# Patient Record
Sex: MALE | Race: WHITE | Employment: OTHER | URBAN - METROPOLITAN AREA
[De-identification: names, ages, dates, MRNs, and addresses within clinical notes are randomized per-mention and may not be internally consistent; named-entity substitution may affect disease eponyms.]

---

## 2017-06-12 ENCOUNTER — DOCUMENTATION ONLY (OUTPATIENT)
Dept: FAMILY MEDICINE CLINIC | Age: 82
End: 2017-06-12

## 2017-06-12 NOTE — PROGRESS NOTES
[address] [place] [address] Registry status was put on LPN [employer] desk to process

## 2017-08-03 RX ORDER — BISOPROLOL FUMARATE AND HYDROCHLOROTHIAZIDE 10; 6.25 MG/1; MG/1
1 TABLET ORAL DAILY
Qty: 30 TAB | Refills: 5 | Status: SHIPPED | OUTPATIENT
Start: 2017-08-03 | End: 2020-11-11

## 2017-12-20 ENCOUNTER — OFFICE VISIT (OUTPATIENT)
Dept: FAMILY MEDICINE CLINIC | Age: 82
End: 2017-12-20

## 2017-12-20 VITALS
WEIGHT: 149.2 LBS | HEART RATE: 54 BPM | OXYGEN SATURATION: 98 % | SYSTOLIC BLOOD PRESSURE: 155 MMHG | RESPIRATION RATE: 16 BRPM | BODY MASS INDEX: 29.29 KG/M2 | HEIGHT: 60 IN | TEMPERATURE: 97.7 F | DIASTOLIC BLOOD PRESSURE: 82 MMHG

## 2017-12-20 DIAGNOSIS — Z00.00 PHYSICAL EXAM: Primary | ICD-10-CM

## 2017-12-20 DIAGNOSIS — I10 ESSENTIAL HYPERTENSION: ICD-10-CM

## 2017-12-20 DIAGNOSIS — C61 PROSTATE CA (HCC): ICD-10-CM

## 2017-12-20 RX ORDER — ATENOLOL 50 MG/1
50 TABLET ORAL DAILY
Qty: 90 TAB | Refills: 3 | Status: SHIPPED | OUTPATIENT
Start: 2017-12-20 | End: 2018-12-31 | Stop reason: SDUPTHER

## 2017-12-20 RX ORDER — ALLOPURINOL 300 MG/1
300 TABLET ORAL DAILY
Qty: 90 TAB | Refills: 3 | Status: SHIPPED | OUTPATIENT
Start: 2017-12-20 | End: 2018-12-31 | Stop reason: SDUPTHER

## 2017-12-20 RX ORDER — ATENOLOL 50 MG/1
TABLET ORAL
COMMUNITY
Start: 2017-12-16 | End: 2017-12-20 | Stop reason: SDUPTHER

## 2017-12-20 NOTE — MR AVS SNAPSHOT
Visit Information Date & Time Provider Department Dept. Phone Encounter #  
 12/20/2017  8:30 AM Antoine Merchant [phone] [id number] Follow-up Instructions Return in about 1 year (around 12/20/2018), or if symptoms worsen or fail to improve. Upcoming Health Maintenance Date Due Pneumococcal 65+ High/Highest Risk (2 of 2 - PPSV23) 1/4/2012 MEDICARE YEARLY EXAM 12/20/2017 GLAUCOMA SCREENING Q2Y 12/19/2018 DTaP/Tdap/Td series (2 - Td) 12/19/2026 Allergies as of 12/20/2017  Review Complete On: 12/20/2017 By: Corina HANSON Rash, LPN Severity Noted Reaction Type Reactions Pcn [Penicillins]  01/04/2011    Swelling Current Immunizations  Reviewed on 12/7/2015 Name Date Influenza High Dose Vaccine PF 12/19/2016 Influenza Vaccine 10/30/2015 Influenza Vaccine Split 12/12/2012 ZZZ-RETIRED (DO NOT USE) Pneumococcal Vaccine (Unspecified Type) 1/4/2007 Zoster Vaccine, Live 10/30/2014 Not reviewed this visit You Were Diagnosed With   
  
 Codes Comments Physical exam    -  Primary ICD-10-CM: Z00.00 ICD-9-CM: V70.9 Prostate CA [facility])     ICD-10-CM: Q17 ICD-9-CM: 829 Vitals BP Pulse Temp Resp Height(growth percentile) Weight(growth percentile) 155/82 (BP 1 Location: Right arm, BP Patient Position: Sitting) (!) 54 97.7 °F (36.5 °C) (Oral) 16 5' (1.524 m) 149 lb 3.2 oz (67.7 kg) SpO2 BMI Smoking Status 98% 29.14 kg/m2 Never Smoker BMI and BSA Data Body Mass Index Body Surface Area  
 29.14 kg/m 2 1.69 m 2 Preferred Pharmacy Pharmacy Name Phone [phone] [place], [address] [phone] Your Updated Medication List  
  
   
This list is accurate as of: 12/20/17  8:51 AM.  Always use your most recent med list.  
  
  
  
  
 allopurinol 300 mg tablet Commonly known as:  Reyes Zhu Take 1 Tab by mouth daily. atenolol 50 mg tablet Commonly known as:  TENORMIN  
  
 bisoprolol-hydroCHLOROthiazide 10-6.25 mg per tablet Commonly known as:  [facility] Take 1 Tab by mouth daily. We Performed the Following CBC WITH AUTOMATED DIFF [12393 CPT(R)] LIPID PANEL [68302 CPT(R)] METABOLIC PANEL, COMPREHENSIVE [35048 CPT(R)] PSA, DIAGNOSTIC (PROSTATE SPECIFIC AG) [id number] CPT(R)] TSH 3RD GENERATION [25248 CPT(R)] Follow-up Instructions Return in about 1 year (around 12/20/2018), or if symptoms worsen or fail to improve. Patient Instructions A Healthy Lifestyle: Care Instructions Your Care Instructions A healthy lifestyle can help you feel good, stay at a healthy weight, and have plenty of energy for both work and play. A healthy lifestyle is something you can share with your whole family. A healthy lifestyle also can lower your risk for serious health problems, such as high blood pressure, heart disease, and diabetes. You can follow a few steps listed below to improve your health and the health of your family. Follow-up care is a key part of your treatment and safety. Be sure to make and go to all appointments, and call your doctor if you are having problems. It's also a good idea to know your test results and keep a list of the medicines you take. How can you care for yourself at home? · Do not eat too much sugar, fat, or fast foods. You can still have dessert and treats now and then. The goal is moderation. · Start small to improve your eating habits. Pay attention to portion sizes, drink less juice and soda pop, and eat more fruits and vegetables. ¨ Eat a healthy amount of food. A 3-ounce serving of meat, for example, is about the size of a deck of cards. Fill the rest of your plate with vegetables and whole grains. ¨ Limit the amount of soda and sports drinks you have every day. Drink more water when you are thirsty. ¨ Eat at least 5 servings of fruits and vegetables every day. It may seem like a lot, but it is not hard to reach this goal. A serving or helping is 1 piece of fruit, 1 cup of vegetables, or 2 cups of leafy, raw vegetables. Have an apple or some carrot sticks as an afternoon snack instead of a candy bar. Try to have fruits and/or vegetables at every meal. 
· Make exercise part of your daily routine. You may want to start with simple activities, such as walking, bicycling, or slow swimming. Try to be active 30 to 60 minutes every day. You do not need to do all 30 to 60 minutes all at once. For example, you can exercise 3 times a day for 10 or 20 minutes. Moderate exercise is safe for most people, but it is always a good idea to talk to your doctor before starting an exercise program. 
· Keep moving. [employer] the lawn, work in the garden, or [employer]. Take the stairs instead of the elevator at work. · If you smoke, quit. People who smoke have an increased risk for heart attack, stroke, cancer, and other lung illnesses. Quitting is hard, but there are ways to boost your chance of quitting tobacco for good. ¨ Use nicotine gum, patches, or lozenges. ¨ Ask your doctor about stop-smoking programs and medicines. ¨ Keep trying. In addition to reducing your risk of diseases in the future, you will notice some benefits soon after you stop using tobacco. If you have shortness of breath or asthma symptoms, they will likely get better within a few weeks after you quit. · Limit how much alcohol you drink. Moderate amounts of alcohol (up to 2 drinks a day for men, 1 drink a day for women) are okay. But drinking too much can lead to liver problems, high blood pressure, and other health problems. Family health If you have a family, there are many things you can do together to improve your health. · Eat meals together as a family as often as possible. · Eat healthy foods.  This includes fruits, vegetables, lean meats and dairy, and whole grains. · Include your family in your fitness plan. Most people think of activities such as jogging or tennis as the way to fitness, but there are many ways you and your family can be more active. Anything that makes you breathe hard and gets your heart pumping is exercise. Here are some tips: 
¨ Walk to do errands or to take your child to school or the bus. ¨ Go for a family bike ride after dinner instead of watching TV. Where can you learn more? Go to http://courtney-sera.info/. Enter G710 in the search box to learn more about \"A Healthy Lifestyle: Care Instructions. \" Current as of: May 12, 2017 Content Version: 11.4 © 0344-6466 [employer]. Care instructions adapted under license by [employer] (which disclaims liability or warranty for this information). If you have questions about a medical condition or this instruction, always ask your healthcare professional. [place] [address] any warranty or liability for your use of this information. Introducing [facility]! [facility] introduces [employer] patient portal. Now you can access parts of your medical record, email your doctor's office, and request medication refills online. 1. In your internet browser, go to [email or web address] 2. Click on the First Time User? Click Here link in the Sign In box. You will see the New Member Sign Up page. 3. Enter your [employer] Access Code exactly as it appears below. You will not need to use this code after youve completed the sign-up process. If you do not sign up before the expiration date, you must request a new code. · [employer] Access Code: [email or web address] Expires: 3/20/2018  8:37 AM 
 
4. Enter the last four digits of your Social Security Number (xxxx) and Date of Birth (mm/dd/yyyy) as indicated and click Submit. You will be taken to the next sign-up page. 5. Create a [employer] ID. This will be your [employer] login ID and cannot be changed, so think of one that is secure and easy to remember. 6. Create a [employer] password. You can change your password at any time. 7. Enter your Password Reset Question and Answer. This can be used at a later time if you forget your password. 8. Enter your e-mail address. You will receive e-mail notification when new information is available in [address] 9. Click Sign Up. You can now view and download portions of your medical record. 10. Click the Download Summary menu link to download a portable copy of your medical information. If you have questions, please visit the Frequently Asked Questions section of the [employer] website. Remember, [employer] is NOT to be used for urgent needs. For medical emergencies, dial 911. Now available from your iPhone and Android! Please provide this summary of care documentation to your next provider. Your primary care clinician is listed as TYSHAWN BRICEÑO. If you have any questions after today's visit, please call [phone].

## 2017-12-20 NOTE — PROGRESS NOTES
Sharon Alvarez is a 80 y.o. male  Chief Complaint   Patient presents with    Complete Physical     1. Have you been to an emergency room, urgent clinic, or hospitalized since your last visit? NO  If yes, where when, and reason for visit? 2. Have seen or consulted any other health care provider since your last visit? NO  Please include any pap smears or colon screening in this section  If yes, where when, and reason for visit?

## 2017-12-20 NOTE — PATIENT INSTRUCTIONS

## 2017-12-20 NOTE — PROGRESS NOTES
Wayne Tayolr is a 80 y.o. male   Chief Complaint   Patient presents with    Complete Physical    pt here for CPE and is othwerwise doing well. Chief Complaint   Patient presents with    Complete Physical     he is a 80y.o. year old male who presents for evalution. Reviewed PmHx, RxHx, FmHx, SocHx, AllgHx and updated and dated in the chart. Review of Systems - negative except as listed above in the HPI    Objective:     Vitals:    12/20/17 0829   BP: 155/82   Pulse: (!) 54   Resp: 16   Temp: 97.7 °F (36.5 °C)   TempSrc: Oral   SpO2: 98%   Weight: 149 lb 3.2 oz (67.7 kg)   Height: 5' (1.524 m)     Physical Examination: General appearance - alert, well appearing, and in no distress  Mental status - alert, oriented to person, place, and time  Eyes - pupils equal and reactive, extraocular eye movements intact  Ears - bilateral TM's and external ear canals normal  Nose - normal and patent, no erythema, discharge or polyps  Mouth - mucous membranes moist, pharynx normal without lesions  Neck - supple, no significant adenopathy  Lymphatics - no palpable lymphadenopathy, no hepatosplenomegaly  Chest - clear to auscultation, no wheezes, rales or rhonchi, symmetric air entry  Heart - normal rate, regular rhythm, normal S1, S2, no murmurs, rubs, clicks or gallops  Abdomen - soft, nontender, nondistended, no masses or organomegaly  Back exam - full range of motion, no tenderness, palpable spasm or pain on motion  Neurological - alert, oriented, normal speech, no focal findings or movement disorder noted  Musculoskeletal - no joint tenderness, deformity or swelling  Extremities - peripheral pulses normal, no pedal edema, no clubbing or cyanosis      Assessment/ Plan:   Diagnoses and all orders for this visit:    1. Physical exam  -     CBC WITH AUTOMATED DIFF  -     METABOLIC PANEL, COMPREHENSIVE  -     TSH 3RD GENERATION  -     LIPID PANEL  -     PROSTATE SPECIFIC AG    2.  Prostate CA ([facility] [address]  -     PROSTATE SPECIFIC AG     Pt wants to be mailed a copy of labs does not want to be called  -Patient is in good health  -Discussed with patient cancer risk factors and screens needed  -Patient needs a colonoscopy no  -Labs from previous visits were discussed with patient yes  -Discussed with patient diet and exercise=yes  -Discussed with patient testicular (male)/breast self exam (female)= yes  Follow-up Disposition:  Return in about 1 year (around 12/20/2018), or if symptoms worsen or fail to improve. I have discussed the diagnosis with the patient and the intended plan as seen in the above orders. The patient has received an after-visit summary and questions were answered concerning future plans. Pt conveyed understanding. Medication Side Effects and Warnings were discussed with patient: yes  Patient Labs were reviewed and or requested: yes  Patient Past Records were reviewed and or requested  yes    Patient Instructions   A Healthy Lifestyle: Care Instructions  Your Care Instructions    A healthy lifestyle can help you feel good, stay at a healthy weight, and have plenty of energy for both work and play. A healthy lifestyle is something you can share with your whole family. A healthy lifestyle also can lower your risk for serious health problems, such as high blood pressure, heart disease, and diabetes. You can follow a few steps listed below to improve your health and the health of your family. Follow-up care is a key part of your treatment and safety. Be sure to make and go to all appointments, and call your doctor if you are having problems. It's also a good idea to know your test results and keep a list of the medicines you take. How can you care for yourself at home? · Do not eat too much sugar, fat, or fast foods. You can still have dessert and treats now and then. The goal is moderation. · Start small to improve your eating habits.  Pay attention to portion sizes, drink less juice and soda pop, and eat more fruits and vegetables. ¨ Eat a healthy amount of food. A 3-ounce serving of meat, for example, is about the size of a deck of cards. Fill the rest of your plate with vegetables and whole grains. ¨ Limit the amount of soda and sports drinks you have every day. Drink more water when you are thirsty. ¨ Eat at least 5 servings of fruits and vegetables every day. It may seem like a lot, but it is not hard to reach this goal. A serving or helping is 1 piece of fruit, 1 cup of vegetables, or 2 cups of leafy, raw vegetables. Have an apple or some carrot sticks as an afternoon snack instead of a candy bar. Try to have fruits and/or vegetables at every meal.  · Make exercise part of your daily routine. You may want to start with simple activities, such as walking, bicycling, or slow swimming. Try to be active 30 to 60 minutes every day. You do not need to do all 30 to 60 minutes all at once. For example, you can exercise 3 times a day for 10 or 20 minutes. Moderate exercise is safe for most people, but it is always a good idea to talk to your doctor before starting an exercise program.  · Keep moving. [employer] the lawn, work in the garden, or [employer]. Take the stairs instead of the elevator at work. · If you smoke, quit. People who smoke have an increased risk for heart attack, stroke, cancer, and other lung illnesses. Quitting is hard, but there are ways to boost your chance of quitting tobacco for good. ¨ Use nicotine gum, patches, or lozenges. ¨ Ask your doctor about stop-smoking programs and medicines. ¨ Keep trying. In addition to reducing your risk of diseases in the future, you will notice some benefits soon after you stop using tobacco. If you have shortness of breath or asthma symptoms, they will likely get better within a few weeks after you quit. · Limit how much alcohol you drink. Moderate amounts of alcohol (up to 2 drinks a day for men, 1 drink a day for women) are okay.  But drinking too much can lead to liver problems, high blood pressure, and other health problems. Family health  If you have a family, there are many things you can do together to improve your health. · Eat meals together as a family as often as possible. · Eat healthy foods. This includes fruits, vegetables, lean meats and dairy, and whole grains. · Include your family in your fitness plan. Most people think of activities such as jogging or tennis as the way to fitness, but there are many ways you and your family can be more active. Anything that makes you breathe hard and gets your heart pumping is exercise. Here are some tips:  ¨ Walk to do errands or to take your child to school or the bus. ¨ Go for a family bike ride after dinner instead of watching TV. Where can you learn more? Go to http://courtney-sera.info/. Enter K484 in the search box to learn more about \"A Healthy Lifestyle: Care Instructions. \"  Current as of: May 12, 2017  Content Version: 11.4  © 7416-1667 Healthwise, [employer]. Care instructions adapted under license by [employer] (which disclaims liability or warranty for this information). If you have questions about a medical condition or this instruction, always ask your healthcare professional. [address] any warranty or liability for your use of this information.             Dr. Susi Espino

## 2017-12-21 LAB
ALBUMIN SERPL-MCNC: 4.4 G/DL (ref 3.5–4.7)
ALBUMIN/GLOB SERPL: 2.2 {RATIO} (ref 1.2–2.2)
ALP SERPL-CCNC: 51 IU/L (ref 39–117)
ALT SERPL-CCNC: 16 IU/L (ref 0–44)
AST SERPL-CCNC: 27 IU/L (ref 0–40)
BASOPHILS # BLD AUTO: 0 X10E3/UL (ref 0–0.2)
BASOPHILS NFR BLD AUTO: 0 %
BILIRUB SERPL-MCNC: 0.9 MG/DL (ref 0–1.2)
BUN SERPL-MCNC: 25 MG/DL (ref 8–27)
BUN/CREAT SERPL: 17 (ref 10–24)
CALCIUM SERPL-MCNC: 9.7 MG/DL (ref 8.6–10.2)
CHLORIDE SERPL-SCNC: 98 MMOL/L (ref 96–106)
CHOLEST SERPL-MCNC: 169 MG/DL (ref 100–199)
CO2 SERPL-SCNC: 24 MMOL/L (ref 18–29)
CREAT SERPL-MCNC: 1.43 MG/DL (ref 0.76–1.27)
EOSINOPHIL # BLD AUTO: 0.2 X10E3/UL (ref 0–0.4)
EOSINOPHIL NFR BLD AUTO: 4 %
ERYTHROCYTE [DISTWIDTH] IN BLOOD BY AUTOMATED COUNT: 14.5 % (ref 12.3–15.4)
GLOBULIN SER CALC-MCNC: 2 G/DL (ref 1.5–4.5)
GLUCOSE SERPL-MCNC: 85 MG/DL (ref 65–99)
HCT VFR BLD AUTO: 42.4 % (ref 37.5–51)
HDLC SERPL-MCNC: 53 MG/DL
HGB BLD-MCNC: 14.2 G/DL (ref 13–17.7)
IMM GRANULOCYTES # BLD: 0 X10E3/UL (ref 0–0.1)
IMM GRANULOCYTES NFR BLD: 0 %
INTERPRETATION, 910389: NORMAL
INTERPRETATION: NORMAL
LDLC SERPL CALC-MCNC: 105 MG/DL (ref 0–99)
LYMPHOCYTES # BLD AUTO: 1.1 X10E3/UL (ref 0.7–3.1)
LYMPHOCYTES NFR BLD AUTO: 21 %
MCH RBC QN AUTO: 32.1 PG (ref 26.6–33)
MCHC RBC AUTO-ENTMCNC: 33.5 G/DL (ref 31.5–35.7)
MCV RBC AUTO: 96 FL (ref 79–97)
MONOCYTES # BLD AUTO: 0.5 X10E3/UL (ref 0.1–0.9)
MONOCYTES NFR BLD AUTO: 10 %
NEUTROPHILS # BLD AUTO: 3.6 X10E3/UL (ref 1.4–7)
NEUTROPHILS NFR BLD AUTO: 65 %
PDF IMAGE, 910387: NORMAL
PLATELET # BLD AUTO: 200 X10E3/UL (ref 150–379)
POTASSIUM SERPL-SCNC: 4.4 MMOL/L (ref 3.5–5.2)
PROT SERPL-MCNC: 6.4 G/DL (ref 6–8.5)
PSA SERPL-MCNC: <0.1 NG/ML (ref 0–4)
RBC # BLD AUTO: 4.42 X10E6/UL (ref 4.14–5.8)
SODIUM SERPL-SCNC: 140 MMOL/L (ref 134–144)
TRIGL SERPL-MCNC: 53 MG/DL (ref 0–149)
TSH SERPL DL<=0.005 MIU/L-ACNC: 3.81 UIU/ML (ref 0.45–4.5)
VLDLC SERPL CALC-MCNC: 11 MG/DL (ref 5–40)
WBC # BLD AUTO: 5.5 X10E3/UL (ref 3.4–10.8)

## 2018-12-28 ENCOUNTER — DOCUMENTATION ONLY (OUTPATIENT)
Dept: FAMILY MEDICINE CLINIC | Age: 83
End: 2018-12-28

## 2018-12-31 ENCOUNTER — OFFICE VISIT (OUTPATIENT)
Dept: FAMILY MEDICINE CLINIC | Age: 83
End: 2018-12-31

## 2018-12-31 VITALS
HEIGHT: 60 IN | RESPIRATION RATE: 16 BRPM | SYSTOLIC BLOOD PRESSURE: 138 MMHG | WEIGHT: 153 LBS | OXYGEN SATURATION: 98 % | HEART RATE: 62 BPM | BODY MASS INDEX: 30.04 KG/M2 | DIASTOLIC BLOOD PRESSURE: 76 MMHG | TEMPERATURE: 97.7 F

## 2018-12-31 DIAGNOSIS — N18.30 CHRONIC RENAL IMPAIRMENT, STAGE 3 (MODERATE) (HCC): Chronic | ICD-10-CM

## 2018-12-31 DIAGNOSIS — C61 PROSTATE CA (HCC): ICD-10-CM

## 2018-12-31 DIAGNOSIS — Z00.00 ROUTINE GENERAL MEDICAL EXAMINATION AT A HEALTH CARE FACILITY: Primary | ICD-10-CM

## 2018-12-31 DIAGNOSIS — C85.90 LYMPHOMA, UNSPECIFIED BODY REGION, UNSPECIFIED LYMPHOMA TYPE (HCC): ICD-10-CM

## 2018-12-31 DIAGNOSIS — I10 ESSENTIAL HYPERTENSION: ICD-10-CM

## 2018-12-31 RX ORDER — ATENOLOL 50 MG/1
50 TABLET ORAL DAILY
Qty: 90 TAB | Refills: 3 | Status: SHIPPED | OUTPATIENT
Start: 2018-12-31 | End: 2020-02-27 | Stop reason: SDUPTHER

## 2018-12-31 RX ORDER — ALLOPURINOL 300 MG/1
300 TABLET ORAL DAILY
Qty: 90 TAB | Refills: 3 | Status: SHIPPED | OUTPATIENT
Start: 2018-12-31 | End: 2020-06-01

## 2018-12-31 NOTE — PROGRESS NOTES
Chief Complaint   Patient presents with    Complete Physical     [employer]    Medication Refill    Labs     fasting today     1. Have you been to the ER, urgent care clinic since your last visit? Hospitalized since your last visit? No    2. Have you seen or consulted any other health care providers outside of the [address] [place] [address] since your last visit? Include any pap smears or colon screening. Yes Where: [address]    Chief Complaint   Patient presents with    Complete Physical     [employer]    Medication Refill    Labs     fasting today     he is a 80y.o. year old male who presents for evalution. Reviewed PmHx, RxHx, FmHx, SocHx, AllgHx and updated and dated in the chart. Patient Active Problem List    Diagnosis    CRI (chronic renal insufficiency)    Lymphoma (HCC)    HTN (hypertension)    Prostate CA ([facility] [address]    Gout       Review of Systems - negative except as listed above in the HPI    Objective:     Vitals:    12/31/18 0822   BP: 138/76   Pulse: 62   Resp: 16   Temp: 97.7 °F (36.5 °C)   TempSrc: Oral   SpO2: 98%   Weight: 153 lb (69.4 kg)   Height: 5' (1.524 m)     Physical Examination: General appearance - alert, well appearing, and in no distress  Ears - bilateral TM's and external ear canals normal  Nose - normal and patent, no erythema, discharge or polyps  Mouth - mucous membranes moist, pharynx normal without lesions  Neck - supple, no significant adenopathy  Chest - clear to auscultation, no wheezes, rales or rhonchi, symmetric air entry  Heart - normal rate, regular rhythm, normal S1, S2, no murmurs, rubs, clicks or gallops  Abdomen - soft, nontender, nondistended, no masses or organomegaly  Extremities - peripheral pulses normal, no pedal edema, no clubbing or cyanosis    Assessment/ Plan:   Diagnoses and all orders for this visit:    1. Routine general medical examination at a health care facility  -     TSH 3RD GENERATION    2.  Essential hypertension  -     atenolol (TENORMIN) 50 mg tablet; Take 1 Tab by mouth daily. -     allopurinol (ZYLOPRIM) 300 mg tablet; Take 1 Tab by mouth daily.  -     LIPID PANEL  -     METABOLIC PANEL, COMPREHENSIVE    3. Chronic renal impairment, stage 3 (moderate) (HCC)  -     METABOLIC PANEL, COMPREHENSIVE    4. Lymphoma, unspecified body region, unspecified lymphoma type ([facility] [address]  -     CBC WITH AUTOMATED DIFF    5. Prostate CA (HCC)  -     PSA, DIAGNOSTIC (PROSTATE SPECIFIC AG)       -Patient is in good health  -Discussed with patient cancer risk factors and screens needed  -Colonoscopy was recommended based on current guidelines for screening.  -Labs from previous visits were discussed with patient yes  -Discussed with patient diet and exercise  -Immunizations appropriate for age were discussed with pt and updated  -Follow-up Disposition:  Return if symptoms worsen or fail to improve. I have discussed the diagnosis with the patient and the intended plan as seen in the above orders. The patient understands and agrees with the plan. The patient has received an after-visit summary and questions were answered concerning future plans. Medication Side Effects and Warnings were discussed with patient  Patient Labs were reviewed and or requested  Patient Past Records were reviewed and or requested     There are no Patient Instructions on file for this visit.         Manuel Phoenix M.D.

## 2019-01-01 LAB
ALBUMIN SERPL-MCNC: 4 G/DL (ref 3.5–4.7)
ALBUMIN/GLOB SERPL: 1.8 {RATIO} (ref 1.2–2.2)
ALP SERPL-CCNC: 64 IU/L (ref 39–117)
ALT SERPL-CCNC: 21 IU/L (ref 0–44)
AST SERPL-CCNC: 26 IU/L (ref 0–40)
BASOPHILS # BLD AUTO: 0 X10E3/UL (ref 0–0.2)
BASOPHILS NFR BLD AUTO: 0 %
BILIRUB SERPL-MCNC: 0.4 MG/DL (ref 0–1.2)
BUN SERPL-MCNC: 28 MG/DL (ref 8–27)
BUN/CREAT SERPL: 19 (ref 10–24)
CALCIUM SERPL-MCNC: 9.7 MG/DL (ref 8.6–10.2)
CHLORIDE SERPL-SCNC: 104 MMOL/L (ref 96–106)
CHOLEST SERPL-MCNC: 142 MG/DL (ref 100–199)
CO2 SERPL-SCNC: 23 MMOL/L (ref 20–29)
CREAT SERPL-MCNC: 1.44 MG/DL (ref 0.76–1.27)
EOSINOPHIL # BLD AUTO: 0.2 X10E3/UL (ref 0–0.4)
EOSINOPHIL NFR BLD AUTO: 4 %
ERYTHROCYTE [DISTWIDTH] IN BLOOD BY AUTOMATED COUNT: 14.6 % (ref 12.3–15.4)
GLOBULIN SER CALC-MCNC: 2.2 G/DL (ref 1.5–4.5)
GLUCOSE SERPL-MCNC: 101 MG/DL (ref 65–99)
HCT VFR BLD AUTO: 42 % (ref 37.5–51)
HDLC SERPL-MCNC: 46 MG/DL
HGB BLD-MCNC: 13.8 G/DL (ref 13–17.7)
IMM GRANULOCYTES # BLD: 0 X10E3/UL (ref 0–0.1)
IMM GRANULOCYTES NFR BLD: 0 %
INTERPRETATION, 910389: NORMAL
INTERPRETATION: NORMAL
LDLC SERPL CALC-MCNC: 85 MG/DL (ref 0–99)
LYMPHOCYTES # BLD AUTO: 1.3 X10E3/UL (ref 0.7–3.1)
LYMPHOCYTES NFR BLD AUTO: 23 %
MCH RBC QN AUTO: 31.9 PG (ref 26.6–33)
MCHC RBC AUTO-ENTMCNC: 32.9 G/DL (ref 31.5–35.7)
MCV RBC AUTO: 97 FL (ref 79–97)
MONOCYTES # BLD AUTO: 0.6 X10E3/UL (ref 0.1–0.9)
MONOCYTES NFR BLD AUTO: 10 %
MORPHOLOGY BLD-IMP: NORMAL
NEUTROPHILS # BLD AUTO: 3.6 X10E3/UL (ref 1.4–7)
NEUTROPHILS NFR BLD AUTO: 63 %
PDF IMAGE, 910387: NORMAL
PLATELET # BLD AUTO: 199 X10E3/UL (ref 150–379)
POTASSIUM SERPL-SCNC: 4.7 MMOL/L (ref 3.5–5.2)
PROT SERPL-MCNC: 6.2 G/DL (ref 6–8.5)
PSA SERPL-MCNC: <0.1 NG/ML (ref 0–4)
RBC # BLD AUTO: 4.33 X10E6/UL (ref 4.14–5.8)
SODIUM SERPL-SCNC: 143 MMOL/L (ref 134–144)
TRIGL SERPL-MCNC: 54 MG/DL (ref 0–149)
TSH SERPL DL<=0.005 MIU/L-ACNC: 3.53 UIU/ML (ref 0.45–4.5)
VLDLC SERPL CALC-MCNC: 11 MG/DL (ref 5–40)
WBC # BLD AUTO: 5.7 X10E3/UL (ref 3.4–10.8)

## 2019-01-01 NOTE — PROGRESS NOTES
After reviewing your labs, I believe they are within normal  limits for your age and let us stay with our current plan of action. If you have any questions, feel free to email thru [facility], or give us   a call back. Ilya Claire M.D.   Good Help to Those in Need  \"You maybe whatever you resolve to be\"

## 2019-01-02 NOTE — PROGRESS NOTES
A letter was sent to the patient at the address on file, with lab results and Dr. Hayden Quiles recommendations for the patient.

## 2019-01-28 ENCOUNTER — OFFICE VISIT (OUTPATIENT)
Dept: FAMILY MEDICINE CLINIC | Age: 84
End: 2019-01-28

## 2019-01-28 VITALS
DIASTOLIC BLOOD PRESSURE: 71 MMHG | RESPIRATION RATE: 18 BRPM | HEART RATE: 77 BPM | HEIGHT: 60 IN | OXYGEN SATURATION: 95 % | WEIGHT: 151 LBS | TEMPERATURE: 97.4 F | BODY MASS INDEX: 29.64 KG/M2 | SYSTOLIC BLOOD PRESSURE: 117 MMHG

## 2019-01-28 DIAGNOSIS — I10 ESSENTIAL HYPERTENSION: ICD-10-CM

## 2019-01-28 DIAGNOSIS — B02.9 HERPES ZOSTER WITHOUT COMPLICATION: Primary | ICD-10-CM

## 2019-01-28 DIAGNOSIS — C85.90 LYMPHOMA, UNSPECIFIED BODY REGION, UNSPECIFIED LYMPHOMA TYPE (HCC): ICD-10-CM

## 2019-02-19 ENCOUNTER — OFFICE VISIT (OUTPATIENT)
Dept: FAMILY MEDICINE CLINIC | Age: 84
End: 2019-02-19

## 2019-02-19 VITALS
SYSTOLIC BLOOD PRESSURE: 123 MMHG | WEIGHT: 149 LBS | HEART RATE: 70 BPM | DIASTOLIC BLOOD PRESSURE: 71 MMHG | HEIGHT: 60 IN | RESPIRATION RATE: 16 BRPM | BODY MASS INDEX: 29.25 KG/M2 | TEMPERATURE: 97.3 F | OXYGEN SATURATION: 99 %

## 2019-02-19 DIAGNOSIS — B02.9 HERPES ZOSTER WITHOUT COMPLICATION: Primary | ICD-10-CM

## 2019-02-19 RX ORDER — GABAPENTIN 100 MG/1
100 CAPSULE ORAL 2 TIMES DAILY
Qty: 30 CAP | Refills: 1 | Status: SHIPPED | OUTPATIENT
Start: 2019-02-19 | End: 2020-11-11

## 2019-02-19 NOTE — PROGRESS NOTES
Chief Complaint   Patient presents with    Leg Pain     Right side severe pain/numbness, since shingles outbreak    Hypertension    Toe Swelling     Right toes turning blue     1. Have you been to the ER, urgent care clinic since your last visit? Hospitalized since your last visit? No    2. Have you seen or consulted any other health care providers outside of the [address] [place] [address] since your last visit? Include any pap smears or colon screening. No      Chief Complaint   Patient presents with    Leg Pain     Right side severe pain/numbness, since shingles outbreak    Hypertension    Toe Swelling     Right toes turning blue     He is a 80 y.o. male who presents for evalution. Reviewed PmHx, RxHx, FmHx, SocHx, AllgHx and updated and dated in the chart. Patient Active Problem List    Diagnosis    CRI (chronic renal insufficiency)    Lymphoma (HCC)    HTN (hypertension)    Prostate CA ([facility] [address]    Gout       Review of Systems - negative except as listed above in the HPI    Objective:     Vitals:    02/19/19 1021   BP: 123/71   Pulse: 70   Resp: 16   Temp: 97.3 °F (36.3 °C)   TempSrc: Oral   SpO2: 99%   Weight: 149 lb (67.6 kg)   Height: 5' (1.524 m)     Physical Examination: General appearance - alert, well appearing, and in no distress  R leg with healed shingles rash    Assessment/ Plan:   Diagnoses and all orders for this visit:    1. Herpes zoster without complication  -     gabapentin (NEURONTIN) 100 mg capsule; Take 1 Cap by mouth two (2) times a day.  -add rx  -take at night first time     Follow-up Disposition:  Return if symptoms worsen or fail to improve. I have discussed the diagnosis with the patient and the intended plan as seen in the above orders. The patient understands and agrees with the plan. The patient has received an after-visit summary and questions were answered concerning future plans.      Medication Side Effects and Warnings were discussed with patient  Patient Labs were reviewed and or requested:  Patient Past Records were reviewed and or requested    Lay Mansfield M.D. There are no Patient Instructions on file for this visit.

## 2020-02-27 DIAGNOSIS — I10 ESSENTIAL HYPERTENSION: ICD-10-CM

## 2020-02-27 RX ORDER — ATENOLOL 50 MG/1
50 TABLET ORAL DAILY
Qty: 90 TAB | Refills: 3 | Status: SHIPPED | OUTPATIENT
Start: 2020-02-27 | End: 2020-08-13 | Stop reason: SDUPTHER

## 2020-02-27 NOTE — TELEPHONE ENCOUNTER
Pt has appt on 4.20.2020 with Dr. Devin Viramontes for his CPE. Can med be filled until his appt on 4.20? Pt states that he comes in once a year for cpe and med refill. He didn't want to make 2 appts.

## 2020-03-09 ENCOUNTER — DOCUMENTATION ONLY (OUTPATIENT)
Dept: FAMILY MEDICINE CLINIC | Age: 85
End: 2020-03-09

## 2020-03-09 NOTE — PROGRESS NOTES
[facility] cancer registry status request was put on [facility] desk to process

## 2020-03-10 ENCOUNTER — TELEPHONE (OUTPATIENT)
Dept: FAMILY MEDICINE CLINIC | Age: 85
End: 2020-03-10

## 2020-03-10 NOTE — TELEPHONE ENCOUNTER
[facility] cancer registry status request was signed & faxed to [phone] placed in scan folder to be scanned to chart.

## 2020-03-31 ENCOUNTER — DOCUMENTATION ONLY (OUTPATIENT)
Dept: FAMILY MEDICINE CLINIC | Age: 85
End: 2020-03-31

## 2020-04-01 ENCOUNTER — TELEPHONE (OUTPATIENT)
Dept: FAMILY MEDICINE CLINIC | Age: 85
End: 2020-04-01

## 2020-04-01 NOTE — TELEPHONE ENCOUNTER
[facility] cancer registry status request was signed & faxed to [phone] placed in scan folder to be scanned to chart.

## 2020-06-01 DIAGNOSIS — I10 ESSENTIAL HYPERTENSION: ICD-10-CM

## 2020-06-01 RX ORDER — ALLOPURINOL 300 MG/1
TABLET ORAL
Qty: 90 TAB | Refills: 1 | Status: SHIPPED | OUTPATIENT
Start: 2020-06-01 | End: 2020-09-09 | Stop reason: SDUPTHER

## 2020-08-13 ENCOUNTER — OFFICE VISIT (OUTPATIENT)
Dept: FAMILY MEDICINE CLINIC | Age: 85
End: 2020-08-13
Payer: COMMERCIAL

## 2020-08-13 ENCOUNTER — HOSPITAL ENCOUNTER (OUTPATIENT)
Dept: LAB | Age: 85
Discharge: HOME | End: 2020-08-13

## 2020-08-13 VITALS
TEMPERATURE: 97.9 F | RESPIRATION RATE: 16 BRPM | DIASTOLIC BLOOD PRESSURE: 89 MMHG | WEIGHT: 138 LBS | HEART RATE: 62 BPM | SYSTOLIC BLOOD PRESSURE: 157 MMHG | HEIGHT: 60 IN | OXYGEN SATURATION: 96 % | BODY MASS INDEX: 27.09 KG/M2

## 2020-08-13 DIAGNOSIS — I10 ESSENTIAL HYPERTENSION: ICD-10-CM

## 2020-08-13 DIAGNOSIS — C61 PROSTATE CA (HCC): ICD-10-CM

## 2020-08-13 DIAGNOSIS — Z00.00 PHYSICAL EXAM: Primary | ICD-10-CM

## 2020-08-13 DIAGNOSIS — C85.90 LYMPHOMA, UNSPECIFIED BODY REGION, UNSPECIFIED LYMPHOMA TYPE (HCC): ICD-10-CM

## 2020-08-13 DIAGNOSIS — Z00.00 PHYSICAL EXAM: ICD-10-CM

## 2020-08-13 LAB
ALBUMIN SERPL-MCNC: 3.9 G/DL (ref 3.5–5)
ALBUMIN/GLOB SERPL: 1.5 {RATIO} (ref 1.1–2.2)
ALP SERPL-CCNC: 60 U/L (ref 45–117)
ALT SERPL-CCNC: 13 U/L (ref 12–78)
ANION GAP SERPL CALC-SCNC: 8 MMOL/L (ref 5–15)
AST SERPL-CCNC: 20 U/L (ref 15–37)
BASOPHILS # BLD: 0 K/UL (ref 0–0.1)
BASOPHILS NFR BLD: 0 % (ref 0–1)
BILIRUB SERPL-MCNC: 0.9 MG/DL (ref 0.2–1)
BUN SERPL-MCNC: 27 MG/DL (ref 6–20)
BUN/CREAT SERPL: 18 (ref 12–20)
CALCIUM SERPL-MCNC: 9.4 MG/DL (ref 8.5–10.1)
CHLORIDE SERPL-SCNC: 104 MMOL/L (ref 97–108)
CHOLEST SERPL-MCNC: 146 MG/DL
CO2 SERPL-SCNC: 27 MMOL/L (ref 21–32)
CREAT SERPL-MCNC: 1.47 MG/DL (ref 0.7–1.3)
DIFFERENTIAL METHOD BLD: ABNORMAL
EOSINOPHIL # BLD: 0.2 K/UL (ref 0–0.4)
EOSINOPHIL NFR BLD: 2 % (ref 0–7)
ERYTHROCYTE [DISTWIDTH] IN BLOOD BY AUTOMATED COUNT: 14.3 % (ref 11.5–14.5)
GLOBULIN SER CALC-MCNC: 2.6 G/DL (ref 2–4)
GLUCOSE SERPL-MCNC: 81 MG/DL (ref 65–100)
HCT VFR BLD AUTO: 42.5 % (ref 36.6–50.3)
HDLC SERPL-MCNC: 45 MG/DL
HDLC SERPL: 3.2 {RATIO} (ref 0–5)
HGB BLD-MCNC: 13.2 G/DL (ref 12.1–17)
IMM GRANULOCYTES # BLD AUTO: 0 K/UL (ref 0–0.04)
IMM GRANULOCYTES NFR BLD AUTO: 0 % (ref 0–0.5)
LDLC SERPL CALC-MCNC: 88 MG/DL (ref 0–100)
LIPID PROFILE,FLP: NORMAL
LYMPHOCYTES # BLD: 1.4 K/UL (ref 0.8–3.5)
LYMPHOCYTES NFR BLD: 20 % (ref 12–49)
MCH RBC QN AUTO: 31.4 PG (ref 26–34)
MCHC RBC AUTO-ENTMCNC: 31.1 G/DL (ref 30–36.5)
MCV RBC AUTO: 101 FL (ref 80–99)
MONOCYTES # BLD: 0.6 K/UL (ref 0–1)
MONOCYTES NFR BLD: 9 % (ref 5–13)
NEUTS SEG # BLD: 4.6 K/UL (ref 1.8–8)
NEUTS SEG NFR BLD: 69 % (ref 32–75)
NRBC # BLD: 0 K/UL (ref 0–0.01)
NRBC BLD-RTO: 0 PER 100 WBC
PLATELET # BLD AUTO: 201 K/UL (ref 150–400)
PMV BLD AUTO: 11.7 FL (ref 8.9–12.9)
POTASSIUM SERPL-SCNC: 4.2 MMOL/L (ref 3.5–5.1)
PROT SERPL-MCNC: 6.5 G/DL (ref 6.4–8.2)
RBC # BLD AUTO: 4.21 M/UL (ref 4.1–5.7)
SODIUM SERPL-SCNC: 139 MMOL/L (ref 136–145)
TRIGL SERPL-MCNC: 65 MG/DL (ref ?–150)
TSH SERPL DL<=0.05 MIU/L-ACNC: 2.23 UIU/ML (ref 0.36–3.74)
VLDLC SERPL CALC-MCNC: 13 MG/DL
WBC # BLD AUTO: 6.7 K/UL (ref 4.1–11.1)

## 2020-08-13 PROCEDURE — 99397 PER PM REEVAL EST PAT 65+ YR: CPT | Performed by: FAMILY MEDICINE

## 2020-08-13 RX ORDER — ATENOLOL 50 MG/1
50 TABLET ORAL DAILY
Qty: 90 TAB | Refills: 3 | Status: SHIPPED | OUTPATIENT
Start: 2020-08-13 | End: 2020-09-09 | Stop reason: SDUPTHER

## 2020-08-13 NOTE — PROGRESS NOTES
Chief Complaint   Patient presents with    Complete Physical    Labs     he is a 80y.o. year old male who presents for evalution. Pt ehre for CPE and states he no longer wants his psa checked. Pt hx of prostate cancer. Reviewed PmHx, RxHx, FmHx, SocHx, AllgHx and updated and dated in the chart. Review of Systems - negative except as listed above in the HPI    Objective:     Vitals:    08/13/20 0852   BP: 157/89   Pulse: 62   Resp: 16   Temp: 97.9 °F (36.6 °C)   TempSrc: Oral   SpO2: 96%   Weight: 138 lb (62.6 kg)   Height: 5' (1.524 m)     Physical Examination: General appearance - alert, well appearing, and in no distress  Mental status - alert, oriented to person, place, and time  Eyes - pupils equal and reactive, extraocular eye movements intact  Ears - bilateral TM's and external ear canals normal  Mouth - mucous membranes moist, pharynx normal without lesions  Neck - supple, no significant adenopathy  Lymphatics - no palpable lymphadenopathy, no hepatosplenomegaly  Chest - clear to auscultation, no wheezes, rales or rhonchi, symmetric air entry  Heart - normal rate, regular rhythm, normal S1, S2, no murmurs, rubs, clicks or gallops  Abdomen - soft, nontender, nondistended, no masses or organomegaly  Extremities - peripheral pulses normal, no pedal edema, no clubbing or cyanosis    Assessment/ Plan:   Diagnoses and all orders for this visit:    1. Physical exam  -     LIPID PANEL; Future  -     METABOLIC PANEL, COMPREHENSIVE; Future  -     CBC WITH AUTOMATED DIFF; Future  -     TSH 3RD GENERATION; Future    2. Essential hypertension  -     atenoloL (TENORMIN) 50 mg tablet; Take 1 Tab by mouth daily. 3. Prostate CA ([address]  remission  4.  Lymphoma, unspecified body region, unspecified lymphoma type ([address]  remission     -Patient is in good health  -Discussed with patient cancer risk factors and screens needed  -Patient needs a colonoscopy no  -Labs from previous visits were discussed with patient yes  -Discussed with patient diet and exercise=yes  -Discussed with patient testicular (male)/breast self exam (female)= yes  Follow-up and Dispositions    · Return if symptoms worsen or fail to improve. I have discussed the diagnosis with the patient and the intended plan as seen in the above orders. The patient has received an after-visit summary and questions were answered concerning future plans. Pt conveyed understanding. Medication Side Effects and Warnings were discussed with patient: yes  Patient Labs were reviewed and or requested: yes  Patient Past Records were reviewed and or requested  yes    Patient Instructions        A Healthy Lifestyle: Care Instructions  Your Care Instructions     A healthy lifestyle can help you feel good, stay at a healthy weight, and have plenty of energy for both work and play. A healthy lifestyle is something you can share with your whole family. A healthy lifestyle also can lower your risk for serious health problems, such as high blood pressure, heart disease, and diabetes. You can follow a few steps listed below to improve your health and the health of your family. Follow-up care is a key part of your treatment and safety. Be sure to make and go to all appointments, and call your doctor if you are having problems. It's also a good idea to know your test results and keep a list of the medicines you take. How can you care for yourself at home? · Do not eat too much sugar, fat, or fast foods. You can still have dessert and treats now and then. The goal is moderation. · Start small to improve your eating habits. Pay attention to portion sizes, drink less juice and soda pop, and eat more fruits and vegetables. ? Eat a healthy amount of food. A 3-ounce serving of meat, for example, is about the size of a deck of cards. Fill the rest of your plate with vegetables and whole grains. ? Limit the amount of soda and sports drinks you have every day.  Drink more water when you are thirsty. ? Eat at least 5 servings of fruits and vegetables every day. It may seem like a lot, but it is not hard to reach this goal. A serving or helping is 1 piece of fruit, 1 cup of vegetables, or 2 cups of leafy, raw vegetables. Have an apple or some carrot sticks as an afternoon snack instead of a candy bar. Try to have fruits and/or vegetables at every meal.  · Make exercise part of your daily routine. You may want to start with simple activities, such as walking, bicycling, or slow swimming. Try to be active 30 to 60 minutes every day. You do not need to do all 30 to 60 minutes all at once. For example, you can exercise 3 times a day for 10 or 20 minutes. Moderate exercise is safe for most people, but it is always a good idea to talk to your doctor before starting an exercise program.  · Keep moving. Carol Chock the lawn, work in the garden, or [employer]. Take the stairs instead of the elevator at work. · If you smoke, quit. People who smoke have an increased risk for heart attack, stroke, cancer, and other lung illnesses. Quitting is hard, but there are ways to boost your chance of quitting tobacco for good. ? Use nicotine gum, patches, or lozenges. ? Ask your doctor about stop-smoking programs and medicines. ? Keep trying. In addition to reducing your risk of diseases in the future, you will notice some benefits soon after you stop using tobacco. If you have shortness of breath or asthma symptoms, they will likely get better within a few weeks after you quit. · Limit how much alcohol you drink. Moderate amounts of alcohol (up to 2 drinks a day for men, 1 drink a day for women) are okay. But drinking too much can lead to liver problems, high blood pressure, and other health problems. Family health  If you have a family, there are many things you can do together to improve your health. · Eat meals together as a family as often as possible. · Eat healthy foods.  This includes fruits, vegetables, lean meats and dairy, and whole grains. · Include your family in your fitness plan. Most people think of activities such as jogging or tennis as the way to fitness, but there are many ways you and your family can be more active. Anything that makes you breathe hard and gets your heart pumping is exercise. Here are some tips:  ? Walk to do errands or to take your child to school or the bus.  ? Go for a family bike ride after dinner instead of watching TV. Where can you learn more? Go to http://courtneyPulian Softwaresera.info/  Enter P778 in the search box to learn more about \"A Healthy Lifestyle: Care Instructions. \"  Current as of: January 31, 2020               Content Version: 12.5  © 8926-0135 Healthwise, Incorporated. Care instructions adapted under license by [employer] (which disclaims liability or warranty for this information). If you have questions about a medical condition or this instruction, always ask your healthcare professional. [place] [address] any warranty or liability for your use of this information.               Dr. Ori Machado

## 2020-08-13 NOTE — PROGRESS NOTES
Chief Complaint   Patient presents with    Complete Physical    Labs     Patient presents in office today for CPE and fasting labs. No concerns. 1. Have you been to the ER, urgent care clinic since your last visit? Hospitalized since your last visit? No    2. Have you seen or consulted any other health care providers outside of the [address] [place] [address] since your last visit? Include any pap smears or colon screening.  No    Learning Assessment 4/5/2016   PRIMARY LEARNER Patient   HIGHEST LEVEL OF EDUCATION - PRIMARY LEARNER  GRADUATED HIGH SCHOOL OR GED   BARRIERS PRIMARY LEARNER NONE   CO-LEARNER CAREGIVER No   PRIMARY LANGUAGE ENGLISH   LEARNER PREFERENCE PRIMARY DEMONSTRATION   ANSWERED BY pat   RELATIONSHIP SELF

## 2020-08-13 NOTE — PATIENT INSTRUCTIONS
A Healthy Lifestyle: Care Instructions Your Care Instructions A healthy lifestyle can help you feel good, stay at a healthy weight, and have plenty of energy for both work and play. A healthy lifestyle is something you can share with your whole family. A healthy lifestyle also can lower your risk for serious health problems, such as high blood pressure, heart disease, and diabetes. You can follow a few steps listed below to improve your health and the health of your family. Follow-up care is a key part of your treatment and safety. Be sure to make and go to all appointments, and call your doctor if you are having problems. It's also a good idea to know your test results and keep a list of the medicines you take. How can you care for yourself at home? · Do not eat too much sugar, fat, or fast foods. You can still have dessert and treats now and then. The goal is moderation. · Start small to improve your eating habits. Pay attention to portion sizes, drink less juice and soda pop, and eat more fruits and vegetables. ? Eat a healthy amount of food. A 3-ounce serving of meat, for example, is about the size of a deck of cards. Fill the rest of your plate with vegetables and whole grains. ? Limit the amount of soda and sports drinks you have every day. Drink more water when you are thirsty. ? Eat at least 5 servings of fruits and vegetables every day. It may seem like a lot, but it is not hard to reach this goal. A serving or helping is 1 piece of fruit, 1 cup of vegetables, or 2 cups of leafy, raw vegetables. Have an apple or some carrot sticks as an afternoon snack instead of a candy bar. Try to have fruits and/or vegetables at every meal. 
· Make exercise part of your daily routine. You may want to start with simple activities, such as walking, bicycling, or slow swimming. Try to be active 30 to 60 minutes every day.  You do not need to do all 30 to 60 minutes all at once. For example, you can exercise 3 times a day for 10 or 20 minutes. Moderate exercise is safe for most people, but it is always a good idea to talk to your doctor before starting an exercise program. 
· Keep moving. Garrosa Spar the lawn, work in the garden, or [employer]. Take the stairs instead of the elevator at work. · If you smoke, quit. People who smoke have an increased risk for heart attack, stroke, cancer, and other lung illnesses. Quitting is hard, but there are ways to boost your chance of quitting tobacco for good. ? Use nicotine gum, patches, or lozenges. ? Ask your doctor about stop-smoking programs and medicines. ? Keep trying. In addition to reducing your risk of diseases in the future, you will notice some benefits soon after you stop using tobacco. If you have shortness of breath or asthma symptoms, they will likely get better within a few weeks after you quit. · Limit how much alcohol you drink. Moderate amounts of alcohol (up to 2 drinks a day for men, 1 drink a day for women) are okay. But drinking too much can lead to liver problems, high blood pressure, and other health problems. Family health If you have a family, there are many things you can do together to improve your health. · Eat meals together as a family as often as possible. · Eat healthy foods. This includes fruits, vegetables, lean meats and dairy, and whole grains. · Include your family in your fitness plan. Most people think of activities such as jogging or tennis as the way to fitness, but there are many ways you and your family can be more active. Anything that makes you breathe hard and gets your heart pumping is exercise. Here are some tips: 
? Walk to do errands or to take your child to school or the bus. 
? Go for a family bike ride after dinner instead of watching TV. Where can you learn more? Go to http://courtney-sera.info/ Enter X950 in the search box to learn more about \"A Healthy Lifestyle: Care Instructions. \" Current as of: January 31, 2020               Content Version: 12.5 © 9628-1072 Healthwise, Incorporated. Care instructions adapted under license by [employer] (which disclaims liability or warranty for this information). If you have questions about a medical condition or this instruction, always ask your healthcare professional. [place] [address] any warranty or liability for your use of this information.

## 2020-09-09 DIAGNOSIS — I10 ESSENTIAL HYPERTENSION: ICD-10-CM

## 2020-09-09 RX ORDER — ATENOLOL 50 MG/1
50 TABLET ORAL DAILY
Qty: 90 TAB | Refills: 3 | Status: SHIPPED | OUTPATIENT
Start: 2020-09-09 | End: 2020-12-21

## 2020-09-09 RX ORDER — ALLOPURINOL 300 MG/1
TABLET ORAL
Qty: 90 TAB | Refills: 1 | Status: SHIPPED | OUTPATIENT
Start: 2020-09-09 | End: 2020-11-20

## 2020-09-09 NOTE — TELEPHONE ENCOUNTER
Patient states that [facility] was shut down so he needs these 2 rx sent to [facility] with refill for the rest of the year.

## 2020-11-11 ENCOUNTER — OFFICE VISIT (OUTPATIENT)
Dept: FAMILY MEDICINE CLINIC | Age: 85
End: 2020-11-11
Payer: COMMERCIAL

## 2020-11-11 VITALS
HEIGHT: 63 IN | BODY MASS INDEX: 24.1 KG/M2 | OXYGEN SATURATION: 97 % | SYSTOLIC BLOOD PRESSURE: 128 MMHG | WEIGHT: 136 LBS | HEART RATE: 72 BPM | DIASTOLIC BLOOD PRESSURE: 75 MMHG | TEMPERATURE: 97.7 F

## 2020-11-11 DIAGNOSIS — R29.6 FREQUENT FALLS: Primary | ICD-10-CM

## 2020-11-11 DIAGNOSIS — L21.9 SEBORRHEIC DERMATITIS OF SCALP: ICD-10-CM

## 2020-11-11 PROCEDURE — 99213 OFFICE O/P EST LOW 20 MIN: CPT | Performed by: STUDENT IN AN ORGANIZED HEALTH CARE EDUCATION/TRAINING PROGRAM

## 2020-11-11 RX ORDER — BISMUTH SUBSALICYLATE 262 MG
1 TABLET,CHEWABLE ORAL DAILY
COMMUNITY
End: 2020-12-21

## 2020-11-11 RX ORDER — KETOCONAZOLE 20 MG/G
CREAM TOPICAL 2 TIMES DAILY
Qty: 60 G | Refills: 1 | Status: SHIPPED | OUTPATIENT
Start: 2020-11-11 | End: 2020-11-30

## 2020-11-11 NOTE — PROGRESS NOTES
Val Berry is a 80 y.o. male , id x 2(name and ). Reviewed record, history, and  medications. Chief Complaint   Patient presents with    Hair/Scalp Problem     itchy scalp x 3 months. states that he has tried everything OTC that he can find and nothing has helped. Vitals:    20 1050   BP: 128/75   Pulse: 72   Temp: 97.7 °F (36.5 °C)   SpO2: 97%   Weight: 136 lb (61.7 kg)   Height: 5' 3\" (1.6 m)   PainSc:   0 - No pain       Coordination of Care Questionnaire:   1) Have you been to an emergency room, urgent care, or hospitalized since your last visit?   no       2. Have seen or consulted any other health care provider since your last visit? NO      3 most recent PHQ Screens 2020   Little interest or pleasure in doing things Not at all   Feeling down, depressed, irritable, or hopeless Not at all   Total Score PHQ 2 0       Patient is accompanied by self I have received verbal consent from Val Berry to discuss any/all medical information while they are present in the room.

## 2020-11-11 NOTE — PROGRESS NOTES
Progress Note    he is a 80y.o. year old male who presents for evalution. Subjective:     Having some issues with balance, especially since a fall 1.5 weeks ago. Was coming through the door holding onto a knob and fell into the hallway. Uses a cane sometimes. Took him 2 hours to get up off the floor, did not want to call for help. Daughter lives 2 miles away. No pain now, back was painful but now resolved. Has fallen into the couch and chair this week \"doing nothing\". No dizziness or pre-syncope, comes out of nowhere. H/o radiation on L side of face for non-hodgkins lymphoma and issues with loss of hearing on his left. Scalp itching off and on. Mostly itches in the posterior. No bleeding. Uses Dermarest Shampoo (3% salicylic acid). Has also tried Juan and one other thing he can't recall. Reviewed PmHx, RxHx, FmHx, SocHx, AllgHx and updated and dated in the chart. Review of Systems - negative except as listed above in the HPI     Objective:     Vitals:    11/11/20 1050   BP: 128/75   Pulse: 72   Temp: 97.7 °F (36.5 °C)   SpO2: 97%   Weight: 136 lb (61.7 kg)   Height: 5' 3\" (1.6 m)       Current Outpatient Medications   Medication Sig    multivitamin (ONE A DAY) tablet Take 1 Tab by mouth daily.  ketoconazole (NIZORAL) 2 % topical cream Apply  to affected area two (2) times a day for 28 days.  allopurinoL (ZYLOPRIM) 300 mg tablet TAKE ONE TABLET BY MOUTH EVERY DAY    atenoloL (TENORMIN) 50 mg tablet Take 1 Tab by mouth daily.  gabapentin (NEURONTIN) 100 mg capsule Take 1 Cap by mouth two (2) times a day. (Patient not taking: Reported on 11/11/2020)    bisoprolol-hydroCHLOROthiazide [facility]) 10-6.25 mg per tablet Take 1 Tab by mouth daily. (Patient not taking: Reported on 11/11/2020)     No current facility-administered medications for this visit. Physical Exam  Vitals signs and nursing note reviewed. Constitutional:       General: He is not in acute distress. Appearance: Normal appearance. He is not ill-appearing, toxic-appearing or diaphoretic. HENT:      Head: Normocephalic and atraumatic. Eyes:      General: No scleral icterus. Right eye: No discharge. Left eye: No discharge. Conjunctiva/sclera: Conjunctivae normal.   Neck:      Musculoskeletal: No neck rigidity. Cardiovascular:      Rate and Rhythm: Normal rate and regular rhythm. Pulses: Normal pulses. Heart sounds: Normal heart sounds. Pulmonary:      Effort: Pulmonary effort is normal. No respiratory distress. Breath sounds: Normal breath sounds. Musculoskeletal:      Right lower leg: No edema. Left lower leg: No edema. Skin:     General: Skin is warm and dry. Findings: Erythema (mild scattered erythema over posterior scalp with fine scaling) present. No lesion. Neurological:      General: No focal deficit present. Mental Status: He is alert and oriented to person, place, and time. Sensory: No sensory deficit. Motor: No weakness. Coordination: Coordination normal.      Gait: Gait normal.            Assessment/ Plan:   Diagnoses and all orders for this visit:    1. Frequent falls - declines to go to PT or undergo workup. [facility]  -     REFERRAL TO PHYSICAL THERAPY    2. Seborrheic dermatitis of scalp - mild, otc without full relief. Trial topical ketoconazole. -     ketoconazole (NIZORAL) 2 % topical cream; Apply  to affected area two (2) times a day for 28 days. Follow-up and Dispositions    · Return in about 3 months (around 2/11/2021). I have discussed the diagnosis with the patient and the intended plan as seen in the above orders. The patient has received an after-visit summary and questions were answered concerning future plans. Pt conveyed understanding of plan.     Medication Side Effects and Warnings were discussed with patient      Stanislaw Turciose Juan Antonio Gr MD

## 2020-11-20 ENCOUNTER — APPOINTMENT (OUTPATIENT)
Dept: MRI IMAGING | Age: 85
DRG: 682 | End: 2020-11-20
Attending: STUDENT IN AN ORGANIZED HEALTH CARE EDUCATION/TRAINING PROGRAM
Payer: MEDICARE

## 2020-11-20 ENCOUNTER — HOSPITAL ENCOUNTER (INPATIENT)
Age: 85
LOS: 7 days | Discharge: HOME HEALTH | DRG: 682 | End: 2020-11-27
Attending: EMERGENCY MEDICINE | Admitting: FAMILY MEDICINE
Payer: MEDICARE

## 2020-11-20 ENCOUNTER — APPOINTMENT (OUTPATIENT)
Dept: VASCULAR SURGERY | Age: 85
DRG: 682 | End: 2020-11-20
Attending: STUDENT IN AN ORGANIZED HEALTH CARE EDUCATION/TRAINING PROGRAM
Payer: MEDICARE

## 2020-11-20 ENCOUNTER — APPOINTMENT (OUTPATIENT)
Dept: CT IMAGING | Age: 85
DRG: 682 | End: 2020-11-20
Attending: EMERGENCY MEDICINE
Payer: MEDICARE

## 2020-11-20 DIAGNOSIS — R53.1 WEAKNESS GENERALIZED: ICD-10-CM

## 2020-11-20 DIAGNOSIS — E83.52 HYPERCALCEMIA OF MALIGNANCY: ICD-10-CM

## 2020-11-20 DIAGNOSIS — Z71.89 GOALS OF CARE, COUNSELING/DISCUSSION: ICD-10-CM

## 2020-11-20 DIAGNOSIS — R53.1 WEAKNESS: ICD-10-CM

## 2020-11-20 DIAGNOSIS — Z71.89 ADVANCED CARE PLANNING/COUNSELING DISCUSSION: ICD-10-CM

## 2020-11-20 DIAGNOSIS — R29.6 MULTIPLE FALLS: ICD-10-CM

## 2020-11-20 DIAGNOSIS — E83.52 HYPERCALCEMIA: ICD-10-CM

## 2020-11-20 DIAGNOSIS — C85.90 NON-HODGKIN'S LYMPHOMA IN RELAPSE (HCC): ICD-10-CM

## 2020-11-20 DIAGNOSIS — Z71.89 DNR (DO NOT RESUSCITATE) DISCUSSION: ICD-10-CM

## 2020-11-20 DIAGNOSIS — C85.90 LYMPHOMA, UNSPECIFIED BODY REGION, UNSPECIFIED LYMPHOMA TYPE (HCC): ICD-10-CM

## 2020-11-20 DIAGNOSIS — N17.9 ACUTE RENAL FAILURE, UNSPECIFIED ACUTE RENAL FAILURE TYPE (HCC): Primary | ICD-10-CM

## 2020-11-20 PROBLEM — M48.061 LUMBAR CANAL STENOSIS: Status: ACTIVE | Noted: 2020-11-20

## 2020-11-20 PROBLEM — M48.02 CERVICAL STENOSIS OF SPINAL CANAL: Status: ACTIVE | Noted: 2020-11-20

## 2020-11-20 LAB
25(OH)D3 SERPL-MCNC: 29 NG/ML (ref 30–100)
ACETONE,ACETX: NEGATIVE MG/L
ALBUMIN SERPL-MCNC: 2.8 G/DL (ref 3.5–5)
ALBUMIN SERPL-MCNC: 2.9 G/DL (ref 3.5–5)
ALBUMIN/GLOB SERPL: 0.9 {RATIO} (ref 1.1–2.2)
ALBUMIN/GLOB SERPL: 0.9 {RATIO} (ref 1.1–2.2)
ALP SERPL-CCNC: 59 U/L (ref 45–117)
ALP SERPL-CCNC: 62 U/L (ref 45–117)
ALT SERPL-CCNC: 13 U/L (ref 12–78)
ALT SERPL-CCNC: 13 U/L (ref 12–78)
AMMONIA PLAS-SCNC: 15 UMOL/L
AMPHET UR QL SCN: NEGATIVE
ANION GAP SERPL CALC-SCNC: 4 MMOL/L (ref 5–15)
ANION GAP SERPL CALC-SCNC: 5 MMOL/L (ref 5–15)
ANION GAP SERPL CALC-SCNC: 6 MMOL/L (ref 5–15)
APAP SERPL-MCNC: <2 UG/ML (ref 10–30)
APPEARANCE UR: CLEAR
AST SERPL-CCNC: 42 U/L (ref 15–37)
AST SERPL-CCNC: 43 U/L (ref 15–37)
BACTERIA URNS QL MICRO: NEGATIVE /HPF
BARBITURATES UR QL SCN: NEGATIVE
BASOPHILS # BLD: 0 K/UL (ref 0–0.1)
BASOPHILS NFR BLD: 0 % (ref 0–1)
BENZODIAZ UR QL: NEGATIVE
BILIRUB SERPL-MCNC: 0.7 MG/DL (ref 0.2–1)
BILIRUB SERPL-MCNC: 0.8 MG/DL (ref 0.2–1)
BILIRUB UR QL: NEGATIVE
BUN SERPL-MCNC: 52 MG/DL (ref 6–20)
BUN SERPL-MCNC: 52 MG/DL (ref 6–20)
BUN SERPL-MCNC: 56 MG/DL (ref 6–20)
BUN/CREAT SERPL: 14 (ref 12–20)
CA-I BLD-SCNC: 1.53 MMOL/L (ref 1.13–1.32)
CALCIUM SERPL-MCNC: 11.7 MG/DL (ref 8.5–10.1)
CALCIUM SERPL-MCNC: 11.8 MG/DL (ref 8.5–10.1)
CALCIUM SERPL-MCNC: 12 MG/DL (ref 8.5–10.1)
CALCIUM SERPL-MCNC: 13.6 MG/DL (ref 8.5–10.1)
CANNABINOIDS UR QL SCN: NEGATIVE
CHAIN OF CUSTODY,CHC: NO
CHLORIDE SERPL-SCNC: 102 MMOL/L (ref 97–108)
CHLORIDE SERPL-SCNC: 103 MMOL/L (ref 97–108)
CHLORIDE SERPL-SCNC: 98 MMOL/L (ref 97–108)
CO2 SERPL-SCNC: 30 MMOL/L (ref 21–32)
CO2 SERPL-SCNC: 31 MMOL/L (ref 21–32)
CO2 SERPL-SCNC: 32 MMOL/L (ref 21–32)
COCAINE UR QL SCN: NEGATIVE
COLOR UR: ABNORMAL
COMMENT, HOLDF: NORMAL
CREAT SERPL-MCNC: 3.73 MG/DL (ref 0.7–1.3)
CREAT SERPL-MCNC: 3.79 MG/DL (ref 0.7–1.3)
CREAT SERPL-MCNC: 3.89 MG/DL (ref 0.7–1.3)
CREAT UR-MCNC: 69 MG/DL
DIFFERENTIAL METHOD BLD: ABNORMAL
DRUG SCRN COMMENT,DRGCM: ABNORMAL
EOSINOPHIL # BLD: 0.2 K/UL (ref 0–0.4)
EOSINOPHIL NFR BLD: 2 % (ref 0–7)
EPITH CASTS URNS QL MICRO: ABNORMAL /LPF
ERYTHROCYTE [DISTWIDTH] IN BLOOD BY AUTOMATED COUNT: 14.8 % (ref 11.5–14.5)
ETHANOL SERPL-MCNC: <10 MG/DL
ETHANOL,ETHX: NEGATIVE MG/L
FERRITIN SERPL-MCNC: 583 NG/ML (ref 26–388)
FOLATE SERPL-MCNC: 39.6 NG/ML (ref 5–21)
GLOBULIN SER CALC-MCNC: 3 G/DL (ref 2–4)
GLOBULIN SER CALC-MCNC: 3.2 G/DL (ref 2–4)
GLUCOSE SERPL-MCNC: 114 MG/DL (ref 65–100)
GLUCOSE SERPL-MCNC: 115 MG/DL (ref 65–100)
GLUCOSE SERPL-MCNC: 140 MG/DL (ref 65–100)
GLUCOSE UR STRIP.AUTO-MCNC: NEGATIVE MG/DL
HAPTOGLOB SERPL-MCNC: 267 MG/DL (ref 30–200)
HCT VFR BLD AUTO: 36.2 % (ref 36.6–50.3)
HGB BLD-MCNC: 11.9 G/DL (ref 12.1–17)
HGB UR QL STRIP: ABNORMAL
HYALINE CASTS URNS QL MICRO: ABNORMAL /LPF (ref 0–5)
IMM GRANULOCYTES # BLD AUTO: 0 K/UL (ref 0–0.04)
IMM GRANULOCYTES NFR BLD AUTO: 0 % (ref 0–0.5)
IRON SATN MFR SERPL: 9 % (ref 20–50)
IRON SERPL-MCNC: 22 UG/DL (ref 35–150)
ISOPROPANOL,ISOPX: NEGATIVE MG/L
KETONES UR QL STRIP.AUTO: NEGATIVE MG/DL
LDH SERPL L TO P-CCNC: 359 U/L (ref 85–241)
LEUKOCYTE ESTERASE UR QL STRIP.AUTO: ABNORMAL
LYMPHOCYTES # BLD: 1.5 K/UL (ref 0.8–3.5)
LYMPHOCYTES NFR BLD: 15 % (ref 12–49)
MAGNESIUM SERPL-MCNC: 2.2 MG/DL (ref 1.6–2.4)
MCH RBC QN AUTO: 31.5 PG (ref 26–34)
MCHC RBC AUTO-ENTMCNC: 32.9 G/DL (ref 30–36.5)
MCV RBC AUTO: 95.8 FL (ref 80–99)
METHADONE UR QL: NEGATIVE
METHANOL,METHX: NEGATIVE MG/L
MONOCYTES # BLD: 1.6 K/UL (ref 0–1)
MONOCYTES NFR BLD: 16 % (ref 5–13)
NEUTS SEG # BLD: 6.5 K/UL (ref 1.8–8)
NEUTS SEG NFR BLD: 67 % (ref 32–75)
NITRITE UR QL STRIP.AUTO: NEGATIVE
NRBC # BLD: 0 K/UL (ref 0–0.01)
NRBC BLD-RTO: 0 PER 100 WBC
OPIATES UR QL: POSITIVE
OSMOLALITY SERPL: 307 MOSM/KG H2O
OSMOLALITY UR: 367 MOSM/KG H2O
PCP UR QL: NEGATIVE
PH UR STRIP: 6.5 [PH] (ref 5–8)
PHOSPHATE SERPL-MCNC: 5.6 MG/DL (ref 2.6–4.7)
PLATELET # BLD AUTO: 146 K/UL (ref 150–400)
PMV BLD AUTO: 11.5 FL (ref 8.9–12.9)
POTASSIUM SERPL-SCNC: 3.6 MMOL/L (ref 3.5–5.1)
POTASSIUM SERPL-SCNC: 4 MMOL/L (ref 3.5–5.1)
POTASSIUM SERPL-SCNC: 4.1 MMOL/L (ref 3.5–5.1)
PROT SERPL-MCNC: 5.8 G/DL (ref 6.4–8.2)
PROT SERPL-MCNC: 6.1 G/DL (ref 6.4–8.2)
PROT UR STRIP-MCNC: 30 MG/DL
PSA SERPL-MCNC: 0 NG/ML (ref 0.01–4)
PTH-INTACT SERPL-MCNC: 8.3 PG/ML (ref 18.4–88)
RBC # BLD AUTO: 3.78 M/UL (ref 4.1–5.7)
RBC #/AREA URNS HPF: ABNORMAL /HPF (ref 0–5)
REPORT STATUS,RSTSX: NORMAL
RETICS # AUTO: 0.04 M/UL (ref 0.03–0.1)
RETICS/RBC NFR AUTO: 1.1 % (ref 0.7–2.1)
SALICYLATES SERPL-MCNC: <1.7 MG/DL (ref 2.8–20)
SAMPLES BEING HELD,HOLD: NORMAL
SODIUM SERPL-SCNC: 136 MMOL/L (ref 136–145)
SODIUM SERPL-SCNC: 137 MMOL/L (ref 136–145)
SODIUM SERPL-SCNC: 138 MMOL/L (ref 136–145)
SODIUM UR-SCNC: 42 MMOL/L
SP GR UR REFRACTOMETRY: 1.01 (ref 1–1.03)
SPECIMEN SOURCE: NORMAL
TIBC SERPL-MCNC: 253 UG/DL (ref 250–450)
TROPONIN I SERPL-MCNC: <0.05 NG/ML
TSH SERPL DL<=0.05 MIU/L-ACNC: 1.79 UIU/ML (ref 0.36–3.74)
UROBILINOGEN UR QL STRIP.AUTO: 0.2 EU/DL (ref 0.2–1)
VIT B12 SERPL-MCNC: 284 PG/ML (ref 193–986)
WBC # BLD AUTO: 9.8 K/UL (ref 4.1–11.1)
WBC URNS QL MICRO: ABNORMAL /HPF (ref 0–4)

## 2020-11-20 PROCEDURE — 80053 COMPREHEN METABOLIC PANEL: CPT

## 2020-11-20 PROCEDURE — 70551 MRI BRAIN STEM W/O DYE: CPT

## 2020-11-20 PROCEDURE — 83935 ASSAY OF URINE OSMOLALITY: CPT

## 2020-11-20 PROCEDURE — 82140 ASSAY OF AMMONIA: CPT

## 2020-11-20 PROCEDURE — 83615 LACTATE (LD) (LDH) ENZYME: CPT

## 2020-11-20 PROCEDURE — 84153 ASSAY OF PSA TOTAL: CPT

## 2020-11-20 PROCEDURE — 36415 COLL VENOUS BLD VENIPUNCTURE: CPT

## 2020-11-20 PROCEDURE — 72125 CT NECK SPINE W/O DYE: CPT

## 2020-11-20 PROCEDURE — 83970 ASSAY OF PARATHORMONE: CPT

## 2020-11-20 PROCEDURE — 82570 ASSAY OF URINE CREATININE: CPT

## 2020-11-20 PROCEDURE — 72131 CT LUMBAR SPINE W/O DYE: CPT

## 2020-11-20 PROCEDURE — 72128 CT CHEST SPINE W/O DYE: CPT

## 2020-11-20 PROCEDURE — 87086 URINE CULTURE/COLONY COUNT: CPT

## 2020-11-20 PROCEDURE — 72141 MRI NECK SPINE W/O DYE: CPT

## 2020-11-20 PROCEDURE — 83010 ASSAY OF HAPTOGLOBIN QUANT: CPT

## 2020-11-20 PROCEDURE — 93880 EXTRACRANIAL BILAT STUDY: CPT

## 2020-11-20 PROCEDURE — 80320 DRUG SCREEN QUANTALCOHOLS: CPT

## 2020-11-20 PROCEDURE — 84466 ASSAY OF TRANSFERRIN: CPT

## 2020-11-20 PROCEDURE — 74011250636 HC RX REV CODE- 250/636: Performed by: EMERGENCY MEDICINE

## 2020-11-20 PROCEDURE — 82746 ASSAY OF FOLIC ACID SERUM: CPT

## 2020-11-20 PROCEDURE — 81001 URINALYSIS AUTO W/SCOPE: CPT

## 2020-11-20 PROCEDURE — 84100 ASSAY OF PHOSPHORUS: CPT

## 2020-11-20 PROCEDURE — 72148 MRI LUMBAR SPINE W/O DYE: CPT

## 2020-11-20 PROCEDURE — 82728 ASSAY OF FERRITIN: CPT

## 2020-11-20 PROCEDURE — 99284 EMERGENCY DEPT VISIT MOD MDM: CPT

## 2020-11-20 PROCEDURE — 84300 ASSAY OF URINE SODIUM: CPT

## 2020-11-20 PROCEDURE — 80307 DRUG TEST PRSMV CHEM ANLYZR: CPT

## 2020-11-20 PROCEDURE — 65660000000 HC RM CCU STEPDOWN

## 2020-11-20 PROCEDURE — 83735 ASSAY OF MAGNESIUM: CPT

## 2020-11-20 PROCEDURE — 70544 MR ANGIOGRAPHY HEAD W/O DYE: CPT

## 2020-11-20 PROCEDURE — 85025 COMPLETE CBC W/AUTO DIFF WBC: CPT

## 2020-11-20 PROCEDURE — 82330 ASSAY OF CALCIUM: CPT

## 2020-11-20 PROCEDURE — 84484 ASSAY OF TROPONIN QUANT: CPT

## 2020-11-20 PROCEDURE — 83930 ASSAY OF BLOOD OSMOLALITY: CPT

## 2020-11-20 PROCEDURE — 99222 1ST HOSP IP/OBS MODERATE 55: CPT | Performed by: FAMILY MEDICINE

## 2020-11-20 PROCEDURE — 82306 VITAMIN D 25 HYDROXY: CPT

## 2020-11-20 PROCEDURE — 82607 VITAMIN B-12: CPT

## 2020-11-20 PROCEDURE — 83540 ASSAY OF IRON: CPT

## 2020-11-20 PROCEDURE — 70450 CT HEAD/BRAIN W/O DYE: CPT

## 2020-11-20 PROCEDURE — 84443 ASSAY THYROID STIM HORMONE: CPT

## 2020-11-20 PROCEDURE — 99285 EMERGENCY DEPT VISIT HI MDM: CPT

## 2020-11-20 PROCEDURE — 85045 AUTOMATED RETICULOCYTE COUNT: CPT

## 2020-11-20 RX ORDER — ALLOPURINOL 300 MG/1
150 TABLET ORAL DAILY
COMMUNITY
End: 2020-12-06

## 2020-11-20 RX ORDER — HEPARIN SODIUM 5000 [USP'U]/ML
5000 INJECTION, SOLUTION INTRAVENOUS; SUBCUTANEOUS EVERY 8 HOURS
Status: DISCONTINUED | OUTPATIENT
Start: 2020-11-20 | End: 2020-11-26

## 2020-11-20 RX ORDER — SODIUM CHLORIDE 0.9 % (FLUSH) 0.9 %
5-40 SYRINGE (ML) INJECTION AS NEEDED
Status: DISCONTINUED | OUTPATIENT
Start: 2020-11-20 | End: 2020-11-27 | Stop reason: HOSPADM

## 2020-11-20 RX ORDER — SODIUM CHLORIDE 0.9 % (FLUSH) 0.9 %
5-40 SYRINGE (ML) INJECTION EVERY 8 HOURS
Status: DISCONTINUED | OUTPATIENT
Start: 2020-11-20 | End: 2020-11-27 | Stop reason: HOSPADM

## 2020-11-20 RX ORDER — SODIUM CHLORIDE 9 MG/ML
150 INJECTION, SOLUTION INTRAVENOUS CONTINUOUS
Status: DISCONTINUED | OUTPATIENT
Start: 2020-11-20 | End: 2020-11-24

## 2020-11-20 RX ORDER — VITAMIN E CAP 100 UNIT 100 UNIT
100 CAP ORAL DAILY
COMMUNITY
End: 2020-12-21

## 2020-11-20 RX ADMIN — Medication 10 ML: at 22:00

## 2020-11-20 RX ADMIN — SODIUM CHLORIDE 1000 ML: 900 INJECTION, SOLUTION INTRAVENOUS at 14:50

## 2020-11-20 NOTE — H&P
[address] [place] [address]   Office [phone]  Fax [phone]       Admission H&P     Name: Brittney Zhang MRN: [id number]  Sex: Male   YOB: 1932  Age: 80 y.o. PCP: Kevin Davies MD     Source of Information: patient, medical records    Chief complaint:     History of Present Illness  Brittney Zhang is a 80 y.o. male with PMHx of Hx of prostate CA, HTN, Gout, hx of lymphoma who presents to the ED complaining of a week hx of generalized weakness and fatigue. Pt and daughter refers recurrent fall episodes for the past 5 days associated with head trauma but w/o LOC. Pt has difficulty ambulating. Family notes a decline in his ADL,  from driving his own car and ambulating with her walker when to go to the barely being able to ambulate. Pertinent positives includes fatigue, polydipsia, constipation (last bowel movement 4 days ago), urinary incontinence (once) and back pain. No numbness, saddle  or tingling. No loss of bowel or bladder habit. No chest pain, shortness of breath, fevers or chills.        COVID Questions:   Experiencing any of the following symptoms: fever, chills, cough, SOB, diarrhea, URI symptoms: none  Any Sick contacts with fever, cough, diarrhea, SOB, URI symptoms. ye  Traveled out of state or out of country. no  Lives with:  alone. Has been staying at home. yes    In the ED:  Vitals: Temp: 98  /78   HR 71   RR 18   SatO2  94% on RA  Labs: Cr: 3.89 (BL: 1.4), BUN: 56, UA: + leuk esterase, Ca: 13.6 corrected 14.5, Hgb: 11.9 (14)  Imaging: Ct spine thoracic: T12 with suspected fx in the body of  T11, CT spine: severe canal stenoses at L3-4 and L4-5.    Treatment: nss 0.9% 1L    EKG: not performed in the ED    Patient Vitals for the past 12 hrs:   Temp Pulse Resp BP SpO2   11/20/20 1446    (!) 132/59 96 %   11/20/20 1305     94 %   11/20/20 1300    (!) 109/45 94 %   11/20/20 1240 98 °F (36.7 °C) 71 18 118/78 94 %       Review of Systems  Review of Systems   Constitutional: Positive for appetite change and fatigue. HENT: Negative. Eyes: Negative. Respiratory: Negative for cough, chest tightness, shortness of breath and wheezing. Cardiovascular: Negative for chest pain and leg swelling. Gastrointestinal: Positive for constipation. Negative for abdominal pain, diarrhea, nausea and vomiting. Endocrine: Negative. Genitourinary: Positive for decreased urine volume. Musculoskeletal: Positive for back pain and gait problem. Skin: Negative. Allergic/Immunologic: Negative. Neurological: Positive for dizziness, weakness and light-headedness. Negative for syncope, numbness and headaches. Hematological: Negative. Psychiatric/Behavioral: Negative. Home Medications   Prior to Admission medications    Medication Sig Start Date End Date Taking? Authorizing Provider   multivitamin (ONE A DAY) tablet Take 1 Tab by mouth daily. Provider, Historical   ketoconazole (NIZORAL) 2 % topical cream Apply  to affected area two (2) times a day for 28 days. 11/11/20 12/9/20  Jesica Alfred MD   allopurinoL (ZYLOPRIM) 300 mg tablet TAKE ONE TABLET BY MOUTH EVERY DAY 9/9/20   Aleena Waterman MD   atenoloL (TENORMIN) 50 mg tablet Take 1 Tab by mouth daily. 9/9/20   Aleena Waterman MD       Allergies  Allergies   Allergen Reactions    Pcn [Penicillins] Swelling       Past Medical History  Past Medical History:   Diagnosis Date    CRI (chronic renal insufficiency) 12/13/2012    Gout 1/4/2011    [facility]       Previous Hospitalization(s)  Past Surgical History:   Procedure Laterality Date    ENDOSCOPY, COLON, DIAGNOSTIC  2003    neg    HC BONE MARROW BIOPSY      HX PROSTATECTOMY         Family History  No family history on file. Social History  Alcohol history: Not at all  Smoking history: Non-smoker  Illicit drug history: Not at all  Living arrangement: patient lives alone.   Ambulates: needs walker around the house, but recently unable to walk even with walker around the house. Vital Signs  Visit Vitals  BP (!) 132/59   Pulse 71   Temp 98 °F (36.7 °C)   Resp 18   Ht 5' 3\" (1.6 m)   Wt 136 lb (61.7 kg)   SpO2 96%   BMI 24.09 kg/m²       Physical Exam  General: In no acute distress. Alert. Cooperative. Head: Normocephalic. Atraumatic. Eyes:              Pin point pupil. Sclera white. PERRL. EOMI. Ears:              Marked hearing loss. Respiratory: CTAB. No w/r/r/c.   Cardiovascular: RRR. GI: Hyperactive bowel sounds. Nontender. No rebound tenderness or guarding. Nondistended. Extremities: No LE edema. Distal pulses intact. Musculoskeletal: Full ROM in all extremities. Skin: Warm, dry. No rashes. Neuro: CN II-XII grossly intact. Strength 5/5 in all extremities. Present cremasteric reflex and anal wink present. Laboratory Data  Recent Results (from the past 8 hour(s))   CBC WITH AUTOMATED DIFF    Collection Time: 11/20/20  1:31 PM   Result Value Ref Range    WBC 9.8 4.1 - 11.1 K/uL    RBC 3.78 (L) 4.10 - 5.70 M/uL    HGB 11.9 (L) 12.1 - 17.0 g/dL    HCT 36.2 (L) 36.6 - 50.3 %    MCV 95.8 80.0 - 99.0 FL    MCH 31.5 26.0 - 34.0 PG    MCHC 32.9 30.0 - 36.5 g/dL    RDW 14.8 (H) 11.5 - 14.5 %    PLATELET 008 (L) 089 - 400 K/uL    MPV 11.5 8.9 - 12.9 FL    NRBC 0.0 0  WBC    ABSOLUTE NRBC 0.00 0.00 - 0.01 K/uL    NEUTROPHILS 67 32 - 75 %    LYMPHOCYTES 15 12 - 49 %    MONOCYTES 16 (H) 5 - 13 %    EOSINOPHILS 2 0 - 7 %    BASOPHILS 0 0 - 1 %    IMMATURE GRANULOCYTES 0 0.0 - 0.5 %    ABS. NEUTROPHILS 6.5 1.8 - 8.0 K/UL    ABS. LYMPHOCYTES 1.5 0.8 - 3.5 K/UL    ABS. MONOCYTES 1.6 (H) 0.0 - 1.0 K/UL    ABS. EOSINOPHILS 0.2 0.0 - 0.4 K/UL    ABS. BASOPHILS 0.0 0.0 - 0.1 K/UL    ABS. IMM.  GRANS. 0.0 0.00 - 0.04 K/UL    DF AUTOMATED     METABOLIC PANEL, BASIC    Collection Time: 11/20/20  1:31 PM   Result Value Ref Range    Sodium 136 136 - 145 mmol/L    Potassium 4.0 3.5 - 5.1 mmol/L Chloride 98 97 - 108 mmol/L    CO2 32 21 - 32 mmol/L    Anion gap 6 5 - 15 mmol/L    Glucose 114 (H) 65 - 100 mg/dL    BUN 56 (H) 6 - 20 MG/DL    Creatinine 3.89 (H) 0.70 - 1.30 MG/DL    BUN/Creatinine ratio 14 12 - 20      GFR est AA 18 (L) >60 ml/min/1.73m2    GFR est non-AA 15 (L) >60 ml/min/1.73m2    Calcium 13.6 (HH) 8.5 - 10.1 MG/DL   TROPONIN I    Collection Time: 11/20/20  1:31 PM   Result Value Ref Range    Troponin-I, Qt. <0.05 <0.05 ng/mL   SAMPLES BEING HELD    Collection Time: 11/20/20  1:31 PM   Result Value Ref Range    SAMPLES BEING HELD 1RED,1BLUE     COMMENT        Add-on orders for these samples will be processed based on acceptable specimen integrity and analyte stability, which may vary by analyte. URINALYSIS W/ RFLX MICROSCOPIC    Collection Time: 11/20/20  2:50 PM   Result Value Ref Range    Color YELLOW/STRAW      Appearance CLEAR CLEAR      Specific gravity 1.012 1.003 - 1.030      pH (UA) 6.5 5.0 - 8.0      Protein 30 (A) NEG mg/dL    Glucose Negative NEG mg/dL    Ketone Negative NEG mg/dL    Bilirubin Negative NEG      Blood MODERATE (A) NEG      Urobilinogen 0.2 0.2 - 1.0 EU/dL    Nitrites Negative NEG      Leukocyte Esterase TRACE (A) NEG      WBC 5-10 0 - 4 /hpf    RBC 20-50 0 - 5 /hpf    Epithelial cells FEW FEW /lpf    Bacteria Negative NEG /hpf    Hyaline cast 0-2 0 - 5 /lpf       Imaging  CXR Results  (Last 48 hours)    None        CT Results  (Last 48 hours)               11/20/20 1414  CT HEAD WO CONT Final result    Impression:  IMPRESSION:    No acute intracranial process. Imaging findings consistent with mild chronic microvascular ischemic change. There is a mild to moderate degree of cerebral atrophy. Narrative:  CLINICAL HISTORY: fall   INDICATION: fall   COMPARISON: None. CT dose reduction was achieved through use of a standardized protocol tailored   for this examination and automatic exposure control for dose modulation.     TECHNIQUE: Serial axial images with a collimation of 5 mm were obtained from the   skull base through the vertex     FINDINGS:    There is sulcal and ventricular prominence. Confluent periventricular and   scattered foci of hypodensity in the cerebral white matter. There is no evidence   of an acute infarction, hemorrhage, or mass-effect. There is no evidence of   midline shift or hydrocephalus. Posterior fossa structures are unremarkable. No   extra-axial collections are seen. Mastoid air cells are well pneumatized and clear. Vertebral and carotid vascular calcifications are noted. 11/20/20 1414  CT SPINE CERV WO CONT Final result    Impression:  IMPRESSION:   There is no acute fracture or dislocation identified. Narrative:  EXAM: CT SPINE CERV WO CONT   CLINICAL HISTORY: fall   INDICATION: fall   COMPARISON:  None   TECHNIQUE:  Axial neck CT was performed. Noncontrast imaging obtained. Coronal   and sagittal reconstructions were performed. CT dose reduction was achieved through use of a standardized protocol tailored   for this examination and automatic exposure control for dose modulation. Osseous/bone algorithm was utilized. FINDINGS:   Carotid atherosclerotic change. No pneumothorax. No large pulmonary mass or   nodule. .  There is no evidence of fracture or subluxation. The prevertebral soft   tissues are grossly within normal limits. The atlantodental interval is within   normal limits. The craniocervical junction is intact. Multilevel loss of disc height. Multilevel severe canal and foraminal stenoses. Minimal anterolisthesis of C7 on T1.       11/20/20 1414  CT SPINE THORAC WO CONT Final result    Impression:  IMPRESSION:    1. Cortical disruption in the body of T12 with suspected fracture in the body of   T11. Narrative:  EXAM:  CT SPINE THORAC WO CONT    INDICATION: Fall, back pain. COMPARISON: None. TECHNIQUE:    Multislice helical CT of the thoracic spine was performed. Sagittal and coronal   reformations were generated. CT dose reduction was achieved through use of a   standardized protocol tailored for this examination and automatic exposure   control for dose modulation. FINDINGS:   There are osteophytes at multiple levels. There is cortical disruption in the anterior, superior endplate of A34 which   extends through both the right lateral and left lateral osteophyte. Query   cortical disruption in the right, lateral aspect of the T11 vertebral body. There is no spinal canal stenosis. Coronary artery disease and atherosclerosis. Prominent lymph nodes in the   posterior mediastinum Likely cystic lesions in both kidneys which are   incompletely characterized             11/20/20 1414  CT SPINE LUMB WO CONT Final result    Impression:  IMPRESSION:    Extensive multilevel degenerative change with multilevel spondylolisthesis as   well. Severe canal stenoses at L3-4 and L4-5. Additional, less severe degenerative findings are as described in detail above. Narrative:  EXAM: CT SPINE LUMB WO CONT   History: Status post fall/back pain   INDICATION: fall, back pain       COMPARISON: None. TECHNIQUE:   Unenhanced multislice helical CT of the lumbar spine was performed   in the axial plane. Coronal and sagittal reconstructions were obtained. CT   dose reduction was achieved through use of a standardized protocol tailored for   this examination and automatic exposure control for dose modulation. FINDINGS:       There is retrolisthesis of L2 on L3 and L3 on L4 each measuring 4 mm. Anterolisthesis of L4 on L5 measuring or millimeters. Abdominal aorta   demonstrates atherosclerotic change. There are renal calculi. There is no   hydronephrosis. Left renal hypodensities most likely a cyst. Mild levorotatory   scoliotic change. T12-L1: The spinal canal and neural foramina are widely patent.        L1-L2: The spinal canal and neural foramina are widely patent. L2-L3: Moderate facet arthropathy. Ligamentum flavum hypertrophy. Minimal   retrolisthesis. Disc bulge/osteophyte. Moderate canal and right foraminal   stenosis. L3-L4: Facet arthropathy and hypertrophy. Ligament flavum hypertrophy. Minimal   retrolisthesis. Severe canal stenosis. Mild bilateral foraminal stenoses. L4-L5: Facet arthropathy and hypertrophy. Disc bulge/osteophyte. Severe canal   stenosis. Severe left foraminal stenosis. L5-S1: Mild facet arthropathy. Canal is patent. Foramina are patent. Assessment and Plan     Arlene Mccartney is a 80 y.o. male with a PMHx of prostate CA, HTN, Gout, hx of lymphoma who presents to the ED with a weakness, acute renal failure and hypercalcemia. Weakness w/ Recurrent falls: Pt w/ hx 3 falls in the past 7 days associated with chronic dizziness. CT spine severe canal stenosis at L3-4 and L4-5. CT spine thoracic: cortical disruption in the body of T12 with suspected fracture in the body of T11.  -Admit to remote telemetry  -Vital signs per unit protoccol  -Brain, thoracic, lumbar, cervical spine MRI  -carotid DOPPLER  -echo  -ekg  -orthostatic bp's  -ammonia, acetaminophen levels, volatiles, salicylates, etoh levels  -fall precautions  -CBC and CMP daily  -Mag, phos, Tsh  -consider consult neuro    Spinal stenosis: recurrent history of weakness, dizziness and falls. -ortho consult  -Lumbar mri    T12 questionable fx: Anthony Velasquez CT spine thoracic: cortical disruption in the body of T12 with suspected fracture in the body of T11.  -consult ortho  -lumbar mri    Hypercalcemia: POA: 13.6 (BL9) ---> corrected Ca:14.5. could be 2/2  Hypovolemia vs  malignancy given hx of lymphoma and prostate CA  vs hyperparathyroidim.  Improving with IVF hydration down to 12.7, will hold off on calcitonin given improvement w/ fluid  -PTH levels  -ionized calcium  -vit d level  -Conitnue IVF aggressive hydration  -Consult nephrology  -repeat ionized ca+ at 10:00 pm     Opioid use: No opioids prescribed per . UDS + for opiates. Hypertension- On home atenolol 50 mg tablet everyday. - Continuing home medications   - Will continue to monitor at this time and readjust as BP's trend.}    Acute renal Failure-  Hx of Estimated Creatinine Clearance: 10.6 mL/min (A) (based on SCr of 3.89 mg/dL (H)). Baseline 1.45. Etiology may be secondary to hypercalcemia   -Urine electrolytes  -IVF: NS @ 150mL/hr  -Strict I&O  -Nephrology consult  -CMP  -Urine lytes  -serum osmolarity  -Ammonia levels  -ucx: Anemia: poa: 11.9 (bl: 13) likely secondary to chronic renal failur. hx of active bleeding.  -peripheral smear  -CBC daily  -LD, Iron studies  -haptoglobin    Gout: stable  -Hold home allopurinol    Lymphoma: stable; remission normal PET scan in 2011. Prostate Cancer: stable; s/p prostatectomy       FEN/GI - renal diet. NS at 100 mL/hr. Activity - Out of bed w/ assistance. DVT prophylaxis - Sub Q Heparin  GI prophylaxis - Not indicated at this time  Fall prophylaxis - Fall precautions ordered. Disposition - Admit to Remote Telemetry. Plan to d/c to Home. Consulting PT, OT and CM  Code Status - Full. Discussed with patient / caregivers. Next of Kin Name and [address], Bhavna,  Daughter - [phone]    Patient Fabiana Paul will be discussed with Dr. Piper De Leon.     3:20 PM, 11/20/20  Edwar Coronado MD  Family Medicine Resident       For Billing    Chief Complaint   Patient presents with   [facility] [place] Problems  Date Reviewed: 8/13/2020    None

## 2020-11-20 NOTE — CONSULTS
ORTHOPAEDIC CONSULT NOTE    Subjective:     Date of Consultation:  November 20, 2020      Noe Welsh is a 80 y.o. male who is being seen for T11/T12 fracture, frequent falls. Pt/daughter falls x 3 weeks. Onset of Mid/Low back pain x one week. Ambulates at baseline with walker. Denies radicular symptoms, Denies new numbness or paraesthesia of the LE, Denies bowel or bladder changes      POC-no pain lying supine. Pain with change of position, standing/truck motion. Lives at home alone, last week pt was able to drive himself to appt, care for himself/manage his house/yard----- last 2 days family has been providing his care as his functional status has declined significantly !!    Daughter states pt is stubborn and prideful doesn't share what is really going on, difficulties. Patient Active Problem List    Diagnosis Date Noted    Hypercalcemia 11/20/2020    Cervical stenosis of spinal canal 11/20/2020    Acute renal failure (ARF) ([address] 11/20/2020    Lumbar canal stenosis 11/20/2020    Frequent falls 11/20/2020    Multiple falls 11/20/2020    CRI (chronic renal insufficiency) 12/13/2012    Lymphoma ([address] 01/04/2011    HTN (hypertension) 01/04/2011    Prostate CA ([address] 01/04/2011    Gout 01/04/2011     No family history on file. Social History     Tobacco Use    Smoking status: Never Smoker    Smokeless tobacco: Never Used   Substance Use Topics    Alcohol use: No     Past Medical History:   Diagnosis Date    CRI (chronic renal insufficiency) 12/13/2012    Gout 1/4/2011    Prostate CA ([facility] [address] 1/4/2011      Past Surgical History:   Procedure Laterality Date    ENDOSCOPY, COLON, DIAGNOSTIC  2003    neg    HC BONE MARROW BIOPSY      HX PROSTATECTOMY        Prior to Admission medications    Medication Sig Start Date End Date Taking? Authorizing Provider   multivitamin (ONE A DAY) tablet Take 1 Tab by mouth daily.     Provider, Historical   ketoconazole (NIZORAL) 2 % topical cream Apply  to affected area two (2) times a day for 28 days. 20  Kelvin Hampton MD   allopurinoL (ZYLOPRIM) 300 mg tablet TAKE ONE TABLET BY MOUTH EVERY DAY 20   Shaun Diaz MD   atenoloL (TENORMIN) 50 mg tablet Take 1 Tab by mouth daily. 20   Shaun Diaz MD     Current Facility-Administered Medications   Medication Dose Route Frequency    sodium chloride (NS) flush 5-40 mL  5-40 mL IntraVENous Q8H    sodium chloride (NS) flush 5-40 mL  5-40 mL IntraVENous PRN    heparin (porcine) injection 5,000 Units  5,000 Units SubCUTAneous Q8H    0.9% sodium chloride infusion  100 mL/hr IntraVENous CONTINUOUS     Current Outpatient Medications   Medication Sig    multivitamin (ONE A DAY) tablet Take 1 Tab by mouth daily.  ketoconazole (NIZORAL) 2 % topical cream Apply  to affected area two (2) times a day for 28 days.  allopurinoL (ZYLOPRIM) 300 mg tablet TAKE ONE TABLET BY MOUTH EVERY DAY    atenoloL (TENORMIN) 50 mg tablet Take 1 Tab by mouth daily. Allergies   Allergen Reactions    Pcn [Penicillins] Swelling        Review of Systems:  A comprehensive review of systems was negative except for that written in the HPI.     Mental Status: no dementia    Objective:     Patient Vitals for the past 8 hrs:   BP Temp Pulse Resp SpO2 Height Weight   20 1616 112/63 98 °F (36.7 °C) 86 14 96 %     20 1500 (!) 111/54    96 %     20 1446 (!) 132/59    96 %     20 1305     94 %     20 1300 (!) 109/45    94 %     20 1240 118/78 98 °F (36.7 °C) 71 18 94 % 5' 3\" (1.6 m) 61.7 kg (136 lb)     Temp (24hrs), Av °F (36.7 °C), Min:98 °F (36.7 °C), Max:98 °F (36.7 °C)      Gen: Well-developed,  in no acute distress   HEENT: Pink conjunctivae, hard of hearing, moist mucous membranes   Neck: Supple  Resp: No respiratory distress   Card: RRR, palpable distal pulse-equal bilaterally, birsk cap refill all distal digits   Abd:  non-distended  Musc: Bilat LE supple A/P ROM, hip int/ext rotation- pain free. 5/5 MMT LE equal bilat. Pain with truck motion/shifting in bed   No knee effusion, No sign of LE VTE. Skin: No skin breakdown noted. Skin warm, pink, dry  Neuro: Cranial nerves are grossly intact, no focal motor weakness, follows commands appropriately   Psych: Good insight, oriented to person, place and time, alert    Imaging Review:   EXAM:  CT SPINE [facility] WO CONT   INDICATION: Fall, back pain. COMPARISON: None. Multislice helical CT of the thoracic spine was performed. Sagittal and coronal  reformations were generated. CT dose reduction was achieved through use of a  standardized protocol tailored for this examination and automatic exposure  control for dose modulation.  FINDINGS:  There are osteophytes at multiple levels. There is cortical disruption in the anterior, superior endplate of A64 which  extends through both the right lateral and left lateral osteophyte. Query  cortical disruption in the right, lateral aspect of the T11 vertebral body. There is no spinal canal stenosis. Coronary artery disease and atherosclerosis. Prominent lymph nodes in the  posterior mediastinum Likely cystic lesions in both kidneys which are  incompletely characterized    IMPRESSION:  1. Cortical disruption in the body of T12 with suspected fracture in the body of T11. EXAM: CT SPINE CERV WO CONT  INDICATION: fall  COMPARISON:  None  TECHNIQUE:  Axial neck CT was performed. Noncontrast imaging obtained. Coronal  and sagittal reconstructions were performed. CT dose reduction was achieved through use of a standardized protocol tailored  for this examination and automatic exposure control for dose modulation. Osseous/bone algorithm was utilized. FINDINGS:  Carotid atherosclerotic change. No pneumothorax. No large pulmonary mass or  nodule. .  There is no evidence of fracture or subluxation. The prevertebral soft  tissues are grossly within normal limits.  The atlantodental interval is within  normal limits. The craniocervical junction is intact. Multilevel loss of disc height. Multilevel severe canal and foraminal stenoses. Minimal anterolisthesis of C7 on T1. IMPRESSION:There is no acute fracture or dislocation identified. EXAM: CT SPINE LUMB WO CONT  History: Status post fall/back pain  INDICATION: fall, back pain   COMPARISON: None.   TECHNIQUE:   Unenhanced multislice helical CT of the lumbar spine was performed  in the axial plane. Coronal and sagittal reconstructions were obtained. CT  dose reduction was achieved through use of a standardized protocol tailored for  this examination and automatic exposure control for dose modulation.  FINDINGS:  Litzy Muller is retrolisthesis of L2 on L3 and L3 on L4 each measuring 4 mm. Anterolisthesis of L4 on L5 measuring or millimeters. Abdominal aorta  demonstrates atherosclerotic change. There are renal calculi. There is no  hydronephrosis. Left renal hypodensities most likely a cyst. Mild levorotatory scoliotic change. T12-L1: The spinal canal and neural foramina are widely patent. L1-L2: The spinal canal and neural foramina are widely patent. L2-L3: Moderate facet arthropathy. Ligamentum flavum hypertrophy. Minimal  retrolisthesis. Disc bulge/osteophyte. Moderate canal and right foraminal stenosis. L3-L4: Facet arthropathy and hypertrophy. Ligament flavum hypertrophy. Minimal  retrolisthesis. Severe canal stenosis. Mild bilateral foraminal stenoses. L4-L5: Facet arthropathy and hypertrophy. Disc bulge/osteophyte. Severe canal  stenosis. Severe left foraminal stenosis. L5-S1: Mild facet arthropathy. Canal is patent. Foramina are patent. IMPRESSION:   Extensive multilevel degenerative change with multilevel spondylolisthesis as well.   Severe canal stenoses at L3-4 and L4-5.   Additional, less severe degenerative findings are as described in detail above.       Labs:   Recent Results (from the past 24 hour(s))   CBC WITH AUTOMATED DIFF    Collection Time: 11/20/20  1:31 PM   Result Value Ref Range    WBC 9.8 4.1 - 11.1 K/uL    RBC 3.78 (L) 4.10 - 5.70 M/uL    HGB 11.9 (L) 12.1 - 17.0 g/dL    HCT 36.2 (L) 36.6 - 50.3 %    MCV 95.8 80.0 - 99.0 FL    MCH 31.5 26.0 - 34.0 PG    MCHC 32.9 30.0 - 36.5 g/dL    RDW 14.8 (H) 11.5 - 14.5 %    PLATELET 060 (L) 940 - 400 K/uL    MPV 11.5 8.9 - 12.9 FL    NRBC 0.0 0  WBC    ABSOLUTE NRBC 0.00 0.00 - 0.01 K/uL    NEUTROPHILS 67 32 - 75 %    LYMPHOCYTES 15 12 - 49 %    MONOCYTES 16 (H) 5 - 13 %    EOSINOPHILS 2 0 - 7 %    BASOPHILS 0 0 - 1 %    IMMATURE GRANULOCYTES 0 0.0 - 0.5 %    ABS. NEUTROPHILS 6.5 1.8 - 8.0 K/UL    ABS. LYMPHOCYTES 1.5 0.8 - 3.5 K/UL    ABS. MONOCYTES 1.6 (H) 0.0 - 1.0 K/UL    ABS. EOSINOPHILS 0.2 0.0 - 0.4 K/UL    ABS. BASOPHILS 0.0 0.0 - 0.1 K/UL    ABS. IMM. GRANS. 0.0 0.00 - 0.04 K/UL    DF AUTOMATED     METABOLIC PANEL, BASIC    Collection Time: 11/20/20  1:31 PM   Result Value Ref Range    Sodium 136 136 - 145 mmol/L    Potassium 4.0 3.5 - 5.1 mmol/L    Chloride 98 97 - 108 mmol/L    CO2 32 21 - 32 mmol/L    Anion gap 6 5 - 15 mmol/L    Glucose 114 (H) 65 - 100 mg/dL    BUN 56 (H) 6 - 20 MG/DL    Creatinine 3.89 (H) 0.70 - 1.30 MG/DL    BUN/Creatinine ratio 14 12 - 20      GFR est AA 18 (L) >60 ml/min/1.73m2    GFR est non-AA 15 (L) >60 ml/min/1.73m2    Calcium 13.6 (HH) 8.5 - 10.1 MG/DL   TROPONIN I    Collection Time: 11/20/20  1:31 PM   Result Value Ref Range    Troponin-I, Qt. <0.05 <0.05 ng/mL   SAMPLES BEING HELD    Collection Time: 11/20/20  1:31 PM   Result Value Ref Range    SAMPLES BEING HELD 1RED,1BLUE     COMMENT        Add-on orders for these samples will be processed based on acceptable specimen integrity and analyte stability, which may vary by analyte.    TSH 3RD GENERATION    Collection Time: 11/20/20  1:31 PM   Result Value Ref Range    TSH 1.79 0.36 - 3.74 uIU/mL   URINALYSIS W/ RFLX MICROSCOPIC    Collection Time: 11/20/20  2:50 PM Result Value Ref Range    Color YELLOW/STRAW      Appearance CLEAR CLEAR      Specific gravity 1.012 1.003 - 1.030      pH (UA) 6.5 5.0 - 8.0      Protein 30 (A) NEG mg/dL    Glucose Negative NEG mg/dL    Ketone Negative NEG mg/dL    Bilirubin Negative NEG      Blood MODERATE (A) NEG      Urobilinogen 0.2 0.2 - 1.0 EU/dL    Nitrites Negative NEG      Leukocyte Esterase TRACE (A) NEG      WBC 5-10 0 - 4 /hpf    RBC 20-50 0 - 5 /hpf    Epithelial cells FEW FEW /lpf    Bacteria Negative NEG /hpf    Hyaline cast 0-2 0 - 5 /lpf         Impression:     Patient Active Problem List    Diagnosis Date Noted    Hypercalcemia 11/20/2020    Cervical stenosis of spinal canal 11/20/2020    Acute renal failure (ARF) ([address] 11/20/2020    Lumbar canal stenosis 11/20/2020    Frequent falls 11/20/2020    Multiple falls 11/20/2020    CRI (chronic renal insufficiency) 12/13/2012    Lymphoma ([address] 01/04/2011    HTN (hypertension) 01/04/2011    Prostate CA ([facility] 01/04/2011    Gout 01/04/2011     Active Problems:    Hypercalcemia (11/20/2020)      Cervical stenosis of spinal canal (11/20/2020)      Acute renal failure (ARF) ([address] (11/20/2020)      Lumbar canal stenosis (11/20/2020)      Frequent falls (11/20/2020)      Multiple falls (11/20/2020)        Plan:     -Acute T11/T12 fracture  -Multilevel lumbar DD/DJD with canal stenosis    Recommend TLSO  WBAT with walker  Pain mgt per medical service  PT/OT/Rehab  MRI spine pending-  Will update recommendations as needed  No urgent surgical intervention at this time    Out-pt FU with Dr Cathy Brady, Dr Alcon Barr or Dr Adeline Santillan aware and agrees with plan as above. Rk Cheng PA-C  [address]      I agree with the above note/plan.  In addition - the patient reports a history of R great toe \"stiffness\" and some intermittent paresthesias to b/l toes after chemo - on exam he does have minimal R EHL however he notes this to be baseline, otherwise his motor/sensation is intact to all exremities. He does have some acute lower back pain for the last week that he attributes to several GLF recently. His CT scan shows a T12 nondisplaced body fx that extends through osteophyte as well as possible fracture line through body T11 - he has extensive lumbar degenerative changes as well as spondylolisthesis most notable at L2-3 level. He had an MRI of the C/T/L spine ordered in the ED and will need to f/u final reads. Recommend a TLSO and WBAT. PT/OT, pain control. Can f/u with spine as noted above after discharge.

## 2020-11-20 NOTE — PROGRESS NOTES
11/20/2020  5:10 PM  Case management note    Reason for Admission:   Acute renal failure  Patient lives alone, had been driving until recently. He has started using a walker. Daughter at bedside Jacqueline Whitman [phone]  [facility] [address]  Patient has been falling recently. RUR Score:      10%               Plan for utilizing home health:      PT/OT [facility]    PCP: First and Last name:  Nathalie mcgill   Name of Practice:    Are you a current patient: Yes/No: yes   Approximate date of last visit: 1 week   Can you participate in a virtual visit with your PCP: unknown                    Current Advanced Directive/Advance Care Plan: no AD, patient unable to do today                         Transition of Care Plan:                1. Home with family assistance  2. PCP follow up  3. AD planning  4.  CM to follow for discharge needs          Care Management Interventions  PCP Verified by CM: Yes(Dr. Buck Mccullough)  Mode of Transport at Discharge: Self  Current Support Network: Lives Alone  Confirm Follow Up Transport: Family  The Plan for Transition of Care is Related to the Following Treatment Goals : Acute Renal failure  Discharge Location  Discharge Placement: Home with family assistance  Lois Whitehead

## 2020-11-20 NOTE — PROGRESS NOTES
[facility]: MED RECONCILIATION    Comments/Recommendations:   Medication information was provided by the patient's son-in law via telephone (read from pictures in his phone). Patient's daughter states that he had all of his medications today (pills taken from weekly pill container). Patient's daughter stated that he takes half of a blood pressure medication, but the son  in-law who had a picture of the medications read \"half of the Allopurinol\". Medication(s) ADDED to PTA list:  Vit E daily    Medication(s) REMOVED from PTA list:  None    Medication(s) ADJUSTED on PTA list:  Allopurinol dose was changed to \"1/2 tab= 150 mg daily\" (per patient's son in-law)    Allergies: Pcn [penicillins]    Prior to Admission Medications:     Prior to Admission Medications   Prescriptions Last Dose Informant Patient Reported? Taking?   allopurinoL (ZYLOPRIM) 300 mg tablet 11/20/2020 at Unknown time  Yes Yes   Sig: Take 150 mg by mouth daily. atenoloL (TENORMIN) 50 mg tablet 11/20/2020 at Unknown time  No Yes   Sig: Take 1 Tab by mouth daily. ketoconazole (NIZORAL) 2 % topical cream 11/20/2020 at Unknown time  No Yes   Sig: Apply  to affected area two (2) times a day for 28 days. multivitamin (ONE A DAY) tablet 11/20/2020 at Unknown time  Yes Yes   Sig: Take 1 Tab by mouth daily. vitamin e (E GEMS) 100 unit capsule 11/20/2020 at Unknown time  Yes Yes   Sig: Take 100 Units by mouth daily.       Facility-Administered Medications: None            Andreas Decker, YANGD

## 2020-11-20 NOTE — ED PROVIDER NOTES
Date of Service:  11/20/2020    Patient:  Arlene Mccartney    Chief Complaint:  Fall and Fatigue       HPI:  Arlene Mccartney is a 80 y.o.  male who presents for evaluation of falls and fatigue. Patient has had 3+ falls in the last week. He states that 2 of them were last Saturday and has had at least one since then. He believes he has had and has had every time. There is been no loss of consciousness. He remembers falling he states that sometimes he feels dizzy before he falls but other times just feels fatigued and weak. Family notes a decline from driving his own car and ambulating with her walker when to go to the barely being able to ambulate today. He has had decreased appetite in the last week. He comes in complaining of these falls as well as some back pain. No numbness or tingling. No loss of bowel or bladder habit. No chest pain shortness of breath fevers or chills. The dizziness is a long time standing issue that the patient had but does not really feel as though his falls are attributed to him being dizzy, Pain minimal,  3/10           Past Medical History:   Diagnosis Date    CRI (chronic renal insufficiency) 12/13/2012    Gout 1/4/2011    Prostate CA ([facility] [address] 1/4/2011       Past Surgical History:   Procedure Laterality Date    ENDOSCOPY, COLON, DIAGNOSTIC  2003    neg    HC BONE MARROW BIOPSY      HX PROSTATECTOMY           No family history on file.     Social History     Socioeconomic History    Marital status: SINGLE     Spouse name: Not on file    Number of children: Not on file    Years of education: Not on file    Highest education level: Not on file   Occupational History    Not on file   Social Needs    Financial resource strain: Not on file    Food insecurity     Worry: Not on file     Inability: Not on file    Transportation needs     Medical: Not on file     Non-medical: Not on file   Tobacco Use    Smoking status: Never Smoker    Smokeless tobacco: Never Used   Substance and Sexual Activity    Alcohol use: No    Drug use: Not on file    Sexual activity: Not on file   Lifestyle    Physical activity     Days per week: Not on file     Minutes per session: Not on file    Stress: Not on file   Relationships    Social connections     Talks on phone: Not on file     Gets together: Not on file     Attends Mu-ism service: Not on file     Active member of club or organization: Not on file     Attends meetings of clubs or organizations: Not on file     Relationship status: Not on file    Intimate partner violence     Fear of current or ex partner: Not on file     Emotionally abused: Not on file     Physically abused: Not on file     Forced sexual activity: Not on file   Other Topics Concern    Not on file   Social History Narrative    Not on file         ALLERGIES: Pcn [penicillins]    Review of Systems   Constitutional: Positive for fatigue. Negative for fever. HENT: Negative for hearing loss. Eyes: Negative for visual disturbance. Respiratory: Negative for shortness of breath. Cardiovascular: Negative for chest pain. Gastrointestinal: Negative for abdominal pain. Genitourinary: Negative for dysuria and flank pain. Musculoskeletal: Positive for back pain. Negative for neck pain and neck stiffness. Skin: Negative for rash. Neurological: Positive for dizziness. Negative for light-headedness. Psychiatric/Behavioral: Negative for confusion. Vitals:    11/20/20 1240 11/20/20 1305   BP: 118/78    Pulse: 71    Resp: 18    Temp: 98 °F (36.7 °C)    SpO2: 94% 94%   Weight: 61.7 kg (136 lb)    Height: 5' 3\" (1.6 m)             Physical Exam  Vitals signs and nursing note reviewed. Constitutional:       General: He is not in acute distress. Appearance: Normal appearance. He is well-developed. He is not ill-appearing, toxic-appearing or diaphoretic. HENT:      Head: Normocephalic and atraumatic. Eyes:      General: No scleral icterus. Conjunctiva/sclera: Conjunctivae normal.   Neck:      Musculoskeletal: Neck supple. No muscular tenderness. Vascular: No JVD. Trachea: No tracheal deviation. Cardiovascular:      Rate and Rhythm: Normal rate and regular rhythm. Heart sounds: No murmur. No friction rub. Pulmonary:      Effort: Pulmonary effort is normal. No respiratory distress. Breath sounds: Normal breath sounds. Abdominal:      General: Abdomen is flat. Palpations: Abdomen is soft. Tenderness: There is no abdominal tenderness. Musculoskeletal: Normal range of motion. General: No tenderness or deformity. Right lower leg: No edema. Left lower leg: No edema. Comments: No midline c/t/l spine tenderness  Moves all extremities without difficulty   Skin:     General: Skin is warm and dry. Capillary Refill: Capillary refill takes less than 2 seconds. Findings: No rash. Neurological:      General: No focal deficit present. Mental Status: He is alert and oriented to person, place, and time. Motor: No weakness. Psychiatric:         Mood and Affect: Mood normal.         Behavior: Behavior normal.          MDM  Number of Diagnoses or Management Options  Acute renal failure, unspecified acute renal failure type [facility]):    Hypercalcemia:   Multiple falls:      Amount and/or Complexity of Data Reviewed  Decide to obtain previous medical records or to obtain history from someone other than the patient: yes          VITAL SIGNS:  Patient Vitals for the past 4 hrs:   Temp Pulse Resp BP SpO2   11/20/20 1305     94 %   11/20/20 1300    (!) 109/45 94 %   11/20/20 1240 98 °F (36.7 °C) 71 18 118/78 94 %         LABS:  Recent Results (from the past 6 hour(s))   CBC WITH AUTOMATED DIFF    Collection Time: 11/20/20  1:31 PM   Result Value Ref Range    WBC 9.8 4.1 - 11.1 K/uL    RBC 3.78 (L) 4.10 - 5.70 M/uL    HGB 11.9 (L) 12.1 - 17.0 g/dL    HCT 36.2 (L) 36.6 - 50.3 %    MCV 95.8 80.0 - 99.0 FL    MCH 31.5 26.0 - 34.0 PG    MCHC 32.9 30.0 - 36.5 g/dL    RDW 14.8 (H) 11.5 - 14.5 %    PLATELET 305 (L) 741 - 400 K/uL    MPV 11.5 8.9 - 12.9 FL    NRBC 0.0 0  WBC    ABSOLUTE NRBC 0.00 0.00 - 0.01 K/uL    NEUTROPHILS 67 32 - 75 %    LYMPHOCYTES 15 12 - 49 %    MONOCYTES 16 (H) 5 - 13 %    EOSINOPHILS 2 0 - 7 %    BASOPHILS 0 0 - 1 %    IMMATURE GRANULOCYTES 0 0.0 - 0.5 %    ABS. NEUTROPHILS 6.5 1.8 - 8.0 K/UL    ABS. LYMPHOCYTES 1.5 0.8 - 3.5 K/UL    ABS. MONOCYTES 1.6 (H) 0.0 - 1.0 K/UL    ABS. EOSINOPHILS 0.2 0.0 - 0.4 K/UL    ABS. BASOPHILS 0.0 0.0 - 0.1 K/UL    ABS. IMM. GRANS. 0.0 0.00 - 0.04 K/UL    DF AUTOMATED     METABOLIC PANEL, BASIC    Collection Time: 11/20/20  1:31 PM   Result Value Ref Range    Sodium 136 136 - 145 mmol/L    Potassium 4.0 3.5 - 5.1 mmol/L    Chloride 98 97 - 108 mmol/L    CO2 32 21 - 32 mmol/L    Anion gap 6 5 - 15 mmol/L    Glucose 114 (H) 65 - 100 mg/dL    BUN 56 (H) 6 - 20 MG/DL    Creatinine 3.89 (H) 0.70 - 1.30 MG/DL    BUN/Creatinine ratio 14 12 - 20      GFR est AA 18 (L) >60 ml/min/1.73m2    GFR est non-AA 15 (L) >60 ml/min/1.73m2    Calcium 13.6 (HH) 8.5 - 10.1 MG/DL   TROPONIN I    Collection Time: 11/20/20  1:31 PM   Result Value Ref Range    Troponin-I, Qt. <0.05 <0.05 ng/mL   SAMPLES BEING HELD    Collection Time: 11/20/20  1:31 PM   Result Value Ref Range    SAMPLES BEING HELD 1RED,1BLUE     COMMENT        Add-on orders for these samples will be processed based on acceptable specimen integrity and analyte stability, which may vary by analyte. IMAGING:  CT HEAD WO CONT   Final Result   IMPRESSION:    No acute intracranial process. Imaging findings consistent with mild chronic microvascular ischemic change. There is a mild to moderate degree of cerebral atrophy. CT SPINE CERV WO CONT   Final Result   IMPRESSION:   There is no acute fracture or dislocation identified.           CT SPINE LUMB WO CONT   Final Result   IMPRESSION: Extensive multilevel degenerative change with multilevel spondylolisthesis as   well. Severe canal stenoses at L3-4 and L4-5. Additional, less severe degenerative findings are as described in detail above. CT SPINE [facility] WO CONT    (Results Pending)         Medications During Visit:  Medications   sodium chloride 0.9 % bolus infusion 1,000 mL (has no administration in time range)         DECISION MAKING:  Robin Slater is a 80 y.o. male who comes in as above. Here, imaging and labs are as above. CT thoracic spine pending at the time of disposition. Patient the patient's renal failure with multiple falls and his hypercalcemia, occupation in the hospital.  This likely dehydration which caused secondary other issues. Later she revealed that the patient had a T12 compression fracture, assistant with where he was describing some back pain. At this time patient is admitted to the  [facility] service. Patient stable disposition      IMPRESSION:  1. Acute renal failure, unspecified acute renal failure type ([address]    2. Multiple falls    3. Hypercalcemia        DISPOSITION:  Admitted        Procedures        Perfect Serve Consult for Admission  2:45 PM    ED Room Number: ER08/08  Patient Name and age:  Robin Slater 80 y.o.  male  Working Diagnosis:   1. Acute renal failure, unspecified acute renal failure type ([address]    2. Multiple falls    3. Hypercalcemia        COVID-19 Suspicion:  no  Sepsis present:  no  Reassessment needed: no  Code Status:  Full Code  Readmission: no  Isolation Requirements:  no  Recommended Level of Care:  med/surg./remote  Department:[facility] ED - [phone]  Other:  Several falls from feeling week/dizzy. Not drinking a lot. Acute renal fail with hyper calcemia. Well appearing.

## 2020-11-20 NOTE — ED TRIAGE NOTES
Per wife and son patient has had weakness and falls x 1 week. Per son this has been a progressive decline. states 1 week ago patient was able to drive himself to a dr. [place]. Patient was at appointment for balance issues but told everything was ok.

## 2020-11-20 NOTE — CONSULTS
Consult dict    BENJI likely IVVD  Hypercalcemia likely due to volume depletion, rapidly improving with fluids. Cannot rule out malignancy but seems less likely.     Rec:  Volume expansion  Serial labs  No need for RRT  More detailed workup of hypercalcemia if it does not improve with fluid alone

## 2020-11-20 NOTE — ED NOTES
TRANSFER - OUT REPORT:    Verbal report given to Megan GERMAN(name) on Princess Solis  being transferred to 522(unit) for routine progression of care       Report consisted of patients Situation, Background, Assessment and   Recommendations(SBAR). Information from the following report(s) SBAR, Kardex, ED Summary, MAR, Recent Results and Med Rec Status was reviewed with the receiving nurse. Lines:   Peripheral IV 11/20/20 Left Antecubital (Active)   Site Assessment Clean, dry, & intact 11/20/20 1339   Phlebitis Assessment 0 11/20/20 1339   Infiltration Assessment 0 11/20/20 1339   Dressing Status Clean, dry, & intact 11/20/20 1339   Dressing Type Tape;Transparent 11/20/20 1339   Hub Color/Line Status Pink 11/20/20 1339        Opportunity for questions and clarification was provided.       Patient transported with:   Monitor  Tech

## 2020-11-21 ENCOUNTER — APPOINTMENT (OUTPATIENT)
Dept: MRI IMAGING | Age: 85
DRG: 682 | End: 2020-11-21
Attending: STUDENT IN AN ORGANIZED HEALTH CARE EDUCATION/TRAINING PROGRAM
Payer: MEDICARE

## 2020-11-21 ENCOUNTER — APPOINTMENT (OUTPATIENT)
Dept: NON INVASIVE DIAGNOSTICS | Age: 85
DRG: 682 | End: 2020-11-21
Attending: STUDENT IN AN ORGANIZED HEALTH CARE EDUCATION/TRAINING PROGRAM
Payer: MEDICARE

## 2020-11-21 PROBLEM — R53.1 WEAKNESS: Status: ACTIVE | Noted: 2020-11-21

## 2020-11-21 PROBLEM — D64.9 ANEMIA: Status: ACTIVE | Noted: 2020-11-21

## 2020-11-21 PROBLEM — F11.90 OPIOID USE: Status: ACTIVE | Noted: 2020-11-21

## 2020-11-21 LAB
ALBUMIN SERPL-MCNC: 2.9 G/DL (ref 3.5–5)
ALBUMIN/GLOB SERPL: 0.9 {RATIO} (ref 1.1–2.2)
ALP SERPL-CCNC: 62 U/L (ref 45–117)
ALT SERPL-CCNC: 12 U/L (ref 12–78)
ANION GAP SERPL CALC-SCNC: 5 MMOL/L (ref 5–15)
AST SERPL-CCNC: 48 U/L (ref 15–37)
BACTERIA SPEC CULT: NORMAL
BILIRUB SERPL-MCNC: 0.8 MG/DL (ref 0.2–1)
BUN SERPL-MCNC: 52 MG/DL (ref 6–20)
BUN/CREAT SERPL: 14 (ref 12–20)
CA-I BLD-SCNC: 1.57 MMOL/L (ref 1.13–1.32)
CA-I BLD-SCNC: 1.58 MMOL/L (ref 1.13–1.32)
CALCIUM SERPL-MCNC: 11.8 MG/DL (ref 8.5–10.1)
CHLORIDE SERPL-SCNC: 103 MMOL/L (ref 97–108)
CO2 SERPL-SCNC: 30 MMOL/L (ref 21–32)
CREAT SERPL-MCNC: 3.7 MG/DL (ref 0.7–1.3)
ERYTHROCYTE [DISTWIDTH] IN BLOOD BY AUTOMATED COUNT: 14.9 % (ref 11.5–14.5)
GLOBULIN SER CALC-MCNC: 3.1 G/DL (ref 2–4)
GLUCOSE SERPL-MCNC: 117 MG/DL (ref 65–100)
HCT VFR BLD AUTO: 34.9 % (ref 36.6–50.3)
HGB BLD-MCNC: 11.3 G/DL (ref 12.1–17)
LEFT CCA DIST DIAS: 17 CM/S
LEFT CCA DIST SYS: 72.5 CM/S
LEFT CCA PROX DIAS: 5.6 CM/S
LEFT CCA PROX SYS: 66.8 CM/S
LEFT ECA DIAS: 0 CM/S
LEFT ECA SYS: 50.4 CM/S
LEFT ICA DIST DIAS: 16.2 CM/S
LEFT ICA DIST SYS: 80 CM/S
LEFT ICA MID DIAS: 11.5 CM/S
LEFT ICA MID SYS: 58 CM/S
LEFT ICA PROX DIAS: 12.1 CM/S
LEFT ICA PROX SYS: 64.2 CM/S
LEFT ICA/CCA SYS: 1.1
LEFT SUBCLAVIAN DIAS: 0 CM/S
LEFT SUBCLAVIAN SYS: 50.6 CM/S
LEFT VERTEBRAL DIAS: 10.22 CM/S
LEFT VERTEBRAL SYS: 47.8 CM/S
MAGNESIUM SERPL-MCNC: 2.1 MG/DL (ref 1.6–2.4)
MCH RBC QN AUTO: 31.3 PG (ref 26–34)
MCHC RBC AUTO-ENTMCNC: 32.4 G/DL (ref 30–36.5)
MCV RBC AUTO: 96.7 FL (ref 80–99)
NRBC # BLD: 0 K/UL (ref 0–0.01)
NRBC BLD-RTO: 0 PER 100 WBC
PHOSPHATE SERPL-MCNC: 5.1 MG/DL (ref 2.6–4.7)
PLATELET # BLD AUTO: 148 K/UL (ref 150–400)
PMV BLD AUTO: 11.4 FL (ref 8.9–12.9)
POTASSIUM SERPL-SCNC: 3.8 MMOL/L (ref 3.5–5.1)
PROT SERPL-MCNC: 6 G/DL (ref 6.4–8.2)
RBC # BLD AUTO: 3.61 M/UL (ref 4.1–5.7)
RIGHT CCA DIST DIAS: 4.3 CM/S
RIGHT CCA DIST SYS: 53.2 CM/S
RIGHT CCA PROX DIAS: 9.8 CM/S
RIGHT CCA PROX SYS: 83.7 CM/S
RIGHT ECA DIAS: 0 CM/S
RIGHT ECA SYS: 101.8 CM/S
RIGHT ICA DIST DIAS: 13.1 CM/S
RIGHT ICA DIST SYS: 57.6 CM/S
RIGHT ICA MID DIAS: 14 CM/S
RIGHT ICA MID SYS: 56.3 CM/S
RIGHT ICA PROX DIAS: 0 CM/S
RIGHT ICA PROX SYS: 37 CM/S
RIGHT ICA/CCA SYS: 1.1
RIGHT SUBCLAVIAN DIAS: 0 CM/S
RIGHT SUBCLAVIAN SYS: 81.1 CM/S
RIGHT VERTEBRAL DIAS: 3.75 CM/S
RIGHT VERTEBRAL SYS: 36.1 CM/S
SERVICE CMNT-IMP: NORMAL
SODIUM SERPL-SCNC: 138 MMOL/L (ref 136–145)
TRANSFERRIN SERPL-MCNC: 198 MG/DL (ref 149–313)
WBC # BLD AUTO: 8.9 K/UL (ref 4.1–11.1)

## 2020-11-21 PROCEDURE — 93306 TTE W/DOPPLER COMPLETE: CPT | Performed by: STUDENT IN AN ORGANIZED HEALTH CARE EDUCATION/TRAINING PROGRAM

## 2020-11-21 PROCEDURE — 82330 ASSAY OF CALCIUM: CPT

## 2020-11-21 PROCEDURE — 97530 THERAPEUTIC ACTIVITIES: CPT

## 2020-11-21 PROCEDURE — 74011250636 HC RX REV CODE- 250/636: Performed by: INTERNAL MEDICINE

## 2020-11-21 PROCEDURE — 74011250637 HC RX REV CODE- 250/637: Performed by: STUDENT IN AN ORGANIZED HEALTH CARE EDUCATION/TRAINING PROGRAM

## 2020-11-21 PROCEDURE — 36415 COLL VENOUS BLD VENIPUNCTURE: CPT

## 2020-11-21 PROCEDURE — 97116 GAIT TRAINING THERAPY: CPT

## 2020-11-21 PROCEDURE — 97161 PT EVAL LOW COMPLEX 20 MIN: CPT

## 2020-11-21 PROCEDURE — 83735 ASSAY OF MAGNESIUM: CPT

## 2020-11-21 PROCEDURE — 82397 CHEMILUMINESCENT ASSAY: CPT

## 2020-11-21 PROCEDURE — 80053 COMPREHEN METABOLIC PANEL: CPT

## 2020-11-21 PROCEDURE — 2709999900 HC NON-CHARGEABLE SUPPLY

## 2020-11-21 PROCEDURE — 74011250636 HC RX REV CODE- 250/636: Performed by: STUDENT IN AN ORGANIZED HEALTH CARE EDUCATION/TRAINING PROGRAM

## 2020-11-21 PROCEDURE — L0627 LO SAG RI AN/POS PNL PRE CST: HCPCS

## 2020-11-21 PROCEDURE — 84100 ASSAY OF PHOSPHORUS: CPT

## 2020-11-21 PROCEDURE — 85027 COMPLETE CBC AUTOMATED: CPT

## 2020-11-21 PROCEDURE — 99221 1ST HOSP IP/OBS SF/LOW 40: CPT | Performed by: INTERNAL MEDICINE

## 2020-11-21 PROCEDURE — 65660000000 HC RM CCU STEPDOWN

## 2020-11-21 PROCEDURE — 99232 SBSQ HOSP IP/OBS MODERATE 35: CPT | Performed by: FAMILY MEDICINE

## 2020-11-21 PROCEDURE — 72146 MRI CHEST SPINE W/O DYE: CPT

## 2020-11-21 PROCEDURE — 93306 TTE W/DOPPLER COMPLETE: CPT

## 2020-11-21 RX ORDER — ACETAMINOPHEN 325 MG/1
650 TABLET ORAL
Status: DISCONTINUED | OUTPATIENT
Start: 2020-11-21 | End: 2020-11-27 | Stop reason: HOSPADM

## 2020-11-21 RX ORDER — CALCITONIN SALMON 200 [USP'U]/ML
4 INJECTION, SOLUTION INTRAMUSCULAR; SUBCUTANEOUS EVERY 12 HOURS
Status: COMPLETED | OUTPATIENT
Start: 2020-11-21 | End: 2020-11-22

## 2020-11-21 RX ORDER — DOCUSATE SODIUM 100 MG/1
100 CAPSULE, LIQUID FILLED ORAL 2 TIMES DAILY
Status: DISCONTINUED | OUTPATIENT
Start: 2020-11-21 | End: 2020-11-27 | Stop reason: HOSPADM

## 2020-11-21 RX ADMIN — SODIUM CHLORIDE 150 ML/HR: 900 INJECTION, SOLUTION INTRAVENOUS at 02:38

## 2020-11-21 RX ADMIN — HEPARIN SODIUM 5000 UNITS: 5000 INJECTION INTRAVENOUS; SUBCUTANEOUS at 00:33

## 2020-11-21 RX ADMIN — SODIUM CHLORIDE 150 ML/HR: 900 INJECTION, SOLUTION INTRAVENOUS at 14:00

## 2020-11-21 RX ADMIN — HEPARIN SODIUM 5000 UNITS: 5000 INJECTION INTRAVENOUS; SUBCUTANEOUS at 08:08

## 2020-11-21 RX ADMIN — DOCUSATE SODIUM 100 MG: 100 CAPSULE, LIQUID FILLED ORAL at 14:07

## 2020-11-21 RX ADMIN — ACETAMINOPHEN 650 MG: 325 TABLET ORAL at 14:07

## 2020-11-21 RX ADMIN — CALCITONIN SALMON 246 INT'L UNITS: 200 INJECTION, SOLUTION INTRAMUSCULAR; SUBCUTANEOUS at 21:56

## 2020-11-21 RX ADMIN — Medication 10 ML: at 00:34

## 2020-11-21 RX ADMIN — DOCUSATE SODIUM 100 MG: 100 CAPSULE, LIQUID FILLED ORAL at 17:24

## 2020-11-21 RX ADMIN — ACETAMINOPHEN 650 MG: 325 TABLET ORAL at 22:06

## 2020-11-21 RX ADMIN — Medication 10 ML: at 21:57

## 2020-11-21 RX ADMIN — HEPARIN SODIUM 5000 UNITS: 5000 INJECTION INTRAVENOUS; SUBCUTANEOUS at 17:24

## 2020-11-21 NOTE — PROGRESS NOTES
Orthopaedic Progress Note  Post Op day: * No surgery found *    November 21, 2020 2:37 PM     Patient: Janes Shah MRN: [id number]  SSN: [id number]    YOB: 1932  Age: 80 y.o. Sex: male      Admit date:  11/20/2020  Date of Surgery:  * No surgery found *   Procedures:    Admitting Physician:  Binu Brooks MD   Surgeon:  * Surgery not found *    Consulting Physician(s): Treatment Team: Attending Provider: Danielle Mcdaniel MD; Consulting Provider: Queen Quinten; Consulting Provider: Jennifer Mondragon MD; Care Manager: Malathi Guzmán; Consulting Provider: Giselle Dow MD; Consulting Provider: Adelina Vizcarra MD; Utilization Review: Jose G Vanessa; Primary Nurse: Azael Ross; Consulting Provider: Earnest Pool MD    SUBJECTIVE:     Janes Shah is a 80 y.o. male with T11-12 vertebral body fracture. MRI pending of thoracic spine. Pt up to chair. Complains of moderate pain in mid back when OOB. OBJECTIVE:       Physical Exam:  General: Alert, cooperative, no distress. Respiratory: Respirations unlabored  Neurological:  Neurovascular exam within normal limits. Motor: + DF/PF. Bilateral upper and lower extremities with 5/5 strength throughout. Musculoskeletal: Calves soft, supple, non-tender upon palpation. Dressing/Wound:  Clean, dry and intact. No significant erythema or swelling.       Vital Signs:        Patient Vitals for the past 8 hrs:   BP Temp Pulse Resp SpO2 Height Weight   11/21/20 1235 115/63         11/21/20 1225 111/61         11/21/20 1219 (!) 108/57         11/21/20 1217 (!) 148/67  83       11/21/20 1215 117/63  79  94 %     11/21/20 1119 (!) 109/53     5' 3\" (1.6 m) 61.7 kg (136 lb 0.4 oz)   11/21/20 1107 (!) 109/53 97.8 °F (36.6 °C) 61 17 91 %     11/21/20 0715 112/62 98 °F (36.7 °C) 73 17 93 %     11/21/20 0703   79                                              Temp (24hrs), Av.8 °F (36.6 °C), Min:97.4 °F (36.3 °C), Max:98 °F (36.7 °C)      Labs:        Recent Labs     20  0220   HCT 34.9*   HGB 11.3*     Lab Results   Component Value Date/Time    Sodium 138 2020 02:20 AM    Potassium 3.8 2020 02:20 AM    Chloride 103 2020 02:20 AM    CO2 30 2020 02:20 AM    Glucose 117 (H) 2020 02:20 AM    BUN 52 (H) 2020 02:20 AM    Creatinine 3.70 (H) 2020 02:20 AM    Calcium 11.8 (H) 2020 02:20 AM       PT/OT:        Gait  Gait Abnormalities: Decreased step clearance  Ambulation - Level of Assistance: Assist x2, Minimal assistance  Distance (ft): 5 Feet (ft)  Assistive Device: Gait belt, Brace/Splint, Walker, rolling       Patient mobility  Bed Mobility Training  Rolling: Additional time, Minimum assistance  Supine to Sit: Minimum assistance, Additional time  Scooting: Stand-by assistance  Transfer Training  Sit to Stand: Assist x2, Minimum assistance  Stand to Sit: Assist x2, Contact guard assistance  Bed to Chair: Minimum assistance, Assist x2      Gait Training  Assistive Device: Gait belt, Brace/Splint, Walker, rolling  Ambulation - Level of Assistance: Assist x2, Minimal assistance  Distance (ft): 5 Feet (ft)            ASSESSMENT / PLAN:   Principal Problem:    Acute renal failure (ARF) ([facility] [address] (2020)    Active Problems:    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 Pt is NVI. Moderate pain. LSO in room, needs TLSO. Will await results from MRI thoracic spine. Likely conservative management with bracing, pain management and possible rehab.               Pain Control: Adequate with oral agents and PRN IV narcotics   DVT Prophylaxis: Lovenox daily, SCDs    Therapy/ Weight bearing: WBAT   Wound/ incisional issues: C, D & I   Medical concerns:    Disposition: TBD     Signed By:  Gilles Zavala NP    Orthopedic Surgery   [address] [place] [address]

## 2020-11-21 NOTE — PROGRESS NOTES
Problem: Mobility Impaired (Adult and Pediatric)  Goal: *Acute Goals and Plan of Care (Insert Text)  Description: FUNCTIONAL STATUS PRIOR TO ADMISSION: Patient was modified independent using a rolling walker out in community and single point cane around the house for functional mobility. HOME SUPPORT PRIOR TO ADMISSION: The patient lived alone;  daughter and son nearby to provide assistance. Physical Therapy Goals  Initiated 11/21/2020    1. Patient will move from supine to sit and sit to supine  in bed with modified independence within 4 days. 2. Patient will perform sit to stand with modified independence within 4 days. 3. Patient will ambulate with modified independence for 100 feet with the least restrictive device within 4 days. 4. Patient will ascend/descend 4 stairs with 1 handrail(s) with minimal assistance/contact guard assist within 4 days. 5. Patient will verbalize and demonstrate understanding of spinal precautions (No bending, lifting greater than 5 lbs, or twisting; log-roll technique; frequent repositioning as instructed) within 4 days. Outcome: Progressing Towards Goal   PHYSICAL THERAPY EVALUATION  Patient: Lion Justice (48 y.o. male)  Date: 11/21/2020  Primary Diagnosis: Acute renal failure (ARF) (HCC) [N17.9]  Frequent falls [R29.6]  Hypercalcemia [E83.52]  Lumbar canal stenosis [M48.061]  Cervical stenosis of spinal canal [M48.02]  Acute renal failure (ARF) (HCC) [N17.9]  Frequent falls [R29.6]  Hypercalcemia [E83.52]  Lumbar canal stenosis [M48.061]  Cervical stenosis of spinal canal [M48.02]        Precautions:   Fall, Back(no BLT's, brace on when OOB; log roll technique)    ASSESSMENT  Based on the objective data described below, the patient presents with generalized weakness, decreased bed mobility, transfers and functional ambulation, decreased safety awareness and at risk for falls following admission for acute renal failure and frequent falls.   CT of head negative for acute process; CT of spine showed multilevel stenosis and T11-T12 fracture. MRI pending. Patient has good family support and though he lives alone, son plans to take him to his home to recover. Recommend home with 24/7 supervision, use of rolling walker and brace when up, and HHPT. Daughter present and expressed understanding. Patient has needed DME. Current Level of Function Impacting Discharge (mobility/balance): Patient received in bed alert and oriented but a bit [place]. Patient admits to frequent falls lately. PT checked BP with changes of position and drops slightly with standing but not far from baseline (see doc flow sheets). Educated patient regarding back precautions and use of brace when OOB. PT fitted him with LSO upon sitting. He stood and ambulated 5 feet with rolling walker +2 min assist.  Needs cues for safe practices. Left up in chair with alarm in place and daughter in room. Functional Outcome Measure: The patient scored 50/100 on the Barthel outcome measure. Other factors to consider for discharge: lives alone but has good family support     Patient will benefit from skilled therapy intervention to address the above noted impairments. PLAN :  Recommendations and Planned Interventions: bed mobility training, transfer training, gait training, and therapeutic exercises      Frequency/Duration: Patient will be followed by physical therapy:  daily to address goals. Recommendation for discharge: (in order for the patient to meet his/her long term goals)  Physical therapy at least 2 days/week in the home AND ensure assist and/or supervision for safety with gait    This discharge recommendation:  Has not yet been discussed the attending provider and/or case management    IF patient discharges home will need the following DME: patient owns DME required for discharge         SUBJECTIVE:   Patient stated I have been falling a lot lately and I don't know why.     OBJECTIVE DATA SUMMARY:   HISTORY:    Past Medical History:   Diagnosis Date    CRI (chronic renal insufficiency) 12/13/2012    Gout 1/4/2011    Prostate CA ([address] 1/4/2011     Past Surgical History:   Procedure Laterality Date    ENDOSCOPY, COLON, DIAGNOSTIC  2003    neg    HC BONE MARROW BIOPSY      HX PROSTATECTOMY         Personal factors and/or comorbidities impacting plan of care: frequent falls, decreased safety awareness. T11-T12 compression fracture    Home Situation  Home Environment: Private residence  # Steps to Enter: 2  Rails to Enter: Yes  One/Two Story Residence: One story  Living Alone: Yes  Support Systems: Family member(s)  Patient Expects to be Discharged to[de-identified] Private residence  Current DME Used/Available at Home: Jim Durant, susana, Walker, rolling    EXAMINATION/PRESENTATION/DECISION MAKING:   Critical Behavior:  Neurologic State: Alert  Orientation Level: Oriented X4  Cognition: Follows commands  Safety/Judgement: Decreased awareness of need for safety  Hearing: Auditory  Auditory Impairment: Hard of hearing, bilateral  Skin:  not fully observed    Range Of Motion:  AROM: Within functional limits(BLE)                       Strength:    Strength: Generally decreased, functional(BLE)                    Tone & Sensation:   Tone: Normal              Sensation: Impaired(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)                    Functional Mobility:  Bed Mobility:  Rolling: Additional time;Minimum assistance  Supine to Sit: Minimum assistance; Additional time     Scooting: Stand-by assistance  Transfers:  Sit to Stand: Assist x2;Minimum assistance  Stand to Sit: Assist x2;Contact guard assistance        Bed to Chair: Minimum assistance;Assist x2              Balance:   Sitting: Intact  Standing: Intact; With support  Ambulation/Gait Training:  Distance (ft): 5 Feet (ft)  Assistive Device: Gait belt;Brace/Splint; Walker, rolling  Ambulation - Level of Assistance: Assist x2;Minimal assistance        Gait Abnormalities: Decreased step clearance                                                  Therapeutic Exercises: Ankle pumps    Functional Measure:  Barthel Index:    Bathin  Bladder: 10  Bowels: 10  Groomin  Dressin  Feeding: 10  Mobility: 0  Stairs: 0  Toilet Use: 5  Transfer (Bed to Chair and Back): 5  Total: 50/100       The Barthel ADL Index: Guidelines  1. The index should be used as a record of what a patient does, not as a record of what a patient could do. 2. The main aim is to establish degree of independence from any help, physical or verbal, however minor and for whatever reason. 3. The need for supervision renders the patient not independent. 4. A patient's performance should be established using the best available evidence. Asking the patient, friends/relatives and nurses are the usual sources, but direct observation and common sense are also important. However direct testing is not needed. 5. Usually the patient's performance over the preceding 24-48 hours is important, but occasionally longer periods will be relevant. 6. Middle categories imply that the patient supplies over 50 per cent of the effort. 7. Use of aids to be independent is allowed. Nika Armendariz., Barthel, D.W. ([id number]). Functional evaluation: the Barthel Index. [facility] (14)2. Lorre Gums benja [place], J.J.M.F, Sana Sport., Larry Mosley., [place], [address] [place] [address] (). Measuring the change indisability after inpatient rehabilitation; comparison of the responsiveness of the Barthel Index and Functional [place] Measure. Journal of Neurology, Neurosurgery, and Psychiatry, 66(4), 975-322. Geovani Payne, N.J.A, ALONDRA Copeland, & Gadiel Simmons MALEXA. (2004.) Assessment of post-stroke quality of life in cost-effectiveness studies: The usefulness of the Barthel Index and the EuroQoL-5D.  Quality of Life Research, 15, 398-38           Physical Therapy Evaluation Charge Determination   History Examination Presentation Decision-Making   MEDIUM  Complexity : 1-2 comorbidities / personal factors will impact the outcome/ POC  LOW Complexity : 1-2 Standardized tests and measures addressing body structure, function, activity limitation and / or participation in recreation  MEDIUM Complexity : Evolving with changing characteristics  Other outcome measures Barthel  LOW       Based on the above components, the patient evaluation is determined to be of the following complexity level: LOW     Pain Rating:  Warren Ripa if I move a lot\"    Activity Tolerance:   Good    After treatment patient left in no apparent distress:   Sitting in chair, Call bell within reach, Bed / chair alarm activated, and Caregiver / family present    COMMUNICATION/EDUCATION:   The patients plan of care was discussed with: Registered nurse. Fall prevention education was provided and the patient/caregiver indicated understanding. and Patient/family agree to work toward stated goals and plan of care.     Thank you for this referral.  Russell Raymundo, PT   Time Calculation: 40 mins

## 2020-11-21 NOTE — PROGRESS NOTES
Awaiting for lab collection to monitor hypercalcemia and ARF. Discussed with night nurse, she will collect labs shortly. Consider bladder scan, if urine output remains low after even after IVF infusion.   
 
Grayson Nunez MD

## 2020-11-21 NOTE — PROGRESS NOTES
[facility] RESIDENCY PROGRAM  PROGRESS NOTE     11/20/2020  PCP: Samira Arzate MD     Assessment/Plan:     Jonny Caal is a 80 y.o. male with a PMHx of prostate CA, HTN, Gout, hx of lymphoma who presents to the ED with a weakness, acute renal failure and hypercalcemia. 24-hour events: Unable to obtain MRI thoracic spine as pt intolerant due to pain.     Weakness w/ Recurrent falls: Pt w/ hx 3 falls in the past 7 days associated with chronic dizziness. CT spine severe canal stenosis at L3-4 and L4-5. CT spine thoracic: cortical disruption in the body of T12 with suspected fracture in the body of T11. NH3, acetaminophen, volatiles, salicylates, etoh neg. -MRI/MRA brain: no acute intracranial abnormalities  -MRI cervical spine: disk herniation C5/C6 which contacts anterior cervical cord  -MRI lumbar spine: neuroforaminal stenosis, most severe on the L side at L3/L4 and L4/L5  -MRI T-spine pending  -echo and ekg pending  -orthostatic bp's pending  -CBC and CMP daily  -Ortho following     Spinal stenosis: recurrent history of weakness, dizziness and falls.   -Lumbar mri as above  -Ortho following, recs appreciated     T12 questionable fx: Fely French CT spine thoracic: cortical disruption in the body of T12 with suspected fracture in the body of T11. MRI lumbar spine as above, final read pending.  -Per ortho WBAT, work w/ PT/OT, thoracic-lumbar-sacral orthosis - will update recs post-MRI     Hypercalcemia: POA: 13.6 (BL9) ---> corrected Ca:14.5. could be 2/2  Hypovolemia vs  malignancy given hx of lymphoma and prostate CA  vs hyperparathyroidim. Improving with IVF hydration. Will hold off on calcitonin given improvement w/ fluid. PTH low. Vit D wnl. Ionized calcium 1.53 > 1.58  -Continue aggressive hydration  -Per nephro no extensive workup required at this time, continue to monitor  -Will get a PTHrP      Opioid use: No opioids prescribed per .  UDS + for opiates.     Hypertension- On home atenolol 50 mg tablet everyday. - Continuing home medications   - Will continue to monitor at this time and readjust as BP's trend.     Acute renal Failure-  Hx of Estimated Creatinine Clearance: 10.6 mL/min (A) (based on SCr of 3.89 mg/dL (H)). Baseline 1.45. Etiology may be secondary to hypercalcemia. FeNa 1.6% suggests intrinsic cause.  -Strict I&O  -Per nephro continue IV hydration, monitor CMP  -UCx pending     Anemia: poa: 11.9 (bl: 13) likely secondary to chronic renal failur. hx of active bleeding. Iron panel suggests anemia of chronic disease  -peripheral smear pending  -CBC daily     Gout: stable  -Hold home allopurinol     Lymphoma: stable; remission normal PET scan in .     Prostate Cancer: stable; s/p prostatectomy         FEN/GI - renal diet. NS at 150 mL/hr. Activity - Out of bed w/ assistance. DVT prophylaxis - Sub Q Heparin  GI prophylaxis - Not indicated at this time  Fall prophylaxis - Fall precautions ordered. Code Status - Full. Discussed with patient / caregivers. Next of Kin Name and Contact - Elias Jordan,  Daughter - [phone]      Alfonso Almodovar discussed with Dr. Karla Gauthier. Subjective:   Pt was seen and examined at bedside. Afebrile and hemodynamically stable. Pt doing ok this morning, having trouble finding a comfortable position to sleep due to back pain. Has not been out of bed to move around much yet. Voiding appropriately. Denies chest pain, SOB, nausea, vomiting, abdominal pain, dizziness.      Objective:   Physical examination  Patient Vitals for the past 24 hrs:   Temp Pulse Resp BP SpO2   20 1952 97.5 °F (36.4 °C) 70 18 110/61 96 %   20 1854  84      20 1616 98 °F (36.7 °C) 86 14 112/63 96 %   20 1500    (!) 111/54 96 %   20 1446    (!) 132/59 96 %   20 1305     94 %   20 1300    (!) 109/45 94 %   20 1240 98 °F (36.7 °C) 71 18 118/78 94 %      Temp (24hrs), Av.8 °F (36.6 °C), Min:97.5 °F (36.4 °C), Max:98 °F (36.7 °C)         O2 Device: Room air      Physical Exam  General: In no acute distress. Alert. Cooperative. Head: Normocephalic. Atraumatic. Eyes:              Sclera white. PERRL. EOMI. Ears:              Hard of hearing. Respiratory: CTAB. No w/r/r/c.   Cardiovascular: RRR. GI: Normal bowel sounds. Nontender. No rebound tenderness or guarding. Nondistended. Extremities: No LE edema. Distal pulses intact. Musculoskeletal: Full ROM in all extremities. Skin: Warm, dry. No rashes. Neuro: CN II-XII grossly intact. Strength 5/5 in all extremities. Data Review:     CBC:  Recent Labs     11/20/20  1331   WBC 9.8   HGB 11.9*   HCT 36.2*   *     Metabolic Panel:  Recent Labs     11/20/20  1645 11/20/20  1331    136   K 3.6 4.0    98   CO2 30 32   BUN 52* 56*   CREA 3.79* 3.89*   * 114*   CA 12.0*  11.8* 13.6*   MG 2.2  --    PHOS 5.6*  --    ALB 2.9*  --    TBILI 0.8  --    ALT 13  --      Micro:  No results found for: CULT  Imaging:  Mra Brain Wo Cont    Result Date: 11/20/2020  *PRELIMINARY REPORT* No emergent process. Preliminary report was provided by Dr. Melissa Sadler Final report to follow. *END PRELIMINARY REPORT*      Mri Brain Wo Cont    Result Date: 11/20/2020  *PRELIMINARY REPORT* No emergent process. Preliminary report was provided by Dr. Melissa Sadler Final report to follow. *END PRELIMINARY REPORT*      Mri Cerv Spine Wo Cont    Result Date: 11/20/2020  *PRELIMINARY REPORT* There is disc herniation at C5/C6 which contacts the anterior margin of the cervical cord. No emergent findings Preliminary report was provided by Dr. Melissa Sadler Final report to follow. *END PRELIMINARY REPORT*      Mri Lumb Spine Wo Cont    Result Date: 11/20/2020  *PRELIMINARY REPORT* Neuroforaminal stenosis, most severe on the left at L3/L4 and L4/L5 Preliminary report was provided by Dr. Melissa Sadler Final report to follow.  *END PRELIMINARY REPORT*      Ct Head Wo Cont    Result Date: 11/20/2020  CLINICAL HISTORY: fall INDICATION: fall COMPARISON: None. CT dose reduction was achieved through use of a standardized protocol tailored for this examination and automatic exposure control for dose modulation. TECHNIQUE: Serial axial images with a collimation of 5 mm were obtained from the skull base through the vertex  FINDINGS: There is sulcal and ventricular prominence. Confluent periventricular and scattered foci of hypodensity in the cerebral white matter. There is no evidence of an acute infarction, hemorrhage, or mass-effect. There is no evidence of midline shift or hydrocephalus. Posterior fossa structures are unremarkable. No extra-axial collections are seen. Mastoid air cells are well pneumatized and clear. Vertebral and carotid vascular calcifications are noted. IMPRESSION: No acute intracranial process. Imaging findings consistent with mild chronic microvascular ischemic change. There is a mild to moderate degree of cerebral atrophy. Ct Spine Cerv Wo Cont    Result Date: 11/20/2020  EXAM: CT SPINE CERV WO CONT CLINICAL HISTORY: fall INDICATION: fall COMPARISON:  None TECHNIQUE:  Axial neck CT was performed. Noncontrast imaging obtained. Coronal and sagittal reconstructions were performed. CT dose reduction was achieved through use of a standardized protocol tailored for this examination and automatic exposure control for dose modulation. Osseous/bone algorithm was utilized. FINDINGS: Carotid atherosclerotic change. No pneumothorax. No large pulmonary mass or nodule. .  There is no evidence of fracture or subluxation. The prevertebral soft tissues are grossly within normal limits. The atlantodental interval is within normal limits. The craniocervical junction is intact. Multilevel loss of disc height. Multilevel severe canal and foraminal stenoses. Minimal anterolisthesis of C7 on T1. IMPRESSION: There is no acute fracture or dislocation identified.       Ct Spine Thorac Wo Cont    Result Date: 11/20/2020  EXAM:  CT SPINE [facility] WO CONT INDICATION: Fall, back pain. COMPARISON: None. TECHNIQUE: Multislice helical CT of the thoracic spine was performed. Sagittal and coronal reformations were generated. CT dose reduction was achieved through use of a standardized protocol tailored for this examination and automatic exposure control for dose modulation. FINDINGS: There are osteophytes at multiple levels. There is cortical disruption in the anterior, superior endplate of F13 which extends through both the right lateral and left lateral osteophyte. Query cortical disruption in the right, lateral aspect of the T11 vertebral body. There is no spinal canal stenosis. Coronary artery disease and atherosclerosis. Prominent lymph nodes in the posterior mediastinum Likely cystic lesions in both kidneys which are incompletely characterized      IMPRESSION: 1. Cortical disruption in the body of T12 with suspected fracture in the body of T11. Ct Spine Lumb Wo Cont    Result Date: 11/20/2020  EXAM: CT SPINE LUMB WO CONT History: Status post fall/back pain INDICATION: fall, back pain COMPARISON: None. TECHNIQUE:   Unenhanced multislice helical CT of the lumbar spine was performed in the axial plane. Coronal and sagittal reconstructions were obtained. CT dose reduction was achieved through use of a standardized protocol tailored for this examination and automatic exposure control for dose modulation. FINDINGS: There is retrolisthesis of L2 on L3 and L3 on L4 each measuring 4 mm. Anterolisthesis of L4 on L5 measuring or millimeters. Abdominal aorta demonstrates atherosclerotic change. There are renal calculi. There is no hydronephrosis. Left renal hypodensities most likely a cyst. Mild levorotatory scoliotic change. T12-L1: The spinal canal and neural foramina are widely patent. L1-L2: The spinal canal and neural foramina are widely patent. L2-L3: Moderate facet arthropathy.  Ligamentum flavum hypertrophy. Minimal retrolisthesis. Disc bulge/osteophyte. Moderate canal and right foraminal stenosis. L3-L4: Facet arthropathy and hypertrophy. Ligament flavum hypertrophy. Minimal retrolisthesis. Severe canal stenosis. Mild bilateral foraminal stenoses. L4-L5: Facet arthropathy and hypertrophy. Disc bulge/osteophyte. Severe canal stenosis. Severe left foraminal stenosis. L5-S1: Mild facet arthropathy. Canal is patent. Foramina are patent. IMPRESSION: Extensive multilevel degenerative change with multilevel spondylolisthesis as well. Severe canal stenoses at L3-4 and L4-5. Additional, less severe degenerative findings are as described in detail above. Medications reviewed  Current Facility-Administered Medications   Medication Dose Route Frequency    sodium chloride (NS) flush 5-40 mL  5-40 mL IntraVENous Q8H    sodium chloride (NS) flush 5-40 mL  5-40 mL IntraVENous PRN    heparin (porcine) injection 5,000 Units  5,000 Units SubCUTAneous Q8H    0.9% sodium chloride infusion  150 mL/hr IntraVENous CONTINUOUS         Signed:   Twyla Gilbert MD   Resident, Family Medicine      Attending note: Attending note to follow. ..

## 2020-11-21 NOTE — CONSULTS
[address]     Name:  Jose Dumont  MR#:  [id number]  :  1932  ACCOUNT #:  [de-identified]  DATE OF SERVICE:  2020      NEPHROLOGY CONSULTATION    REQUESTING PHYSICIAN:  Dr. Dedra Hinojosa. CHIEF COMPLAINT:  Elevated creatinine. HISTORY OF PRESENT ILLNESS:  The patient is an 59-year-old white male who has a history of prostate cancer and lives alone. He had a baseline creatinine of 1.5 back in August.  It is not clear if he has previously seen a nephrologist.  He is not followed by our group. He was brought into the hospital with recurrent falls and weakness. His creatinine was 3.9 and calcium was 13.6. He received saline in the emergency room and repeat labs showed a creatinine of 3.79 and calcium down to 11.8. The patient's history is vague. He seems to note that he has been eating less. He is clearly confused and it is tough to get details. Review of his medical record shows no renin-angiotensin system inhibitors or diuretics or NSAIDs among his listed home medications. Blood pressure was stable on admission. REVIEW OF SYSTEMS:  CONSTITUTIONAL:  Positive for weakness. No fever. GI:  Positive for constipation. Denies vomiting or diarrhea. :  He mentions decreased urine output. No dysuria or hematuria. NEUROLOGICAL:  Positive for dizziness. No seizures. Positive for back pain. All other systems negative, except as per history of present illness. PAST MEDICAL HISTORY:  Prostate cancer, details unknown. He is on no current therapy it appears. Gout, chronic kidney disease stage III, possible history of lymphoma. FAMILY HISTORY:  No documented family history of renal disease. SOCIAL HISTORY:  He does not smoke. He lives alone. His daughter checks in on him. PHYSICAL EXAMINATION:  VITAL SIGNS:  Temperature 98.0, pulse 86, blood pressure 112/63. Pulse ox 96% on room air. GENERAL:  Thin, elderly white male. EYES:  Anicteric. Extraocular movements intact. ENMT:  Mucous membranes are dry. There is no epistaxis. NECK:  Nontender. There is no JVD. HEART:  Regular. There is no lower extremity edema. RESPIRATORY:  Lungs are clear with good effort. GI:  Abdomen is soft, nontender. Bowel sounds are active. There is no guarding or rebound. SKIN:  Warm with normal turgor. There is no rash. MUSCULOSKELETAL:  There is no cyanosis or clubbing. There is no synovitis of fingers or wrists bilaterally. LABORATORY DATA:  Sodium 137, potassium 3.6, chloride 102, bicarb 30, BUN 52, creatinine 3.79, calcium 11.8, albumin 2.9. ASSESSMENT:  Acute kidney injury due to intravascular volume depletion, most likely. He has associated hypercalcemia which is also likely due to volume depletion, although the differential would include malignancy and other causes. PLAN:  1. Volume expansion with isotonic saline. 2.  Check urine electrolytes. 3.  Monitor serial labs. 4.  If renal function is not improving with volume expansion, consider additional workup including imaging and possibly serologies. 5.  Calcium appears to be improving rapidly with fluids. We would not do an extensive workup at this time, unless the calcium fails to normalize.       Rk Pablo MD      [id number]  D:  11/20/2020 19:04  T:  11/20/2020 23:19  JOB #:  [id number]

## 2020-11-21 NOTE — PROGRESS NOTES
11/21/2020  2:30 PM  Consult for [facility] services, PT solomon completed recommending HH w/ 24/7 supervision, pt's son plan to stay w/ him 24/7 at [place]. Met w/ pt and Dtr Braden Grover, provided list of [facility] agencies, she will review w/ her brother for choice.  Pt has [facility] for outpatient    Will follow and assist.  KILEY Kaur

## 2020-11-21 NOTE — PROGRESS NOTES
Bedside, Verbal and Written shift change report given to Maura GERMAN (oncoming nurse) by Anais Sneed (offgoing nurse). Report included the following information SBAR, Kardex, ED Summary, Procedure Summary, Intake/Output, MAR, Recent Results, Med Rec Status and Cardiac Rhythm ns/afib.

## 2020-11-21 NOTE — CONSULTS
[address] [facility]  [address] [facility], [address] [place] [address] Melchor Whitlock, [address]  [phone]    Hematology Oncology Consultation Note         Patient: Erin Pagan MRN: [id number]  SSN: [id number]    YOB: 1932  Age: 80 y.o. Sex: male        Reason for Consultation:      1. Hypercalcemia  2. H/O Diffuse large B-cell lymphoma    Subjective:      Erin Pagan is a 80 y.o. male who I am seeing in consultation for hypercalcemia and history of diffuse large B-cell lymphoma. He is admitted with weakness, fall and back injury. He has some back pain. CT and MRI shows degenerative changes. Labs on admission revealed BENJI and hypercalcemia. With hydration, Creat and Ca level are coming down. He was diagnosed with DLBCL in 2009 and received R-CHOP under the care of Dr. Jose Shi and and has been cured of it. Review of Systems:    Constitutional: negative  Eyes: negative  Ears, Nose, Mouth, Throat, and Face: negative  Respiratory: negative  Cardiovascular: negative  Gastrointestinal: negative  Genitourinary:negative  Integument/Breast: negative  Hematologic/Lymphatic: negative  Musculoskeletal:negative  Neurological: negative      Past Medical History:   Diagnosis Date    CRI (chronic renal insufficiency) 12/13/2012    Gout 1/4/2011    Prostate CA ([facility] [address] 1/4/2011     Past Surgical History:   Procedure Laterality Date    ENDOSCOPY, COLON, DIAGNOSTIC  2003    neg    HC BONE MARROW BIOPSY      HX PROSTATECTOMY        No family history on file. Social History     Tobacco Use    Smoking status: Never Smoker    Smokeless tobacco: Never Used   Substance Use Topics    Alcohol use: No      Prior to Admission medications    Medication Sig Start Date End Date Taking? Authorizing Provider   allopurinoL (ZYLOPRIM) 300 mg tablet Take 150 mg by mouth daily.    Yes Provider, Historical   vitamin e (E GEMS) 100 unit capsule Take 100 Units by mouth daily. Yes Provider, Historical   multivitamin (ONE A DAY) tablet Take 1 Tab by mouth daily. Yes Provider, Historical   ketoconazole (NIZORAL) 2 % topical cream Apply  to affected area two (2) times a day for 28 days. 11/11/20 12/9/20 Yes Ihsan Hicks MD   atenoloL (TENORMIN) 50 mg tablet Take 1 Tab by mouth daily. 9/9/20  Yes Kevin Davies MD              Allergies   Allergen Reactions    Pcn [Penicillins] Swelling           Objective:     Visit Vitals  /63 (BP Patient Position: Sitting)   Pulse 83   Temp 97.8 °F (36.6 °C)   Resp 17   Ht 5' 3\" (1.6 m)   Wt 136 lb 0.4 oz (61.7 kg)   SpO2 94%   BMI 24.10 kg/m²           Physical Exam:    GENERAL: alert, cooperative, no distress, appears stated age  EYE: conjunctivae/corneas clear. PERRL, EOM's intact  LYMPHATIC: Cervical, supraclavicular, and axillary nodes normal.   THROAT & NECK: normal and no erythema or exudates noted. LUNG: clear to auscultation bilaterally  HEART: regular rate and rhythm, S1, S2 normal, no murmur, click, rub or gallop  ABDOMEN: soft, non-tender. Bowel sounds normal. No masses,  no organomegaly  EXTREMITIES:  extremities normal, atraumatic, no cyanosis or edema  SKIN: Normal.  NEUROLOGIC: AOx3. Gait normal. Reflexes and motor strength normal and symmetric. Cranial nerves 2-12 and sensation grossly intact.       Lab Results   Component Value Date/Time    WBC 8.9 11/21/2020 02:20 AM    HGB 11.3 (L) 11/21/2020 02:20 AM    HCT 34.9 (L) 11/21/2020 02:20 AM    PLATELET 615 (L) 94/86/6807 02:20 AM    MCV 96.7 11/21/2020 02:20 AM       Lab Results   Component Value Date/Time    Sodium 138 11/21/2020 02:20 AM    Potassium 3.8 11/21/2020 02:20 AM    Chloride 103 11/21/2020 02:20 AM    CO2 30 11/21/2020 02:20 AM    Anion gap 5 11/21/2020 02:20 AM    Glucose 117 (H) 11/21/2020 02:20 AM    BUN 52 (H) 11/21/2020 02:20 AM    Creatinine 3.70 (H) 11/21/2020 02:20 AM    BUN/Creatinine ratio 14 11/21/2020 02:20 AM    GFR est AA 19 (L) 11/21/2020 02:20 AM    GFR est non-AA 16 (L) 11/21/2020 02:20 AM    Calcium 11.8 (H) 11/21/2020 02:20 AM    Bilirubin, total 0.8 11/21/2020 02:20 AM    Alk. phosphatase 62 11/21/2020 02:20 AM    Protein, total 6.0 (L) 11/21/2020 02:20 AM    Albumin 2.9 (L) 11/21/2020 02:20 AM    Globulin 3.1 11/21/2020 02:20 AM    A-G Ratio 0.9 (L) 11/21/2020 02:20 AM    ALT (SGPT) 12 11/21/2020 02:20 AM    AST (SGOT) 48 (H) 11/21/2020 02:20 AM                Assessment:     1. Hypercalcemia    Agree with Nephrology impression  Possible from dehydration and abnormal kidneys  It is improving  No obvious signs of malignancy at this time. Will obtain SPEP/Immunofix      2. Anemia     Mild, normocytic - chronic disease related  Observation      3. H/O DLBCL    Treated with R-CHOP in 2009  Is cured     Plan:       1. When serum creatinine is close to normal, consider CT of the chest/abd/pelvis  2. Will follow along  3. He will return to Dr. Noemy Price after discharge for follow up of h/o of lymphoma  4.  SPEP/Immunofix      Signed by: Tianna Baird MD                     November 21, 2020

## 2020-11-21 NOTE — PROGRESS NOTES
Patient was unable to finish all of his MRIs tonight due to his severe pain and couldn't hold still. 4 out of 5 exams were done.  MRI of T-spine will be done tomorrow (11/21/2020)

## 2020-11-21 NOTE — PROGRESS NOTES
Problem: Falls - Risk of  Goal: *Absence of Falls  Description: Document Beverly Jones Fall Risk and appropriate interventions in the flowsheet. Outcome: Progressing Towards Goal  Note: Fall Risk Interventions:  Mobility Interventions: PT Consult for mobility concerns    Mentation Interventions: Bed/chair exit alarm              History of Falls Interventions: Bed/chair exit alarm         Problem: Patient Education: Go to Patient Education Activity  Goal: Patient/Family Education  Outcome: Progressing Towards Goal     Problem: Pressure Injury - Risk of  Goal: *Prevention of pressure injury  Description: Document Daniel Scale and appropriate interventions in the flowsheet.   Outcome: Progressing Towards Goal  Note: Pressure Injury Interventions:  Sensory Interventions: Assess need for specialty bed    Moisture Interventions: Absorbent underpads    Activity Interventions: PT/OT evaluation    Mobility Interventions: HOB 30 degrees or less    Nutrition Interventions: Document food/fluid/supplement intake

## 2020-11-22 PROBLEM — Z85.72 HISTORY OF B-CELL LYMPHOMA: Status: ACTIVE | Noted: 2020-11-22

## 2020-11-22 PROBLEM — C85.90 NON-HODGKIN'S LYMPHOMA IN RELAPSE (HCC): Status: ACTIVE | Noted: 2020-11-22

## 2020-11-22 LAB
ALBUMIN SERPL-MCNC: 2.8 G/DL (ref 3.5–5)
ALBUMIN/GLOB SERPL: 0.9 {RATIO} (ref 1.1–2.2)
ALP SERPL-CCNC: 62 U/L (ref 45–117)
ALT SERPL-CCNC: 14 U/L (ref 12–78)
ANION GAP SERPL CALC-SCNC: 6 MMOL/L (ref 5–15)
ANION GAP SERPL CALC-SCNC: 7 MMOL/L (ref 5–15)
AST SERPL-CCNC: 40 U/L (ref 15–37)
BILIRUB SERPL-MCNC: 0.7 MG/DL (ref 0.2–1)
BUN SERPL-MCNC: 39 MG/DL (ref 6–20)
BUN SERPL-MCNC: 41 MG/DL (ref 6–20)
BUN/CREAT SERPL: 14 (ref 12–20)
BUN/CREAT SERPL: 14 (ref 12–20)
CA-I BLD-SCNC: 1.51 MMOL/L (ref 1.13–1.32)
CALCIUM SERPL-MCNC: 10.2 MG/DL (ref 8.5–10.1)
CALCIUM SERPL-MCNC: 11.1 MG/DL (ref 8.5–10.1)
CHLORIDE SERPL-SCNC: 107 MMOL/L (ref 97–108)
CHLORIDE SERPL-SCNC: 110 MMOL/L (ref 97–108)
CO2 SERPL-SCNC: 24 MMOL/L (ref 21–32)
CO2 SERPL-SCNC: 25 MMOL/L (ref 21–32)
CREAT SERPL-MCNC: 2.72 MG/DL (ref 0.7–1.3)
CREAT SERPL-MCNC: 3.02 MG/DL (ref 0.7–1.3)
ERYTHROCYTE [DISTWIDTH] IN BLOOD BY AUTOMATED COUNT: 14.9 % (ref 11.5–14.5)
GLOBULIN SER CALC-MCNC: 3.2 G/DL (ref 2–4)
GLUCOSE SERPL-MCNC: 105 MG/DL (ref 65–100)
GLUCOSE SERPL-MCNC: 114 MG/DL (ref 65–100)
HCT VFR BLD AUTO: 33.9 % (ref 36.6–50.3)
HGB BLD-MCNC: 11.2 G/DL (ref 12.1–17)
MAGNESIUM SERPL-MCNC: 1.7 MG/DL (ref 1.6–2.4)
MCH RBC QN AUTO: 31.4 PG (ref 26–34)
MCHC RBC AUTO-ENTMCNC: 33 G/DL (ref 30–36.5)
MCV RBC AUTO: 95 FL (ref 80–99)
NRBC # BLD: 0 K/UL (ref 0–0.01)
NRBC BLD-RTO: 0 PER 100 WBC
PHOSPHATE SERPL-MCNC: 4.1 MG/DL (ref 2.6–4.7)
PLATELET # BLD AUTO: 144 K/UL (ref 150–400)
PMV BLD AUTO: 11.5 FL (ref 8.9–12.9)
POTASSIUM SERPL-SCNC: 3.5 MMOL/L (ref 3.5–5.1)
POTASSIUM SERPL-SCNC: 3.7 MMOL/L (ref 3.5–5.1)
PROT SERPL-MCNC: 6 G/DL (ref 6.4–8.2)
RBC # BLD AUTO: 3.57 M/UL (ref 4.1–5.7)
SODIUM SERPL-SCNC: 139 MMOL/L (ref 136–145)
SODIUM SERPL-SCNC: 140 MMOL/L (ref 136–145)
WBC # BLD AUTO: 9.9 K/UL (ref 4.1–11.1)

## 2020-11-22 PROCEDURE — 65660000000 HC RM CCU STEPDOWN

## 2020-11-22 PROCEDURE — 80053 COMPREHEN METABOLIC PANEL: CPT

## 2020-11-22 PROCEDURE — 94760 N-INVAS EAR/PLS OXIMETRY 1: CPT

## 2020-11-22 PROCEDURE — 85027 COMPLETE CBC AUTOMATED: CPT

## 2020-11-22 PROCEDURE — 74011000250 HC RX REV CODE- 250: Performed by: STUDENT IN AN ORGANIZED HEALTH CARE EDUCATION/TRAINING PROGRAM

## 2020-11-22 PROCEDURE — 99232 SBSQ HOSP IP/OBS MODERATE 35: CPT | Performed by: FAMILY MEDICINE

## 2020-11-22 PROCEDURE — 36415 COLL VENOUS BLD VENIPUNCTURE: CPT

## 2020-11-22 PROCEDURE — 84165 PROTEIN E-PHORESIS SERUM: CPT

## 2020-11-22 PROCEDURE — 84100 ASSAY OF PHOSPHORUS: CPT

## 2020-11-22 PROCEDURE — 74011250636 HC RX REV CODE- 250/636: Performed by: STUDENT IN AN ORGANIZED HEALTH CARE EDUCATION/TRAINING PROGRAM

## 2020-11-22 PROCEDURE — 99233 SBSQ HOSP IP/OBS HIGH 50: CPT | Performed by: INTERNAL MEDICINE

## 2020-11-22 PROCEDURE — 82784 ASSAY IGA/IGD/IGG/IGM EACH: CPT

## 2020-11-22 PROCEDURE — 82330 ASSAY OF CALCIUM: CPT

## 2020-11-22 PROCEDURE — 74011250636 HC RX REV CODE- 250/636: Performed by: INTERNAL MEDICINE

## 2020-11-22 PROCEDURE — 83735 ASSAY OF MAGNESIUM: CPT

## 2020-11-22 PROCEDURE — 74011250637 HC RX REV CODE- 250/637: Performed by: STUDENT IN AN ORGANIZED HEALTH CARE EDUCATION/TRAINING PROGRAM

## 2020-11-22 RX ORDER — POLYETHYLENE GLYCOL 3350 17 G/17G
17 POWDER, FOR SOLUTION ORAL DAILY PRN
Status: DISCONTINUED | OUTPATIENT
Start: 2020-11-22 | End: 2020-11-27 | Stop reason: HOSPADM

## 2020-11-22 RX ORDER — LIDOCAINE 4 G/100G
1 PATCH TOPICAL EVERY 24 HOURS
Status: DISCONTINUED | OUTPATIENT
Start: 2020-11-22 | End: 2020-11-27 | Stop reason: HOSPADM

## 2020-11-22 RX ADMIN — DOCUSATE SODIUM 100 MG: 100 CAPSULE, LIQUID FILLED ORAL at 08:01

## 2020-11-22 RX ADMIN — SODIUM CHLORIDE 150 ML/HR: 900 INJECTION, SOLUTION INTRAVENOUS at 19:38

## 2020-11-22 RX ADMIN — CALCITONIN SALMON 246 INT'L UNITS: 200 INJECTION, SOLUTION INTRAMUSCULAR; SUBCUTANEOUS at 09:49

## 2020-11-22 RX ADMIN — Medication 10 ML: at 21:23

## 2020-11-22 RX ADMIN — SODIUM CHLORIDE 150 ML/HR: 900 INJECTION, SOLUTION INTRAVENOUS at 03:47

## 2020-11-22 RX ADMIN — HEPARIN SODIUM 5000 UNITS: 5000 INJECTION INTRAVENOUS; SUBCUTANEOUS at 00:25

## 2020-11-22 RX ADMIN — HEPARIN SODIUM 5000 UNITS: 5000 INJECTION INTRAVENOUS; SUBCUTANEOUS at 17:25

## 2020-11-22 RX ADMIN — Medication 10 ML: at 00:29

## 2020-11-22 RX ADMIN — HEPARIN SODIUM 5000 UNITS: 5000 INJECTION INTRAVENOUS; SUBCUTANEOUS at 08:01

## 2020-11-22 RX ADMIN — SODIUM CHLORIDE 150 ML/HR: 900 INJECTION, SOLUTION INTRAVENOUS at 11:54

## 2020-11-22 RX ADMIN — POLYETHYLENE GLYCOL 3350 17 G: 17 POWDER, FOR SOLUTION ORAL at 09:50

## 2020-11-22 RX ADMIN — DOCUSATE SODIUM 100 MG: 100 CAPSULE, LIQUID FILLED ORAL at 17:25

## 2020-11-22 RX ADMIN — SODIUM CHLORIDE 150 ML/HR: 900 INJECTION, SOLUTION INTRAVENOUS at 21:22

## 2020-11-22 RX ADMIN — ACETAMINOPHEN 650 MG: 325 TABLET ORAL at 13:08

## 2020-11-22 NOTE — PROGRESS NOTES
Nephrology Progress Note  [facility]  [facility], [address]  [place], [address]  Phone - [phone]               Fax - [phone]  Patient: Saint Freed    YOB: 1932     Admit Date: 11/20/2020   Date- 11/21/2020     CC: Follow up for hypercalcemia, BENJI        IMPRESSION:    BENJI on CKD  -baseline:1.5; Cr 3.7 on admission  · Hypercalcemia(13.6 on admission)  H/O Diffuse large B-cell lymphoma( possile recurrence as etiology)       PLAN:    Continue IVF: improving calcium but stil >11   Will start Calcitonin  4UI/kg q12, may be increased to 8UI q12h if needed    Monitor serum \calcium   pending SPEP w/ IF   Monitor UOP   Avoid diuretics       Subjective:  Improving calcium    Objective:   Vitals:    11/21/20 1235 11/21/20 1443 11/21/20 1609 11/21/20 1915   BP: 115/63  117/64 112/69   Pulse:  80 68 72   Resp:   17 17   Temp:   97.7 °F (36.5 °C) 97.6 °F (36.4 °C)   SpO2:   94% 94%   Weight:       Height:          Physical exam:   GEN: NAD  NECK- Supple  RESP: no distress  CVS  RRR  NEURO:  Non focal  EXT: No Edema           Chart reviewed. Pertinent Notes reviewed.      Data Review :  Recent Labs     11/21/20 0220 11/20/20  2245 11/20/20  1645    138 137   K 3.8 4.1 3.6    103 102   CO2 30 31 30   BUN 52* 52* 52*   CREA 3.70* 3.73* 3.79*   * 140* 115*   CA 11.8* 11.7* 12.0*  11.8*   MG 2.1  --  2.2   PHOS 5.1*  --  5.6*     Recent Labs     11/21/20  0220 11/20/20  1331   WBC 8.9 9.8   HGB 11.3* 11.9*   HCT 34.9* 36.2*   * 146*     Recent Labs     11/20/20  1645   TIBC 253   PSAT 9*   FERR 583*      Medication list  reviewed  Current Facility-Administered Medications   Medication Dose Route Frequency    docusate sodium (COLACE) capsule 100 mg  100 mg Oral BID    acetaminophen (TYLENOL) tablet 650 mg  650 mg Oral Q6H PRN    calciTONIN (MIACALCIN) injection 246 Int'l Units  4 Int'l Units/kg SubCUTAneous Q12H    sodium chloride (NS) flush 5-40 mL  5-40 mL IntraVENous Q8H    sodium chloride (NS) flush 5-40 mL  5-40 mL IntraVENous PRN    heparin (porcine) injection 5,000 Units  5,000 Units SubCUTAneous Q8H    0.9% sodium chloride infusion  150 mL/hr IntraVENous CONTINUOUS                          Maritza Quiles MD                   [facility]                   [email or web address]

## 2020-11-22 NOTE — PROGRESS NOTES
Physical Therapy    New orders received regarding use of TLSO instead of LSO. Subsequently, PA saw patient this day and advised in his note that LSO should be sufficient in patient's case. Patient is very short in stature with short waist, which would likely make standard TLSO fit poorly. Attempted to see patient this afternoon for routine therapy session. RN notes patient requesting to sleep but noted he had not been OOB all day. Daughter present, woke patient upon this PT's arrival.  Patient adamantly declining to participate in therapy at this time. Suggested short walk then back to bed to sleep but declined and also declined supine bed therex. Patient reporting needing to \"get stronger\" before working with therapy. Patient educated regarding goals of therapy and expectations following this type of injury regarding mobility. Patient continues to decline. Advised would discuss with RN so they could assist patient up to chair later this afternoon, evening, for dinner. Patient with weak agerement to this plan. Will f/u tomorrow earlier in day for therapy session.     Nuha Allred, MS, PT

## 2020-11-22 NOTE — PROGRESS NOTES
[address] [facility] RESIDENCY PROGRAM  PROGRESS NOTE     11/23/2020  PCP: Juan Francisco Thibodeaux MD     Assessment/Plan:     Salvador Nur is a 80 y.o. male with a PMHx of prostate CA, HTN, Gout, hx of lymphoma who presents to the ED with a weakness, acute renal failure and hypercalcemia. Overnight events: none    Hypercalcemia: mohan. Ca: 20.6   -Dc'dIVF hydration 150 ml/hr  -Denosumab 120 mg SQ once. -CMP daily  - f/u w/ SPEP/Immunofix to r/o MM  -F/U on PTHr peptide  -f/u vitamin D levels  -heme following, aprec recs  -nephro following, aprec recs    Acute renal Failure-  Improving, today: 2.36 (BL: 1.45)  -Strict I&O  -Continue PO intake    Weakness w/ Recurrent falls: could be 2/2 to anemia. MRI and CT spine w/ multilevel degenerative changes and stenosis. Carotid dupplex wnl. ECHO LVEF 60-65%, G1LVDD. -f/u on echo   -continue working w/ PT & OT  -CBC and CMP daily  -Ortho following     T12 fx: MRI T spine: Broken dish T12 fx.  -No plans for orthosis   -Tylenol 650 mg q 6 hrs for pain  -Lidocaine patch daily  -PT / OT    Hx of diffuse large B-cell Lymphoma: possible recurrence. MRI T: Splenomegaly and bulky splenic masses. Thoracic, mesenteric, and retroperitoneal LAD. -Ct abdomen pelvic w/o contrast  -CT guided bx  -Chest ct today.  -heme onco following--aprec recs    Spinal stenosis: recurrent history of weakness, dizziness and falls. MRI lumbar: Severe spinal canal stenosis at L4-5 relating   -Ortho following, recs appreciated    Hypertension- On home atenolol 50 mg tablet everyday. - Holding  home medications   . Anemia of chronic disease: Today 9.6 down from 11.2,  likely 2/2 to malignancy. -CBC daily  -Continue to monitor  -heme following, aprec recs     Gout: stable  -Hold home allopurinol 300 mg daily. Opioid use: No opioids prescribed per . UDS + for opiates. Prostate Cancer: stable; s/p prostatectomy. PSA wnl.        FEN/GI - renal diet.    Activity - Out of bed w/ assistance. DVT prophylaxis - Sub Q Heparin  GI prophylaxis - Not indicated at this time  Fall prophylaxis - Fall precautions ordered. Dispo: Home with HH. Code Status - Full. Discussed with patient / caregivers. Next of Kin Name and Contact - Constantin Ley,  Daughter - [phone]      Lissy Ear discussed with Dr. Kavon Ruiz. Subjective:   Pt was seen and examined at bedside. Afebrile and hemodynamically stable. Pt refers improvement of back pain. Denies chest pain, SOB, nausea, vomiting, abdominal pain, dizziness, diarrhea and constipation. Objective:   Physical examination  Patient Vitals for the past 24 hrs:   Temp Pulse Resp BP SpO2   20 0757 98.1 °F (36.7 °C) 84 17 (!) 149/80 93 %   20 0700  90      20 0424 98.5 °F (36.9 °C) 89 18 (!) 156/80 94 %   20 0000 98 °F (36.7 °C) 87 17 127/70 98 %   20 2256  76      20 2000 98.4 °F (36.9 °C) 84 16 112/62 97 %   20 1510 97.6 °F (36.4 °C) 86 17 135/65 96 %   20 1406  83      20 1206 97.5 °F (36.4 °C) 94 17 136/71 94 %      Temp (24hrs), Av °F (36.7 °C), Min:97.5 °F (36.4 °C), Max:98.5 °F (36.9 °C)         O2 Device: Room air    Date 20 - 20 0620 - 20 0659   Shift 6528-5444 4466-5059 24 Hour Total 6195-1441 2663-5459 24 Hour Total   INTAKE   P.O. 560  560        P. O. 560  560      Shift Total(mL/kg) 560(8.5)  560(8.5)      OUTPUT   Urine(mL/kg/hr) 745(0.9) 1150(1.5) 1895(1.2) 325  325     Urine Voided 745 1150 1895 325  325     Urine Occurrence(s) 3 x  3 x      Shift Total(mL/kg) 745(11.3) 1150(17.5) 1895(28.8) 325(4.9)  325(4.9)   NET -185 -1150 -1335 -325  -325   Weight (kg) 65.8 65.8 65.8 65.8 65.8 65.8     Physical Exam  General: In no acute distress. Alert. Cooperative. Head: Normocephalic. Atraumatic. Eyes:              Sclera white. PERRL. EOMI. Ears:              Hard of hearing. Respiratory: CTAB. No w/r/r/c. Cardiovascular: RRR. GI: Normal bowel sounds. Nontender. No rebound tenderness or guarding. Nondistended. Extremities: No LE edema. Distal pulses intact. Musculoskeletal: Full ROM in all extremities. Skin: Warm, dry. No rashes. Neuro: CN II-XII grossly intact. Strength 5/5 in all extremities. Data Review:     CBC:  Recent Labs     11/23/20  0539 11/22/20 0055 11/21/20  0220   WBC 7.7 9.9 8.9   HGB 9.6* 11.2* 11.3*   HCT 28.8* 33.9* 34.9*   * 144* 208*     Metabolic Panel:  Recent Labs     11/23/20  0539 11/22/20  1310 11/22/20 0055 11/21/20 0220    140 139 138   K 3.3* 3.7 3.5 3.8   * 110* 107 103   CO2 22 24 25 30   BUN 32* 39* 41* 52*   CREA 2.36* 2.72* 3.02* 3.70*   GLU 94 114* 105* 117*   CA 9.8 10.2* 11.1* 11.8*   MG 1.7  --  1.7 2.1   PHOS 3.2  --  4.1 5.1*   ALB 2.5*  --  2.8* 2.9*   TBILI 0.7  --  0.7 0.8   ALT 13  --  14 12     Micro:  Lab Results   Component Value Date/Time    Culture result: No growth (<1,000 CFU/ML) 11/20/2020 04:45 PM     Imaging:  Mra Brain Wo Cont    Result Date: 11/20/2020  *PRELIMINARY REPORT* No emergent process. Preliminary report was provided by Dr. Mauricio Waters Final report to follow. *END PRELIMINARY REPORT*      Mri Brain Wo Cont    Result Date: 11/20/2020  *PRELIMINARY REPORT* No emergent process. Preliminary report was provided by Dr. Mauricio Waters Final report to follow. *END PRELIMINARY REPORT*      Mri Cerv Spine Wo Cont    Result Date: 11/20/2020  *PRELIMINARY REPORT* There is disc herniation at C5/C6 which contacts the anterior margin of the cervical cord. No emergent findings Preliminary report was provided by Dr. Mauricio Waters Final report to follow. *END PRELIMINARY REPORT*      Mri Lumb Spine Wo Cont    Result Date: 11/20/2020  *PRELIMINARY REPORT* Neuroforaminal stenosis, most severe on the left at L3/L4 and L4/L5 Preliminary report was provided by Dr. Mauricio Waters Final report to follow.  *END PRELIMINARY REPORT*      Ct Head Wo Cont    Result Date: 11/20/2020  CLINICAL HISTORY: fall INDICATION: fall COMPARISON: None. CT dose reduction was achieved through use of a standardized protocol tailored for this examination and automatic exposure control for dose modulation. TECHNIQUE: Serial axial images with a collimation of 5 mm were obtained from the skull base through the vertex  FINDINGS: There is sulcal and ventricular prominence. Confluent periventricular and scattered foci of hypodensity in the cerebral white matter. There is no evidence of an acute infarction, hemorrhage, or mass-effect. There is no evidence of midline shift or hydrocephalus. Posterior fossa structures are unremarkable. No extra-axial collections are seen. Mastoid air cells are well pneumatized and clear. Vertebral and carotid vascular calcifications are noted. IMPRESSION: No acute intracranial process. Imaging findings consistent with mild chronic microvascular ischemic change. There is a mild to moderate degree of cerebral atrophy. Ct Spine Cerv Wo Cont    Result Date: 11/20/2020  EXAM: CT SPINE CERV WO CONT CLINICAL HISTORY: fall INDICATION: fall COMPARISON:  None TECHNIQUE:  Axial neck CT was performed. Noncontrast imaging obtained. Coronal and sagittal reconstructions were performed. CT dose reduction was achieved through use of a standardized protocol tailored for this examination and automatic exposure control for dose modulation. Osseous/bone algorithm was utilized. FINDINGS: Carotid atherosclerotic change. No pneumothorax. No large pulmonary mass or nodule. .  There is no evidence of fracture or subluxation. The prevertebral soft tissues are grossly within normal limits. The atlantodental interval is within normal limits. The craniocervical junction is intact. Multilevel loss of disc height. Multilevel severe canal and foraminal stenoses. Minimal anterolisthesis of C7 on T1. IMPRESSION: There is no acute fracture or dislocation identified.       Ct Spine Thorac Wo Cont    Result Date: 11/20/2020  EXAM:  CT SPINE [facility] WO CONT INDICATION: Fall, back pain. COMPARISON: None. TECHNIQUE: Multislice helical CT of the thoracic spine was performed. Sagittal and coronal reformations were generated. CT dose reduction was achieved through use of a standardized protocol tailored for this examination and automatic exposure control for dose modulation. FINDINGS: There are osteophytes at multiple levels. There is cortical disruption in the anterior, superior endplate of C48 which extends through both the right lateral and left lateral osteophyte. Query cortical disruption in the right, lateral aspect of the T11 vertebral body. There is no spinal canal stenosis. Coronary artery disease and atherosclerosis. Prominent lymph nodes in the posterior mediastinum Likely cystic lesions in both kidneys which are incompletely characterized      IMPRESSION: 1. Cortical disruption in the body of T12 with suspected fracture in the body of T11. Ct Spine Lumb Wo Cont    Result Date: 11/20/2020  EXAM: CT SPINE LUMB WO CONT History: Status post fall/back pain INDICATION: fall, back pain COMPARISON: None. TECHNIQUE:   Unenhanced multislice helical CT of the lumbar spine was performed in the axial plane. Coronal and sagittal reconstructions were obtained. CT dose reduction was achieved through use of a standardized protocol tailored for this examination and automatic exposure control for dose modulation. FINDINGS: There is retrolisthesis of L2 on L3 and L3 on L4 each measuring 4 mm. Anterolisthesis of L4 on L5 measuring or millimeters. Abdominal aorta demonstrates atherosclerotic change. There are renal calculi. There is no hydronephrosis. Left renal hypodensities most likely a cyst. Mild levorotatory scoliotic change. T12-L1: The spinal canal and neural foramina are widely patent. L1-L2: The spinal canal and neural foramina are widely patent. L2-L3: Moderate facet arthropathy.  Ligamentum flavum hypertrophy. Minimal retrolisthesis. Disc bulge/osteophyte. Moderate canal and right foraminal stenosis. L3-L4: Facet arthropathy and hypertrophy. Ligament flavum hypertrophy. Minimal retrolisthesis. Severe canal stenosis. Mild bilateral foraminal stenoses. L4-L5: Facet arthropathy and hypertrophy. Disc bulge/osteophyte. Severe canal stenosis. Severe left foraminal stenosis. L5-S1: Mild facet arthropathy. Canal is patent. Foramina are patent. IMPRESSION: Extensive multilevel degenerative change with multilevel spondylolisthesis as well. Severe canal stenoses at L3-4 and L4-5. Additional, less severe degenerative findings are as described in detail above. Medications reviewed  Current Facility-Administered Medications   Medication Dose Route Frequency    potassium chloride 10 mEq in 100 ml IVPB  10 mEq IntraVENous Q1H    lidocaine 4 % patch 1 Patch  1 Patch TransDERmal Q24H    polyethylene glycol (MIRALAX) packet 17 g  17 g Oral DAILY PRN    docusate sodium (COLACE) capsule 100 mg  100 mg Oral BID    acetaminophen (TYLENOL) tablet 650 mg  650 mg Oral Q6H PRN    sodium chloride (NS) flush 5-40 mL  5-40 mL IntraVENous Q8H    sodium chloride (NS) flush 5-40 mL  5-40 mL IntraVENous PRN    heparin (porcine) injection 5,000 Units  5,000 Units SubCUTAneous Q8H    0.9% sodium chloride infusion  150 mL/hr IntraVENous CONTINUOUS         Signed:   Kayla Ortiz MD   Resident, Family Medicine      Attending note: Attending note to follow. ..

## 2020-11-22 NOTE — ROUTINE PROCESS
Bedside shift change report given to Lavon Wilson RN (oncoming nurse) by Mills Halsted, RN (offgoing nurse). Report included the following information SBAR, Kardex, Intake/Output, MAR and Accordion.

## 2020-11-22 NOTE — PROGRESS NOTES
[facility]  [address], [address] [facility] [address] [place], [address] [facility]  [phone]    Hematology Oncology Follow up Note         Patient: Janes Shah MRN: [id number]  SSN: [id number]    YOB: 1932  Age: 80 y.o. Sex: male        Reason for Consultation:      1. Hypercalcemia  2. Recurrent lymphoma ? Subjective:      Janes Shah is a 80 y.o. male who I am seeing in follow up for hypercalcemia and history of diffuse large B-cell lymphoma. He is admitted with weakness, fall and back injury. He has some back pain. CT and MRI shows degenerative changes. Labs on admission revealed BENJI and hypercalcemia. With hydration, Creat and Ca level are coming down. MRI shows lots of adenopathy both above and below the diaphragm and splenomegaly. He was diagnosed with DLBCL in 2009 and received R-CHOP under the care of Dr. Jeferson Ward and and has been cured of it. Review of Systems:    Constitutional: negative  Eyes: negative  Ears, Nose, Mouth, Throat, and Face: negative  Respiratory: negative  Cardiovascular: negative  Gastrointestinal: negative  Genitourinary:negative  Integument/Breast: negative  Hematologic/Lymphatic: negative  Musculoskeletal:negative  Neurological: negative      Past Medical History:   Diagnosis Date    CRI (chronic renal insufficiency) 12/13/2012    Gout 1/4/2011    Prostate CA ([facility] [address] 1/4/2011     Past Surgical History:   Procedure Laterality Date    ENDOSCOPY, COLON, DIAGNOSTIC  2003    neg    HC BONE MARROW BIOPSY      HX PROSTATECTOMY        No family history on file. Social History     Tobacco Use    Smoking status: Never Smoker    Smokeless tobacco: Never Used   Substance Use Topics    Alcohol use: No      Prior to Admission medications    Medication Sig Start Date End Date Taking? Authorizing Provider   allopurinoL (ZYLOPRIM) 300 mg tablet Take 150 mg by mouth daily.    Yes Provider, Historical   vitamin e (E GEMS) 100 unit capsule Take 100 Units by mouth daily. Yes Provider, Historical   multivitamin (ONE A DAY) tablet Take 1 Tab by mouth daily. Yes Provider, Historical   ketoconazole (NIZORAL) 2 % topical cream Apply  to affected area two (2) times a day for 28 days. 11/11/20 12/9/20 Yes Gely Burns MD   atenoloL (TENORMIN) 50 mg tablet Take 1 Tab by mouth daily. 9/9/20  Yes Emerald Tavera MD              Allergies   Allergen Reactions    Pcn [Penicillins] Swelling           Objective:     Visit Vitals  /71 (BP 1 Location: Right arm, BP Patient Position: At rest)   Pulse 83   Temp 97.5 °F (36.4 °C)   Resp 17   Ht 5' 3\" (1.6 m)   Wt 145 lb 1 oz (65.8 kg)   SpO2 94%   BMI 25.70 kg/m²           Physical Exam:    GENERAL: alert, cooperative, no distress, appears stated age  EYE: conjunctivae/corneas clear. PERRL, EOM's intact  LYMPHATIC: Cervical, supraclavicular, and axillary nodes normal.   THROAT & NECK: normal and no erythema or exudates noted. LUNG: clear to auscultation bilaterally  HEART: regular rate and rhythm, S1, S2 normal, no murmur, click, rub or gallop  ABDOMEN: soft, non-tender. Bowel sounds normal. No masses,  no organomegaly  EXTREMITIES:  extremities normal, atraumatic, no cyanosis or edema  SKIN: Normal.  NEUROLOGIC: AOx3. Gait normal. Reflexes and motor strength normal and symmetric. Cranial nerves 2-12 and sensation grossly intact.       Lab Results   Component Value Date/Time    WBC 9.9 11/22/2020 12:55 AM    HGB 11.2 (L) 11/22/2020 12:55 AM    HCT 33.9 (L) 11/22/2020 12:55 AM    PLATELET 874 (L) 29/72/0133 12:55 AM    MCV 95.0 11/22/2020 12:55 AM       Lab Results   Component Value Date/Time    Sodium 140 11/22/2020 01:10 PM    Potassium 3.7 11/22/2020 01:10 PM    Chloride 110 (H) 11/22/2020 01:10 PM    CO2 24 11/22/2020 01:10 PM    Anion gap 6 11/22/2020 01:10 PM    Glucose 114 (H) 11/22/2020 01:10 PM    BUN 39 (H) 11/22/2020 01:10 PM    Creatinine 2.72 (H) 11/22/2020 01:10 PM    BUN/Creatinine ratio 14 11/22/2020 01:10 PM    GFR est AA 27 (L) 11/22/2020 01:10 PM    GFR est non-AA 22 (L) 11/22/2020 01:10 PM    Calcium 10.2 (H) 11/22/2020 01:10 PM    Bilirubin, total 0.7 11/22/2020 12:55 AM    Alk. phosphatase 62 11/22/2020 12:55 AM    Protein, total 6.0 (L) 11/22/2020 12:55 AM    Albumin 2.8 (L) 11/22/2020 12:55 AM    Globulin 3.2 11/22/2020 12:55 AM    A-G Ratio 0.9 (L) 11/22/2020 12:55 AM    ALT (SGPT) 14 11/22/2020 12:55 AM    AST (SGOT) 40 (H) 11/22/2020 12:55 AM          MRI Results (most recent):  Results from [place] encounter on 11/20/20    [facility]    Narrative MRI OF THE THORACIC SPINE WITHOUT CONTRAST: 11/21/2020 1:51 PM    INDICATION: T12 fracture suspected on CT thoracic spine. TECHNIQUE: Multiplanar and multisequence MRI was obtained of the thoracic spine  without contrast.    COMPARISON: CT thoracic 11/20/2020. FINDINGS:   \"Broken DISH\" fracture: A fracture of the right lateral aspect of T12 (6-6) is  better seen on prior CT. On prior CT, diffuse idiopathic skeletal hyperostosis  (DISH) is also better appreciated. The flowing syndesmophytes of DISH in this  patient are predominantly right lateral. The fracture extends through the right  lateral cortex of T12 and slightly into the vertebral body, running obliquely  toward the superior endplate. A minimal, anterior, left, superior endplate  cortical step-off in T12 is better seen on prior CT (839-85). The fracture line  continues through the left lateral osteophyte between T11 and T12 (601-43 on  prior CT). Most of these findings are not as clearly visible on MRI. No definite  fracture of T11. No height loss in T11 or T12. The fracture is a \"broken DISH\"  fracture rather than a typical compression fracture. Partially imaged edema and L2 is better seen on same-day lumbar MRI to be  degenerative. Intervertebral disc height and signal intensity are well  preserved.  Thoracic kyphosis is exaggerated. The thoracic cord is normal in  caliber and signal intensity. Lymphoma: In the partially imaged upper abdomen, there are multiple enlarged  retroperitoneal lymph nodes, with more bulky enlargement of multiple mesenteric  lymph nodes. The largest retroperitoneal lymph node measures 14 mm in short axis  on image 5-5. The largest, conglomerate, mesenteric lymph node measures 22 mm on  image 5-4. The spleen is enlarged. Multiple splenic masses are nearly T2  isointense. Several of the larger are centrally T2 hyperintense, however. The  largest splenic mass measures approximately 40 x 49 mm (8-25). An enlarged right  hilar lymph node measures 12 mm on image 8-14. The paraspinal lymph node  posterior to the esophagus and medial to the aorta, at the T5-T6 level, measures  14 mm in short axis (5-8, 8-12). Findings are concerning for lymphoma. Other thoracoabdominal findings: Right shoulder joint effusion. Trace bilateral  pleural effusions and mild passive atelectasis. The common bile duct is mildly  dilated (7 mm). Distended stomach. Bilateral renal cysts. A 9 x 14 mm, oval,  circumscribed, homogenously T2 hyperintense, homogenously T1 hypointense nodule  posteromedial to the segment 6 hepatic capsule (8-24) is indeterminate, but of  doubtful significance. C7-T1: Facet osteoarthritis causes mild right neural foraminal stenosis (5-10). T1-T12: No herniation or stenosis. Impression IMPRESSION:   1. \"Broken DISH\" fracture of T12.  2. Lymphoma: Splenomegaly and bulky splenic masses. Thoracic, mesenteric, and  retroperitoneal lymphadenopathy. 3. CT chest abdomen pelvis with IV contrast is recommended for staging. The findings were called to Dr. Austin Todd on 11/21/2020 at 1347 hours and  1614 hours by Dr. Jhon Coffman. [phone]        I personally reviewed the images. Extensive adenopathy and splenomegaly        Assessment:     1.  Hypercalcemia    From malignancy Denosumab      2. Anemia     Mild, normocytic - chronic disease related  Observation      3. Malignant disease    Lymphoma ? CT with or without contrast  Biopsy afterwards      Plan:       1. CT with or without contrast  2. Biopsy  3.  Denosumab (spoke to pharmacy)      Signed by: Lola Bryson MD                     November 22, 2020

## 2020-11-22 NOTE — PROGRESS NOTES
Bedside, Verbal and Written shift change report given to Maura GERMAN (oncoming nurse) by Cristy Sanchez (offgoing nurse). Report included the following information SBAR, Kardex, ED Summary, Procedure Summary, Intake/Output, MAR, Accordion, Recent Results and Med Rec Status.

## 2020-11-22 NOTE — PROGRESS NOTES
[address] [facility] RESIDENCY PROGRAM  PROGRESS NOTE     11/22/2020  PCP: Juan Francisco Thibodeaux MD     Assessment/Plan:     Salvador Nur is a 80 y.o. male with a PMHx of prostate CA, HTN, Gout, hx of lymphoma who presents to the ED with a weakness, acute renal failure and hypercalcemia. Overnight events: none    Hypercalcemia: POA: mohan. Ca:14.5. could be 2/2  hypovolemia vs MM. Improving with IVF hydration. S/p calcitonin 4u/kg. Ionized Ca+ improving  from 1.57-->1.51 Low PTH  -Continue IVF hydration 150 ml/hr  -Repeat BMP at 2:00 pm  -SPEP/Immunofix to r/o MM  -F/U on PTHr peptide  -heme following, aprec recs  -nephro following, aprec recs    Acute renal Failure-  Improving, today: 3.02 (BL: 1.45)  -Strict I&O  -Per nephro continue IV hydration  -repeat BMP at 2:00 pm  - F/U on final UCx    Weakness w/ Recurrent falls: could be 2/2 to anemia vs malignancy. CT spine and MRI C-spine and L-spine with multilevel degenerative changes and stenosis. -f/u on echo   -continue working w/ PT & OT  -CBC and CMP daily  -Ortho following     T12 questionable fx: MRI T spine: Broken dish  T12 fx.  -No plans for orthosis   -Tylenol 650 mg q 6 hrs for pain  -Lidocaine patch daily  -PT / OT    Hx of diffuse large B-cell Lymphoma: possible recurrence. MRI T: Splenomegaly and bulky splenic masses. Thoracic, mesenteric, and retroperitoneal LAD. -Ct abdomen w/ IV contrast needed for staging. (monitor Cr)  -heme onco following--aprec recs    Spinal stenosis: recurrent history of weakness, dizziness and falls. MRI lumbar: Severe spinal canal stenosis at L4-5 relating   -Ortho following, recs appreciated    Opioid use: No opioids prescribed per . UDS + for opiates. Hypertension- On home atenolol 50 mg tablet everyday. - Holding  home medications   . Anemia: poa: 11.9 (bl: 13) likely secondary to chronic renal failur. hx of active bleeding.  Iron panel suggests anemia of chronic disease.   -f/u on PBS  -CBC daily  -heme following, aprec recs     Gout: stable  -Hold home allopurinol 300 mg daily. Prostate Cancer: stable; s/p prostatectomy         FEN/GI - renal diet. NS at 150 mL/hr. Activity - Out of bed w/ assistance. DVT prophylaxis - Sub Q Heparin  GI prophylaxis - Not indicated at this time  Fall prophylaxis - Fall precautions ordered. Code Status - Full. Discussed with patient / caregivers. Next of Kin Name and Contact - Carlos Enrique Colon,  Daughter - [phone]      Andrewabbe Vang discussed with Dr. New Darden. Subjective:   Pt was seen and examined at bedside. Afebrile and hemodynamically stable. Pt doing ok this morning but continues to refer back pain, says it improves with Tylenol. Denies chest pain, SOB, nausea, vomiting, abdominal pain, dizziness. Objective:   Physical examination  Patient Vitals for the past 24 hrs:   Temp Pulse Resp BP SpO2   20 0722 97.2 °F (36.2 °C) 91 17 (!) 149/71 93 %   20 0714  88      20 0342 98.2 °F (36.8 °C) 84 16 136/76 92 %   20 2333 98.3 °F (36.8 °C) 89 18 109/60 92 %   20 2112  (!) 59      20 1915 97.6 °F (36.4 °C) 72 17 112/69 94 %   20 1609 97.7 °F (36.5 °C) 68 17 117/64 94 %   20 1443  80      20 1235    115/63    20 1225    111/61    20 1219    (!) 108/57    20 1217  83  (!) 148/67    20 1215  79  117/63 94 %      Temp (24hrs), Av.8 °F (36.6 °C), Min:97.2 °F (36.2 °C), Max:98.3 °F (36.8 °C)         O2 Device: Room air    Date 20 0700 - 20 0659 20 07 - 20 0659   Shift 8656-3789 1731-0706 24 Hour Total 5435-4193 5567-4410 24 Hour Total   INTAKE   P.O. 597  597 240  240     P. O. 597  597 240  240   I. V.(mL/kg/hr) 850(1.1)  850(0.6)        Volume (0.9% sodium chloride infusion) 850  850      Shift Total(mL/kg) 1447(23.5)  1447(23.5) 240(3.9)  240(3.9)   OUTPUT   Urine(mL/kg/hr) 850(1.1)  850(0.6) 225  225     Urine Voided 850 850 225  225     Urine Occurrence(s) 3 x  3 x 1 x  1 x   Other  800 800        Other Output  800 800      Shift Total(mL/kg) 850(13.8) 800(13) 1650(26.7) 225(3.6)  225(3.6)    800 -203 15  15   Weight (kg) 61.7 61.7 61.7 61.7 61.7 61.7     Physical Exam  General: In no acute distress. Alert. Cooperative. Head: Normocephalic. Atraumatic. Eyes:              Sclera white. PERRL. EOMI. Ears:              Hard of hearing. Respiratory: CTAB. No w/r/r/c.   Cardiovascular: RRR. GI: Normal bowel sounds. Nontender. No rebound tenderness or guarding. Nondistended. Extremities: No LE edema. Distal pulses intact. Musculoskeletal: Full ROM in all extremities. Skin: Warm, dry. No rashes. Neuro: CN II-XII grossly intact. Strength 5/5 in all extremities. Data Review:     CBC:  Recent Labs     11/22/20 0055 11/21/20 0220 11/20/20  1331   WBC 9.9 8.9 9.8   HGB 11.2* 11.3* 11.9*   HCT 33.9* 34.9* 36.2*   * 148* 577*     Metabolic Panel:  Recent Labs     11/22/20 0055 11/21/20 0220 11/20/20  2245 11/20/20  1645    138 138 137   K 3.5 3.8 4.1 3.6    103 103 102   CO2 25 30 31 30   BUN 41* 52* 52* 52*   CREA 3.02* 3.70* 3.73* 3.79*   * 117* 140* 115*   CA 11.1* 11.8* 11.7* 12.0*  11.8*   MG 1.7 2.1  --  2.2   PHOS 4.1 5.1*  --  5.6*   ALB 2.8* 2.9* 2.8* 2.9*   TBILI 0.7 0.8 0.7 0.8   ALT 14 12 13 13     Micro:  Lab Results   Component Value Date/Time    Culture result: No growth (<1,000 CFU/ML) 11/20/2020 04:45 PM     Imaging:  Mra Brain Wo Cont    Result Date: 11/20/2020  *PRELIMINARY REPORT* No emergent process. Preliminary report was provided by Dr. Consuelo Lobo Final report to follow. *END PRELIMINARY REPORT*      Mri Brain Wo Cont    Result Date: 11/20/2020  *PRELIMINARY REPORT* No emergent process. Preliminary report was provided by Dr. Consuelo Lobo Final report to follow.  *END PRELIMINARY REPORT*      Mri Cerv Spine Wo Cont    Result Date: 11/20/2020  *PRELIMINARY REPORT* There is disc herniation at C5/C6 which contacts the anterior margin of the cervical cord. No emergent findings Preliminary report was provided by Dr. Rody Faust Final report to follow. *END PRELIMINARY REPORT*      Mri Lumb Spine Wo Cont    Result Date: 11/20/2020  *PRELIMINARY REPORT* Neuroforaminal stenosis, most severe on the left at L3/L4 and L4/L5 Preliminary report was provided by Dr. Rody Faust Final report to follow. *END PRELIMINARY REPORT*      Ct Head Wo Cont    Result Date: 11/20/2020  CLINICAL HISTORY: fall INDICATION: fall COMPARISON: None. CT dose reduction was achieved through use of a standardized protocol tailored for this examination and automatic exposure control for dose modulation. TECHNIQUE: Serial axial images with a collimation of 5 mm were obtained from the skull base through the vertex  FINDINGS: There is sulcal and ventricular prominence. Confluent periventricular and scattered foci of hypodensity in the cerebral white matter. There is no evidence of an acute infarction, hemorrhage, or mass-effect. There is no evidence of midline shift or hydrocephalus. Posterior fossa structures are unremarkable. No extra-axial collections are seen. Mastoid air cells are well pneumatized and clear. Vertebral and carotid vascular calcifications are noted. IMPRESSION: No acute intracranial process. Imaging findings consistent with mild chronic microvascular ischemic change. There is a mild to moderate degree of cerebral atrophy. Ct Spine Cerv Wo Cont    Result Date: 11/20/2020  EXAM: CT SPINE CERV WO CONT CLINICAL HISTORY: fall INDICATION: fall COMPARISON:  None TECHNIQUE:  Axial neck CT was performed. Noncontrast imaging obtained. Coronal and sagittal reconstructions were performed. CT dose reduction was achieved through use of a standardized protocol tailored for this examination and automatic exposure control for dose modulation. Osseous/bone algorithm was utilized.  FINDINGS: Carotid atherosclerotic change. No pneumothorax. No large pulmonary mass or nodule. .  There is no evidence of fracture or subluxation. The prevertebral soft tissues are grossly within normal limits. The atlantodental interval is within normal limits. The craniocervical junction is intact. Multilevel loss of disc height. Multilevel severe canal and foraminal stenoses. Minimal anterolisthesis of C7 on T1. IMPRESSION: There is no acute fracture or dislocation identified. Ct Spine Thorac Wo Cont    Result Date: 11/20/2020  EXAM:  CT SPINE [facility] WO CONT INDICATION: Fall, back pain. COMPARISON: None. TECHNIQUE: Multislice helical CT of the thoracic spine was performed. Sagittal and coronal reformations were generated. CT dose reduction was achieved through use of a standardized protocol tailored for this examination and automatic exposure control for dose modulation. FINDINGS: There are osteophytes at multiple levels. There is cortical disruption in the anterior, superior endplate of M46 which extends through both the right lateral and left lateral osteophyte. Query cortical disruption in the right, lateral aspect of the T11 vertebral body. There is no spinal canal stenosis. Coronary artery disease and atherosclerosis. Prominent lymph nodes in the posterior mediastinum Likely cystic lesions in both kidneys which are incompletely characterized      IMPRESSION: 1. Cortical disruption in the body of T12 with suspected fracture in the body of T11. Ct Spine Lumb Wo Cont    Result Date: 11/20/2020  EXAM: CT SPINE LUMB WO CONT History: Status post fall/back pain INDICATION: fall, back pain COMPARISON: None. TECHNIQUE:   Unenhanced multislice helical CT of the lumbar spine was performed in the axial plane. Coronal and sagittal reconstructions were obtained. CT dose reduction was achieved through use of a standardized protocol tailored for this examination and automatic exposure control for dose modulation.  FINDINGS: There is retrolisthesis of L2 on L3 and L3 on L4 each measuring 4 mm. Anterolisthesis of L4 on L5 measuring or millimeters. Abdominal aorta demonstrates atherosclerotic change. There are renal calculi. There is no hydronephrosis. Left renal hypodensities most likely a cyst. Mild levorotatory scoliotic change. T12-L1: The spinal canal and neural foramina are widely patent. L1-L2: The spinal canal and neural foramina are widely patent. L2-L3: Moderate facet arthropathy. Ligamentum flavum hypertrophy. Minimal retrolisthesis. Disc bulge/osteophyte. Moderate canal and right foraminal stenosis. L3-L4: Facet arthropathy and hypertrophy. Ligament flavum hypertrophy. Minimal retrolisthesis. Severe canal stenosis. Mild bilateral foraminal stenoses. L4-L5: Facet arthropathy and hypertrophy. Disc bulge/osteophyte. Severe canal stenosis. Severe left foraminal stenosis. L5-S1: Mild facet arthropathy. Canal is patent. Foramina are patent. IMPRESSION: Extensive multilevel degenerative change with multilevel spondylolisthesis as well. Severe canal stenoses at L3-4 and L4-5. Additional, less severe degenerative findings are as described in detail above. Medications reviewed  Current Facility-Administered Medications   Medication Dose Route Frequency    lidocaine 4 % patch 1 Patch  1 Patch TransDERmal Q24H    polyethylene glycol (MIRALAX) packet 17 g  17 g Oral DAILY PRN    docusate sodium (COLACE) capsule 100 mg  100 mg Oral BID    acetaminophen (TYLENOL) tablet 650 mg  650 mg Oral Q6H PRN    sodium chloride (NS) flush 5-40 mL  5-40 mL IntraVENous Q8H    sodium chloride (NS) flush 5-40 mL  5-40 mL IntraVENous PRN    heparin (porcine) injection 5,000 Units  5,000 Units SubCUTAneous Q8H    0.9% sodium chloride infusion  150 mL/hr IntraVENous CONTINUOUS         Signed:   Do Briscoe MD   Resident, Family Medicine      Attending note: Attending note to follow. ..

## 2020-11-22 NOTE — PROGRESS NOTES
11/22/2020  2:24 PM  CM met w/ pt and Dtr Harjit Al, agreeable to [facility] services at [place], choice offered, prefers [facility], referral faxed [phone], placed TC to on call, await response.   Care Management Interventions  PCP Verified by CM: Yes(Dr. Tyrell Whittington)  Mode of Transport at Discharge: Self  Transition of Care Consult (CM Consult): [address]: No  Reason Outside [place]: (pt preference)  Physical Therapy Consult: Yes  Occupational Therapy Consult: No  Speech Therapy Consult: No  Current Support Network: Lives Alone  Confirm Follow Up Transport: Family  The Plan for Transition of Care is Related to the Following Treatment Goals : Home Health SN and PT  The Patient and/or Patient Representative was Provided with a Choice of Provider and Agrees with the Discharge Plan?: Yes  Name of the Patient Representative Who was Provided with a Choice of Provider and Agrees with the Discharge Plan: pt Lion Justice  [place] of Choice List was Provided with Basic Dialogue that Supports the Patient's Individualized Plan of Care/Goals, Treatment Preferences and Shares the Quality Data Associated with the Providers?: (S) Yes  Discharge Location  Discharge Placement: Home with home health    KILEY Gilliland

## 2020-11-22 NOTE — PROGRESS NOTES
Problem: Falls - Risk of  Goal: *Absence of Falls  Description: Document Jackie Ko Fall Risk and appropriate interventions in the flowsheet. Outcome: Progressing Towards Goal  Note: Fall Risk Interventions:  Mobility Interventions: Bed/chair exit alarm    Mentation Interventions: Adequate sleep, hydration, pain control              History of Falls Interventions: Bed/chair exit alarm         Problem: Patient Education: Go to Patient Education Activity  Goal: Patient/Family Education  Outcome: Progressing Towards Goal     Problem: Pressure Injury - Risk of  Goal: *Prevention of pressure injury  Description: Document Daniel Scale and appropriate interventions in the flowsheet.   Outcome: Progressing Towards Goal  Note: Pressure Injury Interventions:  Sensory Interventions: Assess changes in LOC    Moisture Interventions: Absorbent underpads    Activity Interventions: PT/OT evaluation    Mobility Interventions: HOB 30 degrees or less    Nutrition Interventions: Document food/fluid/supplement intake    Friction and Shear Interventions: HOB 30 degrees or less

## 2020-11-22 NOTE — PROGRESS NOTES
Ortho: Today-- still doing well, resting comfortably in bed, discomfort with changed of position. Managing with APAP and lidocaine patch      Excellent strength with MMT of the LE bilat, intact SLR  No sign of LE VTE.  - R great toe \"stiffness\", intermittent paresthesias bilat toes s/p chemo, Limited R EHL (baseline) . Review of MRI T/L/C-Spine---  -  extensive cervical and lumbar degenerative changes   - spondylolisthesis most notable at L2-3 level   - Most significant at C5-6, where large central disc osteophyte complex effaces the ventral thecal       sac, without significant cord compression, and bilateral uncovertebral osteophytes        cause moderate to severe neuroforaminal narrowing  - \"Broken DISH\" fracture of T12.  -  Lymphoma: Splenomegaly and bulky splenic masses. Thoracic, mesenteric, and      retroperitoneal lymphadenopathy.     No orthopedic surgical intervention  LSO Brace should be appropriate for support of \"T12 fracture\", PT/OT Rehab  Pain mgt per primary team  NO surgical intervention at this time    Out-pt FU with Spine specialist Dr Gemini Mendoza, Dr Bhavana Roth or Dr Zoe Grajeda per Dr Vera Díaz PA-C

## 2020-11-23 ENCOUNTER — APPOINTMENT (OUTPATIENT)
Dept: CT IMAGING | Age: 85
DRG: 682 | End: 2020-11-23
Attending: NURSE PRACTITIONER
Payer: MEDICARE

## 2020-11-23 ENCOUNTER — APPOINTMENT (OUTPATIENT)
Dept: CT IMAGING | Age: 85
DRG: 682 | End: 2020-11-23
Attending: INTERNAL MEDICINE
Payer: MEDICARE

## 2020-11-23 ENCOUNTER — TELEPHONE (OUTPATIENT)
Dept: ONCOLOGY | Age: 85
End: 2020-11-23

## 2020-11-23 PROBLEM — E83.52 HYPERCALCEMIA: Status: ACTIVE | Noted: 2020-11-23

## 2020-11-23 LAB
ALBUMIN SERPL ELPH-MCNC: 2.7 G/DL (ref 2.9–4.4)
ALBUMIN SERPL-MCNC: 2.5 G/DL (ref 3.5–5)
ALBUMIN/GLOB SERPL: 0.9 {RATIO} (ref 1.1–2.2)
ALBUMIN/GLOB SERPL: 1.1 {RATIO} (ref 0.7–1.7)
ALP SERPL-CCNC: 49 U/L (ref 45–117)
ALPHA1 GLOB SERPL ELPH-MCNC: 0.4 G/DL (ref 0–0.4)
ALPHA2 GLOB SERPL ELPH-MCNC: 0.7 G/DL (ref 0.4–1)
ALT SERPL-CCNC: 13 U/L (ref 12–78)
ANION GAP SERPL CALC-SCNC: 7 MMOL/L (ref 5–15)
AST SERPL-CCNC: 35 U/L (ref 15–37)
B-GLOBULIN SERPL ELPH-MCNC: 0.8 G/DL (ref 0.7–1.3)
BILIRUB SERPL-MCNC: 0.7 MG/DL (ref 0.2–1)
BUN SERPL-MCNC: 32 MG/DL (ref 6–20)
BUN/CREAT SERPL: 14 (ref 12–20)
CALCIUM SERPL-MCNC: 9.8 MG/DL (ref 8.5–10.1)
CHLORIDE SERPL-SCNC: 112 MMOL/L (ref 97–108)
CO2 SERPL-SCNC: 22 MMOL/L (ref 21–32)
COMMENT, HOLDF: NORMAL
CREAT SERPL-MCNC: 2.36 MG/DL (ref 0.7–1.3)
ECHO AO ASC DIAM: 3.54 CM
ECHO AO ROOT DIAM: 3.74 CM
ECHO AR MAX VEL PISA: 307.54 CENTIMETER/SECOND
ECHO AV AREA PEAK VELOCITY: 2.8 CM2
ECHO AV AREA/BSA PEAK VELOCITY: 1.7 CM2/M2
ECHO AV PEAK GRADIENT: 6.84 MMHG
ECHO AV PEAK VELOCITY: 130.79 CM/S
ECHO AV REGURGITANT PHT: 1051.03 MS
ECHO LA AREA 4C: 18.16 CM2
ECHO LA MAJOR AXIS: 3.52 CM
ECHO LA MINOR AXIS: 2.14 CM
ECHO LA VOL 2C: 51.86 ML (ref 18–58)
ECHO LA VOL 4C: 50.31 ML (ref 18–58)
ECHO LA VOL BP: 54 ML (ref 18–58)
ECHO LA VOL/BSA BIPLANE: 32.9 ML/M2 (ref 16–28)
ECHO LA VOLUME INDEX A2C: 31.59 ML/M2 (ref 16–28)
ECHO LA VOLUME INDEX A4C: 30.65 ML/M2 (ref 16–28)
ECHO LV E' LATERAL VELOCITY: 8.67 CENTIMETER/SECOND
ECHO LV E' SEPTAL VELOCITY: 7.52 CENTIMETER/SECOND
ECHO LV EDV A2C: 51.15 ML
ECHO LV EDV A4C: 52.67 ML
ECHO LV EDV BP: 51.43 ML (ref 67–155)
ECHO LV EDV INDEX A4C: 32.1 ML/M2
ECHO LV EDV INDEX BP: 31.3 ML/M2
ECHO LV EDV NDEX A2C: 31.2 ML/M2
ECHO LV EJECTION FRACTION A2C: 69 PERCENT
ECHO LV EJECTION FRACTION A4C: 57 PERCENT
ECHO LV EJECTION FRACTION BIPLANE: 63.3 PERCENT (ref 55–100)
ECHO LV ESV A2C: 15.64 ML
ECHO LV ESV A4C: 22.49 ML
ECHO LV ESV BP: 18.88 ML (ref 22–58)
ECHO LV ESV INDEX A2C: 9.5 ML/M2
ECHO LV ESV INDEX A4C: 13.7 ML/M2
ECHO LV ESV INDEX BP: 11.5 ML/M2
ECHO LV INTERNAL DIMENSION DIASTOLIC: 4.62 CM (ref 4.2–5.9)
ECHO LV INTERNAL DIMENSION SYSTOLIC: 3.23 CM
ECHO LV IVSD: 0.6 CM (ref 0.6–1)
ECHO LV MASS 2D: 86.8 G (ref 88–224)
ECHO LV MASS INDEX 2D: 52.9 G/M2 (ref 49–115)
ECHO LV POSTERIOR WALL DIASTOLIC: 0.65 CM (ref 0.6–1)
ECHO LVOT DIAM: 2.15 CM
ECHO LVOT PEAK GRADIENT: 4.1 MMHG
ECHO LVOT PEAK VELOCITY: 101.27 CM/S
ECHO LVOT SV: 88.7 ML
ECHO LVOT VTI: 24.53 CM
ECHO MV A VELOCITY: 106.79 CENTIMETER/SECOND
ECHO MV AREA PHT: 2.94 CM2
ECHO MV E DECELERATION TIME (DT): 257.97 MS
ECHO MV E VELOCITY: 68.84 CENTIMETER/SECOND
ECHO MV PRESSURE HALF TIME (PHT): 74.81 MS
ECHO PV MAX VELOCITY: 96.02 CM/S
ECHO PV PEAK INSTANTANEOUS GRADIENT SYSTOLIC: 3.69 MMHG
ECHO RV INTERNAL DIMENSION: 3.62 CM
ECHO RV TAPSE: 1.64 CM (ref 1.5–2)
ECHO TV REGURGITANT MAX VELOCITY: 290.12 CM/S
ECHO TV REGURGITANT PEAK GRADIENT: 33.67 MMHG
ERYTHROCYTE [DISTWIDTH] IN BLOOD BY AUTOMATED COUNT: 15.1 % (ref 11.5–14.5)
GAMMA GLOB SERPL ELPH-MCNC: 0.5 G/DL (ref 0.4–1.8)
GLOBULIN SER CALC-MCNC: 2.4 G/DL (ref 2.2–3.9)
GLOBULIN SER CALC-MCNC: 2.7 G/DL (ref 2–4)
GLUCOSE SERPL-MCNC: 94 MG/DL (ref 65–100)
HCT VFR BLD AUTO: 28.8 % (ref 36.6–50.3)
HGB BLD-MCNC: 9.6 G/DL (ref 12.1–17)
LA VOL DISK BP: 50.29 ML (ref 18–58)
LVOT MG: 1.77 MMHG
M PROTEIN SERPL ELPH-MCNC: ABNORMAL G/DL
MAGNESIUM SERPL-MCNC: 1.7 MG/DL (ref 1.6–2.4)
MCH RBC QN AUTO: 31.6 PG (ref 26–34)
MCHC RBC AUTO-ENTMCNC: 33.3 G/DL (ref 30–36.5)
MCV RBC AUTO: 94.7 FL (ref 80–99)
NRBC # BLD: 0 K/UL (ref 0–0.01)
NRBC BLD-RTO: 0 PER 100 WBC
PERIPHERAL SMEAR,PSM: NORMAL
PHOSPHATE SERPL-MCNC: 3.2 MG/DL (ref 2.6–4.7)
PLATELET # BLD AUTO: 128 K/UL (ref 150–400)
PMV BLD AUTO: 11.6 FL (ref 8.9–12.9)
POTASSIUM SERPL-SCNC: 3.3 MMOL/L (ref 3.5–5.1)
PROT SERPL-MCNC: 5.1 G/DL (ref 6–8.5)
PROT SERPL-MCNC: 5.2 G/DL (ref 6.4–8.2)
RBC # BLD AUTO: 3.04 M/UL (ref 4.1–5.7)
SAMPLES BEING HELD,HOLD: NORMAL
SODIUM SERPL-SCNC: 141 MMOL/L (ref 136–145)
WBC # BLD AUTO: 7.7 K/UL (ref 4.1–11.1)

## 2020-11-23 PROCEDURE — 99232 SBSQ HOSP IP/OBS MODERATE 35: CPT | Performed by: FAMILY MEDICINE

## 2020-11-23 PROCEDURE — 99232 SBSQ HOSP IP/OBS MODERATE 35: CPT | Performed by: INTERNAL MEDICINE

## 2020-11-23 PROCEDURE — 83735 ASSAY OF MAGNESIUM: CPT

## 2020-11-23 PROCEDURE — 36415 COLL VENOUS BLD VENIPUNCTURE: CPT

## 2020-11-23 PROCEDURE — 74011250637 HC RX REV CODE- 250/637: Performed by: STUDENT IN AN ORGANIZED HEALTH CARE EDUCATION/TRAINING PROGRAM

## 2020-11-23 PROCEDURE — 84100 ASSAY OF PHOSPHORUS: CPT

## 2020-11-23 PROCEDURE — 85027 COMPLETE CBC AUTOMATED: CPT

## 2020-11-23 PROCEDURE — 74011250636 HC RX REV CODE- 250/636: Performed by: STUDENT IN AN ORGANIZED HEALTH CARE EDUCATION/TRAINING PROGRAM

## 2020-11-23 PROCEDURE — 74176 CT ABD & PELVIS W/O CONTRAST: CPT

## 2020-11-23 PROCEDURE — 94760 N-INVAS EAR/PLS OXIMETRY 1: CPT

## 2020-11-23 PROCEDURE — 80053 COMPREHEN METABOLIC PANEL: CPT

## 2020-11-23 PROCEDURE — 74011000250 HC RX REV CODE- 250: Performed by: STUDENT IN AN ORGANIZED HEALTH CARE EDUCATION/TRAINING PROGRAM

## 2020-11-23 PROCEDURE — 71250 CT THORAX DX C-: CPT

## 2020-11-23 PROCEDURE — 82652 VIT D 1 25-DIHYDROXY: CPT

## 2020-11-23 PROCEDURE — 65660000000 HC RM CCU STEPDOWN

## 2020-11-23 RX ORDER — MAGNESIUM SULFATE HEPTAHYDRATE 40 MG/ML
2 INJECTION, SOLUTION INTRAVENOUS ONCE
Status: COMPLETED | OUTPATIENT
Start: 2020-11-23 | End: 2020-11-23

## 2020-11-23 RX ORDER — POTASSIUM CHLORIDE 7.45 MG/ML
10 INJECTION INTRAVENOUS
Status: COMPLETED | OUTPATIENT
Start: 2020-11-23 | End: 2020-11-23

## 2020-11-23 RX ADMIN — DOCUSATE SODIUM 100 MG: 100 CAPSULE, LIQUID FILLED ORAL at 08:21

## 2020-11-23 RX ADMIN — MAGNESIUM SULFATE HEPTAHYDRATE 2 G: 40 INJECTION, SOLUTION INTRAVENOUS at 08:22

## 2020-11-23 RX ADMIN — HEPARIN SODIUM 5000 UNITS: 5000 INJECTION INTRAVENOUS; SUBCUTANEOUS at 08:22

## 2020-11-23 RX ADMIN — POLYETHYLENE GLYCOL 3350 17 G: 17 POWDER, FOR SOLUTION ORAL at 18:14

## 2020-11-23 RX ADMIN — POTASSIUM CHLORIDE 10 MEQ: 10 INJECTION, SOLUTION INTRAVENOUS at 11:00

## 2020-11-23 RX ADMIN — ACETAMINOPHEN 650 MG: 325 TABLET ORAL at 18:14

## 2020-11-23 RX ADMIN — Medication 10 ML: at 00:59

## 2020-11-23 RX ADMIN — DOCUSATE SODIUM 100 MG: 100 CAPSULE, LIQUID FILLED ORAL at 18:14

## 2020-11-23 RX ADMIN — HEPARIN SODIUM 5000 UNITS: 5000 INJECTION INTRAVENOUS; SUBCUTANEOUS at 00:58

## 2020-11-23 RX ADMIN — POTASSIUM CHLORIDE 10 MEQ: 10 INJECTION, SOLUTION INTRAVENOUS at 10:08

## 2020-11-23 RX ADMIN — HEPARIN SODIUM 5000 UNITS: 5000 INJECTION INTRAVENOUS; SUBCUTANEOUS at 18:13

## 2020-11-23 RX ADMIN — POTASSIUM CHLORIDE 10 MEQ: 10 INJECTION, SOLUTION INTRAVENOUS at 10:00

## 2020-11-23 RX ADMIN — POTASSIUM CHLORIDE 10 MEQ: 10 INJECTION, SOLUTION INTRAVENOUS at 08:23

## 2020-11-23 RX ADMIN — Medication 10 ML: at 23:05

## 2020-11-23 NOTE — PROGRESS NOTES
Andry Diaz  YOB: 1932          Assessment & Plan: BENJI due to IVVD, improving with IVF  Hypercalcemia, a bit better. S/p calcitonin. Concern for malignancy  Hypokalemia  H/o lymphoma and prostate cancer    Rec:  Continue IVF  F/u SPEP, PTHrP  Check 1,25 D  Replete K       Subjective:   CC: f/u BENJI  HPI: Renal function improving. Got calcitonin and Xgeva. ROS: no n/v/sob +constipation  Current Facility-Administered Medications   Medication Dose Route Frequency    potassium chloride 10 mEq in 100 ml IVPB  10 mEq IntraVENous Q1H    lidocaine 4 % patch 1 Patch  1 Patch TransDERmal Q24H    polyethylene glycol (MIRALAX) packet 17 g  17 g Oral DAILY PRN    denosumab (XGEVA) injection 120 mg  120 mg SubCUTAneous ONCE    docusate sodium (COLACE) capsule 100 mg  100 mg Oral BID    acetaminophen (TYLENOL) tablet 650 mg  650 mg Oral Q6H PRN    sodium chloride (NS) flush 5-40 mL  5-40 mL IntraVENous Q8H    sodium chloride (NS) flush 5-40 mL  5-40 mL IntraVENous PRN    heparin (porcine) injection 5,000 Units  5,000 Units SubCUTAneous Q8H    0.9% sodium chloride infusion  150 mL/hr IntraVENous CONTINUOUS          Objective:     Vitals:  Blood pressure (!) 149/80, pulse 84, temperature 98.1 °F (36.7 °C), resp. rate 17, height 5' 3\" (1.6 m), weight 65.8 kg (145 lb 1 oz), SpO2 93 %. Temp (24hrs), Av °F (36.7 °C), Min:97.5 °F (36.4 °C), Max:98.5 °F (36.9 °C)      Intake and Output:   07 - 1900  In: -   Out: 325 [Urine:325]  1901 -  0700  In: 560 [P.O.:560]  Out: 2695 [Urine:1895]    Physical Exam:               GENERAL ASSESSMENT: Elderly, thin WM NAD  CHEST: CTA  HEART: S1S2  ABDOMEN: Soft,NT  EXTREMITY: no EDEMA        ECG/rhythm:    Data Review      No results for input(s): TNIPOC in the last 72 hours.     No lab exists for component: ITNL   Recent Labs     20  1331   TROIQ <0.05     Recent Labs 11/23/20  0539 11/22/20  1310 11/22/20  0055 11/21/20  0220    140 139 138   K 3.3* 3.7 3.5 3.8   * 110* 107 103   CO2 22 24 25 30   BUN 32* 39* 41* 52*   CREA 2.36* 2.72* 3.02* 3.70*   GLU 94 114* 105* 117*   PHOS 3.2  --  4.1 5.1*   MG 1.7  --  1.7 2.1   CA 9.8 10.2* 11.1* 11.8*   ALB 2.5*  --  2.8* 2.9*   WBC 7.7  --  9.9 8.9   HGB 9.6*  --  11.2* 11.3*   HCT 28.8*  --  33.9* 34.9*   *  --  144* 148*      No results for input(s): INR, PTP, APTT, INREXT in the last 72 hours. Needs: urine analysis, urine sodium, protein and creatinine  Lab Results   Component Value Date/Time    Sodium,urine random 42 11/20/2020 04:45 PM    Creatinine, urine 69.00 11/20/2020 04:45 PM           : Domenica Jiménez MD  11/23/2020        [facility]:  [email or web address]  Ita Yepez office:  Karina [address] [place] [address] [address]  [address]  Phone: [phone]  Fax :     [phone]    [place] office:  [address] [place] [address]  [place]  Phone - [phone]  Fax - [phone]

## 2020-11-23 NOTE — TELEPHONE ENCOUNTER
Nurse calling for patient. In room 522 at [facility]    Dr Erick Hutson put in an ord for a biopsy but patient cannot get one in this location. Wanting to know if it is ok to do a CT of Chest, Abd, Pelvis.     Please call ASAP

## 2020-11-23 NOTE — PROGRESS NOTES
Bedside, Verbal and Written shift change report given to Dustin Guo (oncoming nurse) by May Murillo (offgoing nurse). Report included the following information SBAR, Kardex, ED Summary, Procedure Summary, Intake/Output, MAR, Accordion, Recent Results and Med Rec Status.

## 2020-11-23 NOTE — ROUTINE PROCESS
Bedside shift change report given to Cb Ramirez RN (oncoming nurse) by Mehran Rojas RN (offgoing nurse). Report included the following information SBAR, Kardex, Intake/Output, MAR and Accordion.

## 2020-11-23 NOTE — PROGRESS NOTES
11/23/2020  CARE MANAGEMENT NOTE:  CM reviewed EMR and handoff received from weekend  Miranda Hensleyion). Pt was admitted with ARF, BENJI; also with T12 fracture (no surgery recommended). Reportedly, pt resides alone in [place]. Pt's dtr, Kevin Martinez [phone]) is the primary family contact. .      RUR 24%    Transition Plan of Care:  1. Plan is for pt to return home with assistance from his son, Goyo Salinas (cell [phone]). [facility] (skilled nursing, PT) referral was sent over the weekend and they accepted however start of care will be Monday, 11/30. CM met with pt and his dtr and bedside and they do not want referrals sent to other agencies. 3.  Outpt f/u  4. Pt will arrange his own transportation home. CM will continue to follow pt until discharged.   Carlos

## 2020-11-23 NOTE — PROGRESS NOTES
Per Dr. Tari Trinh, pt may go to his room and may eat. He states will discuss plan for biopsy with Yessenia Romero. Spoke to [place] via telephone to relay this information.

## 2020-11-23 NOTE — PROGRESS NOTES
[address] [facility] ([place], [place]) Oncology at [facility]  [phone]    Hematology / Oncology Follow-up        Patient: Alfonso Almodovar MRN: [id number]  SSN: [id number]    YOB: 1932  Age: 80 y.o. Sex: male        Reason for Consultation:      1. Hypercalcemia  2. Recurrent lymphoma ? Subjective:      Alfonso Almodovar is a 80 y.o. male who I am seeing in follow up for hypercalcemia and history of diffuse large B-cell lymphoma. He is admitted with weakness, fall and back injury. He has some back pain. CT and MRI shows degenerative changes. Labs on admission revealed BENJI and hypercalcemia. With hydration, Creat and Ca level are coming down. MRI shows lots of adenopathy both above and below the diaphragm and splenomegaly. He was diagnosed with DLBCL in 2009 and received R-CHOP under the care of Dr. Kingsley Montes and and has been cured of it. Interval History:     Assisted onto stretcher; denies SOB or pain at present. Daughter ,Bhavna at bedside         Past Medical History:   Diagnosis Date    CRI (chronic renal insufficiency) 12/13/2012    Gout 1/4/2011    Prostate CA ([facility] [address] 1/4/2011     Past Surgical History:   Procedure Laterality Date    ENDOSCOPY, COLON, DIAGNOSTIC  2003    neg    HC BONE MARROW BIOPSY      HX PROSTATECTOMY        No family history on file. Social History     Tobacco Use    Smoking status: Never Smoker    Smokeless tobacco: Never Used   Substance Use Topics    Alcohol use: No      Prior to Admission medications    Medication Sig Start Date End Date Taking? Authorizing Provider   allopurinoL (ZYLOPRIM) 300 mg tablet Take 150 mg by mouth daily. Yes Provider, Historical   vitamin e (E GEMS) 100 unit capsule Take 100 Units by mouth daily. Yes Provider, Historical   multivitamin (ONE A DAY) tablet Take 1 Tab by mouth daily. Yes Provider, Historical   ketoconazole (NIZORAL) 2 % topical cream Apply  to affected area two (2) times a day for 28 days.  11/11/20 12/9/20 Yes Melinda Campbell MD   atenoloL (TENORMIN) 50 mg tablet Take 1 Tab by mouth daily. 9/9/20  Yes Sherif Gomez MD          Allergies   Allergen Reactions    Pcn [Penicillins] Swelling           Objective:     Visit Vitals  BP (!) 149/80 (BP 1 Location: Left arm, BP Patient Position: At rest;Sitting)   Pulse 84   Temp 98.1 °F (36.7 °C)   Resp 17   Ht 5' 3\" (1.6 m)   Wt 65.8 kg (145 lb 1 oz)   SpO2 93%   BMI 25.70 kg/m²     Physical Exam:    General: No distress  Eyes: Anicteric sclerae  HENT: Atraumatic  Neck: Supple  Respiratory: Normal respiratory effort  CV: No peripheral edema  GI: Soft, nontender, nondistended, no masses, no hepatomegaly, no splenomegaly  Skin: No rashes, ecchymoses, or petechiae  Psych: Alert, oriented, appropriate affect, normal judgment/insight      Lab Results   Component Value Date/Time    WBC 7.7 11/23/2020 05:39 AM    HGB 9.6 (L) 11/23/2020 05:39 AM    HCT 28.8 (L) 11/23/2020 05:39 AM    PLATELET 230 (L) 40/28/3364 05:39 AM    MCV 94.7 11/23/2020 05:39 AM       Lab Results   Component Value Date/Time    Sodium 141 11/23/2020 05:39 AM    Potassium 3.3 (L) 11/23/2020 05:39 AM    Chloride 112 (H) 11/23/2020 05:39 AM    CO2 22 11/23/2020 05:39 AM    Anion gap 7 11/23/2020 05:39 AM    Glucose 94 11/23/2020 05:39 AM    BUN 32 (H) 11/23/2020 05:39 AM    Creatinine 2.36 (H) 11/23/2020 05:39 AM    BUN/Creatinine ratio 14 11/23/2020 05:39 AM    GFR est AA 32 (L) 11/23/2020 05:39 AM    GFR est non-AA 26 (L) 11/23/2020 05:39 AM    Calcium 9.8 11/23/2020 05:39 AM    Bilirubin, total 0.7 11/23/2020 05:39 AM    Alk.  phosphatase 49 11/23/2020 05:39 AM    Protein, total 5.2 (L) 11/23/2020 05:39 AM    Albumin 2.5 (L) 11/23/2020 05:39 AM    Globulin 2.7 11/23/2020 05:39 AM    A-G Ratio 0.9 (L) 11/23/2020 05:39 AM    ALT (SGPT) 13 11/23/2020 05:39 AM    AST (SGOT) 35 11/23/2020 05:39 AM          MRI Results (most recent):  Results from [facility] on 11/20/20    [facility] CONT    Narrative MRI OF THE THORACIC SPINE WITHOUT CONTRAST: 11/21/2020 1:51 PM    INDICATION: T12 fracture suspected on CT thoracic spine. TECHNIQUE: Multiplanar and multisequence MRI was obtained of the thoracic spine  without contrast.    COMPARISON: CT thoracic 11/20/2020. FINDINGS:   \"Broken DISH\" fracture: A fracture of the right lateral aspect of T12 (6-6) is  better seen on prior CT. On prior CT, diffuse idiopathic skeletal hyperostosis  (DISH) is also better appreciated. The flowing syndesmophytes of DISH in this  patient are predominantly right lateral. The fracture extends through the right  lateral cortex of T12 and slightly into the vertebral body, running obliquely  toward the superior endplate. A minimal, anterior, left, superior endplate  cortical step-off in T12 is better seen on prior CT (528-74). The fracture line  continues through the left lateral osteophyte between T11 and T12 (601-43 on  prior CT). Most of these findings are not as clearly visible on MRI. No definite  fracture of T11. No height loss in T11 or T12. The fracture is a \"broken DISH\"  fracture rather than a typical compression fracture. Partially imaged edema and L2 is better seen on same-day lumbar MRI to be  degenerative. Intervertebral disc height and signal intensity are well  preserved. Thoracic kyphosis is exaggerated. The thoracic cord is normal in  caliber and signal intensity. Lymphoma: In the partially imaged upper abdomen, there are multiple enlarged  retroperitoneal lymph nodes, with more bulky enlargement of multiple mesenteric  lymph nodes. The largest retroperitoneal lymph node measures 14 mm in short axis  on image 5-5. The largest, conglomerate, mesenteric lymph node measures 22 mm on  image 5-4. The spleen is enlarged. Multiple splenic masses are nearly T2  isointense. Several of the larger are centrally T2 hyperintense, however. The  largest splenic mass measures approximately 40 x 49 mm (8-25).  An enlarged right  hilar lymph node measures 12 mm on image 8-14. The paraspinal lymph node  posterior to the esophagus and medial to the aorta, at the T5-T6 level, measures  14 mm in short axis (5-8, 8-12). Findings are concerning for lymphoma. Other thoracoabdominal findings: Right shoulder joint effusion. Trace bilateral  pleural effusions and mild passive atelectasis. The common bile duct is mildly  dilated (7 mm). Distended stomach. Bilateral renal cysts. A 9 x 14 mm, oval,  circumscribed, homogenously T2 hyperintense, homogenously T1 hypointense nodule  posteromedial to the segment 6 hepatic capsule (8-24) is indeterminate, but of  doubtful significance. C7-T1: Facet osteoarthritis causes mild right neural foraminal stenosis (5-10). T1-T12: No herniation or stenosis. Impression IMPRESSION:   1. \"Broken DISH\" fracture of T12.  2. Lymphoma: Splenomegaly and bulky splenic masses. Thoracic, mesenteric, and  retroperitoneal lymphadenopathy. 3. CT chest abdomen pelvis with IV contrast is recommended for staging. The findings were called to Dr. Christiana Cabot on 11/21/2020 at 1347 hours and  1614 hours by Dr. Skinny Escoto. [phone]        11/23/2020 CT C/A/P wo contrast  Pending         Assessment/Plan     1. Hypercalcemia  Likely from malignancy   S/p calcitonin x 2 doses 11/21 and 11/22   Calcium 9.8 this am: corrects to 11  Continue with IVFs  Will continue to monitor; no treatment today. 2. Anemia   Mild, normocytic - chronic disease related  Acute drop this am; ? Dilutional; continue to monitor   Transfuse for Hgb < 7       3. Malignant disease  Lymphoma ? CT without contrast placed per radiology request  11/23 Biopsy per radiology     4. BENJI  Nephrology following  Improving this am      Plan reviewed with Dr Sherry Walker by:  Lien Strong, TEO                     November 23, 2020

## 2020-11-23 NOTE — TELEPHONE ENCOUNTER
Spoke with . Will forward to inpatient NP at [address] [place] [address] to monitor kidney function prior to ordering scans.

## 2020-11-24 ENCOUNTER — APPOINTMENT (OUTPATIENT)
Dept: GENERAL RADIOLOGY | Age: 85
DRG: 682 | End: 2020-11-24
Attending: STUDENT IN AN ORGANIZED HEALTH CARE EDUCATION/TRAINING PROGRAM
Payer: MEDICARE

## 2020-11-24 ENCOUNTER — APPOINTMENT (OUTPATIENT)
Dept: CT IMAGING | Age: 85
DRG: 682 | End: 2020-11-24
Attending: INTERNAL MEDICINE
Payer: MEDICARE

## 2020-11-24 LAB
ALBUMIN SERPL-MCNC: 2.6 G/DL (ref 3.5–5)
ALBUMIN/GLOB SERPL: 1 {RATIO} (ref 1.1–2.2)
ALP SERPL-CCNC: 56 U/L (ref 45–117)
ALT SERPL-CCNC: 14 U/L (ref 12–78)
ANION GAP SERPL CALC-SCNC: 6 MMOL/L (ref 5–15)
APPEARANCE UR: CLEAR
AST SERPL-CCNC: 40 U/L (ref 15–37)
BACTERIA URNS QL MICRO: NEGATIVE /HPF
BASOPHILS # BLD: 0 K/UL (ref 0–0.1)
BASOPHILS NFR BLD: 0 % (ref 0–1)
BILIRUB SERPL-MCNC: 0.7 MG/DL (ref 0.2–1)
BILIRUB UR QL: NEGATIVE
BUN SERPL-MCNC: 29 MG/DL (ref 6–20)
BUN/CREAT SERPL: 13 (ref 12–20)
CA-I BLD-SCNC: 1.38 MMOL/L (ref 1.13–1.32)
CALCIUM SERPL-MCNC: 10.1 MG/DL (ref 8.5–10.1)
CHLORIDE SERPL-SCNC: 113 MMOL/L (ref 97–108)
CO2 SERPL-SCNC: 21 MMOL/L (ref 21–32)
COLOR UR: ABNORMAL
CREAT SERPL-MCNC: 2.17 MG/DL (ref 0.7–1.3)
D DIMER PPP FEU-MCNC: 2.95 MG/L FEU (ref 0–0.65)
DIFFERENTIAL METHOD BLD: ABNORMAL
EOSINOPHIL # BLD: 0 K/UL (ref 0–0.4)
EOSINOPHIL NFR BLD: 0 % (ref 0–7)
EPITH CASTS URNS QL MICRO: ABNORMAL /LPF
ERYTHROCYTE [DISTWIDTH] IN BLOOD BY AUTOMATED COUNT: 15.5 % (ref 11.5–14.5)
ERYTHROCYTE [DISTWIDTH] IN BLOOD BY AUTOMATED COUNT: 15.8 % (ref 11.5–14.5)
GLOBULIN SER CALC-MCNC: 2.7 G/DL (ref 2–4)
GLUCOSE SERPL-MCNC: 90 MG/DL (ref 65–100)
GLUCOSE UR STRIP.AUTO-MCNC: 100 MG/DL
HCT VFR BLD AUTO: 29.1 % (ref 36.6–50.3)
HCT VFR BLD AUTO: 31.5 % (ref 36.6–50.3)
HGB BLD-MCNC: 10.6 G/DL (ref 12.1–17)
HGB BLD-MCNC: 9.7 G/DL (ref 12.1–17)
HGB UR QL STRIP: ABNORMAL
HYALINE CASTS URNS QL MICRO: ABNORMAL /LPF (ref 0–5)
IGA SERPL-MCNC: 182 MG/DL (ref 61–437)
IGG SERPL-MCNC: 612 MG/DL (ref 603–1613)
IGM SERPL-MCNC: 54 MG/DL (ref 15–143)
IMM GRANULOCYTES # BLD AUTO: 0.1 K/UL (ref 0–0.04)
IMM GRANULOCYTES NFR BLD AUTO: 1 % (ref 0–0.5)
KETONES UR QL STRIP.AUTO: 15 MG/DL
LEUKOCYTE ESTERASE UR QL STRIP.AUTO: NEGATIVE
LYMPHOCYTES # BLD: 0.8 K/UL (ref 0.8–3.5)
LYMPHOCYTES NFR BLD: 7 % (ref 12–49)
MAGNESIUM SERPL-MCNC: 2.1 MG/DL (ref 1.6–2.4)
MCH RBC QN AUTO: 31.5 PG (ref 26–34)
MCH RBC QN AUTO: 31.8 PG (ref 26–34)
MCHC RBC AUTO-ENTMCNC: 33.3 G/DL (ref 30–36.5)
MCHC RBC AUTO-ENTMCNC: 33.7 G/DL (ref 30–36.5)
MCV RBC AUTO: 94.5 FL (ref 80–99)
MCV RBC AUTO: 94.6 FL (ref 80–99)
MONOCYTES # BLD: 1.5 K/UL (ref 0–1)
MONOCYTES NFR BLD: 12 % (ref 5–13)
NEUTS SEG # BLD: 10.1 K/UL (ref 1.8–8)
NEUTS SEG NFR BLD: 80 % (ref 32–75)
NITRITE UR QL STRIP.AUTO: NEGATIVE
NRBC # BLD: 0 K/UL (ref 0–0.01)
NRBC # BLD: 0 K/UL (ref 0–0.01)
NRBC BLD-RTO: 0 PER 100 WBC
NRBC BLD-RTO: 0 PER 100 WBC
PH UR STRIP: 5.5 [PH] (ref 5–8)
PHOSPHATE SERPL-MCNC: 3.1 MG/DL (ref 2.6–4.7)
PLATELET # BLD AUTO: 136 K/UL (ref 150–400)
PLATELET # BLD AUTO: 141 K/UL (ref 150–400)
PMV BLD AUTO: 11 FL (ref 8.9–12.9)
PMV BLD AUTO: 11.1 FL (ref 8.9–12.9)
POTASSIUM SERPL-SCNC: 3.6 MMOL/L (ref 3.5–5.1)
PROT PATTERN SERPL IFE-IMP: NORMAL
PROT SERPL-MCNC: 5.3 G/DL (ref 6.4–8.2)
PROT UR STRIP-MCNC: ABNORMAL MG/DL
RBC # BLD AUTO: 3.08 M/UL (ref 4.1–5.7)
RBC # BLD AUTO: 3.33 M/UL (ref 4.1–5.7)
RBC #/AREA URNS HPF: ABNORMAL /HPF (ref 0–5)
SODIUM SERPL-SCNC: 140 MMOL/L (ref 136–145)
SP GR UR REFRACTOMETRY: 1.01 (ref 1–1.03)
TROPONIN I SERPL-MCNC: 0.17 NG/ML
UA: UC IF INDICATED,UAUC: ABNORMAL
UROBILINOGEN UR QL STRIP.AUTO: 1 EU/DL (ref 0.2–1)
WBC # BLD AUTO: 12.6 K/UL (ref 4.1–11.1)
WBC # BLD AUTO: 7.8 K/UL (ref 4.1–11.1)
WBC URNS QL MICRO: ABNORMAL /HPF (ref 0–4)

## 2020-11-24 PROCEDURE — 87040 BLOOD CULTURE FOR BACTERIA: CPT

## 2020-11-24 PROCEDURE — 84100 ASSAY OF PHOSPHORUS: CPT

## 2020-11-24 PROCEDURE — 81001 URINALYSIS AUTO W/SCOPE: CPT

## 2020-11-24 PROCEDURE — 74011000250 HC RX REV CODE- 250: Performed by: STUDENT IN AN ORGANIZED HEALTH CARE EDUCATION/TRAINING PROGRAM

## 2020-11-24 PROCEDURE — 85027 COMPLETE CBC AUTOMATED: CPT

## 2020-11-24 PROCEDURE — 36415 COLL VENOUS BLD VENIPUNCTURE: CPT

## 2020-11-24 PROCEDURE — 94760 N-INVAS EAR/PLS OXIMETRY 1: CPT

## 2020-11-24 PROCEDURE — 65660000000 HC RM CCU STEPDOWN

## 2020-11-24 PROCEDURE — 97116 GAIT TRAINING THERAPY: CPT

## 2020-11-24 PROCEDURE — 93005 ELECTROCARDIOGRAM TRACING: CPT

## 2020-11-24 PROCEDURE — 74011250637 HC RX REV CODE- 250/637: Performed by: STUDENT IN AN ORGANIZED HEALTH CARE EDUCATION/TRAINING PROGRAM

## 2020-11-24 PROCEDURE — 74011250636 HC RX REV CODE- 250/636: Performed by: STUDENT IN AN ORGANIZED HEALTH CARE EDUCATION/TRAINING PROGRAM

## 2020-11-24 PROCEDURE — 99232 SBSQ HOSP IP/OBS MODERATE 35: CPT | Performed by: FAMILY MEDICINE

## 2020-11-24 PROCEDURE — 85379 FIBRIN DEGRADATION QUANT: CPT

## 2020-11-24 PROCEDURE — 77030027138 HC INCENT SPIROMETER -A

## 2020-11-24 PROCEDURE — 80053 COMPREHEN METABOLIC PANEL: CPT

## 2020-11-24 PROCEDURE — 83735 ASSAY OF MAGNESIUM: CPT

## 2020-11-24 PROCEDURE — 84484 ASSAY OF TROPONIN QUANT: CPT

## 2020-11-24 PROCEDURE — 99232 SBSQ HOSP IP/OBS MODERATE 35: CPT | Performed by: INTERNAL MEDICINE

## 2020-11-24 PROCEDURE — 74011250636 HC RX REV CODE- 250/636: Performed by: INTERNAL MEDICINE

## 2020-11-24 PROCEDURE — 97530 THERAPEUTIC ACTIVITIES: CPT

## 2020-11-24 PROCEDURE — 85025 COMPLETE CBC W/AUTO DIFF WBC: CPT

## 2020-11-24 PROCEDURE — 71046 X-RAY EXAM CHEST 2 VIEWS: CPT

## 2020-11-24 PROCEDURE — 82330 ASSAY OF CALCIUM: CPT

## 2020-11-24 RX ORDER — METRONIDAZOLE 500 MG/100ML
500 INJECTION, SOLUTION INTRAVENOUS EVERY 8 HOURS
Status: DISCONTINUED | OUTPATIENT
Start: 2020-11-25 | End: 2020-11-24

## 2020-11-24 RX ORDER — LEVOFLOXACIN 5 MG/ML
500 INJECTION, SOLUTION INTRAVENOUS
Status: DISCONTINUED | OUTPATIENT
Start: 2020-11-26 | End: 2020-11-27 | Stop reason: HOSPADM

## 2020-11-24 RX ORDER — GUAIFENESIN 100 MG/5ML
100 SOLUTION ORAL
Status: DISCONTINUED | OUTPATIENT
Start: 2020-11-24 | End: 2020-11-27 | Stop reason: HOSPADM

## 2020-11-24 RX ORDER — LEVOFLOXACIN 5 MG/ML
750 INJECTION, SOLUTION INTRAVENOUS ONCE
Status: COMPLETED | OUTPATIENT
Start: 2020-11-25 | End: 2020-11-25

## 2020-11-24 RX ORDER — POTASSIUM CHLORIDE 7.45 MG/ML
10 INJECTION INTRAVENOUS
Status: DISCONTINUED | OUTPATIENT
Start: 2020-11-24 | End: 2020-11-24

## 2020-11-24 RX ORDER — MAG HYDROX/ALUMINUM HYD/SIMETH 200-200-20
30 SUSPENSION, ORAL (FINAL DOSE FORM) ORAL ONCE
Status: COMPLETED | OUTPATIENT
Start: 2020-11-24 | End: 2020-11-24

## 2020-11-24 RX ORDER — METRONIDAZOLE 500 MG/100ML
500 INJECTION, SOLUTION INTRAVENOUS EVERY 12 HOURS
Status: DISCONTINUED | OUTPATIENT
Start: 2020-11-25 | End: 2020-11-27 | Stop reason: HOSPADM

## 2020-11-24 RX ORDER — POTASSIUM CHLORIDE 750 MG/1
20 TABLET, FILM COATED, EXTENDED RELEASE ORAL
Status: DISPENSED | OUTPATIENT
Start: 2020-11-24 | End: 2020-11-24

## 2020-11-24 RX ADMIN — DOCUSATE SODIUM 100 MG: 100 CAPSULE, LIQUID FILLED ORAL at 09:07

## 2020-11-24 RX ADMIN — HEPARIN SODIUM 5000 UNITS: 5000 INJECTION INTRAVENOUS; SUBCUTANEOUS at 17:49

## 2020-11-24 RX ADMIN — Medication 10 ML: at 06:59

## 2020-11-24 RX ADMIN — POTASSIUM CHLORIDE 20 MEQ: 750 TABLET, FILM COATED, EXTENDED RELEASE ORAL at 11:59

## 2020-11-24 RX ADMIN — SODIUM CHLORIDE 150 ML/HR: 900 INJECTION, SOLUTION INTRAVENOUS at 09:11

## 2020-11-24 RX ADMIN — METRONIDAZOLE 500 MG: 500 INJECTION, SOLUTION INTRAVENOUS at 23:39

## 2020-11-24 RX ADMIN — HEPARIN SODIUM 5000 UNITS: 5000 INJECTION INTRAVENOUS; SUBCUTANEOUS at 09:00

## 2020-11-24 RX ADMIN — SODIUM CHLORIDE 150 ML/HR: 900 INJECTION, SOLUTION INTRAVENOUS at 02:35

## 2020-11-24 RX ADMIN — ALUMINUM HYDROXIDE, MAGNESIUM HYDROXIDE, AND SIMETHICONE 30 ML: 200; 200; 20 SUSPENSION ORAL at 18:56

## 2020-11-24 RX ADMIN — Medication 10 ML: at 22:57

## 2020-11-24 RX ADMIN — POTASSIUM CHLORIDE 20 MEQ: 750 TABLET, FILM COATED, EXTENDED RELEASE ORAL at 09:07

## 2020-11-24 RX ADMIN — PAMIDRONATE DISODIUM 30 MG: 3 INJECTION INTRAVENOUS at 11:57

## 2020-11-24 RX ADMIN — HEPARIN SODIUM 5000 UNITS: 5000 INJECTION INTRAVENOUS; SUBCUTANEOUS at 01:08

## 2020-11-24 NOTE — PROGRESS NOTES
Andry Diaz  YOB: 1932          Assessment & Plan: BENJI due to IVVD, improving with IVF  Hypercalcemia, a bit better. S/p calcitonin. Concern for malignancy  Hypokalemia  H/o lymphoma and prostate cancer    Rec:  Reduce IVF  SPEP neg  F/u PTHrP  F/u 1,25 D  BMBX planned       Subjective:   CC: f/u BENJI  HPI: Renal function improving. Calcium better. ROS: no n/v/sob +constipation  Current Facility-Administered Medications   Medication Dose Route Frequency    potassium chloride SR (KLOR-CON 10) tablet 20 mEq  20 mEq Oral Q2H    pamidronate (AREDIA) 30 mg in 0.9% sodium chloride 250 mL infusion  30 mg IntraVENous ONCE    lidocaine 4 % patch 1 Patch  1 Patch TransDERmal Q24H    polyethylene glycol (MIRALAX) packet 17 g  17 g Oral DAILY PRN    docusate sodium (COLACE) capsule 100 mg  100 mg Oral BID    acetaminophen (TYLENOL) tablet 650 mg  650 mg Oral Q6H PRN    sodium chloride (NS) flush 5-40 mL  5-40 mL IntraVENous Q8H    sodium chloride (NS) flush 5-40 mL  5-40 mL IntraVENous PRN    heparin (porcine) injection 5,000 Units  5,000 Units SubCUTAneous Q8H    0.9% sodium chloride infusion  150 mL/hr IntraVENous CONTINUOUS          Objective:     Vitals:  Blood pressure 135/71, pulse 70, temperature 98.1 °F (36.7 °C), resp. rate 18, height 5' 3\" (1.6 m), weight 67 kg (147 lb 9.6 oz), SpO2 91 %. Temp (24hrs), Av.1 °F (36.7 °C), Min:97.8 °F (36.6 °C), Max:98.4 °F (36.9 °C)      Intake and Output:  701 - 1900  In: -   Out: 400 [Urine:400]  1901 - 700  In: 1938 [I.V.:1755]  Out: 5915 [Urine:3005]    Physical Exam:               GENERAL ASSESSMENT: Elderly, thin WM NAD  CHEST: CTA  HEART: S1S2  ABDOMEN: Soft,NT  EXTREMITY: no EDEMA        ECG/rhythm:    Data Review      No results for input(s): TNIPOC in the last 72 hours.     No lab exists for component: ITNL   No results for input(s): CPK, CKMB, TROIQ in the last 72 hours. Recent Labs     11/24/20  0135 11/23/20  0539 11/22/20  1310 11/22/20  0055    141 140 139   K 3.6 3.3* 3.7 3.5   * 112* 110* 107   CO2 21 22 24 25   BUN 29* 32* 39* 41*   CREA 2.17* 2.36* 2.72* 3.02*   GLU 90 94 114* 105*   PHOS 3.1 3.2  --  4.1   MG 2.1 1.7  --  1.7   CA 10.1 9.8 10.2* 11.1*   ALB 2.6* 2.5*  --  2.8*   WBC 7.8 7.7  --  9.9   HGB 9.7* 9.6*  --  11.2*   HCT 29.1* 28.8*  --  33.9*   * 128*  --  144*      No results for input(s): INR, PTP, APTT, INREXT, INREXT in the last 72 hours. Needs: urine analysis, urine sodium, protein and creatinine  Lab Results   Component Value Date/Time    Sodium,urine random 42 11/20/2020 04:45 PM    Creatinine, urine 69.00 11/20/2020 04:45 PM           : Natasha Stokes MD  11/24/2020        [facility]:  [email or web address]  Adali Barron office:  [address] [place] [address], [address] [place] [address] [address]  [facility], [address] [place] [address]  Phone: [phone]  Fax :     [phone]    [address] [place] [address] office:  [facility]  [facility], [address]  Phone - [phone]  Fax - [phone]

## 2020-11-24 NOTE — PROGRESS NOTES
11/24/2020  CARE MANAGEMENT NOTE:  CM reviewed EMR and handoff received from weekend  Nino Teri). Pt was admitted with ARF, BENJI; also with T12 fracture (no surgery recommended). Reportedly, pt resides alone in [facility]. Pt's dtr, Carroll Cage ([phone]) is the primary family contact. .       RUR 23%     Transition Plan of Care:  1. Plan is for pt to return home with assistance from his son, Michael Connell (cell [phone]). [facility] (skilled nursing, PT) referral was sent over the weekend and they accepted however start of care will be Monday, 11/30. CM met with pt and his dtr at bedside and they do not want referrals sent to other agencies. 3.  Outpt f/u  4. Pt will arrange his own transportation home.     CM will continue to follow pt until discharged.   Carlos

## 2020-11-24 NOTE — PROGRESS NOTES
Bedside and Verbal shift change report given to Johnna Burch (oncoming nurse) by Sosa Stone (offgoing nurse). Report included the following information SBAR, Kardex and MAR.

## 2020-11-24 NOTE — PROGRESS NOTES
Bedside and Verbal shift change report given to Sangeetha [facility] (oncoming nurse) by Kingsley Holder RN (offgoing nurse). Report included the following information SBAR, Kardex, Intake/Output, MAR, Accordion and Recent Results.

## 2020-11-24 NOTE — ROUTINE PROCESS
Concerning CT guided biopsy, Dr. Sharmila Melendez, as well as Dr. Cory Mendiola, says that position of Retroperitoneal adenopathy is too difficult to attempt. Biopsy will be cancelled. Any questions please call Sharmila Beaulieu.

## 2020-11-24 NOTE — PROGRESS NOTES
[id number] [facility] RESIDENCY PROGRAM  PROGRESS NOTE     11/24/2020  PCP: Gina Cerrato MD     Assessment/Plan:     Noe Welsh is a 80 y.o. male with a PMHx of prostate CA, HTN, Gout, hx of lymphoma who presents to the ED with a weakness, acute renal failure and hypercalcemia. Overnight events: none    Hypercalcemia: possibly 2/2 to malignancy. Calcium 10.1  this am: corrects to 11.2  -CMP daily  - f/u w/ SPEP/Immunofix  -F/U on PTHr peptide  -f/u vitamin D levels  -heme following, aprec recs  -nephro following, aprec recs    Acute renal Failure-  Improving.  -Strict I&O  -continue MVIF  -Continue PO intake    Weakness w/ Recurrent falls: could be 2/2 to anemia. MRI and CT spine w/ multilevel degenerative changes and stenosis. Carotid dupplex wnl. ECHO LVEF 60-65%, G1LVDD. -f/u on echo   -continue working w/ PT & OT  -CBC and CMP daily  -Ortho following     T12 fx: MRI T spine: Broken dish T12 fx.  -No plans for orthosis   -Tylenol 650 mg q 6 hrs for pain  -Lidocaine patch daily  -PT / OT    Hx of diffuse large B-cell Lymphoma: possible recurrence. Ct abdomen pelvic w/o contrast remarkable for retroperitoneal adenopathy; difficult percutaneous sample. Will attempt BM tomorrow. -F/u outpatient w/ Dr Fam Bee.  -heme onco following--aprec recs    Spinal stenosis: recurrent history of weakness, dizziness and falls. MRI lumbar: Severe spinal canal stenosis at L4-5 relating   -Ortho following, recs appreciated    Hypertension- On home atenolol 50 mg tablet everyday. - Holding  home medications   . Anemia of chronic disease: Today 9.6 down from 11.2,  likely 2/2 to malignancy. -CBC daily  -Continue to monitor  -heme following, aprec recs     Gout: stable  -Hold home allopurinol 300 mg daily. Opioid use: No opioids prescribed per . UDS + for opiates. Prostate Cancer: stable; s/p prostatectomy. PSA wnl.        FEN/GI - renal diet. Activity - Out of bed w/ assistance.   DVT prophylaxis - Sub Q Heparin  GI prophylaxis - Not indicated at this time  Fall prophylaxis - Fall precautions ordered. Dispo: Home with HH. Code Status - Full. Discussed with patient / caregivers. Next of Kin Name and Contact - Binta Ann,  Daughter - [phone]      [facility] discussed with Dr. Jose Mcneil. Subjective:   Pt was seen and examined at bedside. Afebrile and hemodynamically stable. Pt refers improvement of back pain. Denies chest pain, SOB, nausea, vomiting, abdominal pain, dizziness, diarrhea and constipation.       Objective:   Physical examination  Patient Vitals for the past 24 hrs:   Temp Pulse Resp BP SpO2   20 1116 98.1 °F (36.7 °C) 70 18 135/71 91 %   20 0838 98.1 °F (36.7 °C) 84 18 (!) 157/83 96 %   20 0700  100      20 0437 98.1 °F (36.7 °C) 78 16 132/69 94 %   20 2330 98.1 °F (36.7 °C) 87 16 131/78 92 %   20 2303  68      20 2100 98 °F (36.7 °C) 87 15 124/76 95 %   20 1533 97.8 °F (36.6 °C) 87 16 135/77 94 %   20 1458  79      20 1300 98.4 °F (36.9 °C) 94 17 (!) 149/87 93 %      Temp (24hrs), Av.1 °F (36.7 °C), Min:97.8 °F (36.6 °C), Max:98.4 °F (36.9 °C)         O2 Device: Room air    Date 20 07 - 20 0659 20 07 - 20 0659   Shift 1825-5261 3008-0975 24 Hour Total 7764-7604 8310-9939 24 Hour Total   INTAKE   I.V.(mL/kg/hr) 0(0) 1755(2.2) 1755(1.1)        Volume (0.9% sodium chloride infusion) 0 1755 1755      Shift Total(mL/kg) 0(0) 1755(26.2) 1755(26.2)      OUTPUT   Urine(mL/kg/hr) 1150(1.5) 705(0.9) 1855(1.2) 400  400     Urine Voided 2178 401 0644 400  400     Urine Occurrence(s)  3 x 3 x      Emesis/NG output 0  0 0  0     Emesis 0  0 0  0   Stool 0  0        Stool Occurrence(s)  1 x 1 x        Stool 0  0      Shift Total(mL/kg) 1150(17.5) 705(10.5) 1855(27.7) 400(6)  400(6)   NET -1150 1050 -100 -400  -400   Weight (kg) 65.8 67 67 67 67 67     Physical Exam  General: In no acute distress. Alert. Cooperative. Head: Normocephalic. Atraumatic. Eyes:              Sclera white. PERRL. EOMI. Ears:              Hard of hearing. Respiratory: CTAB. No w/r/r/c.   Cardiovascular: RRR. GI: Normal bowel sounds. Nontender. No rebound tenderness or guarding. Nondistended. Extremities: No LE edema. Distal pulses intact. Musculoskeletal: Full ROM in all extremities. Skin: Warm, dry. No rashes. Neuro: CN II-XII grossly intact. Strength 5/5 in all extremities. Data Review:     CBC:  Recent Labs     11/24/20 0135 11/23/20  0539 11/22/20  0055   WBC 7.8 7.7 9.9   HGB 9.7* 9.6* 11.2*   HCT 29.1* 28.8* 33.9*   * 128* 661*     Metabolic Panel:  Recent Labs     11/24/20 0135 11/23/20  0539 11/22/20  1310 11/22/20  0055    141 140 139   K 3.6 3.3* 3.7 3.5   * 112* 110* 107   CO2 21 22 24 25   BUN 29* 32* 39* 41*   CREA 2.17* 2.36* 2.72* 3.02*   GLU 90 94 114* 105*   CA 10.1 9.8 10.2* 11.1*   MG 2.1 1.7  --  1.7   PHOS 3.1 3.2  --  4.1   ALB 2.6* 2.5*  --  2.8*   TBILI 0.7 0.7  --  0.7   ALT 14 13  --  14     Micro:  Lab Results   Component Value Date/Time    Culture result: No growth (<1,000 CFU/ML) 11/20/2020 04:45 PM     Imaging:  Mra Brain Wo Cont    Result Date: 11/20/2020  *PRELIMINARY REPORT* No emergent process. Preliminary report was provided by Dr. Keaton Pederson Final report to follow. *END PRELIMINARY REPORT*      Mri Brain Wo Cont    Result Date: 11/20/2020  *PRELIMINARY REPORT* No emergent process. Preliminary report was provided by Dr. Keaton Pederson Final report to follow. *END PRELIMINARY REPORT*      Mri Cerv Spine Wo Cont    Result Date: 11/20/2020  *PRELIMINARY REPORT* There is disc herniation at C5/C6 which contacts the anterior margin of the cervical cord. No emergent findings Preliminary report was provided by Dr. Keaton Pederson Final report to follow.  *END PRELIMINARY REPORT*      Mri Lumb Spine Wo Cont    Result Date: 11/20/2020  *PRELIMINARY REPORT* Neuroforaminal stenosis, most severe on the left at L3/L4 and L4/L5 Preliminary report was provided by Dr. Ganesh Cook Final report to follow. *END PRELIMINARY REPORT*      Ct Head Wo Cont    Result Date: 11/20/2020  CLINICAL HISTORY: fall INDICATION: fall COMPARISON: None. CT dose reduction was achieved through use of a standardized protocol tailored for this examination and automatic exposure control for dose modulation. TECHNIQUE: Serial axial images with a collimation of 5 mm were obtained from the skull base through the vertex  FINDINGS: There is sulcal and ventricular prominence. Confluent periventricular and scattered foci of hypodensity in the cerebral white matter. There is no evidence of an acute infarction, hemorrhage, or mass-effect. There is no evidence of midline shift or hydrocephalus. Posterior fossa structures are unremarkable. No extra-axial collections are seen. Mastoid air cells are well pneumatized and clear. Vertebral and carotid vascular calcifications are noted. IMPRESSION: No acute intracranial process. Imaging findings consistent with mild chronic microvascular ischemic change. There is a mild to moderate degree of cerebral atrophy. Ct Spine Cerv Wo Cont    Result Date: 11/20/2020  EXAM: CT SPINE CERV WO CONT CLINICAL HISTORY: fall INDICATION: fall COMPARISON:  None TECHNIQUE:  Axial neck CT was performed. Noncontrast imaging obtained. Coronal and sagittal reconstructions were performed. CT dose reduction was achieved through use of a standardized protocol tailored for this examination and automatic exposure control for dose modulation. Osseous/bone algorithm was utilized. FINDINGS: Carotid atherosclerotic change. No pneumothorax. No large pulmonary mass or nodule. .  There is no evidence of fracture or subluxation. The prevertebral soft tissues are grossly within normal limits. The atlantodental interval is within normal limits. The craniocervical junction is intact.  Multilevel loss of disc height. Multilevel severe canal and foraminal stenoses. Minimal anterolisthesis of C7 on T1. IMPRESSION: There is no acute fracture or dislocation identified. Ct Spine Thorac Wo Cont    Result Date: 11/20/2020  EXAM:  CT SPINE [facility] WO CONT INDICATION: Fall, back pain. COMPARISON: None. TECHNIQUE: Multislice helical CT of the thoracic spine was performed. Sagittal and coronal reformations were generated. CT dose reduction was achieved through use of a standardized protocol tailored for this examination and automatic exposure control for dose modulation. FINDINGS: There are osteophytes at multiple levels. There is cortical disruption in the anterior, superior endplate of Z80 which extends through both the right lateral and left lateral osteophyte. Query cortical disruption in the right, lateral aspect of the T11 vertebral body. There is no spinal canal stenosis. Coronary artery disease and atherosclerosis. Prominent lymph nodes in the posterior mediastinum Likely cystic lesions in both kidneys which are incompletely characterized      IMPRESSION: 1. Cortical disruption in the body of T12 with suspected fracture in the body of T11. Ct Spine Lumb Wo Cont    Result Date: 11/20/2020  EXAM: CT SPINE LUMB WO CONT History: Status post fall/back pain INDICATION: fall, back pain COMPARISON: None. TECHNIQUE:   Unenhanced multislice helical CT of the lumbar spine was performed in the axial plane. Coronal and sagittal reconstructions were obtained. CT dose reduction was achieved through use of a standardized protocol tailored for this examination and automatic exposure control for dose modulation. FINDINGS: There is retrolisthesis of L2 on L3 and L3 on L4 each measuring 4 mm. Anterolisthesis of L4 on L5 measuring or millimeters. Abdominal aorta demonstrates atherosclerotic change. There are renal calculi. There is no hydronephrosis.  Left renal hypodensities most likely a cyst. Mild levorotatory scoliotic change. T12-L1: The spinal canal and neural foramina are widely patent. L1-L2: The spinal canal and neural foramina are widely patent. L2-L3: Moderate facet arthropathy. Ligamentum flavum hypertrophy. Minimal retrolisthesis. Disc bulge/osteophyte. Moderate canal and right foraminal stenosis. L3-L4: Facet arthropathy and hypertrophy. Ligament flavum hypertrophy. Minimal retrolisthesis. Severe canal stenosis. Mild bilateral foraminal stenoses. L4-L5: Facet arthropathy and hypertrophy. Disc bulge/osteophyte. Severe canal stenosis. Severe left foraminal stenosis. L5-S1: Mild facet arthropathy. Canal is patent. Foramina are patent. IMPRESSION: Extensive multilevel degenerative change with multilevel spondylolisthesis as well. Severe canal stenoses at L3-4 and L4-5. Additional, less severe degenerative findings are as described in detail above. Medications reviewed  Current Facility-Administered Medications   Medication Dose Route Frequency    potassium chloride SR (KLOR-CON 10) tablet 20 mEq  20 mEq Oral Q2H    pamidronate (AREDIA) 30 mg in 0.9% sodium chloride 250 mL infusion  30 mg IntraVENous ONCE    lidocaine 4 % patch 1 Patch  1 Patch TransDERmal Q24H    polyethylene glycol (MIRALAX) packet 17 g  17 g Oral DAILY PRN    docusate sodium (COLACE) capsule 100 mg  100 mg Oral BID    acetaminophen (TYLENOL) tablet 650 mg  650 mg Oral Q6H PRN    sodium chloride (NS) flush 5-40 mL  5-40 mL IntraVENous Q8H    sodium chloride (NS) flush 5-40 mL  5-40 mL IntraVENous PRN    heparin (porcine) injection 5,000 Units  5,000 Units SubCUTAneous Q8H    0.9% sodium chloride infusion  150 mL/hr IntraVENous CONTINUOUS         Signed:   Derek Haynes MD   Resident, Family Medicine      Attending note: Attending note to follow. ..

## 2020-11-24 NOTE — PROGRESS NOTES
Comprehensive Nutrition Assessment    Type and Reason for Visit: Initial, RD nutrition re-screen/LOS    Nutrition Recommendations/Plan:   1. Continue renal diet. 2. Continue Nepro TID to promote adequate intake. 3. Provided renal diet education per family request.    Nutrition Assessment:      11/24: 79 y/o male admitted with acute renal failure. PMH includes HTN, gout, prostate CA, hx of lymphoma. Possible BM bx planned per MD note. Pt reports fair appetite. Says he was eating well PTA, 2.5 meals/day + snacks. Drinks Ensure at home. Recorded intakes since admission 25-75% meals. Daughter ordering meals for pt. Says he was having constipation which decreased his appetite, but says he has since had 3 large BMs. Pt was having hypercalcemia and hypokalemia, both now resolved and WNL. Pt's daughter requesting renal diet education- provided written/verbal education and RD contact info for any further questions. Pt tried Nepro for the first time during visit and says he likes it. Will continue. Labs- Cr 2.17, folate 39.6. Meds- KCl, colace. Intakes:  Patient Vitals for the past 168 hrs:   % Diet Eaten   11/22/20 1705 45 %   11/22/20 1207 75 %   11/22/20 0940 25 %   11/21/20 1303 75 %   11/21/20 0849 45 %     Weight hx: Wt Readings from Last 10 Encounters:   11/23/20 67 kg (147 lb 9.6 oz)   11/11/20 61.7 kg (136 lb)   08/13/20 62.6 kg (138 lb)   02/19/19 67.6 kg (149 lb)   01/28/19 68.5 kg (151 lb)   12/31/18 69.4 kg (153 lb)   12/20/17 67.7 kg (149 lb 3.2 oz)   12/19/16 68.2 kg (150 lb 5 oz)   04/05/16 70.3 kg (155 lb)   12/07/15 70.3 kg (155 lb)     Malnutrition Assessment:  Malnutrition Status: none    Estimated Daily Nutrient Needs:  Energy (kcal): 5048(9780 x 1.3 AF); Weight Used for Energy Requirements: Current  Protein (g): 67(0.8-1.2 g/kg);  Weight Used for Protein Requirements: Current  Fluid (ml/day): 9687; Method Used for Fluid Requirements: 1 ml/kcal      Nutrition Related Findings:  LBM 11/23 Wounds:    None       Current Nutrition Therapies:  DIET RENAL Regular  DIET NPO  DIET NPO  DIET NUTRITIONAL SUPPLEMENTS All Meals; Nepro    Anthropometric Measures:  · Height:  5' 3\" (160 cm)  · Current Body Wt:  67 kg (147 lb 11.3 oz)   · Admission Body Wt:       · Usual Body Wt:        · Ideal Body Wt:  124 lbs:  119.1 %   · Adjusted Body Weight:   ; Weight Adjustment for: No adjustment   · Adjusted BMI:       · BMI Category: Overweight (BMI 25.0-29. 9)       Nutrition Diagnosis:   · Food & nutrition-related knowledge deficit related to food and nutrition related knowledge deficit as evidenced by (need for renal diet education)      Nutrition Interventions:   Food and/or Nutrient Delivery: Continue current diet, Continue oral nutrition supplement  Nutrition Education and Counseling: Education completed  Coordination of Nutrition Care: Continue to monitor while inpatient    Goals:  PO intake >75% meals + ONS with adherance to recommended diet next 4-6 days       Nutrition Monitoring and Evaluation:   Behavioral-Environmental Outcomes: None identified  Food/Nutrient Intake Outcomes: Food and nutrient intake, Supplement intake  Physical Signs/Symptoms Outcomes: Weight, Biochemical data, Nutrition focused physical findings, Constipation    Discharge Planning:    Continue current diet, Continue oral nutrition supplement     Electronically signed by Katharina Pittman RDN on 11/24/2020 at 2:05 PM    Contact: [phone]

## 2020-11-24 NOTE — PROGRESS NOTES
[id number] [facility] [place]  [phone]    Hematology / Oncology Follow-up        Patient: Erin Pagan MRN: [id number]  SSN: [id number]    YOB: 1932  Age: 80 y.o. Sex: male        Reason for Consultation:      1. Hypercalcemia  2. Recurrent lymphoma ? Subjective:      Erin Pagan is a 80 y.o. male who I am seeing in follow up for hypercalcemia and history of diffuse large B-cell lymphoma. He is admitted with weakness, fall and back injury. He has some back pain. CT and MRI shows degenerative changes. Labs on admission revealed BENJI and hypercalcemia. With hydration, Creat and Ca level are coming down. MRI shows lots of adenopathy both above and below the diaphragm and splenomegaly. He was diagnosed with DLBCL in 2009 and received R-CHOP under the care of Dr. Jose Shi and and has been cured of it. Interval History:     Resting comfortably; states when up walking had back brace in place with PT and that helped. No further complaints at present. No family at bedside         Past Medical History:   Diagnosis Date    CRI (chronic renal insufficiency) 12/13/2012    Gout 1/4/2011    Prostate CA ([facility] [address] 1/4/2011     Past Surgical History:   Procedure Laterality Date    ENDOSCOPY, COLON, DIAGNOSTIC  2003    neg    HC BONE MARROW BIOPSY      HX PROSTATECTOMY        No family history on file. Social History     Tobacco Use    Smoking status: Never Smoker    Smokeless tobacco: Never Used   Substance Use Topics    Alcohol use: No      Prior to Admission medications    Medication Sig Start Date End Date Taking? Authorizing Provider   allopurinoL (ZYLOPRIM) 300 mg tablet Take 150 mg by mouth daily. Yes Provider, Historical   vitamin e (E GEMS) 100 unit capsule Take 100 Units by mouth daily. Yes Provider, Historical   multivitamin (ONE A DAY) tablet Take 1 Tab by mouth daily.    Yes Provider, Historical   ketoconazole (NIZORAL) 2 % topical cream Apply  to affected area two (2) times a day for 28 days. 11/11/20 12/9/20 Yes Maddie Todd MD   atenoloL (TENORMIN) 50 mg tablet Take 1 Tab by mouth daily. 9/9/20  Yes Parvin Muir MD          Allergies   Allergen Reactions    Pcn [Penicillins] Swelling           Objective:     Visit Vitals  BP (!) 157/83 (BP 1 Location: Left arm, BP Patient Position: At rest)   Pulse 84   Temp 98.1 °F (36.7 °C)   Resp 18   Ht 5' 3\" (1.6 m)   Wt 67 kg (147 lb 9.6 oz)   SpO2 96%   BMI 26.15 kg/m²     Physical Exam:    General: No distress  Eyes: Anicteric sclerae  HENT: Atraumatic  Neck: Supple  Respiratory: Normal respiratory effort  CV: No peripheral edema  GI: Soft, nontender, nondistended, no masses, no hepatomegaly, no splenomegaly  Skin: No rashes, ecchymoses, or petechiae  Psych: Alert, oriented, appropriate affect, normal judgment/insight      Lab Results   Component Value Date/Time    WBC 7.8 11/24/2020 01:35 AM    HGB 9.7 (L) 11/24/2020 01:35 AM    HCT 29.1 (L) 11/24/2020 01:35 AM    PLATELET 037 (L) 66/03/9054 01:35 AM    MCV 94.5 11/24/2020 01:35 AM       Lab Results   Component Value Date/Time    Sodium 140 11/24/2020 01:35 AM    Potassium 3.6 11/24/2020 01:35 AM    Chloride 113 (H) 11/24/2020 01:35 AM    CO2 21 11/24/2020 01:35 AM    Anion gap 6 11/24/2020 01:35 AM    Glucose 90 11/24/2020 01:35 AM    BUN 29 (H) 11/24/2020 01:35 AM    Creatinine 2.17 (H) 11/24/2020 01:35 AM    BUN/Creatinine ratio 13 11/24/2020 01:35 AM    GFR est AA 35 (L) 11/24/2020 01:35 AM    GFR est non-AA 29 (L) 11/24/2020 01:35 AM    Calcium 10.1 11/24/2020 01:35 AM    Bilirubin, total 0.7 11/24/2020 01:35 AM    Alk.  phosphatase 56 11/24/2020 01:35 AM    Protein, total 5.3 (L) 11/24/2020 01:35 AM    Albumin 2.6 (L) 11/24/2020 01:35 AM    Globulin 2.7 11/24/2020 01:35 AM    A-G Ratio 1.0 (L) 11/24/2020 01:35 AM    ALT (SGPT) 14 11/24/2020 01:35 AM    AST (SGOT) 40 (H) 11/24/2020 01:35 AM          MRI Results (most recent):  Results from Hospital Encounter encounter on 11/20/20    [facility] CONT    Narrative MRI OF THE THORACIC SPINE WITHOUT CONTRAST: 11/21/2020 1:51 PM    INDICATION: T12 fracture suspected on CT thoracic spine. TECHNIQUE: Multiplanar and multisequence MRI was obtained of the thoracic spine  without contrast.    COMPARISON: CT thoracic 11/20/2020. FINDINGS:   \"Broken DISH\" fracture: A fracture of the right lateral aspect of T12 (6-6) is  better seen on prior CT. On prior CT, diffuse idiopathic skeletal hyperostosis  (DISH) is also better appreciated. The flowing syndesmophytes of DISH in this  patient are predominantly right lateral. The fracture extends through the right  lateral cortex of T12 and slightly into the vertebral body, running obliquely  toward the superior endplate. A minimal, anterior, left, superior endplate  cortical step-off in T12 is better seen on prior CT (054-29). The fracture line  continues through the left lateral osteophyte between T11 and T12 (601-43 on  prior CT). Most of these findings are not as clearly visible on MRI. No definite  fracture of T11. No height loss in T11 or T12. The fracture is a \"broken DISH\"  fracture rather than a typical compression fracture. Partially imaged edema and L2 is better seen on same-day lumbar MRI to be  degenerative. Intervertebral disc height and signal intensity are well  preserved. Thoracic kyphosis is exaggerated. The thoracic cord is normal in  caliber and signal intensity. Lymphoma: In the partially imaged upper abdomen, there are multiple enlarged  retroperitoneal lymph nodes, with more bulky enlargement of multiple mesenteric  lymph nodes. The largest retroperitoneal lymph node measures 14 mm in short axis  on image 5-5. The largest, conglomerate, mesenteric lymph node measures 22 mm on  image 5-4. The spleen is enlarged. Multiple splenic masses are nearly T2  isointense. Several of the larger are centrally T2 hyperintense, however. The  largest splenic mass measures approximately 40 x 49 mm (8-25). An enlarged right  hilar lymph node measures 12 mm on image 8-14. The paraspinal lymph node  posterior to the esophagus and medial to the aorta, at the T5-T6 level, measures  14 mm in short axis (5-8, 8-12). Findings are concerning for lymphoma. Other thoracoabdominal findings: Right shoulder joint effusion. Trace bilateral  pleural effusions and mild passive atelectasis. The common bile duct is mildly  dilated (7 mm). Distended stomach. Bilateral renal cysts. A 9 x 14 mm, oval,  circumscribed, homogenously T2 hyperintense, homogenously T1 hypointense nodule  posteromedial to the segment 6 hepatic capsule (8-24) is indeterminate, but of  doubtful significance. C7-T1: Facet osteoarthritis causes mild right neural foraminal stenosis (5-10). T1-T12: No herniation or stenosis. Impression IMPRESSION:   1. \"Broken DISH\" fracture of T12.  2. Lymphoma: Splenomegaly and bulky splenic masses. Thoracic, mesenteric, and  retroperitoneal lymphadenopathy. 3. CT chest abdomen pelvis with IV contrast is recommended for staging. The findings were called to Dr. Mari Davidson on 11/21/2020 at 1347 hours and  1614 hours by Dr. Hunter Kayser. [phone]        11/23/2020 CT C/A/P wo contrast  IMPRESSION:  Retroperitoneal adenopathy is aorta caval in location measuring 35 x 17 mm in  size. Splenomegaly with scattered splenic hypodensities. The patient was not  administered IV contrast.   Adenopathy is in a location that would be difficult to percutaneously sample. Consider clinical necessity of percutaneous sampling.   Small right and minimal left-sided pleural effusion with bibasilar atelectasis.   Right and left renal calculi and cysts. No hydronephrosis or ureterolithiasis.           Assessment/Plan     1.  Hypercalcemia  Likely from malignancy   S/p calcitonin x 2 doses 11/21 and 11/22   Calcium 10.1  this am: corrects to 11.2  Continue with IVFs; will give Pamidronate today 11/24  Will continue to monitor        2. Anemia   Mild, normocytic - chronic disease related  No fe def; B12 and Folate normal  stable;  continue to monitor   Transfuse for Hgb < 7       3. Malignant disease  Lymphoma ? Scans as above   11/24 Plan for Biopsy; per radiology unable to perform bx due to difficulty of location  Will attempt BM Bx; possibly  tomorrow: waiting to hear from radiology; if unable to perform; OK for discharge    - will need close follow up with Dr Jazmine Krishnan after discharge; reviewed with [facility]. 4. BENJI  Nephrology following  Improving this am    5. Dispo  -needs Calcium continuing to trend down  -possible BM bx; if unable to perform tomorrow; no need to delay discharge  -needs close follow up with Dr Jazmine Krishnan  -plan reviewed with [facility] reviewed with Dr Deb Brady by:  Rivka Gonsalez, NP                     November 24, 2020

## 2020-11-25 ENCOUNTER — APPOINTMENT (OUTPATIENT)
Dept: CT IMAGING | Age: 85
DRG: 682 | End: 2020-11-25
Attending: STUDENT IN AN ORGANIZED HEALTH CARE EDUCATION/TRAINING PROGRAM
Payer: MEDICARE

## 2020-11-25 ENCOUNTER — APPOINTMENT (OUTPATIENT)
Dept: CT IMAGING | Age: 85
DRG: 682 | End: 2020-11-25
Attending: NURSE PRACTITIONER
Payer: MEDICARE

## 2020-11-25 LAB
ALBUMIN SERPL-MCNC: 2.6 G/DL (ref 3.5–5)
ALBUMIN/GLOB SERPL: 0.8 {RATIO} (ref 1.1–2.2)
ALP SERPL-CCNC: 62 U/L (ref 45–117)
ALT SERPL-CCNC: 17 U/L (ref 12–78)
ANION GAP SERPL CALC-SCNC: 8 MMOL/L (ref 5–15)
ARTERIAL PATENCY WRIST A: ABNORMAL
AST SERPL-CCNC: 48 U/L (ref 15–37)
ATRIAL RATE: 103 BPM
ATRIAL RATE: 111 BPM
BASE DEFICIT BLDA-SCNC: 3.8 MMOL/L
BASOPHILS # BLD: 0 K/UL (ref 0–0.1)
BASOPHILS NFR BLD: 0 % (ref 0–1)
BDY SITE: ABNORMAL
BILIRUB SERPL-MCNC: 1 MG/DL (ref 0.2–1)
BUN SERPL-MCNC: 30 MG/DL (ref 6–20)
BUN/CREAT SERPL: 15 (ref 12–20)
CALCIUM SERPL-MCNC: 11.5 MG/DL (ref 8.5–10.1)
CALCULATED P AXIS, ECG09: 47 DEGREES
CALCULATED R AXIS, ECG10: -153 DEGREES
CALCULATED R AXIS, ECG10: -22 DEGREES
CALCULATED T AXIS, ECG11: -179 DEGREES
CALCULATED T AXIS, ECG11: 12 DEGREES
CHLORIDE SERPL-SCNC: 110 MMOL/L (ref 97–108)
CO2 SERPL-SCNC: 23 MMOL/L (ref 21–32)
COMMENT, HOLDF: NORMAL
CREAT SERPL-MCNC: 2.05 MG/DL (ref 0.7–1.3)
DIAGNOSIS, 93000: NORMAL
DIAGNOSIS, 93000: NORMAL
DIFFERENTIAL METHOD BLD: ABNORMAL
EOSINOPHIL # BLD: 0 K/UL (ref 0–0.4)
EOSINOPHIL NFR BLD: 0 % (ref 0–7)
ERYTHROCYTE [DISTWIDTH] IN BLOOD BY AUTOMATED COUNT: 15.7 % (ref 11.5–14.5)
GAS FLOW.O2 O2 DELIVERY SYS: 3 L/MIN
GLOBULIN SER CALC-MCNC: 3.1 G/DL (ref 2–4)
GLUCOSE SERPL-MCNC: 132 MG/DL (ref 65–100)
HCO3 BLDA-SCNC: 20 MMOL/L (ref 22–26)
HCT VFR BLD AUTO: 30 % (ref 36.6–50.3)
HGB BLD-MCNC: 10.1 G/DL (ref 12.1–17)
IMM GRANULOCYTES # BLD AUTO: 0.1 K/UL (ref 0–0.04)
IMM GRANULOCYTES NFR BLD AUTO: 1 % (ref 0–0.5)
LYMPHOCYTES # BLD: 0.9 K/UL (ref 0.8–3.5)
LYMPHOCYTES NFR BLD: 7 % (ref 12–49)
MAGNESIUM SERPL-MCNC: 1.8 MG/DL (ref 1.6–2.4)
MCH RBC QN AUTO: 31.7 PG (ref 26–34)
MCHC RBC AUTO-ENTMCNC: 33.7 G/DL (ref 30–36.5)
MCV RBC AUTO: 94 FL (ref 80–99)
MONOCYTES # BLD: 1.4 K/UL (ref 0–1)
MONOCYTES NFR BLD: 12 % (ref 5–13)
NEUTS SEG # BLD: 9.8 K/UL (ref 1.8–8)
NEUTS SEG NFR BLD: 80 % (ref 32–75)
NRBC # BLD: 0 K/UL (ref 0–0.01)
NRBC BLD-RTO: 0 PER 100 WBC
P-R INTERVAL, ECG05: 150 MS
P-R INTERVAL, ECG05: 158 MS
PCO2 BLDA: 34 MMHG (ref 35–45)
PH BLDA: 7.39 [PH] (ref 7.35–7.45)
PHOSPHATE SERPL-MCNC: 2.7 MG/DL (ref 2.6–4.7)
PLATELET # BLD AUTO: 140 K/UL (ref 150–400)
PMV BLD AUTO: 11.7 FL (ref 8.9–12.9)
PO2 BLDA: 67 MMHG (ref 80–100)
POTASSIUM SERPL-SCNC: 3.6 MMOL/L (ref 3.5–5.1)
PROCALCITONIN SERPL-MCNC: 0.67 NG/ML
PROT SERPL-MCNC: 5.7 G/DL (ref 6.4–8.2)
Q-T INTERVAL, ECG07: 310 MS
Q-T INTERVAL, ECG07: 338 MS
QRS DURATION, ECG06: 88 MS
QRS DURATION, ECG06: 88 MS
QTC CALCULATION (BEZET), ECG08: 406 MS
QTC CALCULATION (BEZET), ECG08: 459 MS
RBC # BLD AUTO: 3.19 M/UL (ref 4.1–5.7)
SAMPLES BEING HELD,HOLD: NORMAL
SAO2 % BLD: 93 % (ref 92–97)
SAO2% DEVICE SAO2% SENSOR NAME: ABNORMAL
SODIUM SERPL-SCNC: 141 MMOL/L (ref 136–145)
SPECIMEN SITE: ABNORMAL
TROPONIN I SERPL-MCNC: 0.2 NG/ML
TROPONIN I SERPL-MCNC: 0.21 NG/ML
TROPONIN I SERPL-MCNC: 0.23 NG/ML
VENTRICULAR RATE, ECG03: 103 BPM
VENTRICULAR RATE, ECG03: 111 BPM
WBC # BLD AUTO: 12.3 K/UL (ref 4.1–11.1)

## 2020-11-25 PROCEDURE — 93005 ELECTROCARDIOGRAM TRACING: CPT

## 2020-11-25 PROCEDURE — 74011250636 HC RX REV CODE- 250/636: Performed by: STUDENT IN AN ORGANIZED HEALTH CARE EDUCATION/TRAINING PROGRAM

## 2020-11-25 PROCEDURE — 99232 SBSQ HOSP IP/OBS MODERATE 35: CPT | Performed by: FAMILY MEDICINE

## 2020-11-25 PROCEDURE — 84484 ASSAY OF TROPONIN QUANT: CPT

## 2020-11-25 PROCEDURE — 94660 CPAP INITIATION&MGMT: CPT

## 2020-11-25 PROCEDURE — 74011250637 HC RX REV CODE- 250/637: Performed by: STUDENT IN AN ORGANIZED HEALTH CARE EDUCATION/TRAINING PROGRAM

## 2020-11-25 PROCEDURE — 80053 COMPREHEN METABOLIC PANEL: CPT

## 2020-11-25 PROCEDURE — 74011000250 HC RX REV CODE- 250: Performed by: INTERNAL MEDICINE

## 2020-11-25 PROCEDURE — 2709999900 HC NON-CHARGEABLE SUPPLY

## 2020-11-25 PROCEDURE — 84145 PROCALCITONIN (PCT): CPT

## 2020-11-25 PROCEDURE — 94760 N-INVAS EAR/PLS OXIMETRY 1: CPT

## 2020-11-25 PROCEDURE — 82803 BLOOD GASES ANY COMBINATION: CPT

## 2020-11-25 PROCEDURE — 77030029684 HC NEB SM VOL KT MONA -A

## 2020-11-25 PROCEDURE — 71250 CT THORAX DX C-: CPT

## 2020-11-25 PROCEDURE — 99232 SBSQ HOSP IP/OBS MODERATE 35: CPT | Performed by: INTERNAL MEDICINE

## 2020-11-25 PROCEDURE — 74011000250 HC RX REV CODE- 250: Performed by: STUDENT IN AN ORGANIZED HEALTH CARE EDUCATION/TRAINING PROGRAM

## 2020-11-25 PROCEDURE — 74011000636 HC RX REV CODE- 636: Performed by: RADIOLOGY

## 2020-11-25 PROCEDURE — 5A09357 ASSISTANCE WITH RESPIRATORY VENTILATION, LESS THAN 24 CONSECUTIVE HOURS, CONTINUOUS POSITIVE AIRWAY PRESSURE: ICD-10-PCS | Performed by: FAMILY MEDICINE

## 2020-11-25 PROCEDURE — 36600 WITHDRAWAL OF ARTERIAL BLOOD: CPT

## 2020-11-25 PROCEDURE — 85025 COMPLETE CBC W/AUTO DIFF WBC: CPT

## 2020-11-25 PROCEDURE — 83735 ASSAY OF MAGNESIUM: CPT

## 2020-11-25 PROCEDURE — 92610 EVALUATE SWALLOWING FUNCTION: CPT

## 2020-11-25 PROCEDURE — 71275 CT ANGIOGRAPHY CHEST: CPT

## 2020-11-25 PROCEDURE — 87635 SARS-COV-2 COVID-19 AMP PRB: CPT

## 2020-11-25 PROCEDURE — 36415 COLL VENOUS BLD VENIPUNCTURE: CPT

## 2020-11-25 PROCEDURE — 94664 DEMO&/EVAL PT USE INHALER: CPT

## 2020-11-25 PROCEDURE — 84100 ASSAY OF PHOSPHORUS: CPT

## 2020-11-25 PROCEDURE — 65660000000 HC RM CCU STEPDOWN

## 2020-11-25 PROCEDURE — 94640 AIRWAY INHALATION TREATMENT: CPT

## 2020-11-25 RX ORDER — FUROSEMIDE 10 MG/ML
20 INJECTION INTRAMUSCULAR; INTRAVENOUS ONCE
Status: COMPLETED | OUTPATIENT
Start: 2020-11-25 | End: 2020-11-25

## 2020-11-25 RX ORDER — MORPHINE SULFATE 2 MG/ML
1 INJECTION, SOLUTION INTRAMUSCULAR; INTRAVENOUS
Status: DISCONTINUED | OUTPATIENT
Start: 2020-11-25 | End: 2020-11-27 | Stop reason: HOSPADM

## 2020-11-25 RX ORDER — IPRATROPIUM BROMIDE AND ALBUTEROL SULFATE 2.5; .5 MG/3ML; MG/3ML
3 SOLUTION RESPIRATORY (INHALATION)
Status: DISCONTINUED | OUTPATIENT
Start: 2020-11-25 | End: 2020-11-27 | Stop reason: HOSPADM

## 2020-11-25 RX ORDER — BUDESONIDE 0.5 MG/2ML
500 INHALANT ORAL
Status: DISCONTINUED | OUTPATIENT
Start: 2020-11-25 | End: 2020-11-27 | Stop reason: HOSPADM

## 2020-11-25 RX ORDER — HYDROCODONE BITARTRATE AND ACETAMINOPHEN 7.5; 325 MG/1; MG/1
1 TABLET ORAL
Status: DISCONTINUED | OUTPATIENT
Start: 2020-11-25 | End: 2020-11-27 | Stop reason: HOSPADM

## 2020-11-25 RX ADMIN — HEPARIN SODIUM 5000 UNITS: 5000 INJECTION INTRAVENOUS; SUBCUTANEOUS at 08:04

## 2020-11-25 RX ADMIN — FUROSEMIDE 20 MG: 10 INJECTION, SOLUTION INTRAMUSCULAR; INTRAVENOUS at 06:52

## 2020-11-25 RX ADMIN — IPRATROPIUM BROMIDE AND ALBUTEROL SULFATE 3 ML: .5; 3 SOLUTION RESPIRATORY (INHALATION) at 02:57

## 2020-11-25 RX ADMIN — IPRATROPIUM BROMIDE AND ALBUTEROL SULFATE 3 ML: .5; 3 SOLUTION RESPIRATORY (INHALATION) at 20:25

## 2020-11-25 RX ADMIN — HEPARIN SODIUM 5000 UNITS: 5000 INJECTION INTRAVENOUS; SUBCUTANEOUS at 16:54

## 2020-11-25 RX ADMIN — IPRATROPIUM BROMIDE AND ALBUTEROL SULFATE 3 ML: .5; 3 SOLUTION RESPIRATORY (INHALATION) at 07:30

## 2020-11-25 RX ADMIN — IOPAMIDOL 100 ML: 755 INJECTION, SOLUTION INTRAVENOUS at 12:02

## 2020-11-25 RX ADMIN — DOCUSATE SODIUM 100 MG: 100 CAPSULE, LIQUID FILLED ORAL at 17:52

## 2020-11-25 RX ADMIN — Medication 10 ML: at 08:04

## 2020-11-25 RX ADMIN — FUROSEMIDE 20 MG: 10 INJECTION, SOLUTION INTRAMUSCULAR; INTRAVENOUS at 02:58

## 2020-11-25 RX ADMIN — BUDESONIDE 500 MCG: 0.5 INHALANT RESPIRATORY (INHALATION) at 20:25

## 2020-11-25 RX ADMIN — Medication 10 ML: at 14:00

## 2020-11-25 RX ADMIN — HYDROCODONE BITARTRATE AND ACETAMINOPHEN 1 TABLET: 7.5; 325 TABLET ORAL at 11:16

## 2020-11-25 RX ADMIN — BUDESONIDE 500 MCG: 0.5 INHALANT RESPIRATORY (INHALATION) at 13:25

## 2020-11-25 RX ADMIN — METRONIDAZOLE 500 MG: 500 INJECTION, SOLUTION INTRAVENOUS at 08:04

## 2020-11-25 RX ADMIN — LEVOFLOXACIN 750 MG: 5 INJECTION, SOLUTION INTRAVENOUS at 01:12

## 2020-11-25 RX ADMIN — IPRATROPIUM BROMIDE AND ALBUTEROL SULFATE 3 ML: .5; 3 SOLUTION RESPIRATORY (INHALATION) at 13:25

## 2020-11-25 RX ADMIN — Medication 10 ML: at 21:02

## 2020-11-25 RX ADMIN — METRONIDAZOLE 500 MG: 500 INJECTION, SOLUTION INTRAVENOUS at 20:24

## 2020-11-25 RX ADMIN — HYDROCODONE BITARTRATE AND ACETAMINOPHEN 1 TABLET: 7.5; 325 TABLET ORAL at 15:51

## 2020-11-25 RX ADMIN — VANCOMYCIN HYDROCHLORIDE 1250 MG: 1.25 INJECTION, POWDER, LYOPHILIZED, FOR SOLUTION INTRAVENOUS at 08:04

## 2020-11-25 NOTE — PROGRESS NOTES
[address] [address] Pharmacy Dosing Services: Antimicrobial Stewardship Daily Doc 11/25/2020    Consult for antibiotic dosing of Vancomycin by Dr. Cordon  Indication: HAP   Day of Therapy 1  - Hx lymphoma, prostate CA not on chemotherapy    Ht Readings from Last 1 Encounters:   11/24/20 160 cm (63\")        Wt Readings from Last 1 Encounters:   11/23/20 67 kg (147 lb 9.6 oz)      Vancomycin therapy:  Current maintenance dose: Initial dosing  Dose calculated to approximate a therapeutic trough of 15-20 mcg/mL. Last trough level: Initial dosing  Plan for level / Adjustment in Therapy: BENJI on apparent CKD (based on creatinine history baseline creat ~1.45 mg/dL), Scr improving this AM after IVF. Will dose conservatively with 1250 mg x 1 (~20 mg/kg rounded down) and plan for a random level tomorrow AM to evaluate clearance. IVF have been dced d/t PL effusions, will monitor creatinine closely. Dose administration notes:   Doses given appropriately as scheduled    Date Dose & Interval Measured (mcg/mL) Extrapolated (mcg/mL)   ? ? ? ?   ? ? ? ?   ? ? ? ? Non-Kinetic Antimicrobial Dosing Regimen:   Current Regimen:  Levofloxacin 750 x 1 then 500 mg Q48 hours  Recommendation: Dose appropriate for CrCl <20 ml/min. Patient is borderline for 750 mg Q48 hours dose. Will keep @ 500 mg for today and evaluate in AM if dose needs increased. Dose administration notes:   Doses given appropriately as scheduled    Other Antimicrobial   (not dosed by pharmacist) Metronidazole 500 mg Q12 hours (per protocol)   Cultures 11/24 Blood: NGTD, Prelim  11/20 Urine: UA neg bacteria   Serum Creatinine Lab Results   Component Value Date/Time    Creatinine 2.05 (H) 11/25/2020 04:39 AM    Creatinine (POC) 1.7 (H) 06/10/2009 08:44 AM         Creatinine Clearance Estimated Creatinine Clearance: 20 mL/min (A) (based on SCr of 2.05 mg/dL (H)).      Temp Temp: (!) 100.5 °F (38.1 °C)       WBC Lab Results   Component Value Date/Time    WBC 12.3 (H) 11/25/2020 04:39 AM        H/H Lab Results   Component Value Date/Time    HGB 10.1 (L) 11/25/2020 04:39 AM        Platelets    Lab Results   Component Value Date/Time    PLATELET 021 (L) 67/35/7786 04:39 AM          Thanks,  Pharmacist Kevan iNcole, PHARMD Contact information: [phone]

## 2020-11-25 NOTE — PROGRESS NOTES
11/25/2020  10:44 AM    EMR Review, rapid response called on patient this morning. Patient with increased tachypnea, tachycardia and hypoxia into the mid 80s requiring 7L O2    CM consult noted, CM discussed with MD and pt's RN, pt will need to be re-evaluated by PT/OT for disposition. Consult noted for RW, CM discussed with pt and pt stated he has a walker at home, but that he doesn't walk around as much. Pt declined needing walker at this time. CM following for needs. Transition Plan of Care:  1. PT/OT, pending re-evaluation for disposition needs. 2.  Pt's plan is to return home with assistance from his son, Goyo Salinas (cell [phone]). 3. [facility]  (skilled nursing, PT) referral was sent over the weekend and they accepted however start of care will be Monday, 11/30.  CM met with pt and his dtr at bedside and they do not want referrals sent to other agencies.   4.CM following for needs    Oddis Carrel

## 2020-11-25 NOTE — CONSULTS
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 Mild dementia  · cory _ ? Volume depletion - improved with hydration   · Recent frequent falls   ·  decreased po intake per pt   · Lll asdz _ may have component of pna - ? Aspiration   · Dysphagia - per family member has had some trouble swallowing for years       RECOMMENDATIONS:   · o2 and wean   ·  start rt   ·  watch for any swallowing difficulties   ·  ab   ·  for cta today - ok by radiologist and renal for the contrast   · dvt prophylaxis  ·   For bone marrow bx  When stable   ·   dvt prophylaxis  · Continue rt/ nebs and add budesonide   · Agree with dvt prophylaxis  ·  I would like speech  To see. · dtr says there was a concern about an aspirated pill yesterday   · Volume mgt per renal   · Hypercalcemia - as per heme / onc     Subjective: This patient has been seen and evaluated at the request of Dr. Oksana Arredondo  for resp failure . Patient is a 80 y.o. male  Who was admitted with increased weakness and falls . He was noted on admit to have cory thought to be related ot volume depletion. He was hydrated and overnight went into resp distress prompting bipap support and diuretic. He is better today  And is on 2 liters nc. He denies hx of tobacco use or of pulm dz. Currently no cp , pleurisy . Admitted to dizziness and weakness. Discussed with rn, dtr, and admitting team and speech . Past Medical History:   Diagnosis Date    CRI (chronic renal insufficiency) 12/13/2012    Gout 1/4/2011    Prostate CA ([facility] [address] 1/4/2011    ux of b  Cell lymphoma - s/p tx   Past Surgical History:   Procedure Laterality Date    ENDOSCOPY, COLON, DIAGNOSTIC  2003    neg    HC BONE MARROW BIOPSY      HX PROSTATECTOMY        Prior to Admission medications    Medication Sig Start Date End Date Taking? Authorizing Provider   allopurinoL (ZYLOPRIM) 300 mg tablet Take 150 mg by mouth daily. Yes Provider, Historical   vitamin e (E GEMS) 100 unit capsule Take 100 Units by mouth daily. Yes Provider, Historical   multivitamin (ONE A DAY) tablet Take 1 Tab by mouth daily. Yes Provider, Historical   ketoconazole (NIZORAL) 2 % topical cream Apply  to affected area two (2) times a day for 28 days. 11/11/20 12/9/20 Yes Jared Darnell MD   atenoloL (TENORMIN) 50 mg tablet Take 1 Tab by mouth daily. 9/9/20  Yes David Malik MD     Allergies   Allergen Reactions    Pcn [Penicillins] Swelling      Social History     Tobacco Use    Smoking status: Never Smoker    Smokeless tobacco: Never Used   Substance Use Topics    Alcohol use: No      No family history on file.    - reportedly  Was living alone     Current Facility-Administered Medications   Medication Dose Route Frequency    albuterol-ipratropium (DUO-NEB) 2.5 MG-0.5 MG/3 ML  3 mL Nebulization Q6H RT    [START ON 11/26/2020] Vancomycin level 11/26 @ 0400   Other ONCE    Vancomycin- Pharmacy dosing by levels   Other Rx Dosing/Monitoring    metroNIDAZOLE (FLAGYL) IVPB premix 500 mg  500 mg IntraVENous Q12H    [START ON 11/26/2020] levoFLOXacin (LEVAQUIN) 500 mg in D5W IVPB  500 mg IntraVENous Q48H    lidocaine 4 % patch 1 Patch  1 Patch TransDERmal Q24H    docusate sodium (COLACE) capsule 100 mg  100 mg Oral BID    sodium chloride (NS) flush 5-40 mL  5-40 mL IntraVENous Q8H    heparin (porcine) injection 5,000 Units  5,000 Units SubCUTAneous Q8H Review of Systems:  A comprehensive review of systems was negative except for that written in the HPI. He admits to trouble breathing last pm. - ok now    he reports trouble  Swallowing large pills --  Vit c .    Objective:   Vital Signs:    Visit Vitals  /76   Pulse 92   Temp 99.7 °F (37.6 °C)   Resp 21   Ht 5' 3\" (1.6 m)   Wt 67 kg (147 lb 9.6 oz)   SpO2 96%   BMI 26.15 kg/m²       O2 Device: Nasal cannula   O2 Flow Rate (L/min): 2 l/min   Temp (24hrs), Av.5 °F (36.9 °C), Min:97.8 °F (36.6 °C), Max:100.5 °F (38.1 °C)       Intake/Output:   Last shift:       0701 - 1900  In: 350 [I.V.:350]  Out: 1100 [Urine:1100]  Last 3 shifts: 1901 -  0700  In: 2005 [I.V.:2005]  Out: 9949 [Urine:2715]    Intake/Output Summary (Last 24 hours) at 2020 1132  Last data filed at 2020 1118  Gross per 24 hour   Intake 600 ml   Output 2710 ml   Net -2110 ml      Physical Exam:   General:  Alert, cooperative, no distress, appears stated age. Head:  Normocephalic, without obvious abnormality, atraumatic. Eyes:  Conjunctivae/corneas clear. PERRL, EOMs intact. Nose: Nares normal. Septum midline. Mucosa normal. No drainage or sinus tenderness. Throat: Lips, mucosa, and tongue normal. Dry mouth and poor dentition    Neck: Supple, symmetrical, trachea midline, no adenopathy, thyroid: no enlargment/tenderness/nodules, no carotid bruit and no JVD. Back:   Symmetric, no curvature. ROM normal.   Lungs:   Rhonchi - left greater than rt with course wheeze and some rales in bases    Chest wall:  No tenderness or deformity. Heart:  Regular rate and rhythm, S1, S2 normal, no murmur, click, rub or gallop. Abdomen:   Soft, non-tender. Bowel sounds normal. No masses,  No organomegaly. Extremities: Extremities normal, atraumatic, no cyanosis or edema. Pulses: 2+ and symmetric all extremities.    Skin: Skin color, texture, turgor normal. No rashes or lesions   Lymph nodes: Cervical, supraclavicular, nodes normal.   Neurologic: Grossly nonfocal- alert and talkative -      Data review:     Recent Results (from the past 24 hour(s))   CBC WITH AUTOMATED DIFF    Collection Time: 11/24/20  9:20 PM   Result Value Ref Range    WBC 12.6 (H) 4.1 - 11.1 K/uL    RBC 3.33 (L) 4.10 - 5.70 M/uL    HGB 10.6 (L) 12.1 - 17.0 g/dL    HCT 31.5 (L) 36.6 - 50.3 %    MCV 94.6 80.0 - 99.0 FL    MCH 31.8 26.0 - 34.0 PG    MCHC 33.7 30.0 - 36.5 g/dL    RDW 15.8 (H) 11.5 - 14.5 %    PLATELET 659 (L) 669 - 400 K/uL    MPV 11.0 8.9 - 12.9 FL    NRBC 0.0 0  WBC    ABSOLUTE NRBC 0.00 0.00 - 0.01 K/uL    NEUTROPHILS 80 (H) 32 - 75 %    LYMPHOCYTES 7 (L) 12 - 49 %    MONOCYTES 12 5 - 13 %    EOSINOPHILS 0 0 - 7 %    BASOPHILS 0 0 - 1 %    IMMATURE GRANULOCYTES 1 (H) 0.0 - 0.5 %    ABS. NEUTROPHILS 10.1 (H) 1.8 - 8.0 K/UL    ABS. LYMPHOCYTES 0.8 0.8 - 3.5 K/UL    ABS. MONOCYTES 1.5 (H) 0.0 - 1.0 K/UL    ABS. EOSINOPHILS 0.0 0.0 - 0.4 K/UL    ABS. BASOPHILS 0.0 0.0 - 0.1 K/UL    ABS. IMM.  GRANS. 0.1 (H) 0.00 - 0.04 K/UL    DF AUTOMATED     TROPONIN I    Collection Time: 11/24/20  9:20 PM   Result Value Ref Range    Troponin-I, Qt. 0.17 (H) <0.05 ng/mL   CULTURE, BLOOD    Collection Time: 11/24/20 11:07 PM    Specimen: Blood   Result Value Ref Range    Special Requests: NO SPECIAL REQUESTS      Culture result: NO GROWTH AFTER 7 HOURS     D DIMER    Collection Time: 11/24/20 11:07 PM   Result Value Ref Range    D-dimer 2.95 (H) 0.00 - 0.65 mg/L FEU   URINALYSIS W/ REFLEX CULTURE    Collection Time: 11/24/20 11:28 PM    Specimen: Urine   Result Value Ref Range    Color YELLOW/STRAW      Appearance CLEAR CLEAR      Specific gravity 1.009 1.003 - 1.030      pH (UA) 5.5 5.0 - 8.0      Protein TRACE (A) NEG mg/dL    Glucose 100 (A) NEG mg/dL    Ketone 15 (A) NEG mg/dL    Bilirubin Negative NEG      Blood MODERATE (A) NEG      Urobilinogen 1.0 0.2 - 1.0 EU/dL    Nitrites Negative NEG      Leukocyte Esterase Negative NEG      WBC 0-4 0 - 4 /hpf    RBC 10-20 0 - 5 /hpf    Epithelial cells FEW FEW /lpf    Bacteria Negative NEG /hpf    UA:UC IF INDICATED CULTURE NOT INDICATED BY UA RESULT CNI      Hyaline cast 0-2 0 - 5 /lpf   BLOOD GAS, ARTERIAL    Collection Time: 11/25/20  2:49 AM   Result Value Ref Range    pH 7.39 7.35 - 7.45      PCO2 34 (L) 35.0 - 45.0 mmHg    PO2 67 (L) 80 - 100 mmHg    O2 SAT 93 92 - 97 %    BICARBONATE 20 (L) 22 - 26 mmol/L    BASE DEFICIT 3.8 mmol/L    O2 METHOD NASAL O2      O2 FLOW RATE 3 L/min    Sample source ARTERIAL      SITE RR      NEW'S TEST N/A     METABOLIC PANEL, COMPREHENSIVE    Collection Time: 11/25/20  4:39 AM   Result Value Ref Range    Sodium 141 136 - 145 mmol/L    Potassium 3.6 3.5 - 5.1 mmol/L    Chloride 110 (H) 97 - 108 mmol/L    CO2 23 21 - 32 mmol/L    Anion gap 8 5 - 15 mmol/L    Glucose 132 (H) 65 - 100 mg/dL    BUN 30 (H) 6 - 20 MG/DL    Creatinine 2.05 (H) 0.70 - 1.30 MG/DL    BUN/Creatinine ratio 15 12 - 20      GFR est AA 37 (L) >60 ml/min/1.73m2    GFR est non-AA 31 (L) >60 ml/min/1.73m2    Calcium 11.5 (H) 8.5 - 10.1 MG/DL    Bilirubin, total 1.0 0.2 - 1.0 MG/DL    ALT (SGPT) 17 12 - 78 U/L    AST (SGOT) 48 (H) 15 - 37 U/L    Alk.  phosphatase 62 45 - 117 U/L    Protein, total 5.7 (L) 6.4 - 8.2 g/dL    Albumin 2.6 (L) 3.5 - 5.0 g/dL    Globulin 3.1 2.0 - 4.0 g/dL    A-G Ratio 0.8 (L) 1.1 - 2.2     PHOSPHORUS    Collection Time: 11/25/20  4:39 AM   Result Value Ref Range    Phosphorus 2.7 2.6 - 4.7 MG/DL   CBC WITH AUTOMATED DIFF    Collection Time: 11/25/20  4:39 AM   Result Value Ref Range    WBC 12.3 (H) 4.1 - 11.1 K/uL    RBC 3.19 (L) 4.10 - 5.70 M/uL    HGB 10.1 (L) 12.1 - 17.0 g/dL    HCT 30.0 (L) 36.6 - 50.3 %    MCV 94.0 80.0 - 99.0 FL    MCH 31.7 26.0 - 34.0 PG    MCHC 33.7 30.0 - 36.5 g/dL    RDW 15.7 (H) 11.5 - 14.5 %    PLATELET 185 (L) 659 - 400 K/uL    MPV 11.7 8.9 - 12.9 FL    NRBC 0.0 0  WBC    ABSOLUTE NRBC 0.00 0.00 - 0.01 K/uL    NEUTROPHILS 80 (H) 32 - 75 % LYMPHOCYTES 7 (L) 12 - 49 %    MONOCYTES 12 5 - 13 %    EOSINOPHILS 0 0 - 7 %    BASOPHILS 0 0 - 1 %    IMMATURE GRANULOCYTES 1 (H) 0.0 - 0.5 %    ABS. NEUTROPHILS 9.8 (H) 1.8 - 8.0 K/UL    ABS. LYMPHOCYTES 0.9 0.8 - 3.5 K/UL    ABS. MONOCYTES 1.4 (H) 0.0 - 1.0 K/UL    ABS. EOSINOPHILS 0.0 0.0 - 0.4 K/UL    ABS. BASOPHILS 0.0 0.0 - 0.1 K/UL    ABS. IMM. GRANS. 0.1 (H) 0.00 - 0.04 K/UL    DF AUTOMATED     TROPONIN I    Collection Time: 11/25/20  4:39 AM   Result Value Ref Range    Troponin-I, Qt. 0.21 (H) <0.05 ng/mL   PROCALCITONIN    Collection Time: 11/25/20  4:39 AM   Result Value Ref Range    Procalcitonin 0.67 ng/mL   MAGNESIUM    Collection Time: 11/25/20  4:39 AM   Result Value Ref Range    Magnesium 1.8 1.6 - 2.4 mg/dL   SAMPLES BEING HELD    Collection Time: 11/25/20  4:39 AM   Result Value Ref Range    SAMPLES BEING HELD 1LAV     COMMENT        Add-on orders for these samples will be processed based on acceptable specimen integrity and analyte stability, which may vary by analyte.    EKG, 12 LEAD, INITIAL    Collection Time: 11/25/20  6:55 AM   Result Value Ref Range    Ventricular Rate 100 BPM    Atrial Rate 357 BPM    QRS Duration 80 ms    Q-T Interval 346 ms    QTC Calculation (Bezet) 446 ms    Calculated R Axis -25 degrees    Calculated T Axis -3 degrees    Diagnosis       ** Poor data quality, interpretation may be adversely affected  Atrial fibrillation  Low voltage QRS  Nonspecific ST and T wave abnormality  Abnormal ECG  When compared with ECG of 24-NOV-2020 20:42,  MANUAL COMPARISON REQUIRED, DATA IS UNCONFIRMED         Imaging:  I have personally reviewed the patients radiographs and have reviewed the reports:   cxr and ct seen         Bret Wooten MD

## 2020-11-25 NOTE — PROGRESS NOTES
TRANSFER - IN REPORT:    Verbal report received from Lester Rollins (name) on Jonny Caal  being received from 5th floor Med Surg(unit) for change in patient condition(respiratory distress)      Report consisted of patients Situation, Background, Assessment and   Recommendations(SBAR). Information from the following report(s) SBAR, ED Summary, Intake/Output, MAR, Recent Results, Cardiac Rhythm ST, Alarm Parameters  and Dual Neuro Assessment was reviewed with the receiving nurse. Opportunity for questions and clarification was provided. Assessment completed upon patients arrival to unit and care assumed. Primary Nurse Snow Espinoza RN and MARTA RN performed a dual skin assessment on this patient Impairment noted- see wound doc flow sheet  Daniel score is SEE FLOWSHEET. A&Ox4, JENKINS, follows commands. Lung sounds wet, crackles. Was on 15L NC during transfer in- placed on bipap with respiratory at bedside during transfer. NPO, last BM on 24th. BS hypoactive. #20G L AC- flushed and capped. Sinus tach on monitor - S1S2 present, pulses palpable. Condom cath placed on patient, lasix 20mg administered per family practice orders who are at bedside to assess pt. Skin tear noted to posterior right upper thigh, with dressing dated 11/21/2020. Mepilex dressing removed, new mepilex applied. Charge nurse at bedside. 0700 - Bedside shift change report given to Shruthi Pelayo (oncoming nurse) by Gilbert Salcedo (offgoing nurse). Report included the following information SBAR, ED Summary, Procedure Summary, Intake/Output, MAR, Med Rec Status, Cardiac Rhythm NSR, Alarm Parameters  and Dual Neuro Assessment.

## 2020-11-25 NOTE — PROGRESS NOTES
Notified family practice pt's respirations were 32, heart rate 112, crackles bilateral lungs. Of note, was told in report that pt had difficulty swallowing a potassium pill during day shift, experienced some reflux, and was treated with Maalox. [address] and Reyes Sickles came to the bedside to evaluate the pt by 2010. New orders placed, including CXR, CT, and labs.

## 2020-11-25 NOTE — PROGRESS NOTES
[address] [place] [address] with [address] [place] [address]     Resident Progress Note in Brief    S: Nurse reported patient's O2Sat went down to low 90s%, put on 2L Oxygen and went up to 95%. Patient seen and examined at bedside. Patient is awake and alert, he seems mildly tachypneic and little bit uncomfortable. Per patient he reports he had trouble swallowing some big pills today (likely potassium tablets). Denies cp/sob. O:  Visit Vitals  BP (!) 156/80 (BP 1 Location: Left arm, BP Patient Position: At rest)   Pulse (!) 112   Temp 98.5 °F (36.9 °C)   Resp (!) 32   Ht 5' 3\" (1.6 m)   Wt 147 lb 9.6 oz (67 kg)   SpO2 97%   BMI 26.15 kg/m²       Physical Examination  General appearance - alert, seems a little bit uncomfortable. Chest - No increase in work of breathing. Mild decrease in air movement in LLL, rales throughout the lung fields. No wheezes or rhonchi. Heart - tachycardic, regular rhythm, normal S1, S2, no murmurs, rubs, clicks or gallops,   Abdomen - soft, nontender, nondistended, no masses or organomegaly  Neurological - alert, oriented, normal speech, no focal findings  Extremities - peripheral pulses normal, no pedal edema, no clubbing or cyanosis    A/P:   Melven Magda a 80 y. o. male with a PMHx of prostate CA, HTN, Gout, hx of lymphoma who presents to the ED with a weakness, acute renal failure and hypercalcemia. Concern for Aspiration pneumonia: Pt with a brief episode of hypoxia, improved after 2L O2 supplementation. Also tachycardic and tachypneic. Afebrile. Rales throughout on PE.   - CXR PA and Lateral  - EKG  - Continue monitoring closely  - Supplemental O2 as needed  - CBC  - Troponin    Addendum   9:40 PM  CBC came back, WBC:12.6 (Increased from AM lab WBC: 7.8). Troponin: 0.17. CXR: Left lower lobe collapse or volume loss with left pleural effusion.  We talked to Radiologist and he reports concern that this lung collapse may be due to a mucus plug, PE is very unlikely, and he recommended doing a CT chest for a better picture and also for comparison to the previous one. Pt reports that he experienced face swelling with the use of Penicillin. We spoke to Pharmacist regarding initiation of antibiotics based on Penicillin allergy and pt's renal function, they recommended to consider Ceftriaxone (even in the setting of this side effect) or Levaquin/Flagyl usage. SIRS 3/4 criteria (HR:107 RR:24 WBC:12.6). Possibly due to an infectious process (PNA vs UTI vs unclear source). There is a concern for a PE given his increase HR/RR transient decreased in O2Sat, (pt on prophylactic anticoagulation, though. Unable to get CTA d/t kidneys' function). It can be due to his L pleural effusion as well. Trop: 0.17 EKG: STachy, PVCs, No ST o T changes. QTc:439  - Trend Trops q6h. Next 0330  - Blood Culture  - D-dimer.  - UA/UCx   - Procal   - Start Levaquin and Flagyl, dosing per pharmacy  - Mucinex 100 mg q4h PRN  - Continue monitoring closely. - Supplemental O2 as needed. - CT chest w/o contrast.      11:40 PM  Pt re-evaluated at bedside. He is not in acute distress. He seems more comfortable. He refers having some fhlegm coming up. Visit Vitals  BP (!) 148/77 (BP 1 Location: Right arm, BP Patient Position: At rest)   Pulse (!) 104   Temp 98.1 °F (36.7 °C)   Resp 24   Ht 5' 3\" (1.6 m)   Wt 147 lb 9.6 oz (67 kg)   SpO2 95%   BMI 26.15 kg/m²     D- dimer level: 0.29 mg/L. (Age corrected d-dimer: 0.88 mg/L). Wells score for PE: 3 (Mod risk). - Continue monitoring  - Awaiting for CT chest.       3:01 AM  Rapid response was called due to patient being tachypneic (RR:40) and feeling SOB. Pt seen and evaluated at bedside with Dr. Tara Marshall. Pt reports feeling SOB, denies CP/n/v.   Pt supplemental O2 increased to 4L NC, O2Sat 95%    Physical Exam:  General: In mild acute distress. Alert. Respiratory: Tachypneic. Mild increase in work breathing.  Decrease air movement in LLL, rales throughout the lung fields. No wheezes or rhonchi. Cardiovascular: Tachycardic. Normal S1,S2. No m/r/g. Visit Vitals  BP (!) 174/87 (BP 1 Location: Left arm, BP Patient Position: At rest)   Pulse (!) 117   Temp 97.9 °F (36.6 °C)   Resp (!) 40   Ht 5' 3\" (1.6 m)   Wt 147 lb 9.6 oz (67 kg)   SpO2 91%   BMI 26.15 kg/m²     CT scan Chest: Bilateral pleural effusions and bilateral lower lobe volume loss left greater than right. No evidence of bronchial obstruction. Plan:  - Lasix 20 mg iv x1  - Breathing Treatment x1   - ABG stat (Respiratory Alkalosis)  - Continue monitoring. 3:39 AM  Pt seen and evaluated with Dr. Jihan Marino. Pt reports feeling somehow better, he states that his breathing is better after the respiratory treatment. He denies CP. Pt is less tachypneic and seems a little bit more comfortable. Pt's O2Sat 94% on 2LNC. - Will continue following closely  - Duo-nebs scheduled    5:31 AM  Pt seen and evaluated at bedside with Dr. Jihan Marino. Pt laying down comfortably, in no acute distress. He reports feeling ok, denies trouble with breathing, no CP, chest pressure. Visit Vitals  BP (!) 157/72 (BP 1 Location: Left arm, BP Patient Position: At rest)   Pulse 89   Temp 97.8 °F (36.6 °C)   Resp 30   Ht 5' 3\" (1.6 m)   Wt 147 lb 9.6 oz (67 kg)   SpO2 95%   BMI 26.15 kg/m²     Physical Exam:  General: In no acute distress. Chest: Mild tachypneic. Rales throughout R>L lung. No wheezing/ronchi/crackles. Plan:  - Continue monitoring  - Trop/Procal levels results pending.   - Continue IV Levo/Flagyl  - Cont breathing treatment  - Supplemental O2 as needed. Please see daily progress note for detailed assessment and plan.      Rudine Ormond, MD  Family Medicine Resident

## 2020-11-25 NOTE — PROGRESS NOTES
[facility] RESIDENCY PROGRAM  PROGRESS NOTE     11/25/2020  PCP: Phillip Talamantes MD     Assessment/Plan:     Robin Slater is a 80 y.o. male with a PMHx of prostate CA, HTN, Gout, hx of lymphoma who presents to the ED with a weakness, acute renal failure and hypercalcemia. Overnight events: rapid response at 3:00 AM.  Pt presented w/ increased tachypnea, tachycardia and hypoxia into the mid 80s requiring 7L O2 to maintain O2 saturations >90%. Also having noisy breathing. Patient denied chest pain. Pt placed on BIPAP, ABGs remarkable for respiratory alkalosis. Pt transferred to telemetry. AHRF: likely 2/2 to PE vs less likely aspiration events. D-dimer: 2.95, Chest CT: b/l pleural effusion, vlm loss left>right. BCx neg for 7 hours. -ABG q 4 hrs  -chest CTA stat  -Levaquin and flagyl started. Elevated Troponins: 0.17-->0.21 likely 2/2 to PE vs demand ischemia. EKG w/o acute ST changes compare to previous. -f/u on trops    Hypercalcemia: possibly 2/2 to malignancy. Continues worsening; corrected 12.6  -CMP daily  - f/u w/ SPEP/Immunofix  -F/U on PTHr peptide  -f/u vitamin D levels  -heme following, aprec recs  -nephro following, aprec recs    Acute renal Failure-  Improving.  -Strict I&O  -continue MVIF  -Continue PO intake    Hx of diffuse large B-cell Lymphoma: possible recurrence. Ct abdomen pelvic w/o contrast remarkable for retroperitoneal adenopathy; difficult percutaneous sample. BM bx cancelled . -F/u outpatient w/ Dr Fracisco Kwan.  -heme onco following--aprec recs    Weakness w/ Recurrent falls: could be 2/2 to anemia. MRI and CT spine w/ multilevel degenerative changes and stenosis. Carotid dupplex wnl. ECHO LVEF 60-65%, G1LVDD.    -f/u on echo   -continue working w/ PT & OT  -CBC and CMP daily  -Ortho following     T12 fx: MRI T spine: Broken dish T12 fx.  -No plans for orthosis   -Tylenol 650 mg q 6 hrs for pain  -Lidocaine patch daily  -PT / OT    Spinal stenosis: recurrent history of weakness, dizziness and falls. MRI lumbar: Severe spinal canal stenosis at L4-5 relating   -Ortho following, recs appreciated    Hypertension- On home atenolol 50 mg tablet everyday. - Holding  home medications   . Anemia of chronic disease: Today 9.6 down from 11.2,  likely 2/2 to malignancy. -CBC daily  -Continue to monitor  -heme following, aprec recs     Gout: stable  -Hold home allopurinol 300 mg daily. Opioid use: No opioids prescribed per . UDS + for opiates. Prostate Cancer: stable; s/p prostatectomy. PSA wnl.        FEN/GI - renal diet. Activity - Out of bed w/ assistance. DVT prophylaxis - Sub Q Heparin  GI prophylaxis - Not indicated at this time  Fall prophylaxis - Fall precautions ordered. Dispo: Home with HH. Code Status - Full. Discussed with patient / caregivers. Next of Kin Name and Contact - Luwana Castleman,  Daughter - [phone]      L[place] Justice discussed with Dr. Joseph Romero. Subjective:   Pt was seen and examined at bedside. Afebrile and hemodynamically stable. Pt refers improvement of back pain. Denies chest pain, SOB, nausea, vomiting, abdominal pain, dizziness, diarrhea and constipation.       Objective:   Physical examination  Patient Vitals for the past 24 hrs:   Temp Pulse Resp BP SpO2   11/25/20 1115 99.7 °F (37.6 °C) 92 21 136/76 96 %   11/25/20 1100  (!) 108 24 125/68 100 %   11/25/20 1045  97 15 127/72 100 %   11/25/20 1030  94 27 113/72 100 %   11/25/20 1015  (!) 104 23 133/73 100 %   11/25/20 1000  98 26 130/65 100 %   11/25/20 0945  99 23 128/73 100 %   11/25/20 0930  99 26 133/71 100 %   11/25/20 0915  100 27 (!) 140/79 92 %   11/25/20 0900  (!) 106 22 137/76 100 %   11/25/20 0845  (!) 108 24 (!) 141/87 100 %   11/25/20 0830  (!) 113 27 (!) 151/81 100 %   11/25/20 0827     100 %   11/25/20 0815  (!) 116 22 (!) 148/90 100 %   11/25/20 0800   20 (!) 156/93 100 %   11/25/20 0745 98.3 °F (36.8 °C) 96 21 (!) 146/84 98 % 20 0731     96 %   20 0730  90 30 (!) 152/96 100 %   20 0715  90 21 (!) 134/116 99 %   20 0713  95      20 0700  100 25 (!) 148/97 97 %   20 0649  (!) 107 30 (!) 152/86 99 %   20 0645  (!) 113 (!) 38 (!) 162/97 96 %   20 0632     96 %   20 0630 (!) 100.5 °F (38.1 °C) (!) 117 (!) 32 (!) 157/83 95 %   20 0611 97.8 °F (36.6 °C) (!) 122 (!) 40 (!) 171/94 (!) 85 %   20 0339 97.8 °F (36.6 °C) 89 30 (!) 157/72 95 %   20 0236 97.9 °F (36.6 °C) (!) 117 (!) 40 (!) 174/87 91 %   20 2333  (!) 104      20 2233 98.1 °F (36.7 °C) (!) 107 24 (!) 148/77 95 %   20 98.5 °F (36.9 °C) (!) 112 (!) 32 (!) 156/80 97 %   20 1545 98 °F (36.7 °C) 95 18 (!) 145/80 90 %   20 1418  85         Temp (24hrs), Av.5 °F (36.9 °C), Min:97.8 °F (36.6 °C), Max:100.5 °F (38.1 °C)     O2 Flow Rate (L/min): 2 l/min   O2 Device: Nasal cannula    Date 20 - 20 - 20 0659   Shift 1617-9849 2701-8607 24 Hour Total [id number] 24 Hour Total   INTAKE   I.V.(mL/kg/hr)  250(0.3) 250(0.2) 350  350     Volume (levoFLOXacin (LEVAQUIN) 750 mg in D5W IVPB)  150 150        Volume (metroNIDAZOLE (FLAGYL) IVPB premix 500 mg)  100 100 100  100     Volume (vancomycin (VANCOCIN) 1,250 mg in 0.9% sodium chloride 250 mL (VIAL-MATE))    250  250   Shift Total(mL/kg)  250(3.7) 250(3.7) 350(5.2)  350(5.2)   OUTPUT   Urine(mL/kg/hr) 700(0.9) 1310(1.6) 2010(1.3) 1100  1100     Urine Voided 700 1310 2010 1100  1100     Urine Occurrence(s) 1 x 9 x 10 x      Emesis/NG output 0  0        Emesis 0  0      Stool           Stool Occurrence(s) 1 x  1 x      Shift Total(mL/kg) 700(10.5) 1310(19.6) (30) 1100(16.4)  1100(16.4)   NET -700 -1060 -1760 -750  -750   Weight (kg) 67 67 67 67 67 67     Physical Exam  General: In no acute distress. Alert. Cooperative. Head: Normocephalic. Atraumatic. Eyes:              Sclera white. PERRL. EOMI. Ears:              Hard of hearing. Respiratory: Increased b/l rhoncus. Cardiovascular: RRR. GI: Normal bowel sounds. Nontender. No rebound tenderness or guarding. Nondistended. Extremities: No LE edema. Distal pulses intact. Musculoskeletal: Full ROM in all extremities. Skin: Warm, dry. No rashes. Neuro: CN II-XII grossly intact. Strength 5/5 in all extremities. Data Review:     CBC:  Recent Labs     11/25/20 0439 11/24/20 2120 11/24/20 0135   WBC 12.3* 12.6* 7.8   HGB 10.1* 10.6* 9.7*   HCT 30.0* 31.5* 29.1*   * 141* 534*     Metabolic Panel:  Recent Labs     11/25/20 0439 11/24/20 0135 11/23/20  0539    140 141   K 3.6 3.6 3.3*   * 113* 112*   CO2 23 21 22   BUN 30* 29* 32*   CREA 2.05* 2.17* 2.36*   * 90 94   CA 11.5* 10.1 9.8   MG 1.8 2.1 1.7   PHOS 2.7 3.1 3.2   ALB 2.6* 2.6* 2.5*   TBILI 1.0 0.7 0.7   ALT 17 14 13     Micro:  Lab Results   Component Value Date/Time    Culture result: NO GROWTH AFTER 7 HOURS 11/24/2020 11:07 PM    Culture result: No growth (<1,000 CFU/ML) 11/20/2020 04:45 PM     Imaging:  Mra Brain Wo Cont    Result Date: 11/20/2020  *PRELIMINARY REPORT* No emergent process. Preliminary report was provided by Dr. SNOW [facility] Final report to follow. *END PRELIMINARY REPORT*      Mri Brain Wo Cont    Result Date: 11/20/2020  *PRELIMINARY REPORT* No emergent process. Preliminary report was provided by Dr. SNOW [facility] Final report to follow. *END PRELIMINARY REPORT*      Mri Cerv Spine Wo Cont    Result Date: 11/20/2020  *PRELIMINARY REPORT* There is disc herniation at C5/C6 which contacts the anterior margin of the cervical cord. No emergent findings Preliminary report was provided by Dr. SNOW [facility] Final report to follow.  *END PRELIMINARY REPORT*      Mri Lumb Spine Wo Cont    Result Date: 11/20/2020  *PRELIMINARY REPORT* Neuroforaminal stenosis, most severe on the left at L3/L4 and L4/L5 Preliminary report was provided by Dr. Zhang Cluster Final report to follow. *END PRELIMINARY REPORT*      Ct Head Wo Cont    Result Date: 11/20/2020  CLINICAL HISTORY: fall INDICATION: fall COMPARISON: None. CT dose reduction was achieved through use of a standardized protocol tailored for this examination and automatic exposure control for dose modulation. TECHNIQUE: Serial axial images with a collimation of 5 mm were obtained from the skull base through the vertex  FINDINGS: There is sulcal and ventricular prominence. Confluent periventricular and scattered foci of hypodensity in the cerebral white matter. There is no evidence of an acute infarction, hemorrhage, or mass-effect. There is no evidence of midline shift or hydrocephalus. Posterior fossa structures are unremarkable. No extra-axial collections are seen. Mastoid air cells are well pneumatized and clear. Vertebral and carotid vascular calcifications are noted. IMPRESSION: No acute intracranial process. Imaging findings consistent with mild chronic microvascular ischemic change. There is a mild to moderate degree of cerebral atrophy. Ct Spine Cerv Wo Cont    Result Date: 11/20/2020  EXAM: CT SPINE CERV WO CONT CLINICAL HISTORY: fall INDICATION: fall COMPARISON:  None TECHNIQUE:  Axial neck CT was performed. Noncontrast imaging obtained. Coronal and sagittal reconstructions were performed. CT dose reduction was achieved through use of a standardized protocol tailored for this examination and automatic exposure control for dose modulation. Osseous/bone algorithm was utilized. FINDINGS: Carotid atherosclerotic change. No pneumothorax. No large pulmonary mass or nodule. .  There is no evidence of fracture or subluxation. The prevertebral soft tissues are grossly within normal limits. The atlantodental interval is within normal limits. The craniocervical junction is intact. Multilevel loss of disc height. Multilevel severe canal and foraminal stenoses.  Minimal anterolisthesis of C7 on T1. IMPRESSION: There is no acute fracture or dislocation identified. Ct Spine Thorac Wo Cont    Result Date: 11/20/2020  EXAM:  CT SPINE [facility] WO CONT INDICATION: Fall, back pain. COMPARISON: None. TECHNIQUE: Multislice helical CT of the thoracic spine was performed. Sagittal and coronal reformations were generated. CT dose reduction was achieved through use of a standardized protocol tailored for this examination and automatic exposure control for dose modulation. FINDINGS: There are osteophytes at multiple levels. There is cortical disruption in the anterior, superior endplate of S55 which extends through both the right lateral and left lateral osteophyte. Query cortical disruption in the right, lateral aspect of the T11 vertebral body. There is no spinal canal stenosis. Coronary artery disease and atherosclerosis. Prominent lymph nodes in the posterior mediastinum Likely cystic lesions in both kidneys which are incompletely characterized      IMPRESSION: 1. Cortical disruption in the body of T12 with suspected fracture in the body of T11. Ct Spine Lumb Wo Cont    Result Date: 11/20/2020  EXAM: CT SPINE LUMB WO CONT History: Status post fall/back pain INDICATION: fall, back pain COMPARISON: None. TECHNIQUE:   Unenhanced multislice helical CT of the lumbar spine was performed in the axial plane. Coronal and sagittal reconstructions were obtained. CT dose reduction was achieved through use of a standardized protocol tailored for this examination and automatic exposure control for dose modulation. FINDINGS: There is retrolisthesis of L2 on L3 and L3 on L4 each measuring 4 mm. Anterolisthesis of L4 on L5 measuring or millimeters. Abdominal aorta demonstrates atherosclerotic change. There are renal calculi. There is no hydronephrosis. Left renal hypodensities most likely a cyst. Mild levorotatory scoliotic change. T12-L1: The spinal canal and neural foramina are widely patent. L1-L2: The spinal canal and neural foramina are widely patent. L2-L3: Moderate facet arthropathy. Ligamentum flavum hypertrophy. Minimal retrolisthesis. Disc bulge/osteophyte. Moderate canal and right foraminal stenosis. L3-L4: Facet arthropathy and hypertrophy. Ligament flavum hypertrophy. Minimal retrolisthesis. Severe canal stenosis. Mild bilateral foraminal stenoses. L4-L5: Facet arthropathy and hypertrophy. Disc bulge/osteophyte. Severe canal stenosis. Severe left foraminal stenosis. L5-S1: Mild facet arthropathy. Canal is patent. Foramina are patent. IMPRESSION: Extensive multilevel degenerative change with multilevel spondylolisthesis as well. Severe canal stenoses at L3-4 and L4-5. Additional, less severe degenerative findings are as described in detail above.     Medications reviewed  Current Facility-Administered Medications   Medication Dose Route Frequency    albuterol-ipratropium (DUO-NEB) 2.5 MG-0.5 MG/3 ML  3 mL Nebulization Q6H RT    [START ON 11/26/2020] Vancomycin level 11/26 @ 0400   Other ONCE    Vancomycin- Pharmacy dosing by levels   Other Rx Dosing/Monitoring    morphine injection 1 mg  1 mg IntraVENous Q4H PRN    HYDROcodone-acetaminophen (NORCO) 7.5-325 mg per tablet 1 Tab  1 Tab Oral Q4H PRN    metroNIDAZOLE (FLAGYL) IVPB premix 500 mg  500 mg IntraVENous Q12H    [START ON 11/26/2020] levoFLOXacin (LEVAQUIN) 500 mg in D5W IVPB  500 mg IntraVENous Q48H    guaiFENesin (ROBITUSSIN) 100 mg/5 mL oral liquid 100 mg  100 mg Oral Q4H PRN    lidocaine 4 % patch 1 Patch  1 Patch TransDERmal Q24H    polyethylene glycol (MIRALAX) packet 17 g  17 g Oral DAILY PRN    docusate sodium (COLACE) capsule 100 mg  100 mg Oral BID    acetaminophen (TYLENOL) tablet 650 mg  650 mg Oral Q6H PRN    sodium chloride (NS) flush 5-40 mL  5-40 mL IntraVENous Q8H    sodium chloride (NS) flush 5-40 mL  5-40 mL IntraVENous PRN    heparin (porcine) injection 5,000 Units  5,000 Units SubCUTAneous Q8H Signed:   Marcella Lundberg MD   Resident, Family Medicine      Attending note: Attending note to follow. ..

## 2020-11-25 NOTE — PROGRESS NOTES
Problem: Dysphagia (Adult)  Goal: *Acute Goals and Plan of Care (Insert Text)  Description: Swallowing goals initiated 12-2-20:   1) tolerate regular diet, thin liquids without s/s aspiration  by 12-9-20  2) crush pills as able for large pills and tolerate without s/s aspiration or reflux by 12-9-20  Outcome: Progressing Towards Goal   SPEECH [address] EVALUATION  Patient: Lashay Cage [de-identified]80 y.o. male)  Date: 11/25/2020  Primary Diagnosis: Acute renal failure (ARF) (HCC) [N17.9]  Frequent falls [R29.6]  Hypercalcemia [E83.52]  Lumbar canal stenosis [M48.061]  Cervical stenosis of spinal canal [M48.02]  Acute renal failure (ARF) (HCC) [N17.9]  Frequent falls [R29.6]  Hypercalcemia [E83.52]  Lumbar canal stenosis [M48.061]  Cervical stenosis of spinal canal [M48.02]        Precautions: aspiration  Fall, Back(no BLT's, brace on when OOB; log roll technique)    ASSESSMENT :  Based on the objective data described below, the patient presents with functional swallow. He has premorbid mild dysphagia, most likely related to radiation of neck lymph node in 2009. Daughter reports h/o mild dysphagia. Patient reports large pill dysphagia. Admitted 11-20-20 with weakness, 3 falls in a week. CT spine: severe stenosis of L3-4 and 4-5. Also T12 fx. With ?return of lymphoma, retroperitonieal adenopathy. Daughter noted decreased PO lately. 11-24: patient had trouble with potassium pills and c/o reflux. Then 11-25 he had RR with congestions and lung sounds. Chest CT:  (B) pleural effusions, (B) LL volume loss L >R. He is getting diuresed. PMH; prostate CA, HTN, gout,  lymphoma 2009, cervical stenosis, lumbar stenosis. Used to walk with walker and drive self. Patient will benefit from skilled intervention to address the above impairments. Patients rehabilitation potential is considered to be Good     PLAN :  Recommendations and Planned Interventions:  Return to renal diet.    Crush large pills or convert to liquid. Frequency/Duration: Patient will be followed by speech-language pathology 1 time a week to address goals. Discharge Recommendations: To Be Determined     SUBJECTIVE:   Patient stated Melisa Heredia had a bunch of (ice) chips, but they took them away. OBJECTIVE:     Past Medical History:   Diagnosis Date    CRI (chronic renal insufficiency) 12/13/2012    Gout 1/4/2011    Prostate CA ([address] 1/4/2011     Past Surgical History:   Procedure Laterality Date    ENDOSCOPY, COLON, DIAGNOSTIC  2003    neg    HC BONE MARROW BIOPSY      HX PROSTATECTOMY       Prior Level of Function/Home Situation:   Home Situation  Home Environment: Private residence  # Steps to Enter: 2  Rails to Enter: Yes  One/Two Story Residence: One story  Living Alone: Yes  Support Systems: Family member(s)  Patient Expects to be Discharged to[de-identified] Private residence  Current DME Used/Available at Home: Abimbola Freeman, susana, Walker, rolling  Diet prior to admission: regular, thins  Current Diet:  NPO b/c of congestion s/p pill dysphagia. Cognitive and Communication Status:  Neurologic State: Alert, Eyes open spontaneously  Orientation Level: Oriented to place, Oriented to person, Disoriented to situation, Disoriented to time  Cognition: Decreased command following, Impaired decision making, Impulsive, Memory loss  Perception: Appears intact     Safety/Judgement: Decreased insight into deficits  Oral Assessment:  Oral Assessment  Labial: No impairment  Dentition: Limited;Natural(bridge, caps)  Oral Hygiene: WFL  Lingual: No impairment  Velum: No impairment  Mandible: No impairment  P.O. Trials:  Patient Position: upright in bed  Vocal quality prior to P.O.: No impairment  Consistency Presented: Thin liquid; Solid;Puree  How Presented: Self-fed/presented;Spoon;Straw;Successive swallows   ORAL PHASE:   Bolus Acceptance: No impairment  Bolus Formation/Control: No impairment     Propulsion: No impairment  Oral Residue: None  PHARYNGEAL PHASE:   Initiation of Swallow: No impairment  Laryngeal Elevation: Functional     Pharyngeal Phase Characteristics: (occasional coughing at meals)         Patient reports large pill dysphagia \"I have to eat something behind it to get it down\" He doesn't take vitamins any more b/c of that. He did not recall yesterday's instance of pill dysphagia. Daughter reports that he doesn't eat much. Sometimes his voice sounds wet when he is eating and talking to her on the phone. NOMS:   The NOMS functional outcome measure was used to quantify this patient's level of swallowing impairment. Based on the NOMS, the patient was determined to be at level 5 for swallow function       NOMS Swallowing Levels:  Level 1 (CN): NPO  Level 2 (CM): NPO but takes consistency in therapy  Level 3 (CL): Takes less than 50% of nutrition p.o. and continues with nonoral feedings; and/or safe with mod cues; and/or max diet restriction  Level 4 (CK): Safe swallow but needs mod cues; and/or mod diet restriction; and/or still requires some nonoral feeding/supplements  Level 5 (CJ): Safe swallow with min diet restriction; and/or needs min cues  Level 6 (CI): Independent with p.o.; rare cues; usually self cues; may need to avoid some foods or needs extra time  Level 7 ([address] [place] [address]): Independent for all p.o.  BELGICA. (2003). National Outcomes Measurement System (NOMS): Adult Speech-Language Pathology User's Guide. Pain:  Pain Scale 1: Numeric (0 - 10)  Pain Intensity 1: 6  Pain Location 1: Back    After treatment:   Patient left in no apparent distress in bed and Nursing notified    COMMUNICATION/EDUCATION:   Patient was educated regarding his deficit(s) of pill dysphagia  as this relates to his diagnosis of weakness. He demonstrated Fair understanding as evidenced by confusion . Daughter understood.  .    The patient's plan of care including recommendations, planned interventions, and recommended diet changes were discussed with: Registered nurse. Patient/family have participated as able in goal setting and plan of care. Patient/family agree to work toward stated goals and plan of care.     Thank you for this referral.  Allen Kinney SLP  Time Calculation: 20 mins

## 2020-11-25 NOTE — PROGRESS NOTES
Palliative Medicine      Code Status: Full Code    Advance Care Planning:  Advance Care Planning 2020   Confirm Advance Directive None       Patient / Family Encounter Documentation    Participants (names):  Pt, Palliative Medicine (NP Hoxiebernabe Knight). Regrouped with kavitha Huggins shortly after. Narrative:  Pt was awake in bed, no family present. Pt was alert and able to engage in conversation though was unclear in the details both in terms of personal history (particularly time) and current medical condition. Pt served 4 years in the [employer] during the [address] [place] [address] Street, spoke of numerous family members who also served during wartime. Pt shared that he has 4 children but stated one child \"walked out the door\" with pt's then wife 27 yrs ago and stated they haven't spoken since, though story changed a bit over course of conversation. Only two children's names are on file:  pt's kavitha Huggins lives nearby, pt's son Paula Cortez reportedly plans to stay with pt following hospitalization. Pt's sister  in 2017; pt was not accurate as to timeframe of sister's death but did state he is ready to be her and his brother-in-law (though later referred to [facility] as though he were still living). Pt initially stated sister was his only sibling, later clarified that he also has a brother Jocelyne Neri). Pt shared that he was followed by Dr. Anayeli Ryan for 10 yrs after treatment for his lymphoma, stated he had not planned to see Dr. Anayeli Ryan again after 2019 visit but stated he is now being told to follow up, though did not appear to have an understanding as to why. Pt does not currently have AMD on file. Did not attempt goals of care conversation without family present. While pt was able to speak to some degree about his medical condition, he did not demonstrate clear understanding, became fixated on desire to drink water but is currently NPO. Palliative team returned to room shortly after upon learning that kavitha Huggins was present. Pt was off floor for testing. Dtr confirmed that pt has 1 dtr and 3 sons, is completely estranged from son in [place], does not have much contact with other son, Marian Simmons. Dtr Ed Edwar and son Goyo Salinas are involved. Dtr reports pt is hard of hearing but will not use assistive devices, indicated that what appears to be confusion is often really attributable to pt's hearing loss, though stated pt is also experiencing some confusion due to stress of hospitalization. Dtr shared that pt tends to think the worst when he receives information. Dtr stated she is pt's POA, though was uncertain whether paperwork authorizes medical decision making in addition to managing finances. Dtr stated she and pt have discussed code status during this hospitalization, stated pt wishes to remain full code at this time. Psychosocial Issues Identified/ Resilience Factors:  Pt indicated some degree of spiritual distress, stated he has always lived a \"clean life,\" never smoked, attends [employer] every Sunday, is questioning why God would allow him to become sick.  had been in to visit just prior to Palliative team; pt had that time had indicated that his donaldo was intact. Pt is estranged from several family members, has good support from dtr and a son, expressed pride that his children are all well educated. Goals of Care / Plan: Biopsy had been planned for today but is on hold due to RRT; plan is reportedly for biopsy tomorrow. SLP to evaluate swallowing; pt is eager to eat/drink. Palliative team will follow for support and ongoing goals of care conversations. Thank you for including Palliative Medicine in the care of Mr. Mg Razo.     [employer] KARLENE Levy, [facility]-  [phone])

## 2020-11-25 NOTE — PROGRESS NOTES
[id number] [facility]  [phone]    Hematology / Oncology Follow-up        Patient: Val Berry MRN: [id number]  SSN: [id number]    YOB: 1932  Age: 80 y.o. Sex: male        Reason for Consultation:      1. Hypercalcemia  2. Recurrent lymphoma ? Subjective:      Val Berry is a 80 y.o. male who I am seeing in follow up for hypercalcemia and history of diffuse large B-cell lymphoma. He is admitted with weakness, fall and back injury. He has some back pain. CT and MRI shows degenerative changes. Labs on admission revealed BENJI and hypercalcemia. With hydration, Creat and Ca level are coming down. MRI shows lots of adenopathy both above and below the diaphragm and splenomegaly. He was diagnosed with DLBCL in 2009 and received R-CHOP under the care of Dr. Queta South    Interval History:     Rapid response called this am with pt having increased tachypnea, tachycardia and hypoxia in to the mid 80s on 7L NC O2 > 90%. Lasix given. Transferred to ICU, on bipap. Pt feeling better. Past Medical History:   Diagnosis Date    CRI (chronic renal insufficiency) 12/13/2012    Gout 1/4/2011    Prostate CA ([facility] [address] 1/4/2011     Past Surgical History:   Procedure Laterality Date    ENDOSCOPY, COLON, DIAGNOSTIC  2003    neg    HC BONE MARROW BIOPSY      HX PROSTATECTOMY        No family history on file. Social History     Tobacco Use    Smoking status: Never Smoker    Smokeless tobacco: Never Used   Substance Use Topics    Alcohol use: No      Prior to Admission medications    Medication Sig Start Date End Date Taking? Authorizing Provider   allopurinoL (ZYLOPRIM) 300 mg tablet Take 150 mg by mouth daily. Yes Provider, Historical   vitamin e (E GEMS) 100 unit capsule Take 100 Units by mouth daily. Yes Provider, Historical   multivitamin (ONE A DAY) tablet Take 1 Tab by mouth daily.    Yes Provider, Historical   ketoconazole (NIZORAL) 2 % topical cream Apply  to affected area two (2) times a day for 28 days. 11/11/20 12/9/20 Yes Zayda Brown MD   atenoloL (TENORMIN) 50 mg tablet Take 1 Tab by mouth daily. 9/9/20  Yes Tera Frazier MD          Allergies   Allergen Reactions    Pcn [Penicillins] Swelling           Objective:     Visit Vitals  BP (!) 146/84   Pulse 96   Temp (!) 100.5 °F (38.1 °C)   Resp 21   Ht 5' 3\" (1.6 m)   Wt 147 lb 9.6 oz (67 kg)   SpO2 98%   BMI 26.15 kg/m²     Physical Exam:    General: No distress  Eyes: Anicteric sclerae  HENT: Atraumatic  Neck: Supple  Respiratory: on bipap  CV: No peripheral edema  GI: nondistended  Skin: No rashes, ecchymoses, or petechiae  Psych: Alert, oriented, appropriate affect, normal judgment/insight      Lab Results   Component Value Date/Time    WBC 12.3 (H) 11/25/2020 04:39 AM    HGB 10.1 (L) 11/25/2020 04:39 AM    HCT 30.0 (L) 11/25/2020 04:39 AM    PLATELET 077 (L) 67/72/0812 04:39 AM    MCV 94.0 11/25/2020 04:39 AM       Lab Results   Component Value Date/Time    Sodium 141 11/25/2020 04:39 AM    Potassium 3.6 11/25/2020 04:39 AM    Chloride 110 (H) 11/25/2020 04:39 AM    CO2 23 11/25/2020 04:39 AM    Anion gap 8 11/25/2020 04:39 AM    Glucose 132 (H) 11/25/2020 04:39 AM    BUN 30 (H) 11/25/2020 04:39 AM    Creatinine 2.05 (H) 11/25/2020 04:39 AM    BUN/Creatinine ratio 15 11/25/2020 04:39 AM    GFR est AA 37 (L) 11/25/2020 04:39 AM    GFR est non-AA 31 (L) 11/25/2020 04:39 AM    Calcium 11.5 (H) 11/25/2020 04:39 AM    Bilirubin, total 1.0 11/25/2020 04:39 AM    Alk.  phosphatase 62 11/25/2020 04:39 AM    Protein, total 5.7 (L) 11/25/2020 04:39 AM    Albumin 2.6 (L) 11/25/2020 04:39 AM    Globulin 3.1 11/25/2020 04:39 AM    A-G Ratio 0.8 (L) 11/25/2020 04:39 AM    ALT (SGPT) 17 11/25/2020 04:39 AM    AST (SGOT) 48 (H) 11/25/2020 04:39 AM          MRI Results (most recent):  Results from [place] encounter on 11/20/20   MRI [facility] SPINE WO CONT    Narrative MRI OF THE THORACIC SPINE WITHOUT CONTRAST: 11/21/2020 1:51 PM    INDICATION: T12 fracture suspected on CT thoracic spine. TECHNIQUE: Multiplanar and multisequence MRI was obtained of the thoracic spine  without contrast.    COMPARISON: CT thoracic 11/20/2020. FINDINGS:   \"Broken DISH\" fracture: A fracture of the right lateral aspect of T12 (6-6) is  better seen on prior CT. On prior CT, diffuse idiopathic skeletal hyperostosis  (DISH) is also better appreciated. The flowing syndesmophytes of DISH in this  patient are predominantly right lateral. The fracture extends through the right  lateral cortex of T12 and slightly into the vertebral body, running obliquely  toward the superior endplate. A minimal, anterior, left, superior endplate  cortical step-off in T12 is better seen on prior CT (138-16). The fracture line  continues through the left lateral osteophyte between T11 and T12 (601-43 on  prior CT). Most of these findings are not as clearly visible on MRI. No definite  fracture of T11. No height loss in T11 or T12. The fracture is a \"broken DISH\"  fracture rather than a typical compression fracture. Partially imaged edema and L2 is better seen on same-day lumbar MRI to be  degenerative. Intervertebral disc height and signal intensity are well  preserved. Thoracic kyphosis is exaggerated. The thoracic cord is normal in  caliber and signal intensity. Lymphoma: In the partially imaged upper abdomen, there are multiple enlarged  retroperitoneal lymph nodes, with more bulky enlargement of multiple mesenteric  lymph nodes. The largest retroperitoneal lymph node measures 14 mm in short axis  on image 5-5. The largest, conglomerate, mesenteric lymph node measures 22 mm on  image 5-4. The spleen is enlarged. Multiple splenic masses are nearly T2  isointense. Several of the larger are centrally T2 hyperintense, however. The  largest splenic mass measures approximately 40 x 49 mm (8-25).  An enlarged right  hilar lymph node measures 12 mm on image 8-14. The paraspinal lymph node  posterior to the esophagus and medial to the aorta, at the T5-T6 level, measures  14 mm in short axis (5-8, 8-12). Findings are concerning for lymphoma. Other thoracoabdominal findings: Right shoulder joint effusion. Trace bilateral  pleural effusions and mild passive atelectasis. The common bile duct is mildly  dilated (7 mm). Distended stomach. Bilateral renal cysts. A 9 x 14 mm, oval,  circumscribed, homogenously T2 hyperintense, homogenously T1 hypointense nodule  posteromedial to the segment 6 hepatic capsule (8-24) is indeterminate, but of  doubtful significance. C7-T1: Facet osteoarthritis causes mild right neural foraminal stenosis (5-10). T1-T12: No herniation or stenosis. Impression IMPRESSION:   1. \"Broken DISH\" fracture of T12.  2. Lymphoma: Splenomegaly and bulky splenic masses. Thoracic, mesenteric, and  retroperitoneal lymphadenopathy. 3. CT chest abdomen pelvis with IV contrast is recommended for staging. The findings were called to Dr. Alan Holder on 11/21/2020 at 1347 hours and  1614 hours by Dr. Bari Nguyen. [phone]        11/23/2020 CT C/A/P wo contrast  IMPRESSION:  Retroperitoneal adenopathy is aorta caval in location measuring 35 x 17 mm in  size. Splenomegaly with scattered splenic hypodensities. The patient was not  administered IV contrast.   Adenopathy is in a location that would be difficult to percutaneously sample. Consider clinical necessity of percutaneous sampling.   Small right and minimal left-sided pleural effusion with bibasilar atelectasis.   Right and left renal calculi and cysts. No hydronephrosis or ureterolithiasis.           Assessment/Plan     1. Hypercalcemia: Likely from malignancy, S/p calcitonin x 2 doses 11/21 and 11/22   Calcium 11.5 this am: corrects to 13.4  Continue with IVFs and lasix as needed; s/p Pamidronate 11/24, verified with nursing. Given at 30 mg over 4 hours      2.  Anemia   Mild, normocytic - chronic disease related  No fe def; B12 and Folate normal  stable;  continue to monitor   Transfuse for Hgb < 7       3. Malignant disease  Lymphoma ? Scans as above   11/24 Plan for Biopsy; per radiology unable to perform bx due to difficulty of location  Will cancel bone marrow biopsy due to rapid response, discussed with patient and nursing    - will need close follow up with  [facility] after discharge; reviewed with [facility]. Will consult palliative care for goals of care discussion    4. BENJI  Nephrology following, SPEP negative  Improving this am    5. Dispo  -needs Calcium continuing to trend down  -needs close follow up with Dr Phoebe Pastrana by:  Sharad Knapp MD                     November 25, 2020

## 2020-11-25 NOTE — PROGRESS NOTES
[address] RESIDENCY PROGRAM  PROGRESS NOTE     11/26/2020  PCP: Sherif Gomez MD     Assessment/Plan:     Rach Grullon is a 80 y.o. male with a PMHx of prostate CA, HTN, Gout, hx of lymphoma who presents to the ED with a weakness, acute renal failure and hypercalcemia. Overnight events: no calls. AHRF: likely 2/2 to volume overload. Symptomatic improvement with Lasix. Procal: 0.67 Chest xray: improved aeration b/l. Probable L. Basilar vlm loss.  -Levaquin, vancomycin and flagyl started on 11/25.  -continue weaning O2 as tolerated. -continue on schedule duonebs q 6hrs  -continue pulmicort BID  -conitnue robitussin 100 mg q 4 hrs prn  -f/u on blood cx, NGTD  -cbc daily    Elevated Troponins: 0.17-->0.21-->0.23  likely 2/2 to demand ischemia. EKG w/o acute ST changes compare to previous. No PE on CTA. Hypercalcemia: possibly 2/2 to malignancy and recurrence of lymphoma. S/p calcitonin, denosumab, pramidonate x 4 doses. Corrected today: 11.8  -CMP daily  -ionized Ca daily  - f/u w/ SPEP/Immunofix  -F/U on PTHr peptide  -f/u vitamin D levels  -heme following, aprec recs  -nephro following, aprec recs    Acute renal Failure-  Improving.  -Strict I&O  -Continue PO intake  -Nephrology following. Hx of diffuse large B-cell Lymphoma: possible recurrence. Ct abdomen pelvic w/o contrast remarkable for retroperitoneal adenopathy; difficult percutaneous sample. -F/u outpatient w/ Dr Margareth Richey for possible BM bx.  -heme onco following--aprec recs    Weakness w/ Recurrent falls: could be 2/2 to anemia and malignance. MRI and CT spine w/ multilevel degenerative changes and stenosis. Carotid dupplex wnl. ECHO LVEF 60-65%, G1LVDD. -continue working w/ PT & OT  -CBC and CMP daily  -Ortho consulted. COVID PUI: now negative, given new onset opacities on chest cta, cough and new O2 requirement.     T12 fx: MRI T spine: Broken dish T12 fx.  -Norco 7.5-325 mg q 4hrs prn for pain.   -Morphine 1 mg IV q 4hrs prn for pain. -Tylenol 650 mg q 6 hrs for pain  -Lidocaine patch daily  -PT / OT    Spinal stenosis: evident on lumbar MRI: Severe spinal canal stenosis at L4-5 relating   -Ortho consulted. No orthopedic surgical intervention  -LSO Brace recommended per ortho, but will no pursuit given no proven evidence. -PT/OT Rehab  -Out-pt FU with Spine specialist Dr Angelo Chacon, Dr Sofía Jarvis or Dr Ana Wright    Hypertension- On home atenolol 50 mg tablet everyday. - Holding  home medications due to hypotension. .  Anemia of chronic disease: Stable,  likely 2/2 to malignancy. Normal Folate and B 12 levels. -CBC daily  -Continue to monitor  -Transfuse for Hgb < 7   -heme following, aprec recs     Gout: stable  -Hold home allopurinol 300 mg daily. Opioid use: Trace Pelaez UDS + for opiates. Prostate Cancer: stable; s/p prostatectomy. PSA wnl.        FEN/GI - renal diet. Activity - Out of bed w/ assistance. DVT prophylaxis - Sub Q Heparin  GI prophylaxis - Not indicated at this time  Fall prophylaxis - Fall precautions ordered. Dispo: Home with . [facility] (skilled nursing, PT)  accepted hstart of care will be Monday, 11/30. Code Status - Full. Discussed with patient / caregivers. Next of Kin Name and Contact - Thania Atkins,  Daughter - [phone]      Erin Pagan discussed with Dr. Maria Luisa Gandara. Subjective:   Pt was seen and examined at bedside. Afebrile and hemodynamically stable. Pt refers improvement of back pain. Denies chest pain, SOB, nausea, vomiting, abdominal pain, dizziness, diarrhea and constipation.       Objective:   Physical examination  Patient Vitals for the past 24 hrs:   Temp Pulse Resp BP SpO2   11/26/20 0400 98.3 °F (36.8 °C) 92 24 99/68 98 %   11/26/20 0200  93 26 (!) 114/56 97 %   11/26/20 0016     98 %   11/26/20 0000 98 °F (36.7 °C) 80 30 (!) 99/50 98 %   11/25/20 2319  95      11/25/20 2300  98 (!) 34 108/65 95 %   11/25/20 2200    115/65  20 2100  100 21 120/70 93 %   20 2026     95 %   20 2000 98.2 °F (36.8 °C) 91 30 109/68 95 %   20 1900  97 30 109/85 91 %   20 1800  (!) 105 27 120/77 92 %   20 1700  96 27 116/62 99 %   20 1600 97.3 °F (36.3 °C) 96 25 (!) 111/54 97 %   20 1500  (!) 118 26 (!) 127/99    20 1451  (!) 108      20 1400  98 28 118/65 97 %   20 1325     97 %   20 1300  89 28 123/70 98 %   20 1145 99.7 °F (37.6 °C) 94 29 107/64 97 %   20 1130  (!) 109 22 129/76 100 %   20 1115 99.7 °F (37.6 °C) 92 21 136/76 96 %   20 1100  (!) 108 24 125/68 100 %   20 1045  97 15 127/72 100 %   20 1030  94 27 113/72 100 %   20 1015  (!) 104 23 133/73 100 %   20 1000  98 26 130/65 100 %   20 0945  99 23 128/73 100 %   20 0930  99 26 133/71 100 %   20 0915  100 27 (!) 140/79 92 %   20 0900  (!) 106 22 137/76 100 %   20 0845  (!) 108 24 (!) 141/87 100 %   20 0830  (!) 113 27 (!) 151/81 100 %   20 0827     100 %   20 0815  (!) 116 22 (!) 148/90 100 %   20 0800   20 (!) 156/93 100 %   20 0745 98.3 °F (36.8 °C) 96 21 (!) 146/84 98 %   20 0731     96 %   20 0730  90 30 (!) 152/96 100 %   20 0715  90 21 (!) 134/116 99 %   20 0713  95      20 0700  100 25 (!) 148/97 97 %   20 0649  (!) 107 30 (!) 152/86 99 %   20 0645  (!) 113 (!) 38 (!) 162/97 96 %      Temp (24hrs), Av.5 °F (36.9 °C), Min:97.3 °F (36.3 °C), Max:99.7 °F (37.6 °C)     O2 Flow Rate (L/min): 3 l/min   O2 Device: Nasal cannula    Date 20 - 20 0659 20 - 20 0659   Shift 3725-9921 9635-4627 24 Hour Total 4020-8847 6907-7610 24 Hour Total   INTAKE   P.O. 480  480        P. O. 480  480      I. V.(mL/kg/hr) 350(0.4)  350        Volume (metroNIDAZOLE (FLAGYL) IVPB premix 500 mg) 100  100        Volume (vancomycin (VANCOCIN) 1,250 mg in 0.9% sodium chloride 250 mL (VIAL-MATE)) 250  250      Shift Total(mL/kg) 830(12.4)  830(12.4)      OUTPUT   Urine(mL/kg/hr) 3407(5.4) 125 2200        Urine Voided 2075 2075        Urine Occurrence(s) 1 x 1 x 2 x        Urine Output (mL) (Condom Catheter 11/25/20)  125 125      Shift Total(mL/kg) 3300(51) 125(1.9) 3414(22.0)      NET -9783 -125 -1375      Weight (kg) 67 67 67 67 67 67     Physical Exam  General: In no acute distress. Alert. Cooperative. Head: Normocephalic. Atraumatic. Eyes:              Sclera white. PERRL. EOMI. Ears:              Hard of hearing. Respiratory:  b/l rhoncus. Cardiovascular: RRR. GI: Normal bowel sounds. Nontender. No rebound tenderness or guarding. Nondistended. Extremities: No LE edema. Distal pulses intact. Musculoskeletal: Full ROM in all extremities. Skin: Warm, dry. No rashes. Neuro: CN II-XII grossly intact. Strength 5/5 in all extremities. Data Review:     CBC:  Recent Labs     11/26/20 0341 11/25/20 0439 11/24/20  2120   WBC 10.8 12.3* 12.6*   HGB 10.1* 10.1* 10.6*   HCT 29.6* 30.0* 31.5*   * 140* 831*     Metabolic Panel:  Recent Labs     11/26/20 0341 11/25/20  0439 11/24/20  0135    141 140   K 2.6* 3.6 3.6    110* 113*   CO2 28 23 21   BUN 30* 30* 29*   CREA 2.13* 2.05* 2.17*   * 132* 90   CA 10.6* 11.5* 10.1   MG 1.8 1.8 2.1   PHOS 3.0 2.7 3.1   ALB 2.5* 2.6* 2.6*   TBILI 0.8 1.0 0.7   ALT 15 17 14     Micro:  Lab Results   Component Value Date/Time    Culture result: NO GROWTH AFTER 7 HOURS 11/24/2020 11:07 PM    Culture result: No growth (<1,000 CFU/ML) 11/20/2020 04:45 PM     Imaging:  Mra Brain Wo Cont    Result Date: 11/20/2020  *PRELIMINARY REPORT* No emergent process. Preliminary report was provided by Dr. Peterson Bro Final report to follow. *END PRELIMINARY REPORT*      Mri Brain Wo Cont    Result Date: 11/20/2020  *PRELIMINARY REPORT* No emergent process.  Preliminary report was provided by Dr. Maggie Garcia Final report to follow. *END PRELIMINARY REPORT*      Mri Cerv Spine Wo Cont    Result Date: 11/20/2020  *PRELIMINARY REPORT* There is disc herniation at C5/C6 which contacts the anterior margin of the cervical cord. No emergent findings Preliminary report was provided by Dr. Maggie Garcia Final report to follow. *END PRELIMINARY REPORT*      Mri Lumb Spine Wo Cont    Result Date: 11/20/2020  *PRELIMINARY REPORT* Neuroforaminal stenosis, most severe on the left at L3/L4 and L4/L5 Preliminary report was provided by Dr. Maggie Garcia Final report to follow. *END PRELIMINARY REPORT*      Ct Head Wo Cont    Result Date: 11/20/2020  CLINICAL HISTORY: fall INDICATION: fall COMPARISON: None. CT dose reduction was achieved through use of a standardized protocol tailored for this examination and automatic exposure control for dose modulation. TECHNIQUE: Serial axial images with a collimation of 5 mm were obtained from the skull base through the vertex  FINDINGS: There is sulcal and ventricular prominence. Confluent periventricular and scattered foci of hypodensity in the cerebral white matter. There is no evidence of an acute infarction, hemorrhage, or mass-effect. There is no evidence of midline shift or hydrocephalus. Posterior fossa structures are unremarkable. No extra-axial collections are seen. Mastoid air cells are well pneumatized and clear. Vertebral and carotid vascular calcifications are noted. IMPRESSION: No acute intracranial process. Imaging findings consistent with mild chronic microvascular ischemic change. There is a mild to moderate degree of cerebral atrophy. Ct Spine Cerv Wo Cont    Result Date: 11/20/2020  EXAM: CT SPINE CERV WO CONT CLINICAL HISTORY: fall INDICATION: fall COMPARISON:  None TECHNIQUE:  Axial neck CT was performed. Noncontrast imaging obtained. Coronal and sagittal reconstructions were performed.  CT dose reduction was achieved through use of a standardized protocol tailored for this examination and automatic exposure control for dose modulation. Osseous/bone algorithm was utilized. FINDINGS: Carotid atherosclerotic change. No pneumothorax. No large pulmonary mass or nodule. .  There is no evidence of fracture or subluxation. The prevertebral soft tissues are grossly within normal limits. The atlantodental interval is within normal limits. The craniocervical junction is intact. Multilevel loss of disc height. Multilevel severe canal and foraminal stenoses. Minimal anterolisthesis of C7 on T1. IMPRESSION: There is no acute fracture or dislocation identified. Ct Spine Thorac Wo Cont    Result Date: 11/20/2020  EXAM:  CT SPINE [facility] WO CONT INDICATION: Fall, back pain. COMPARISON: None. TECHNIQUE: Multislice helical CT of the thoracic spine was performed. Sagittal and coronal reformations were generated. CT dose reduction was achieved through use of a standardized protocol tailored for this examination and automatic exposure control for dose modulation. FINDINGS: There are osteophytes at multiple levels. There is cortical disruption in the anterior, superior endplate of J40 which extends through both the right lateral and left lateral osteophyte. Query cortical disruption in the right, lateral aspect of the T11 vertebral body. There is no spinal canal stenosis. Coronary artery disease and atherosclerosis. Prominent lymph nodes in the posterior mediastinum Likely cystic lesions in both kidneys which are incompletely characterized      IMPRESSION: 1. Cortical disruption in the body of T12 with suspected fracture in the body of T11. Ct Spine Lumb Wo Cont    Result Date: 11/20/2020  EXAM: CT SPINE LUMB WO CONT History: Status post fall/back pain INDICATION: fall, back pain COMPARISON: None. TECHNIQUE:   Unenhanced multislice helical CT of the lumbar spine was performed in the axial plane. Coronal and sagittal reconstructions were obtained.   CT dose reduction was achieved through use of a standardized protocol tailored for this examination and automatic exposure control for dose modulation. FINDINGS: There is retrolisthesis of L2 on L3 and L3 on L4 each measuring 4 mm. Anterolisthesis of L4 on L5 measuring or millimeters. Abdominal aorta demonstrates atherosclerotic change. There are renal calculi. There is no hydronephrosis. Left renal hypodensities most likely a cyst. Mild levorotatory scoliotic change. T12-L1: The spinal canal and neural foramina are widely patent. L1-L2: The spinal canal and neural foramina are widely patent. L2-L3: Moderate facet arthropathy. Ligamentum flavum hypertrophy. Minimal retrolisthesis. Disc bulge/osteophyte. Moderate canal and right foraminal stenosis. L3-L4: Facet arthropathy and hypertrophy. Ligament flavum hypertrophy. Minimal retrolisthesis. Severe canal stenosis. Mild bilateral foraminal stenoses. L4-L5: Facet arthropathy and hypertrophy. Disc bulge/osteophyte. Severe canal stenosis. Severe left foraminal stenosis. L5-S1: Mild facet arthropathy. Canal is patent. Foramina are patent. IMPRESSION: Extensive multilevel degenerative change with multilevel spondylolisthesis as well. Severe canal stenoses at L3-4 and L4-5. Additional, less severe degenerative findings are as described in detail above.     Medications reviewed  Current Facility-Administered Medications   Medication Dose Route Frequency    vancomycin (VANCOCIN) 1,000 mg in 0.9% sodium chloride 250 mL (VIAL-MATE)  1,000 mg IntraVENous Q24H    potassium chloride 10 mEq in 100 ml IVPB  10 mEq IntraVENous Q1H    albuterol-ipratropium (DUO-NEB) 2.5 MG-0.5 MG/3 ML  3 mL Nebulization Q6H RT    morphine injection 1 mg  1 mg IntraVENous Q4H PRN    HYDROcodone-acetaminophen (NORCO) 7.5-325 mg per tablet 1 Tab  1 Tab Oral Q4H PRN    budesonide (PULMICORT) 500 mcg/2 ml nebulizer suspension  500 mcg Nebulization BID RT    metroNIDAZOLE (FLAGYL) IVPB premix 500 mg  500 mg IntraVENous Q12H    levoFLOXacin (LEVAQUIN) 500 mg in D5W IVPB  500 mg IntraVENous Q48H    guaiFENesin (ROBITUSSIN) 100 mg/5 mL oral liquid 100 mg  100 mg Oral Q4H PRN    lidocaine 4 % patch 1 Patch  1 Patch TransDERmal Q24H    polyethylene glycol (MIRALAX) packet 17 g  17 g Oral DAILY PRN    docusate sodium (COLACE) capsule 100 mg  100 mg Oral BID    acetaminophen (TYLENOL) tablet 650 mg  650 mg Oral Q6H PRN    sodium chloride (NS) flush 5-40 mL  5-40 mL IntraVENous Q8H    sodium chloride (NS) flush 5-40 mL  5-40 mL IntraVENous PRN    heparin (porcine) injection 5,000 Units  5,000 Units SubCUTAneous Q8H         Signed:   Braydon Galloway MD   Resident, Family Medicine      Attending note: Attending note to follow. ..

## 2020-11-25 NOTE — PROGRESS NOTES
Spiritual Care Assessment/Progress Note  [facility] [place] [place]      NAME: Rach Grullon      MRN: [id number]  AGE: 80 y.o.  SEX: male  Jain Affiliation: Non Scientologist   Language: English     11/25/2020     Total Time (in minutes): 15     Spiritual Assessment begun in [facility] INTENSIVE CARE through conversation with:         [x]Patient        [] Family    [] Friend(s)        Reason for Consult: Palliative Care, Initial/Spiritual Assessment     Spiritual beliefs: (Please include comment if needed)     [x] Identifies with a donaldo tradition:  Methodist       [] Supported by a donaldo community:            [] Claims no spiritual orientation:           [] Seeking spiritual identity:                [] Adheres to an individual form of spirituality:           [] Not able to assess:                           Identified resources for coping:      [x] Prayer                               [] Music                  [] Guided Imagery     [x] Family/friends                 [] Pet visits     [] Devotional reading                         [] Unknown     [] Other:                                            Interventions offered during this visit: (See comments for more details)   Patient Interventions: End of life issues discussed, Initial/Spiritual assessment, Critical care, Normalization of emotional/spiritual concerns, Prayer (actual), Jain beliefs/image of God discussed, Prayer (assurance of), Affirmation of emotions/emotional suffering, Affirmation of donaldo, Integration of medical assessment with existing values and beliefs, Anointing of the sick [employer]), Coping skills reviewed/reinforced, Catharsis/review of pertinent events in supportive environment           Plan of Care:     [] Support spiritual and/or cultural needs    [] Support AMD and/or advance care planning process      [] Support grieving process   [] Coordinate Rites and/or Rituals    [] Coordination with community clergy   [] No spiritual needs identified at this time   [] Detailed Plan of Care below (See Comments)  [] Make referral to Music Therapy  [] Make referral to Pet Therapy     [] Make referral to Addiction services  [] Make referral to [facility]  [] Make referral to Spiritual Care Partner  [] No future visits requested        [x] Follow up visits as needed     Comments:   visited pt, Mr Noe Welsh, goes by Yoon Mendez, on the ICU. He was in bed, alert and oriented  and appeared comfortable. He engaged  in conversation, sharing his hospitalization, donaldo and family stories. He discussed at length his medical issues and stated that he was feeling much better today. He was hoping to get the needed treatment to enable him return home soon. Pt shared that donaldo is very important. He stated that he became a Methodist at an early age and has served faithfully ever since. He was very involved in his Episcopalian, which he stopped attend anymore due to several issues in the Episcopalian. His donaldo is however intact and he supports other ministries financially. He stated with a big smile that he was eager to see his late brother and sister in law in [facility], but not yet. He requested for prayer for healing from his current medical issues.  provided supportive presence, active listening and affirmation. Requested prayer was offered at the end of the visit. Pt thanked  tearfully, for the visit and prayer. Spiritual care is still available as able or as needed. Visited by: Enma James.   To polly queen: [phone])

## 2020-11-25 NOTE — PROGRESS NOTES
[address] Medicine Residency  Resident Note in Brief  ----------------------------------------    S:  Called to patient's beside for rapid response. Patient with increased tachypnea, tachycardia and hypoxia into the mid 80s requiring 7L O2 to maintain O2 saturations >90%. Also having noisy breathing. Patient denies chest pain but is feeling like its hard to breathe. O:  Visit Vitals  BP (!) 171/94 (BP 1 Location: Left arm, BP Patient Position: At rest)   Pulse (!) 122   Temp 97.8 °F (36.6 °C)   Resp (!) 40   Ht 5' 3\" (1.6 m)   Wt 147 lb 9.6 oz (67 kg)   SpO2 (!) 85%   BMI 26.15 kg/m²     Gen: patient in mild distress, responsive to questions; oriented to self, place  Pulm: increased work of breathing with use of respiratory muscles, rales throughout anterior lung fields and crackles in the right lower lung bases with decreased air movement into the LLL. Audible gurgling sounds which are not transmitted on exam  CV: HR regular, ~ 120bpm; no murmur appreciated  Ext: no warmth, swelling, tenderness in the bilateral LE      Intake/Output Summary (Last 24 hours) at 11/25/2020 8624  Last data filed at 11/25/2020 8140  Gross per 24 hour   Intake 560 ml   Output 2010 ml   Net -1450 ml         A/P  81yo M with hx of lymphoma and prostate cancer, HTN admitted for HyperCa s/p fluid rescusitation now with increased WOB and recurrent tachycardia and tachypnea with elevated WBC in the setting of likely recurrent malignancy. CT chest earlier in the morning showed bilateral pleural effusions and patient responded well to 1X dose of lasix and duoneb. Now with noisy breathing as well as rales and crackles on exam concerning for volume overload/worsening pleural effusions.  Given his history of malignancy, elevated d dimer and recurrent respiratory distress will obtain VQ scan to evaluate for PE.  - Transfer to telemetry  - Initiate BiPAP & obtain ABG  - VQ Scan   - IV lasix 20mg   - continue duonebs prn  - discussed with oncoming day team who will reach out to family to update on plan of care      Please see full daily progress note for complete plan.   Ainsley Meza MD  6:20 AM

## 2020-11-25 NOTE — PROGRESS NOTES
Rapid response called for pt's -130s, SpO2 85% on 4L NC, RR 40, /94; pt c/o difficulty breathing and appeared to be in distress with increased effort to breathe. Family practice, ICU RN, respiratory, nursing supervisor responded. Pt put on 15L non-rebreather. Dr. Pola Sevilla ordered higher level of care. Pt transferred to ICU bed 7. Bedside report given to [address] [place] [address] w/ opportunity for questions. Pt transported on 15L non-rebreather and telemetry; personal belongings transported w/ pt.

## 2020-11-25 NOTE — WOUND CARE
New Consult for upper posterior thigh skin tear ASSESSMENT: 
 appropriately conversational, Communicative. assists in repositioning. Bed-AGA mattress All skin folds and bony prominences assessed Bilateral heel intact and blanchable buttocks and sacral skin intact and without erythema. Heels offloaded with pillows. partial thickness skin tear with skin flap, no drainage. Foam dressing in place. Recommendations: 
Cleanse with NSS and cover with foam dressing Turn reposition approximately every 2 hours and offload heels with pillows at all times in bed. Z-guard cream to buttocks and sacrum daily and as needed with incontinence care. Minimize layers of linen/-pads under patient to optimize support surface. Discussed with Staff RN Transition of Care: Plan to follow weekly and as needed while admitted to hospital.

## 2020-11-25 NOTE — PROGRESS NOTES
Bedside and Verbal shift change report given to [address] [place] [address] [place] (oncoming nurse) by Remedios Chilel RN (offgoing nurse). Report included the following information SBAR, Kardex, Intake/Output, MAR, Accordion and Recent Results.

## 2020-11-25 NOTE — PROGRESS NOTES
Chart reviewed for Bone Marrow Biopsy    Areas reviewed include, but are not limited to, patient's current and past H&P, Lab and Procedure Results, all notes, media records and medications. Patient was a rapid response this morning requiring bi pap and is currently unstable per primary RN.   Bone Marrow Biopsy should be held until patient is stable and can tolerate being sedated and placed in prone position on CT table per Radiology MD.      Please call radiology with questions at

## 2020-11-25 NOTE — PROGRESS NOTES
Physical Therapy Note:    PT treatment deferred. Pt with acute change in medical status, presenting with tachypnea, tachycardia and hypoxia earlier today. He has been transferred for increased level of care/monitoring and now pending CTA to rule out PE. Plan to hold PT interventions until results available and pt medically appropriate for PT. Will follow.     Jesse Manriquez, PT, DPT, [facility]

## 2020-11-25 NOTE — PROGRESS NOTES
Andry Diaz  YOB: 1932          Assessment & Plan: BENJI due to IVVD, better with IVF  Volume overload  Hypercalcemia, a bit better. S/p calcitonin. S/p pamidronate. SPEP neg  Hypokalemia  H/o lymphoma and prostate cancer    Rec:  Diuresis as needed. Resp status improved  Stay off IVF  SPEP neg  F/u PTHrP  F/u 1,25 D  BMBX on hold       Subjective:   CC: f/u BENJI  HPI: Rapid response for hypoxia. Better with diuresis. Off IVF. Renal function stable. Calcium higher and got pamidronate. ROS: Less sob, no n/v  Current Facility-Administered Medications   Medication Dose Route Frequency    albuterol-ipratropium (DUO-NEB) 2.5 MG-0.5 MG/3 ML  3 mL Nebulization Q6H RT    [START ON 2020] Vancomycin level  @ 0400   Other ONCE    Vancomycin- Pharmacy dosing by levels   Other Rx Dosing/Monitoring    morphine injection 1 mg  1 mg IntraVENous Q4H PRN    metroNIDAZOLE (FLAGYL) IVPB premix 500 mg  500 mg IntraVENous Q12H    [START ON 2020] levoFLOXacin (LEVAQUIN) 500 mg in D5W IVPB  500 mg IntraVENous Q48H    guaiFENesin (ROBITUSSIN) 100 mg/5 mL oral liquid 100 mg  100 mg Oral Q4H PRN    lidocaine 4 % patch 1 Patch  1 Patch TransDERmal Q24H    polyethylene glycol (MIRALAX) packet 17 g  17 g Oral DAILY PRN    docusate sodium (COLACE) capsule 100 mg  100 mg Oral BID    acetaminophen (TYLENOL) tablet 650 mg  650 mg Oral Q6H PRN    sodium chloride (NS) flush 5-40 mL  5-40 mL IntraVENous Q8H    sodium chloride (NS) flush 5-40 mL  5-40 mL IntraVENous PRN    heparin (porcine) injection 5,000 Units  5,000 Units SubCUTAneous Q8H          Objective:     Vitals:  Blood pressure 133/71, pulse 99, temperature 98.3 °F (36.8 °C), resp. rate 26, height 5' 3\" (1.6 m), weight 67 kg (147 lb 9.6 oz), SpO2 100 %.   Temp (24hrs), Av.3 °F (36.8 °C), Min:97.8 °F (36.6 °C), Max:100.5 °F (38.1 °C)      Intake and Output:  701 - 11/25 1900  In: 350 [I.V.:350]  Out: 400 [Urine:400]  11/23 1901 - 11/25 0700  In: 2005 [I.V.:2005]  Out: 2715 [Urine:2715]    Physical Exam:               GENERAL ASSESSMENT: Elderly, thin WM NAD  CHEST: CTA  HEART: S1S2  ABDOMEN: Soft,NT  EXTREMITY: no EDEMA        ECG/rhythm:    Data Review      No results for input(s): TNIPOC in the last 72 hours. No lab exists for component: ITNL   Recent Labs     11/25/20 0439 11/24/20 2120   TROIQ 0.21* 0.17*     Recent Labs     11/25/20 0439 11/24/20 2120 11/24/20  0135 11/23/20  0539     --  140 141   K 3.6  --  3.6 3.3*   *  --  113* 112*   CO2 23  --  21 22   BUN 30*  --  29* 32*   CREA 2.05*  --  2.17* 2.36*   *  --  90 94   PHOS 2.7  --  3.1 3.2   MG 1.8  --  2.1 1.7   CA 11.5*  --  10.1 9.8   ALB 2.6*  --  2.6* 2.5*   WBC 12.3* 12.6* 7.8 7.7   HGB 10.1* 10.6* 9.7* 9.6*   HCT 30.0* 31.5* 29.1* 28.8*   * 141* 136* 128*      No results for input(s): INR, PTP, APTT, INREXT, INREXT in the last 72 hours. Needs: urine analysis, urine sodium, protein and creatinine  Lab Results   Component Value Date/Time    Sodium,urine random 42 11/20/2020 04:45 PM    Creatinine, urine 69.00 11/20/2020 04:45 PM           : Amandeep Olsen MD  11/25/2020        [facility]:  [email or web address]  [place] office:  [address] [place] [address],[address] [place] [address]  Phone: [phone]  Fax :     [phone]    Pablo Tse office:  [address] [place] [address]  [facility]  Phone - [phone]  Fax - [phone]

## 2020-11-25 NOTE — PROGRESS NOTES
Pharmacy changed Levaquin to 750 mg IV now, then 500 mg IV q48H per renal protocol, for CrCl of 19 ml/min. Metronidazole changed to 500 mg IV q12H, per dosing protocol. Treating aspiration PNA. Pharmacy will follow daily.

## 2020-11-25 NOTE — PROGRESS NOTES
Physical Therapy  11/25/2020    Therapy attempted. Pt in ICU received supine in bed. Reports \"I just can't do it today they were too rough on me and I told them I had a bad back. \" States increased pain following bed transfers for imaging today. Will continue to follow.     Thank Gilbert Barbour, PT, DPT

## 2020-11-25 NOTE — PROGRESS NOTES
Rapid response called re: pt reporting shortness of breath w/ difficulty breathing, RR 40,  w/ telemetry reporting HR reaching 130-140s, Sp02 91% on RA; increased to 95% on 2L NC. Family practice, ICU RN, respiratory, nursing supervisor, and security responded. Shy Madden and Nataliia 1spire assessed pt. New orders placed, including ABG and nebulizer treatment. Pt appeared and reported to be more comfortable after nebulizer treatment.

## 2020-11-25 NOTE — CONSULTS
Palliative Medicine Consult  Pedro: [phone] ([phone])    Patient Name: Princess Solis  YOB: 1932    Date of Initial Consult: 11/25/2020  Reason for Consult: [address] Discussion  Requesting Provider: Dr. Valery García  Primary Care Physician: Emerald Tavera MD     SUMMARY:   Princess Solis is a 80 y.o. 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 Current medical issues leading to Palliative Medicine involvement include: Goals of care discussion with 80year old gentleman, currently hospitalized with acute T12 fx sesecondary to multiple recent GLFs and likely  recurrent lymphoma, in setting of no AMD and Full Code status. Patient presented to ER w/ cc of multiple recent falls, lower back pain and fatigue. Family also reported patient with decreased appetite and intake x 1 week. In ER patient found to be oriented. Vitals WNL, Labs revealed Cr 3.89 and Calcium 13.6. CT of thoracic spine +T12 fracture. Patient admitted. Course of hospitalization: Nephrology following, Creatinine decreasing, down to 2.05 since admission. Hematology/oncology following, Calcium down s/p fluids and calcitonin x2, however remains elevated. 11/21 MRI thoracic spine also partially captured upper abdomen, found multiple enlarged retroperitoneal lymph nodes with increased bulky enlargement of multiple mesenteric lymph nodes. Spleen enlarged, with multiple splenic masses. Abdominal adenopathy in a location that would make biopsy difficult to obtain. Primary medical team and oncology considering bone marrow biopsy. In the evening of 11/25, a rapid response called, concerned patient may have aspirated.   Later on second rapid response called, patient dyspneic, tachycardic and hypoxic, with low-grade temp 100.5. Patient requiring supplemental O2 and transferred to the ICU. CTA negative acute pulmonary embolus, unchanged small bilateral pleural effusions with bibasilar atelectasis and new nonspecific groundglass opacity in left lung apex, infectious versus inflammatory etiology. Speech pathologist consult pending        Baseline cognitive and functional status (per family as well as PCP visit early November 2020): A&O x 4, independent with all ADLs, used a cane, still driving, no supplemental O2 needs. Patient had multiple falls in November, one in which it took him approx 2 hours to get up off the floor. Psychosocial Hx: Born and raised in [place]. He was in the [place] x4 years, is a  [place] . He has 4 children, 1 daughter and 3 sons. He is estranged from one of his sons  that lives in [place]. Patient lives by himself, though daughter and son Reuben Guadarrama live near by and are able to help, though patient and family report he is very stubborn and does not reach out for help easily. PALLIATIVE DIAGNOSES:   1. Advanced care planning discussion  2. DNR Discussion  3. Goals of care Discussion  4. Weakness, generalized  5. Debility       PLAN:   1. Prior to visit completed extensive chart review, including review of current hospitalization documentation, vitals, mars and results of labs and other diagnostics. I also reviewed recent out patient documentation from Family Medicine appointment 11/11/2020 and 8/13/2020.   2. Patient seen x2 today  3. Palliative clinical  [address] and I met with patient, initially no family at bedside. Introduced ourselves and role of palliative medicine. Attempted to assess patient's understanding of hospitalization. Patient initially seemed to be alert and oriented, however there seemed to be some confusion versus patient being [place]. 4. Patient did not indicate he was aware of new findings of adenopathy, concerning for recurrent lymphoma.     5. We decided to wait till patient's daughter at bedside to discuss further. I called the patient's daughter, spoke with her  who stated that she was already at [address] and I went back down to the unit, initially patient off unit for imaging, however he did return while we were speaking with his daughter. 6. Advanced care planning discussion-no AMD in the EMR. Patient is single, he has 4 children 1 daughter and 3 sons. Patient stated that he is estranged from son who lives in [place]. Daughter stated that she has POA. I asked her if that also included medical decision making, she was uncertain. 7. DNR discussion-patient continues to have full CODE STATUS. 8. Goals of care discussion-primary medical team has proposed further testing, possible bone biopsy tomorrow 11/26. Palliative team will follow up on Friday 11/27 to revisit goals of care. 9. Weakness, generalized-patient has had multiple falls, 1 of which she was took him approximately 2 hours to get up on his own. Currently patient hospitalized and undergoing significant deconditioning. Poor appetite/intake and concerns for reoccurring lymphoma. PT/OT consulted  10. Initial consult note routed to primary continuity provider and/or primary health care team members  6.  Communicated plan of care with: Palliative IDT, [address] Team, Family medicine team, Speech Pathologist Amanda. [address] / TREATMENT PREFERENCES:     GOALS OF CARE:  Patient/Health Care Proxy Stated Goals: Rehabilitation    TREATMENT PREFERENCES:   Code Status: Full Code    Advance Care Planning:  [x] The [facility] Interdisciplinary Team has updated the ACP Navigator with Health Care Decision Maker and Patient Capacity      Primary Decision Maker: Mike Suresh - Daughter - [phone]    Primary Decision Maker: Courtney Doshi - [phone]  Advance Care Planning 11/25/2020   Patient's Healthcare Decision Maker is: Verbal statement (Legal Next of Kin remains as decision maker)   Confirm Advance Directive Yes, not on file       Medical Interventions: Full interventions     Other Instructions: Other:    As far as possible, the palliative care team has discussed with patient / health care proxy about goals of care / treatment preferences for patient. HISTORY:     History obtained from: medical records/patient and family    CHIEF COMPLAINT: Im thirsty    HPI/SUBJECTIVE:    The patient is:   [x] Verbal and participatory  [] Non-participatory due to:   Patient repeatedly telling us he is thirsty, mouth very dry, that  he had been NPO for a procedure, but it was cancelled, now is concerned they might keep him NPO until tomorrows procedure. Patient without pain, but acknowledged he does get  pain in lower back and sacral area s/p fall. Patient endorsed fatigue. Clinical Pain Assessment (nonverbal scale for severity on nonverbal patients):   Clinical Pain Assessment  Severity: 0          Duration: for how long has pt been experiencing pain (e.g., 2 days, 1 month, years)  Frequency: how often pain is an issue (e.g., several times per day, once every few days, constant)     FUNCTIONAL ASSESSMENT:     Palliative Performance Scale (PPS):  PPS: 40       PSYCHOSOCIAL/SPIRITUAL SCREENING:     Palliative IDT has assessed this patient for cultural preferences / practices and a referral made as appropriate to needs (Cultural Services, Patient Advocacy, Ethics, etc.)    Any spiritual / Mu-ism concerns:  [] Yes /  [x] No    Caregiver Burnout:  [] Yes /  [x] No /  [] No Caregiver Present      Anticipatory grief assessment:   [x] Normal  / [] Maladaptive       ESAS Anxiety: Anxiety: 0    ESAS Depression: Depression: 0        REVIEW OF SYSTEMS:     Positive and pertinent negative findings in ROS are noted above in HPI. The following systems were [x] reviewed / [] unable to be reviewed as noted in HPI  Other findings are noted below.   Systems: constitutional, ears/nose/mouth/throat, respiratory, gastrointestinal, genitourinary, musculoskeletal, integumentary, neurologic, psychiatric, endocrine. Positive findings noted below. Modified ESAS Completed by: provider   Fatigue: 6 Drowsiness: 0   Depression: 0 Pain: 0   Anxiety: 0 Nausea: 0   Anorexia: 4 Dyspnea: 0     Constipation: Yes     Stool Occurrence(s): 1        PHYSICAL EXAM:     From RN flowsheet:  Wt Readings from Last 3 Encounters:   11/23/20 147 lb 9.6 oz (67 kg)   11/11/20 136 lb (61.7 kg)   08/13/20 138 lb (62.6 kg)     Blood pressure 136/76, pulse 92, temperature 99.7 °F (37.6 °C), resp. rate 21, height 5' 3\" (1.6 m), weight 147 lb 9.6 oz (67 kg), SpO2 97 %.     Pain Scale 1: Numeric (0 - 10)  Pain Intensity 1: 6  Pain Onset 1: acute  Pain Location 1: Back  Pain Orientation 1: Lower  Pain Description 1: Aching  Pain Intervention(s) 1: MD notified (comment)  Last bowel movement, if known:     Constitutional:appears stated age, frail, conversant, possibly Koi  Eyes: pupils equal, anicteric  ENMT: no nasal discharge, dry oral mucous membranes  Cardiovascular: regular rhythm, distal pulses intact  Respiratory: breathing not labored, symmetric  Gastrointestinal: soft non-tender, +bowel sounds  Musculoskeletal: no deformity, no tenderness to palpation  Skin: warm, dry  Neurologic: Oriented x3, possible mild confusion vs Koi, following commands, moving all extremities  Psychiatric: appropriate affect, no hallucinations  Other:       HISTORY:     Principal Problem:    Acute renal failure (ARF) ([address] (11/20/2020)    Active Problems:    Lymphoma ([address] (1/4/2011)      HTN (hypertension) (1/4/2011)      Prostate CA ([address] (1/4/2011)      Gout (1/4/2011)      Hypercalcemia of malignancy (11/20/2020)      Cervical stenosis of spinal canal (11/20/2020)      Lumbar canal stenosis (11/20/2020)      Frequent falls (11/20/2020)      Multiple falls (11/20/2020)      Weakness (11/21/2020)      Anemia (11/21/2020)      Opioid use (11/21/2020)      History of B-cell lymphoma (11/22/2020)      Non-Hodgkin's lymphoma in relapse ([facility] (11/22/2020)      Hypercalcemia (11/23/2020)      Past Medical History:   Diagnosis Date    CRI (chronic renal insufficiency) 12/13/2012    Gout 1/4/2011    Prostate CA ([facility] 1/4/2011      Past Surgical History:   Procedure Laterality Date    ENDOSCOPY, COLON, DIAGNOSTIC  2003    neg    HC BONE MARROW BIOPSY      HX PROSTATECTOMY        No family history on file. History reviewed, no pertinent family history.   Social History     Tobacco Use    Smoking status: Never Smoker    Smokeless tobacco: Never Used   Substance Use Topics    Alcohol use: No     Allergies   Allergen Reactions    Pcn [Penicillins] Swelling      Current Facility-Administered Medications   Medication Dose Route Frequency    albuterol-ipratropium (DUO-NEB) 2.5 MG-0.5 MG/3 ML  3 mL Nebulization Q6H RT    [START ON 11/26/2020] Vancomycin level 11/26 @ 0400   Other ONCE    Vancomycin- Pharmacy dosing by levels   Other Rx Dosing/Monitoring    morphine injection 1 mg  1 mg IntraVENous Q4H PRN    HYDROcodone-acetaminophen (NORCO) 7.5-325 mg per tablet 1 Tab  1 Tab Oral Q4H PRN    budesonide (PULMICORT) 500 mcg/2 ml nebulizer suspension  500 mcg Nebulization BID RT    metroNIDAZOLE (FLAGYL) IVPB premix 500 mg  500 mg IntraVENous Q12H    [START ON 11/26/2020] levoFLOXacin (LEVAQUIN) 500 mg in D5W IVPB  500 mg IntraVENous Q48H    guaiFENesin (ROBITUSSIN) 100 mg/5 mL oral liquid 100 mg  100 mg Oral Q4H PRN    lidocaine 4 % patch 1 Patch  1 Patch TransDERmal Q24H    polyethylene glycol (MIRALAX) packet 17 g  17 g Oral DAILY PRN    docusate sodium (COLACE) capsule 100 mg  100 mg Oral BID    acetaminophen (TYLENOL) tablet 650 mg  650 mg Oral Q6H PRN    sodium chloride (NS) flush 5-40 mL  5-40 mL IntraVENous Q8H    sodium chloride (NS) flush 5-40 mL  5-40 mL IntraVENous PRN    heparin (porcine) injection 5,000 Units  5,000 Units SubCUTAneous Q8H          LAB AND IMAGING FINDINGS:     Lab Results   Component Value Date/Time    WBC 12.3 (H) 11/25/2020 04:39 AM    HGB 10.1 (L) 11/25/2020 04:39 AM    PLATELET 369 (L) 77/38/9603 04:39 AM     Lab Results   Component Value Date/Time    Sodium 141 11/25/2020 04:39 AM    Potassium 3.6 11/25/2020 04:39 AM    Chloride 110 (H) 11/25/2020 04:39 AM    CO2 23 11/25/2020 04:39 AM    BUN 30 (H) 11/25/2020 04:39 AM    Creatinine 2.05 (H) 11/25/2020 04:39 AM    Calcium 11.5 (H) 11/25/2020 04:39 AM    Magnesium 1.8 11/25/2020 04:39 AM    Phosphorus 2.7 11/25/2020 04:39 AM      Lab Results   Component Value Date/Time    Alk. phosphatase 62 11/25/2020 04:39 AM    Protein, total 5.7 (L) 11/25/2020 04:39 AM    Albumin 2.6 (L) 11/25/2020 04:39 AM    Globulin 3.1 11/25/2020 04:39 AM     No results found for: INR, PTMR, PTP, PT1, PT2, APTT, INREXT, INREXT   Lab Results   Component Value Date/Time    Iron 22 (L) 11/20/2020 04:45 PM    TIBC 253 11/20/2020 04:45 PM    Iron % saturation 9 (L) 11/20/2020 04:45 PM    Ferritin 583 (H) 11/20/2020 04:45 PM      Lab Results   Component Value Date/Time    pH 7.39 11/25/2020 02:49 AM    PCO2 34 (L) 11/25/2020 02:49 AM    PO2 67 (L) 11/25/2020 02:49 AM     No components found for: GLPOC   No results found for: CPK, CKMB             Total time: 70 min  Counseling / coordination time, spent as noted above: 55 min  > 50% counseling / coordination?: yes    Prolonged service was provided for  []30 min   []75 min in face to face time in the presence of the patient, spent as noted above. Time Start:   Time End:   Note: this can only be billed with 36209 (initial) or 07578 (follow up). If multiple start / stop times, list each separately.

## 2020-11-25 NOTE — PROGRESS NOTES
0700: Bedside shift change report given to Roberto Monique RN (oncoming nurse) by Malia Rivas RN (offgoing nurse). Report included the following information SBAR, Intake/Output, MAR, Accordion, Recent Results, Med Rec Status and Cardiac Rhythm ST w. PVCs. Primary Nurse Scott Gosselin and Norma Marquis RN performed a dual skin assessment on this patient No impairment noted. Right butt cheek skin tear covered with mepilex foam, clean dry intact, wound care following. 0745: Assessment complete see flowsheet. Patient in bed, awake, alert, in no apparent distress. On BIPAP 50%, Dr. Danuta Merrill at bedside, BIPAP removed and patient placed on nasal cannula 2L Sp02 %. RR 16-18. Breathing does not appears labored, patient not tachypneic at this time. Lungs coarse/rhales/rhonchi. Patient with congested moist non-productive cough. Encouraged IS. Quillian Sails at bedside. Patient with BUE tremors, patient states this is not new and he is currently cold. Blankets applied, room temperature increased. Speech consult ordered, concern for possible aspiration, patient with coughing after eating ice chips. VSS. ST with PVC's on monitor. [address] [facility] [address]: Spoke with CT who spoke with radiologist as well as family practice team. Family practice team wants patient to have CTA chest, GFR non AA 31, GFR AA 37, BUN 30, Cr 2.05. Per CT tech, okay per radiologist and family practice team for patient to have CTA chest.  6445: Speech at bedside, renal diet ordered. Instructed to crush large pills and give with applesauce. 1145: Re assessment complete see flowsheet. No change from previous assessment, remains on 2L Sp02 %, patient remains in no apparent distress. VSS. Call bell in reach. 1545: Re assessment complete see flowsheet  1900: Bedside shift change report given to Bertha Connell RN (oncoming nurse) by Roberto Monique RN (offgoing nurse).  Report included the following information SBAR, Intake/Output, MAR, Accordion, Recent Results, Med Rec Status and Cardiac Rhythm NSR/.

## 2020-11-26 ENCOUNTER — APPOINTMENT (OUTPATIENT)
Dept: GENERAL RADIOLOGY | Age: 85
DRG: 682 | End: 2020-11-26
Attending: INTERNAL MEDICINE
Payer: MEDICARE

## 2020-11-26 LAB
1,25(OH)2D3 SERPL-MCNC: 210.3 PG/ML (ref 19.9–79.3)
ALBUMIN SERPL-MCNC: 2.4 G/DL (ref 3.5–5)
ALBUMIN SERPL-MCNC: 2.5 G/DL (ref 3.5–5)
ALBUMIN/GLOB SERPL: 0.8 {RATIO} (ref 1.1–2.2)
ALBUMIN/GLOB SERPL: 1.1 {RATIO} (ref 1.1–2.2)
ALP SERPL-CCNC: 56 U/L (ref 45–117)
ALP SERPL-CCNC: 58 U/L (ref 45–117)
ALT SERPL-CCNC: 14 U/L (ref 12–78)
ALT SERPL-CCNC: 15 U/L (ref 12–78)
ANION GAP SERPL CALC-SCNC: 5 MMOL/L (ref 5–15)
ANION GAP SERPL CALC-SCNC: 7 MMOL/L (ref 5–15)
AST SERPL-CCNC: 33 U/L (ref 15–37)
AST SERPL-CCNC: 36 U/L (ref 15–37)
BILIRUB SERPL-MCNC: 0.8 MG/DL (ref 0.2–1)
BILIRUB SERPL-MCNC: 1 MG/DL (ref 0.2–1)
BUN SERPL-MCNC: 30 MG/DL (ref 6–20)
BUN SERPL-MCNC: 31 MG/DL (ref 6–20)
BUN/CREAT SERPL: 14 (ref 12–20)
BUN/CREAT SERPL: 16 (ref 12–20)
CA-I BLD-SCNC: 1.33 MMOL/L (ref 1.13–1.32)
CALCIUM SERPL-MCNC: 10.1 MG/DL (ref 8.5–10.1)
CALCIUM SERPL-MCNC: 10.6 MG/DL (ref 8.5–10.1)
CHLORIDE SERPL-SCNC: 105 MMOL/L (ref 97–108)
CHLORIDE SERPL-SCNC: 106 MMOL/L (ref 97–108)
CO2 SERPL-SCNC: 28 MMOL/L (ref 21–32)
CO2 SERPL-SCNC: 28 MMOL/L (ref 21–32)
CREAT SERPL-MCNC: 1.95 MG/DL (ref 0.7–1.3)
CREAT SERPL-MCNC: 2.13 MG/DL (ref 0.7–1.3)
ERYTHROCYTE [DISTWIDTH] IN BLOOD BY AUTOMATED COUNT: 15.6 % (ref 11.5–14.5)
GLOBULIN SER CALC-MCNC: 2.2 G/DL (ref 2–4)
GLOBULIN SER CALC-MCNC: 3 G/DL (ref 2–4)
GLUCOSE SERPL-MCNC: 112 MG/DL (ref 65–100)
GLUCOSE SERPL-MCNC: 121 MG/DL (ref 65–100)
HCT VFR BLD AUTO: 29.6 % (ref 36.6–50.3)
HEALTH STATUS, XMCV2T: NORMAL
HGB BLD-MCNC: 10.1 G/DL (ref 12.1–17)
MAGNESIUM SERPL-MCNC: 1.8 MG/DL (ref 1.6–2.4)
MCH RBC QN AUTO: 31.9 PG (ref 26–34)
MCHC RBC AUTO-ENTMCNC: 34.1 G/DL (ref 30–36.5)
MCV RBC AUTO: 93.4 FL (ref 80–99)
NRBC # BLD: 0 K/UL (ref 0–0.01)
NRBC BLD-RTO: 0 PER 100 WBC
PHOSPHATE SERPL-MCNC: 3 MG/DL (ref 2.6–4.7)
PLATELET # BLD AUTO: 140 K/UL (ref 150–400)
PMV BLD AUTO: 11.4 FL (ref 8.9–12.9)
POTASSIUM SERPL-SCNC: 2.6 MMOL/L (ref 3.5–5.1)
POTASSIUM SERPL-SCNC: 3 MMOL/L (ref 3.5–5.1)
PROT SERPL-MCNC: 4.6 G/DL (ref 6.4–8.2)
PROT SERPL-MCNC: 5.5 G/DL (ref 6.4–8.2)
PTH RELATED PROT SERPL-SCNC: <2 PMOL/L
RBC # BLD AUTO: 3.17 M/UL (ref 4.1–5.7)
SARS-COV-2, COV2: NOT DETECTED
SODIUM SERPL-SCNC: 139 MMOL/L (ref 136–145)
SODIUM SERPL-SCNC: 140 MMOL/L (ref 136–145)
SOURCE, COVRS: NORMAL
SPECIMEN SOURCE, FCOV2M: NORMAL
SPECIMEN TYPE, XMCV1T: NORMAL
VANCOMYCIN SERPL-MCNC: 12.7 UG/ML
WBC # BLD AUTO: 10.8 K/UL (ref 4.1–11.1)

## 2020-11-26 PROCEDURE — 80053 COMPREHEN METABOLIC PANEL: CPT

## 2020-11-26 PROCEDURE — 71045 X-RAY EXAM CHEST 1 VIEW: CPT

## 2020-11-26 PROCEDURE — 74011250636 HC RX REV CODE- 250/636: Performed by: STUDENT IN AN ORGANIZED HEALTH CARE EDUCATION/TRAINING PROGRAM

## 2020-11-26 PROCEDURE — 94640 AIRWAY INHALATION TREATMENT: CPT

## 2020-11-26 PROCEDURE — 77010033678 HC OXYGEN DAILY

## 2020-11-26 PROCEDURE — 85027 COMPLETE CBC AUTOMATED: CPT

## 2020-11-26 PROCEDURE — 65660000000 HC RM CCU STEPDOWN

## 2020-11-26 PROCEDURE — 99223 1ST HOSP IP/OBS HIGH 75: CPT | Performed by: NURSE PRACTITIONER

## 2020-11-26 PROCEDURE — 84100 ASSAY OF PHOSPHORUS: CPT

## 2020-11-26 PROCEDURE — 83735 ASSAY OF MAGNESIUM: CPT

## 2020-11-26 PROCEDURE — 74011250637 HC RX REV CODE- 250/637: Performed by: STUDENT IN AN ORGANIZED HEALTH CARE EDUCATION/TRAINING PROGRAM

## 2020-11-26 PROCEDURE — 36415 COLL VENOUS BLD VENIPUNCTURE: CPT

## 2020-11-26 PROCEDURE — 74011000250 HC RX REV CODE- 250: Performed by: INTERNAL MEDICINE

## 2020-11-26 PROCEDURE — 82330 ASSAY OF CALCIUM: CPT

## 2020-11-26 PROCEDURE — 80202 ASSAY OF VANCOMYCIN: CPT

## 2020-11-26 PROCEDURE — 74011000250 HC RX REV CODE- 250: Performed by: STUDENT IN AN ORGANIZED HEALTH CARE EDUCATION/TRAINING PROGRAM

## 2020-11-26 PROCEDURE — 99232 SBSQ HOSP IP/OBS MODERATE 35: CPT | Performed by: FAMILY MEDICINE

## 2020-11-26 PROCEDURE — 99232 SBSQ HOSP IP/OBS MODERATE 35: CPT | Performed by: INTERNAL MEDICINE

## 2020-11-26 RX ORDER — HEPARIN SODIUM 5000 [USP'U]/ML
5000 INJECTION, SOLUTION INTRAVENOUS; SUBCUTANEOUS EVERY 8 HOURS
Status: COMPLETED | OUTPATIENT
Start: 2020-11-26 | End: 2020-11-26

## 2020-11-26 RX ORDER — POTASSIUM CHLORIDE 7.45 MG/ML
10 INJECTION INTRAVENOUS
Status: DISPENSED | OUTPATIENT
Start: 2020-11-26 | End: 2020-11-26

## 2020-11-26 RX ORDER — MAGNESIUM SULFATE HEPTAHYDRATE 40 MG/ML
2 INJECTION, SOLUTION INTRAVENOUS ONCE
Status: COMPLETED | OUTPATIENT
Start: 2020-11-26 | End: 2020-11-26

## 2020-11-26 RX ADMIN — IPRATROPIUM BROMIDE AND ALBUTEROL SULFATE 3 ML: .5; 3 SOLUTION RESPIRATORY (INHALATION) at 19:30

## 2020-11-26 RX ADMIN — POTASSIUM BICARBONATE 20 MEQ: 782 TABLET, EFFERVESCENT ORAL at 11:29

## 2020-11-26 RX ADMIN — IPRATROPIUM BROMIDE AND ALBUTEROL SULFATE 3 ML: .5; 3 SOLUTION RESPIRATORY (INHALATION) at 00:16

## 2020-11-26 RX ADMIN — MAGNESIUM SULFATE HEPTAHYDRATE 2 G: 40 INJECTION, SOLUTION INTRAVENOUS at 07:46

## 2020-11-26 RX ADMIN — BUDESONIDE 500 MCG: 0.5 INHALANT RESPIRATORY (INHALATION) at 19:31

## 2020-11-26 RX ADMIN — Medication 10 ML: at 21:11

## 2020-11-26 RX ADMIN — LEVOFLOXACIN 500 MG: 5 INJECTION, SOLUTION INTRAVENOUS at 21:09

## 2020-11-26 RX ADMIN — VANCOMYCIN HYDROCHLORIDE 1000 MG: 1 INJECTION, POWDER, LYOPHILIZED, FOR SOLUTION INTRAVENOUS at 05:28

## 2020-11-26 RX ADMIN — IPRATROPIUM BROMIDE AND ALBUTEROL SULFATE 3 ML: .5; 3 SOLUTION RESPIRATORY (INHALATION) at 15:01

## 2020-11-26 RX ADMIN — METRONIDAZOLE 500 MG: 500 INJECTION, SOLUTION INTRAVENOUS at 21:09

## 2020-11-26 RX ADMIN — HEPARIN SODIUM 5000 UNITS: 5000 INJECTION INTRAVENOUS; SUBCUTANEOUS at 09:24

## 2020-11-26 RX ADMIN — HEPARIN SODIUM 5000 UNITS: 5000 INJECTION INTRAVENOUS; SUBCUTANEOUS at 00:54

## 2020-11-26 RX ADMIN — METRONIDAZOLE 500 MG: 500 INJECTION, SOLUTION INTRAVENOUS at 11:12

## 2020-11-26 RX ADMIN — Medication 10 ML: at 05:31

## 2020-11-26 RX ADMIN — POTASSIUM BICARBONATE 40 MEQ: 782 TABLET, EFFERVESCENT ORAL at 17:15

## 2020-11-26 RX ADMIN — HEPARIN SODIUM 5000 UNITS: 5000 INJECTION INTRAVENOUS; SUBCUTANEOUS at 17:15

## 2020-11-26 RX ADMIN — HYDROCODONE BITARTRATE AND ACETAMINOPHEN 1 TABLET: 7.5; 325 TABLET ORAL at 23:02

## 2020-11-26 RX ADMIN — DOCUSATE SODIUM 100 MG: 100 CAPSULE, LIQUID FILLED ORAL at 09:24

## 2020-11-26 RX ADMIN — Medication 10 ML: at 14:00

## 2020-11-26 RX ADMIN — POTASSIUM BICARBONATE 40 MEQ: 782 TABLET, EFFERVESCENT ORAL at 17:16

## 2020-11-26 RX ADMIN — DOCUSATE SODIUM 100 MG: 100 CAPSULE, LIQUID FILLED ORAL at 17:22

## 2020-11-26 RX ADMIN — POTASSIUM CHLORIDE 10 MEQ: 10 INJECTION, SOLUTION INTRAVENOUS at 09:33

## 2020-11-26 RX ADMIN — POTASSIUM BICARBONATE 20 MEQ: 782 TABLET, EFFERVESCENT ORAL at 11:12

## 2020-11-26 NOTE — PROGRESS NOTES
Bedside and Verbal shift change report given to Josefa Wright RN (oncoming nurse) by Saida Tyson RN (offgoing nurse). Report included the following information SBAR, Kardex and ED Summary. 2000 - Full assessment complete. No complaints at this time. Currently on 3LNC. Condom cath draining clear yellow urine. 0000 - Reassessment complete. No changes noted. 0400 - Reassessment complete. No changed noted. No complaints at this time. 0309 - Notified MD of K 2.6     Bedside and Verbal shift change report given to Giselle Walker (oncoming nurse) by Josefa Wright RN (offgoing nurse). Report included the following information SBAR, Kardex and ED Summary.

## 2020-11-26 NOTE — PROGRESS NOTES
[id number] [facility] Oncology at [address] [place] [address]  [phone]    Hematology / Oncology Follow-up        Patient: Saint Freed MRN: [id number]  SSN: [id number]    YOB: 1932  Age: 80 y.o. Sex: male        Reason for Consultation:      1. Hypercalcemia  2. Recurrent lymphoma ? Subjective:      Saint Freed is a 80 y.o. male who I am seeing in follow up for hypercalcemia and history of diffuse large B-cell lymphoma. He is admitted with weakness, fall and back injury. He has some back pain. CT and MRI shows degenerative changes. Labs on admission revealed BENJI and hypercalcemia. With hydration, Creat and Ca level are coming down. MRI shows lots of adenopathy both above and below the diaphragm and splenomegaly. He was diagnosed with DLBCL in 2009 and received R-CHOP under the care of Dr. Juan David Pike    Interval History:     Pt feeling better. Plan to leave ICU today      Past Medical History:   Diagnosis Date    CRI (chronic renal insufficiency) 12/13/2012    Gout 1/4/2011    Prostate CA ([facility] [address] 1/4/2011     Past Surgical History:   Procedure Laterality Date    ENDOSCOPY, COLON, DIAGNOSTIC  2003    neg    HC BONE MARROW BIOPSY      HX PROSTATECTOMY        No family history on file. Social History     Tobacco Use    Smoking status: Never Smoker    Smokeless tobacco: Never Used   Substance Use Topics    Alcohol use: No      Prior to Admission medications    Medication Sig Start Date End Date Taking? Authorizing Provider   allopurinoL (ZYLOPRIM) 300 mg tablet Take 150 mg by mouth daily. Yes Provider, Historical   vitamin e (E GEMS) 100 unit capsule Take 100 Units by mouth daily. Yes Provider, Historical   multivitamin (ONE A DAY) tablet Take 1 Tab by mouth daily. Yes Provider, Historical   ketoconazole (NIZORAL) 2 % topical cream Apply  to affected area two (2) times a day for 28 days.  11/11/20 12/9/20 Yes Larry Wright MD   atenoloL (TENORMIN) 50 mg tablet Take 1 Tab by mouth daily. 9/9/20  Yes Shaun Diaz MD          Allergies   Allergen Reactions    Pcn [Penicillins] Swelling           Objective:     Visit Vitals  /69   Pulse 65   Temp 98.2 °F (36.8 °C)   Resp 17   Ht 5' 3\" (1.6 m)   Wt 147 lb 9.6 oz (67 kg)   SpO2 97%   BMI 26.15 kg/m²     Physical Exam:    General: No distress  Eyes: Anicteric sclerae  HENT: Atraumatic  Neck: Supple  Respiratory: on bipap  CV: No peripheral edema  GI: nondistended  Skin: No rashes, ecchymoses, or petechiae  Psych: Alert, oriented, appropriate affect, normal judgment/insight      Lab Results   Component Value Date/Time    WBC 10.8 11/26/2020 03:41 AM    HGB 10.1 (L) 11/26/2020 03:41 AM    HCT 29.6 (L) 11/26/2020 03:41 AM    PLATELET 792 (L) 53/61/6428 03:41 AM    MCV 93.4 11/26/2020 03:41 AM       Lab Results   Component Value Date/Time    Sodium 139 11/26/2020 03:41 AM    Potassium 2.6 (LL) 11/26/2020 03:41 AM    Chloride 106 11/26/2020 03:41 AM    CO2 28 11/26/2020 03:41 AM    Anion gap 5 11/26/2020 03:41 AM    Glucose 112 (H) 11/26/2020 03:41 AM    BUN 30 (H) 11/26/2020 03:41 AM    Creatinine 2.13 (H) 11/26/2020 03:41 AM    BUN/Creatinine ratio 14 11/26/2020 03:41 AM    GFR est AA 36 (L) 11/26/2020 03:41 AM    GFR est non-AA 29 (L) 11/26/2020 03:41 AM    Calcium 10.6 (H) 11/26/2020 03:41 AM    Bilirubin, total 0.8 11/26/2020 03:41 AM    Alk. phosphatase 58 11/26/2020 03:41 AM    Protein, total 5.5 (L) 11/26/2020 03:41 AM    Albumin 2.5 (L) 11/26/2020 03:41 AM    Globulin 3.0 11/26/2020 03:41 AM    A-G Ratio 0.8 (L) 11/26/2020 03:41 AM    ALT (SGPT) 15 11/26/2020 03:41 AM    AST (SGOT) 36 11/26/2020 03:41 AM          MRI Results (most recent):  Results from [place] encounter on 11/20/20    [address] [facility]    Narrative MRI OF THE THORACIC SPINE WITHOUT CONTRAST: 11/21/2020 1:51 PM    INDICATION: T12 fracture suspected on CT thoracic spine.     TECHNIQUE: Multiplanar and multisequence MRI was obtained of the thoracic spine  without contrast.    COMPARISON: CT thoracic 11/20/2020. FINDINGS:   \"Broken DISH\" fracture: A fracture of the right lateral aspect of T12 (6-6) is  better seen on prior CT. On prior CT, diffuse idiopathic skeletal hyperostosis  (DISH) is also better appreciated. The flowing syndesmophytes of DISH in this  patient are predominantly right lateral. The fracture extends through the right  lateral cortex of T12 and slightly into the vertebral body, running obliquely  toward the superior endplate. A minimal, anterior, left, superior endplate  cortical step-off in T12 is better seen on prior CT (395-97). The fracture line  continues through the left lateral osteophyte between T11 and T12 (601-43 on  prior CT). Most of these findings are not as clearly visible on MRI. No definite  fracture of T11. No height loss in T11 or T12. The fracture is a \"broken DISH\"  fracture rather than a typical compression fracture. Partially imaged edema and L2 is better seen on same-day lumbar MRI to be  degenerative. Intervertebral disc height and signal intensity are well  preserved. Thoracic kyphosis is exaggerated. The thoracic cord is normal in  caliber and signal intensity. Lymphoma: In the partially imaged upper abdomen, there are multiple enlarged  retroperitoneal lymph nodes, with more bulky enlargement of multiple mesenteric  lymph nodes. The largest retroperitoneal lymph node measures 14 mm in short axis  on image 5-5. The largest, conglomerate, mesenteric lymph node measures 22 mm on  image 5-4. The spleen is enlarged. Multiple splenic masses are nearly T2  isointense. Several of the larger are centrally T2 hyperintense, however. The  largest splenic mass measures approximately 40 x 49 mm (8-25). An enlarged right  hilar lymph node measures 12 mm on image 8-14. The paraspinal lymph node  posterior to the esophagus and medial to the aorta, at the T5-T6 level, measures  14 mm in short axis (5-8, 8-12).  Findings are concerning for lymphoma. Other thoracoabdominal findings: Right shoulder joint effusion. Trace bilateral  pleural effusions and mild passive atelectasis. The common bile duct is mildly  dilated (7 mm). Distended stomach. Bilateral renal cysts. A 9 x 14 mm, oval,  circumscribed, homogenously T2 hyperintense, homogenously T1 hypointense nodule  posteromedial to the segment 6 hepatic capsule (8-24) is indeterminate, but of  doubtful significance. C7-T1: Facet osteoarthritis causes mild right neural foraminal stenosis (5-10). T1-T12: No herniation or stenosis. Impression IMPRESSION:   1. \"Broken DISH\" fracture of T12.  2. Lymphoma: Splenomegaly and bulky splenic masses. Thoracic, mesenteric, and  retroperitoneal lymphadenopathy. 3. CT chest abdomen pelvis with IV contrast is recommended for staging. The findings were called to Dr. Chris Grimes on 11/21/2020 at 1347 hours and  1614 hours by Dr. Zina Amaya. [phone]        11/23/2020 CT C/A/P wo contrast  IMPRESSION:  Retroperitoneal adenopathy is aorta caval in location measuring 35 x 17 mm in  size. Splenomegaly with scattered splenic hypodensities. The patient was not  administered IV contrast.   Adenopathy is in a location that would be difficult to percutaneously sample. Consider clinical necessity of percutaneous sampling.   Small right and minimal left-sided pleural effusion with bibasilar atelectasis.   Right and left renal calculi and cysts. No hydronephrosis or ureterolithiasis.           Assessment/Plan     1. Hypercalcemia: Likely from malignancy, S/p calcitonin x 2 doses 11/21 and 11/22   Calcium 10.6 this am: corrects to 11.8, improved  Continue with IVFs and lasix as needed; s/p Pamidronate 11/24, verified with nursing. Given at 30 mg over 4 hours      2. Anemia   Mild, normocytic - chronic disease related  No fe def; B12 and Folate normal  stable;  continue to monitor   Transfuse for Hgb < 7       3.  Malignant disease  Lymphoma ?  Scans as above   11/24 Plan for Biopsy; per radiology unable to perform bx due to difficulty of location      Will order bone marrow bx for tomorrow since pt is improved    - will need close follow up with Dr Eboni Donnelly after discharge; reviewed with [facility]. consulted palliative care for goals of care discussion, this is important as if pt needs an open surgical bx for diagnosis, would want to avoid that if he would not be interested in further chemotherapy    4. BENJI  Nephrology following, SPEP negative  Improving this am    5. Dispo  -needs Calcium continuing to trend down  -needs close follow up with Dr Eboni Donnelly    6. Acute hypoxic resp failure:  Improved, going for CTA today            Signed by:  Kajal Shah MD                     November 26, 2020

## 2020-11-26 NOTE — PROGRESS NOTES
[facility] Dosing Services: Antimicrobial Stewardship Daily Doc    Consult for antibiotic dosing of vancomycin by Dr. Qiana Wilson  Indication: HAP  Day of Therapy: 2    Ht Readings from Last 1 Encounters:   11/24/20 160 cm (63\")        Wt Readings from Last 1 Encounters:   11/23/20 67 kg (147 lb 9.6 oz)     Vancomycin therapy:  Current maintenance dose: Pharmacy to Dose by Levels  Dose calculated to approximate a therapeutic trough of 15-20 mcg/mL. Assessment/Plan:  SCr slightly up this morning, leukocytosis resolved, procalcitonin on 11/25 = 0.67, fever spike yesterday, culture results finalizing, urine output appears adequate  Vancomycin random level this morning is sufficient for re-dosing. Will start scheduled regimen of 1000 mg IV every 24 hours with plan to obtain trough level prior to 3rd dose (not ordered yet)  CMP ordered daily by MD  Dose administration notes:   Doses given appropriately as scheduled    Date Dose & Interval Measured (mcg/mL) Extrapolated (mcg/mL)   ? 11/26 at 0341 ?~20 hours after 1250 mg IV x 1 dose ? 12.7 ? N/A   ? ? ? ?   ? ? ? ? Other Antimicrobial   (not dosed by pharmacist) Levofloxacin 500 mg IV every 48 hours  Metronidazole 500 mg IV every 12 hours   Cultures 11/20 - Urine - No growth - FINAL  11/24 - Blood - NGTD x 7 hours - Prelim   Serum Creatinine Lab Results   Component Value Date/Time    Creatinine 2.05 (H) 11/25/2020 04:39 AM    Creatinine (POC) 1.7 (H) 06/10/2009 08:44 AM         Creatinine Clearance Estimated Creatinine Clearance: 20 mL/min (A) (based on SCr of 2.05 mg/dL (H)).      Temp Temp: 98.3 °F (36.8 °C)       WBC Lab Results   Component Value Date/Time    WBC 10.8 11/26/2020 03:41 AM        H/H Lab Results   Component Value Date/Time    HGB 10.1 (L) 11/26/2020 03:41 AM        Platelets    Lab Results   Component Value Date/Time    PLATELET 782 (L) 01/83/6686 03:41 AM          For Antifungals, Metronidazole, and Nafcillin:  ALT: 15   AST: 36 Alk Phos: 58  T Bili: 0.8    Pharmacist CHANDRIKA Harrison Contact information:

## 2020-11-27 ENCOUNTER — APPOINTMENT (OUTPATIENT)
Dept: CT IMAGING | Age: 85
DRG: 682 | End: 2020-11-27
Attending: INTERNAL MEDICINE
Payer: MEDICARE

## 2020-11-27 VITALS
OXYGEN SATURATION: 92 % | BODY MASS INDEX: 25.34 KG/M2 | DIASTOLIC BLOOD PRESSURE: 84 MMHG | RESPIRATION RATE: 20 BRPM | HEART RATE: 89 BPM | SYSTOLIC BLOOD PRESSURE: 142 MMHG | HEIGHT: 63 IN | TEMPERATURE: 98.7 F | WEIGHT: 143 LBS

## 2020-11-27 LAB
ALBUMIN SERPL-MCNC: 2.4 G/DL (ref 3.5–5)
ALBUMIN/GLOB SERPL: 0.8 {RATIO} (ref 1.1–2.2)
ALP SERPL-CCNC: 64 U/L (ref 45–117)
ALT SERPL-CCNC: 15 U/L (ref 12–78)
ANION GAP SERPL CALC-SCNC: 7 MMOL/L (ref 5–15)
AST SERPL-CCNC: 36 U/L (ref 15–37)
BASOPHILS # BLD: 0 K/UL (ref 0–0.1)
BASOPHILS NFR BLD: 0 % (ref 0–1)
BILIRUB SERPL-MCNC: 0.8 MG/DL (ref 0.2–1)
BUN SERPL-MCNC: 32 MG/DL (ref 6–20)
BUN/CREAT SERPL: 16 (ref 12–20)
CALCIUM SERPL-MCNC: 10.1 MG/DL (ref 8.5–10.1)
CHLORIDE SERPL-SCNC: 103 MMOL/L (ref 97–108)
CO2 SERPL-SCNC: 28 MMOL/L (ref 21–32)
CREAT SERPL-MCNC: 1.97 MG/DL (ref 0.7–1.3)
DIFFERENTIAL METHOD BLD: ABNORMAL
EOSINOPHIL # BLD: 0.2 K/UL (ref 0–0.4)
EOSINOPHIL NFR BLD: 2 % (ref 0–7)
ERYTHROCYTE [DISTWIDTH] IN BLOOD BY AUTOMATED COUNT: 15.5 % (ref 11.5–14.5)
ERYTHROCYTE [DISTWIDTH] IN BLOOD BY AUTOMATED COUNT: 15.7 % (ref 11.5–14.5)
GLOBULIN SER CALC-MCNC: 2.9 G/DL (ref 2–4)
GLUCOSE SERPL-MCNC: 133 MG/DL (ref 65–100)
HCT VFR BLD AUTO: 29.1 % (ref 36.6–50.3)
HCT VFR BLD AUTO: 29.2 % (ref 36.6–50.3)
HGB BLD-MCNC: 9.9 G/DL (ref 12.1–17)
HGB BLD-MCNC: 9.9 G/DL (ref 12.1–17)
IMM GRANULOCYTES # BLD AUTO: 0.1 K/UL (ref 0–0.04)
IMM GRANULOCYTES NFR BLD AUTO: 1 % (ref 0–0.5)
LYMPHOCYTES # BLD: 0.7 K/UL (ref 0.8–3.5)
LYMPHOCYTES NFR BLD: 7 % (ref 12–49)
MAGNESIUM SERPL-MCNC: 2.2 MG/DL (ref 1.6–2.4)
MCH RBC QN AUTO: 31.1 PG (ref 26–34)
MCH RBC QN AUTO: 31.2 PG (ref 26–34)
MCHC RBC AUTO-ENTMCNC: 33.9 G/DL (ref 30–36.5)
MCHC RBC AUTO-ENTMCNC: 34 G/DL (ref 30–36.5)
MCV RBC AUTO: 91.5 FL (ref 80–99)
MCV RBC AUTO: 92.1 FL (ref 80–99)
MONOCYTES # BLD: 1.4 K/UL (ref 0–1)
MONOCYTES NFR BLD: 13 % (ref 5–13)
NEUTS SEG # BLD: 8.1 K/UL (ref 1.8–8)
NEUTS SEG NFR BLD: 77 % (ref 32–75)
NRBC # BLD: 0 K/UL (ref 0–0.01)
NRBC # BLD: 0 K/UL (ref 0–0.01)
NRBC BLD-RTO: 0 PER 100 WBC
NRBC BLD-RTO: 0 PER 100 WBC
PHOSPHATE SERPL-MCNC: 1.4 MG/DL (ref 2.6–4.7)
PLATELET # BLD AUTO: 147 K/UL (ref 150–400)
PLATELET # BLD AUTO: 149 K/UL (ref 150–400)
PMV BLD AUTO: 11.7 FL (ref 8.9–12.9)
PMV BLD AUTO: 12.3 FL (ref 8.9–12.9)
POTASSIUM SERPL-SCNC: 3.4 MMOL/L (ref 3.5–5.1)
PROT SERPL-MCNC: 5.3 G/DL (ref 6.4–8.2)
RBC # BLD AUTO: 3.17 M/UL (ref 4.1–5.7)
RBC # BLD AUTO: 3.18 M/UL (ref 4.1–5.7)
RBC MORPH BLD: ABNORMAL
SODIUM SERPL-SCNC: 138 MMOL/L (ref 136–145)
VANCOMYCIN SERPL-MCNC: 27.1 UG/ML
WBC # BLD AUTO: 10.5 K/UL (ref 4.1–11.1)
WBC # BLD AUTO: 10.5 K/UL (ref 4.1–11.1)

## 2020-11-27 PROCEDURE — 94640 AIRWAY INHALATION TREATMENT: CPT

## 2020-11-27 PROCEDURE — 83735 ASSAY OF MAGNESIUM: CPT

## 2020-11-27 PROCEDURE — 74011000250 HC RX REV CODE- 250: Performed by: INTERNAL MEDICINE

## 2020-11-27 PROCEDURE — 77010033678 HC OXYGEN DAILY

## 2020-11-27 PROCEDURE — 74011250636 HC RX REV CODE- 250/636: Performed by: STUDENT IN AN ORGANIZED HEALTH CARE EDUCATION/TRAINING PROGRAM

## 2020-11-27 PROCEDURE — 77030014115

## 2020-11-27 PROCEDURE — 74011250637 HC RX REV CODE- 250/637: Performed by: STUDENT IN AN ORGANIZED HEALTH CARE EDUCATION/TRAINING PROGRAM

## 2020-11-27 PROCEDURE — 99153 MOD SED SAME PHYS/QHP EA: CPT

## 2020-11-27 PROCEDURE — 36415 COLL VENOUS BLD VENIPUNCTURE: CPT

## 2020-11-27 PROCEDURE — 99152 MOD SED SAME PHYS/QHP 5/>YRS: CPT

## 2020-11-27 PROCEDURE — 85027 COMPLETE CBC AUTOMATED: CPT

## 2020-11-27 PROCEDURE — 80053 COMPREHEN METABOLIC PANEL: CPT

## 2020-11-27 PROCEDURE — 74011000250 HC RX REV CODE- 250: Performed by: STUDENT IN AN ORGANIZED HEALTH CARE EDUCATION/TRAINING PROGRAM

## 2020-11-27 PROCEDURE — 88313 SPECIAL STAINS GROUP 2: CPT

## 2020-11-27 PROCEDURE — 88341 IMHCHEM/IMCYTCHM EA ADD ANTB: CPT

## 2020-11-27 PROCEDURE — 94760 N-INVAS EAR/PLS OXIMETRY 1: CPT

## 2020-11-27 PROCEDURE — 07DR3ZX EXTRACTION OF ILIAC BONE MARROW, PERCUTANEOUS APPROACH, DIAGNOSTIC: ICD-10-PCS | Performed by: RADIOLOGY

## 2020-11-27 PROCEDURE — 38222 DX BONE MARROW BX & ASPIR: CPT

## 2020-11-27 PROCEDURE — 85025 COMPLETE CBC W/AUTO DIFF WBC: CPT

## 2020-11-27 PROCEDURE — 94664 DEMO&/EVAL PT USE INHALER: CPT

## 2020-11-27 PROCEDURE — 88305 TISSUE EXAM BY PATHOLOGIST: CPT

## 2020-11-27 PROCEDURE — 97530 THERAPEUTIC ACTIVITIES: CPT

## 2020-11-27 PROCEDURE — 88184 FLOWCYTOMETRY/ TC 1 MARKER: CPT

## 2020-11-27 PROCEDURE — 88342 IMHCHEM/IMCYTCHM 1ST ANTB: CPT

## 2020-11-27 PROCEDURE — 2709999900 HC NON-CHARGEABLE SUPPLY

## 2020-11-27 PROCEDURE — 92526 ORAL FUNCTION THERAPY: CPT

## 2020-11-27 PROCEDURE — 74011250636 HC RX REV CODE- 250/636: Performed by: RADIOLOGY

## 2020-11-27 PROCEDURE — 84100 ASSAY OF PHOSPHORUS: CPT

## 2020-11-27 PROCEDURE — 88311 DECALCIFY TISSUE: CPT

## 2020-11-27 PROCEDURE — 80202 ASSAY OF VANCOMYCIN: CPT

## 2020-11-27 PROCEDURE — 99238 HOSP IP/OBS DSCHRG MGMT 30/<: CPT | Performed by: FAMILY MEDICINE

## 2020-11-27 PROCEDURE — 99232 SBSQ HOSP IP/OBS MODERATE 35: CPT | Performed by: INTERNAL MEDICINE

## 2020-11-27 PROCEDURE — 77030028872 HC BN BIOP NDL ON CNTRL TY TELE -C

## 2020-11-27 PROCEDURE — 97116 GAIT TRAINING THERAPY: CPT

## 2020-11-27 PROCEDURE — 88185 FLOWCYTOMETRY/TC ADD-ON: CPT

## 2020-11-27 RX ORDER — METRONIDAZOLE 500 MG/1
500 TABLET ORAL 2 TIMES DAILY
Qty: 8 TAB | Refills: 0 | Status: SHIPPED | OUTPATIENT
Start: 2020-11-27 | End: 2020-12-06

## 2020-11-27 RX ORDER — FENTANYL CITRATE 50 UG/ML
100 INJECTION, SOLUTION INTRAMUSCULAR; INTRAVENOUS
Status: DISCONTINUED | OUTPATIENT
Start: 2020-11-27 | End: 2020-11-27 | Stop reason: ALTCHOICE

## 2020-11-27 RX ORDER — HEPARIN SODIUM 5000 [USP'U]/ML
5000 INJECTION, SOLUTION INTRAVENOUS; SUBCUTANEOUS EVERY 8 HOURS
Status: DISCONTINUED | OUTPATIENT
Start: 2020-11-27 | End: 2020-11-27 | Stop reason: HOSPADM

## 2020-11-27 RX ORDER — ATENOLOL 50 MG/1
50 TABLET ORAL DAILY
Status: DISCONTINUED | OUTPATIENT
Start: 2020-11-27 | End: 2020-11-27 | Stop reason: HOSPADM

## 2020-11-27 RX ORDER — MIDAZOLAM HYDROCHLORIDE 1 MG/ML
5 INJECTION, SOLUTION INTRAMUSCULAR; INTRAVENOUS
Status: DISCONTINUED | OUTPATIENT
Start: 2020-11-27 | End: 2020-11-27 | Stop reason: ALTCHOICE

## 2020-11-27 RX ORDER — ALLOPURINOL 300 MG/1
150 TABLET ORAL DAILY
Status: DISCONTINUED | OUTPATIENT
Start: 2020-11-27 | End: 2020-11-27 | Stop reason: HOSPADM

## 2020-11-27 RX ORDER — ATENOLOL 50 MG/1
50 TABLET ORAL DAILY
Status: DISCONTINUED | OUTPATIENT
Start: 2020-11-28 | End: 2020-11-27

## 2020-11-27 RX ORDER — LEVOFLOXACIN 500 MG/1
TABLET, FILM COATED ORAL
Qty: 2 TAB | Refills: 0 | Status: SHIPPED | OUTPATIENT
Start: 2020-11-28 | End: 2020-12-06

## 2020-11-27 RX ADMIN — FENTANYL CITRATE 25 MCG: 50 INJECTION, SOLUTION INTRAMUSCULAR; INTRAVENOUS at 10:55

## 2020-11-27 RX ADMIN — MIDAZOLAM HYDROCHLORIDE 1 MG: 1 INJECTION, SOLUTION INTRAMUSCULAR; INTRAVENOUS at 11:06

## 2020-11-27 RX ADMIN — ALLOPURINOL 150 MG: 300 TABLET ORAL at 13:34

## 2020-11-27 RX ADMIN — POTASSIUM BICARBONATE 20 MEQ: 782 TABLET, EFFERVESCENT ORAL at 13:35

## 2020-11-27 RX ADMIN — FENTANYL CITRATE 25 MCG: 50 INJECTION, SOLUTION INTRAMUSCULAR; INTRAVENOUS at 10:50

## 2020-11-27 RX ADMIN — IPRATROPIUM BROMIDE AND ALBUTEROL SULFATE 3 ML: .5; 3 SOLUTION RESPIRATORY (INHALATION) at 02:32

## 2020-11-27 RX ADMIN — FENTANYL CITRATE 25 MCG: 50 INJECTION, SOLUTION INTRAMUSCULAR; INTRAVENOUS at 11:06

## 2020-11-27 RX ADMIN — HEPARIN SODIUM 5000 UNITS: 5000 INJECTION INTRAVENOUS; SUBCUTANEOUS at 13:34

## 2020-11-27 RX ADMIN — MIDAZOLAM HYDROCHLORIDE 1 MG: 1 INJECTION, SOLUTION INTRAMUSCULAR; INTRAVENOUS at 10:55

## 2020-11-27 RX ADMIN — IPRATROPIUM BROMIDE AND ALBUTEROL SULFATE 3 ML: .5; 3 SOLUTION RESPIRATORY (INHALATION) at 13:56

## 2020-11-27 RX ADMIN — BUDESONIDE 500 MCG: 0.5 INHALANT RESPIRATORY (INHALATION) at 07:36

## 2020-11-27 RX ADMIN — Medication 10 ML: at 13:36

## 2020-11-27 RX ADMIN — MIDAZOLAM HYDROCHLORIDE 1 MG: 1 INJECTION, SOLUTION INTRAMUSCULAR; INTRAVENOUS at 10:50

## 2020-11-27 RX ADMIN — METRONIDAZOLE 500 MG: 500 INJECTION, SOLUTION INTRAVENOUS at 08:54

## 2020-11-27 RX ADMIN — ATENOLOL 50 MG: 50 TABLET ORAL at 13:34

## 2020-11-27 RX ADMIN — IPRATROPIUM BROMIDE AND ALBUTEROL SULFATE 3 ML: .5; 3 SOLUTION RESPIRATORY (INHALATION) at 07:30

## 2020-11-27 NOTE — PROGRESS NOTES
Problem: Dysphagia (Adult)  Goal: *Acute Goals and Plan of Care (Insert Text)  Description: Swallowing goals initiated 12-2-20:   1) tolerate regular diet, thin liquids without s/s aspiration  by 12-9-20  2) crush pills as able for large pills and tolerate without s/s aspiration or reflux by 12-9-20  Outcome: Progressing Towards Goal   SPEECH LANGUAGE PATHOLOGY DYSPHAGIA TREATMENT  Patient: Rj Martin (85 y.o. male)  Date: 11/27/2020  Diagnosis: Acute renal failure (ARF) (HCC) [N17.9]  Frequent falls [R29.6]  Hypercalcemia [E83.52]  Lumbar canal stenosis [M48.061]  Cervical stenosis of spinal canal [M48.02]  Acute renal failure (ARF) (HCC) [N17.9]  Frequent falls [R29.6]  Hypercalcemia [E83.52]  Lumbar canal stenosis [M48.061]  Cervical stenosis of spinal canal [M48.02]   Acute renal failure (ARF) (HCC)       Precautions: aspiration Fall, Back(no BLT's, brace on when OOB; log roll technique)    ASSESSMENT:  Patient taking small amounts of PO with intermittent delayed coughing. He certainly has risks for aspiration due to h/o radiation to neck mass. HOwever, Chest x-ray showing improvement. PLAN:  Recommendations and Planned Interventions:  Ok to continue diet as tolerated. Patient continues to benefit from skilled intervention to address the above impairments. Continue treatment per established plan of care. Discharge Recommendations: To Be Determined     SUBJECTIVE:   Patient stated I'm thirsty. Where's my water? . OBJECTIVE:   Cognitive and Communication Status:  Neurologic State: Other (Comment)(periodic confusion)  Orientation Level: Oriented X4  Cognition: Follows commands  Perception: Appears intact     Safety/Judgement: Decreased insight into deficits  Dysphagia Treatment:  Oral Assessment:     P.O. Trials:  Patient Position: upright in bed  Vocal quality prior to P.O.: No impairment  Consistency Presented: Solid;Mixed consistency; Thin liquid  How Presented: Self-fed/presented;Spoon;Straw;Successive swallows   ORAL PHASE:   HE is sitting up much better unsupported and feeding himself today. Bolus Acceptance: No impairment  Bolus Formation/Control: Impaired     Propulsion: No impairment  Oral Residue: None  PHARYNGEAL PHASE:   Initiation of Swallow: No impairment  Laryngeal Elevation: Functional     Pharyngeal Phase Characteristics: (he has intermittent delayed coughing after PO at meals. he suppresses cough. Certainly he has aspiration risks due to h/o radiation to neck for mass.)         CXR 11-27-20 actually improved. Exercises:  Laryngeal Exercises:                                                                                 Discussed aspiration risks with RN. Pain:  Pain Scale 1: Numeric (0 - 10)  Pain Intensity 1: 0       After treatment:   Patient left in no apparent distress in bed and Nursing notified    COMMUNICATION/EDUCATION:   Patient was educated regarding his deficit(s) of dysphagia  as this relates to his diagnosis of ARF. He demonstrated Fair understanding as evidenced by lack of insight into deficits. .    The patient's plan of care including recommendations, planned interventions, and recommended diet changes were discussed with: Registered nurse.      Himanshu Rae SLP  Time Calculation: 15 mins

## 2020-11-27 NOTE — PROGRESS NOTES
11/27/2020  CARE MANAGEMENT NOTE:  Pt transferred back to the 5th floor from ICU. CM reviewed EMR and handoff was received from previous .  Pt was admitted with ARF, BENJI; also with T12 fracture (no surgery recommended).    Reportedly, pt resides [place]. Pt's dtr, Precious Cardenas ([phone]; [phone]) is the primary family contact. .       RUR 24%; LOS 6 days     Transition Plan of Care:  Darius Gong is for pt to return home with assistance from his son, Radha Martínez (cell [phone]). 2.  [facility] (skilled nursing, PT) with ST added on. AVS and revised order was faxed. .  3.  Pt declined a rolling walker for home use (Per ICU  note). 4..  Outpt f/u  5.  Pt will arrange his own transportation home.     CM will continue to follow pt until discharged.   Carlos

## 2020-11-27 NOTE — PROGRESS NOTES
responded to Spiritual Care Consults/Orders request, indicating pt wants to charge code status to DNR. Consulted pt's  nurse and learned pt was out for testing at the time of visit. Devorah Gore will continue to follow up as able or upon staff referrals. Visited by: Mihai Ward.   To polly : [phone])

## 2020-11-27 NOTE — H&P
Interventional Radiology History and Physical (Inpatient)    Patient: Princess Solis 80 y.o. male     Referring Physician:  No ref. provider found    Chief Complaint:presents for CT guided bone marrow biopsy    History of Present Illness: lymphadenopathy    History:  Past Medical History:   Diagnosis Date    CRI (chronic renal insufficiency) 12/13/2012    Gout 1/4/2011    Prostate CA ([facility] [address] 1/4/2011     No family history on file. Social History     Socioeconomic History    Marital status: SINGLE     Spouse name: Not on file    Number of children: Not on file    Years of education: Not on file    Highest education level: Not on file   Occupational History    Not on file   Social Needs    Financial resource strain: Not on file    Food insecurity     Worry: Not on file     Inability: Not on file    Transportation needs     Medical: Not on file     Non-medical: Not on file   Tobacco Use    Smoking status: Never Smoker    Smokeless tobacco: Never Used   Substance and Sexual Activity    Alcohol use: No    Drug use: Not on file    Sexual activity: Not on file   Lifestyle    Physical activity     Days per week: Not on file     Minutes per session: Not on file    Stress: Not on file   Relationships    Social connections     Talks on phone: Not on file     Gets together: Not on file     Attends [employer] service: Not on file     Active member of club or organization: Not on file     Attends meetings of clubs or organizations: Not on file     Relationship status: Not on file    Intimate partner violence     Fear of current or ex partner: Not on file     Emotionally abused: Not on file     Physically abused: Not on file     Forced sexual activity: Not on file   Other Topics Concern    Not on file   Social History Narrative    Not on file       Allergies:    Allergies   Allergen Reactions    Pcn [Penicillins] Swelling       Current Medications:  Current Facility-Administered Medications   Medication Dose Route Frequency    potassium bicarb-citric acid (EFFER-K) tablet 20 mEq  20 mEq Oral ONCE    midazolam (PF) (VERSED) injection 5 mg  5 mg IntraVENous RAD PRN    fentaNYL citrate (PF) injection 100 mcg  100 mcg IntraVENous RAD PRN    allopurinoL (ZYLOPRIM) tablet 150 mg  150 mg Oral DAILY    albuterol-ipratropium (DUO-NEB) 2.5 MG-0.5 MG/3 ML  3 mL Nebulization Q6H RT    morphine injection 1 mg  1 mg IntraVENous Q4H PRN    HYDROcodone-acetaminophen (NORCO) 7.5-325 mg per tablet 1 Tab  1 Tab Oral Q4H PRN    budesonide (PULMICORT) 500 mcg/2 ml nebulizer suspension  500 mcg Nebulization BID RT    metroNIDAZOLE (FLAGYL) IVPB premix 500 mg  500 mg IntraVENous Q12H    levoFLOXacin (LEVAQUIN) 500 mg in D5W IVPB  500 mg IntraVENous Q48H    guaiFENesin (ROBITUSSIN) 100 mg/5 mL oral liquid 100 mg  100 mg Oral Q4H PRN    lidocaine 4 % patch 1 Patch  1 Patch TransDERmal Q24H    polyethylene glycol (MIRALAX) packet 17 g  17 g Oral DAILY PRN    docusate sodium (COLACE) capsule 100 mg  100 mg Oral BID    acetaminophen (TYLENOL) tablet 650 mg  650 mg Oral Q6H PRN    sodium chloride (NS) flush 5-40 mL  5-40 mL IntraVENous Q8H    sodium chloride (NS) flush 5-40 mL  5-40 mL IntraVENous PRN        Physical Exam:  Blood pressure (!) 158/73, pulse (!) 106, temperature 97.9 °F (36.6 °C), resp. rate 19, height 5' 3\" (1.6 m), weight 64.9 kg (143 lb), SpO2 94 %.   GENERAL: alert, cooperative, no distress, appears stated age, LUNG: clear to auscultation bilaterally, HEART: tachycardia; regular rhythm    Findings/Diagnosis: lymphadenopathy    Alerts:    Hospital Problems  Date Reviewed: 8/13/2020          Codes Class Noted POA    Hypercalcemia ICD-10-CM: E83.52  ICD-9-CM: 275.42  11/23/2020 Unknown        History of B-cell lymphoma ICD-10-CM: Z85.72  ICD-9-CM: V10.79  11/22/2020 Unknown        Non-Hodgkin's lymphoma in relapse [facility]) ICD-10-CM: C85.90  ICD-9-CM: 202.80  11/22/2020 Unknown        Weakness ICD-10-CM: R53.1  ICD-9-CM: 780.79  11/21/2020 Unknown        Anemia ICD-10-CM: D64.9  ICD-9-CM: 285.9  11/21/2020 Unknown        Opioid use ICD-10-CM: F11.90  ICD-9-CM: 305.50  11/21/2020 Unknown        Hypercalcemia of malignancy ICD-10-CM: E83.52  ICD-9-CM: 275.42  11/20/2020         Cervical stenosis of spinal canal ICD-10-CM: M48.02  ICD-9-CM: 723.0  11/20/2020 Unknown        * (Principal) Acute renal failure (ARF) (HCC) ICD-10-CM: N17.9  ICD-9-CM: 584.9  11/20/2020 Unknown        Lumbar canal stenosis ICD-10-CM: M48.061  ICD-9-CM: 724.02  11/20/2020 Unknown        Frequent falls ICD-10-CM: R29.6  ICD-9-CM: V15.88  11/20/2020 Unknown        Multiple falls ICD-10-CM: R29.6  ICD-9-CM: V15.88  11/20/2020 Unknown        Lymphoma ([facility] [place] [address] ICD-10-CM: C85.90  ICD-9-CM: 202.80  1/4/2011 Yes        HTN (hypertension) ICD-10-CM: I10  ICD-9-CM: 401.9  1/4/2011 Yes        Prostate CA ([facility] [place] [address] ICD-10-CM: C61  ICD-9-CM: 185  1/4/2011 Yes        Gout ICD-10-CM: M10.9  ICD-9-CM: 274.9  1/4/2011 Yes              Laboratory:      Recent Labs     11/27/20  0406   HGB 9.9*  9.9*   HCT 29.2*  29.1*   WBC 10.5  10.5   *  147*   BUN 32*   CREA 1.97*   K 3.4*         Plan of Care/Planned Procedure:  Risks, benefits, and alternatives reviewed with patient and he agrees to proceed with the procedure. Full dictated report to follow.

## 2020-11-27 NOTE — PROGRESS NOTES
Verbal report given to primary RN to include vitals, medications given and plan of care to follow for CT bone marrow biopsy. Patient tolerated well, ok to transfer back to IP bed.

## 2020-11-27 NOTE — PROGRESS NOTES
[id number] [facility]  [phone]    Hematology / Oncology Follow-up        Patient: Saint Freed MRN: [id number]  SSN: [id number]    YOB: 1932  Age: 80 y.o. Sex: male        Reason for Consultation:      1. Hypercalcemia  2. Recurrent lymphoma ? Subjective:      Saint Freed is a 80 y.o. male who I am seeing in follow up for hypercalcemia and history of diffuse large B-cell lymphoma. He is admitted with weakness, fall and back injury. He has some back pain. CT and MRI shows degenerative changes. Labs on admission revealed BENJI and hypercalcemia. With hydration, Creat and Ca level are coming down. MRI shows lots of adenopathy both above and below the diaphragm and splenomegaly. He was diagnosed with DLBCL in 2009 and received R-CHOP under the care of Dr. Juan David Pike    Interval History:     Pt feeling better. Plan for DC today      Past Medical History:   Diagnosis Date    CRI (chronic renal insufficiency) 12/13/2012    Gout 1/4/2011    Prostate CA ([facility] [address] 1/4/2011     Past Surgical History:   Procedure Laterality Date    ENDOSCOPY, COLON, DIAGNOSTIC  2003    neg    HC BONE MARROW BIOPSY      HX PROSTATECTOMY        No family history on file. Social History     Tobacco Use    Smoking status: Never Smoker    Smokeless tobacco: Never Used   Substance Use Topics    Alcohol use: No      Prior to Admission medications    Medication Sig Start Date End Date Taking? Authorizing Provider   allopurinoL (ZYLOPRIM) 300 mg tablet Take 150 mg by mouth daily. Yes Provider, Historical   vitamin e (E GEMS) 100 unit capsule Take 100 Units by mouth daily. Yes Provider, Historical   multivitamin (ONE A DAY) tablet Take 1 Tab by mouth daily. Yes Provider, Historical   ketoconazole (NIZORAL) 2 % topical cream Apply  to affected area two (2) times a day for 28 days.  11/11/20 12/9/20 Yes Larry Wright MD   atenoloL (TENORMIN) 50 mg tablet Take 1 Tab by mouth daily. 9/9/20  Yes Zaria Zepeda MD          Allergies   Allergen Reactions    Pcn [Penicillins] Swelling           Objective:     Visit Vitals  BP (!) 153/71   Pulse 95   Temp 98 °F (36.7 °C)   Resp 22   Ht 5' 3\" (1.6 m)   Wt 143 lb (64.9 kg)   SpO2 95%   BMI 25.33 kg/m²     Physical Exam:    General: No distress  Eyes: Anicteric sclerae  HENT: Atraumatic  Neck: Supple  Respiratory: normal effort  CV: No peripheral edema  GI: nondistended  Skin: No rashes, ecchymoses, or petechiae  Psych: Alert, oriented, appropriate affect, normal judgment/insight      Lab Results   Component Value Date/Time    WBC 10.5 11/27/2020 04:06 AM    WBC 10.5 11/27/2020 04:06 AM    HGB 9.9 (L) 11/27/2020 04:06 AM    HGB 9.9 (L) 11/27/2020 04:06 AM    HCT 29.1 (L) 11/27/2020 04:06 AM    HCT 29.2 (L) 11/27/2020 04:06 AM    PLATELET 373 (L) 52/31/5500 04:06 AM    PLATELET 211 (L) 88/01/0262 04:06 AM    MCV 91.5 11/27/2020 04:06 AM    MCV 92.1 11/27/2020 04:06 AM       Lab Results   Component Value Date/Time    Sodium 138 11/27/2020 04:06 AM    Potassium 3.4 (L) 11/27/2020 04:06 AM    Chloride 103 11/27/2020 04:06 AM    CO2 28 11/27/2020 04:06 AM    Anion gap 7 11/27/2020 04:06 AM    Glucose 133 (H) 11/27/2020 04:06 AM    BUN 32 (H) 11/27/2020 04:06 AM    Creatinine 1.97 (H) 11/27/2020 04:06 AM    BUN/Creatinine ratio 16 11/27/2020 04:06 AM    GFR est AA 39 (L) 11/27/2020 04:06 AM    GFR est non-AA 32 (L) 11/27/2020 04:06 AM    Calcium 10.1 11/27/2020 04:06 AM    Bilirubin, total 0.8 11/27/2020 04:06 AM    Alk.  phosphatase 64 11/27/2020 04:06 AM    Protein, total 5.3 (L) 11/27/2020 04:06 AM    Albumin 2.4 (L) 11/27/2020 04:06 AM    Globulin 2.9 11/27/2020 04:06 AM    A-G Ratio 0.8 (L) 11/27/2020 04:06 AM    ALT (SGPT) 15 11/27/2020 04:06 AM    AST (SGOT) 36 11/27/2020 04:06 AM          MRI Results (most recent):  Results from [place] encounter on 11/20/20   MRI [facility] SPINE WO CONT    Narrative MRI OF THE THORACIC SPINE WITHOUT CONTRAST: 11/21/2020 1:51 PM    INDICATION: T12 fracture suspected on CT thoracic spine. TECHNIQUE: Multiplanar and multisequence MRI was obtained of the thoracic spine  without contrast.    COMPARISON: CT thoracic 11/20/2020. FINDINGS:   \"Broken DISH\" fracture: A fracture of the right lateral aspect of T12 (6-6) is  better seen on prior CT. On prior CT, diffuse idiopathic skeletal hyperostosis  (DISH) is also better appreciated. The flowing syndesmophytes of DISH in this  patient are predominantly right lateral. The fracture extends through the right  lateral cortex of T12 and slightly into the vertebral body, running obliquely  toward the superior endplate. A minimal, anterior, left, superior endplate  cortical step-off in T12 is better seen on prior CT (949-47). The fracture line  continues through the left lateral osteophyte between T11 and T12 (601-43 on  prior CT). Most of these findings are not as clearly visible on MRI. No definite  fracture of T11. No height loss in T11 or T12. The fracture is a \"broken DISH\"  fracture rather than a typical compression fracture. Partially imaged edema and L2 is better seen on same-day lumbar MRI to be  degenerative. Intervertebral disc height and signal intensity are well  preserved. Thoracic kyphosis is exaggerated. The thoracic cord is normal in  caliber and signal intensity. Lymphoma: In the partially imaged upper abdomen, there are multiple enlarged  retroperitoneal lymph nodes, with more bulky enlargement of multiple mesenteric  lymph nodes. The largest retroperitoneal lymph node measures 14 mm in short axis  on image 5-5. The largest, conglomerate, mesenteric lymph node measures 22 mm on  image 5-4. The spleen is enlarged. Multiple splenic masses are nearly T2  isointense. Several of the larger are centrally T2 hyperintense, however. The  largest splenic mass measures approximately 40 x 49 mm (8-25).  An enlarged right  hilar lymph node measures 12 mm on image 8-14. The paraspinal lymph node  posterior to the esophagus and medial to the aorta, at the T5-T6 level, measures  14 mm in short axis (5-8, 8-12). Findings are concerning for lymphoma. Other thoracoabdominal findings: Right shoulder joint effusion. Trace bilateral  pleural effusions and mild passive atelectasis. The common bile duct is mildly  dilated (7 mm). Distended stomach. Bilateral renal cysts. A 9 x 14 mm, oval,  circumscribed, homogenously T2 hyperintense, homogenously T1 hypointense nodule  posteromedial to the segment 6 hepatic capsule (8-24) is indeterminate, but of  doubtful significance. C7-T1: Facet osteoarthritis causes mild right neural foraminal stenosis (5-10). T1-T12: No herniation or stenosis. Impression IMPRESSION:   1. \"Broken DISH\" fracture of T12.  2. Lymphoma: Splenomegaly and bulky splenic masses. Thoracic, mesenteric, and  retroperitoneal lymphadenopathy. 3. CT chest abdomen pelvis with IV contrast is recommended for staging. The findings were called to Dr. Lakesha Howard on 11/21/2020 at 1347 hours and  1614 hours by Dr. Rommel Last. [phone]        11/23/2020 CT C/A/P wo contrast  IMPRESSION:  Retroperitoneal adenopathy is aorta caval in location measuring 35 x 17 mm in  size. Splenomegaly with scattered splenic hypodensities. The patient was not  administered IV contrast.   Adenopathy is in a location that would be difficult to percutaneously sample. Consider clinical necessity of percutaneous sampling.   Small right and minimal left-sided pleural effusion with bibasilar atelectasis.   Right and left renal calculi and cysts. No hydronephrosis or ureterolithiasis.     11/25/20 CTA chest    IMPRESSION:  No evidence of acute pulmonary embolus. Unchanged small bilateral pleural  effusions with bibasilar atelectasis. New nonspecific groundglass opacity in the  left lung apex, possibly infectious or inflammatory in etiology. Assessment/Plan     1.  Hypercalcemia: Likely from malignancy, S/p calcitonin x 2 doses 11/21 and 11/22   Calcium 10.1 this am: corrects to 11.38, improved  Continue with IVFs and lasix as needed; s/p Pamidronate 11/24, verified with nursing. Given at 30 mg over 4 hours      2. Anemia   Mild, normocytic - chronic disease related  No fe def; B12 and Folate normal  stable;  continue to monitor   Transfuse for Hgb < 7       3. Malignant disease  Lymphoma ? Scans as above   11/24 Plan for Biopsy; per radiology unable to perform bx due to difficulty of location      Bone marrow bx today, uneventful, will get results to Dr. Amira Bourne    - will need close follow up with Dr Amira Bourne after discharge; reviewed with [facility]. consulted palliative care for goals of care discussion, this is important as if pt needs an open surgical bx for diagnosis, would want to avoid that if he would not be interested in further chemotherapy    4. BENJI  Nephrology following, SPEP negative  Improving this am    5. Dispo    -needs close follow up with Dr Adeline Mendieta by:  Facundo Fay MD                     November 27, 2020

## 2020-11-27 NOTE — PROGRESS NOTES
Bedside and Verbal shift change report given to Robyn Baker (oncoming nurse) by Demetrice Wade RN (offgoing nurse). Report included the following information SBAR, Kardex, Intake/Output, MAR, Accordion, Recent Results, Med Rec Status and Cardiac Rhythm sinus rhythm with frequent PVCs. 1900 - Patient repositioned in bed, vital signs stable, no complaints of pain. Patient states he would like to have someone give him a shave. 11:03 PM  Vital signs stable, pain medication given for abdominal pain. TRANSFER - OUT REPORT:    Verbal report given to Dar Otero (name) on [facility]  being transferred to 5th Floor(unit) for routine progression of care       Report consisted of patients Situation, Background, Assessment and   Recommendations(SBAR). Information from the following report(s) SBAR, Kardex, Intake/Output, MAR, Accordion, Recent Results, Med Rec Status and Cardiac Rhythm sinus rhythm with PVCs was reviewed with the receiving nurse. Lines:   Peripheral IV 11/24/20 Left Antecubital (Active)   Site Assessment Clean, dry, & intact 11/26/20 2000   Phlebitis Assessment 0 11/26/20 2000   Infiltration Assessment 0 11/26/20 2000   Dressing Status Clean, dry, & intact 11/26/20 2000   Dressing Type Tape;Transparent 11/26/20 2000   Hub Color/Line Status Pink; Infusing;Flushed 11/26/20 2000   Action Taken Open ports on tubing capped 11/26/20 2000   Alcohol Cap Used Yes 11/26/20 2000       Peripheral IV 11/26/20 Anterior;Right; Outer Forearm (Active)   Site Assessment Clean, dry, & intact 11/26/20 2000   Phlebitis Assessment 0 11/26/20 2000   Infiltration Assessment 0 11/26/20 2000   Dressing Status Clean, dry, & intact 11/26/20 2000   Dressing Type Tape;Transparent 11/26/20 2000   Hub Color/Line Status Pink;Capped 11/26/20 2000   Action Taken Open ports on tubing capped 11/26/20 2000   Alcohol Cap Used Yes 11/26/20 2000        Opportunity for questions and clarification was provided.       Patient transported with: Monitor  O2 @ 2 liters

## 2020-11-27 NOTE — DISCHARGE INSTRUCTIONS
HOME DISCHARGE INSTRUCTIONS    Susi Cunningham / [id number] : 1932    Admission date: 2020 Discharge date: 2020     Please bring this form with you to show your care provider at your follow-up appointment. Primary care provider:  Jayden Gerardo MD    Discharging provider:  Molina Harrington MD  - Family Medicine Resident  Julia Ramirez MD - Family Medicine Attending      You have been admitted to the hospital with the following diagnoses:    ACUTE DIAGNOSES:  Acute renal failure (ARF) ([facility] [address] [N17.9]  Frequent falls [R29.6]  Hypercalcemia [E83.52]  Lumbar canal stenosis [M48.061]  Cervical stenosis of spinal canal [M48.02]  Acute renal failure (ARF) ([address] [N17.9]  Frequent falls [R29.6]  Hypercalcemia [E83.52]  Lumbar canal stenosis [M48.061]  Cervical stenosis of spinal canal [M48.02]    . . . . . . . . . . . . . . . . . . . . . . . . . . . . . . . . . . . . . . . . . . . . . . . . . . . . . . . . . . . . . . . . . . . . . . . . MEDICATION CHANGES    1. Levaquin 500 mg daily every 2 days. Start first dose in the morning  and take the second and last dose on . (No dose on the )    2. Flagyl 500 mg twice a day for 4 days. Start first dose in the evening (9:00PM) today and complete last dose on the morning of . Pick prescription from the pharmacy today. No changes were made to your medications, please take all your other home medicines as previously prescribed. . . . . . . . . . . . . . . . . . . . . . . . . . . . . . . . . . . . . . . . . . . . . . . . . . . . . . . . . . . . . . . . . . . . . . . . Shea Morocho      FOLLOW-UP CARE RECOMMENDATIONS:    Appointments  Follow-up Information     Follow up With Specialties Details Why Contact Info    Jayden Gerardo MD Family Medicine Call on 2020 Follow up after hospialization for a VIRTUAL appointment on 11:00  [facility]  [phone]      Charly Gray MD Hematology and Oncology, Hematology, Oncology Schedule an appointment as soon as possible for a visit To follow up for history of lymphoma. [address]  [phone]      Mallory Valencia MD Orthopedic Surgery Schedule an appointment as soon as possible for a visit Follow up after inpatient hospitalization for spinal fracture. [address]   [address] [facility]  [phone]             Follow-up tests needed:     -You will need to follow up with hematologist outpatient to continue work up for [address] will need to repeat CMP (complete metabolic panel) outpatient to monitor your Ca levels and renal function.    -You will need a repeat CBC (complete cell count) to follow up on anemia. Pending test results: At the time of your discharge the following test results are still pending:     - Final blood cultures sent on 11/24.   - Bone Marrow Biopsy    Please make sure you review these results with your outpatient follow-up provider(s). DIET/what to eat:  Renal Diet    ACTIVITY:  Activity as tolerated    Specific symptoms to watch for: chest pain, shortness of breath, fever, chills, nausea, vomiting, diarrhea, change in mentation, falling, weakness, bleeding. What to do if new or unexpected symptoms occur? If you experience any of the above symptoms (or should other concerns or questions arise after discharge) please call your primary care physician. Return to the emergency room if you cannot get hold of your doctor. · It is very important that you keep your follow-up appointment(s). · Please bring discharge papers, medication list (and/or medication bottles) to your follow-up appointments for review by your outpatient provider(s). · Please check the list of medications and be sure it includes every medication (even non-prescription medications) that your provider wants you to take.     · It is important that you take the medication exactly as they are prescribed. · Keep your medication in the bottles provided by the pharmacist and keep a list of the medication names, dosages, and times to be taken in your wallet. · Do not take other medications without consulting your doctor. · If you have any questions about your medications or other instructions, please talk to your nurse or care provider before you leave the hospital.     Information obtained by:     I understand that if any problems occur once I am at home I am to contact my physician. These instructions were explained to me and I had the opportunity to ask questions. I understand and acknowledge receipt of the instructions indicated above.                                                                                                                                                Physician's or R.N.'s Signature                                                                  Date/Time                                                                                                                                              Patient or Representative Signature                                                          Date/Time

## 2020-11-27 NOTE — PROGRESS NOTES
Bedside and Verbal shift change report given to Ozzy Cline (oncoming nurse) by Sosa Stone (offgoing nurse). Report included the following information SBAR, Kardex and MAR.

## 2020-11-27 NOTE — ACP (ADVANCE CARE PLANNING)
responded to Spiritual Care Consults/Orders request, indicating pt wants to charge code status to DNR. Consulted pt's nurse and learned pt was out for testing at the time of visit. Guerita Hill will continue to follow up as able or upon staff referrals. Visited by: Kirstin Burns   To polly : [phone])

## 2020-11-27 NOTE — PROGRESS NOTES
CBC Diff added on to CBC drawn this AM, PSBM added on for likely bone marrow biopsy this AM.  Please call [phone] for questions or concerns.       1180 Put in transport for Bone marrow biopsy

## 2020-11-27 NOTE — PROGRESS NOTES
Problem: Mobility Impaired (Adult and Pediatric)  Goal: *Acute Goals and Plan of Care (Insert Text)  Description: FUNCTIONAL STATUS PRIOR TO ADMISSION: Patient was modified independent using a rolling walker out in community and single point cane around the house for functional mobility. HOME SUPPORT PRIOR TO ADMISSION: The patient lived alone;  daughter and son nearby to provide assistance. Physical Therapy Goals  Initiated 11/21/2020; continue goals 11/27/20    1. Patient will move from supine to sit and sit to supine  in bed with modified independence within 4 days. 2. Patient will perform sit to stand with modified independence within 4 days. 3. Patient will ambulate with modified independence for 100 feet with the least restrictive device within 4 days. 4. Patient will ascend/descend 4 stairs with 1 handrail(s) with minimal assistance/contact guard assist within 4 days. 5. Patient will verbalize and demonstrate understanding of spinal precautions (No bending, lifting greater than 5 lbs, or twisting; log-roll technique; frequent repositioning as instructed) within 4 days. Outcome: Not Met   PHYSICAL THERAPY TREATMENT: WEEKLY REASSESSMENT  Patient: Jaime Mann [de-identified]80 y.o. male)  Date: 11/27/2020  Primary Diagnosis: Acute renal failure (ARF) (HCC) [N17.9]  Frequent falls [R29.6]  Hypercalcemia [E83.52]  Lumbar canal stenosis [M48.061]  Cervical stenosis of spinal canal [M48.02]  Acute renal failure (ARF) (HCC) [N17.9]  Frequent falls [R29.6]  Hypercalcemia [E83.52]  Lumbar canal stenosis [M48.061]  Cervical stenosis of spinal canal [M48.02]        Precautions: Skin;  Fall, Back(no BLT's, brace on when OOB; log roll technique)      ASSESSMENT  Patient continues with skilled PT services and is progressing towards goals slowly. Since initial evaluation 6 days ago pt initially demonstrated increased activity tolerance and required CGA for mobility.  He experienced decline in respiratory status and tachycardia, resulting in transfer for higher level of care. PE ruled out and COVID-19 negative. He presents today with decreased strength, AROM, and endurance limiting functional mobility. Pt back on medsurg unit using 1LNCO2 and reports not having exited bed in 2 days. He offers good participation in bed mobility with cues for techniques, transfers with mod to min assist, and brief ambulation to chair. After brief rest transport staff arrives for patient who is to receive bone marrow biopsy. Pt performs subsequent transfer, gait, and bed mobility to stretcher. O2 weaned to room air with SpO2 91% with activity and 94% at rest. RN aware and reports pt should transport without supplemental O2. Current Level of Function Impacting Discharge (mobility/balance): mod assist some transfers, cues for back precautions and safe mobility    Functional Outcome Measure: The patient scored 35 on the Barthel outcome measure which is indicative of 65% functional impairment. Other factors to consider for discharge: states son will be living with him after discharge; 2 stairs to enter home +rails. PLAN :  Goals have been updated based on progression since last assessment. Patient continues to benefit from skilled intervention to address the above impairments. Recommendations and Planned Interventions: bed mobility training, transfer training, gait training, therapeutic exercises, neuromuscular re-education, modalities, patient and family training/education, and therapeutic activities      Frequency/Duration: Patient will be followed by physical therapy:  5 times a week to address goals.     Recommendation for discharge: (in order for the patient to meet his/her long term goals)  Physical therapy at least 2 days/week in the home AND ensure assist and/or supervision for safety with all functional mobility    This discharge recommendation:  Has not yet been discussed the attending provider and/or case management    IF patient discharges home will need the following DME: none         SUBJECTIVE:   Patient stated That's about all I can do for today, after ambulating to chair. Pt declining further ambulation due to fatigue. Pt received supine, agreeable to PT and cleared by RN. OBJECTIVE DATA SUMMARY:   HISTORY:    Past Medical History:   Diagnosis Date    CRI (chronic renal insufficiency) 12/13/2012    Gout 1/4/2011    Prostate CA ([address] 1/4/2011     Past Surgical History:   Procedure Laterality Date    ENDOSCOPY, COLON, DIAGNOSTIC  2003    neg    HC BONE MARROW BIOPSY      HX PROSTATECTOMY         Personal factors and/or comorbidities impacting plan of care: as above    Home Situation  Home Environment: Private residence  # Steps to Enter: 2  Rails to Enter: Yes  One/Two Story Residence: One story  Living Alone: Yes  Support Systems: Family member(s)  Patient Expects to be Discharged to[de-identified] Private residence  Current DME Used/Available at Home: Ana Balint, straight, Walker, rolling    EXAMINATION/PRESENTATION/DECISION MAKING:   Critical Behavior:  Neurologic State: Other (Comment)(periodic confusion)  Orientation Level: Oriented X4  Cognition: Follows commands  Safety/Judgement: Decreased insight into deficits  Hearing: Auditory  Auditory Impairment: Hard of hearing, bilateral  Skin:  LE exposed skin intact  Edema: none noted LEs. Range Of Motion:      AROM generally decreased BLEs. Strength:        Generally decreased throughout                 Tone & Sensation:       Normal LE tone                           Coordination:   WFL       Functional Mobility:  Bed Mobility:  Rolling: Additional time; Adaptive equipment; Moderate assistance(cues and education for log roll)  Supine to Sit: Additional time; Adaptive equipment;Minimum assistance  Sit to Supine: Additional time; Moderate assistance  Scooting:  Additional time;Contact guard assistance  Transfers:  Sit to Stand: (mod assist from bed; min assist from chair)  Stand to Sit: Minimum assistance; Additional time(cues for safety)        Bed to Chair: Minimum assistance; Moderate assistance        Sliding Board : Additional time     Balance:   Sitting: Intact  Standing: With support  Standing - Static: Fair  Standing - Dynamic : Fair  Ambulation/Gait Training:  Distance (ft): (5' x 2 with seated rest.)  Assistive Device: Walker, rolling;Gait belt;Brace/Splint  Ambulation - Level of Assistance: Contact guard assistance        Gait Abnormalities: Decreased step clearance              Speed/Kika: Pace decreased (<100 feet/min)                     Flexed trunk    Brace donned with total assist and pt educated regarding LSO use when out of bed. He requires cues and re-education for back precautions including log roll and avoiding bending, lifting, twisting, and sitting >30 mins. Functional Measure:  Barthel Index:    Bathin  Bladder: 0  Bowels: 10  Groomin  Dressin  Feeding: 10  Mobility: 0  Stairs: 0  Toilet Use: 5  Transfer (Bed to Chair and Back): 5  Total: 35/100       The Barthel ADL Index: Guidelines  1. The index should be used as a record of what a patient does, not as a record of what a patient could do. 2. The main aim is to establish degree of independence from any help, physical or verbal, however minor and for whatever reason. 3. The need for supervision renders the patient not independent. 4. A patient's performance should be established using the best available evidence. Asking the patient, friends/relatives and nurses are the usual sources, but direct observation and common sense are also important. However direct testing is not needed. 5. Usually the patient's performance over the preceding 24-48 hours is important, but occasionally longer periods will be relevant. 6. Middle categories imply that the patient supplies over 50 per cent of the effort. 7. Use of aids to be independent is allowed.     Todd Stock., Barthel, DKeylaW. (1965). Functional evaluation: the Barthel Index. [address] [place] [employer] (14)2. JAMIN Sutton, Jane Pablo.Wilfredo Killen, [address] (). Measuring the change indisability after inpatient rehabilitation; comparison of the responsiveness of the Barthel Index and Functional [place] Measure. Journal of Neurology, Neurosurgery, and Psychiatry, 66(4), 862-964. CORIN Brandt, ALONDRA Copeland, & Deo Arshad M.A. (2004.) Assessment of post-stroke quality of life in cost-effectiveness studies: The usefulness of the Barthel Index and the EuroQoL-5D. Quality of Life Research, 13, 427-43           Pain Ratin/10 low back    Activity Tolerance:   requires rest breaks    After treatment patient left in no apparent distress:   Supine in bed and transport staff present, stretcher rails up, RN aware of pt transporting. COMMUNICATION/EDUCATION:   The patients plan of care was discussed with: Registered nurse. Fall prevention education was provided and the patient/caregiver indicated understanding., Patient/family have participated as able in goal setting and plan of care. , and Patient/family agree to work toward stated goals and plan of care.     Thank you for this referral.  Leobardo Le, PT, DPT   Time Calculation: 31 mins

## 2020-11-27 NOTE — PROGRESS NOTES
Pharmacy Dosing Note    Vancomycin random level drawn this morning (27.1 mcg/mL) is too high for re-dosing now. Will order random level in 12 more hours to evaluate clearance after 36 hours.     Thank you,  Joey Begum, PHARMD

## 2020-11-27 NOTE — PROGRESS NOTES
Bedside and Verbal shift change report given to Georgiana Akins RN (oncoming nurse) by Shelbi Alonso RN (offgoing nurse). Report included the following information SBAR, Kardex, MAR and Cardiac Rhythm NSR with PVCs .     0700: Patient without complaints. A&Ox4. Encouraged patient to call for assistance before getting out of bed. Call light in reach. Bedside and Verbal shift change report given to Bruce Miguel RN (oncoming nurse) by Georgiana Akins RN (offgoing nurse). Report included the following information SBAR, Kardex, MAR and Cardiac Rhythm NSR w/PVCs.

## 2020-11-27 NOTE — PROGRESS NOTES
I spoke to Mr. Andrea Rodriguez since the patient referred wanting to have a discussion regarding his code status. We discussed risks versus benefits of CPR. The patient is AOX4 and fully aware to make informed decisions about  life-sustaining measures and he verbally authorized DNR/DNI status. Dr. Dutch Saleh was present during conversation. Attending physician Dr. Genie Turcios will be notified.     Adrian Duran MD

## 2020-11-27 NOTE — PALLIATIVE CARE DISCHARGE
The Palliative Medicine team was consulted as part of your / your loved one's care in the hospital. Our team is a supportive service; we strive to relieve suffering and improve quality of life. You identified the following goal(s) as your main focus for healthcare: Patient/Health Care Proxy Stated Goals: Rehabilitation We reviewed advance care planning information, which includes the following: 
Advance Care Planning 11/25/2020 Patient's Healthcare Decision Maker is: Verbal statement (Legal Next of Kin remains as decision maker) Confirm Advance Directive Yes, not on file We reviewed / discussed your code status as: DNR 
   Full Code means perform CPR in the event of cardiac arrest 
   [facility] means do NOT perform CPR in the event of cardiac arrest 
   Partial Code means you have specific preferences, please discuss with your health care team 
   No Order means this issue was not addressed / resolved during your stay Because of the importance of this information, we are providing you with a printed copy to share with other healthcare providers after this hospitalization is complete. If any of the above information is incomplete or incorrect, please contact the Palliative Medicine team at [phone].

## 2020-11-27 NOTE — PROGRESS NOTES
Patient received from 5th floor for CT bone marrow biopsy    MD to bedside at 1040, Consent obtained  Patient to CT at  1045, placed in prone position on table, Monitor x3 applied, o2 at 2lpm via nasal cannula.  scan obtained. First dose at 1050  Time out at 1106  End of procedure 1114    Dry sterile dressing applied to procedure site, noted as CDI. Patient tolerated well. Total sedation time: 24 minutes  Total meds given 3mg Versed and 75mcg Fentanyl    Total meds wasted 2mg Versed and 25mcg Fentanyl    1130 patient returned to [facility] for recovery per protocol.

## 2020-11-28 NOTE — DISCHARGE SUMMARY
[address] [facility] [address] [place] [address]   Office [phone]  Fax [phone]       Discharge / Transfer / Off-Service Note     Name: Ankur Guillory MRN: [id number]  Sex: Male   YOB: 1932  Age: 80 y.o. PCP: Nadege Canales MD     Date of admission: 11/20/2020  Date of discharge/transfer: 11/27/2020    Attending physician at admission: Ford Tirado. Attending physician at discharge/transfer: Dr. Josie Fuentes  Resident physician at discharge/transfer: Analy Medellin MD     Consultants during hospitalization  IP CONSULT TO [address]  IP CONSULT TO NEPHROLOGY  IP CONSULT TO HEMATOLOGY  IP CONSULT TO PALLIATIVE CARE - PROVIDER  IP CONSULT TO PULMONOLOGY     Admission diagnoses   Acute renal failure (ARF) ([address] [N17.9]  Frequent falls [R29.6]  Hypercalcemia [E83.52]  Lumbar canal stenosis [M48.061]  Cervical stenosis of spinal canal [M48.02]  Acute renal failure (ARF) (HCC) [N17.9]  Frequent falls [R29.6]  Hypercalcemia [E83.52]  Lumbar canal stenosis [M48.061]  Cervical stenosis of spinal canal [M48.02]    Recommended follow-up after discharge    1. PCP-Erickson Sullivan MD  2. Hematology-Dr. Matt Kirkland (BM bx; possible recurrece of lymphoma)  3. Othropedic surgery-Dr. Cristian Monge (T12 fx)     Things to follow up on with PCP:   -Repeat CBC for anemia  -Repeat cmp for hypercalcemia and renal function  -final bone marrow bx results  -final blood cx taken on 11/2  -abx completion for HAP and improvement of cough. -outpatient speech follow up for dysphagia. -bowel regimen outpatient (miralax and colace)  -POA w/ uds positive for opiates w/o PMD.  -f/u peripheral blood smear      Medication Changes:    1.  Levaquin 500 mg daily q 48 hrs; on 11/28 and on 11/30.     2. Flagyl 500 mg bid  for 4 days    History of Present Illness    Ankur Guillory is a 80 y.o. male with PMHx of Hx of prostate CA, HTN, Gout, hx of lymphoma who presents to the ED complaining of a week hx of generalized weakness and fatigue. Pt and daughter refers recurrent fall episodes for the past 5 days associated with head trauma but w/o LOC. Pt has difficulty ambulating.     Family notes a decline in his ADL,  from driving his own car and ambulating with her walker when to go to the barely being able to ambulate. Pertinent positives includes fatigue, polydipsia, constipation (last bowel movement 4 days ago), urinary incontinence (once) and back pain. No numbness, saddle  or tingling.  No loss of bowel or bladder habit.  No chest pain, shortness of breath, fevers or chills.         COVID Questions:   Experiencing any of the following symptoms: fever, chills, cough, SOB, diarrhea, URI symptoms: none  Any Sick contacts with fever, cough, diarrhea, SOB, URI symptoms. ye  Traveled out of state or out of country. no  Lives with:  alone. Has been staying at home. yes     In the ED:  Vitals: Temp: 98  /78   HR 71   RR 18   SatO2  94% on RA  Labs: Cr: 3.89 (BL: 1.4), BUN: 56, UA: + leuk esterase, Ca: 13.6 corrected 14.5, Hgb: 11.9 (14)  Imaging: Ct spine thoracic: T12 with suspected fx in the body of  T11, CT spine: severe canal stenoses at L3-4 and L4-5. Treatment: nss 0.9% 1L    Hospital course    AHRF: initially thought to be 2/2 to aspiration event vs to volume overload. Pt improved w/ lasix. Chest CT: b/l pleural effusion, vlm loss left>right. CTA neg for PE. On 11/26 Chest xray: improved aeration b/l. Probable L. Basilar vlm loss. Procal: 0.67 BCcx: neg 3 days. S/p levaquin, vanco and flagyl. Pt required O2, but was weaned sucessfully and w/o O2 home requirements upon discharge.  -continue flagyl and levaquin as described above. 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 Ct abdomen pelvic w/o contrast remarkable for retroperitoneal adenopathy; difficult percutaneous sample. s/p BM bx on 11/27. Pt is DNR.  -F/u outpatient w/ Dr Khang Sierra   -F/U on final BMbx results. Hypercalcemia: POA: Ca: 13.6 corrected 14.5, 2/2 to malignancy and possible recurrence of lymphoma. S/p calcitonin, denosumab, pramidonate x 4 doses. Vitamin D levels >210.3, Immunoelectrophoresis: wnl. Decrease total protein and albumin on SPEP. PTHr peptide negative: 0.2  -CMP outpatient  -f/u w/ heme     Acute renal Failure-  POA: 3.86 (BL:1.4)  improved. Nephro was consulted. Improved initially w/ IVF. -repeat cmp     Elevated Troponins: Resolved. 0.17-->0.21-->0.23-->0.21  likely 2/2 to demand ischemia. EKG w/o acute ST changes compare to previous. No PE on CTA.      Weakness w/ Recurrent falls: could be 2/2 to anemia and malignance. MRI and CT spine w/ multilevel degenerative changes and stenosis. Carotid dupplex wnl. ECHO LVEF 60-65%, G1LVDD. -continue working w/ pt and      COVID PUI:  Negative results. Pt tested given new onset opacities on chest cta, cough and new O2 requirement.     T12 fx: MRI T spine: Broken dish T12 fx. tx with Norco 7.5-325 mg q 4hrs prn, Morphine 1 mg IV q 4hrs,  Tylenol 650 mg q 6 hr, Lidocaine patch daily.  -Follow w/ ortho outpatient. -PT / OT continue working with .     Spinal stenosis: evident on lumbar MRI: Severe spinal canal stenosis at L4-5 relating. Ortho consulted, no orthopedic surgical intervention. LSO Brace recommended per ortho, but will no pursuit given no proven evidence.  -Out-pt FU with Spine specialist Dr Navarro, Dr Mik Newell or Dr Sunnie Skiff     Hypertension-   -Continue home meds  . Anemia of chronic disease: Stable. POA: 11.9, (BL: 13)  likely 2/2 to malignancy. Normal Folate and B 12 levels. >Ferritin, <fe sat and <fe levels. Pt did not require BT.  -follow w/ heme outpatient    Constipation:   -Miralax prn  -Colace 100 mg bid     Gout: stable  -allopurinol 150 mg daily.      Opioid use: Fely French UDS + for opiates.     Prostate Cancer: stable; s/p prostatectomy. PSA wnl. Physical exam at discharge:    Vitals Reviewed.    Patient Vitals for the past 12 hrs:   Temp Pulse Resp BP SpO2   11/27/20 1645 98.7 °F (37.1 °C) 89 20 (!) 142/84 92 %   11/27/20 1431  95      11/27/20 1356     95 %   11/27/20 1214 98 °F (36.7 °C) 86 22 (!) 153/71 92 %   11/27/20 1140  94 23 134/80 95 %   11/27/20 1135  94 22 129/80 94 %   11/27/20 1130  96 23 131/83 96 %   11/27/20 1125  98 24 (!) 141/81 96 %   11/27/20 1123  99 24 (!) 141/84 95 %   11/27/20 1110  100 24 139/77 100 %   11/27/20 1105  99 23 127/76 100 %   11/27/20 1100  (!) 101 26 (!) 141/86 100 %   11/27/20 1057  (!) 102 11 131/81 100 %   11/27/20 1051  (!) 103 20 (!) 136/96 100 %   11/27/20 1026  (!) 101 26 (!) 142/81 95 %   11/27/20 1007  (!) 106 24 (!) 158/73 94 %      General Oriented to person, place, and time and well-developed. Appears well-nourished, no distress. Not diaphoretic. HENT Head Normocephalic and atraumatic. Eyes Conjunctivae are normal, no discharge. No scleral icterus. Nose Nose normal, clear turbinates. Oral Oropharynx is clear and moist. No oropharyngeal exudate. Neck No thyromegaly present. No cervical adenopathy. Cardio Normal rate, regular rhythm. Exam reveals no gallop and no friction rub. No murmur heard. No chest wall tenderness. Pulmonary Improved rhonchus. Abdominal Soft. Bowel sounds normal. No distension. No tenderness.  Deferred. Extremities No edema of lower extremities. No tenderness. Distal pulses intact. Neurological Alert and oriented to person, place, and time. Dermatology Skin is warm and dry. No rash noted. No erythema or pallor. Psychiatric Affect and judgment normal.        Condition at discharge: Stable.     Labs  Recent Labs     11/27/20  0406 11/26/20  0341 11/25/20  0439   WBC 10.5  10.5 10.8 12.3*   HGB 9.9*  9.9* 10.1* 10.1*   HCT 29.2*  29.1* 29.6* 30.0*   *  147* 140* 140*     Recent Labs     11/27/20  0406 11/26/20  1500 11/26/20  0341 11/25/20  0439    140 139 141   K 3.4* 3.0* 2.6* 3.6    105 106 110* CO2 28 28 28 23   BUN 32* 31* 30* 30*   CREA 1.97* 1.95* 2.13* 2.05*   * 121* 112* 132*   CA 10.1 10.1 10.6* 11.5*   MG 2.2  --  1.8 1.8   PHOS 1.4*  --  3.0 2.7     Recent Labs     11/27/20  0406 11/26/20  1500 11/26/20  0341   ALT 15 14 15   AP 64 56 58   TBILI 0.8 1.0 0.8   TP 5.3* 4.6* 5.5*   ALB 2.4* 2.4* 2.5*   GLOB 2.9 2.2 3.0     Recent Labs     11/25/20 2033 11/25/20  1350 11/25/20  0439 11/25/20  0249   PH  --   --   --  7.39   PCO2  --   --   --  34*   PO2  --   --   --  67*   TROIQ 0.20* 0.23* 0.21*  --        Micro:  Lab Results   Component Value Date/Time    Culture result: NO GROWTH 3 DAYS 11/24/2020 11:07 PM    Culture result: No growth (<1,000 CFU/ML) 11/20/2020 04:45 PM       Imaging:  Xr Chest Pa Lat    Result Date: 11/24/2020  EXAM: XR CHEST PA LAT INDICATION: concern for aspiration COMPARISON: 727 and 9. FINDINGS: PA and lateral radiographs of the chest demonstrate diminished lung volumes compared to prior study. Left lower lobe collapse. Left pleural effusion. . The cardiac and mediastinal contours and pulmonary vascularity are normal. The bones and soft tissues are within normal limits. IMPRESSION: Left lower lobe collapse or volume loss with left pleural effusion. Mra Brain Wo Cont    Result Date: 11/21/2020   No emergent process. Preliminary report was provided by Dr. Fer Whaley Final report to follow. EXAM: MRI BRAIN WO CONT, MRA BRAIN WO CONT INDICATION: Weakness/falls, CVA work up COMPARISON: None. CONTRAST: None. TECHNIQUE:  Multiplanar multisequence acquisition without contrast of the brain. 3-D time-of-flight MRA of the brain was performed. Multiplanar reconstructions were obtained. FINDINGS: MRI BRAIN: The ventricles are normal in size and position. There is no acute infarct, hemorrhage, extra-axial fluid collection, or mass effect. There is no cerebellar tonsillar herniation. Expected arterial flow-voids are present.  Periventricular and subcortical T2/flair white matter hyperintensity compatible with chronic small vessel ischemic disease. The paranasal sinuses, mastoid air cells, and middle ears are clear. The orbital contents are within normal limits. No significant osseous or scalp lesions are identified. MRA BRAIN: Dominant left vertebral artery, the right V4 segment is hypoplastic. The basilar artery and its branches are normal. The internal carotid, anterior cerebral, and middle cerebral arteries are patent. There is no flow-limiting intracranial stenosis. There is no aneurysm. There are no sizable posterior communicating arteries. IMPRESSION: 1. No acute intracranial abnormality. No acute vascular abnormality. 2. Sequela of chronic small vessel ischemic disease. Mri Brain Wo Cont    Result Date: 11/21/2020  *PRELIMINARY REPORT* No emergent process. Preliminary report was provided by Dr. Marlys Hodges Final report to follow. *END PRELIMINARY REPORT* EXAM: MRI BRAIN WO CONT, MRA BRAIN WO CONT INDICATION: Weakness/falls, CVA work up COMPARISON: None. CONTRAST: None. TECHNIQUE:  Multiplanar multisequence acquisition without contrast of the brain. 3-D time-of-flight MRA of the brain was performed. Multiplanar reconstructions were obtained. FINDINGS: MRI BRAIN: The ventricles are normal in size and position. There is no acute infarct, hemorrhage, extra-axial fluid collection, or mass effect. There is no cerebellar tonsillar herniation. Expected arterial flow-voids are present. Periventricular and subcortical T2/flair white matter hyperintensity compatible with chronic small vessel ischemic disease. The paranasal sinuses, mastoid air cells, and middle ears are clear. The orbital contents are within normal limits. No significant osseous or scalp lesions are identified. MRA BRAIN: Dominant left vertebral artery, the right V4 segment is hypoplastic. The basilar artery and its branches are normal. The internal carotid, anterior cerebral, and middle cerebral arteries are patent.  There is no flow-limiting intracranial stenosis. There is no aneurysm. There are no sizable posterior communicating arteries. IMPRESSION: 1. No acute intracranial abnormality. No acute vascular abnormality. 2. Sequela of chronic small vessel ischemic disease. Mri Cerv Spine Wo Cont    Result Date: 11/21/2020  *PRELIMINARY REPORT* There is disc herniation at C5/C6 which contacts the anterior margin of the cervical cord. No emergent findings Preliminary report was provided by Dr. Veronica Brunner Final report to follow. *END PRELIMINARY REPORT* EXAM: MRI CERV SPINE WO CONT INDICATION: severe canal stenosis. COMPARISON: None TECHNIQUE: MR imaging of the cervical spine was performed using the following sequences: sagittal T1, T2, STIR;  axial T2, T1. CONTRAST:  None. FINDINGS: Grade 1 anterolisthesis of C7 on T1, without significant uncovering of the disc. Vertebral body heights are maintained. Marrow signal is normal. The craniocervical junction is intact. The course, caliber, and signal intensity of the spinal cord are normal.  The paraspinal soft tissues are within normal limits. C2-C3: Small central disc osteophyte complex which does not significantly narrow the spinal canal. C3-C4: Small central disc osteophyte complex which causes minimal spinal canal narrowing. Mild bilateral uncovertebral osteophytes cause mild bilateral neural foraminal narrowing. C4-C5: Small left paracentral disc osteophyte complex causes minimal spinal canal narrowing. C5-C6: Large central disc osteophyte complex causes moderate spinal canal narrowing, with complete effacement of the ventral thecal sac although there is no compression of the spinal cord. Bilateral uncovertebral osteophytes cause moderate to severe neuroforaminal narrowing. C6-C7: Central/left paracentral disc osteophyte complex causes mild spinal canal narrowing. C7-T1: Grade 1 anterolisthesis of C7 on T1, without significant canal narrowing.      IMPRESSION: Multilevel degenerative changes as detailed above. These are most severe at C5-6, where large central disc osteophyte complex effaces the ventral thecal sac, without significant cord compression, and bilateral uncovertebral osteophytes cause moderate to severe neuroforaminal narrowing. Mri Thorac Spine Wo Cont    Result Date: 11/21/2020  MRI OF THE THORACIC SPINE WITHOUT CONTRAST: 11/21/2020 1:51 PM INDICATION: T12 fracture suspected on CT thoracic spine. TECHNIQUE: Multiplanar and multisequence MRI was obtained of the thoracic spine without contrast. COMPARISON: CT thoracic 11/20/2020. FINDINGS: \"Broken DISH\" fracture: A fracture of the right lateral aspect of T12 (6-6) is better seen on prior CT. On prior CT, diffuse idiopathic skeletal hyperostosis (DISH) is also better appreciated. The flowing syndesmophytes of DISH in this patient are predominantly right lateral. The fracture extends through the right lateral cortex of T12 and slightly into the vertebral body, running obliquely toward the superior endplate. A minimal, anterior, left, superior endplate cortical step-off in T12 is better seen on prior CT (884-98). The fracture line continues through the left lateral osteophyte between T11 and T12 (601-43 on prior CT). Most of these findings are not as clearly visible on MRI. No definite fracture of T11. No height loss in T11 or T12. The fracture is a \"broken DISH\" fracture rather than a typical compression fracture. Partially imaged edema and L2 is better seen on same-day lumbar MRI to be degenerative. Intervertebral disc height and signal intensity are well preserved. Thoracic kyphosis is exaggerated. The thoracic cord is normal in caliber and signal intensity. Lymphoma: In the partially imaged upper abdomen, there are multiple enlarged retroperitoneal lymph nodes, with more bulky enlargement of multiple mesenteric lymph nodes. The largest retroperitoneal lymph node measures 14 mm in short axis on image 5-5.  The largest, conglomerate, mesenteric lymph node measures 22 mm on image 5-4. The spleen is enlarged. Multiple splenic masses are nearly T2 isointense. Several of the larger are centrally T2 hyperintense, however. The largest splenic mass measures approximately 40 x 49 mm (8-25). An enlarged right hilar lymph node measures 12 mm on image 8-14. The paraspinal lymph node posterior to the esophagus and medial to the aorta, at the T5-T6 level, measures 14 mm in short axis (5-8, 8-12). Findings are concerning for lymphoma. Other thoracoabdominal findings: Right shoulder joint effusion. Trace bilateral pleural effusions and mild passive atelectasis. The common bile duct is mildly dilated (7 mm). Distended stomach. Bilateral renal cysts. A 9 x 14 mm, oval, circumscribed, homogenously T2 hyperintense, homogenously T1 hypointense nodule posteromedial to the segment 6 hepatic capsule (8-24) is indeterminate, but of doubtful significance. C7-T1: Facet osteoarthritis causes mild right neural foraminal stenosis (5-10). T1-T12: No herniation or stenosis. IMPRESSION: 1. \"Broken DISH\" fracture of T12. 2. Lymphoma: Splenomegaly and bulky splenic masses. Thoracic, mesenteric, and retroperitoneal lymphadenopathy. 3. CT chest abdomen pelvis with IV contrast is recommended for staging. The findings were called to Dr. Federico Camacho on 11/21/2020 at 1347 hours and 1614 hours by Dr. Monica Lopez. [facility]    Result Date: 11/21/2020  *PRELIMINARY REPORT* Neuroforaminal stenosis, most severe on the left at L3/L4 and L4/L5 Preliminary report was provided by Dr. Zulma Mattson Final report to follow. *END PRELIMINARY REPORT* EXAM: MRI LUMB SPINE WO CONT INDICATION: Severe Canal Stenosis. COMPARISON: CT of the lumbar spine dated November 20, 2020 TECHNIQUE: MR imaging of the lumbar spine was performed using the following sequences: sagittal T1, T2, STIR;  axial T1, T2. CONTRAST:  None.  FINDINGS: There is significant sigmoidal scoliosis, with apex left curvature centered about L2, and apex right curvature centered about L4. Grade 1 retrolisthesis of L2 on L3, L3 on L4. Grade 1 anterolisthesis of L4 on L5. Vertebral body heights are maintained. Mixed Modic type II and type III discogenic changes around the L2-3 disc space. The conus medullaris terminates at L1. Signal and caliber of the distal spinal cord are within normal limits. Large renal cysts are noted bilaterally. Lower thoracic spine: No herniation or stenosis. L1-L2: No herniation or stenosis. L2-L3: Grade 1 retrolisthesis of L2 on L3. Due to significant curvature, the spinal nerves at this level are displaced to the right, towards the right lateral recess. There is a moderate central disc bulge. With moderate mild bilateral facet arthropathy, there is narrowing of the right lateral recess, with mild impingement upon the traversing nerve roots. This also causes severe right neural foraminal narrowing. L3-L4: Grade 1 retrolisthesis of L3 on L4. Mild central disc bulge with a superimposed central extrusion, with a small fragment migrated inferiorly along the posterior cortex of L4 although there is no significant spinal canal narrowing. With mild bilateral facet arthropathy, the bulging disc causes mild right and moderate to severe left neural foraminal narrowing. L4-L5: Grade 1 anterolisthesis of L4 on L5 causes uncovering of the disc. In combination with hypertrophy of the ligamentum flavum at this level, there is severe spinal canal stenosis, with near complete effacement of the ventral and dorsal thecal sac. With severe bilateral facet arthropathy, there is also mild right and severe left neuroforaminal narrowing. L5-S1: No herniation or stenosis. IMPRESSION: 1. Multilevel degenerative change in association with sigmoidal scoliosis as detailed above, causing multiple areas of moderate to severe neuroforaminal narrowing.  2. Severe spinal canal stenosis at L4-5 relating to anterolisthesis of L4 on L5, uncovering of the L4-5 disc, and hypertrophy of the ligamentum flavum. 3. Central disc extrusion at L3-4, with inferior migration of an extruded fragment. There is no significant spinal canal narrowing at this level. Ct Head Wo Cont    Result Date: 11/20/2020  CLINICAL HISTORY: fall INDICATION: fall COMPARISON: None. CT dose reduction was achieved through use of a standardized protocol tailored for this examination and automatic exposure control for dose modulation. TECHNIQUE: Serial axial images with a collimation of 5 mm were obtained from the skull base through the vertex  FINDINGS: There is sulcal and ventricular prominence. Confluent periventricular and scattered foci of hypodensity in the cerebral white matter. There is no evidence of an acute infarction, hemorrhage, or mass-effect. There is no evidence of midline shift or hydrocephalus. Posterior fossa structures are unremarkable. No extra-axial collections are seen. Mastoid air cells are well pneumatized and clear. Vertebral and carotid vascular calcifications are noted. IMPRESSION: No acute intracranial process. Imaging findings consistent with mild chronic microvascular ischemic change. There is a mild to moderate degree of cerebral atrophy. Ct Chest Wo Cont    Result Date: 11/25/2020  INDICATION: Left lower lobe volume loss COMPARISON: Chest radiograph from the same day CONTRAST: None. TECHNIQUE:  5 mm axial images were obtained through the chest. Coronal and sagittal reformats were generated. CT dose reduction was achieved through use of a standardized protocol tailored for this examination and automatic exposure control for dose modulation. The absence of intravenous contrast reduces the sensitivity for evaluation of the mediastinum, martell, vasculature, and upper abdominal organs. FINDINGS: CHEST WALL: No mass or axillary lymphadenopathy. THYROID: No nodule. MEDIASTINUM: No mass or lymphadenopathy.  MARTELL: No mass or lymphadenopathy. THORACIC AORTA: No aneurysm. MAIN PULMONARY ARTERY: Normal in caliber. TRACHEA/BRONCHI: Patent. ESOPHAGUS: No wall thickening or dilatation. HEART: Coronary atherosclerotic disease PLEURA: Small bilateral pleural effusions. LUNGS: Bilateral lower lobe volume loss left greater than right. No evidence of mucus plugging or obstruction. INCIDENTALLY IMAGED UPPER ABDOMEN: Unusual lobulated contour of the spleen. BONES: No destructive bone lesion. IMPRESSION: Bilateral pleural effusions and bilateral lower lobe volume loss left greater than right. No evidence of bronchial obstruction. Ct Chest Wo Cont    Result Date: 11/23/2020  Clinical history: Evaluate for possible biopsy INDICATION:   Evaluate for possible biopsy COMPARISON:  MR lumbar spine 11/21/2020, MR thoracic spine 11/21/2020 TECHNIQUE: Thin axial images were obtained through the chest, abdomen and pelvis. Coronal and sagittal reconstructions were generated. The patient  was not administered oral contrast material as well. CT dose reduction was achieved through use of a standardized protocol tailored for this examination and automatic exposure control for dose modulation. Adaptive statistical iterative reconstruction (ASIR) was utilized. The absence of IV contrast significantly reduces sensitivity for presence of mass lesions. FINDINGS: SUPRACLAVICULAR REGION: Major cervical vasculature within normal limits. No supraclavicular adenopathy. VASCULATURE: No aortic aneurysm . MEDIASTINUM: Coronary artery disease. Trace pericardial effusion. Anemia. No hilar or mediastinal lymphadenopathy. No esophageal mass. No endotracheal or endobronchial mass. PLEURA/LUNGS[de-identified] Small right and trace left effusion. Minimal bibasilar atelectasis. No mass lesion identified No nodule, mass, or airspace disease. SOFT TISSUE/ AXILLA:  No mass or lymphadenopathy. LIVER: There is no intrahepatic duct dilatation. There is no hepatic parenchymal mass.  GALLBLADDER: No dilatation or wall thickening. SPLEEN/PANCREAS: There is splenomegaly. Scattered hypodensities in the splenic parenchyma with irregular contour of the spleen. There is no pancreatic duct dilatation. There is no evidence of splenomegaly. ADRENALS/KIDNEYS: Left and right renal hypodensities are consistent with simple cyst. Right and left renal calculi. No hydronephrosis or obstruction. There is no hydronephrosis. There is no renal mass. There is no perinephric mass. STOMACH: No dilatation or wall thickening. COLON AND SMALL BOWEL: Fecal stasis. There is no free intraperitoneal air. There is no evidence of incarceration or obstruction. No mesenteric adenopathy. PERITONEUM: Unremarkable. APPENDIX: Unremarkable. BLADDER/REPRODUCTIVE ORGANS: Patient is post prostatectomy. RETROPERITONEUM: There is retroperitoneal lymphadenopathy demonstrated. Conglomerate of lymph nodes at 2-63 measures 35 mm AP by 17 mm in transverse dimension. Aortocaval in location. Immediately associated with the renal vein and renal artery as well. There is no extraluminal iliac or inguinal adenopathy. The abdominal aorta is normal in caliber. No aneurysm. No retroperitoneal adenopathy. OSSEOUS STRUCTURES: Retrolisthesis of L2 on L3 and L3 on L4. Canal and foraminal stenosis of the lumbar spine. There is mild levo rotatory scoliosis. IMPRESSION: Retroperitoneal adenopathy is aorta caval in location measuring 35 x 17 mm in size. Splenomegaly with scattered splenic hypodensities. The patient was not administered IV contrast. Adenopathy is in a location that would be difficult to percutaneously sample. Consider clinical necessity of percutaneous sampling. Small right and minimal left-sided pleural effusion with bibasilar atelectasis. Right and left renal calculi and cysts. No hydronephrosis or ureterolithiasis. Additional incidental findings are as described in detail above.       Cta Chest W Or W Wo Cont    Result Date: 11/25/2020  INDICATION: Tachycardia. Tachypnea. New oxygen requirement. COMPARISON:November 25, 2020 at 1:34 AM TECHNIQUE:  Routine noncontrast imaging the chest was performed for localization purposes. Then, following the uneventful intravenous administration of 100 cc [id number], thin helical axial images were obtained through the chest. 3D image postprocessing was performed. CT dose reduction was achieved through use of a standardized protocol tailored for this examination and automatic exposure control for dose modulation. FINDINGS: CHEST WALL: No chest wall mass or axillary lymphadenopathy. THYROID: No nodule. MEDIASTINUM: No mass or lymphadenopathy. MARTELL: No mass or lymphadenopathy. THORACIC AORTA: No dissection or aneurysm. PULMONARY ARTERIES: Main pulmonary artery is normal in caliber. No evidence of acute pulmonary emboli. TRACHEA/BRONCHI: Patent. ESOPHAGUS: No wall thickening or dilatation. HEART: Normal in size. Moderate coronary artery calcifications. Trace pericardial effusion, unchanged. PLEURA: Unchanged small bilateral pleural effusions. LUNGS: Bibasilar atelectasis. New nonspecific groundglass opacity in the left lung apex. INCIDENTALLY IMAGED UPPER ABDOMEN: Unchanged splenic lobulation. BONES: No destructive bone lesion. ADDITIONAL COMMENTS: N/A     IMPRESSION: No evidence of acute pulmonary embolus. Unchanged small bilateral pleural effusions with bibasilar atelectasis. New nonspecific groundglass opacity in the left lung apex, possibly infectious or inflammatory in etiology. Ct Spine Cerv Wo Cont    Result Date: 11/20/2020  EXAM: CT SPINE CERV WO CONT CLINICAL HISTORY: fall INDICATION: fall COMPARISON:  None TECHNIQUE:  Axial neck CT was performed. Noncontrast imaging obtained. Coronal and sagittal reconstructions were performed. CT dose reduction was achieved through use of a standardized protocol tailored for this examination and automatic exposure control for dose modulation. Osseous/bone algorithm was utilized. FINDINGS: Carotid atherosclerotic change. No pneumothorax. No large pulmonary mass or nodule. .  There is no evidence of fracture or subluxation. The prevertebral soft tissues are grossly within normal limits. The atlantodental interval is within normal limits. The craniocervical junction is intact. Multilevel loss of disc height. Multilevel severe canal and foraminal stenoses. Minimal anterolisthesis of C7 on T1. IMPRESSION: There is no acute fracture or dislocation identified. Ct Spine Thorac Wo Cont    Result Date: 11/20/2020  EXAM:  CT SPINE [facility] WO CONT INDICATION: Fall, back pain. COMPARISON: None. TECHNIQUE: Multislice helical CT of the thoracic spine was performed. Sagittal and coronal reformations were generated. CT dose reduction was achieved through use of a standardized protocol tailored for this examination and automatic exposure control for dose modulation. FINDINGS: There are osteophytes at multiple levels. There is cortical disruption in the anterior, superior endplate of E49 which extends through both the right lateral and left lateral osteophyte. Query cortical disruption in the right, lateral aspect of the T11 vertebral body. There is no spinal canal stenosis. Coronary artery disease and atherosclerosis. Prominent lymph nodes in the posterior mediastinum Likely cystic lesions in both kidneys which are incompletely characterized      IMPRESSION: 1. Cortical disruption in the body of T12 with suspected fracture in the body of T11. Ct Spine Lumb Wo Cont    Result Date: 11/20/2020  EXAM: CT SPINE LUMB WO CONT History: Status post fall/back pain INDICATION: fall, back pain COMPARISON: None. TECHNIQUE:   Unenhanced multislice helical CT of the lumbar spine was performed in the axial plane. Coronal and sagittal reconstructions were obtained.   CT dose reduction was achieved through use of a standardized protocol tailored for this examination and automatic exposure control for dose modulation. FINDINGS: There is retrolisthesis of L2 on L3 and L3 on L4 each measuring 4 mm. Anterolisthesis of L4 on L5 measuring or millimeters. Abdominal aorta demonstrates atherosclerotic change. There are renal calculi. There is no hydronephrosis. Left renal hypodensities most likely a cyst. Mild levorotatory scoliotic change. T12-L1: The spinal canal and neural foramina are widely patent. L1-L2: The spinal canal and neural foramina are widely patent. L2-L3: Moderate facet arthropathy. Ligamentum flavum hypertrophy. Minimal retrolisthesis. Disc bulge/osteophyte. Moderate canal and right foraminal stenosis. L3-L4: Facet arthropathy and hypertrophy. Ligament flavum hypertrophy. Minimal retrolisthesis. Severe canal stenosis. Mild bilateral foraminal stenoses. L4-L5: Facet arthropathy and hypertrophy. Disc bulge/osteophyte. Severe canal stenosis. Severe left foraminal stenosis. L5-S1: Mild facet arthropathy. Canal is patent. Foramina are patent. IMPRESSION: Extensive multilevel degenerative change with multilevel spondylolisthesis as well. Severe canal stenoses at L3-4 and L4-5. Additional, less severe degenerative findings are as described in detail above. Ct Abd Pelv Wo Cont    Result Date: 11/23/2020  Clinical history: Evaluate for possible biopsy INDICATION:   Evaluate for possible biopsy COMPARISON:  MR lumbar spine 11/21/2020, MR thoracic spine 11/21/2020 TECHNIQUE: Thin axial images were obtained through the chest, abdomen and pelvis. Coronal and sagittal reconstructions were generated. The patient  was not administered oral contrast material as well. CT dose reduction was achieved through use of a standardized protocol tailored for this examination and automatic exposure control for dose modulation. Adaptive statistical iterative reconstruction (ASIR) was utilized. The absence of IV contrast significantly reduces sensitivity for presence of mass lesions.  FINDINGS: SUPRACLAVICULAR REGION: Major cervical vasculature within normal limits. No supraclavicular adenopathy. VASCULATURE: No aortic aneurysm . MEDIASTINUM: Coronary artery disease. Trace pericardial effusion. Anemia. No hilar or mediastinal lymphadenopathy. No esophageal mass. No endotracheal or endobronchial mass. PLEURA/LUNGS[de-identified] Small right and trace left effusion. Minimal bibasilar atelectasis. No mass lesion identified No nodule, mass, or airspace disease. SOFT TISSUE/ AXILLA:  No mass or lymphadenopathy. LIVER: There is no intrahepatic duct dilatation. There is no hepatic parenchymal mass. GALLBLADDER:  No dilatation or wall thickening. SPLEEN/PANCREAS: There is splenomegaly. Scattered hypodensities in the splenic parenchyma with irregular contour of the spleen. There is no pancreatic duct dilatation. There is no evidence of splenomegaly. ADRENALS/KIDNEYS: Left and right renal hypodensities are consistent with simple cyst. Right and left renal calculi. No hydronephrosis or obstruction. There is no hydronephrosis. There is no renal mass. There is no perinephric mass. STOMACH: No dilatation or wall thickening. COLON AND SMALL BOWEL: Fecal stasis. There is no free intraperitoneal air. There is no evidence of incarceration or obstruction. No mesenteric adenopathy. PERITONEUM: Unremarkable. APPENDIX: Unremarkable. BLADDER/REPRODUCTIVE ORGANS: Patient is post prostatectomy. RETROPERITONEUM: There is retroperitoneal lymphadenopathy demonstrated. Conglomerate of lymph nodes at 2-63 measures 35 mm AP by 17 mm in transverse dimension. Aortocaval in location. Immediately associated with the renal vein and renal artery as well. There is no extraluminal iliac or inguinal adenopathy. The abdominal aorta is normal in caliber. No aneurysm. No retroperitoneal adenopathy. OSSEOUS STRUCTURES: Retrolisthesis of L2 on L3 and L3 on L4. Canal and foraminal stenosis of the lumbar spine. There is mild levo rotatory scoliosis.      IMPRESSION: Retroperitoneal adenopathy is aorta caval in location measuring 35 x 17 mm in size. Splenomegaly with scattered splenic hypodensities. The patient was not administered IV contrast. Adenopathy is in a location that would be difficult to percutaneously sample. Consider clinical necessity of percutaneous sampling. Small right and minimal left-sided pleural effusion with bibasilar atelectasis. Right and left renal calculi and cysts. No hydronephrosis or ureterolithiasis. Additional incidental findings are as described in detail above. Xr Chest Port    Result Date: 11/26/2020  EXAM: XR CHEST PORT INDICATION: dypnea COMPARISON: 11/24/2020 FINDINGS: A portable AP radiograph of the chest was obtained at 316 hours. The patient is on a cardiac monitor. Lungs demonstrate improved aeration. Probable left basilar volume loss is again noted. . The cardiac and mediastinal contours and pulmonary vascularity are normal.  The bones and soft tissues are grossly within normal limits. IMPRESSION: Improved aeration bilaterally. Probable left basilar volume loss. Ct Bx Bone Marrow And Aspiration    Result Date: 11/27/2020  INDICATION: Retroperitoneal lymphadenopathy. Evaluate for lymphoma. PROCEDURE: Bone marrow biopsy under CT guidance. CT dose reduction was achieved through use of a standardized protocol tailored for this examination and automatic exposure control for dose modulation. ESTIMATED BLOOD LOSS: Less than 5 mL OPERATING PHYSICIAN: William Flores M.D. POSTOPERATIVE DIAGNOSIS: Retroperitoneal lymphadenopathy. SPECIMENS REMOVED: Bone marrow aspirate and core SEDATION: Moderate intravenous conscious sedation was supervised by Dr. Cornel Wilkinson. The patient was independently monitored by a registered nurse assigned to the Department of Radiology using automated blood pressure, EKG, and pulse oximetry. The detailed conscious sedation record is stored in the hospital information system.  MEDICATION: Versed: 3 mg Fentanyl: 75 mcg Intraprocedure time: 24 minutes EXAM: FINDINGS: Written and verbal consent was obtained from the patient after the risks, benefits and alternatives of the procedure were explained. The patient was placed prone on the CT gantry. Initial  images were obtained. Site on the lower back was chosen. The skin was prepped and draped in the normal sterile fashion. Skin and subcutaneous tissues were anesthetized with lidocaine. Under CT guidance the [employer] power bone biopsy needle was advanced to the margin of the posterior iliac bone. A 2 cm core biopsy was obtained and approximately 5 mL marrow aspirate were handed to the cytology technologist in the CT suite. The needle was removed after the sample was considered adequate. No immediate complications. Patient was recovered in the recovery room without incident. IMPRESSION: 1. Successful conscious sedation 2.  Successful CT guided power bone marrow biopsy      Procedures / Diagnostic Studies  · Imaging work up described above  · BM bx  · BCcx  · Peripheral blood smear    Chronic diagnoses   Problem List as of 11/27/2020 Date Reviewed: 8/13/2020          Codes Class Noted - Resolved    Hypercalcemia ICD-10-CM: E83.52  ICD-9-CM: 275.42  11/23/2020 - Present        History of B-cell lymphoma ICD-10-CM: Z85.72  ICD-9-CM: V10.79  11/22/2020 - Present        Non-Hodgkin's lymphoma in relapse [facility]) ICD-10-CM: C85.90  ICD-9-CM: 202.80  11/22/2020 - Present        Weakness ICD-10-CM: R53.1  ICD-9-CM: 780.79  11/21/2020 - Present        Anemia ICD-10-CM: D64.9  ICD-9-CM: 285.9  11/21/2020 - Present        Opioid use ICD-10-CM: F11.90  ICD-9-CM: 305.50  11/21/2020 - Present        Hypercalcemia of malignancy ICD-10-CM: E83.52  ICD-9-CM: 275.42  11/20/2020 - Present        Cervical stenosis of spinal canal ICD-10-CM: M48.02  ICD-9-CM: 723.0  11/20/2020 - Present        * (Principal) Acute renal failure (ARF) ([facility] [address] ICD-10-CM: N17.9  ICD-9-CM: 584.9  11/20/2020 - Present Lumbar canal stenosis ICD-10-CM: M48.061  ICD-9-CM: 724.02  11/20/2020 - Present        Frequent falls ICD-10-CM: R29.6  ICD-9-CM: V15.88  11/20/2020 - Present        Multiple falls ICD-10-CM: R29.6  ICD-9-CM: V15.88  11/20/2020 - Present        CRI (chronic renal insufficiency) (Chronic) ICD-10-CM: N18.9  ICD-9-CM: 585.9  12/13/2012 - Present        Lymphoma ([facility] ICD-10-CM: C85.90  ICD-9-CM: 202.80  1/4/2011 - Present        HTN (hypertension) ICD-10-CM: I10  ICD-9-CM: 401.9  1/4/2011 - Present        Prostate CA ([facility] ICD-10-CM: Geri Sanchez  ICD-9-CM: 185  1/4/2011 - Present        Gout ICD-10-CM: M10.9  ICD-9-CM: 274.9  1/4/2011 - Present              Discharge Medication List as of 11/27/2020  5:56 PM      START taking these medications    Details   metroNIDAZOLE (FLAGYL) 500 mg tablet Take 1 Tab by mouth two (2) times a day for 8 doses. , Normal, Disp-8 Tab,R-0      levoFLOXacin (LEVAQUIN) 500 mg tablet Take one tablet every 48 hours. , Normal, Disp-2 Tab,R-0         CONTINUE these medications which have NOT CHANGED    Details   allopurinoL (ZYLOPRIM) 300 mg tablet Take 150 mg by mouth daily. , Historical Med      vitamin e (E GEMS) 100 unit capsule Take 100 Units by mouth daily. , Historical Med      multivitamin (ONE A DAY) tablet Take 1 Tab by mouth daily. , Historical Med      ketoconazole (NIZORAL) 2 % topical cream Apply  to affected area two (2) times a day for 28 days. , Normal, Disp-60 g,R-1      atenoloL (TENORMIN) 50 mg tablet Take 1 Tab by mouth daily. , Normal, Disp-90 Tab,R-3              Diet:  Renal diet .     Activity:  As tolerated     Disposition: Home or Self Care    Discharge instructions to patient/family  Please seek medical attention for any new or worsening symptoms particularly fever, chest pain, shortness of breath, abdominal pain, nausea, vomiting    Follow up plans/appointments  Follow-up Information     Follow up With Specialties Details Why Contact Info    Gina Cerrato MD Family Medicine Call on 12/2/2020 Follow up after hospialization for a VIRTUAL appointment on 11:00  [facility]  [phone]      Susi Juarez MD Hematology and Oncology, Hematology, Oncology Schedule an appointment as soon as possible for a visit To follow up for history of lymphoma. [address] [facility]  [address] [facility]  [phone]      Char Duran MD Orthopedic Surgery Schedule an appointment as soon as possible for a visit Follow up after inpatient hospitalization for spinal fracture. [address] [facility]   [address] [facility] ([phone]). Please call agency directly with any questions.             Belen De Luna MD  Family Medicine Resident       For Billing    Chief Complaint   Patient presents with   [facility] Problems  Date Reviewed: 8/13/2020          Codes Class Noted POA    Hypercalcemia ICD-10-CM: V39.02  ICD-9-CM: 275.42  11/23/2020 Unknown        History of B-cell lymphoma ICD-10-CM: Z85.72  ICD-9-CM: V10.79  11/22/2020 Unknown        Non-Hodgkin's lymphoma in relapse [facility]) ICD-10-CM: C85.90  ICD-9-CM: 202.80  11/22/2020 Unknown        Weakness ICD-10-CM: R53.1  ICD-9-CM: 780.79  11/21/2020 Unknown        Anemia ICD-10-CM: D64.9  ICD-9-CM: 285.9  11/21/2020 Unknown        Opioid use ICD-10-CM: F11.90  ICD-9-CM: 305.50  11/21/2020 Unknown        Hypercalcemia of malignancy ICD-10-CM: E83.52  ICD-9-CM: 275.42  11/20/2020         Cervical stenosis of spinal canal ICD-10-CM: M48.02  ICD-9-CM: 723.0  11/20/2020 Unknown        * (Principal) Acute renal failure (ARF) ([facility] [address] ICD-10-CM: N17.9  ICD-9-CM: 584.9  11/20/2020 Unknown        Lumbar canal stenosis ICD-10-CM: M48.061  ICD-9-CM: 724.02  11/20/2020 Unknown        Frequent falls ICD-10-CM: R29.6  ICD-9-CM: V15.88  11/20/2020 Unknown        Multiple falls ICD-10-CM: R29.6  ICD-9-CM: V15.88  11/20/2020 Unknown        Lymphoma ([facility] ICD-10-CM: C85.90  ICD-9-CM: 202.80  1/4/2011 Yes        HTN (hypertension) ICD-10-CM: I10  ICD-9-CM: 401.9  1/4/2011 Yes        Prostate CA ([facility] ICD-10-CM: C61  ICD-9-CM: 413  1/4/2011 Yes        Gout ICD-10-CM: M10.9  ICD-9-CM: 274.9  1/4/2011 Yes

## 2020-11-30 ENCOUNTER — TELEPHONE (OUTPATIENT)
Dept: FAMILY MEDICINE CLINIC | Age: 85
End: 2020-11-30

## 2020-11-30 ENCOUNTER — VIRTUAL VISIT (OUTPATIENT)
Dept: FAMILY MEDICINE CLINIC | Age: 85
DRG: 871 | End: 2020-11-30
Payer: MEDICARE

## 2020-11-30 ENCOUNTER — PATIENT OUTREACH (OUTPATIENT)
Dept: CASE MANAGEMENT | Age: 85
End: 2020-11-30

## 2020-11-30 ENCOUNTER — APPOINTMENT (OUTPATIENT)
Dept: GENERAL RADIOLOGY | Age: 85
DRG: 871 | End: 2020-11-30
Attending: EMERGENCY MEDICINE
Payer: MEDICARE

## 2020-11-30 ENCOUNTER — HOSPITAL ENCOUNTER (INPATIENT)
Age: 85
LOS: 6 days | Discharge: NURSING, REHAB OR LONG-TERM CARE | DRG: 871 | End: 2020-12-06
Attending: EMERGENCY MEDICINE | Admitting: FAMILY MEDICINE
Payer: MEDICARE
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
## 2020-11-30 DIAGNOSIS — I10 ESSENTIAL HYPERTENSION: ICD-10-CM

## 2020-11-30 DIAGNOSIS — R65.20 SEVERE SEPSIS (HCC): ICD-10-CM

## 2020-11-30 DIAGNOSIS — A41.9 SEVERE SEPSIS (HCC): ICD-10-CM

## 2020-11-30 DIAGNOSIS — J18.9 HCAP (HEALTHCARE-ASSOCIATED PNEUMONIA): Primary | ICD-10-CM

## 2020-11-30 DIAGNOSIS — J18.9 HAP (HOSPITAL-ACQUIRED PNEUMONIA): ICD-10-CM

## 2020-11-30 DIAGNOSIS — N30.01 ACUTE CYSTITIS WITH HEMATURIA: ICD-10-CM

## 2020-11-30 DIAGNOSIS — J96.01 ACUTE HYPOXEMIC RESPIRATORY FAILURE (HCC): ICD-10-CM

## 2020-11-30 DIAGNOSIS — R53.1 WEAKNESS: ICD-10-CM

## 2020-11-30 DIAGNOSIS — R09.02 HYPOXIA: ICD-10-CM

## 2020-11-30 DIAGNOSIS — N17.9 AKI (ACUTE KIDNEY INJURY) (HCC): ICD-10-CM

## 2020-11-30 DIAGNOSIS — I50.9 ACUTE CONGESTIVE HEART FAILURE, UNSPECIFIED HEART FAILURE TYPE (HCC): ICD-10-CM

## 2020-11-30 DIAGNOSIS — M1A.9XX0 CHRONIC GOUT WITHOUT TOPHUS, UNSPECIFIED CAUSE, UNSPECIFIED SITE: ICD-10-CM

## 2020-11-30 DIAGNOSIS — N17.9 ACUTE RENAL FAILURE, UNSPECIFIED ACUTE RENAL FAILURE TYPE (HCC): ICD-10-CM

## 2020-11-30 DIAGNOSIS — J96.00 ACUTE RESPIRATORY FAILURE, UNSPECIFIED WHETHER WITH HYPOXIA OR HYPERCAPNIA (HCC): Primary | ICD-10-CM

## 2020-11-30 DIAGNOSIS — R31.9 HEMATURIA, UNSPECIFIED TYPE: ICD-10-CM

## 2020-11-30 DIAGNOSIS — Z85.72 HISTORY OF B-CELL LYMPHOMA: ICD-10-CM

## 2020-11-30 DIAGNOSIS — R06.02 SOB (SHORTNESS OF BREATH): ICD-10-CM

## 2020-11-30 DIAGNOSIS — D64.9 ANEMIA, UNSPECIFIED TYPE: ICD-10-CM

## 2020-11-30 PROBLEM — R65.10 SIRS (SYSTEMIC INFLAMMATORY RESPONSE SYNDROME) (HCC): Status: ACTIVE | Noted: 2020-11-30

## 2020-11-30 LAB
ALBUMIN SERPL-MCNC: 2.5 G/DL (ref 3.5–5)
ALBUMIN/GLOB SERPL: 0.9 {RATIO} (ref 1.1–2.2)
ALP SERPL-CCNC: 98 U/L (ref 45–117)
ALT SERPL-CCNC: 17 U/L (ref 12–78)
ANION GAP SERPL CALC-SCNC: 6 MMOL/L (ref 5–15)
APPEARANCE UR: ABNORMAL
ARTERIAL PATENCY WRIST A: YES
AST SERPL-CCNC: 41 U/L (ref 15–37)
BACTERIA SPEC CULT: NORMAL
BACTERIA URNS QL MICRO: NEGATIVE /HPF
BASE EXCESS BLDA CALC-SCNC: 1.7 MMOL/L
BASOPHILS # BLD: 0 K/UL (ref 0–0.1)
BASOPHILS NFR BLD: 0 % (ref 0–1)
BDY SITE: ABNORMAL
BILIRUB SERPL-MCNC: 1.2 MG/DL (ref 0.2–1)
BILIRUB UR QL: NEGATIVE
BNP SERPL-MCNC: ABNORMAL PG/ML
BUN SERPL-MCNC: 48 MG/DL (ref 6–20)
BUN/CREAT SERPL: 21 (ref 12–20)
CALCIUM SERPL-MCNC: 9.2 MG/DL (ref 8.5–10.1)
CHLORIDE SERPL-SCNC: 101 MMOL/L (ref 97–108)
CO2 SERPL-SCNC: 31 MMOL/L (ref 21–32)
COLOR UR: ABNORMAL
CREAT SERPL-MCNC: 2.24 MG/DL (ref 0.7–1.3)
CRP SERPL-MCNC: 10.7 MG/DL (ref 0–0.6)
D DIMER PPP FEU-MCNC: 8.2 MG/L FEU (ref 0–0.65)
DIFFERENTIAL METHOD BLD: ABNORMAL
EOSINOPHIL # BLD: 0 K/UL (ref 0–0.4)
EOSINOPHIL NFR BLD: 0 % (ref 0–7)
EPITH CASTS URNS QL MICRO: ABNORMAL /LPF
ERYTHROCYTE [DISTWIDTH] IN BLOOD BY AUTOMATED COUNT: 16 % (ref 11.5–14.5)
FERRITIN SERPL-MCNC: 1620 NG/ML (ref 26–388)
FIBRINOGEN PPP-MCNC: 661 MG/DL (ref 200–475)
GAS FLOW.O2 O2 DELIVERY SYS: 4 L/MIN
GLOBULIN SER CALC-MCNC: 2.8 G/DL (ref 2–4)
GLUCOSE SERPL-MCNC: 127 MG/DL (ref 65–100)
GLUCOSE UR STRIP.AUTO-MCNC: NEGATIVE MG/DL
HCO3 BLDA-SCNC: 24 MMOL/L (ref 22–26)
HCT VFR BLD AUTO: 34.6 % (ref 36.6–50.3)
HGB BLD-MCNC: 11.8 G/DL (ref 12.1–17)
HGB UR QL STRIP: ABNORMAL
HYALINE CASTS URNS QL MICRO: ABNORMAL /LPF (ref 0–5)
IMM GRANULOCYTES # BLD AUTO: 0.1 K/UL (ref 0–0.04)
IMM GRANULOCYTES NFR BLD AUTO: 1 % (ref 0–0.5)
KETONES UR QL STRIP.AUTO: NEGATIVE MG/DL
LACTATE SERPL-SCNC: 1.9 MMOL/L (ref 0.4–2)
LDH SERPL L TO P-CCNC: 450 U/L (ref 85–241)
LEUKOCYTE ESTERASE UR QL STRIP.AUTO: ABNORMAL
LIPASE SERPL-CCNC: 69 U/L (ref 73–393)
LYMPHOCYTES # BLD: 0.9 K/UL (ref 0.8–3.5)
LYMPHOCYTES NFR BLD: 5 % (ref 12–49)
MAGNESIUM SERPL-MCNC: 1.8 MG/DL (ref 1.6–2.4)
MCH RBC QN AUTO: 31.3 PG (ref 26–34)
MCHC RBC AUTO-ENTMCNC: 34.1 G/DL (ref 30–36.5)
MCV RBC AUTO: 91.8 FL (ref 80–99)
MONOCYTES # BLD: 1.5 K/UL (ref 0–1)
MONOCYTES NFR BLD: 9 % (ref 5–13)
NEUTS SEG # BLD: 15 K/UL (ref 1.8–8)
NEUTS SEG NFR BLD: 85 % (ref 32–75)
NITRITE UR QL STRIP.AUTO: NEGATIVE
NRBC # BLD: 0 K/UL (ref 0–0.01)
NRBC BLD-RTO: 0 PER 100 WBC
PCO2 BLDA: 32 MMHG (ref 35–45)
PH BLDA: 7.49 [PH] (ref 7.35–7.45)
PH UR STRIP: 5.5 [PH] (ref 5–8)
PLATELET # BLD AUTO: 209 K/UL (ref 150–400)
PMV BLD AUTO: 11.6 FL (ref 8.9–12.9)
PO2 BLDA: 67 MMHG (ref 80–100)
POTASSIUM SERPL-SCNC: 3.5 MMOL/L (ref 3.5–5.1)
PROCALCITONIN SERPL-MCNC: 1.26 NG/ML
PROT SERPL-MCNC: 5.3 G/DL (ref 6.4–8.2)
PROT UR STRIP-MCNC: 30 MG/DL
RBC # BLD AUTO: 3.77 M/UL (ref 4.1–5.7)
RBC #/AREA URNS HPF: ABNORMAL /HPF (ref 0–5)
SAO2 % BLD: 95 % (ref 92–97)
SAO2% DEVICE SAO2% SENSOR NAME: ABNORMAL
SERVICE CMNT-IMP: NORMAL
SODIUM SERPL-SCNC: 138 MMOL/L (ref 136–145)
SP GR UR REFRACTOMETRY: 1.02 (ref 1–1.03)
SPECIMEN SITE: ABNORMAL
TROPONIN I SERPL-MCNC: <0.05 NG/ML
UA: UC IF INDICATED,UAUC: ABNORMAL
UROBILINOGEN UR QL STRIP.AUTO: 0.2 EU/DL (ref 0.2–1)
WBC # BLD AUTO: 17.5 K/UL (ref 4.1–11.1)
WBC URNS QL MICRO: ABNORMAL /HPF (ref 0–4)

## 2020-11-30 PROCEDURE — 87899 AGENT NOS ASSAY W/OPTIC: CPT

## 2020-11-30 PROCEDURE — 83880 ASSAY OF NATRIURETIC PEPTIDE: CPT

## 2020-11-30 PROCEDURE — 36415 COLL VENOUS BLD VENIPUNCTURE: CPT

## 2020-11-30 PROCEDURE — 0100U RESPIRATORY PANEL,PCR,NASOPHARYNGEAL: CPT

## 2020-11-30 PROCEDURE — 87635 SARS-COV-2 COVID-19 AMP PRB: CPT

## 2020-11-30 PROCEDURE — G0463 HOSPITAL OUTPT CLINIC VISIT: HCPCS | Performed by: NURSE PRACTITIONER

## 2020-11-30 PROCEDURE — 84484 ASSAY OF TROPONIN QUANT: CPT

## 2020-11-30 PROCEDURE — 83605 ASSAY OF LACTIC ACID: CPT

## 2020-11-30 PROCEDURE — 93005 ELECTROCARDIOGRAM TRACING: CPT

## 2020-11-30 PROCEDURE — 84145 PROCALCITONIN (PCT): CPT

## 2020-11-30 PROCEDURE — 81001 URINALYSIS AUTO W/SCOPE: CPT

## 2020-11-30 PROCEDURE — 87449 NOS EACH ORGANISM AG IA: CPT

## 2020-11-30 PROCEDURE — 82803 BLOOD GASES ANY COMBINATION: CPT

## 2020-11-30 PROCEDURE — 96375 TX/PRO/DX INJ NEW DRUG ADDON: CPT

## 2020-11-30 PROCEDURE — 83735 ASSAY OF MAGNESIUM: CPT

## 2020-11-30 PROCEDURE — 96365 THER/PROPH/DIAG IV INF INIT: CPT

## 2020-11-30 PROCEDURE — 87040 BLOOD CULTURE FOR BACTERIA: CPT

## 2020-11-30 PROCEDURE — 83690 ASSAY OF LIPASE: CPT

## 2020-11-30 PROCEDURE — 85384 FIBRINOGEN ACTIVITY: CPT

## 2020-11-30 PROCEDURE — 71045 X-RAY EXAM CHEST 1 VIEW: CPT

## 2020-11-30 PROCEDURE — 74011250637 HC RX REV CODE- 250/637: Performed by: STUDENT IN AN ORGANIZED HEALTH CARE EDUCATION/TRAINING PROGRAM

## 2020-11-30 PROCEDURE — 80053 COMPREHEN METABOLIC PANEL: CPT

## 2020-11-30 PROCEDURE — 74011250636 HC RX REV CODE- 250/636: Performed by: STUDENT IN AN ORGANIZED HEALTH CARE EDUCATION/TRAINING PROGRAM

## 2020-11-30 PROCEDURE — 85379 FIBRIN DEGRADATION QUANT: CPT

## 2020-11-30 PROCEDURE — 82728 ASSAY OF FERRITIN: CPT

## 2020-11-30 PROCEDURE — 99213 OFFICE O/P EST LOW 20 MIN: CPT | Performed by: NURSE PRACTITIONER

## 2020-11-30 PROCEDURE — 74011250636 HC RX REV CODE- 250/636: Performed by: EMERGENCY MEDICINE

## 2020-11-30 PROCEDURE — 83615 LACTATE (LD) (LDH) ENZYME: CPT

## 2020-11-30 PROCEDURE — 65660000000 HC RM CCU STEPDOWN

## 2020-11-30 PROCEDURE — 86140 C-REACTIVE PROTEIN: CPT

## 2020-11-30 PROCEDURE — 85025 COMPLETE CBC W/AUTO DIFF WBC: CPT

## 2020-11-30 PROCEDURE — 87086 URINE CULTURE/COLONY COUNT: CPT

## 2020-11-30 PROCEDURE — 74011000250 HC RX REV CODE- 250: Performed by: EMERGENCY MEDICINE

## 2020-11-30 PROCEDURE — 99285 EMERGENCY DEPT VISIT HI MDM: CPT

## 2020-11-30 RX ORDER — SODIUM CHLORIDE 9 MG/ML
100 INJECTION, SOLUTION INTRAVENOUS CONTINUOUS
Status: DISCONTINUED | OUTPATIENT
Start: 2020-11-30 | End: 2020-12-01

## 2020-11-30 RX ORDER — ONDANSETRON 2 MG/ML
4 INJECTION INTRAMUSCULAR; INTRAVENOUS
Status: DISCONTINUED | OUTPATIENT
Start: 2020-11-30 | End: 2020-12-06 | Stop reason: HOSPADM

## 2020-11-30 RX ORDER — ACETAMINOPHEN 650 MG/1
650 SUPPOSITORY RECTAL
Status: DISCONTINUED | OUTPATIENT
Start: 2020-11-30 | End: 2020-12-06 | Stop reason: HOSPADM

## 2020-11-30 RX ORDER — HEPARIN SODIUM 5000 [USP'U]/ML
5000 INJECTION, SOLUTION INTRAVENOUS; SUBCUTANEOUS EVERY 12 HOURS
Status: DISCONTINUED | OUTPATIENT
Start: 2020-11-30 | End: 2020-11-30

## 2020-11-30 RX ORDER — ASCORBIC ACID 500 MG
500 TABLET ORAL 2 TIMES DAILY
Status: DISCONTINUED | OUTPATIENT
Start: 2020-11-30 | End: 2020-12-01

## 2020-11-30 RX ORDER — VANCOMYCIN/0.9 % SOD CHLORIDE 1.5G/250ML
1500 PLASTIC BAG, INJECTION (ML) INTRAVENOUS ONCE
Status: COMPLETED | OUTPATIENT
Start: 2020-11-30 | End: 2020-12-01

## 2020-11-30 RX ORDER — HEPARIN SODIUM 5000 [USP'U]/ML
5000 INJECTION, SOLUTION INTRAVENOUS; SUBCUTANEOUS EVERY 8 HOURS
Status: DISCONTINUED | OUTPATIENT
Start: 2020-11-30 | End: 2020-12-06 | Stop reason: HOSPADM

## 2020-11-30 RX ORDER — ALLOPURINOL 100 MG/1
50 TABLET ORAL EVERY OTHER DAY
Status: DISCONTINUED | OUTPATIENT
Start: 2020-12-02 | End: 2020-12-06 | Stop reason: HOSPADM

## 2020-11-30 RX ORDER — SODIUM CHLORIDE 0.9 % (FLUSH) 0.9 %
5-40 SYRINGE (ML) INJECTION AS NEEDED
Status: DISCONTINUED | OUTPATIENT
Start: 2020-11-30 | End: 2020-12-06 | Stop reason: HOSPADM

## 2020-11-30 RX ORDER — ATENOLOL 50 MG/1
50 TABLET ORAL DAILY
Status: DISCONTINUED | OUTPATIENT
Start: 2020-12-01 | End: 2020-12-06 | Stop reason: HOSPADM

## 2020-11-30 RX ORDER — ATORVASTATIN CALCIUM 20 MG/1
20 TABLET, FILM COATED ORAL DAILY
Status: DISCONTINUED | OUTPATIENT
Start: 2020-12-01 | End: 2020-12-01

## 2020-11-30 RX ORDER — SODIUM CHLORIDE 9 MG/ML
100 INJECTION, SOLUTION INTRAVENOUS CONTINUOUS
Status: DISCONTINUED | OUTPATIENT
Start: 2020-11-30 | End: 2020-11-30

## 2020-11-30 RX ORDER — ACETAMINOPHEN 325 MG/1
650 TABLET ORAL
Status: DISCONTINUED | OUTPATIENT
Start: 2020-11-30 | End: 2020-12-06 | Stop reason: HOSPADM

## 2020-11-30 RX ORDER — PROMETHAZINE HYDROCHLORIDE 25 MG/1
12.5 TABLET ORAL
Status: DISCONTINUED | OUTPATIENT
Start: 2020-11-30 | End: 2020-12-06 | Stop reason: HOSPADM

## 2020-11-30 RX ORDER — ZINC SULFATE 50(220)MG
1 CAPSULE ORAL DAILY
Status: DISCONTINUED | OUTPATIENT
Start: 2020-12-01 | End: 2020-12-01

## 2020-11-30 RX ORDER — SODIUM CHLORIDE 0.9 % (FLUSH) 0.9 %
5-40 SYRINGE (ML) INJECTION EVERY 8 HOURS
Status: DISCONTINUED | OUTPATIENT
Start: 2020-11-30 | End: 2020-12-06 | Stop reason: HOSPADM

## 2020-11-30 RX ORDER — DEXAMETHASONE SODIUM PHOSPHATE 10 MG/ML
6 INJECTION INTRAMUSCULAR; INTRAVENOUS EVERY 24 HOURS
Status: DISCONTINUED | OUTPATIENT
Start: 2020-11-30 | End: 2020-12-01

## 2020-11-30 RX ORDER — METRONIDAZOLE 500 MG/100ML
500 INJECTION, SOLUTION INTRAVENOUS EVERY 12 HOURS
Status: DISCONTINUED | OUTPATIENT
Start: 2020-11-30 | End: 2020-12-02

## 2020-11-30 RX ADMIN — CEFEPIME 2 G: 2 INJECTION, POWDER, FOR SOLUTION INTRAVENOUS at 18:04

## 2020-11-30 RX ADMIN — METRONIDAZOLE 500 MG: 500 INJECTION, SOLUTION INTRAVENOUS at 21:18

## 2020-11-30 RX ADMIN — HEPARIN SODIUM 5000 UNITS: 5000 INJECTION INTRAVENOUS; SUBCUTANEOUS at 21:23

## 2020-11-30 RX ADMIN — DEXAMETHASONE SODIUM PHOSPHATE 6 MG: 10 INJECTION, SOLUTION INTRAMUSCULAR; INTRAVENOUS at 21:22

## 2020-11-30 RX ADMIN — SODIUM CHLORIDE 100 ML/HR: 900 INJECTION, SOLUTION INTRAVENOUS at 21:18

## 2020-11-30 RX ADMIN — VANCOMYCIN HYDROCHLORIDE 1500 MG: 10 INJECTION, POWDER, LYOPHILIZED, FOR SOLUTION INTRAVENOUS at 18:23

## 2020-11-30 RX ADMIN — OXYCODONE HYDROCHLORIDE AND ACETAMINOPHEN 500 MG: 500 TABLET ORAL at 21:26

## 2020-11-30 NOTE — TELEPHONE ENCOUNTER
LVM for pt to [facility] to discuss upcoming VV with Roxy Portillo. Need to discuss the need for O2, and making an in office appt to document all of the information necessary for insurance coverage.

## 2020-11-30 NOTE — PROGRESS NOTES
Chapis Love is a 80 y.o. male , id x 2(name and ). Reviewed questionnaires, and  medications. Chief Complaint   Patient presents with   [facility] Follow Up    Shortness of Breath     woke up several time during the night with SOB.  Did aspirate a pill in the hospital and was placed on abx.        3 most recent PHQ Screens 2020   Little interest or pleasure in doing things Not at all   Feeling down, depressed, irritable, or hopeless Not at all   Total Score PHQ 2 0

## 2020-11-30 NOTE — ED TRIAGE NOTES
Patient reports recent admission for lung infection, dc Friday. Told to come to ER by PCP.  Taking 2 abx

## 2020-11-30 NOTE — ED PROVIDER NOTES
80-year-old male with a history of recent admission on 11/20 with reported suspected aspiration event during that admission precipitating pneumonia and shortness of breath reportedly discharged on Friday presents to the emergency department stating that last night he developed significant shortness of breath again as well as intermittent occasionally productive cough. He states that the symptoms are identical to the symptoms that he had while in the hospital.  He states that it seemed like he was getting a little bit better but then got worse again. He has been taking his Rx'd medication as prescribed, including Flagyl and Levaquin. He reportedly had a virtual visit with his primary care provider earlier today and checked his home pulse oximetry and was found to have O2 sats in the low 80s. No history of asthma, COPD, or baseline oxygen requirement at home, non-smoker. He denies any chest pain, abdominal pain, fever, nausea, vomiting, diarrhea, or any other choking or aspiration type events. He was advised to present to the emergency department for further evaluation. He reportedly was recently tested for COVID-19 on the 25th and was negative at that time. No other known sick contacts. Shortness of Breath   Associated symptoms include cough. Pertinent negatives include no fever, no headaches, no rhinorrhea, no sore throat, no neck pain, no chest pain, no vomiting, no abdominal pain, no rash and no leg swelling. Past Medical History:   Diagnosis Date    CRI (chronic renal insufficiency) 12/13/2012    Gout 1/4/2011    Prostate CA ([facility] [address] 1/4/2011       Past Surgical History:   Procedure Laterality Date    ENDOSCOPY, COLON, DIAGNOSTIC  2003    neg    HC BONE MARROW BIOPSY      HX PROSTATECTOMY           No family history on file.     Social History     Socioeconomic History    Marital status: SINGLE     Spouse name: Not on file    Number of children: Not on file    Years of education: Not on file    Highest education level: Not on file   Occupational History    Not on file   Social Needs    Financial resource strain: Not on file    Food insecurity     Worry: Not on file     Inability: Not on file    Transportation needs     Medical: Not on file     Non-medical: Not on file   Tobacco Use    Smoking status: Never Smoker    Smokeless tobacco: Never Used   Substance and Sexual Activity    Alcohol use: No    Drug use: Not on file    Sexual activity: Not on file   Lifestyle    Physical activity     Days per week: Not on file     Minutes per session: Not on file    Stress: Not on file   Relationships    Social connections     Talks on phone: Not on file     Gets together: Not on file     Attends [employer] service: Not on file     Active member of club or organization: Not on file     Attends meetings of clubs or organizations: Not on file     Relationship status: Not on file    Intimate partner violence     Fear of current or ex partner: Not on file     Emotionally abused: Not on file     Physically abused: Not on file     Forced sexual activity: Not on file   Other Topics Concern    Not on file   Social History Narrative    Not on file         ALLERGIES: Pcn [penicillins]    Review of Systems   Constitutional: Positive for activity change and fatigue. Negative for appetite change, chills and fever. HENT: Negative for congestion, rhinorrhea, sinus pain, sneezing and sore throat. Eyes: Negative for photophobia and visual disturbance. Respiratory: Positive for cough and shortness of breath. Cardiovascular: Negative for chest pain and leg swelling. Gastrointestinal: Negative for abdominal pain, blood in stool, constipation, diarrhea, nausea and vomiting. Genitourinary: Negative for difficulty urinating, dysuria, flank pain, hematuria, penile pain and testicular pain. Musculoskeletal: Negative for arthralgias, back pain, myalgias and neck pain. Skin: Negative for rash and wound. Neurological: Negative for syncope, weakness, light-headedness, numbness and headaches. Psychiatric/Behavioral: Negative for self-injury and suicidal ideas. All other systems reviewed and are negative. Vitals:    11/30/20 1617 11/30/20 1631 11/30/20 1645 11/30/20 1649   BP: 126/78  119/69    Pulse: 76      Resp: 24      Temp: 98.1 °F (36.7 °C)      SpO2: (!) 87% 91% 95% 94%   Weight: 64.9 kg (143 lb)      Height: 5' 3\" (1.6 m)               Physical Exam  Vitals signs and nursing note reviewed. Constitutional:       General: He is not in acute distress. Appearance: Normal appearance. He is well-developed. He is not diaphoretic. Comments: Pleasant, no acute distress, satting in the low 80s on room air. HENT:      Head: Normocephalic and atraumatic. Nose: Nose normal.   Eyes:      Extraocular Movements: Extraocular movements intact. Conjunctiva/sclera: Conjunctivae normal.      Pupils: Pupils are equal, round, and reactive to light. Neck:      Musculoskeletal: Neck supple. Cardiovascular:      Rate and Rhythm: Normal rate and regular rhythm. Heart sounds: Normal heart sounds. Pulmonary:      Effort: Pulmonary effort is normal. Tachypnea (Mild, but speaking in full sentences.) present. No respiratory distress. Breath sounds: Examination of the right-lower field reveals rhonchi. Rhonchi present. Abdominal:      General: There is no distension. Palpations: Abdomen is soft. Tenderness: There is no abdominal tenderness. Musculoskeletal:         General: No tenderness. Skin:     General: Skin is warm and dry. Neurological:      General: No focal deficit present. Mental Status: He is alert and oriented to person, place, and time. Cranial Nerves: No cranial nerve deficit. Sensory: No sensory deficit. Motor: No weakness.       Coordination: Coordination normal.          MDM   77-year-old male with recent admission for renal failure and suspected aspiration pneumonia. He returns to the ED with worsening shortness of breath despite p.o. antibiotics. He was placed on nasal cannula and improved. Patient is afebrile with otherwise vital signs stable. Labs returned showing normal lactic acid at 1.9, but leukocytosis of 17.5, Hg 11.8, pH 7.49, PCO2 32, O2 67, normal bicarb, creatinine elevated at 2.24, BUN 48, BNP elevated [place] [address], negative troponin, elevated CRP and D-dimer and inflammatory markers. Chest x-ray reviewed by myself and read by radiology showing right lower lobe pneumonia, consistent with exam.  Is given IV antibiotics, vanc/Zosyn as he has failed outpatient treatment with p.o. Levaquin and Flagyl. Will recheck COVID-19 and admit to the [facility] service for further care and assessment. Total critical care time spent exclusive of procedures:  45 minutes    Procedures  1654 EKG shows normal sinus rhythm with occasional PVCs and PACs, left axis deviation, rate of 75 bpm, with nonspecific T wave flattening inferolaterally but no ST elevation or depression. Perfect Serve Consult for Admission  6:01 PM    ED Room Number: ER14/14  Patient Name and age:  Tricia Perrin 80 y.o.  male  Working Diagnosis:   1. HCAP (healthcare-associated pneumonia)    2. Hypoxia    3. BENJI (acute kidney injury) ([address]    4. SOB (shortness of breath)        COVID-19 Suspicion:  yes, ordered testing  Sepsis present:  no  Reassessment needed: no  Code Status:  Do Not Resuscitate  Readmission: yes  Isolation Requirements:  yes  Recommended Level of Care:  telemetry  Department:Merged with [facility] ED - [phone]  Other: Recent admission for BENJI and PNA, had prior Covid negative on the 25th, symptoms unalleviated by p.o. Levaquin and Flagyl at home clinical worsening last night. Hypoxic on room air, RLL pneumonia, IV ABX, stable on NC O2. Hemodynamically stable, afebrile.

## 2020-11-30 NOTE — PROGRESS NOTES
[facility] Dosing Services: Antimicrobial Stewardship Progress Note    Cefepime: dose adjustment per P&T approved protocol. Pharmacist reviewed antibiotic appropriateness for 80year old , male  for indication of bacteremia. Day of Therapy: 1    Non-Kinetic Antimicrobial Dosing:   Assessment/Plan: CrCl 18 mL/min. Begin Cefepime 2 grams IV every 24 hours per P&T approved protocol for patient with CrCl 10-29 mL/min. Serum Creatinine     Lab Results   Component Value Date/Time    Creatinine 2.24 (H) 11/30/2020 05:06 PM    Creatinine (POC) 1.7 (H) 06/10/2009 08:44 AM       Creatinine Clearance Estimated Creatinine Clearance: 18.3 mL/min (A) (based on SCr of 2.24 mg/dL (H)).      Procalcitonin    Lab Results   Component Value Date/Time    Procalcitonin 0.67 11/25/2020 04:39 AM      Temp   98.1 °F (36.7 °C)    WBC   Lab Results   Component Value Date/Time    WBC 17.5 (H) 11/30/2020 05:06 PM       H/H   Lab Results   Component Value Date/Time    HGB 11.8 (L) 11/30/2020 05:06 PM      Platelets Lab Results   Component Value Date/Time    PLATELET 802 31/46/0387 05:06 PM      Pharmacist: Christin Heath

## 2020-11-30 NOTE — PROGRESS NOTES
20 Care transitions    Es Gary RN, [facility], [facility]  Care transitions nurse [phone]  [facility] Coordination Team    Mr. Mg Razo was seen at [address] [place] [address]  -for weakness, debility - dyspnea -   Pt with history of lymphoma - had retesting - awaiting results of lymphoma biopsy -   Pt with severe spinal stenosis - contuls from ortho, nephrology, pulmonary, and palliative. Pt's daughter, Omar Vann, and his son, are his caretakers -  She states he is c/o dyspnea - and gets anxious - she said it is thought that he aspirated a pill in the hospital and hence is on aspiration precautions and is on antibiotics. They have a virtual visit today - with Dr. Renuka Machado -  - 3 pm -  Prasanth Rodriguez was discussed as an adjunct evaluation - in person - and his daughter wanted that -   CTN placed referral to [facility] and they can see today - CTN requested call from provider, as possible. They have access to hospital records -    Complex pt with debility, frailty - falls prior to admission -   Palliative care for symptom management might be consideration - with long term plan. Patient contacted regarding recent discharge and COVID-19 risk. Discussed COVID-19 related testing which was available at this time. Test results were negative. Patient informed of results, if available? yes    2020  9:55 AM - Johnny, [facility] [employer]     Component  Value  Flag  Ref Range  Units  Status    Specimen source  Nasopharyngeal       Final    SARS-CoV-2  Not detected   NOTD     Final    Comment:         The specimen is NEGATIVE for SARS-CoV2, the novel coronavirus associated with COVID-19. Care Transition Nurse/ Ambulatory Care Manager/ LPN Care Coordinator contacted the family by telephone to perform post discharge assessment. Verified name and  with family as identifiers.      Patient has following risk factors of: acute respiratory failure and immunocompromised; history lymphoma - testing for recurrence; cervical spinal stenosis - falls. CTN/ACM/LPN reviewed discharge instructions, medical action plan and red flags related to discharge diagnosis. Reviewed and educated them on any new and changed medications related to discharge diagnosis. Advised obtaining a 90-day supply of all daily and as-needed medications. Advance Care Planning:   Does patient have an Advance Directive: currently not on file; education provided     Carlos Enrique Colon Daughter Primary Decision Maker  [phone] [facility] [address] [id number]    Attach Document       Kaylah Murphy Son Primary Decision Maker  [phone] [facility]  [phone]      Education provided regarding infection prevention, and signs and symptoms of COVID-19 and when to seek medical attention with family who verbalized understanding. Discussed exposure protocols and quarantine from [address] you at higher risk for severe illness 2019 and given an opportunity for questions and concerns. The family agrees to contact the COVID-19 hotline [phone] or PCP office for questions related to their healthcare. CTN/ACM/LPN provided contact information for future reference. From CDC: Are you at higher risk for severe illness?  Wash your hands often.  Avoid close contact (6 feet, which is about two arm lengths) with people who are sick.  Put distance between yourself and other people if COVID-19 is spreading in your community.  Clean and disinfect frequently touched surfaces.  Avoid all cruise travel and non-essential air travel.  Call your healthcare professional if you have concerns about COVID-19 and your underlying condition or if you are sick.     For more information on steps you can take to protect yourself, see CDC's How to Protect Yourself      Patient/family/caregiver given information for [facility] and agrees to enroll no  Patient's preferred e-mail:  none  Patient's preferred phone number: [phone]  Based on [facility] alert triggers, patient will be contacted by nurse care manager for worsening symptoms. Plan for follow-up call in 3-5 days based on severity of symptoms and risk factors.     Davdia Carrasco

## 2020-11-30 NOTE — PROGRESS NOTES
Consent: Janes Shah, who was seen by synchronous (real-time) audio-video technology, and/or his healthcare decision maker, is aware that this patient-initiated, Telehealth encounter on 11/30/2020 is a billable service, with coverage as determined by his insurance carrier. He is aware that he may receive a bill and has provided verbal consent to proceed: Yes. 712      Subjective:   Janes Shah is a 80 y.o. male who was seen for Hospital Follow Up and Shortness of Breath (woke up several time during the night with SOB. Did aspirate a pill in the hospital and was placed on abx. )    Pt w/ hx of prostate CA, HTN, gout, spinal stenosis, and diffuse large B cell lymphoma. Being seen for hospital f/u and continued SOB. Recently admitted to [facility] from 11/20-11/27 for generalized weakness and fatigue. Found to have ARF, anemia and elevated troponin's (thought to be due to demand ischemia) - no ST changes on EKG and CT negative for PE. He was started on IVF d/t ARF and later developed acute hypoxemic respiratory failure requiring bipap, oxygen, and diuretic, but was successfully weaned off O2 prior to discharge. Per notes respiratory failure thought to be d/t fluid overload vs aspiration event. His chest CT showed bilateral pleural effusions and bilateral lower lobe volume loss left greater than right, however CXR prior to discharged showed improvement. He was discharged on: Levaquin 500 mg daily q 48 hrs & Flagyl 500 mg bid  for 4 days (has not completed)  Consults were placed to ortho, nephrology, pulm, heme/onc, and palliative during admission. Had bone marrow biopsy on 11/27, awaiting results. Pt's is cared for by daughter and son who are present for virtual visit.   Pt c/o ongoing SOB and feeling of \"congestion in my chest\" since discharge from hospital, reportedly woke several times during the night w/ dyspnea  Pt denies feeling any improvement on antibiotics and c/o dyspnea  Son reports that he just bought pulse oximeter today but has not placed on patient  Placed on pt during visit and HR 73 and SpO2 consistently visualized to be 82-83%, tried on alternate fingers and SpO2 remained in mid 80's   Daughter reports Prasanth Rodriguez was planned to visit pt today and will have [facility] as well. Prior to Admission medications    Medication Sig Start Date End Date Taking? Authorizing Provider   metroNIDAZOLE (FLAGYL) 500 mg tablet Take 1 Tab by mouth two (2) times a day for 8 doses. 11/27/20 12/1/20 Yes Ingrid Hidalgo MD   allopurinoL (ZYLOPRIM) 300 mg tablet Take 150 mg by mouth daily. Yes Provider, Historical   atenoloL (TENORMIN) 50 mg tablet Take 1 Tab by mouth daily. 9/9/20  Yes Kevin Davies MD   levoFLOXacin (LEVAQUIN) 500 mg tablet Take one tablet every 48 hours. Patient not taking: Reported on 11/30/2020 11/28/20 11/30/20  Ingrid Hidalgo MD   vitamin e (E GEMS) 100 unit capsule Take 100 Units by mouth daily. Provider, Historical   multivitamin (ONE A DAY) tablet Take 1 Tab by mouth daily. Provider, Historical   ketoconazole (NIZORAL) 2 % topical cream Apply  to affected area two (2) times a day for 28 days.  11/11/20 12/9/20  Ihsan Hicks MD     Allergies   Allergen Reactions    Pcn [Penicillins] Swelling       Patient Active Problem List    Diagnosis Date Noted    Hypercalcemia 11/23/2020    History of B-cell lymphoma 11/22/2020    Non-Hodgkin's lymphoma in relapse ([address] 11/22/2020    Weakness 11/21/2020    Anemia 11/21/2020    Opioid use 11/21/2020    Hypercalcemia of malignancy 11/20/2020    Cervical stenosis of spinal canal 11/20/2020    Acute renal failure (ARF) ([address] 11/20/2020    Lumbar canal stenosis 11/20/2020    Frequent falls 11/20/2020    Multiple falls 11/20/2020    CRI (chronic renal insufficiency) 12/13/2012    Lymphoma ([address] 01/04/2011    HTN (hypertension) 01/04/2011    Prostate CA ([address] 01/04/2011    Gout 01/04/2011       ROS - negative except as listed above in the HPI      Objective:   Vital Signs: (As obtained by patient/caregiver at home)  There were no vitals taken for this visit. Physical Exam:   General: alert, cooperative, no distress   Mental  status: normal mood, behavior, speech, dress, motor activity, and thought processes, able to follow commands   HEENT: normocephalic, atraumatic    Eyes:  extraocular movements intact, sclera normal, no visible discharge   Ears:  external ears normal   Mouth/Throat:  mucous memrates appear moist    Neck: no visualized mass   Resp: respiratory effort is normal, breathing appears non-labored, audible rales/rattling heard through virtual platform on inspiration and expiration    Neuro: no gross deficits   Skin: no discoloration or lesions of concern on visible areas   Psychiatric: normal affect, consistent with stated mood, no evidence of hallucinations      Additional exam findings:      Assessment & Plan:   Diagnoses and all orders for this visit:    1. Acute respiratory failure, unspecified whether with hypoxia or hypercapnia ([address]  - Discussed with patient and son/daughter that initially plan was to recheck labs w/ [place] and continue to monitor outpatient, however given SpO2 82-83% during visit, audible adventitious breath sounds, and pt c/o ongoing dyspnea, would be best for him to go back to hospital asap for supportive oxygen. Was not discharged on oxygen and due to new O2 requirement, needs urgent imaging and labs to further assess.  - Daughter and son in agreement w/ plan and will bring pt back to [facility] to eval.             Follow-up and Dispositions    · Return if symptoms worsen or fail to improve. I spent at least 15 minutes with this established patient, and >50% of the time was spent counseling and/or coordinating care regarding hospital follow up, hypoxemia       We discussed the expected course, resolution and complications of the diagnosis(es) in detail.   Medication risks, benefits, costs, interactions, and alternatives were discussed as indicated. I advised him to contact the office if his condition worsens, changes or fails to improve as anticipated. He expressed understanding with the diagnosis(es) and plan. Chapis Love is a 80 y.o. male being evaluated by a video visit encounter for concerns as above. A caregiver was present when appropriate. Due to this being a TeleHealth encounter (During [place] [address] public health emergency), evaluation of the following organ systems was limited: Vitals/Constitutional/EENT/Resp/CV/GI//MS/Neuro/Skin/Heme-Lymph-Imm. Pursuant to the emergency declaration under the [facility], 1135 waiver authority and the [employer] Act, this Virtual  Visit was conducted, with patient's (and/or legal guardian's) consent, to reduce the patient's risk of exposure to COVID-19 and provide necessary medical care. Services were provided through a video synchronous discussion virtually to substitute for in-person clinic visit. Patient and provider were located at their individual homes.         Leonora Ruvalcaba NP

## 2020-11-30 NOTE — PROGRESS NOTES
[facility]: MED RECONCILIATION    Information obtained from: Patient's son in law over the phone     Allergies: Pcn [penicillins]    Comment:  Just discharged on 11/27/20  Removed Ketoconazole cream from his PTA med list as son in law states patient has not been using it as it is not working per patient. Prior to Admission Medications:     Medication Documentation Review Audit       Reviewed by Shira Alexander PHARMD (Pharmacist) on 11/30/20 at [address] Provider Last Dose Status Taking?   allopurinoL (ZYLOPRIM) 300 mg tablet Take 150 mg by mouth daily. Provider, Historical 11/30/2020 Active Yes   atenoloL (TENORMIN) 50 mg tablet Take 1 Tab by mouth daily. Enrike Olson MD 11/30/2020 Active Yes   levoFLOXacin (LEVAQUIN) 500 mg tablet Take one tablet every 48 hours. Mindi Ag MD 11/30/2020 Active Yes   metroNIDAZOLE (FLAGYL) 500 mg tablet Take 1 Tab by mouth two (2) times a day for 8 doses. Mindi Ag MD 11/30/2020 Active Yes   multivitamin (ONE A DAY) tablet Take 1 Tab by mouth daily. Provider, Historical Not today Active Yes   vitamin e (E GEMS) 100 unit capsule Take 100 Units by mouth daily.  Provider, Historical Not today Active Yes                  [address] [facility]   Contact: 2154

## 2020-12-01 ENCOUNTER — APPOINTMENT (OUTPATIENT)
Dept: NON INVASIVE DIAGNOSTICS | Age: 85
DRG: 871 | End: 2020-12-01
Attending: NURSE PRACTITIONER
Payer: MEDICARE

## 2020-12-01 ENCOUNTER — APPOINTMENT (OUTPATIENT)
Dept: CT IMAGING | Age: 85
DRG: 871 | End: 2020-12-01
Attending: STUDENT IN AN ORGANIZED HEALTH CARE EDUCATION/TRAINING PROGRAM
Payer: MEDICARE

## 2020-12-01 PROBLEM — I50.9 CHF (CONGESTIVE HEART FAILURE) (HCC): Status: ACTIVE | Noted: 2020-12-01

## 2020-12-01 PROBLEM — R65.20 SEVERE SEPSIS (HCC): Status: ACTIVE | Noted: 2020-12-01

## 2020-12-01 PROBLEM — A41.9 SEVERE SEPSIS (HCC): Status: ACTIVE | Noted: 2020-12-01

## 2020-12-01 PROBLEM — Z20.822 PERSON UNDER INVESTIGATION FOR COVID-19: Status: ACTIVE | Noted: 2020-12-01

## 2020-12-01 PROBLEM — J96.01 ACUTE HYPOXEMIC RESPIRATORY FAILURE (HCC): Status: ACTIVE | Noted: 2020-12-01

## 2020-12-01 LAB
ALBUMIN SERPL-MCNC: 2.4 G/DL (ref 3.5–5)
ALBUMIN/GLOB SERPL: 1 {RATIO} (ref 1.1–2.2)
ALP SERPL-CCNC: 84 U/L (ref 45–117)
ALT SERPL-CCNC: 16 U/L (ref 12–78)
ANION GAP SERPL CALC-SCNC: 7 MMOL/L (ref 5–15)
AST SERPL-CCNC: 36 U/L (ref 15–37)
ATRIAL RATE: 75 BPM
B PERT DNA SPEC QL NAA+PROBE: NOT DETECTED
BASOPHILS # BLD: 0 K/UL (ref 0–0.1)
BASOPHILS NFR BLD: 0 % (ref 0–1)
BILIRUB SERPL-MCNC: 1 MG/DL (ref 0.2–1)
BORDETELLA PARAPERTUSSIS PCR, BORPAR: NOT DETECTED
BUN SERPL-MCNC: 46 MG/DL (ref 6–20)
BUN/CREAT SERPL: 23 (ref 12–20)
C PNEUM DNA SPEC QL NAA+PROBE: NOT DETECTED
CALCIUM SERPL-MCNC: 8.4 MG/DL (ref 8.5–10.1)
CALCULATED P AXIS, ECG09: 25 DEGREES
CALCULATED R AXIS, ECG10: -31 DEGREES
CALCULATED T AXIS, ECG11: -26 DEGREES
CHLORIDE SERPL-SCNC: 104 MMOL/L (ref 97–108)
CO2 SERPL-SCNC: 28 MMOL/L (ref 21–32)
COVID-19 RAPID TEST, COVR: NOT DETECTED
CREAT SERPL-MCNC: 1.97 MG/DL (ref 0.7–1.3)
CRP SERPL-MCNC: 8.84 MG/DL (ref 0–0.6)
D DIMER PPP FEU-MCNC: 4.1 MG/L FEU (ref 0–0.65)
DIAGNOSIS, 93000: NORMAL
DIFFERENTIAL METHOD BLD: ABNORMAL
EOSINOPHIL # BLD: 0 K/UL (ref 0–0.4)
EOSINOPHIL NFR BLD: 0 % (ref 0–7)
ERYTHROCYTE [DISTWIDTH] IN BLOOD BY AUTOMATED COUNT: 16 % (ref 11.5–14.5)
FERRITIN SERPL-MCNC: 1491 NG/ML (ref 26–388)
FLUAV H1 2009 PAND RNA SPEC QL NAA+PROBE: NOT DETECTED
FLUAV H1 RNA SPEC QL NAA+PROBE: NOT DETECTED
FLUAV H3 RNA SPEC QL NAA+PROBE: NOT DETECTED
FLUAV SUBTYP SPEC NAA+PROBE: NOT DETECTED
FLUBV RNA SPEC QL NAA+PROBE: NOT DETECTED
GLOBULIN SER CALC-MCNC: 2.4 G/DL (ref 2–4)
GLUCOSE SERPL-MCNC: 125 MG/DL (ref 65–100)
HADV DNA SPEC QL NAA+PROBE: NOT DETECTED
HCOV 229E RNA SPEC QL NAA+PROBE: NOT DETECTED
HCOV HKU1 RNA SPEC QL NAA+PROBE: NOT DETECTED
HCOV NL63 RNA SPEC QL NAA+PROBE: NOT DETECTED
HCOV OC43 RNA SPEC QL NAA+PROBE: NOT DETECTED
HCT VFR BLD AUTO: 33.1 % (ref 36.6–50.3)
HEALTH STATUS, XMCV2T: NORMAL
HEALTH STATUS, XMCV2T: NORMAL
HGB BLD-MCNC: 11.2 G/DL (ref 12.1–17)
HMPV RNA SPEC QL NAA+PROBE: NOT DETECTED
HPIV1 RNA SPEC QL NAA+PROBE: NOT DETECTED
HPIV2 RNA SPEC QL NAA+PROBE: NOT DETECTED
HPIV3 RNA SPEC QL NAA+PROBE: NOT DETECTED
HPIV4 RNA SPEC QL NAA+PROBE: NOT DETECTED
IMM GRANULOCYTES # BLD AUTO: 0.1 K/UL (ref 0–0.04)
IMM GRANULOCYTES NFR BLD AUTO: 1 % (ref 0–0.5)
LDH SERPL L TO P-CCNC: 379 U/L (ref 85–241)
LYMPHOCYTES # BLD: 1 K/UL (ref 0.8–3.5)
LYMPHOCYTES NFR BLD: 9 % (ref 12–49)
M PNEUMO DNA SPEC QL NAA+PROBE: NOT DETECTED
MCH RBC QN AUTO: 31.4 PG (ref 26–34)
MCHC RBC AUTO-ENTMCNC: 33.8 G/DL (ref 30–36.5)
MCV RBC AUTO: 92.7 FL (ref 80–99)
MONOCYTES # BLD: 0.6 K/UL (ref 0–1)
MONOCYTES NFR BLD: 5 % (ref 5–13)
NEUTS SEG # BLD: 10.4 K/UL (ref 1.8–8)
NEUTS SEG NFR BLD: 85 % (ref 32–75)
NRBC # BLD: 0 K/UL (ref 0–0.01)
NRBC BLD-RTO: 0 PER 100 WBC
P-R INTERVAL, ECG05: 170 MS
PLATELET # BLD AUTO: 183 K/UL (ref 150–400)
PMV BLD AUTO: 11.7 FL (ref 8.9–12.9)
POTASSIUM SERPL-SCNC: 3.5 MMOL/L (ref 3.5–5.1)
PROT SERPL-MCNC: 4.8 G/DL (ref 6.4–8.2)
Q-T INTERVAL, ECG07: 386 MS
QRS DURATION, ECG06: 82 MS
QTC CALCULATION (BEZET), ECG08: 431 MS
RBC # BLD AUTO: 3.57 M/UL (ref 4.1–5.7)
RSV RNA SPEC QL NAA+PROBE: NOT DETECTED
RV+EV RNA SPEC QL NAA+PROBE: NOT DETECTED
SARS-COV-2, COV2: NOT DETECTED
SODIUM SERPL-SCNC: 139 MMOL/L (ref 136–145)
SOURCE, COVRS: NORMAL
SOURCE, COVRS: NORMAL
SPECIMEN SOURCE, FCOV2M: NORMAL
SPECIMEN SOURCE, FCOV2M: NORMAL
SPECIMEN TYPE, XMCV1T: NORMAL
SPECIMEN TYPE, XMCV1T: NORMAL
TROPONIN I SERPL-MCNC: <0.05 NG/ML
VENTRICULAR RATE, ECG03: 75 BPM
WBC # BLD AUTO: 12.2 K/UL (ref 4.1–11.1)

## 2020-12-01 PROCEDURE — 65660000000 HC RM CCU STEPDOWN

## 2020-12-01 PROCEDURE — 99223 1ST HOSP IP/OBS HIGH 75: CPT | Performed by: FAMILY MEDICINE

## 2020-12-01 PROCEDURE — 84484 ASSAY OF TROPONIN QUANT: CPT

## 2020-12-01 PROCEDURE — 65270000029 HC RM PRIVATE

## 2020-12-01 PROCEDURE — 74011250636 HC RX REV CODE- 250/636: Performed by: STUDENT IN AN ORGANIZED HEALTH CARE EDUCATION/TRAINING PROGRAM

## 2020-12-01 PROCEDURE — 93308 TTE F-UP OR LMTD: CPT

## 2020-12-01 PROCEDURE — 85379 FIBRIN DEGRADATION QUANT: CPT

## 2020-12-01 PROCEDURE — 80053 COMPREHEN METABOLIC PANEL: CPT

## 2020-12-01 PROCEDURE — 74011000250 HC RX REV CODE- 250: Performed by: EMERGENCY MEDICINE

## 2020-12-01 PROCEDURE — 93308 TTE F-UP OR LMTD: CPT | Performed by: INTERNAL MEDICINE

## 2020-12-01 PROCEDURE — 99254 IP/OBS CNSLTJ NEW/EST MOD 60: CPT | Performed by: INTERNAL MEDICINE

## 2020-12-01 PROCEDURE — 36415 COLL VENOUS BLD VENIPUNCTURE: CPT

## 2020-12-01 PROCEDURE — 83615 LACTATE (LD) (LDH) ENZYME: CPT

## 2020-12-01 PROCEDURE — 86140 C-REACTIVE PROTEIN: CPT

## 2020-12-01 PROCEDURE — 74011250637 HC RX REV CODE- 250/637: Performed by: STUDENT IN AN ORGANIZED HEALTH CARE EDUCATION/TRAINING PROGRAM

## 2020-12-01 PROCEDURE — 71275 CT ANGIOGRAPHY CHEST: CPT

## 2020-12-01 PROCEDURE — 2709999900 HC NON-CHARGEABLE SUPPLY

## 2020-12-01 PROCEDURE — 77030040831 HC BAG URINE DRNG MDII -A

## 2020-12-01 PROCEDURE — 82728 ASSAY OF FERRITIN: CPT

## 2020-12-01 PROCEDURE — 85025 COMPLETE CBC W/AUTO DIFF WBC: CPT

## 2020-12-01 PROCEDURE — 74011000636 HC RX REV CODE- 636: Performed by: RADIOLOGY

## 2020-12-01 PROCEDURE — 74011000250 HC RX REV CODE- 250: Performed by: NURSE PRACTITIONER

## 2020-12-01 PROCEDURE — 74011250636 HC RX REV CODE- 250/636: Performed by: EMERGENCY MEDICINE

## 2020-12-01 RX ORDER — LEVOFLOXACIN 5 MG/ML
750 INJECTION, SOLUTION INTRAVENOUS
Status: DISCONTINUED | OUTPATIENT
Start: 2020-12-01 | End: 2020-12-06 | Stop reason: HOSPADM

## 2020-12-01 RX ORDER — ALBUTEROL SULFATE 1.25 MG/3ML
1.25 SOLUTION RESPIRATORY (INHALATION)
Status: DISCONTINUED | OUTPATIENT
Start: 2020-12-01 | End: 2020-12-06 | Stop reason: HOSPADM

## 2020-12-01 RX ORDER — BUMETANIDE 0.25 MG/ML
0.5 INJECTION INTRAMUSCULAR; INTRAVENOUS ONCE
Status: COMPLETED | OUTPATIENT
Start: 2020-12-01 | End: 2020-12-01

## 2020-12-01 RX ADMIN — Medication 10 ML: at 21:19

## 2020-12-01 RX ADMIN — BUMETANIDE 0.5 MG: 0.25 INJECTION, SOLUTION INTRAMUSCULAR; INTRAVENOUS at 13:43

## 2020-12-01 RX ADMIN — Medication 10 ML: at 13:42

## 2020-12-01 RX ADMIN — METRONIDAZOLE 500 MG: 500 INJECTION, SOLUTION INTRAVENOUS at 21:19

## 2020-12-01 RX ADMIN — METRONIDAZOLE 500 MG: 500 INJECTION, SOLUTION INTRAVENOUS at 09:46

## 2020-12-01 RX ADMIN — Medication 10 ML: at 06:16

## 2020-12-01 RX ADMIN — IOPAMIDOL 85 ML: 755 INJECTION, SOLUTION INTRAVENOUS at 07:47

## 2020-12-01 RX ADMIN — CEFEPIME 2 G: 2 INJECTION, POWDER, FOR SOLUTION INTRAVENOUS at 16:57

## 2020-12-01 RX ADMIN — Medication 10 ML: at 01:02

## 2020-12-01 RX ADMIN — HEPARIN SODIUM 5000 UNITS: 5000 INJECTION INTRAVENOUS; SUBCUTANEOUS at 12:26

## 2020-12-01 RX ADMIN — ATORVASTATIN CALCIUM 20 MG: 20 TABLET, FILM COATED ORAL at 09:45

## 2020-12-01 RX ADMIN — HEPARIN SODIUM 5000 UNITS: 5000 INJECTION INTRAVENOUS; SUBCUTANEOUS at 21:18

## 2020-12-01 RX ADMIN — OXYCODONE HYDROCHLORIDE AND ACETAMINOPHEN 500 MG: 500 TABLET ORAL at 09:45

## 2020-12-01 RX ADMIN — ATENOLOL 50 MG: 50 TABLET ORAL at 09:45

## 2020-12-01 RX ADMIN — HEPARIN SODIUM 5000 UNITS: 5000 INJECTION INTRAVENOUS; SUBCUTANEOUS at 06:36

## 2020-12-01 RX ADMIN — LEVOFLOXACIN 750 MG: 5 INJECTION, SOLUTION INTRAVENOUS at 07:05

## 2020-12-01 NOTE — H&P
[id number]   Admission H&P    Date of admission: 2020    Patient name: Susi Cunningham  MRN: [id number]  YOB: 1932  Age: 80 y.o. Primary care provider:  Jayden Gerardo MD     Source of Information: patient, medical records    Chief complaint:  SOB    History of Present Illness  Susi Cunningham is a 80 y.o. male with A PMHx of prostate CA, HTN, Gout, hx of lymphoma  who presents to the ED complaining of SOB. Pt was recently admitted on  -  fro AHR 2/2 Aspiration Pneumonia. He was treated with broad spectrum antibiotics and d/c home with Flagyl and Levaquin PO. He states that he has bee feeling SOB since his discharge and has had sputum production, small amount, green in color. Pt had a virtual visit with his where it was found to have low oxygen saturation in the low 80s and was advise to come to the ED promptly. Patient denies fever, chills, n/v/d, palpitations, CP, HA. Of note, he was found to have retroperitoneal adenopathy on CT abd during his admission and had BM biopsy done showing hypercellular marrow and  left shifted myeloid maturation without B cell lymphoma. COVID Questions:   Experiencing any of the following symptoms: fever, chills, cough, SOB, diarrhea, URI symptoms. Yes, SOB  Any Sick contacts fever, cough, diarrhea, SOB, URI symptoms. No  Traveled out of state or out of country. No    In the ED:   Vitals: T:98.1 BP: 126/78 HR:76 RR:24 O2Sat: 87 on RA  Labs: WBC:17.5 H.8 Cr: 2.24 (BL: 2.0). Rapid covid test negative, LA:1.9 Pro- BNP:14.829 Procal:1.26 LD:450 Trop:0.05 Fribinogen:661 D-dimer: 8.20  Imaging: XCR: Right lower lobe pneumonia  Treatment: Cefepime/Vancomycin started. Review of Systems  Review of Systems   Constitutional: Positive for malaise/fatigue. Negative for chills. HENT: Negative for congestion and ear discharge. Eyes: Negative for double vision and photophobia.    Respiratory: Positive for sputum production and shortness of breath. Negative for cough. Cardiovascular: Negative for chest pain, palpitations and leg swelling. Gastrointestinal: Negative for abdominal pain, blood in stool, diarrhea, nausea and vomiting. Genitourinary: Negative for dysuria, frequency and urgency. Incontinence    Musculoskeletal: Negative for myalgias. Neurological: Negative for dizziness, speech change, focal weakness and headaches. Endo/Heme/Allergies: Does not bruise/bleed easily. Psychiatric/Behavioral: Negative for depression. Home Medications   Prior to Admission medications    Medication Sig Start Date End Date Taking? Authorizing Provider   metroNIDAZOLE (FLAGYL) 500 mg tablet Take 1 Tab by mouth two (2) times a day for 8 doses. 11/27/20 12/1/20 Yes Johana Galeano MD   levoFLOXacin (LEVAQUIN) 500 mg tablet Take one tablet every 48 hours. 11/28/20 11/30/20 Yes Johana Galeano MD   allopurinoL (ZYLOPRIM) 300 mg tablet Take 150 mg by mouth daily. Yes Provider, Historical   vitamin e (E GEMS) 100 unit capsule Take 100 Units by mouth daily. Yes Provider, Historical   multivitamin (ONE A DAY) tablet Take 1 Tab by mouth daily. Yes Provider, Historical   atenoloL (TENORMIN) 50 mg tablet Take 1 Tab by mouth daily. 9/9/20  Yes Parvin Muir MD   ketoconazole (NIZORAL) 2 % topical cream Apply  to affected area two (2) times a day for 28 days. 11/11/20 11/30/20  Maddie Todd MD       Allergies   Allergies   Allergen Reactions    Pcn [Penicillins] Swelling       Past Medical History:   Diagnosis Date    CRI (chronic renal insufficiency) 12/13/2012    Gout 1/4/2011    Prostate CA ([facility] [address] 1/4/2011       Past Surgical History:   Procedure Laterality Date    ENDOSCOPY, COLON, DIAGNOSTIC  2003    neg    HC BONE MARROW BIOPSY      HX PROSTATECTOMY         No family history on file.     Social History   Patient resides  x  Independently, Son close to him, check on him very frequently. With family care      Assisted living      SNF      Ambulates  x  Independently      With cane       Assisted walker      Alcohol history   x  None     Social     Chronic     Smoking history  x  None     Former smoker     Current smoker     Social History     Tobacco Use   Smoking Status Never Smoker   Smokeless Tobacco Never Used       Drug history  x  None     Former drug user     Current drug user     Code status  x  Full code     DNR/DNI     Partial    Code status discussed with the patient/caregivers. Physical Exam  Visit Vitals  /70   Pulse 76   Temp 98.1 °F (36.7 °C)   Resp 24   Ht 5' 3\" (1.6 m)   Wt 143 lb (64.9 kg)   SpO2 96%   BMI 25.33 kg/m²        Physical Exam  Constitutional:       Appearance: Normal appearance. HENT:      Head: Normocephalic and atraumatic. Nose: Nose normal.      Mouth/Throat:      Mouth: Mucous membranes are moist.      Pharynx: Oropharynx is clear. Eyes:      Pupils: Pupils are equal, round, and reactive to light. Neck:      Musculoskeletal: Normal range of motion and neck supple. Cardiovascular:      Rate and Rhythm: Normal rate and regular rhythm. Pulses: Normal pulses. Heart sounds: Normal heart sounds. No murmur. No friction rub. No gallop. Pulmonary:      Effort: Pulmonary effort is normal.      Breath sounds: Normal breath sounds. No wheezing or rhonchi. Comments: Crackles in R middle and lower lobes  Abdominal:      General: Abdomen is flat. Bowel sounds are normal.      Palpations: Abdomen is soft. Musculoskeletal: Normal range of motion. Skin:     General: Skin is warm and dry. Capillary Refill: Capillary refill takes less than 2 seconds. Neurological:      General: No focal deficit present. Mental Status: He is alert and oriented to person, place, and time.    Psychiatric:         Mood and Affect: Mood normal.          Laboratory Data  Recent Results (from the past 24 hour(s))   EKG, 12 LEAD, INITIAL Collection Time: 11/30/20  4:54 PM   Result Value Ref Range    Ventricular Rate 75 BPM    Atrial Rate 75 BPM    P-R Interval 170 ms    QRS Duration 82 ms    Q-T Interval 386 ms    QTC Calculation (Bezet) 431 ms    Calculated P Axis 25 degrees    Calculated R Axis -31 degrees    Calculated T Axis -26 degrees    Diagnosis       Sinus rhythm with occasional premature ventricular complexes and premature   atrial complexes  Left axis deviation  Nonspecific ST and T wave abnormality  Abnormal ECG  When compared with ECG of 25-NOV-2020 06:57,  premature ventricular complexes are now present  premature atrial complexes are now present  ST now depressed in Anterior leads     CBC WITH AUTOMATED DIFF    Collection Time: 11/30/20  5:06 PM   Result Value Ref Range    WBC 17.5 (H) 4.1 - 11.1 K/uL    RBC 3.77 (L) 4.10 - 5.70 M/uL    HGB 11.8 (L) 12.1 - 17.0 g/dL    HCT 34.6 (L) 36.6 - 50.3 %    MCV 91.8 80.0 - 99.0 FL    MCH 31.3 26.0 - 34.0 PG    MCHC 34.1 30.0 - 36.5 g/dL    RDW 16.0 (H) 11.5 - 14.5 %    PLATELET 188 247 - 451 K/uL    MPV 11.6 8.9 - 12.9 FL    NRBC 0.0 0  WBC    ABSOLUTE NRBC 0.00 0.00 - 0.01 K/uL    NEUTROPHILS 85 (H) 32 - 75 %    LYMPHOCYTES 5 (L) 12 - 49 %    MONOCYTES 9 5 - 13 %    EOSINOPHILS 0 0 - 7 %    BASOPHILS 0 0 - 1 %    IMMATURE GRANULOCYTES 1 (H) 0.0 - 0.5 %    ABS. NEUTROPHILS 15.0 (H) 1.8 - 8.0 K/UL    ABS. LYMPHOCYTES 0.9 0.8 - 3.5 K/UL    ABS. MONOCYTES 1.5 (H) 0.0 - 1.0 K/UL    ABS. EOSINOPHILS 0.0 0.0 - 0.4 K/UL    ABS. BASOPHILS 0.0 0.0 - 0.1 K/UL    ABS. IMM.  GRANS. 0.1 (H) 0.00 - 0.04 K/UL    DF AUTOMATED     METABOLIC PANEL, COMPREHENSIVE    Collection Time: 11/30/20  5:06 PM   Result Value Ref Range    Sodium 138 136 - 145 mmol/L    Potassium 3.5 3.5 - 5.1 mmol/L    Chloride 101 97 - 108 mmol/L    CO2 31 21 - 32 mmol/L    Anion gap 6 5 - 15 mmol/L    Glucose 127 (H) 65 - 100 mg/dL    BUN 48 (H) 6 - 20 MG/DL    Creatinine 2.24 (H) 0.70 - 1.30 MG/DL    BUN/Creatinine ratio 21 (H) 12 - 20      GFR est AA 34 (L) >60 ml/min/1.73m2    GFR est non-AA 28 (L) >60 ml/min/1.73m2    Calcium 9.2 8.5 - 10.1 MG/DL    Bilirubin, total 1.2 (H) 0.2 - 1.0 MG/DL    ALT (SGPT) 17 12 - 78 U/L    AST (SGOT) 41 (H) 15 - 37 U/L    Alk.  phosphatase 98 45 - 117 U/L    Protein, total 5.3 (L) 6.4 - 8.2 g/dL    Albumin 2.5 (L) 3.5 - 5.0 g/dL    Globulin 2.8 2.0 - 4.0 g/dL    A-G Ratio 0.9 (L) 1.1 - 2.2     NT-PRO BNP    Collection Time: 11/30/20  5:06 PM   Result Value Ref Range    NT pro-BNP 14,829 (H) <450 PG/ML   TROPONIN I    Collection Time: 11/30/20  5:06 PM   Result Value Ref Range    Troponin-I, Qt. <0.05 <0.05 ng/mL   LIPASE    Collection Time: 11/30/20  5:06 PM   Result Value Ref Range    Lipase 69 (L) 73 - 393 U/L   MAGNESIUM    Collection Time: 11/30/20  5:06 PM   Result Value Ref Range    Magnesium 1.8 1.6 - 2.4 mg/dL   PROCALCITONIN    Collection Time: 11/30/20  5:06 PM   Result Value Ref Range    Procalcitonin 1.26 ng/mL   C REACTIVE PROTEIN, QT    Collection Time: 11/30/20  5:06 PM   Result Value Ref Range    C-Reactive protein 10.70 (H) 0.00 - 0.60 mg/dL   D DIMER    Collection Time: 11/30/20  5:06 PM   Result Value Ref Range    D-dimer 8.20 (H) 0.00 - 0.65 mg/L FEU   FIBRINOGEN    Collection Time: 11/30/20  5:06 PM   Result Value Ref Range    Fibrinogen 661 (H) 200 - 475 mg/dL   LD    Collection Time: 11/30/20  5:06 PM   Result Value Ref Range     (H) 85 - 241 U/L   BLOOD GAS, ARTERIAL    Collection Time: 11/30/20  5:16 PM   Result Value Ref Range    pH 7.49 (H) 7.35 - 7.45      PCO2 32 (L) 35.0 - 45.0 mmHg    PO2 67 (L) 80 - 100 mmHg    O2 SAT 95 92 - 97 %    BICARBONATE 24 22 - 26 mmol/L    BASE EXCESS 1.7 mmol/L    O2 METHOD NASAL O2      O2 FLOW RATE 4 L/min    Sample source LEFT RADIAL      SITE ARTERIAL      NEW'S TEST YES     SARS-COV-2    Collection Time: 11/30/20  5:26 PM   Result Value Ref Range    Specimen source Nasopharyngeal      Specimen source Nasopharyngeal      COVID-19 rapid test Not detected NOTD      Specimen type NP Swab      Health status Symptomatic Testing      COVID-19 PENDING    LACTIC ACID    Collection Time: 11/30/20  5:27 PM   Result Value Ref Range    Lactic acid 1.9 0.4 - 2.0 MMOL/L       Imaging  Xr Chest Port    Result Date: 11/30/2020  IMPRESSION: Right lower lobe pneumonia       EKG:  SR w/ occasional premature ventricular complexes and premature atrial complexes. VR:75. Assessment and Plan     Alfonso Almodovar is a 80 y.o. male with a PMHx of prostate CA, HTN, Gout, hx of lymphoma who presents to the ED with SOB and dyspnea.     SIRS 2/4: (WBC:17.5 RR:24): Likely 2/2 to HAP vs Covid Infection. Pt with a recent 7 days hospitalization. CXR: Right lower lobe pneumonia Procal:1.26. Rapid covid test neg. Pro-BNP 14,829. D-dimer:8.20.   - Admit to Telemetry  - Vital signs per unit protoccol  - Renal diet   - Vancomycin/Cefepime/Flagyl/Levaquin  - CBC and CMP daily  - RVP  - Sputum culture  - Legionella Ag, S. Pneumo, Mycoplasma  - Blood Culture.      AHRF: likely 2/2 to HAP vs Covid Infection. Complaint of SOB which has been present since prior to last admission. Pt recently admitted d/t Aspiration PNA. CXR: Right lower lobe pneumonia Procal:1.26. Rapid covid test neg. Pro-BNP 14,829. ECHO on 11/21/20 EF: 60-65%  - Supplemental O2 as needed  - Covid test PCR    Covid PIU: Pt with SOB and Sputum production.   - Covid Test PCR  - Covid labs daily  - Vit C 500 mg po bid daily  - Zinc 220 mg po daily   - Droplets precautions  - Decadron 6 mg daily    - Atorvastatin 20 mg po daily. At risk of BENJI in the setting of CKD III: POA CR: 2.24 (BL:2.0). Likely 2/2 dehydration.   - CMP daily   - Avoid nephrotoxic agents    Elevated D-dimer: possible causes may include HAP vs COVID infection. Low suspicious for DVT or PE, pt had a CTA done last admission which was negative for PE. D-dimer:8.20 Wells' score for DVT: 1 point (Mod risk).  Well's score for PE:1.5 Low risk.   - Repeat D- dimer in AM.     Spinal stenosis: recurrent history of weakness, dizziness and falls. - Follow as OP     Hypertension On home atenolol 50 mg tablet everyday. - Continuing home Atenolol 50 mg daily   - Will continue to monitor at this time and readjust as BP's trend.     Anemia: POA 11.8 (BL: 13) likely 2/2 CKD. No hx of active bleeding.  - CBC daily    Gout: stable. Home Allopurinol 150 mg daily   -  Start Allopurinol 50 mg every other day. Dose changed based on patient renal function.      H/o B Cell Lymphoma: Stable; remission normal PET scan in 2011. Bone Marrow Bx on 11/27/20: Hypercellular marrow with maturing trilineage hematopoiesis   No morphologic or immunophenotypic evidence of B cell non-Hodgkin's lymphoma   Flow cytometry shows left shifted myeloid maturation with less than 5% blasts   - Follow as OP. Prostate Cancer: stable; s/p prostatectomy         FEN/GI - Renal diet. NS at 100 mL/hr. Activity - Out of bed w/ assistance. DVT prophylaxis - Heparin  GI prophylaxis - Not indicated at this time  Fall prophylaxis - Fall precautions ordered. Disposition - Admit to Telemetry. Plan to d/c to Home. Consulting PT, OT and CM  Code Status - Full. Discussed with patient / caregivers.   Next of Kin Name and [address], Bhavna,  Daughter - [phone]     Patient discussed with Dr. Iveth Katz (Family Medicine Attending)       Mery Mooney, [address] Problems  Date Reviewed: 8/13/2020    None

## 2020-12-01 NOTE — PROGRESS NOTES
Verbal shift change report given to Dioni (oncoming nurse) by Tello Gonzalez (offgoing nurse). Report included the following information SBAR.

## 2020-12-01 NOTE — CONSULTS
Lee Parikh MD [facility]  Office [phone]      Date of  Admission: 11/30/2020  4:18 PM       Assessment/Plan:   · Dyspnea: multifactorial:   · Acute resp failure: COVID ruled out. Being treated for PNA: Add nebs. · Acute diastolic CHF: low dose IV bumex now. Repeat limited ECHO to reassess EF Ck troponin. · Hx aspiration:   · BENJI on CKD  · HTN: cont atenolol. · Prostate CA:   · Gout:  · Hx lymphoma  · Deconditioned: PT/OT. Will need home oxygen assessment prior to discharge  · Readmission: will d/w . Pt personally seen and examined. Chart reviewed. Agree with advanced NP's history, exam and  A/P with changes/additons. CVS-S1-S2 present,   2/6 systolic murmur present  RS- Bilateral rhonchi present  Abdomen-  Obese/soft/NT    IV diuretics/Monitor Cr    11/30/20   ECHO ADULT FOLLOW-UP OR LIMITED 12/01/2020 12/1/2020    Narrative · LV: Estimated LVEF is 55 - 60%. Normal cavity size, wall thickness and   systolic function (ejection fraction normal). · MV: Moderate mitral annular calcification. Signed by: Radu Summers MD         Discussed with patient/nursing        Thank you for allowing us to participate in Hitesh Ocampo care. We will be happy to follow along. Please call for any questions. Richard Eduardo MD, [facility]          Consult requested by Marii Sethi MD for *HAP (hospital-acquired pneumonia) [J18.9, Y95]  SIRS (systemic inflammatory response syndrome) ([facility] [R65.10]    Subjective:  Hitesh Ocampo is a 80 y.o.   male  PMH of prostate CA, HTN, Gout, hx of lymphoma who presents to the ED complaining of SOB. Pt was recently admitted on 11/20 - 11/27 for AHRF 2/2 Aspiration Pneumonia. He was treated with broad spectrum antibiotics and d/c home with Flagyl and Levaquin PO.    He states that he has been feeling SOB since his discharge and has had sputum production, small amount, green in color. Pt had a virtual visit with his where it was found to have low oxygen saturation in the low 80s and was advise to come to the ED. Per pt had a \"test on his heart\" , not a stress test but can't recall it. (20 yrs ago)       Cardiology asked to see for new HF. [place] [address] with chest CT showing: bilateral pleural effusions. Requires oxygen 2 liters for hypoxia. The patient denies chest pain, dependent edema, diaphoresis,  orthopnea, palpitations, PND,  claudication or syncope. No bleeding. Cardiac risk factors    HTN   Dyslipidemia   Male gender       LABS:   Troponin:   Pending     ProBNP: 35055         Results from Hospital Encounter encounter on 11/30/20   CTA CHEST W OR W WO CONT    Narrative EXAM:  CTA CHEST W OR W WO CONT    INDICATION: Elevated d-dimer    COMPARISON: None. TECHNIQUE: Helical thin section chest CT following uneventful intravenous  administration of nonionic contrast 85 mL of isovue 370 according to  departmental PE protocol. Coronal and sagittal reformats were performed. 3D post  processing was performed. CT dose reduction was achieved through the use of a  standardized protocol tailored for this examination and automatic exposure  control for dose modulation. FINDINGS: This is a limited quality study for the evaluation of pulmonary  embolism to the proximal segmental arterial level. There is no pulmonary  embolism to this level. THYROID: No nodule. MEDIASTINUM: No mass or lymphadenopathy. MARTELL: No mass or lymphadenopathy. THORACIC AORTA: No aneurysm. HEART: Coronary atherosclerotic disease  ESOPHAGUS: No wall thickening or dilatation. TRACHEA/BRONCHI: Patent. PLEURA: Bilateral pleural effusions  LUNGS: Bilateral lower lobe volume loss. Mild right middle lobe volume loss. UPPER ABDOMEN: Distended gallbladder. Lobulated appearance of the spleen. Possible retroperitoneal adenopathy is again noted.   BONES: No aggressive bone lesion or fracture. Impression IMPRESSION:     1.  [Limited study. No evidence of pulmonary embolus. 2.  Bilateral pleural effusions and bilateral lower lobe volume loss. Mild right  middle lobe volume loss. 3.  Distended gallbladder. This may be related to fasting state. Correlate  clinically. Lobulated spleen and possible retroperitoneal adenopathy is again  noted         Cardiographics:   Telemetry:  SR PVC's     EKG Results     Procedure 720 Value Units Date/Time    EKG 12 LEAD INITIAL [[id number]] Collected:  11/30/20 1654    Order Status:  Completed Updated:  12/01/20 1143     Ventricular Rate 75 BPM      Atrial Rate 75 BPM      P-R Interval 170 ms      QRS Duration 82 ms      Q-T Interval 386 ms      QTC Calculation (Bezet) 431 ms      Calculated P Axis 25 degrees      Calculated R Axis -31 degrees      Calculated T Axis -26 degrees      Diagnosis --     Sinus rhythm with occasional premature ventricular complexes and premature   atrial complexes  Left axis deviation  Nonspecific ST and T wave abnormality  Abnormal ECG  When compared with ECG of 25-NOV-2020 06:57,  premature ventricular complexes are now present  premature atrial complexes are now present  ST now depressed in Anterior leads  Confirmed by eKlin Quiñones M.D., [address] [place] [address] ([facility]) on 12/1/2020 11:43:02 AM              Cardiac Testing/ Procedures: A. Cardiac Cath/PCI:   B.ECHO/MISTY:   C.StressNuclear/Stress ECHO/Stress test:   D.Vascular:   E. EP:   F. Miscellaneous         Patient Active Problem List    Diagnosis Date Noted    CHF (congestive heart failure) ([facility] 12/01/2020    Acute hypoxemic respiratory failure ([facility] 12/01/2020    Severe sepsis ([address] 12/01/2020    Person under investigation for COVID-19 12/01/2020    HAP (hospital-acquired pneumonia) 11/30/2020    SIRS (systemic inflammatory response syndrome) ([address] 11/30/2020    Hypercalcemia 11/23/2020    History of B-cell lymphoma 11/22/2020    Non-Hodgkin's lymphoma in relapse ([facility] 11/22/2020    Weakness 11/21/2020    Anemia 11/21/2020    Opioid use 11/21/2020    Hypercalcemia of malignancy 11/20/2020    Cervical stenosis of spinal canal 11/20/2020    Acute renal failure (ARF) ([facility] 11/20/2020    Lumbar canal stenosis 11/20/2020    Frequent falls 11/20/2020    Multiple falls 11/20/2020    CRI (chronic renal insufficiency) 12/13/2012    Lymphoma ([facility] 01/04/2011    HTN (hypertension) 01/04/2011    Prostate CA ([facility] 01/04/2011    Gout 01/04/2011      Past Medical History:   Diagnosis Date    CRI (chronic renal insufficiency) 12/13/2012    Gout 1/4/2011    Prostate CA ([facility] 1/4/2011      Past Surgical History:   Procedure Laterality Date    ENDOSCOPY, COLON, DIAGNOSTIC  2003    neg    HC BONE MARROW BIOPSY      HX PROSTATECTOMY       Allergies   Allergen Reactions    Pcn [Penicillins] Swelling      Current Facility-Administered Medications   Medication Dose Route Frequency    levoFLOXacin (LEVAQUIN) 750 mg in D5W IVPB  750 mg IntraVENous Q48H    [START ON 12/2/2020] Vancomycin Level 12/2 with am labs   Other ONCE    cefepime (MAXIPIME) 2 g in sterile water (preservative free) 10 mL IV syringe  2 g IntraVENous Q24H    sodium chloride (NS) flush 5-40 mL  5-40 mL IntraVENous Q8H    sodium chloride (NS) flush 5-40 mL  5-40 mL IntraVENous PRN    acetaminophen (TYLENOL) tablet 650 mg  650 mg Oral Q6H PRN    Or    acetaminophen (TYLENOL) suppository 650 mg  650 mg Rectal Q6H PRN    promethazine (PHENERGAN) tablet 12.5 mg  12.5 mg Oral Q6H PRN    Or    ondansetron (ZOFRAN) injection 4 mg  4 mg IntraVENous Q6H PRN    ascorbic acid (vitamin C) (VITAMIN C) tablet 500 mg  500 mg Oral BID    zinc sulfate (ZINCATE) 220 (50) mg capsule 1 Cap  1 Cap Oral DAILY    metroNIDAZOLE (FLAGYL) IVPB premix 500 mg  500 mg IntraVENous Q12H    dexamethasone (PF) (DECADRON) 10 mg/mL injection 6 mg  6 mg IntraVENous Q24H    heparin (porcine) injection 5,000 Units  5,000 Units SubCUTAneous Q8H    atenoloL (TENORMIN) tablet 50 mg  50 mg Oral DAILY    [START ON 12/2/2020] allopurinoL (ZYLOPRIM) tablet 50 mg  50 mg Oral EVERY OTHER DAY    Vancomycin - Pharmacist to dose    Other Rx Dosing/Monitoring    atorvastatin (LIPITOR) tablet 20 mg  20 mg Oral DAILY     Current Outpatient Medications   Medication Sig    metroNIDAZOLE (FLAGYL) 500 mg tablet Take 1 Tab by mouth two (2) times a day for 8 doses.  allopurinoL (ZYLOPRIM) 300 mg tablet Take 150 mg by mouth daily.  vitamin e (E GEMS) 100 unit capsule Take 100 Units by mouth daily.  multivitamin (ONE A DAY) tablet Take 1 Tab by mouth daily.  atenoloL (TENORMIN) 50 mg tablet Take 1 Tab by mouth daily. Review of Symptoms:  Constitutional: negative for fatigue  ENT: [place]   Respiratory: positive for cough or dyspnea on exertion  Gastrointestinal: negative for nausea and vomiting  Genitourinary: + frrequency   Musculoskeletal:positive for stiff joints  Neurological: negative for memory problems  Other systems reviewed and negative except as above. Social History     Socioeconomic History    Marital status: SINGLE     Spouse name: Not on file    Number of children: Not on file    Years of education: Not on file    Highest education level: Not on file   Tobacco Use    Smoking status: Never Smoker    Smokeless tobacco: Never Used   Substance and Sexual Activity    Alcohol use: No     No family history on file. Physical Exam    Visit Vitals  BP (!) 116/56   Pulse 71   Temp 97.5 °F (36.4 °C)   Resp 20   Ht 5' 3\" (1.6 m)   Wt 64.9 kg (143 lb)   SpO2 98%   BMI 25.33 kg/m²     General: awake, alert, and oriented. MAEx4. No acute distress. Thin, frial.   HEENT Exam:     Normocephalic, atraumatic. Sclera anicteric . Neck: supple,   Lung Exam:     Respirations unlabored. O2 @98 % 2 liters   Sat:  . Lungs: scattered rhonchi. Heart Exam:       PMI nondisplaced,  Rate: Rhythm: regular, no murmur.      No JVD, Abdomen Exam:     soft, nontender. + Bowel sounds. No palpable masses; organomegaly. : voiding     Extremities Exam:      Atraumatic. Trace edema RLE    Vascular Exam:      radial, brachial, dorsalis pedis, posterior tibial, are equal and strong bilaterally     Neuro exam:  No focal deficits   Psy Exam: Normal affect, does not appear anxious or agitated        Recent Results (from the past 12 hour(s))   METABOLIC PANEL, COMPREHENSIVE    Collection Time: 12/01/20  6:14 AM   Result Value Ref Range    Sodium 139 136 - 145 mmol/L    Potassium 3.5 3.5 - 5.1 mmol/L    Chloride 104 97 - 108 mmol/L    CO2 28 21 - 32 mmol/L    Anion gap 7 5 - 15 mmol/L    Glucose 125 (H) 65 - 100 mg/dL    BUN 46 (H) 6 - 20 MG/DL    Creatinine 1.97 (H) 0.70 - 1.30 MG/DL    BUN/Creatinine ratio 23 (H) 12 - 20      GFR est AA 39 (L) >60 ml/min/1.73m2    GFR est non-AA 32 (L) >60 ml/min/1.73m2    Calcium 8.4 (L) 8.5 - 10.1 MG/DL    Bilirubin, total 1.0 0.2 - 1.0 MG/DL    ALT (SGPT) 16 12 - 78 U/L    AST (SGOT) 36 15 - 37 U/L    Alk. phosphatase 84 45 - 117 U/L    Protein, total 4.8 (L) 6.4 - 8.2 g/dL    Albumin 2.4 (L) 3.5 - 5.0 g/dL    Globulin 2.4 2.0 - 4.0 g/dL    A-G Ratio 1.0 (L) 1.1 - 2.2     CBC WITH AUTOMATED DIFF    Collection Time: 12/01/20  6:14 AM   Result Value Ref Range    WBC 12.2 (H) 4.1 - 11.1 K/uL    RBC 3.57 (L) 4.10 - 5.70 M/uL    HGB 11.2 (L) 12.1 - 17.0 g/dL    HCT 33.1 (L) 36.6 - 50.3 %    MCV 92.7 80.0 - 99.0 FL    MCH 31.4 26.0 - 34.0 PG    MCHC 33.8 30.0 - 36.5 g/dL    RDW 16.0 (H) 11.5 - 14.5 %    PLATELET 210 676 - 401 K/uL    MPV 11.7 8.9 - 12.9 FL    NRBC 0.0 0  WBC    ABSOLUTE NRBC 0.00 0.00 - 0.01 K/uL    NEUTROPHILS 85 (H) 32 - 75 %    LYMPHOCYTES 9 (L) 12 - 49 %    MONOCYTES 5 5 - 13 %    EOSINOPHILS 0 0 - 7 %    BASOPHILS 0 0 - 1 %    IMMATURE GRANULOCYTES 1 (H) 0.0 - 0.5 %    ABS. NEUTROPHILS 10.4 (H) 1.8 - 8.0 K/UL    ABS. LYMPHOCYTES 1.0 0.8 - 3.5 K/UL    ABS.  MONOCYTES 0.6 0.0 - 1.0 K/UL ABS. EOSINOPHILS 0.0 0.0 - 0.4 K/UL    ABS. BASOPHILS 0.0 0.0 - 0.1 K/UL    ABS. IMM.  GRANS. 0.1 (H) 0.00 - 0.04 K/UL    DF AUTOMATED     D DIMER    Collection Time: 12/01/20  6:14 AM   Result Value Ref Range    D-dimer 4.10 (H) 0.00 - 0.65 mg/L FEU   LD    Collection Time: 12/01/20  6:14 AM   Result Value Ref Range     (H) 85 - 241 U/L   FERRITIN    Collection Time: 12/01/20  6:14 AM   Result Value Ref Range    Ferritin 1,491 (H) 26 - 388 NG/ML   C REACTIVE PROTEIN, QT    Collection Time: 12/01/20  6:14 AM   Result Value Ref Range    C-Reactive protein 8.84 (H) 0.00 - 0.60 mg/dL       Wilda Rubi MS [facility]

## 2020-12-01 NOTE — PROGRESS NOTES
[facility] Dosing Services:     Pharmacist Renal Dosing Progress Note for Levaquin   Physician: Tim Salcedo    The following medication: Levaquin, was automatically dose-adjusted per [facility] P&T Committee Protocol, with respect to renal function. Consult provided for this   80 y.o. , male , for the indication of HCAP. Pt Weight:   Wt Readings from Last 1 Encounters:   11/30/20 64.9 kg (143 lb)         Previous Regimen 750 mg IV daily   Serum Creatinine Lab Results   Component Value Date/Time    Creatinine 2.24 (H) 11/30/2020 05:06 PM    Creatinine (POC) 1.7 (H) 06/10/2009 08:44 AM       Creatinine Clearance Estimated Creatinine Clearance: 18.3 mL/min (A) (based on SCr of 2.24 mg/dL (H)). BUN Lab Results   Component Value Date/Time    BUN 48 (H) 11/30/2020 05:06 PM         Dosage changed to: For CrCl ~20, change to 750 mg IV every 48 hours per [facility] Renal dosing protocol    Pharmacy to continue to monitor patient daily. Will make dosage adjustments based upon changing renal function. Signed Zoraida Medina.  Contact information:  [phone]

## 2020-12-01 NOTE — PROGRESS NOTES
[address] [facility] [address] [place] [address]   Office [phone]  Fax [phone]          Assessment and Plan   Belkys Lincoln a 80 y. o. male with a PMHx of prostate CA, HTN, Gout, hx of lymphoma who presents to the ED with SOB and dyspnea.     Severe sepsis w/ SIRS 3/4 (WBC:17.5 RR:24 HR:98) w/ AHRF: LA 1.9. Pt with a recent hx of 7 days hospitalization d/t aspiration pneumonia. CXR: Right lower lobe pneumonia. Chest CT: Bilateral pleural effusion Procal:1.26. COVID rapid and PCR neg. Pro-BNP 14,829. D-dimer:8.20 UA: Negative RVP: Negative. Was not given fluid bolus as he has possible HF and his pro-BNP is elevated. - Continue Vancomycin dosing per pharmacy  - Cont Cefepime 2 g iv daily  - Continue Flagyl 500 mg iv q12 daily for 1 more day  - Continue Levaquin 750 mg IV Q48h  - CBC and CMP daily  - Sputum culture pending   - Legionella Ag, S. Pneumo, Mycoplasma, results pending   - Blood Culture prelim NG 10h  - MRSA culture pending     AHRF 2/2 HAP vs HF exacerbation: Complaint of SOB which has been present since prior to last admission. Pt recently admitted d/t Aspiration PNA. CXR: Right lower lobe pneumonia Procal:1.26. Rapid covid test neg. Pro-BNP 14,829. ECHO on 11/21/20 EF: 60-65%  - Cards c/s appreciate recs  - Supplemental O2 as needed     UTI: Patient presented with urinary incontinence. UA showed trace LE and moderate blood  - UCx sent  - Covered with broad spec as above     Covid neg: Pt with SOB and Sputum production.  - Discontinue COVID labs and meds     At risk of BENJI in the setting of CKD III: Improved POA CR: 2.24 (BL:2.0). Likely 2/2 dehydration.   - CMP daily   - Avoid nephrotoxic agents     Elevated D-dimer - no PE: Likely increased as acute phase reactant 2/2 HAP vs UTI. D-dimer:8.20  Low suspicious for DVT or PE, pt had a CTA done last admission which was negative for PE. Wells' score for DVT: 1 point (Mod risk). Well's score for PE:1.5 Low risk.  Repeat CTA neg for PE     Spinal stenosis: recurrent history of weakness, dizziness and falls.   - Follow out OP     Hypertension On home atenolol 50 mg tablet everyday. - Continuing home Atenolol 50 mg daily   - Will continue to monitor at this time and readjust as BP's trend.     Anemia: POA 11.8 (BL: 13) likely 2/2 CKD. No hx of active bleeding.  - CBC daily     Gout: stable. Home Allopurinol 150 mg daily   - Continue Allopurinol 50 mg every other day. Dose changed based on patient renal function.      H/o B Cell Lymphoma: Stable; remission normal PET scan in . Bone Marrow Bx on 20: Hypercellular marrow with maturing trilineage hematopoiesis   No morphologic or immunophenotypic evidence of B cell non-Hodgkin's lymphoma   Flow cytometry shows left shifted myeloid maturation with less than 5% blasts   - Follow as OP.      Prostate Cancer: stable; s/p prostatectomy       FEN/GI - Renal diet. NS at 100 mL/hr. Activity - Out of bed w/ assistance. DVT prophylaxis - Heparin  GI prophylaxis - Not indicated at this time  Fall prophylaxis - Fall precautions ordered. Disposition -. Plan to d/c to Home. Consulting PT, OT and CM  Code Status - Full. Discussed with patient / caregivers. Next of Rafi 69 Name and Antonio Blanton MD  Family Medicine Resident         Subjective / Objective     Subjective Pt reports he is feeling ok, he has been able to sleep throughout the night. He denies any other complaints at this moment. Temp (24hrs), Av.7 °F (36.5 °C), Min:97.5 °F (36.4 °C), Max:98.1 °F (36.7 °C)     Objective  Respiratory: O2 Flow Rate (L/min): 2 l/min O2 Device: Nasal cannula   Visit Vitals  BP (!) 116/56   Pulse 71   Temp 97.5 °F (36.4 °C)   Resp 20   Ht 5' 3\" (1.6 m)   Wt 143 lb (64.9 kg)   SpO2 98%   BMI 25.33 kg/m²     Physical Exam  Constitutional:       Appearance: Normal appearance. HENT:      Head: Normocephalic and atraumatic.       Nose: Nose normal. Mouth/Throat:      Mouth: Mucous membranes are moist.      Pharynx: Oropharynx is clear. Eyes:      Pupils: Pupils are equal, round, and reactive to light. Neck:      Musculoskeletal: Normal range of motion and neck supple. Cardiovascular:      Rate and Rhythm: Normal rate and regular rhythm. Pulses: Normal pulses. Heart sounds: Normal heart sounds. No murmur. No friction rub. No gallop. Pulmonary:      Effort: Pulmonary effort is normal.      Breath sounds: Normal breath sounds. No wheezing or rhonchi. Comments: Crackles in R middle and lower lobes  Abdominal:      General: Abdomen is flat. Bowel sounds are normal.      Palpations: Abdomen is soft. Musculoskeletal: Normal range of motion. Skin:     General: Skin is warm and dry. Capillary Refill: Capillary refill takes less than 2 seconds. Neurological:      General: No focal deficit present. Mental Status: He is alert and oriented to person, place, and time.    Psychiatric:         Mood and Affect: Mood normal.     I/O:  Date 11/30/20 0700 - 12/01/20 0659 12/01/20 0700 - 12/02/20 0659   Shift 7279-8779 3104-9930 24 Hour Total 4116-2879 4217-5109 24 Hour Total   INTAKE   I.V.(mL/kg/hr)  600(0.8) 600(0.4) 1320  1320     Volume (0.9% sodium chloride infusion)    1070  1070     Volume (vancomycin (VANCOCIN) 1500 mg in  ml infusion)  500 500        Volume (metroNIDAZOLE (FLAGYL) IVPB premix 500 mg)  100 100 100  100     Volume (levoFLOXacin (LEVAQUIN) 750 mg in D5W IVPB)    150  150   Shift Total(mL/kg)  600(9.3) 600(9.3) 1320(20.4)  1320(20.4)   OUTPUT   Urine(mL/kg/hr)    200  200     Urine Voided    200  200     Urine Occurrence(s)  150 x 150 x      Shift Total(mL/kg)    200(3.1)  200(3.1)   NET    1120   Weight (kg) 64.9 64.9 64.9 64.9 64.9 64.9       CBC:  Recent Labs     12/01/20  0614 11/30/20  1706   WBC 12.2* 17.5*   HGB 11.2* 11.8*   HCT 33.1* 34.6*    185       Metabolic Panel:  Recent Labs 12/01/20  0614 11/30/20  1706    138   K 3.5 3.5    101   CO2 28 31   BUN 46* 48*   CREA 1.97* 2.24*   * 127*   CA 8.4* 9.2   MG  --  1.8   ALB 2.4* 2.5*   ALT 16 17          For Billing    Chief Complaint   Patient presents with    Shortness of Breath       Hospital Problems  Date Reviewed: 12/1/2020          Codes Class Noted POA    CHF (congestive heart failure) (HCC) ICD-10-CM: I50.9  ICD-9-CM: 428.0  12/1/2020 Unknown        * (Principal) Acute hypoxemic respiratory failure (HCC) ICD-10-CM: J96.01  ICD-9-CM: 518.81  12/1/2020 Unknown        Severe sepsis (HCC) ICD-10-CM: A41.9, R65.20  ICD-9-CM: 038.9, 995.92  12/1/2020 Unknown        Person under investigation for COVID-19 ICD-10-CM: Z20.828  ICD-9-CM: V01.79  12/1/2020 Unknown        HAP (hospital-acquired pneumonia) ICD-10-CM: J18.9, Y95  ICD-9-CM: 652  11/30/2020 Unknown        SIRS (systemic inflammatory response syndrome) (HCC) ICD-10-CM: R65.10  ICD-9-CM: 995.90  11/30/2020 Unknown        History of B-cell lymphoma ICD-10-CM: Z85.72  ICD-9-CM: V10.79  11/22/2020 Yes        Anemia ICD-10-CM: D64.9  ICD-9-CM: 285.9  11/21/2020 Yes        CRI (chronic renal insufficiency) (Chronic) ICD-10-CM: N18.9  ICD-9-CM: 585.9  12/13/2012 Yes        Lymphoma ([address] ICD-10-CM: C85.90  ICD-9-CM: 202.80  1/4/2011 Yes        HTN (hypertension) ICD-10-CM: I10  ICD-9-CM: 401.9  1/4/2011 Yes        Gout ICD-10-CM: M10.9  ICD-9-CM: 274.9  1/4/2011 Yes

## 2020-12-01 NOTE — PROGRESS NOTES
1100: Patient's daughter left her number for this nurse to call back for an update. Called the number multiple times but the line is busy and not going through. Call the house phone and asked the daughter's  if the number is right, he said the number that she left is right. Will try again later. 1103: Patient's COVID swab came back negative. Notified Family Practice, received order to discontinue the isolation precautions.

## 2020-12-01 NOTE — PROGRESS NOTES
[facility]   Senior Resident Admission Note    CC: SOB    HPI:  Janes Shah is a 80 y.o. male with a history of gout, prostate CA, CKD III, HTN, and large B cell lymphoma who presents to the ER complaining of SOB. Patient was admitted 11/20-11/27 for AHR 2/2 aspiration PNA. He was treated with IV Vanc/Levaquin/Flagyl and discharged home with Flagyl and Levaquin. He states that prior to discharge, he experienced increased SOB and developed cough productive of green sputum. He states his difficulty breathing worsening last night. He had a virtual visit with his PCP today; he noted O2 sat in low 80s and was advised to go to the ED. States he had a few episodes of urinary incontinence since last admission. He denies fevers, chills, diarrhea, dysuria, other URI symptoms, sick contacts, travel, or known COVID exposure. He lives at home alone typically, but his son moved in after his last admission. Of note, he was found to have retroperitoneal adenopathy on CT abd during his admission and had BM biopsy done showing hypercellular marrow and  left shifted myeloid maturation without B cell lymphoma. Chart reviewed. Patient seen, examined, and discussed with Dr. Brock Ross (PGY-1). See her note for more details. A/P: 79yo male with a history of gout, prostate CA, CKD III, HTN, and large B cell lymphoma who is admitted for AHRF, sepsis 2/2 HAP, and COVID r/o.     1. AHRF 2/2 HAP vs possible COVID: Recent d/c from [facility] for aspiration PNA. CXR: RLL PNA. ABG: pH 7.49/pCO2 32/HCO3 24. Now requires 4L NC. BNP 14,829, although clinically euvolemic. Echo 11/21/20 w/ EF 60-65% w/ G1DD.  -Vanc, Cefepime, Flagyl, Levaquin  -blood cx  -wean O2 as tolerated    2. Sepsis 2/2 HAP: 2/4 SIRS (RR, WBC). CXR w/ RLL PNA in setting of recent hospital admission. LA nml. Pt w/ urinary incontinence; will r/o UTI as well.   -Vanc, Cefepime, Flagyl  -blood cx  -UA  -MIVF    3. COVID r/o: Previously tested neg 11/25.  -Lipitor 20mg daily  -Vit C BID, Zinc daily  -Decadron daily given O2 requirement    4. At risk BENJI in setting of CKD III: Cr 2.24 (BL 1.5). Likely due to acute illness. -MIVF  -Daily CMP    5. Elevated D-dimer: D-dimer 8.20; low suspicion for DVT/PE (no LE edema or tachycardia). Had neg CTA chest on 11/25. Likely due to at risk BENJI and sepsis. -D-dimer daily; consider CTA chest if becomes tachycardic or respiratory status worsens    6. Elevated BNP: BNP 14,829, although clinically euvolemic. Echo 11/21/20 w/ EF 60-65% w/ GIDD. Likely due to AHRF and at risk BENJI. 7.  B cell lymphoma: CT abd with retroperitoneal adenopathy with bone marrow showing hypercellular marrow and left shift myeloid maturation w/o B cell lymphoma. Followed by Dr. Julissa Rangel. -F/u Onc OP    8. Gout: Stable. -Decrease Allopurinol 150mg daily to 50mg every other day (renal dosing)    I agree with remaining assessment and plan as documented in Dr. oDmenica Hadley note.       To be discussed with Dr. Sweta Ernandez (on-call attending physician)    Kyung Clarke MD  Family Medicine Resident

## 2020-12-01 NOTE — PROGRESS NOTES
12/1/2020  12:10 PM    Case management note    Reason for Readmission:       EVAL done with son in law via phone    11/20/2020 -11/27/2020 ARF  Patient came back to hospital for weakness and SOB. Patient lives alone, son has been staying with him since discharge. Patient stated he choked on pill while hospitalized, monitoring for aspiration. Patient has history of prostate CA, awaiting results from biopsy for lymphoma   Patient lives alone and has a walker  [employer] @ [address] from [facility] was supposed to come yesterday and he came to hospital.           RUR Score/Risk Level:     31%    PCP: First and Last name:  Dr. Demetrice Tucker   Name of Practice:    Are you a current patient: Yes/No: yes   Approximate date of last visit:    Can you participate in a virtual visit with your PCP: no    Is a Care Conference indicated: yes, for long term goals of  care      Did you attend your follow up appointment (s): If not, why not:no, patient left on 27th, office closed for thanksgiving         Resources/supports as identified by patient/family:   family       Top Challenges facing patient (as identified by patient/family and CM): Finances/Medication cost?     Medicare, [employer]      family  Support system or lack thereof?     family  Living arrangements? Son is currently staying with him and daughter and son in law go to his home frequently  Self-care/ADLs/Cognition? Unable to do self care, poor health cognition         Current Advanced Directive/Advance Care Plan:             Plan for utilizing home health:   [address] was to start today             Transition of Care Plan:    Based on readmission, the patient's previous Plan of Care   has been evaluated and/or modified. The current Transition of Care Plan is:      1. Home to be determined  2. PCP follow up  3. PT/OT eval  4. AD planning  5.  CM to follow for discharge needs         Care Management Interventions  PCP Verified by CM: Yes(Dr. Sarah Alexander)  Mode of Transport at Discharge: Self  Current Support Network: Lives Alone(son has been staying with him)  The Plan for Transition of Care is Related to the Following Treatment Goals : HAP  Discharge Location  Discharge Placement: Home with family assistance    Readmission Assessment  Number of days since last admission?: 1-7 days  Previous disposition: Home with Home Health  Who is being interviewed?: Caregiver  What was the patient's/caregiver's perception as to why they think they needed to return back to the hospital?: Did not realize care needs would be so extensive, Not enough help at home, Other (Comment)(patient was too weak to stay at home)  Did you visit your Primary Care Physician after you left the hospital, before you returned this time?: No(they were closed over thanksgiving)  Did you see a specialist, such as Cardiac, Pulmonary, Orthopedic Physician, etc. after you left the hospital?: No  Who advised the patient to return to the hospital?: Caregiver, Self-referral  Does the patient report anything that got in the way of taking their medications?: No  In our efforts to provide the best possible care to you and others like you, can you think of anything that we could have done to help you after you left the hospital the first time, so that you might not have needed to return so soon?: Identify patient's health literacy needs, Arrange for more help when leaving the hospital, Teaching during hospitalization regarding your illness, Additional Community resources available for illness support    Eve CAORT

## 2020-12-01 NOTE — ED NOTES
Bedside and Verbal shift change report given to Jeffery Lantigua RN (oncoming nurse) by Soledad Ellsworth RN (offgoing nurse). Report included the following information SBAR, ED Summary, Intake/Output, MAR, Recent Results and Cardiac Rhythm NSR.

## 2020-12-01 NOTE — PROGRESS NOTES
12/1/20 - addendum     Mr. Kirt Armstrong had a virtual visit with pcp office on 11/30 -   His family had gotten a pulse ox and his sats were in the 80 range - and he continued with uncomfortable dyspnea - he was advised to return to [facility] ED - and was admitted on 2l/oxygen -  Consultation with cardiology -     CTN note - requested , noted palliative referral last admission, and requested oxygen testing. CTN episode closed - pt readmitted.     Virgilio Moreno

## 2020-12-01 NOTE — ED NOTES
Bedside and Verbal shift change report given to Bharath Montano RN (oncoming nurse) by Nuno Timmons RN (offgoing nurse). Report included the following information SBAR, Kardex, ED Summary, MAR, Accordion and Recent Results.

## 2020-12-01 NOTE — ED NOTES
TRANSFER - OUT REPORT:    Verbal report given to Skyler (name) on Aleah Monique  being transferred to 5th floor med/surg (unit) for routine progression of care       Report consisted of patients Situation, Background, Assessment and   Recommendations(SBAR). Information from the following report(s) SBAR, ED Summary, [facility] and Recent Results was reviewed with the receiving nurse. Lines:   Peripheral IV 11/30/20 Left Antecubital (Active)   Site Assessment Clean, dry, & intact 12/01/20 1800   Phlebitis Assessment 0 12/01/20 1800   Infiltration Assessment 0 12/01/20 1800   Dressing Status Clean, dry, & intact 12/01/20 1800   Dressing Type Transparent 12/01/20 1800   Hub Color/Line Status Pink 12/01/20 1800   Action Taken Blood drawn 11/30/20 1704   Alcohol Cap Used Yes 12/01/20 0700       Peripheral IV 12/01/20 Right Antecubital (Active)   Site Assessment Clean, dry, & intact 12/01/20 1800   Phlebitis Assessment 0 12/01/20 1800   Infiltration Assessment 0 12/01/20 1800   Dressing Status Clean, dry, & intact 12/01/20 1800   Dressing Type Transparent 12/01/20 1800   Hub Color/Line Status Pink 12/01/20 1800   Alcohol Cap Used Yes 12/01/20 0700        Opportunity for questions and clarification was provided.       Patient transported with:   Monitor  Registered Nurse

## 2020-12-01 NOTE — PROGRESS NOTES
[facility] Dosing Services: Antimicrobial Stewardship Progress Note    Consult for antibiotic dosing of Vancomycin by Dr. Long Gunn   Pharmacist reviewed antibiotic appropriateness for 80year old , male  for indication of - Bacteremia   Day of Therapy - 1    Plan:  Vancomycin :  Dose based on level  CrCl 18 ml/min    1500 mg dose given in ER at 1823, 11/30/20    Other Antimicrobial  (not dosed by pharmacist)   Cefepime 2 gm IV Q24h  Metronidazole 500 mg IV Q12h   Cultures        Serum Creatinine     Lab Results   Component Value Date/Time    Creatinine 2.24 (H) 11/30/2020 05:06 PM    Creatinine (POC) 1.7 (H) 06/10/2009 08:44 AM       Creatinine Clearance Estimated Creatinine Clearance: 18.3 mL/min (A) (based on SCr of 2.24 mg/dL (H)).      Procalcitonin    Lab Results   Component Value Date/Time    Procalcitonin 1.26 11/30/2020 05:06 PM        Temp   98.1 °F (36.7 °C)    WBC   Lab Results   Component Value Date/Time    WBC 17.5 (H) 11/30/2020 05:06 PM       H/H   Lab Results   Component Value Date/Time    HGB 11.8 (L) 11/30/2020 05:06 PM      Platelets Lab Results   Component Value Date/Time    PLATELET 201 72/57/1775 05:06 PM            Pharmacist: [address] [place] [address] Rd, [address]

## 2020-12-02 ENCOUNTER — APPOINTMENT (OUTPATIENT)
Dept: GENERAL RADIOLOGY | Age: 85
DRG: 871 | End: 2020-12-02
Attending: STUDENT IN AN ORGANIZED HEALTH CARE EDUCATION/TRAINING PROGRAM
Payer: MEDICARE

## 2020-12-02 LAB
ALBUMIN SERPL-MCNC: 2.2 G/DL (ref 3.5–5)
ALBUMIN/GLOB SERPL: 0.8 {RATIO} (ref 1.1–2.2)
ALP SERPL-CCNC: 72 U/L (ref 45–117)
ALT SERPL-CCNC: 13 U/L (ref 12–78)
ANION GAP SERPL CALC-SCNC: 7 MMOL/L (ref 5–15)
ANION GAP SERPL CALC-SCNC: 9 MMOL/L (ref 5–15)
ANION GAP SERPL CALC-SCNC: 9 MMOL/L (ref 5–15)
AST SERPL-CCNC: 34 U/L (ref 15–37)
BACTERIA SPEC CULT: NORMAL
BASOPHILS # BLD: 0 K/UL (ref 0–0.1)
BASOPHILS # BLD: 0 K/UL (ref 0–0.1)
BASOPHILS NFR BLD: 0 % (ref 0–1)
BASOPHILS NFR BLD: 0 % (ref 0–1)
BILIRUB SERPL-MCNC: 0.9 MG/DL (ref 0.2–1)
BUN SERPL-MCNC: 40 MG/DL (ref 6–20)
BUN SERPL-MCNC: 42 MG/DL (ref 6–20)
BUN SERPL-MCNC: 46 MG/DL (ref 6–20)
BUN/CREAT SERPL: 21 (ref 12–20)
BUN/CREAT SERPL: 23 (ref 12–20)
BUN/CREAT SERPL: 24 (ref 12–20)
CALCIUM SERPL-MCNC: 8.3 MG/DL (ref 8.5–10.1)
CALCIUM SERPL-MCNC: 8.5 MG/DL (ref 8.5–10.1)
CALCIUM SERPL-MCNC: 8.5 MG/DL (ref 8.5–10.1)
CHLORIDE SERPL-SCNC: 105 MMOL/L (ref 97–108)
CHLORIDE SERPL-SCNC: 105 MMOL/L (ref 97–108)
CHLORIDE SERPL-SCNC: 106 MMOL/L (ref 97–108)
CO2 SERPL-SCNC: 23 MMOL/L (ref 21–32)
CO2 SERPL-SCNC: 24 MMOL/L (ref 21–32)
CO2 SERPL-SCNC: 26 MMOL/L (ref 21–32)
CREAT SERPL-MCNC: 1.8 MG/DL (ref 0.7–1.3)
CREAT SERPL-MCNC: 1.89 MG/DL (ref 0.7–1.3)
CREAT SERPL-MCNC: 1.91 MG/DL (ref 0.7–1.3)
DIFFERENTIAL METHOD BLD: ABNORMAL
DIFFERENTIAL METHOD BLD: ABNORMAL
EOSINOPHIL # BLD: 0 K/UL (ref 0–0.4)
EOSINOPHIL # BLD: 0 K/UL (ref 0–0.4)
EOSINOPHIL NFR BLD: 0 % (ref 0–7)
EOSINOPHIL NFR BLD: 0 % (ref 0–7)
ERYTHROCYTE [DISTWIDTH] IN BLOOD BY AUTOMATED COUNT: 15.9 % (ref 11.5–14.5)
ERYTHROCYTE [DISTWIDTH] IN BLOOD BY AUTOMATED COUNT: 15.9 % (ref 11.5–14.5)
FLUID CULTURE, SPNG2: NORMAL
GLOBULIN SER CALC-MCNC: 2.8 G/DL (ref 2–4)
GLUCOSE SERPL-MCNC: 103 MG/DL (ref 65–100)
GLUCOSE SERPL-MCNC: 125 MG/DL (ref 65–100)
GLUCOSE SERPL-MCNC: 91 MG/DL (ref 65–100)
HCT VFR BLD AUTO: 29.4 % (ref 36.6–50.3)
HCT VFR BLD AUTO: 30.3 % (ref 36.6–50.3)
HGB BLD-MCNC: 10.2 G/DL (ref 12.1–17)
HGB BLD-MCNC: 10.3 G/DL (ref 12.1–17)
IMM GRANULOCYTES # BLD AUTO: 0.1 K/UL (ref 0–0.04)
IMM GRANULOCYTES # BLD AUTO: 0.2 K/UL (ref 0–0.04)
IMM GRANULOCYTES NFR BLD AUTO: 1 % (ref 0–0.5)
IMM GRANULOCYTES NFR BLD AUTO: 1 % (ref 0–0.5)
L PNEUMO1 AG UR QL IA: NEGATIVE
LYMPHOCYTES # BLD: 0.8 K/UL (ref 0.8–3.5)
LYMPHOCYTES # BLD: 1.1 K/UL (ref 0.8–3.5)
LYMPHOCYTES NFR BLD: 6 % (ref 12–49)
LYMPHOCYTES NFR BLD: 8 % (ref 12–49)
MAGNESIUM SERPL-MCNC: 1.7 MG/DL (ref 1.6–2.4)
MCH RBC QN AUTO: 30.7 PG (ref 26–34)
MCH RBC QN AUTO: 31.3 PG (ref 26–34)
MCHC RBC AUTO-ENTMCNC: 34 G/DL (ref 30–36.5)
MCHC RBC AUTO-ENTMCNC: 34.7 G/DL (ref 30–36.5)
MCV RBC AUTO: 90.2 FL (ref 80–99)
MCV RBC AUTO: 90.4 FL (ref 80–99)
MONOCYTES # BLD: 0.8 K/UL (ref 0–1)
MONOCYTES # BLD: 1.4 K/UL (ref 0–1)
MONOCYTES NFR BLD: 11 % (ref 5–13)
MONOCYTES NFR BLD: 6 % (ref 5–13)
NEUTS SEG # BLD: 10.2 K/UL (ref 1.8–8)
NEUTS SEG # BLD: 10.7 K/UL (ref 1.8–8)
NEUTS SEG NFR BLD: 81 % (ref 32–75)
NEUTS SEG NFR BLD: 85 % (ref 32–75)
NRBC # BLD: 0 K/UL (ref 0–0.01)
NRBC # BLD: 0 K/UL (ref 0–0.01)
NRBC BLD-RTO: 0 PER 100 WBC
NRBC BLD-RTO: 0 PER 100 WBC
ORGANISM ID, SPNG3: NORMAL
PHOSPHATE SERPL-MCNC: 2.3 MG/DL (ref 2.6–4.7)
PLATELET # BLD AUTO: 183 K/UL (ref 150–400)
PLATELET # BLD AUTO: 212 K/UL (ref 150–400)
PLEASE NOTE, SPNG4: NORMAL
PMV BLD AUTO: 11.4 FL (ref 8.9–12.9)
PMV BLD AUTO: 11.7 FL (ref 8.9–12.9)
POTASSIUM SERPL-SCNC: 2.8 MMOL/L (ref 3.5–5.1)
POTASSIUM SERPL-SCNC: 3.1 MMOL/L (ref 3.5–5.1)
POTASSIUM SERPL-SCNC: 3.2 MMOL/L (ref 3.5–5.1)
PROT SERPL-MCNC: 5 G/DL (ref 6.4–8.2)
RBC # BLD AUTO: 3.26 M/UL (ref 4.1–5.7)
RBC # BLD AUTO: 3.35 M/UL (ref 4.1–5.7)
S PNEUM AG SPEC QL LA: NEGATIVE
SERVICE CMNT-IMP: NORMAL
SERVICE CMNT-IMP: NORMAL
SODIUM SERPL-SCNC: 137 MMOL/L (ref 136–145)
SODIUM SERPL-SCNC: 138 MMOL/L (ref 136–145)
SODIUM SERPL-SCNC: 139 MMOL/L (ref 136–145)
SPECIMEN SOURCE: NORMAL
SPECIMEN SOURCE: NORMAL
SPECIMEN, SPNG1: NORMAL
TROPONIN I SERPL-MCNC: <0.05 NG/ML
VANCOMYCIN SERPL-MCNC: 17.3 UG/ML
WBC # BLD AUTO: 12.6 K/UL (ref 4.1–11.1)
WBC # BLD AUTO: 12.7 K/UL (ref 4.1–11.1)

## 2020-12-02 PROCEDURE — 80048 BASIC METABOLIC PNL TOTAL CA: CPT

## 2020-12-02 PROCEDURE — 77030027138 HC INCENT SPIROMETER -A

## 2020-12-02 PROCEDURE — 74011250637 HC RX REV CODE- 250/637: Performed by: FAMILY MEDICINE

## 2020-12-02 PROCEDURE — 74011250636 HC RX REV CODE- 250/636: Performed by: INTERNAL MEDICINE

## 2020-12-02 PROCEDURE — 71045 X-RAY EXAM CHEST 1 VIEW: CPT

## 2020-12-02 PROCEDURE — 80053 COMPREHEN METABOLIC PANEL: CPT

## 2020-12-02 PROCEDURE — 36415 COLL VENOUS BLD VENIPUNCTURE: CPT

## 2020-12-02 PROCEDURE — 74011250637 HC RX REV CODE- 250/637: Performed by: STUDENT IN AN ORGANIZED HEALTH CARE EDUCATION/TRAINING PROGRAM

## 2020-12-02 PROCEDURE — 74011000250 HC RX REV CODE- 250: Performed by: STUDENT IN AN ORGANIZED HEALTH CARE EDUCATION/TRAINING PROGRAM

## 2020-12-02 PROCEDURE — 2709999900 HC NON-CHARGEABLE SUPPLY

## 2020-12-02 PROCEDURE — 77030029684 HC NEB SM VOL KT MONA -A

## 2020-12-02 PROCEDURE — 94640 AIRWAY INHALATION TREATMENT: CPT

## 2020-12-02 PROCEDURE — 93005 ELECTROCARDIOGRAM TRACING: CPT

## 2020-12-02 PROCEDURE — 74011250636 HC RX REV CODE- 250/636: Performed by: STUDENT IN AN ORGANIZED HEALTH CARE EDUCATION/TRAINING PROGRAM

## 2020-12-02 PROCEDURE — 84100 ASSAY OF PHOSPHORUS: CPT

## 2020-12-02 PROCEDURE — 74011000250 HC RX REV CODE- 250: Performed by: INTERNAL MEDICINE

## 2020-12-02 PROCEDURE — 94760 N-INVAS EAR/PLS OXIMETRY 1: CPT

## 2020-12-02 PROCEDURE — 80202 ASSAY OF VANCOMYCIN: CPT

## 2020-12-02 PROCEDURE — 51798 US URINE CAPACITY MEASURE: CPT

## 2020-12-02 PROCEDURE — 87070 CULTURE OTHR SPECIMN AEROBIC: CPT

## 2020-12-02 PROCEDURE — 94664 DEMO&/EVAL PT USE INHALER: CPT

## 2020-12-02 PROCEDURE — 92610 EVALUATE SWALLOWING FUNCTION: CPT

## 2020-12-02 PROCEDURE — 85025 COMPLETE CBC W/AUTO DIFF WBC: CPT

## 2020-12-02 PROCEDURE — 77010033678 HC OXYGEN DAILY

## 2020-12-02 PROCEDURE — 97530 THERAPEUTIC ACTIVITIES: CPT

## 2020-12-02 PROCEDURE — 74011250636 HC RX REV CODE- 250/636: Performed by: EMERGENCY MEDICINE

## 2020-12-02 PROCEDURE — 99232 SBSQ HOSP IP/OBS MODERATE 35: CPT | Performed by: INTERNAL MEDICINE

## 2020-12-02 PROCEDURE — 84484 ASSAY OF TROPONIN QUANT: CPT

## 2020-12-02 PROCEDURE — 99233 SBSQ HOSP IP/OBS HIGH 50: CPT | Performed by: FAMILY MEDICINE

## 2020-12-02 PROCEDURE — 83735 ASSAY OF MAGNESIUM: CPT

## 2020-12-02 PROCEDURE — 97161 PT EVAL LOW COMPLEX 20 MIN: CPT

## 2020-12-02 PROCEDURE — 65660000000 HC RM CCU STEPDOWN

## 2020-12-02 PROCEDURE — 74011000250 HC RX REV CODE- 250: Performed by: EMERGENCY MEDICINE

## 2020-12-02 RX ORDER — SODIUM,POTASSIUM PHOSPHATES 280-250MG
1 POWDER IN PACKET (EA) ORAL 4 TIMES DAILY
Status: COMPLETED | OUTPATIENT
Start: 2020-12-02 | End: 2020-12-02

## 2020-12-02 RX ORDER — IPRATROPIUM BROMIDE AND ALBUTEROL SULFATE 2.5; .5 MG/3ML; MG/3ML
3 SOLUTION RESPIRATORY (INHALATION)
Status: DISCONTINUED | OUTPATIENT
Start: 2020-12-02 | End: 2020-12-02

## 2020-12-02 RX ORDER — BUMETANIDE 0.25 MG/ML
1 INJECTION INTRAMUSCULAR; INTRAVENOUS ONCE
Status: COMPLETED | OUTPATIENT
Start: 2020-12-02 | End: 2020-12-02

## 2020-12-02 RX ORDER — POTASSIUM CHLORIDE 7.45 MG/ML
10 INJECTION INTRAVENOUS
Status: DISPENSED | OUTPATIENT
Start: 2020-12-02 | End: 2020-12-02

## 2020-12-02 RX ORDER — POTASSIUM CHLORIDE 750 MG/1
40 TABLET, FILM COATED, EXTENDED RELEASE ORAL 3 TIMES DAILY
Status: DISCONTINUED | OUTPATIENT
Start: 2020-12-02 | End: 2020-12-02

## 2020-12-02 RX ORDER — IPRATROPIUM BROMIDE AND ALBUTEROL SULFATE 2.5; .5 MG/3ML; MG/3ML
3 SOLUTION RESPIRATORY (INHALATION)
Status: DISCONTINUED | OUTPATIENT
Start: 2020-12-02 | End: 2020-12-05

## 2020-12-02 RX ADMIN — METHYLPREDNISOLONE SODIUM SUCCINATE 40 MG: 40 INJECTION, POWDER, FOR SOLUTION INTRAMUSCULAR; INTRAVENOUS at 16:41

## 2020-12-02 RX ADMIN — Medication 10 ML: at 07:16

## 2020-12-02 RX ADMIN — POTASSIUM & SODIUM PHOSPHATES POWDER PACK 280-160-250 MG 1 PACKET: 280-160-250 PACK at 21:50

## 2020-12-02 RX ADMIN — IPRATROPIUM BROMIDE AND ALBUTEROL SULFATE 3 ML: .5; 3 SOLUTION RESPIRATORY (INHALATION) at 19:52

## 2020-12-02 RX ADMIN — POTASSIUM & SODIUM PHOSPHATES POWDER PACK 280-160-250 MG 1 PACKET: 280-160-250 PACK at 18:04

## 2020-12-02 RX ADMIN — IPRATROPIUM BROMIDE AND ALBUTEROL SULFATE 3 ML: .5; 3 SOLUTION RESPIRATORY (INHALATION) at 16:09

## 2020-12-02 RX ADMIN — Medication 10 ML: at 21:50

## 2020-12-02 RX ADMIN — HEPARIN SODIUM 5000 UNITS: 5000 INJECTION INTRAVENOUS; SUBCUTANEOUS at 21:49

## 2020-12-02 RX ADMIN — ATENOLOL 50 MG: 50 TABLET ORAL at 08:44

## 2020-12-02 RX ADMIN — Medication 10 ML: at 13:43

## 2020-12-02 RX ADMIN — POTASSIUM CHLORIDE 10 MEQ: 10 INJECTION, SOLUTION INTRAVENOUS at 11:51

## 2020-12-02 RX ADMIN — METHYLPREDNISOLONE SODIUM SUCCINATE 40 MG: 40 INJECTION, POWDER, FOR SOLUTION INTRAMUSCULAR; INTRAVENOUS at 21:03

## 2020-12-02 RX ADMIN — POTASSIUM CHLORIDE 10 MEQ: 750 TABLET, FILM COATED, EXTENDED RELEASE ORAL at 07:17

## 2020-12-02 RX ADMIN — HEPARIN SODIUM 5000 UNITS: 5000 INJECTION INTRAVENOUS; SUBCUTANEOUS at 11:51

## 2020-12-02 RX ADMIN — HEPARIN SODIUM 5000 UNITS: 5000 INJECTION INTRAVENOUS; SUBCUTANEOUS at 04:21

## 2020-12-02 RX ADMIN — POTASSIUM CHLORIDE 10 MEQ: 10 INJECTION, SOLUTION INTRAVENOUS at 08:43

## 2020-12-02 RX ADMIN — VANCOMYCIN HYDROCHLORIDE 1000 MG: 1 INJECTION, POWDER, LYOPHILIZED, FOR SOLUTION INTRAVENOUS at 07:12

## 2020-12-02 RX ADMIN — BUMETANIDE 1 MG: 0.25 INJECTION INTRAMUSCULAR; INTRAVENOUS at 21:49

## 2020-12-02 RX ADMIN — POTASSIUM CHLORIDE 10 MEQ: 10 INJECTION, SOLUTION INTRAVENOUS at 10:47

## 2020-12-02 RX ADMIN — CEFEPIME 2 G: 2 INJECTION, POWDER, FOR SOLUTION INTRAVENOUS at 18:04

## 2020-12-02 RX ADMIN — POTASSIUM & SODIUM PHOSPHATES POWDER PACK 280-160-250 MG 1 PACKET: 280-160-250 PACK at 08:43

## 2020-12-02 RX ADMIN — POTASSIUM & SODIUM PHOSPHATES POWDER PACK 280-160-250 MG 1 PACKET: 280-160-250 PACK at 13:41

## 2020-12-02 RX ADMIN — ALLOPURINOL 50 MG: 100 TABLET ORAL at 08:44

## 2020-12-02 NOTE — PROGRESS NOTES
Cardiology Progress Note       5th floor   NAME:  Val Berry   :   1932   MRN:   [id number]     Assessment/Plan:   1. Acute CHF likely triggered by PNA: s/p 1 dose IV bumex  .   2. Hx aspiration: choking on meds this am. I have asked ST to see. Per pt for years he has been unable to swallow pills and freq chokes. ? Stricture. 3. Acute resp failure: PNA> pulm consult, likely needs IV steroids  4. HTN: cont atenolol. 5. Hx lymphoma:   6. Hypokalemia: replete IV      Pt personally seen and examined. Chart reviewed. Agree with advanced NP's history, exam and  A/P with changes/additons. C/o cough/dyspnea    Neck-no JVD  CVS-S1-S2 present,   2/6 systolic murmur present  RS-    Bilateral rhonchi present  Abdomen-  soft/NT    20   ECHO ADULT FOLLOW-UP OR LIMITED 2020    Narrative · LV: Estimated LVEF is 55 - 60%. Normal cavity size, wall thickness and   systolic function (ejection fraction normal). · MV: Moderate mitral annular calcification. Signed by: Jenelle Bella MD     Dyspnea - component of Acute HFpEF    Discussed with patient/nursing    Zayra Maria MD, [facility]      Subjective:   Val Berry is a 80 y.o.   male PMH of prostate CA, HTN, Gout, hx of lymphoma who presents to the ED complaining of SOB.   Pt was recently admitted on  -  for AHRF 2/2 Aspiration Pneumonia. He was treated with broad spectrum antibiotics and d/c home with Flagyl and Levaquin PO. He states that he has been feeling SOB since his discharge and has had sputum production, small amount, green in color. Pt had a virtual visit with his where it was found to have low oxygen saturation in the low 80s and was advise to come to the ED. Per pt had a \"test on his heart\", not a stress test but can't recall it. (20 yrs ago)      Cardiology asked to see for new HF.  [place] [address] with chest CT showing: bilateral pleural effusions. Requires oxygen 2 liters for hypoxia.        Cardiac ROS: Patient denies any exertional chest pain,  palpitations, syncope, orthopnea, edema or paroxysmal nocturnal dyspnea. Previous Cardiac Eval  20   ECHO ADULT FOLLOW-UP OR LIMITED 2020    Narrative · LV: Estimated LVEF is 55 - 60%. Normal cavity size, wall thickness and   systolic function (ejection fraction normal). · MV: Moderate mitral annular calcification. Signed by: Adelaide Hernandez MD         Review of Systems: + cough , persistent. No nausea, indigestion, vomiting, pain,  sputum. No bleeding. Taking po. Objective:     Visit Vitals  /76 (BP 1 Location: Right arm, BP Patient Position: At rest)   Pulse 75   Temp 97.7 °F (36.5 °C)   Resp 20   Ht 5' 3\" (1.6 m)   Wt 64.9 kg (143 lb)   SpO2 96%   BMI 25.33 kg/m²    O2 Flow Rate (L/min): 2 l/min O2 Device: Nasal cannula    Temp (24hrs), Av.6 °F (36.4 °C), Min:97.5 °F (36.4 °C), Max:97.7 °F (36.5 °C)      701 - 1900  In: -   Out: 250 [Urine:250]    1901 -  0700  In: 2020 [I.V.:]  Out: 1100 [Urine:1100]  TELE: SR    General: AAOx3 cooperative, no acute distress. Thin. Frail   HEENT: Atraumatic. Pink and moist.  Anicteric sclerae. Neck : Supple,  Lungs: Coarse rhonchi throughout. Heart: Regular rhythm, no murmur, no rubs, no gallops. No JVD. No carotid bruits. Abdomen: Soft, non-distended, non-tender. + Bowel sounds. Extremities: No edema  Neurologic: Grossly intact. Alert and oriented X 3. No acute neurological distress. Psych: Good insight. Not anxious or agitated.         Care Plan discussed with:    Comments   Patient x    Family      RN x    Care Manager                    Consultant:  porfirio        Data Review:     No lab exists for component: ITNL   Recent Labs     20  1350 20  1706   TROIQ <0.05 <0.05     Recent Labs     20  0430 20  0614 20  1706    139 138   K 2.8* 3.5 3.5    104 101   CO2 24 28 31   BUN 46* 46* 48*   CREA 1.89* 1.97* 2.24*   * 125* 127*   PHOS 2.3*  --   --    MG 1.7  --  1.8   ALB 2.2* 2.4* 2.5*   WBC 12.6* 12.2* 17.5*   HGB 10.2* 11.2* 11.8*   HCT 29.4* 33.1* 34.6*    183 209     No results for input(s): INR, PTP, APTT, INREXT in the last 72 hours.     Medications reviewed  Current Facility-Administered Medications   Medication Dose Route Frequency    potassium, sodium phosphates (NEUTRA-PHOS) packet 1 Packet  1 Packet Oral QID    potassium chloride 10 mEq in 100 ml IVPB  10 mEq IntraVENous Q1H    levoFLOXacin (LEVAQUIN) 750 mg in D5W IVPB  750 mg IntraVENous Q48H    albuterol (ACCUNEB) nebulizer solution 1.25 mg  1.25 mg Nebulization Q6H PRN    cefepime (MAXIPIME) 2 g in sterile water (preservative free) 10 mL IV syringe  2 g IntraVENous Q24H    sodium chloride (NS) flush 5-40 mL  5-40 mL IntraVENous Q8H    sodium chloride (NS) flush 5-40 mL  5-40 mL IntraVENous PRN    acetaminophen (TYLENOL) tablet 650 mg  650 mg Oral Q6H PRN    Or    acetaminophen (TYLENOL) suppository 650 mg  650 mg Rectal Q6H PRN    promethazine (PHENERGAN) tablet 12.5 mg  12.5 mg Oral Q6H PRN    Or    ondansetron (ZOFRAN) injection 4 mg  4 mg IntraVENous Q6H PRN    heparin (porcine) injection 5,000 Units  5,000 Units SubCUTAneous Q8H    atenoloL (TENORMIN) tablet 50 mg  50 mg Oral DAILY    allopurinoL (ZYLOPRIM) tablet 50 mg  50 mg Oral EVERY OTHER DAY    Vancomycin - Pharmacist to dose    Other Rx Dosing/Monitoring         Guanakito Mckeon NP

## 2020-12-02 NOTE — PROGRESS NOTES
[facility] Dosing Services:      Consult for Vancomycin Dosing by Pharmacy by Dr. Long Wright  Day of Therapy 3      Previous Regimen Dosing based on levels (CrCl<30)   Last Level 17.3 at 0430 after 11/30 1830 dose   Other Current Antibiotics Cefepime, Flagyl   Significant Cultures Blood ng x 2 days, urine pending, mrsa neg x 12 hours   Serum Creatinine Lab Results   Component Value Date/Time    Creatinine 1.89 (H) 12/02/2020 04:30 AM    Creatinine (POC) 1.7 (H) 06/10/2009 08:44 AM       Creatinine Clearance Estimated Creatinine Clearance: 21.7 mL/min (A) (based on SCr of 1.89 mg/dL (H)). BUN Lab Results   Component Value Date/Time    BUN 46 (H) 12/02/2020 04:30 AM       WBC Lab Results   Component Value Date/Time    WBC 12.6 (H) 12/02/2020 04:30 AM       H/H Lab Results   Component Value Date/Time    HGB 10.2 (L) 12/02/2020 04:30 AM      Platelets Lab Results   Component Value Date/Time    PLATELET 062 85/42/2617 04:30 AM      Temp Temp: [unfilled]       Plan/Adjustments: Random level <15-20, will re-dose at 0700 with 1 gram. Will recheck level within next 24-48 hours. Continue to dose per random levels due to renal function. Will continue to follow.     Maribel BrennerD BCPS

## 2020-12-02 NOTE — PROGRESS NOTES
[address] [facility] [address] [address]   Office [phone]  Fax [phone]          Assessment and Plan   True Gentile a 80 y. o. male with a PMHx of prostate CA, HTN, Gout, hx of lymphoma who presents to the ED with SOB and dyspnea.     Severe sepsis w/ SIRS 3/4 (WBC:17.5 RR:24 HR:98) w/ AHRF: Improving. LA 1.9. Pt with a recent hx of 7 days hospitalization d/t aspiration pneumonia. CXR: Right lower lobe pneumonia. Chest CT: Bilateral pleural effusion Procal:1.26. COVID rapid and PCR neg. Pro-BNP 14,829. D-dimer:8.20 UA: Negative RVP: Negative. Was not given fluid bolus as he has possible HF and his pro-BNP is elevated. - Continue Vancomycin, cefepime, levaquin  - Discontinue flagyl  - CBC and CMP daily  - Sputum culture pending  - Legionella Ag, S. Pneumo, Mycoplasma, results pending  - Blood Culture prelim NG 2D  - MRSA culture pending  - Wean O2 as tolerated     AHRF 2/2 HAP vs acute diastolic HF: Complaint of SOB which has been present since last admission d/t Aspiration PNA. CXR: Right lower lobe pneumonia Procal:1.26. Rapid covid test neg. Pro-BNP 14,829. ECHO on 11/21/20 EF: 60-65%. Given echo results and negative trop, source of patients RF unlikely to be   - Pulm c/s appreciate recs  - Cards c/s appreciate recs       - Albuterol neb PRN       - IV low dose bumex x1 (0.5mg)       - Repeat ECHO: LVEF is 55 - 60%. Normal cavity size, wall thickness and EF. Moderate mitral annular calcification.        - Troponin neg  - Supplemental O2 as needed     Dysphagia: Patient with h/o dysphagia and aspiration PNA on previous admission. Patient reported, to cards, that he has been having trouble swallowing his meds this morning. Likely potassium tablet which is exceptionally difficult to swallow. - Speech therapy consulted by cards  - Avoid large pills when possible    UTI: Patient presented with urinary incontinence.  UA showed trace LE and moderate blood  - UCx pending  - Covered with broad spec as above     Covid neg: Pt with SOB and Sputum production.  - Discontinue COVID labs and meds     At risk of BENJI in the setting of CKD III: Improved POA CR: 2.24 (BL:2.0). Likely 2/2 dehydration.   - CMP daily   - Avoid nephrotoxic agents     Elevated D-dimer - no PE: Likely increased as acute phase reactant 2/2 HAP vs UTI. D-dimer:8.20  Low suspicious for DVT or PE, pt had a CTA done last admission which was negative for PE. Wells' score for DVT: 1 point (Mod risk). Well's score for PE:1.5 Low risk. Repeat CTA neg for PE     Spinal stenosis: recurrent history of weakness, dizziness and falls.   - Follow out OP     Hypertension On home atenolol 50 mg tablet everyday. - Continuing home Atenolol 50 mg daily   - Will continue to monitor at this time and readjust as BP's trend.     Anemia: POA 11.8 (BL: 13) likely 2/2 CKD. No hx of active bleeding.  - CBC daily     Gout: stable. Home Allopurinol 150 mg daily   - Continue Allopurinol 50 mg every other day. Dose changed based on patient renal function.      H/o B Cell Lymphoma: Stable; remission normal PET scan in 2011. Bone Marrow Bx on 11/27/20: Hypercellular marrow with maturing trilineage hematopoiesis   No morphologic or immunophenotypic evidence of B cell non-Hodgkin's lymphoma   Flow cytometry shows left shifted myeloid maturation with less than 5% blasts   - Follow as OP.      Prostate Cancer: stable; s/p prostatectomy       FEN/GI - Renal diet. NS at 100 mL/hr. Activity - Out of bed w/ assistance. DVT prophylaxis - Heparin  GI prophylaxis - Not indicated at this time  Fall prophylaxis - Fall precautions ordered. Disposition -. Plan to d/c to Home. Consulting PT, OT and CM  Code Status - Full. Discussed with patient / caregivers. Next of Rafi 69 Name and Meredith Solis MD  Family Medicine Resident         Subjective / Objective     Subjective Patient is feeling down this morning.  Reports SOB is unchanged from yesterday. Wants to know when the antibiotics will start working. Temp (24hrs), Av.5 °F (36.4 °C), Min:97.5 °F (36.4 °C), Max:97.5 °F (36.4 °C)     Objective:  Vital signs: (most recent): Blood pressure 119/70, pulse 72, temperature 97.5 °F (36.4 °C), resp. rate 19, height 5' 3\" (1.6 m), weight 143 lb (64.9 kg), SpO2 96 %. Respiratory: O2 Flow Rate (L/min): 2 l/min O2 Device: Nasal cannula   Visit Vitals  /70   Pulse 72   Temp 97.5 °F (36.4 °C)   Resp 19   Ht 5' 3\" (1.6 m)   Wt 143 lb (64.9 kg)   SpO2 96%   BMI 25.33 kg/m²     Physical Exam  Constitutional:       Appearance: Normal appearance. HENT:      Head: Normocephalic and atraumatic. Nose: Nose normal.      Mouth/Throat:      Mouth: Mucous membranes are moist.      Pharynx: Oropharynx is clear. Eyes:      Pupils: Pupils are equal, round  Neck:      Musculoskeletal: Normal range of motion and neck supple. Cardiovascular:      Rate and Rhythm: Normal rate and regular rhythm. Pulses: Normal pulses. Heart sounds: Normal heart sounds. No murmur. No friction rub. No gallop. Pulmonary:      Effort: Pulmonary effort is normal.      Breath sounds: Normal breath sounds. No wheezing or rhonchi. Comments: Crackles bilaterally in lower lung fields  Abdominal:      General: Abdomen is flat. Bowel sounds are normal.      Palpations: Abdomen is soft. Musculoskeletal: Normal range of motion. Skin:     General: Skin is warm and dry. Capillary Refill: Capillary refill takes less than 2 seconds. Neurological:      General: No focal deficit present. Mental Status: He is alert and oriented to person, place, and time.    Psychiatric:         Mood and Affect: Mood normal.     I/O:  Date 20 - 2059 20 07 - 20 0659   Shift 1366-9034 24 Hour Total 6377-5992 3837-9135 24 Hour Total   INTAKE   I.V.(mL/kg/hr) (1.7)  1320     Volume (0.9% sodium chloride infusion)   1070  1070 Volume (vancomycin (VANCOCIN) 1500 mg in  ml infusion) 500 500        Volume (metroNIDAZOLE (FLAGYL) IVPB premix 500 mg) 100 100 100  100     Volume (levoFLOXacin (LEVAQUIN) 750 mg in D5W IVPB)   150  150   Shift Total(mL/kg) 600(9.3) 600(9.3) 1320(20.4)  1320(20.4)   OUTPUT   Urine(mL/kg/hr)   600(0.8)  600     Urine Voided   600  600     Urine Occurrence(s) 150 x 150 x      Shift Total(mL/kg)   600(9.3)  600(9.3)    600 720  720   Weight (kg) 64.9 64.9 64.9 64.9 64.9       CBC:  Recent Labs     12/01/20  0614 11/30/20  1706   WBC 12.2* 17.5*   HGB 11.2* 11.8*   HCT 33.1* 34.6*    866       Metabolic Panel:  Recent Labs     12/01/20  0614 11/30/20  1706    138   K 3.5 3.5    101   CO2 28 31   BUN 46* 48*   CREA 1.97* 2.24*   * 127*   CA 8.4* 9.2   MG  --  1.8   ALB 2.4* 2.5*   ALT 16 17          For Billing    Chief Complaint   Patient presents with    Shortness of Breath       Hospital Problems  Date Reviewed: 12/1/2020          Codes Class Noted POA    CHF (congestive heart failure) (HCC) ICD-10-CM: I50.9  ICD-9-CM: 428.0  12/1/2020 Unknown        * (Principal) Acute hypoxemic respiratory failure ([facility] [address] ICD-10-CM: J96.01  ICD-9-CM: 518.81  12/1/2020 Unknown        Severe sepsis (HCC) ICD-10-CM: A41.9, R65.20  ICD-9-CM: 038.9, 995.92  12/1/2020 Unknown        Person under investigation for COVID-19 ICD-10-CM: Z20.828  ICD-9-CM: V01.79  12/1/2020 Unknown        HAP (hospital-acquired pneumonia) ICD-10-CM: J18.9, Y95  ICD-9-CM: 134  11/30/2020 Unknown        SIRS (systemic inflammatory response syndrome) (HCC) ICD-10-CM: R65.10  ICD-9-CM: 995.90  11/30/2020 Unknown        History of B-cell lymphoma ICD-10-CM: Z85.72  ICD-9-CM: V10.79  11/22/2020 Yes        Anemia ICD-10-CM: D64.9  ICD-9-CM: 285.9  11/21/2020 Yes        CRI (chronic renal insufficiency) (Chronic) ICD-10-CM: N18.9  ICD-9-CM: 585.9  12/13/2012 Yes        Lymphoma ([address] ICD-10-CM: C85.90  ICD-9-CM: 202.80  1/4/2011 Yes        HTN (hypertension) ICD-10-CM: I10  ICD-9-CM: 401.9  1/4/2011 Yes        Gout ICD-10-CM: M10.9  ICD-9-CM: 274.9  1/4/2011 Yes

## 2020-12-02 NOTE — PROGRESS NOTES
Verbal shift change report given to Carmen (oncoming nurse) by Mikey Goldberg (offgoing nurse). Report included the following information SBAR, Kardex and MAR.

## 2020-12-02 NOTE — PROGRESS NOTES
Problem: Mobility Impaired (Adult and Pediatric)  Goal: *Acute Goals and Plan of Care (Insert Text)  Description: FUNCTIONAL STATUS PRIOR TO ADMISSION: At baseline patient ambulatory with RW but after last hospital D/C went home and remained bedbound stating \"no energy to get OOB\"    HOME SUPPORT PRIOR TO ADMISSION: The patient lived with family. Physical Therapy Goals  Initiated 12/2/2020  1. Patient will move from supine to sit and sit to supine , scoot up and down, and roll side to side in bed with minimal assistance/contact guard assist within 7 day(s). 2.  Patient will transfer from bed to chair and chair to bed with minimal assistance/contact guard assist using the least restrictive device within 7 day(s). 3.  Patient will perform sit to stand with minimal assistance/contact guard assist within 7 day(s). 4.  Patient will ambulate with minimal assistance/contact guard assist for 50 feet with the least restrictive device within 7 day(s). 5.  Patient will ascend/descend 4 stairs with 1 handrail(s) with minimal assistance/contact guard assist within 7 day(s). Note:   PHYSICAL THERAPY EVALUATION  Patient: Hitesh Ocampo (24 y.o. male)  Date: 12/2/2020  Primary Diagnosis: HAP (hospital-acquired pneumonia) [J18.9, Y95]  SIRS (systemic inflammatory response syndrome) ([facility]) [R65.10]        Precautions:   Bed Alarm, Fall    ASSESSMENT  Based on the objective data described below, the patient presents with poor endurance, poor balance, and generalized debility. He needed max persuasion to at least attempt sitting EOB with PT, refused amb attempt, refused OOB to chair attempt stating \"I have no energy. \" Per chart patient d/c'd to home on RA after last admission was ambulatory for a day and then would not get OOB and amb at home. Patient said when he was last d/c he wasn't ambulatory and it took two men to assist him into his car for transport to home. Recent discharge from [facility].  Patient did very little activity with PT sitting edge of bed and then stance and sidestepping to head of bed but appears to be ambulatory if he can find motivation. On RA he remained 92-95% with mild SOB. Explained role of PT and importance of OOB but still refused. Goals of care should be revisited. Will follow for PT to attempt to get him back to overall min A level and household ambulator. Requires 24/7 care at this time. Current Level of Function Impacting Discharge (mobility/balance): see above    Functional Outcome Measure: The patient scored 40/100 on the Barthel Index outcome measure     Other factors to consider for discharge: goals of care, assist at home     Patient will benefit from skilled therapy intervention to address the above noted impairments. PLAN :  Recommendations and Planned Interventions: bed mobility training, transfer training, gait training, therapeutic exercises, patient and family training/education, and therapeutic activities      Frequency/Duration: Patient will be followed by physical therapy:  5 times a week to address goals. Recommendation for discharge: (in order for the patient to meet his/her long term goals)  To be determined: This discharge recommendation:  Has not yet been discussed the attending provider and/or case management    IF patient discharges home will need the following DME: to be determined (TBD)         SUBJECTIVE:   Patient stated I never walked when I went home.     OBJECTIVE DATA SUMMARY:   HISTORY:    Past Medical History:   Diagnosis Date    CRI (chronic renal insufficiency) 12/13/2012    Gout 1/4/2011    Prostate CA ([facility] [address] 1/4/2011     Past Surgical History:   Procedure Laterality Date    ENDOSCOPY, COLON, DIAGNOSTIC  2003    neg    HC BONE MARROW BIOPSY      HX PROSTATECTOMY         Personal factors and/or comorbidities impacting plan of care: dementia  Home Situation  Home Environment: Private residence    EXAMINATION/PRESENTATION/DECISION MAKING:   Critical Behavior:  Neurologic State: Alert, Eyes open spontaneously  Orientation Level: Oriented X4(forgetful at times)  Cognition: Follows commands  Safety/Judgement: Decreased insight into deficits  Hearing:Cachil DeHe    Range Of Motion:  AROM: Generally decreased, functional           PROM: Generally decreased, functional           Strength:    Strength: Grossly decreased, non-functional                    Tone & Sensation:   Tone: Normal              Sensation: Impaired               Coordination:  Coordination: Grossly decreased, non-functional  Vision:      Functional Mobility:  Bed Mobility:  Rolling: Minimum assistance  Supine to Sit: Moderate assistance;Maximum assistance; Additional time;Assist x1  Sit to Supine: Moderate assistance;Maximum assistance; Additional time;Assist x1  Scooting: Moderate assistance  Transfers:  Sit to Stand: Minimum assistance; Additional time;Assist x2  Stand to Sit: Minimum assistance; Additional time;Assist x2  Stand Pivot Transfers: Minimum assistance; Additional time;Assist x2                    Balance:   Sitting: Intact; Without support  Standing: Impaired  Standing - Static: Constant support;Occasional  Standing - Dynamic : Constant support;Poor  Ambulation/Gait Training:  Distance (ft): 3 Feet (ft)(side steppin along bedside)  Assistive Device: Gait belt;Walker, rolling  Ambulation - Level of Assistance: Minimal assistance; Moderate assistance; Additional time;Assist x2        Gait Abnormalities: Antalgic                                       Stairs:     Stairs - Level of Assistance: (NT)        Therapeutic Exercises:       Functional Measure:  Barthel Index:    Bathin  Bladder: 5  Bowels: 5  Groomin  Dressin  Feeding: 10  Mobility: 5  Stairs: 0  Toilet Use: 5  Transfer (Bed to Chair and Back): 5  Total: 40/100       The Barthel ADL Index: Guidelines  1. The index should be used as a record of what a patient does, not as a record of what a patient could do.   2. The main aim is to establish degree of independence from any help, physical or verbal, however minor and for whatever reason. 3. The need for supervision renders the patient not independent. 4. A patient's performance should be established using the best available evidence. Asking the patient, friends/relatives and nurses are the usual sources, but direct observation and common sense are also important. However direct testing is not needed. 5. Usually the patient's performance over the preceding 24-48 hours is important, but occasionally longer periods will be relevant. 6. Middle categories imply that the patient supplies over 50 per cent of the effort. 7. Use of aids to be independent is allowed. Tony Payne., Barthel, D.W. (2288). Functional evaluation: the Barthel Index. [address] [facility] (14)2. JAMIN Cabrera, Mitch Tejada., Gustavo Gunderson., [place], [address] (1999). Measuring the change indisability after inpatient rehabilitation; comparison of the responsiveness of the Barthel Index and Functional [place] Measure. Journal of Neurology, Neurosurgery, and Psychiatry, 66(4), 769-930. Reshma Vazquez, N.J.A, ALONDRA Copeland, & Marina Thomason, MKeylaA. (2004.) Assessment of post-stroke quality of life in cost-effectiveness studies: The usefulness of the Barthel Index and the EuroQoL-5D.  Quality of Life Research, 15, 288-57           Physical Therapy Evaluation Charge Determination   History Examination Presentation Decision-Making   HIGH Complexity :3+ comorbidities / personal factors will impact the outcome/ POC  HIGH Complexity : 4+ Standardized tests and measures addressing body structure, function, activity limitation and / or participation in recreation  LOW Complexity : Stable, uncomplicated  Other outcome measures Barthel Index  HIGH       Based on the above components, the patient evaluation is determined to be of the following complexity level: HIGH     Pain Rating:  Did not rate pain CC: fatigue and cough    Activity Tolerance:   Poor and SpO2 stable on RA    After treatment patient left in no apparent distress:   Supine in bed, Call bell within reach, and Side rails x 3    COMMUNICATION/EDUCATION:   The patients plan of care was discussed with: Registered nurse. Fall prevention education was provided and the patient/caregiver indicated understanding. and Patient understands intent and goals of therapy, but is neutral about his/her participation.     Thank you for this referral.  Armaan Ayers, PT, DPT   Time Calculation: 20 mins

## 2020-12-02 NOTE — PROGRESS NOTES
Bedside and Verbal shift change report given to Virgie Al  (oncoming nurse) by Charity Santiago  (offgoing nurse). Report included the following information SBAR and Kardex.

## 2020-12-02 NOTE — PROGRESS NOTES
SPEECH LANGUAGE PATHOLOGY BEDSIDE SWALLOW EVALUATION/DISCHARGE  Patient: Lissy Stuart [de-identified]80 y.o. male)  Date: 12/2/2020  Primary Diagnosis: HAP (hospital-acquired pneumonia) [J18.9, Y95]  SIRS (systemic inflammatory response syndrome) (HCC) [R65.10]       Precautions: aspiration       ASSESSMENT :  Based on the objective data described below, the patient presents with functional swallow for liquids and small amounts of solids. PO nutrition has bee problematic lately. He has shown his premorbid pill dysphagia in hospital again. Will need large pills crushed or converted to liquid form. Admitted 11-30-20  With suspected aspiration event/choked on a pill. He had hypoxia. CHest CT 12-1:  (B) pleural effusions and LL volume loss. 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 Prostate CA, HTN, Gout, stenosis, asthma, COPD, gout, CKD 3. .    Patient will benefit from skilled intervention to address the above impairments. Patients rehabilitation potential is considered to be Fair     PLAN :  Recommendations and Planned Interventions:  Continue diet as tolerated. Upright for all PO. Crush pills if able or convert to liquid form  Frequency/Duration: Patient will not  be followed by speech-language pathology   Discharge Recommendations: None     SUBJECTIVE:   Patient stated I ate a good breakfast, so I don't want any lunch. OBJECTIVE:     Past Medical History:   Diagnosis Date    CRI (chronic renal insufficiency) 12/13/2012    Gout 1/4/2011    Prostate CA ([facility] [address] 1/4/2011     Past Surgical History:   Procedure Laterality Date    ENDOSCOPY, COLON, DIAGNOSTIC  2003    neg    HC BONE MARROW BIOPSY      HX PROSTATECTOMY       Prior Level of Function/Home Situation: son lives with him.        Diet prior to admission: regular, thins  Current Diet:  Regular, thins   Cognitive and Communication Status:     Orientation Level: Oriented to place, Oriented to person  Cognition: Follows commands  Perception: Appears intact     Safety/Judgement: Decreased insight into deficits  Oral Assessment:  Oral Assessment  Labial: No impairment  Oral Hygiene: WFL  P.O. Trials:  Patient Position: upright in bed  Vocal quality prior to P.O.: No impairment  Consistency Presented: Thin liquid  How Presented: Self-fed/presented;Straw;Successive swallows   ORAL PHASE:   Bolus Acceptance: No impairment  Bolus Formation/Control: No impairment     Propulsion: No impairment  Oral Residue: None   PHARYNGEAL PHASE:   Initiation of Swallow: No impairment  Laryngeal Elevation: Functional  Aspiration Signs/Symptoms: None  Pharyngeal Phase Characteristics: (he had trouble swallowing large pill -premorbid issue)           Patient has been seen by SLP in admission at end of November. He had mild dysphagia at that time, especially with pills. Needed pills converted to liquid or crushed. H/o mild dysphagia since submandibular lymph node radiation in 2009. ?return of lymphoma. 11-27: CXR improving. NOMS:   The NOMS functional outcome measure was used to quantify this patient's level of swallowing impairment. Based on the NOMS, the patient was determined to be at level 5 for swallow function       NOMS Swallowing Levels:  Level 1 (CN): NPO  Level 2 (CM): NPO but takes consistency in therapy  Level 3 (CL): Takes less than 50% of nutrition p.o. and continues with nonoral feedings; and/or safe with mod cues; and/or max diet restriction  Level 4 (CK): Safe swallow but needs mod cues; and/or mod diet restriction; and/or still requires some nonoral feeding/supplements  Level 5 (CJ): Safe swallow with min diet restriction; and/or needs min cues  Level 6 (CI): Independent with p.o.; rare cues; usually self cues; may need to avoid some foods or needs extra time  Level 7 ([address] [place] [address]): Independent for all p.o.  BELGICA. (2003). National Outcomes Measurement System (NOMS): Adult Speech-Language Pathology User's Guide.        Pain:  Pain Scale 1: Numeric (0 - 10)  Pain Intensity 1: 0       After treatment:   Patient left in no apparent distress in bed and Nursing notified    COMMUNICATION/EDUCATION:   Patient was educated regarding his deficit(s) of dysphagia  as this relates to his diagnosis of hypoxia. He demonstrated Fair understanding as evidenced by discussion. .    The patient's plan of care including recommendations, planned interventions, and recommended diet changes were discussed with: Registered nurse. Patient/family have participated as able in goal setting and plan of care. Patient/family agree to work toward stated goals and plan of care.     Thank you for this referral.  Santiago Zurita, SLP  Time Calculation: 10 mins

## 2020-12-02 NOTE — PROGRESS NOTES
[address] Dosing Services: Antimicrobial Stewardship Daily Doc 12/2/2020    Consult for antibiotic dosing of Vancomycin by Dr. Néstor Mendieta- Continued by family practice team  Indication: HAP, hx aspiration pna  Day of Therapy 4  OP Tx failure levaquin/flagyl    Ht Readings from Last 1 Encounters:   12/01/20 160 cm (63\")        Wt Readings from Last 1 Encounters:   12/01/20 64.9 kg (143 lb)      Vancomycin therapy:  Current maintenance dose: Dosing by levels due to BENJI/CKD3  Dose calculated to approximate a therapeutic trough of 15-20 mcg/ml  Last trough level- Initial dosing  Plan for level / Adjustment in Therapy: Vancomycin level 17.3 mcg/ml ~36 hrs post dose and re-dosed with 1000 mg this AM, clearing vancomycin very slowly. Dose/Vd ~22 mcg/ml. Scr improving daily, unclear baseline (was ~1.45 mg/dL in 2018). Would plan for a 36-48 hour random level (not yet ordered) as anticipate patient will be therapeutic without need to repeat dose for at least 2-3 days. MRSA screen NGTD x 20 hours, if cont negative 11/3 AM recommend de-escalate vancomycin. Dose administration notes:   Doses given appropriately as scheduled    Date Dose & Interval Measured (mcg/mL) Extrapolated (mcg/mL)   ?12/2 1500 mg x 1  ?17.3 36 hour random level? ? ? ? ?   ? ? ? ? Non-Kinetic Antimicrobial Dosing Regimen:   Current Regimen:  Cefepime 2 grams every 24 hours  Recommendation: Continue the same.   Dose administration notes:   Doses given appropriately as scheduled    Non-Kinetic Antimicrobial Dosing Regimen:   Current Regimen:  Levofloxacin 750 mg Q48 hours  Recommendation: Continue the same for CrCl 20-50 ml/min  Dose administration notes:   Doses given appropriately as scheduled    Other Antimicrobial   (not dosed by pharmacist) None- metronidazole dced after 3 doses   Cultures 11/30 Blood (Paired): NGTD, Prelim  11/30 Urine: Not done, UA neg bacteria  11/30 Resp panel: Negative for respiratory viruses, bacteria  12/1 MRSA: NGTD x 20 hours; Prelim   Serum Creatinine Lab Results   Component Value Date/Time    Creatinine 1.89 (H) 12/02/2020 04:30 AM    Creatinine (POC) 1.7 (H) 06/10/2009 08:44 AM         Creatinine Clearance Estimated Creatinine Clearance: 21.7 mL/min (A) (based on SCr of 1.89 mg/dL (H)).      Temp Temp: 97.7 °F (36.5 °C)       WBC Lab Results   Component Value Date/Time    WBC 12.6 (H) 12/02/2020 04:30 AM        H/H Lab Results   Component Value Date/Time    HGB 10.2 (L) 12/02/2020 04:30 AM        Platelets    Lab Results   Component Value Date/Time    PLATELET 301 89/22/0582 04:30 AM          Thanks,  Pharmacist Lilian Aleman, PHARMD Contact information: [phone]

## 2020-12-02 NOTE — CONSULTS
[facility]     Name: Lucy Jeff MRN: [id number]   : 1932 Hospital: [address]   Date: 2020        Impression Plan   1. Acute respiratory failure  2. Hypoxia  3. Productive cough  4. Bilateral pleural effusion  5. Bilateral atelectasis               · CT not convincing for obvious PNA but could have it in the atelectatic areas, pt at best has bacterial bronchitis  · duonebs  · IV methylpred  · Continue levoflox/ cefepime for now. Would wean cefepime in the next 24 hrs  · PT/OT  · IS  · OOB into chair  · Home O2 assesment before discharge. Radiology  ( personally reviewed) CT chest: bilateral small pleural effusions, bilateral lower lobe atelectasis, motion artifact, but no gross PE   ABG No results for input(s): PHI, PO2I, PCO2I in the last 72 hours. Subjective     79 yo with PMHx CKD and prostate CA presenting for evaluation of progressive shortness of breath. Pt was admitted from - and was treated for aspiration PNA at the time. He was not discharged on O2. Wife reports that he seem to be doing well the day after discharge, but then started deteriorating with increasing shortness of breath and wet cough that he is unable to cough up. She states that he was able to move around the house with a walker before the  hospitalization but has been profoundly week and bedbound since then. Pt smoked as a teenager only and denies any hx of lung problems prior to this. CTA chest on this admission showed bilateral small effusions and atelectasis bilaterally. He denies any wheezing. Review of Systems:  A comprehensive review of systems was negative except for that written in the HPI.     Past Medical History:   Diagnosis Date    CRI (chronic renal insufficiency) 2012    Gout 2011    Prostate CA ([facility] [address] 2011      Past Surgical History:   Procedure Laterality Date    ENDOSCOPY, COLON, DIAGNOSTIC      neg    HC BONE MARROW BIOPSY      HX PROSTATECTOMY        Prior to Admission medications    Medication Sig Start Date End Date Taking? Authorizing Provider   metroNIDAZOLE (FLAGYL) 500 mg tablet Take 1 Tab by mouth two (2) times a day for 8 doses. 11/27/20 12/1/20 Yes Ita Dennis MD   allopurinoL (ZYLOPRIM) 300 mg tablet Take 150 mg by mouth daily. Yes Provider, Historical   vitamin e (E GEMS) 100 unit capsule Take 100 Units by mouth daily. Yes Provider, Historical   multivitamin (ONE A DAY) tablet Take 1 Tab by mouth daily. Yes Provider, Historical   atenoloL (TENORMIN) 50 mg tablet Take 1 Tab by mouth daily. 9/9/20  Yes Samira Arzate MD     Current Facility-Administered Medications   Medication Dose Route Frequency    potassium, sodium phosphates (NEUTRA-PHOS) packet 1 Packet  1 Packet Oral QID    albuterol-ipratropium (DUO-NEB) 2.5 MG-0.5 MG/3 ML  3 mL Nebulization QID RT    methylPREDNISolone (PF) (SOLU-MEDROL) injection 40 mg  40 mg IntraVENous Q8H    levoFLOXacin (LEVAQUIN) 750 mg in D5W IVPB  750 mg IntraVENous Q48H    cefepime (MAXIPIME) 2 g in sterile water (preservative free) 10 mL IV syringe  2 g IntraVENous Q24H    sodium chloride (NS) flush 5-40 mL  5-40 mL IntraVENous Q8H    heparin (porcine) injection 5,000 Units  5,000 Units SubCUTAneous Q8H    atenoloL (TENORMIN) tablet 50 mg  50 mg Oral DAILY    allopurinoL (ZYLOPRIM) tablet 50 mg  50 mg Oral EVERY OTHER DAY     Allergies   Allergen Reactions    Pcn [Penicillins] Swelling      Social History     Tobacco Use    Smoking status: Never Smoker    Smokeless tobacco: Never Used   Substance Use Topics    Alcohol use: No      No family history on file. Laboratory: I have personally reviewed the critical care flowsheet and labs.      Recent Labs     12/02/20  0430 12/01/20  0614 11/30/20  1706   WBC 12.6* 12.2* 17.5*   HGB 10.2* 11.2* 11.8*   HCT 29.4* 33.1* 34.6*    183 209     Recent Labs     12/02/20  0430 12/01/20  2850 11/30/20  1706    139 138   K 2.8* 3.5 3.5    104 101   CO2 24 28 31   * 125* 127*   BUN 46* 46* 48*   CREA 1.89* 1.97* 2.24*   CA 8.5 8.4* 9.2   MG 1.7  --  1.8   PHOS 2.3*  --   --    ALB 2.2* 2.4* 2.5*   ALT 13 16 17       Objective:     Mode Rate Tidal Volume Pressure FiO2 PEEP                    Vital Signs:     TMAX(24)      Intake/Output:   Last shift:         Last 3 shifts: 12/02 0701 - 12/02 1900  In: 490 [P.O.:240;  I.V.:250]  Out: 700 [Urine:700]RRIOLAST3    Intake/Output Summary (Last 24 hours) at 12/2/2020 1519  Last data filed at 12/2/2020 1500  Gross per 24 hour   Intake 590 ml   Output 1600 ml   Net -1010 ml     EXAM:   GENERAL: elderly, wet cough, HEENT:  PERRL, EOMI, no alar flaring or epistaxis, oral mucosa moist without cyanosis, NECK:  no jugular vein distention, no retractions, no thyromegaly or masses, LUNGS: rhonci bilaterally, no wheezes, HEART:  Regular rate and rhythm with no MGR; no edema is present, ABDOMEN:  soft with no tenderness, bowel sounds present, EXTREMITIES:  warm with no cyanosis, SKIN:  no jaundice or ecchymosis and NEUROLOGIC:  alert and oriented, grossly non-focal    Anahi MD [facility] [address] [facility]

## 2020-12-02 NOTE — PROGRESS NOTES
Notified Family Practice that pt unable to swallow the potassium pills, even cut in half. The second half-pill he took got stuck in his throat and he had a hard time getting it down. I held the remaining 30mEq dose. MD said she would place new orders.

## 2020-12-02 NOTE — PROGRESS NOTES
Family practice called regarding patients refusal to take potassium iv,dr luevano said he would d/c medication

## 2020-12-02 NOTE — PROGRESS NOTES
Transition of Care Plan - RUR 29%:      1. Monitor patient response to treatment and recommendations. 2. PT/OT eval  3. CM following - patient is a readmission, lives alone but son reportedly had been staying with him.   Home health had been arranged with [facility] at last hospitalization

## 2020-12-02 NOTE — PROGRESS NOTES
[address] [facility] [address] [place] [address]   Office [phone]  Fax [phone]          Assessment and Plan   Steven Vieira a 80 y. o. male with a PMHx of prostate CA, HTN, Gout, hx of lymphoma who presents to the ED with SOB and dyspnea.     Overnight events: Patient complained of increased SOB around 9pm. Night team increased O2 to 5l/min, and gave bumex 1mg; ordered EKG, CXR and troponin which were at baseline, improved aeration/unchanged effusion and negative respectively. Patient improved with bumex    Severe sepsis w/ SIRS 3/4 (WBC:17.5 RR:24 HR:98) w/ AHRF: Improving. LA 1.9. Pt with a recent hx of 7 days hospitalization d/t aspiration pneumonia. CXR: Right lower lobe pneumonia. Chest CT: Bilateral pleural effusion Procal:1.26. COVID rapid and PCR neg. Pro-BNP 14,829. D-dimer:8.20 UA: Negative RVP: Negative. Was not given fluid bolus as he has possible HF and his pro-BNP is elevated. S/p flagyl/vanc. Legionella, strep pneumo neg.  - Discontinue cefepime  - Continue levaquin  - CBC and CMP daily  - Sputum culture pending  - Blood Culture NG3d/NG5D  - MRSA culture neg: Vancomycin discontinued   - Wean O2 as tolerated     AHRF 2/2 HAP vs HF exacerbation: Complaint of SOB which has been present since last admission d/t Aspiration PNA. CXR: Right lower lobe pneumonia Procal:1.26. Rapid covid test neg. Pro-BNP 14,829. ECHO on 11/21/20 EF: 60-65%. Repeat ECHO: LVEF is 55 - 60%. Normal cavity size, wall thickness and EF. Moderate mitral annular calcification. Primary etiology likely HAP causing HF exacerbation and AHRF.   - Pulm c/s appreciate recs   - IV solumedrol 40Q8  - Cards c/s appreciate recs       - Albuterol neb PRN       - Repeat BNP pending       - Trop neg  - Supplemental O2 as needed     Dysphagia: Patient with h/o dysphagia and aspiration PNA on previous admission.  - Speech therapy consulted, appreciate recs   - Liquid form or crushed pills when possible   - Upright when swallowing    Hematuria: Patient presented with urinary incontinence. UA showed trace LE and moderate blood. UCx no growth, UTI ruled out  - Follow up outpt, repeat UA with PCP     Covid neg: Pt with SOB and Sputum production.  - Discontinue COVID labs and meds     At risk of BENJI in the setting of CKD III: Improved POA CR: 2.24 (BL:2.0). Likely 2/2 dehydration.   - CMP daily   - Avoid nephrotoxic agents     Elevated D-dimer - no PE: Likely increased as acute phase reactant 2/2 HAP vs UTI. D-dimer:8.20  Low suspicious for DVT or PE, pt had a CTA done last admission which was negative for PE. Wells' score for DVT: 1 point (Mod risk). Well's score for PE:1.5 Low risk. Repeat CTA neg for PE     Spinal stenosis: recurrent history of weakness, dizziness and falls.   - Follow out OP     Hypertension On home atenolol 50 mg tablet everyday. - Continuing home Atenolol 50 mg daily   - Will continue to monitor at this time and readjust as BP's trend.     Anemia: POA 11.8 (BL: 13) likely 2/2 CKD. No hx of active bleeding.  - CBC daily     Gout: stable. Home Allopurinol 150 mg daily   - Continue Allopurinol 50 mg every other day. Dose changed based on patient renal function.      H/o B Cell Lymphoma: Stable; remission normal PET scan in 2011. Bone Marrow Bx on 11/27/20: Hypercellular marrow with maturing trilineage hematopoiesis   No morphologic or immunophenotypic evidence of B cell non-Hodgkin's lymphoma   Flow cytometry shows left shifted myeloid maturation with less than 5% blasts   - Follow as OP.      Prostate Cancer: stable; s/p prostatectomy       FEN/GI - Renal diet. No fluids  Activity - Out of bed w/ assistance. DVT prophylaxis - Heparin  GI prophylaxis - Not indicated at this time  Fall prophylaxis - Fall precautions ordered. Disposition -. Plan to d/c to Home. Consulting PT, OT and CM  Code Status - Full. Discussed with patient / caregivers.   Next of Kin Name and Shruthi JeanaShruthi Joanne- [address] MD  Family Medicine Resident         Subjective / Objective     Subjective Reports SOB increased from yesterday this morning. Denies fever, chills, chest pain, abdominal pain, nausea, vomiting. Temp (24hrs), Av.6 °F (36.4 °C), Min:97.5 °F (36.4 °C), Max:97.7 °F (36.5 °C)     Objective:  Vital signs: (most recent): Blood pressure 128/76, pulse 75, temperature 97.7 °F (36.5 °C), resp. rate 20, height 5' 3\" (1.6 m), weight 143 lb (64.9 kg), SpO2 96 %. Respiratory: O2 Flow Rate (L/min): 2 l/min O2 Device: Nasal cannula   Visit Vitals  /76 (BP 1 Location: Right arm, BP Patient Position: At rest)   Pulse 75   Temp 97.7 °F (36.5 °C)   Resp 20   Ht 5' 3\" (1.6 m)   Wt 143 lb (64.9 kg)   SpO2 96%   BMI 25.33 kg/m²     Physical Exam  Constitutional:       Appearance: Normal appearance. HENT:      Head: Normocephalic and atraumatic. Nose: Nose normal.      Mouth/Throat:      Mouth: Mucous membranes are moist.      Pharynx: Oropharynx is clear. Eyes:      Pupils: Pupils are equal, round  Neck:      Musculoskeletal: Normal range of motion and neck supple. Cardiovascular:      Rate and Rhythm: Normal rate and regular rhythm. Pulses: Normal pulses. Heart sounds: Normal heart sounds. No murmur. No friction rub. No gallop. Pulmonary:      Effort: Pulmonary effort is normal.      Breath sounds: Breath sounds diminished bilaterally in lower fields. Transmitted breath sounds from upper resp tract. Inspiratory and expiratory wheezing appreciated. Abdominal:      General: Abdomen is flat. Bowel sounds are normal.      Palpations: Abdomen is soft. Musculoskeletal: Normal range of motion. Skin:     General: Skin is warm and dry. Capillary Refill: Capillary refill takes less than 2 seconds. Neurological:      General: No focal deficit present. Mental Status: He is alert and oriented to person, place, and time.    Psychiatric:         Mood and Affect: Mood normal.     I/O:  Date 12/01/20 0700 - 12/02/20 0659 12/02/20 0700 - 12/03/20 0659   Shift [id number] 1900-0659 24 Hour Total 0700-1859 1900-0659 24 Hour Total   INTAKE   P.O.    240  240     P. O.    240  240   I. V.(mL/kg/hr) 1320(1.7) 100(0.1) 1420(0.9) 250  250     Volume (0.9% sodium chloride infusion) 1070  1070        Volume (metroNIDAZOLE (FLAGYL) IVPB premix 500 mg) 100 100 200        Volume (levoFLOXacin (LEVAQUIN) 750 mg in D5W IVPB) 150  150        Volume (vancomycin (VANCOCIN) 1,000 mg in 0.9% sodium chloride 250 mL (VIAL-MATE))    250  250   Shift Total(mL/kg) 1320(20.4) 100(1.5) 1420(21.9) 490(7.6)  490(7.6)   OUTPUT   Urine(mL/kg/hr) 600(0.8) 500(0.6) 1100(0.7) 600  600     Urine Voided  600  600     Urine Occurrence(s)  3 x 3 x      Shift Total(mL/kg) 600(9.3) 500(7.7) 1100(17) 600(9.3)  600(9.3)    -400 320 -110  -110   Weight (kg) 64.9 64.9 64.9 64.9 64.9 64.9       CBC:  Recent Labs     12/02/20  0430 12/01/20  0614 11/30/20  1706   WBC 12.6* 12.2* 17.5*   HGB 10.2* 11.2* 11.8*   HCT 29.4* 33.1* 34.6*    183 158       Metabolic Panel:  Recent Labs     12/02/20  0430 12/01/20  0614 11/30/20  1706    139 138   K 2.8* 3.5 3.5    104 101   CO2 24 28 31   BUN 46* 46* 48*   CREA 1.89* 1.97* 2.24*   * 125* 127*   CA 8.5 8.4* 9.2   MG 1.7  --  1.8   PHOS 2.3*  --   --    ALB 2.2* 2.4* 2.5*   ALT 13 16 17          For Billing    Chief Complaint   Patient presents with    Shortness of Breath       Hospital Problems  Date Reviewed: 12/1/2020          Codes Class Noted POA    CHF (congestive heart failure) (HCC) ICD-10-CM: I50.9  ICD-9-CM: 428.0  12/1/2020 Unknown        * (Principal) Acute hypoxemic respiratory failure (HCC) ICD-10-CM: J96.01  ICD-9-CM: 518.81  12/1/2020 Unknown        Severe sepsis (HCC) ICD-10-CM: A41.9, R65.20  ICD-9-CM: 038.9, 995.92  12/1/2020 Unknown        Person under investigation for COVID-19 ICD-10-CM: Z20.828  ICD-9-CM: V01.79 12/1/2020 Unknown        HAP (hospital-acquired pneumonia) ICD-10-CM: J18.9, Y95  ICD-9-CM: 176  11/30/2020 Unknown        SIRS (systemic inflammatory response syndrome) (HCC) ICD-10-CM: R65.10  ICD-9-CM: 995.90  11/30/2020 Unknown        History of B-cell lymphoma ICD-10-CM: Z85.72  ICD-9-CM: V10.79  11/22/2020 Yes        Anemia ICD-10-CM: D64.9  ICD-9-CM: 285.9  11/21/2020 Yes        CRI (chronic renal insufficiency) (Chronic) ICD-10-CM: N18.9  ICD-9-CM: 585.9  12/13/2012 Yes        Lymphoma ([facility] ICD-10-CM: C85.90  ICD-9-CM: 202.80  1/4/2011 Yes        HTN (hypertension) ICD-10-CM: I10  ICD-9-CM: 401.9  1/4/2011 Yes        Gout ICD-10-CM: M10.9  ICD-9-CM: 274.9  1/4/2011 Yes

## 2020-12-03 ENCOUNTER — DOCUMENTATION ONLY (OUTPATIENT)
Dept: FAMILY MEDICINE CLINIC | Age: 85
End: 2020-12-03

## 2020-12-03 PROBLEM — H81.10 BPPV (BENIGN PAROXYSMAL POSITIONAL VERTIGO): Status: ACTIVE | Noted: 2020-12-03

## 2020-12-03 LAB
ALBUMIN SERPL-MCNC: 2.3 G/DL (ref 3.5–5)
ALBUMIN/GLOB SERPL: 0.8 {RATIO} (ref 1.1–2.2)
ALP SERPL-CCNC: 69 U/L (ref 45–117)
ALT SERPL-CCNC: 15 U/L (ref 12–78)
ANION GAP SERPL CALC-SCNC: 8 MMOL/L (ref 5–15)
AST SERPL-CCNC: 39 U/L (ref 15–37)
BASOPHILS # BLD: 0 K/UL (ref 0–0.1)
BASOPHILS NFR BLD: 0 % (ref 0–1)
BILIRUB SERPL-MCNC: 1 MG/DL (ref 0.2–1)
BNP SERPL-MCNC: ABNORMAL PG/ML
BUN SERPL-MCNC: 42 MG/DL (ref 6–20)
BUN/CREAT SERPL: 22 (ref 12–20)
CALCIUM SERPL-MCNC: 8 MG/DL (ref 8.5–10.1)
CHLORIDE SERPL-SCNC: 103 MMOL/L (ref 97–108)
CO2 SERPL-SCNC: 27 MMOL/L (ref 21–32)
COMMENT, HOLDF: NORMAL
CREAT SERPL-MCNC: 1.92 MG/DL (ref 0.7–1.3)
DIFFERENTIAL METHOD BLD: ABNORMAL
EOSINOPHIL # BLD: 0 K/UL (ref 0–0.4)
EOSINOPHIL NFR BLD: 0 % (ref 0–7)
ERYTHROCYTE [DISTWIDTH] IN BLOOD BY AUTOMATED COUNT: 16.1 % (ref 11.5–14.5)
GLOBULIN SER CALC-MCNC: 3 G/DL (ref 2–4)
GLUCOSE SERPL-MCNC: 127 MG/DL (ref 65–100)
HCT VFR BLD AUTO: 29.7 % (ref 36.6–50.3)
HGB BLD-MCNC: 10 G/DL (ref 12.1–17)
IMM GRANULOCYTES # BLD AUTO: 0.1 K/UL (ref 0–0.04)
IMM GRANULOCYTES NFR BLD AUTO: 1 % (ref 0–0.5)
LYMPHOCYTES # BLD: 0.7 K/UL (ref 0.8–3.5)
LYMPHOCYTES NFR BLD: 7 % (ref 12–49)
MAGNESIUM SERPL-MCNC: 1.7 MG/DL (ref 1.6–2.4)
MCH RBC QN AUTO: 30.4 PG (ref 26–34)
MCHC RBC AUTO-ENTMCNC: 33.7 G/DL (ref 30–36.5)
MCV RBC AUTO: 90.3 FL (ref 80–99)
MONOCYTES # BLD: 0.6 K/UL (ref 0–1)
MONOCYTES NFR BLD: 6 % (ref 5–13)
NEUTS SEG # BLD: 8.3 K/UL (ref 1.8–8)
NEUTS SEG NFR BLD: 86 % (ref 32–75)
NRBC # BLD: 0 K/UL (ref 0–0.01)
NRBC BLD-RTO: 0 PER 100 WBC
PHOSPHATE SERPL-MCNC: 5.1 MG/DL (ref 2.6–4.7)
PLATELET # BLD AUTO: 212 K/UL (ref 150–400)
PMV BLD AUTO: 11.4 FL (ref 8.9–12.9)
POTASSIUM SERPL-SCNC: 3.5 MMOL/L (ref 3.5–5.1)
PROT SERPL-MCNC: 5.3 G/DL (ref 6.4–8.2)
RBC # BLD AUTO: 3.29 M/UL (ref 4.1–5.7)
SAMPLES BEING HELD,HOLD: NORMAL
SODIUM SERPL-SCNC: 138 MMOL/L (ref 136–145)
WBC # BLD AUTO: 9.7 K/UL (ref 4.1–11.1)

## 2020-12-03 PROCEDURE — 94640 AIRWAY INHALATION TREATMENT: CPT

## 2020-12-03 PROCEDURE — 65660000000 HC RM CCU STEPDOWN

## 2020-12-03 PROCEDURE — 74011250636 HC RX REV CODE- 250/636: Performed by: STUDENT IN AN ORGANIZED HEALTH CARE EDUCATION/TRAINING PROGRAM

## 2020-12-03 PROCEDURE — 99233 SBSQ HOSP IP/OBS HIGH 50: CPT | Performed by: FAMILY MEDICINE

## 2020-12-03 PROCEDURE — 74011250636 HC RX REV CODE- 250/636: Performed by: NURSE PRACTITIONER

## 2020-12-03 PROCEDURE — 36415 COLL VENOUS BLD VENIPUNCTURE: CPT

## 2020-12-03 PROCEDURE — 85025 COMPLETE CBC W/AUTO DIFF WBC: CPT

## 2020-12-03 PROCEDURE — 77010033678 HC OXYGEN DAILY

## 2020-12-03 PROCEDURE — 97530 THERAPEUTIC ACTIVITIES: CPT

## 2020-12-03 PROCEDURE — 94760 N-INVAS EAR/PLS OXIMETRY 1: CPT

## 2020-12-03 PROCEDURE — 74011250637 HC RX REV CODE- 250/637: Performed by: STUDENT IN AN ORGANIZED HEALTH CARE EDUCATION/TRAINING PROGRAM

## 2020-12-03 PROCEDURE — 80053 COMPREHEN METABOLIC PANEL: CPT

## 2020-12-03 PROCEDURE — 74011000250 HC RX REV CODE- 250: Performed by: INTERNAL MEDICINE

## 2020-12-03 PROCEDURE — 84100 ASSAY OF PHOSPHORUS: CPT

## 2020-12-03 PROCEDURE — 83735 ASSAY OF MAGNESIUM: CPT

## 2020-12-03 PROCEDURE — 99231 SBSQ HOSP IP/OBS SF/LOW 25: CPT | Performed by: INTERNAL MEDICINE

## 2020-12-03 PROCEDURE — 74011250636 HC RX REV CODE- 250/636: Performed by: INTERNAL MEDICINE

## 2020-12-03 PROCEDURE — 83880 ASSAY OF NATRIURETIC PEPTIDE: CPT

## 2020-12-03 PROCEDURE — 97110 THERAPEUTIC EXERCISES: CPT

## 2020-12-03 RX ORDER — FUROSEMIDE 10 MG/ML
20 INJECTION INTRAMUSCULAR; INTRAVENOUS DAILY
Status: DISCONTINUED | OUTPATIENT
Start: 2020-12-04 | End: 2020-12-06

## 2020-12-03 RX ORDER — MECLIZINE HCL 12.5 MG 12.5 MG/1
12.5 TABLET ORAL 2 TIMES DAILY
Status: DISCONTINUED | OUTPATIENT
Start: 2020-12-03 | End: 2020-12-06 | Stop reason: HOSPADM

## 2020-12-03 RX ORDER — FUROSEMIDE 10 MG/ML
20 INJECTION INTRAMUSCULAR; INTRAVENOUS ONCE
Status: COMPLETED | OUTPATIENT
Start: 2020-12-03 | End: 2020-12-03

## 2020-12-03 RX ORDER — MAGNESIUM SULFATE 1 G/100ML
1 INJECTION INTRAVENOUS ONCE
Status: COMPLETED | OUTPATIENT
Start: 2020-12-03 | End: 2020-12-03

## 2020-12-03 RX ADMIN — IPRATROPIUM BROMIDE AND ALBUTEROL SULFATE 3 ML: .5; 3 SOLUTION RESPIRATORY (INHALATION) at 16:56

## 2020-12-03 RX ADMIN — Medication 10 ML: at 06:45

## 2020-12-03 RX ADMIN — IPRATROPIUM BROMIDE AND ALBUTEROL SULFATE 3 ML: .5; 3 SOLUTION RESPIRATORY (INHALATION) at 19:49

## 2020-12-03 RX ADMIN — MECLIZINE 12.5 MG: 12.5 TABLET ORAL at 17:09

## 2020-12-03 RX ADMIN — FUROSEMIDE 20 MG: 10 INJECTION, SOLUTION INTRAMUSCULAR; INTRAVENOUS at 09:58

## 2020-12-03 RX ADMIN — HEPARIN SODIUM 5000 UNITS: 5000 INJECTION INTRAVENOUS; SUBCUTANEOUS at 04:35

## 2020-12-03 RX ADMIN — IPRATROPIUM BROMIDE AND ALBUTEROL SULFATE 3 ML: .5; 3 SOLUTION RESPIRATORY (INHALATION) at 12:40

## 2020-12-03 RX ADMIN — METHYLPREDNISOLONE SODIUM SUCCINATE 40 MG: 40 INJECTION, POWDER, FOR SOLUTION INTRAMUSCULAR; INTRAVENOUS at 22:07

## 2020-12-03 RX ADMIN — METHYLPREDNISOLONE SODIUM SUCCINATE 40 MG: 40 INJECTION, POWDER, FOR SOLUTION INTRAMUSCULAR; INTRAVENOUS at 06:45

## 2020-12-03 RX ADMIN — ATENOLOL 50 MG: 50 TABLET ORAL at 09:58

## 2020-12-03 RX ADMIN — HEPARIN SODIUM 5000 UNITS: 5000 INJECTION INTRAVENOUS; SUBCUTANEOUS at 17:21

## 2020-12-03 RX ADMIN — Medication 10 ML: at 17:10

## 2020-12-03 RX ADMIN — IPRATROPIUM BROMIDE AND ALBUTEROL SULFATE 3 ML: .5; 3 SOLUTION RESPIRATORY (INHALATION) at 07:41

## 2020-12-03 RX ADMIN — LEVOFLOXACIN 750 MG: 5 INJECTION, SOLUTION INTRAVENOUS at 06:45

## 2020-12-03 RX ADMIN — METHYLPREDNISOLONE SODIUM SUCCINATE 40 MG: 40 INJECTION, POWDER, FOR SOLUTION INTRAMUSCULAR; INTRAVENOUS at 17:09

## 2020-12-03 RX ADMIN — MAGNESIUM SULFATE HEPTAHYDRATE 1 G: 1 INJECTION, SOLUTION INTRAVENOUS at 17:09

## 2020-12-03 RX ADMIN — Medication 10 ML: at 22:07

## 2020-12-03 NOTE — PROGRESS NOTES
2030: Patient called out for worsening SOB saying ' I just can't seem to catch a deep breathe and having trouble breathing.'  RN assessed patient. Vital signs stable ( BP: 134/68, T: 97.4,  02 97% on 5L NC, RR: 24).  paged and came to see patient. New orders placed. Will continue to monitor. 0630: Patient resting comfortably without any complaints of SOB or distress. Still on 5L NC. Will continue to monitor. 0730: Bedside and Verbal shift change report given to Kuldip Hinojosa RN (oncoming nurse) by Estefania MCCAULEY (offgoing nurse). Report included the following information SBAR, Kardex, Intake/Output, MAR and Recent Results.

## 2020-12-03 NOTE — PROGRESS NOTES
Problem: Mobility Impaired (Adult and Pediatric)  Goal: *Acute Goals and Plan of Care (Insert Text)  Description: FUNCTIONAL STATUS PRIOR TO ADMISSION: At baseline patient ambulatory with RW but after last hospital D/C went home and remained bedbound stating \"no energy to get OOB\"    HOME SUPPORT PRIOR TO ADMISSION: The patient lived with family. Physical Therapy Goals  Initiated 12/2/2020  1. Patient will move from supine to sit and sit to supine , scoot up and down, and roll side to side in bed with minimal assistance/contact guard assist within 7 day(s). 2.  Patient will transfer from bed to chair and chair to bed with minimal assistance/contact guard assist using the least restrictive device within 7 day(s). 3.  Patient will perform sit to stand with minimal assistance/contact guard assist within 7 day(s). 4.  Patient will ambulate with minimal assistance/contact guard assist for 50 feet with the least restrictive device within 7 day(s). 5.  Patient will ascend/descend 4 stairs with 1 handrail(s) with minimal assistance/contact guard assist within 7 day(s). Outcome: Not Met   PHYSICAL THERAPY TREATMENT  Patient: Ankur Guillory (71 y.o. male)  Date: 12/3/2020  Diagnosis: HAP (hospital-acquired pneumonia) [J18.9, Y95]  SIRS (systemic inflammatory response syndrome) (HCC) [R65.10]   Acute hypoxemic respiratory failure (HCC)       Precautions: Bed Alarm, Fall, skin, back brace at prior admission  Chart, physical therapy assessment, plan of care and goals were reviewed. ASSESSMENT  Patient continues with skilled PT services and is not progressing towards goals. He declines out of bed mobility stating dyspnea and fatigue; not amenable despite encouragement and education. Pt received using 3LNCO2 and SpO2 97-98%, HR 73-75 BPM throughout encounter. He participates in repositioning and supine exercises with frequent rest breaks. Pt left with bed in partial chair position and meal tray set-up.  Pt utilized back brace when out of bed during recent admission. Current Level of Function Impacting Discharge (mobility/balance): max assist repositioning in bed    Other factors to consider for discharge: re-admit after discharge to private residence with [address] and family assist after recent admission. PLAN :  Patient continues to benefit from skilled intervention to address the above impairments. Continue treatment per established plan of care. to address goals. Recommendation for discharge: (in order for the patient to meet his/her long term goals)  Therapy up to 5 days/week in SNF setting    This discharge recommendation:  Has not yet been discussed the attending provider and/or case management    IF patient discharges home will need the following DME: medical transport home, hospital bed, HHPT, 24-hour assistance       SUBJECTIVE:   Patient stated I can't do it today. I'm short of breath. .. I told the doctor.     Pt received supine, agreeable to PT and cleared by RN. OBJECTIVE DATA SUMMARY:   Critical Behavior:  Neurologic State: Alert  Orientation Level: Oriented X4  Cognition: Follows commands, Appropriate for age attention/concentration, Appropriate safety awareness  Safety/Judgement: Decreased insight into deficits    LE edema with very slight pitting    Functional Mobility Training:  Bed Mobility:           Scooting: (max assist repositioning in bed)                                                  Therapeutic Exercises: Ankle pumps x 10  Heel slides 2 sets 5 reps  SLR 2 sets 5 reps  Pain Rating:  Denies pain    Activity Tolerance:   requires frequent rest breaks    After treatment patient left in no apparent distress:   Supine in bed, Call bell within reach, Bed / chair alarm activated, and Side rails x 3    COMMUNICATION/COLLABORATION:   The patients plan of care was discussed with: Registered nurse and Rehabilitation technician.      Luis Coffman PT, DPT   Time Calculation: 10 mins

## 2020-12-03 NOTE — PROGRESS NOTES
I presented to patients bedside after my team was informed that the patient was complaining about dizziness where I was joined by Dr Jo Ann Conde. Patients vital signs were stable. Patient reported that he had been feeling dizzy when he moved his head, like the room was spinning. He said he had no dizziness when he stayed still. Denied any other new complaints including hearing changes, neck stiffness, dizziness, numbness, tinnitus, weakness, chest pain. SOB is unchanged. Visit Vitals  /73 (BP 1 Location: Left arm, BP Patient Position: At rest)   Pulse 75   Temp 98.3 °F (36.8 °C)   Resp 20   Ht 5' 3\" (1.6 m)   Wt 143 lb 1.3 oz (64.9 kg)   SpO2 95%   BMI 25.35 kg/m²     Physical exam:  General: Patient sitting up in bed. No acute distress. Heart: RRR  Extremities: No edema  Neuro: CN 2-12 intact. Strength and sensation in extremities symmetrical and unchanged from previous exam. Patients vertigo can be reproduced when his head is moved abruptly from side to side. Assessment and Plan:    Most likely BPPV given vertigo is only when he moves his head. Denies tinnitus and hearing changes, making Menieres disease unlikely. No other neuro complaints with neuro exam unchanged from previous makes CVA unlikely.   - Consult to PT to administer Epley Maneuver for BPPV    Mercedes Irene MD  1:14 PM  12/03/20

## 2020-12-03 NOTE — PROGRESS NOTES
[facility]     Name: Burke Washington MRN: [id number]   : 1932 Hospital: [address]   Date: 12/3/2020        Impression Plan   1. Acute respiratory failure  2. Hypoxia  3. Productive cough  4. Bilateral pleural effusion  5. Bilateral atelectasis               · CT not convincing for obvious PNA but could have it in the atelectatic areas, pt at best has bacterial bronchitis: follow sputum culture  · duonebs  · IV methylpred  · Continue levoflox. Cefepime d'cd  · Replete Mag  · PT/OT  · IS  · OOB into chair  · Home O2 assesment before discharge. Radiology  ( personally reviewed) CT chest: bilateral small pleural effusions, bilateral lower lobe atelectasis, motion artifact, but no gross PE   ABG No results for input(s): PHI, PO2I, PCO2I in the last 72 hours. Subjective     81 yo with PMHx CKD and prostate CA presenting for evaluation of progressive shortness of breath. Pt was admitted from - and was treated for aspiration PNA at the time. He was not discharged on O2. Wife reports that he seem to be doing well the day after discharge, but then started deteriorating with increasing shortness of breath and wet cough that he is unable to cough up. She states that he was able to move around the house with a walker before the  hospitalization but has been profoundly week and bedbound since then. Pt smoked as a teenager only and denies any hx of lung problems prior to this. CTA chest on this admission showed bilateral small effusions and atelectasis bilaterally. He denies any wheezing. Review of Systems:  A comprehensive review of systems was negative except for that written in the HPI. Interval History:    Afebrile  Bp stable  Sats 95% on 1L NC  WBC 9.7; better  Hgb 10  K 3.5  Creat 1.92; increased  Mag 1.7  Phos 5.1  Pro-BNp 71421  Sputum culture with occasional GPCs    ROS:  Breathing feels better.  Reports dizziness with turning his head that is new. No cough, sputum production. Denies fever or chills. Past Medical History:   Diagnosis Date    CRI (chronic renal insufficiency) 12/13/2012    Gout 1/4/2011    Prostate CA ([address] 1/4/2011      Past Surgical History:   Procedure Laterality Date    ENDOSCOPY, COLON, DIAGNOSTIC  2003    neg    HC BONE MARROW BIOPSY      HX PROSTATECTOMY        Prior to Admission medications    Medication Sig Start Date End Date Taking? Authorizing Provider   allopurinoL (ZYLOPRIM) 300 mg tablet Take 150 mg by mouth daily. Yes Provider, Historical   vitamin e (E GEMS) 100 unit capsule Take 100 Units by mouth daily. Yes Provider, Historical   multivitamin (ONE A DAY) tablet Take 1 Tab by mouth daily. Yes Provider, Historical   atenoloL (TENORMIN) 50 mg tablet Take 1 Tab by mouth daily. 9/9/20  Yes Fili Alcaraz MD     Current Facility-Administered Medications   Medication Dose Route Frequency    [START ON 12/4/2020] furosemide (LASIX) injection 20 mg  20 mg IntraVENous DAILY    meclizine (ANTIVERT) tablet 12.5 mg  12.5 mg Oral BID    albuterol-ipratropium (DUO-NEB) 2.5 MG-0.5 MG/3 ML  3 mL Nebulization QID RT    methylPREDNISolone (PF) (SOLU-MEDROL) injection 40 mg  40 mg IntraVENous Q8H    levoFLOXacin (LEVAQUIN) 750 mg in D5W IVPB  750 mg IntraVENous Q48H    sodium chloride (NS) flush 5-40 mL  5-40 mL IntraVENous Q8H    heparin (porcine) injection 5,000 Units  5,000 Units SubCUTAneous Q8H    atenoloL (TENORMIN) tablet 50 mg  50 mg Oral DAILY    allopurinoL (ZYLOPRIM) tablet 50 mg  50 mg Oral EVERY OTHER DAY     Allergies   Allergen Reactions    Pcn [Penicillins] Swelling      Social History     Tobacco Use    Smoking status: Never Smoker    Smokeless tobacco: Never Used   Substance Use Topics    Alcohol use: No      No family history on file. Laboratory: I have personally reviewed the critical care flowsheet and labs.      Recent Labs     12/03/20  0634 12/02/20 2125 12/02/20  0430   WBC 9.7 12.7* 12.6*   HGB 10.0* 10.3* 10.2*   HCT 29.7* 30.3* 29.4*    212 183     Recent Labs     12/03/20  0634 12/02/20  2125 12/02/20  1648 12/02/20  0430 12/01/20  0614 11/30/20  1706    137 138 139 139 138   K 3.5 3.2* 3.1* 2.8* 3.5 3.5    105 105 106 104 101   CO2 27 23 26 24 28 31   * 125* 91 103* 125* 127*   BUN 42* 42* 40* 46* 46* 48*   CREA 1.92* 1.80* 1.91* 1.89* 1.97* 2.24*   CA 8.0* 8.5 8.3* 8.5 8.4* 9.2   MG 1.7  --   --  1.7  --  1.8   PHOS 5.1*  --   --  2.3*  --   --    ALB 2.3*  --   --  2.2* 2.4* 2.5*   ALT 15  --   --  13 16 17       Objective:     Mode Rate Tidal Volume Pressure FiO2 PEEP                    Vital Signs:     TMAX(24)      Intake/Output:   Last shift:         Last 3 shifts: 12/03 0701 - 12/03 1900  In: 240 [P.O.:240]  Out: 120 [Urine:120]RRIOLAST3    Intake/Output Summary (Last 24 hours) at 12/3/2020 1652  Last data filed at 12/3/2020 1032  Gross per 24 hour   Intake 620 ml   Output 1720 ml   Net -1100 ml     EXAM:   GENERAL: elderly, wet cough, HEENT:  PERRL, EOMI, no alar flaring or epistaxis, oral mucosa moist without cyanosis, NECK:  no jugular vein distention, no retractions, no thyromegaly or masses, LUNGS: rhonci bilaterally, no wheezes, HEART:  Regular rate and rhythm with no MGR; no edema is present, ABDOMEN:  soft with no tenderness, bowel sounds present, EXTREMITIES:  warm with no cyanosis, SKIN:  no jaundice or ecchymosis and NEUROLOGIC:  alert and oriented, grossly non-focal    Read TEO Tobias  [facility] [address]

## 2020-12-03 NOTE — PROGRESS NOTES
Bedside shift change report given to Grisel (oncoming nurse) by Sharon Quiñonez (offgoing nurse). Report included the following information SBAR, Kardex, MAR and Recent Results.

## 2020-12-03 NOTE — PROGRESS NOTES
Problem: Mobility Impaired (Adult and Pediatric)  Goal: *Acute Goals and Plan of Care (Insert Text)  Description: FUNCTIONAL STATUS PRIOR TO ADMISSION: At baseline patient ambulatory with RW but after last hospital D/C went home and remained bedbound stating \"no energy to get OOB\"    HOME SUPPORT PRIOR TO ADMISSION: The patient lived with family. Physical Therapy Goals  Initiated 12/2/2020  1. Patient will move from supine to sit and sit to supine , scoot up and down, and roll side to side in bed with minimal assistance/contact guard assist within 7 day(s). 2.  Patient will transfer from bed to chair and chair to bed with minimal assistance/contact guard assist using the least restrictive device within 7 day(s). 3.  Patient will perform sit to stand with minimal assistance/contact guard assist within 7 day(s). 4.  Patient will ambulate with minimal assistance/contact guard assist for 50 feet with the least restrictive device within 7 day(s). 5.  Patient will ascend/descend 4 stairs with 1 handrail(s) with minimal assistance/contact guard assist within 7 day(s). 12/3/2020 1420 by Royanne Hammans, PT, DPT  Outcome: Not Met   PHYSICAL THERAPY TREATMENT  Patient: Delroy Vang (76 y.o. male)  Date: 12/3/2020  Diagnosis: HAP (hospital-acquired pneumonia) [J18.9, Y95]  SIRS (systemic inflammatory response syndrome) ([facility]) [R65.10]   Acute hypoxemic respiratory failure ([facility] [address]       Precautions: Bed Alarm, Fall  Chart, physical therapy assessment, plan of care and goals were reviewed. ASSESSMENT  Patient continues with skilled PT services and is not progressing towards goals. Follow-up provided for BPPV assessment per new MD orders. Pt did not complain of dizziness during earlier session this date though did say he had felt dizzy momentarily earlier in day. Asked regarding symptoms this afternoon he consistently states dyspnea and reports dizziness when asked.  He states dizziness sometimes occurs when sitting still, sometimes with head turns but not always. Pt educated regarding techniques for and role of Jed-Hallpike testing for BPPV - he declines participation in assessment. Pt repositioned up in bed with total assistance and left supine NAD. Question if anxiety could play some role in patient's symptoms. Pt found with O2 cannula in place but titrated to 0L/min and connector removed from wall. SpO2 95% and Hr 74. RN notified and advises PT to reapply at 2L/min. Daughter present stating concern regarding pt's dyspnea and apparent decline in status compared with yesterday. She offers several questions regarding pt's medical status - questions deferred to appropriate provider. Daughter requesting to see primary team. Family medicine team notified regarding pt declining BPPV assessment and daughter's request.     Current Level of Function Impacting Discharge (mobility/balance): total assist bed mobility    Other factors to consider for discharge:          PLAN :  Patient continues to benefit from skilled intervention to address the above impairments. Continue treatment per established plan of care. to address goals. Recommendation for discharge: (in order for the patient to meet his/her long term goals)  Therapy up to 5 days/week in SNF setting    This discharge recommendation:  Has not yet been discussed the attending provider and/or case management    IF patient discharges home will need the following DME: see earlier note this date. SUBJECTIVE:   Patient stated I'm short of breath.     OBJECTIVE DATA SUMMARY:   Critical Behavior:  Neurologic State: Alert  Orientation Level: Oriented X4  Cognition: Follows commands, Appropriate for age attention/concentration, Appropriate safety awareness  Safety/Judgement: Decreased insight into deficits  Functional Mobility Training:  Bed Mobility:           Scooting: (total assist repositioning in bed) Pain Ratin    Activity Tolerance: Tolerates repositioning without complaints    After treatment patient left in no apparent distress:   Supine in bed, Call bell within reach, Bed / chair alarm activated, Caregiver / family present, and Side rails x 3    COMMUNICATION/COLLABORATION:   The patients plan of care was discussed with: Registered nurse and Rehabilitation technician.      Marylee Meckel, PT, DPT   Time Calculation: 13 mins

## 2020-12-03 NOTE — PROGRESS NOTES
[address] [facility] [address] [place] [address]   Office [phone]  Fax [phone] [facility] Residency Attending Addendum:  I saw and evaluated the patient on the day of the encounter with Dr. Jeffery Espinoza, performing the key elements of the service. I discussed the findings, assessment and plan with the resident and agree with the resident's findings and plan as documented in the resident's note. Plan we will check procalcitonin and D-dimer. Continue antibiotics and steroids for now. Blood cultures negative today. Oskar Quiñones MD, FAAFP, CAQSM, RMSK           Assessment and Plan   Julissa Hobson a 80 y. o. male with a PMHx of prostate CA, HTN, Gout, hx of lymphoma who presents to the ED with SOB and dyspnea.     Overnight events: none    Telemetry reviewed: NSR in the 60s-70s. Occasional PVCs    Severe sepsis w/ SIRS 3/4 (WBC:17.5 RR:24 HR:98) w/ AHRF: Improving. LA 1.9. Pt with a recent hx of 7 days hospitalization d/t aspiration pneumonia. CXR: Right lower lobe pneumonia. Chest CT: Bilateral pleural effusion Procal:1.26. COVID rapid and PCR neg. Pro-BNP 14,829. D-dimer:8.20 UA: Negative RVP: Negative. Was not given fluid bolus as he has possible HF and his pro-BNP is elevated. S/p flagyl/vanc/cefepime. Legionella, strep pneumo neg. Patient's resp status is subjectively better this am, his O2 requirement has decreased, he is afebrile. Markus in WBC to 15.3 likely d/t solumedrol.  - Continue levaquin  - CBC and CMP daily  - Sputum culture prelim G+ cocci  - Blood Culture NG3d/NG5D  - MRSA culture neg: Vancomycin discontinued   - Wean O2 as tolerated     AHRF 2/2 HAP vs HF exacerbation: Complaint of SOB which has been present since last admission d/t Aspiration PNA. CXR: Right lower lobe pneumonia Procal:1.26. Rapid covid test neg. Pro-BNP [id number]. ECHO on 11/21/20 EF: 60-65%. Repeat ECHO: LVEF is 55 - 60%. Normal cavity size, wall thickness and EF.  Moderate mitral annular calcification. Primary etiology likely HAP causing HF exacerbation and AHRF. Trop <0.05  - Pulm c/s appreciate recs   - IV solumedrol 40Q8  - Cards c/s appreciate recs       - Albuterol neb PRN       - IV lasix QD  - Supplemental O2 as needed     BPPV: Vertigo reproducible with modified Jed Hallpike maneuver but no nystagmus  - meclizine for vertigo  - PT not trained to d epley maneuver    Dysphagia: Patient with h/o dysphagia and aspiration PNA on previous admission.  - Speech therapy consulted, appreciate recs   - Liquid form or crushed pills when possible   - Upright when swallowing    Hematuria: Patient presented with urinary incontinence. UA showed trace LE and moderate blood. UCx no growth, UTI ruled out  - Follow up outpt, repeat UA with PCP     Covid neg: Pt with SOB and Sputum production.  - Discontinue COVID labs and meds     At risk of BENJI in the setting of CKD III: Improved POA CR: 2.24 (BL:2.0). Likely 2/2 dehydration.   - CMP daily   - Avoid nephrotoxic agents     Elevated D-dimer - no PE: Likely increased as acute phase reactant 2/2 HAP vs UTI. D-dimer:8.20  Low suspicious for DVT or PE, pt had a CTA done last admission which was negative for PE. Wells' score for DVT: 1 point (Mod risk). Well's score for PE:1.5 Low risk. Repeat CTA neg for PE     Spinal stenosis: recurrent history of weakness, dizziness and falls.   - Follow out OP     Hypertension On home atenolol 50 mg tablet everyday. - Continuing home Atenolol 50 mg daily   - Will continue to monitor at this time and readjust as BP's trend.     Anemia: POA 11.8 (BL: 13) likely 2/2 CKD. No hx of active bleeding.  - CBC daily     Gout: stable. Home Allopurinol 150 mg daily   - Continue Allopurinol 50 mg every other day. Dose changed based on patient renal function.      H/o B Cell Lymphoma: Stable; remission normal PET scan in 2011.   Bone Marrow Bx on 11/27/20: Hypercellular marrow with maturing trilineage hematopoiesis   No morphologic or immunophenotypic evidence of B cell non-Hodgkin's lymphoma   Flow cytometry shows left shifted myeloid maturation with less than 5% blasts   - Follow as OP.      Prostate Cancer: stable; s/p prostatectomy       FEN/GI - Renal diet. No fluids  Activity - Out of bed w/ assistance. DVT prophylaxis - Heparin  GI prophylaxis - Not indicated at this time  Fall prophylaxis - Fall precautions ordered. Disposition -. Plan to d/c to Home. Consulting PT, OT and CM  Code Status - Full. Discussed with patient / caregivers. Next of Rafi 69 Name and Ezekiel Peraza MD  Family Medicine Resident         Subjective / Objective     Subjective Reports SOB better this am. Still weak and complaining of dizziness. Denies fever, chills, chest pain, abdominal pain, nausea, vomiting. Temp (24hrs), Av.1 °F (36.7 °C), Min:97.4 °F (36.3 °C), Max:99.1 °F (37.3 °C)     Objective:  Vital signs: (most recent): Blood pressure 129/73, pulse 75, temperature 98.3 °F (36.8 °C), resp. rate 20, height 5' 3\" (1.6 m), weight 143 lb 1.3 oz (64.9 kg), SpO2 95 %. Respiratory: O2 Flow Rate (L/min): 1 l/min O2 Device: Nasal cannula   Visit Vitals  /73 (BP 1 Location: Left arm, BP Patient Position: At rest)   Pulse 75   Temp 98.3 °F (36.8 °C)   Resp 20   Ht 5' 3\" (1.6 m)   Wt 143 lb 1.3 oz (64.9 kg)   SpO2 95%   BMI 25.35 kg/m²     Physical Exam  Constitutional:       Appearance: Normal appearance. HENT:      Head: Normocephalic and atraumatic. Nose: Nose normal.      Mouth: Mucous membranes are moist.      Pharynx: Oropharynx is clear. Eyes:      Pupils: Pupils are equal, round  Neck:      Musculoskeletal: Normal range of motion and neck supple. Cardiovascular:      Rate and Rhythm: Normal rate and regular rhythm. Pulses: Normal pulses. Heart sounds: Normal heart sounds. No murmur. No friction rub. No gallop.     Pulmonary:      Effort: Pulmonary effort is normal. Breath sounds: Breath sounds diminished bilaterally in lower fields. Transmitted breath sounds from upper resp tract. Abdominal:      General: Abdomen is flat. Bowel sounds are normal.      Palpations: Abdomen is soft. Musculoskeletal: Normal range of motion. Skin:     General: Skin is warm and dry. Capillary Refill: Capillary refill takes less than 2 seconds. Neurological:      General: No focal deficit present. Mental Status: He is alert and oriented to person, place, and time. Psychiatric:         Mood and Affect: Mood normal.     I/O:  Date 12/02/20 0700 - 12/03/20 0659 12/03/20 0700 - 12/04/20 0659   Shift 3530-9882 0051-6613 24 Hour Total 8783-8717 9482-9449 24 Hour Total   INTAKE   P.O. 300 320 620 240  240     P. O. 300 320 620 240  240   I. V.(mL/kg/hr) 550(0.7) 0(0) 550(0.4)        I.V.  0 0        Volume (vancomycin (VANCOCIN) 1,000 mg in 0.9% sodium chloride 250 mL (VIAL-MATE)) 250  250        Volume (potassium chloride 10 mEq in 100 ml IVPB) 300  300      Blood  0 0        Autotransfused  0 0      Other  0 0        Other  0 0      Shift Total(mL/kg) 850(13.1) 320(4.9) 1170(18) 240(3.7)  240(3.7)   OUTPUT   Urine(mL/kg/hr) 850(1.1) 1450(1.9) 2300(1.5) 120  120     Urine Voided 850 1450 2300 120  120     Urine Occurrence(s) 1 x 1 x 2 x      Emesis/NG output  0 0        Emesis  0 0      Other  0 0        Other Output  0 0      Stool  0 0        Stool Occurrence(s) 2 x  2 x        Stool  0 0      Blood  0 0        Quantitative Blood Loss  0 0        Blood  0 0      Shift Total(mL/kg) 850(13.1) 1450(22.3) 2300(35.4) 120(1.8)  120(1.8)   NET 0 -1130 -1130 120  120   Weight (kg) 64.9 64.9 64.9 64.9 64.9 64.9       CBC:  Recent Labs     12/03/20  0634 12/02/20  2125 12/02/20  0430   WBC 9.7 12.7* 12.6*   HGB 10.0* 10.3* 10.2*   HCT 29.7* 30.3* 29.4*    212 955       Metabolic Panel:  Recent Labs     12/03/20  0634 12/02/20  2125 12/02/20  1648 12/02/20  0430 12/01/20  3611 11/30/20  1706  137 138 139 139 138   K 3.5 3.2* 3.1* 2.8* 3.5 3.5    105 105 106 104 101   CO2 27 23 26 24 28 31   BUN 42* 42* 40* 46* 46* 48*   CREA 1.92* 1.80* 1.91* 1.89* 1.97* 2.24*   * 125* 91 103* 125* 127*   CA 8.0* 8.5 8.3* 8.5 8.4* 9.2   MG 1.7  --   --  1.7  --  1.8   PHOS 5.1*  --   --  2.3*  --   --    ALB 2.3*  --   --  2.2* 2.4* 2.5*   ALT 15  --   --  13 16 17          For Billing    Chief Complaint   Patient presents with    Shortness of Breath       Hospital Problems  Date Reviewed: 12/1/2020          Codes Class Noted POA    BPPV (benign paroxysmal positional vertigo) ICD-10-CM: H81.10  ICD-9-CM: 386.11  12/3/2020 No        CHF (congestive heart failure) (HCC) ICD-10-CM: I50.9  ICD-9-CM: 428.0  12/1/2020 Unknown        * (Principal) Acute hypoxemic respiratory failure (HCC) ICD-10-CM: J96.01  ICD-9-CM: 518.81  12/1/2020 Unknown        Severe sepsis (HCC) ICD-10-CM: A41.9, R65.20  ICD-9-CM: 038.9, 995.92  12/1/2020 Unknown        Person under investigation for COVID-19 ICD-10-CM: Z20.828  ICD-9-CM: V01.79  12/1/2020 Unknown        HAP (hospital-acquired pneumonia) ICD-10-CM: J18.9, Y95  ICD-9-CM: 391  11/30/2020 Unknown        SIRS (systemic inflammatory response syndrome) (HCC) ICD-10-CM: R65.10  ICD-9-CM: 995.90  11/30/2020 Unknown        History of B-cell lymphoma ICD-10-CM: Z85.72  ICD-9-CM: V10.79  11/22/2020 Yes        Anemia ICD-10-CM: D64.9  ICD-9-CM: 285.9  11/21/2020 Yes        CRI (chronic renal insufficiency) (Chronic) ICD-10-CM: N18.9  ICD-9-CM: 585.9  12/13/2012 Yes        Lymphoma ([address] ICD-10-CM: C85.90  ICD-9-CM: 202.80  1/4/2011 Yes        HTN (hypertension) ICD-10-CM: I10  ICD-9-CM: 401.9  1/4/2011 Yes        Gout ICD-10-CM: M10.9  ICD-9-CM: 274.9  1/4/2011 Yes

## 2020-12-03 NOTE — PROGRESS NOTES
Occupational therapy note:  Orders received, chart reviewed. Patient received in semi chair position following PT treatment. Patient currently declining further activity at this time. Patient states fatigued and not feeling well. Will follow up with patient at later time for OT evaluation  Sabrina Boles MS OTR/L

## 2020-12-03 NOTE — PROGRESS NOTES
Comprehensive Nutrition Assessment    Type and Reason for Visit: Initial, RD nutrition re-screen/LOS    Nutrition Recommendations/Plan:   1. Continue regular, 2 g Na diet. 2. Add butter pecan Nepro BID to promote adequate intake. Nutrition Assessment:     12/3: 81 yo male admitted with HAP. PMH includes HTN, CHF, CKD, gout, hx of prostate CA, hx lymphoma. Hx of dysphagia, SLP approved pt to continue regular diet. Spoke with pt in room. Pt reports good appetite, says he ate better this morning. Recorded intake of 80%. Feels that he has been eating less than normal recently. Pt with recent admission, recent RD assessment on 11/24. Pt/daughter received renal diet education during previous visit. Pt received Nepro and liked it, agreeable to receiving it again during this admission. No c/o N/V. No recent significant weight changes noted. Will continue to monitor intakes. Labs- phos 5.1, Cr 1.92. Meds- cefepime, lasix, solu-medrol. Intakes:  Patient Vitals for the past 168 hrs:   % Diet Eaten   12/03/20 0745 80 %   12/03/20 0347 0 %   12/02/20 1810 25 %   12/02/20 1343 0 %   12/02/20 0800 20 %     Weight hx: Wt Readings from Last 10 Encounters:   12/03/20 64.9 kg (143 lb 1.3 oz)   11/27/20 64.9 kg (143 lb)   11/11/20 61.7 kg (136 lb)   08/13/20 62.6 kg (138 lb)   02/19/19 67.6 kg (149 lb)   01/28/19 68.5 kg (151 lb)   12/31/18 69.4 kg (153 lb)   12/20/17 67.7 kg (149 lb 3.2 oz)   12/19/16 68.2 kg (150 lb 5 oz)   04/05/16 70.3 kg (155 lb)     Malnutrition Assessment:  Malnutrition Status: none identified    Estimated Daily Nutrient Needs:  Energy (kcal): 9016(0372 x 1.3 AF); Weight Used for Energy Requirements: Current  Protein (g): 65(1-1.2 g/kg);  Weight Used for Protein Requirements: Current  Fluid (ml/day): 1578; Method Used for Fluid Requirements: 1 ml/kcal      Nutrition Related Findings:  LBM 12/3 (loose)      Wounds:    None       Current Nutrition Therapies:  DIET REGULAR 2 GM NA (House Low NA)  DIET NUTRITIONAL SUPPLEMENTS Lunch, Breakfast; Nepro    Anthropometric Measures:  · Height:  5' 3\" (160 cm)  · Current Body Wt:  64.9 kg (143 lb 1.3 oz)   · Admission Body Wt:       · Usual Body Wt:        · Ideal Body Wt:  124 lbs:  115.4 %   · Adjusted Body Weight:   ; Weight Adjustment for: No adjustment   · Adjusted BMI:       · BMI Category: Overweight (BMI 25.0-29. 9)       Nutrition Diagnosis:   · Inadequate oral intake related to inadequate protein-energy intake as evidenced by poor intake prior to admission, intake 0-25%      Nutrition Interventions:   Food and/or Nutrient Delivery: Continue current diet, Start oral nutrition supplement  Nutrition Education and Counseling: No recommendations at this time  Coordination of Nutrition Care: Continue to monitor while inpatient    Goals:  PO intake >75% meals + ONS next 3-5 days       Nutrition Monitoring and Evaluation:   Behavioral-Environmental Outcomes: None identified  Food/Nutrient Intake Outcomes: Food and nutrient intake, Supplement intake  Physical Signs/Symptoms Outcomes: Weight, Biochemical data, Nutrition focused physical findings    Discharge Planning:    Continue current diet, Continue oral nutrition supplement     Electronically signed by Beatris Sicard, RDN on 12/3/2020 at 11:28 AM    Contact: [phone]

## 2020-12-03 NOTE — PROGRESS NOTES
[address] Medicine Residency  Resident Note in Brief  ----------------------------------------    S: Patient started complaining of SOB to the nurse around 2100 who contacted [facility]. The patient states the SOB started a couple hours ago. He endorses \"head heaviness\" and some dizziness. He denies chest pain, pleuritic pain, cough. The patient has had a new oxygen requirement of 2L/m, which the nurse increased to 5 L/m without relief of symptoms. O:  Visit Vitals  /72 (BP 1 Location: Left arm, BP Patient Position: At rest)   Pulse 70   Temp 97.4 °F (36.3 °C)   Resp 24   Ht 5' 3\" (1.6 m)   Wt 143 lb (64.9 kg)   SpO2 98%   BMI 25.33 kg/m²     PE:  Heart: RRR w/ S1S2 present. No m/r/g  Lungs: Rales noted on the left side. Decreased breath sounds on the right. LE: no LE edema    A/P  Worsening SOB in the setting of AHRF 2/2 HAP vs CHF: Patient did NOT desat, current SpO2 99%. Last albuterol tx was at 1900. Last received Bumex 12/1.   - Chest X-ray  - EKG   - Troponins  - CMP, CBC  - Supplemental O2, titrate as needed        Please see full daily progress note for complete plan. Grisel Jacobs,   9:18 PM    Addendum  2130: EKG shows NSR with frequent PVCs unchanged from admission. QTC 482ms (prolonged relative to admission)    2145: CXR interprated by myself and Dr. Rajesh Johnson. Compared to 11/30 there appears to be a new small L pleural effusion and worsening of the RLL PNA. Will give 1g IV bumex now. 2300: Patient's SOB is markedly improved with Bumex. Lung sounds still coarse, L >R, however symptomatically patient feels much better. Advised to remain in inclined position to avoid worsening of SOB.

## 2020-12-03 NOTE — PROGRESS NOTES
Cardiology Progress Note       5th floor   NAME:  Jonny Caal   :   1932   MRN:   [id number]     Assessment/Plan:   1. Acute HFpEF  - oxygen requirements Inc overnight. Sllght improvement in  pBNP. Dose IV lasix this am.   2. Hx aspiration:PNA  3. Acute resp failure: PNA. On iv ab, steroids, nebs. 4. HTN: cont atenolol. 5. Hx lymphoma:   6. Hypokalemia: replete IV      Pt personally seen and examined. Chart reviewed. Agree with advanced NP's history, exam and  A/P with changes/additons. IV diuretics      Shawn Washington MD, [facility]      Subjective:   Jonny Caal is a 80 y.o.   male PMH of prostate CA, HTN, Gout, hx of lymphoma who presents to the ED complaining of SOB.   Pt was recently admitted on  -  for AHRF 2/2 Aspiration Pneumonia. He was treated with broad spectrum antibiotics and d/c home with Flagyl and Levaquin PO. He states that he has been feeling SOB since his discharge and has had sputum production, small amount, green in color. Pt had a virtual visit with his where it was found to have low oxygen saturation in the low 80s and was advise to come to the ED. Per pt had a \"test on his heart\", not a stress test but can't recall it. (20 yrs ago)      Cardiology following for diastolic HF. On xray  noted bilateral pleural effusions. Requires oxygen 5  liters for hypoxia.        Cardiac ROS: + STEVENSON, Patient denies any exertional chest pain, palpitations, syncope, orthopnea, edema or paroxysmal nocturnal dyspnea. Previous Cardiac Eval  20   ECHO ADULT FOLLOW-UP OR LIMITED 2020    Narrative · LV: Estimated LVEF is 55 - 60%. Normal cavity size, wall thickness and   systolic function (ejection fraction normal). · MV: Moderate mitral annular calcification. Signed by: Agustín Riley MD         Review of Systems: + cough , persistent. No nausea, indigestion, vomiting, pain,  sputum. No bleeding. Taking po. Objective:     Visit Vitals  BP (!) 140/66 (BP 1 Location: Left arm, BP Patient Position: At rest) Comment: notified nurse   Pulse 73   Temp 98.4 °F (36.9 °C)   Resp 24   Ht 5' 3\" (1.6 m)   Wt 64.9 kg (143 lb 1.3 oz)   SpO2 100%   BMI 25.35 kg/m²    O2 Flow Rate (L/min): 5 l/min(titrated to 3) O2 Device: Nasal cannula    Temp (24hrs), Av °F (36.7 °C), Min:97.4 °F (36.3 °C), Max:99.1 °F (37.3 °C)      No intake/output data recorded.  1901 -  0700  In: 26 [P.O.:620; I.V.:650]  Out: 2800 [Urine:2800]  TELE: SR    General: AAOx3 cooperative, no acute distress. Thin. Frail   HEENT: Atraumatic. Pink and moist.  Anicteric sclerae. Neck : Supple,  Lungs: Coarse rhonchi throughout. Heart: Regular rhythm, no murmur, no rubs, no gallops. No JVD. No carotid bruits. Abdomen: Soft, non-distended, non-tender. + Bowel sounds. Extremities: No edema  Neurologic: Grossly intact. Alert and oriented X 3. No acute neurological distress. Psych: Good insight. Not anxious or agitated.         Care Plan discussed with:    Comments   Patient x    Family      RN x    Care Manager                    Consultant:  x        Data Review:     No lab exists for component: ITNL   Recent Labs     20  1350 20  1706   TROIQ <0.05 <0.05 <0.05     Recent Labs     20  0634 20  2125 20  1648 20  0430 20  0614 20  1706    137 138 139 139 138   K 3.5 3.2* 3.1* 2.8* 3.5 3.5    105 105 106 104 101   CO2 27 23 26 24 28 31   BUN 42* 42* 40* 46* 46* 48*   CREA 1.92* 1.80* 1.91* 1.89* 1.97* 2.24*   * 125* 91 103* 125* 127*   PHOS 5.1*  --   --  2.3*  --   --    MG 1.7  --   --  1.7  --  1.8   ALB 2.3*  --   --  2.2* 2.4* 2.5*   WBC 9.7 12.7*  --  12.6* 12.2* 17.5*   HGB 10.0* 10.3*  --  10.2* 11.2* 11.8*   HCT 29.7* 30.3*  --  29.4* 33.1* 34.6*    212  --  183 183 209     No results for input(s): INR, PTP, APTT, INREXT, INREXT in the last 72 hours.     Medications reviewed  Current Facility-Administered Medications   Medication Dose Route Frequency    furosemide (LASIX) injection 20 mg  20 mg IntraVENous ONCE    albuterol-ipratropium (DUO-NEB) 2.5 MG-0.5 MG/3 ML  3 mL Nebulization QID RT    methylPREDNISolone (PF) (SOLU-MEDROL) injection 40 mg  40 mg IntraVENous Q8H    levoFLOXacin (LEVAQUIN) 750 mg in D5W IVPB  750 mg IntraVENous Q48H    albuterol (ACCUNEB) nebulizer solution 1.25 mg  1.25 mg Nebulization Q6H PRN    cefepime (MAXIPIME) 2 g in sterile water (preservative free) 10 mL IV syringe  2 g IntraVENous Q24H    sodium chloride (NS) flush 5-40 mL  5-40 mL IntraVENous Q8H    sodium chloride (NS) flush 5-40 mL  5-40 mL IntraVENous PRN    acetaminophen (TYLENOL) tablet 650 mg  650 mg Oral Q6H PRN    Or    acetaminophen (TYLENOL) suppository 650 mg  650 mg Rectal Q6H PRN    promethazine (PHENERGAN) tablet 12.5 mg  12.5 mg Oral Q6H PRN    Or    ondansetron (ZOFRAN) injection 4 mg  4 mg IntraVENous Q6H PRN    heparin (porcine) injection 5,000 Units  5,000 Units SubCUTAneous Q8H    atenoloL (TENORMIN) tablet 50 mg  50 mg Oral DAILY    allopurinoL (ZYLOPRIM) tablet 50 mg  50 mg Oral EVERY OTHER DAY         Merissa Green NP

## 2020-12-03 NOTE — PROGRESS NOTES
Transition of Care Plan:   RUR-34%  1. PT/OT following--recommending snf  2. On 5L O2--wean as tolerated; will need O2 eval prior to d/c  3. bialteral pleural effusion and atelectasis  4. covid negative  11/30  5. Cards following  6. CM following for any needs prior to d/c  MARY Sifuentes

## 2020-12-04 LAB
ALBUMIN SERPL-MCNC: 2.5 G/DL (ref 3.5–5)
ALBUMIN/GLOB SERPL: 0.9 {RATIO} (ref 1.1–2.2)
ALP SERPL-CCNC: 67 U/L (ref 45–117)
ALT SERPL-CCNC: 16 U/L (ref 12–78)
ANION GAP SERPL CALC-SCNC: 9 MMOL/L (ref 5–15)
AST SERPL-CCNC: 36 U/L (ref 15–37)
BASOPHILS # BLD: 0 K/UL (ref 0–0.1)
BASOPHILS NFR BLD: 0 % (ref 0–1)
BILIRUB SERPL-MCNC: 0.9 MG/DL (ref 0.2–1)
BUN SERPL-MCNC: 40 MG/DL (ref 6–20)
BUN/CREAT SERPL: 22 (ref 12–20)
CALCIUM SERPL-MCNC: 8.3 MG/DL (ref 8.5–10.1)
CHLORIDE SERPL-SCNC: 101 MMOL/L (ref 97–108)
CO2 SERPL-SCNC: 27 MMOL/L (ref 21–32)
CREAT SERPL-MCNC: 1.79 MG/DL (ref 0.7–1.3)
D DIMER PPP FEU-MCNC: 3.07 MG/L FEU (ref 0–0.65)
DIFFERENTIAL METHOD BLD: ABNORMAL
EOSINOPHIL # BLD: 0 K/UL (ref 0–0.4)
EOSINOPHIL NFR BLD: 0 % (ref 0–7)
ERYTHROCYTE [DISTWIDTH] IN BLOOD BY AUTOMATED COUNT: 15.5 % (ref 11.5–14.5)
GLOBULIN SER CALC-MCNC: 2.8 G/DL (ref 2–4)
GLUCOSE SERPL-MCNC: 147 MG/DL (ref 65–100)
HCT VFR BLD AUTO: 29.1 % (ref 36.6–50.3)
HGB BLD-MCNC: 10.1 G/DL (ref 12.1–17)
IMM GRANULOCYTES # BLD AUTO: 0.2 K/UL (ref 0–0.04)
IMM GRANULOCYTES NFR BLD AUTO: 1 % (ref 0–0.5)
LYMPHOCYTES # BLD: 0.8 K/UL (ref 0.8–3.5)
LYMPHOCYTES NFR BLD: 5 % (ref 12–49)
MAGNESIUM SERPL-MCNC: 2 MG/DL (ref 1.6–2.4)
MCH RBC QN AUTO: 30.5 PG (ref 26–34)
MCHC RBC AUTO-ENTMCNC: 34.7 G/DL (ref 30–36.5)
MCV RBC AUTO: 87.9 FL (ref 80–99)
MONOCYTES # BLD: 0.9 K/UL (ref 0–1)
MONOCYTES NFR BLD: 6 % (ref 5–13)
NEUTS SEG # BLD: 13.4 K/UL (ref 1.8–8)
NEUTS SEG NFR BLD: 88 % (ref 32–75)
NRBC # BLD: 0 K/UL (ref 0–0.01)
NRBC BLD-RTO: 0 PER 100 WBC
PHOSPHATE SERPL-MCNC: 3.4 MG/DL (ref 2.6–4.7)
PLATELET # BLD AUTO: 223 K/UL (ref 150–400)
PMV BLD AUTO: 11.5 FL (ref 8.9–12.9)
POTASSIUM SERPL-SCNC: 2.8 MMOL/L (ref 3.5–5.1)
PROCALCITONIN SERPL-MCNC: 0.23 NG/ML
PROT SERPL-MCNC: 5.3 G/DL (ref 6.4–8.2)
RBC # BLD AUTO: 3.31 M/UL (ref 4.1–5.7)
RBC MORPH BLD: ABNORMAL
SODIUM SERPL-SCNC: 137 MMOL/L (ref 136–145)
WBC # BLD AUTO: 15.3 K/UL (ref 4.1–11.1)

## 2020-12-04 PROCEDURE — 87635 SARS-COV-2 COVID-19 AMP PRB: CPT

## 2020-12-04 PROCEDURE — 85025 COMPLETE CBC W/AUTO DIFF WBC: CPT

## 2020-12-04 PROCEDURE — 74011000250 HC RX REV CODE- 250: Performed by: INTERNAL MEDICINE

## 2020-12-04 PROCEDURE — 99232 SBSQ HOSP IP/OBS MODERATE 35: CPT | Performed by: FAMILY MEDICINE

## 2020-12-04 PROCEDURE — 84145 PROCALCITONIN (PCT): CPT

## 2020-12-04 PROCEDURE — 36415 COLL VENOUS BLD VENIPUNCTURE: CPT

## 2020-12-04 PROCEDURE — 74011250637 HC RX REV CODE- 250/637: Performed by: STUDENT IN AN ORGANIZED HEALTH CARE EDUCATION/TRAINING PROGRAM

## 2020-12-04 PROCEDURE — 99231 SBSQ HOSP IP/OBS SF/LOW 25: CPT | Performed by: INTERNAL MEDICINE

## 2020-12-04 PROCEDURE — 85379 FIBRIN DEGRADATION QUANT: CPT

## 2020-12-04 PROCEDURE — 94760 N-INVAS EAR/PLS OXIMETRY 1: CPT

## 2020-12-04 PROCEDURE — 74011250636 HC RX REV CODE- 250/636: Performed by: STUDENT IN AN ORGANIZED HEALTH CARE EDUCATION/TRAINING PROGRAM

## 2020-12-04 PROCEDURE — 84100 ASSAY OF PHOSPHORUS: CPT

## 2020-12-04 PROCEDURE — 74011250636 HC RX REV CODE- 250/636: Performed by: INTERNAL MEDICINE

## 2020-12-04 PROCEDURE — 74011250637 HC RX REV CODE- 250/637: Performed by: FAMILY MEDICINE

## 2020-12-04 PROCEDURE — 74011250636 HC RX REV CODE- 250/636: Performed by: NURSE PRACTITIONER

## 2020-12-04 PROCEDURE — 94640 AIRWAY INHALATION TREATMENT: CPT

## 2020-12-04 PROCEDURE — 97165 OT EVAL LOW COMPLEX 30 MIN: CPT

## 2020-12-04 PROCEDURE — 83735 ASSAY OF MAGNESIUM: CPT

## 2020-12-04 PROCEDURE — 80053 COMPREHEN METABOLIC PANEL: CPT

## 2020-12-04 PROCEDURE — 65660000000 HC RM CCU STEPDOWN

## 2020-12-04 PROCEDURE — 77010033678 HC OXYGEN DAILY

## 2020-12-04 RX ORDER — POTASSIUM CHLORIDE 7.45 MG/ML
10 INJECTION INTRAVENOUS
Status: DISCONTINUED | OUTPATIENT
Start: 2020-12-04 | End: 2020-12-04

## 2020-12-04 RX ORDER — BUMETANIDE 1 MG/1
2 TABLET ORAL DAILY
Qty: 30 TAB | Refills: 0 | Status: SHIPPED | OUTPATIENT
Start: 2020-12-04 | End: 2020-12-06

## 2020-12-04 RX ADMIN — HEPARIN SODIUM 5000 UNITS: 5000 INJECTION INTRAVENOUS; SUBCUTANEOUS at 12:34

## 2020-12-04 RX ADMIN — FUROSEMIDE 20 MG: 10 INJECTION, SOLUTION INTRAMUSCULAR; INTRAVENOUS at 08:47

## 2020-12-04 RX ADMIN — ALLOPURINOL 50 MG: 100 TABLET ORAL at 08:46

## 2020-12-04 RX ADMIN — MECLIZINE 12.5 MG: 12.5 TABLET ORAL at 08:46

## 2020-12-04 RX ADMIN — METHYLPREDNISOLONE SODIUM SUCCINATE 40 MG: 40 INJECTION, POWDER, FOR SOLUTION INTRAMUSCULAR; INTRAVENOUS at 05:45

## 2020-12-04 RX ADMIN — HEPARIN SODIUM 5000 UNITS: 5000 INJECTION INTRAVENOUS; SUBCUTANEOUS at 04:47

## 2020-12-04 RX ADMIN — POTASSIUM BICARBONATE 40 MEQ: 782 TABLET, EFFERVESCENT ORAL at 12:34

## 2020-12-04 RX ADMIN — POTASSIUM BICARBONATE 40 MEQ: 782 TABLET, EFFERVESCENT ORAL at 14:04

## 2020-12-04 RX ADMIN — IPRATROPIUM BROMIDE AND ALBUTEROL SULFATE 3 ML: .5; 3 SOLUTION RESPIRATORY (INHALATION) at 11:17

## 2020-12-04 RX ADMIN — IPRATROPIUM BROMIDE AND ALBUTEROL SULFATE 3 ML: .5; 3 SOLUTION RESPIRATORY (INHALATION) at 07:33

## 2020-12-04 RX ADMIN — Medication 10 ML: at 05:46

## 2020-12-04 RX ADMIN — ATENOLOL 50 MG: 50 TABLET ORAL at 08:46

## 2020-12-04 RX ADMIN — Medication 10 ML: at 14:05

## 2020-12-04 RX ADMIN — POTASSIUM BICARBONATE 40 MEQ: 782 TABLET, EFFERVESCENT ORAL at 09:01

## 2020-12-04 NOTE — PROGRESS NOTES
Bedside and Verbal shift change report given to fili patel (oncoming nurse) by Carrol Mcmullen  (offgoing nurse). Report included the following information SBAR and Kardex.

## 2020-12-04 NOTE — PROGRESS NOTES
Physical Therapy      Attempted to enlist patient cooperation for upright, seated or bedside exercise without success. Daughter present and also encouraging. When asked: 'have you given up?'  He said 'yes.'  Discussion followed without change in decision. Daughter plans to speak with MD asap. Will keep on asellius and check Monday.        Aleksandr Rodriguez PT

## 2020-12-04 NOTE — PROGRESS NOTES
Problem: Self Care Deficits Care Plan (Adult)  Goal: *Acute Goals and Plan of Care (Insert Text)  Description:   FUNCTIONAL STATUS PRIOR TO ADMISSION: Recent dc from hospital(11/25-11/28 with aspiration PNA) to home with support from family. Patient bedbound at home with re-ouabwgynm58/30/20. He lives at home alone. Poor historian in regards to last time he was OOB to complete ADL tasks. HOME SUPPORT: The patient lived alone with son and daughter to provide assistance. Occupational Therapy Goals  Initiated 12/4/2020  1. Patient will perform grooming in unsupported sitting with minimal assistance within 7 day(s). 2.  Patient will perform upper body dressing with supervision/set-up within 7 day(s). 3.  Patient will perform anterior upper body bathing in unsupported sitting with minimal assistance within 7 day(s). 4.  Patient will perform toilet transfers with maximal assistance within 7 day(s). 5.  Patient will perform all aspects of toileting with maximal assistance within 7 day(s). 6.  Patient will participate in upper extremity therapeutic exercise/activities with supervision/set-up for 5 minutes within 7 day(s). Outcome: Progressing Towards Goal   OCCUPATIONAL THERAPY EVALUATION  Patient: Princess Solis (77 y.o. male)  Date: 12/4/2020  Primary Diagnosis: HAP (hospital-acquired pneumonia) [J18.9, Y95]  SIRS (systemic inflammatory response syndrome) (HCC) [R65.10]        Precautions:  Bed Alarm, Fall    ASSESSMENT  Based on the objective data described below, the patient presents with severe impairment with functional mobility, activity tolerance and general strength necessary in ADL tasks with admission for PNA. Patient with extensive medical hx of prostate cancer, HTN, gout, spinal stenosis, large B cell lymphoma. Elevated D dimer at admission, chest CT negative for PE. Nursing cleared for therapy.   Patient resistant for OOB activity secondary to generalized weakness, \"feeling old,\" and constant dizziness. He demonstrated ability to complete grooming tasks with min A with encouragement for highest level of participation and set up for feeding. Unclear last time he achieved unsupported sitting at EOB, standing or self care transfer. Recommend dc to SNF vs HH with 24 hour physical assistance. Current Level of Function Impacting Discharge (ADLs/self-care): Feeding set up, UB care mod A, LB care total A    Functional Outcome Measure: The patient scored 15/100 on the Barthel Index outcome measure which is indicative of severe impairment with ADL tasks. Other factors to consider for discharge: bed bound since previous admission     Patient will benefit from skilled therapy intervention to address the above noted impairments. PLAN :  Recommendations and Planned Interventions: self care training, functional mobility training, therapeutic exercise, balance training, therapeutic activities, endurance activities, patient education, home safety training, and family training/education    Frequency/Duration: Patient will be followed by occupational therapy 5 times a week to address goals. Recommendation for discharge: (in order for the patient to meet his/her long term goals)  To be determined: SNF vs HH with 24 hour assistance    This discharge recommendation:  Has been made in collaboration with the attending provider and/or case management    IF patient discharges home will need the following DME: TBD--hospital bed, RW, BSC, shower chair, Percell Dun? SUBJECTIVE:   Patient stated The dizziness stays.     OBJECTIVE DATA SUMMARY:   HISTORY:   Past Medical History:   Diagnosis Date    CRI (chronic renal insufficiency) 12/13/2012    Gout 1/4/2011    Prostate CA ([facility] [address] 1/4/2011     Past Surgical History:   Procedure Laterality Date    ENDOSCOPY, COLON, DIAGNOSTIC  2003    neg    HC BONE MARROW BIOPSY      HX PROSTATECTOMY         Expanded or extensive additional review of patient history:     Home Situation  Home Environment: Private residence  Living Alone: Yes(son and daughter provided support prior)  Patient Expects to be Discharged to[de-identified] Skilled nursing facility  Current DME Used/Available at Home: Walker, rolling    Hand dominance: Right    EXAMINATION OF PERFORMANCE DEFICITS:  Cognitive/Behavioral Status:  Neurologic State: Alert  Orientation Level: Oriented to person;Oriented to place; Disoriented to situation  Cognition: Follows commands               Vision/Perceptual:       DNT                              Range of Motion:  AROM: Generally decreased, functional                         Strength:  Strength: Generally decreased, functional                Coordination:  Coordination: Within functional limits  Fine Motor Skills-Upper: Left Intact; Right Intact    Gross Motor Skills-Upper: Left Intact; Right Intact    Tone & Sensation:  TONE: BUE WDL                   Balance:  Sitting: (patient deferred)    Functional Mobility and Transfers for ADLs:  Bed Mobility:       Transfers:       ADL Assessment:  Feeding: Setup    Oral Facial Hygiene/Grooming: Minimum assistance    Bathing: Maximum assistance    Upper Body Dressing: Moderate assistance    Lower Body Dressing: Total assistance    Toileting: Total assistance                ADL Intervention and task modifications:     Patient instructed and indicated understanding the benefits of maintaining activity tolerance, functional mobility, and independence with self care tasks during acute stay  to ensure safe return home and to baseline. Encouraged patient to increase frequency and duration OOB, be out of bed for all meals, perform daily ADLs (as approved by RN/MD regarding bathing etc).                 Functional Measure:  Barthel Index:    Bathin  Bladder: 5  Bowels: 5  Groomin  Dressin  Feedin  Mobility: 0  Stairs: 0  Toilet Use: 0  Transfer (Bed to Chair and Back): 0  Total: 15/100        The Barthel ADL Index: Guidelines  1. The index should be used as a record of what a patient does, not as a record of what a patient could do. 2. The main aim is to establish degree of independence from any help, physical or verbal, however minor and for whatever reason. 3. The need for supervision renders the patient not independent. 4. A patient's performance should be established using the best available evidence. Asking the patient, friends/relatives and nurses are the usual sources, but direct observation and common sense are also important. However direct testing is not needed. 5. Usually the patient's performance over the preceding 24-48 hours is important, but occasionally longer periods will be relevant. 6. Middle categories imply that the patient supplies over 50 per cent of the effort. 7. Use of aids to be independent is allowed. Nikki Banerjee., Barthel, KAYDEN. (7103). Functional evaluation: the Barthel Index. [address] [facility] (14)2. Dong Fontanez benja JAMIN Lehman, Deonte Grossman., Lola Buchanan., [place], [address] [place] [address] (1999). Measuring the change indisability after inpatient rehabilitation; comparison of the responsiveness of the Barthel Index and Functional [place] Measure. Journal of Neurology, Neurosurgery, and Psychiatry, 66(4), 007-997. Marti Flores, N.J.A, ALONDRA Copeland, & Shakila Pollack, M.A. (2004.) Assessment of post-stroke quality of life in cost-effectiveness studies: The usefulness of the Barthel Index and the EuroQoL-5D.  Quality of Life Research, 15, 166-07       Occupational Therapy Evaluation Charge Determination   History Examination Decision-Making   MEDIUM Complexity : Expanded review of history including physical, cognitive and psychosocial  history  MEDIUM Complexity : 3-5 performance deficits relating to physical, cognitive , or psychosocial skils that result in activity limitations and / or participation restrictions MEDIUM Complexity : Patient may present with comorbidities that affect occupational performnce. Miniml to moderate modification of tasks or assistance (eg, physical or verbal ) with assesment(s) is necessary to enable patient to complete evaluation       Based on the above components, the patient evaluation is determined to be of the following complexity level: MEDIUM  Pain Rating:  No c/o pain    Activity Tolerance:   Poor- reports constant dizziness  1L O2- no SOB noted    After treatment patient left in no apparent distress:    Supine in bed and Call bell within reach    COMMUNICATION/EDUCATION:   The patients plan of care was discussed with: Registered nurse. Home safety education was provided and the patient/caregiver indicated understanding. and Patient understands intent and goals of therapy, but is neutral about his/her participation. This patients plan of care is appropriate for delegation to \[facility]\"".     Thank you for this referral.  Xin Garza, OT  Time Calculation: 13 mins

## 2020-12-04 NOTE — PROGRESS NOTES
[facility]     Name: Delroy Vang MRN: [id number]   : 1932 Hospital: [address]   Date: 2020        Impression Plan   1. Acute respiratory failure  2. Hypoxia, currently on 1Lo2  3. Productive cough  4. Bilateral pleural effusion  5. Bilateral atelectasis               · CT not convincing for obvious PNA but could have it in the atelectatic areas, pt at best has bacterial bronchitis: follow sputum culture-- ngtd  · duonebs  · IV methylpred-- wean to 40mg q12h and continue weaning over the weekend  · Continue levoflox. Cefepime d'cd  · diuresis  · PT/OT  · IS  · OOB into chair  · Home O2 assesment before discharge  · Pulmonary will be available prn over the weekend  · Supervising physician: Dr. Marito Bragg         Radiology  ( personally reviewed) CT chest: bilateral small pleural effusions, bilateral lower lobe atelectasis, motion artifact, but no gross PE   ABG No results for input(s): PHI, PO2I, PCO2I in the last 72 hours. Subjective     81 yo with PMHx CKD and prostate CA presenting for evaluation of progressive shortness of breath. Pt was admitted from - and was treated for aspiration PNA at the time. He was not discharged on O2. Wife reports that he seem to be doing well the day after discharge, but then started deteriorating with increasing shortness of breath and wet cough that he is unable to cough up. She states that he was able to move around the house with a walker before the  hospitalization but has been profoundly week and bedbound since then. Pt smoked as a teenager only and denies any hx of lung problems prior to this. CTA chest on this admission showed bilateral small effusions and atelectasis bilaterally. He denies any wheezing. Review of Systems:  A comprehensive review of systems was negative except for that written in the HPI.      Interval History:    Afebrile  Bp stable  Sats 95% on 1L NC  WBC 9.7; better  Hgb 10  K 3.5  Creat 1.92; increased  Mag 1.7  Phos 5.1  Pro-BNp 53923  Sputum culture with occasional GPCs    ROS:  Breathing feels better. Reports dizziness with turning his head that is new. No cough, sputum production. Denies fever or chills. 12/4: pt reports SOB is improving. He has concerns about weaning off o2 (wants to continue use). Past Medical History:   Diagnosis Date    CRI (chronic renal insufficiency) 12/13/2012    Gout 1/4/2011    Prostate CA ([facility] [address] 1/4/2011      Past Surgical History:   Procedure Laterality Date    ENDOSCOPY, COLON, DIAGNOSTIC  2003    neg    HC BONE MARROW BIOPSY      HX PROSTATECTOMY        Prior to Admission medications    Medication Sig Start Date End Date Taking? Authorizing Provider   allopurinoL (ZYLOPRIM) 300 mg tablet Take 150 mg by mouth daily. Yes Provider, Historical   vitamin e (E GEMS) 100 unit capsule Take 100 Units by mouth daily. Yes Provider, Historical   multivitamin (ONE A DAY) tablet Take 1 Tab by mouth daily. Yes Provider, Historical   atenoloL (TENORMIN) 50 mg tablet Take 1 Tab by mouth daily.  9/9/20  Yes Jayden Gerardo MD     Current Facility-Administered Medications   Medication Dose Route Frequency    potassium bicarb-citric acid (EFFER-K) tablet 40 mEq  40 mEq Oral TID    furosemide (LASIX) injection 20 mg  20 mg IntraVENous DAILY    meclizine (ANTIVERT) tablet 12.5 mg  12.5 mg Oral BID    albuterol-ipratropium (DUO-NEB) 2.5 MG-0.5 MG/3 ML  3 mL Nebulization QID RT    methylPREDNISolone (PF) (SOLU-MEDROL) injection 40 mg  40 mg IntraVENous Q8H    levoFLOXacin (LEVAQUIN) 750 mg in D5W IVPB  750 mg IntraVENous Q48H    sodium chloride (NS) flush 5-40 mL  5-40 mL IntraVENous Q8H    heparin (porcine) injection 5,000 Units  5,000 Units SubCUTAneous Q8H    atenoloL (TENORMIN) tablet 50 mg  50 mg Oral DAILY    allopurinoL (ZYLOPRIM) tablet 50 mg  50 mg Oral EVERY OTHER DAY     Allergies   Allergen Reactions    Pcn [Penicillins] Swelling Social History     Tobacco Use    Smoking status: Never Smoker    Smokeless tobacco: Never Used   Substance Use Topics    Alcohol use: No      No family history on file. Laboratory: I have personally reviewed the critical care flowsheet and labs. Recent Labs     12/04/20  0640 12/03/20 0634 12/02/20 2125   WBC 15.3* 9.7 12.7*   HGB 10.1* 10.0* 10.3*   HCT 29.1* 29.7* 30.3*    212 212     Recent Labs     12/04/20  0640 12/03/20  0634 12/02/20 2125 12/02/20  0430    138 137   < > 139   K 2.8* 3.5 3.2*   < > 2.8*    103 105   < > 106   CO2 27 27 23   < > 24   * 127* 125*   < > 103*   BUN 40* 42* 42*   < > 46*   CREA 1.79* 1.92* 1.80*   < > 1.89*   CA 8.3* 8.0* 8.5   < > 8.5   MG 2.0 1.7  --   --  1.7   PHOS 3.4 5.1*  --   --  2.3*   ALB 2.5* 2.3*  --   --  2.2*   ALT 16 15  --   --  13    < > = values in this interval not displayed. Objective:     Mode Rate Tidal Volume Pressure FiO2 PEEP                    Vital Signs:     TMAX(24)      Intake/Output:   Last shift:         Last 3 shifts: No intake/output data recorded. RRIOLAST3    Intake/Output Summary (Last 24 hours) at 12/4/2020 4778  Last data filed at 12/4/2020 0308  Gross per 24 hour   Intake 780 ml   Output 1240 ml   Net -460 ml     EXAM:   GENERAL: elderly, wet cough, HEENT:  PERRL, EOMI, no alar flaring or epistaxis, oral mucosa moist without cyanosis, NECK:  no jugular vein distention, no retractions, no thyromegaly or masses, LUNGS: rhonci bilaterally, no wheezes, HEART:  Regular rate and rhythm with no MGR; no edema is present, ABDOMEN:  soft with no tenderness, bowel sounds present, EXTREMITIES:  warm with no cyanosis, SKIN:  no jaundice or ecchymosis and NEUROLOGIC:  alert and oriented, grossly non-focal    Ankur Cheadle, NP  [facility]

## 2020-12-04 NOTE — PROGRESS NOTES
Spoke to patient with patients daughter and PGY3 resident Dr Lurdes Jasso present. Patient stated that he was no longer interested in medical treatment and that \"there is only trouble ahead\" and to \"let me go\". He stated that he did not want us to attempt resuscitation if it came to that. He stated that he is not interested in going home as he did not want to be a burden for his family. He is aware that he would need around the clock care and he does not want his family to be obligated to provide that for him. After being told his medical status was improving, responded saying that he was not interested in being treated and that he had had cancer twice and no longer wanted to keep fighting to stay alive. Upon being told he may be depressed and being treated for that may improve his mood and change his outlook, he responded similarly, stating that he did not want to be treated. Currently refusing skilled nursing facility however earlier this afternoon patient had agreed that SNF would be an acceptable plan. Patient is alert and orientedx3, completely lucid. No suicidal ideation or intent. Hospice care and palliative care have been consulted. Patients code status was changed to DNR.     Maria C Reddy MD  12/04/20

## 2020-12-04 NOTE — PROGRESS NOTES
Cardiology Progress Note       5th floor   NAME:  Erin Pagan   :   1932   MRN:   [id number]     Assessment/Plan:   1. Acute HFpEF  Cont IV lasix today. Cliff pBNP in am.   2. Hx aspiration PNA:   3. Acute resp failure: PNA. On iv abx, steroids, nebs. 4. HTN: cont atenolol. 5. Hx lymphoma: Will see prn over the weekend  Would discharge on lasix 20 mg po every day. Will arrange OP follow up. Pt personally seen and examined. Chart reviewed. Agree with advanced NP's history, exam and  A/P with changes/additons. Getting nebs  CVS-S1-S2 present,  RS-   Bilateral rhonchi present  Abdomen-  soft/NT  LE-   no edema    Discussed with patient/nursing    Susi Qureshi MD, [facility] [place]        Subjective:   Erin Pagan is a 80 y.o.   male PMH of prostate CA, HTN, Gout, hx of lymphoma who presents to the ED complaining of SOB.   Pt was recently admitted on  -  for AHRF 2/2 Aspiration Pneumonia. He was treated with broad spectrum antibiotics and d/c home with Flagyl and Levaquin PO. He states that he has been feeling SOB since his discharge and has had sputum production, small amount, green in color. Pt had a virtual visit with his where it was found to have low oxygen saturation in the low 80s and was advise to come to the ED. Per pt had a \"test on his heart\", not a stress test but can't recall it. (20 yrs ago)      Cardiology following for diastolic HF. On xray  noted bilateral pleural effusions. Requires oxygen 5  liters for hypoxia.        Cardiac ROS: + STEVENSON/cough    Patient denies any exertional chest pain, palpitations, syncope, orthopnea, edema or paroxysmal nocturnal dyspnea. Previous Cardiac Eval  20   ECHO ADULT FOLLOW-UP OR LIMITED 2020    Narrative · LV: Estimated LVEF is 55 - 60%. Normal cavity size, wall thickness and   systolic function (ejection fraction normal).   · MV: Moderate mitral annular calcification. Signed by: Janina Quintana MD         Review of Systems: + cough , persistent. No nausea, indigestion, vomiting, pain,  sputum. No bleeding. Taking po. Objective:     Visit Vitals  /63 (BP 1 Location: Right arm, BP Patient Position: At rest)   Pulse 76   Temp 97.5 °F (36.4 °C)   Resp 18   Ht 5' 3\" (1.6 m)   Wt 64.9 kg (143 lb 1.3 oz)   SpO2 99%   BMI 25.35 kg/m²    O2 Flow Rate (L/min): 2 l/min(placed on RA) O2 Device: Nasal cannula    Temp (24hrs), Av.9 °F (36.6 °C), Min:97.5 °F (36.4 °C), Max:98.4 °F (36.9 °C)      No intake/output data recorded.  1901 -  0700  In: 9322 [P.O.:1340]  Out: 2690 [Urine:2690]     TELE: SR    General: AAOx3 cooperative, no acute distress. Thin. Frail   HEENT: Atraumatic. Pink and moist.  Anicteric sclerae. Neck : Supple,  Lungs: Coarse rhonchi. Heart: Regular rhythm, no murmur, no rubs, no gallops. No JVD. No carotid bruits. Abdomen: Soft, non-distended, non-tender. + Bowel sounds. Extremities: No edema  Neurologic: Grossly intact. Alert and oriented X 3. No acute neurological distress. Psych: Good insight. Not anxious or agitated.         Care Plan discussed with:    Comments   Patient x    Family      RN x    Care Manager                    Consultant:  x        Data Review:     No lab exists for component: ITNL   Recent Labs     20  1350   TROIQ <0.05 <0.05     Recent Labs     20  0640 20  0634 20  0430    138 137   < > 139   K 2.8* 3.5 3.2*   < > 2.8*    103 105   < > 106   CO2 27 27 23   < > 24   BUN 40* 42* 42*   < > 46*   CREA 1.79* 1.92* 1.80*   < > 1.89*   * 127* 125*   < > 103*   PHOS 3.4 5.1*  --   --  2.3*   MG 2.0 1.7  --   --  1.7   ALB 2.5* 2.3*  --   --  2.2*   WBC 15.3* 9.7 12.7*  --  12.6*   HGB 10.1* 10.0* 10.3*  --  10.2*   HCT 29.1* 29.7* 30.3*  --  29.4*    212 212  --  183    < > = values in this interval not displayed. No results for input(s): INR, PTP, APTT, INREXT, INREXT in the last 72 hours.     Medications reviewed  Current Facility-Administered Medications   Medication Dose Route Frequency    furosemide (LASIX) injection 20 mg  20 mg IntraVENous DAILY    meclizine (ANTIVERT) tablet 12.5 mg  12.5 mg Oral BID    albuterol-ipratropium (DUO-NEB) 2.5 MG-0.5 MG/3 ML  3 mL Nebulization QID RT    methylPREDNISolone (PF) (SOLU-MEDROL) injection 40 mg  40 mg IntraVENous Q8H    levoFLOXacin (LEVAQUIN) 750 mg in D5W IVPB  750 mg IntraVENous Q48H    albuterol (ACCUNEB) nebulizer solution 1.25 mg  1.25 mg Nebulization Q6H PRN    sodium chloride (NS) flush 5-40 mL  5-40 mL IntraVENous Q8H    sodium chloride (NS) flush 5-40 mL  5-40 mL IntraVENous PRN    acetaminophen (TYLENOL) tablet 650 mg  650 mg Oral Q6H PRN    Or    acetaminophen (TYLENOL) suppository 650 mg  650 mg Rectal Q6H PRN    promethazine (PHENERGAN) tablet 12.5 mg  12.5 mg Oral Q6H PRN    Or    ondansetron (ZOFRAN) injection 4 mg  4 mg IntraVENous Q6H PRN    heparin (porcine) injection 5,000 Units  5,000 Units SubCUTAneous Q8H    atenoloL (TENORMIN) tablet 50 mg  50 mg Oral DAILY    allopurinoL (ZYLOPRIM) tablet 50 mg  50 mg Oral EVERY OTHER DAY         Maame Nascimento NP

## 2020-12-04 NOTE — PROGRESS NOTES
1646:  Referral sent in [facility] to [facility]. Transition of Care Plan:   RUR-35%  1. PT/OT following--recommending snf; dtr wants him to go home with [facility] to see him)  2. O2 weaned to 1L; will need O2 eval prior to d/c  3. bilalteral pleural effusion and atelectasis  4. covid negative  11/30  5. Cards following  6. If patient discharges home will need the following DME: medical transport home, hospital bed, HHPT, 24-hour assistance  7. CM following for any needs prior to d/c  L. Ardie Goodell

## 2020-12-04 NOTE — PROGRESS NOTES
Bedside and Verbal shift change report given to Gio Quiles RN (oncoming nurse) by Red Pointer. O (offgoing nurse). Report included the following information SBAR, Kardex, Intake/Output, MAR and Recent Results.

## 2020-12-05 ENCOUNTER — HOSPICE ADMISSION (OUTPATIENT)
Dept: HOSPICE | Facility: HOSPICE | Age: 85
End: 2020-12-05

## 2020-12-05 LAB
ATRIAL RATE: 71 BPM
BACTERIA SPEC CULT: NORMAL
BACTERIA SPEC CULT: NORMAL
CALCULATED P AXIS, ECG09: 18 DEGREES
CALCULATED R AXIS, ECG10: -22 DEGREES
CALCULATED T AXIS, ECG11: -4 DEGREES
DIAGNOSIS, 93000: NORMAL
GRAM STN SPEC: NORMAL
HEALTH STATUS, XMCV2T: NORMAL
P-R INTERVAL, ECG05: 150 MS
Q-T INTERVAL, ECG07: 444 MS
QRS DURATION, ECG06: 92 MS
QTC CALCULATION (BEZET), ECG08: 482 MS
SARS-COV-2, COV2: NOT DETECTED
SERVICE CMNT-IMP: NORMAL
SERVICE CMNT-IMP: NORMAL
SOURCE, COVRS: NORMAL
SPECIMEN SOURCE, FCOV2M: NORMAL
SPECIMEN TYPE, XMCV1T: NORMAL
VENTRICULAR RATE, ECG03: 71 BPM

## 2020-12-05 PROCEDURE — 74011250636 HC RX REV CODE- 250/636: Performed by: STUDENT IN AN ORGANIZED HEALTH CARE EDUCATION/TRAINING PROGRAM

## 2020-12-05 PROCEDURE — 94664 DEMO&/EVAL PT USE INHALER: CPT

## 2020-12-05 PROCEDURE — 74011250637 HC RX REV CODE- 250/637: Performed by: STUDENT IN AN ORGANIZED HEALTH CARE EDUCATION/TRAINING PROGRAM

## 2020-12-05 PROCEDURE — 99232 SBSQ HOSP IP/OBS MODERATE 35: CPT | Performed by: FAMILY MEDICINE

## 2020-12-05 PROCEDURE — 77010033678 HC OXYGEN DAILY

## 2020-12-05 PROCEDURE — 74011000250 HC RX REV CODE- 250: Performed by: FAMILY MEDICINE

## 2020-12-05 PROCEDURE — 94640 AIRWAY INHALATION TREATMENT: CPT

## 2020-12-05 PROCEDURE — 65660000000 HC RM CCU STEPDOWN

## 2020-12-05 PROCEDURE — 93042 RHYTHM ECG REPORT: CPT | Performed by: FAMILY MEDICINE

## 2020-12-05 PROCEDURE — 94760 N-INVAS EAR/PLS OXIMETRY 1: CPT

## 2020-12-05 RX ORDER — SERTRALINE HYDROCHLORIDE 25 MG/1
12.5 TABLET, FILM COATED ORAL DAILY
Status: DISCONTINUED | OUTPATIENT
Start: 2020-12-05 | End: 2020-12-06 | Stop reason: HOSPADM

## 2020-12-05 RX ORDER — IPRATROPIUM BROMIDE AND ALBUTEROL SULFATE 2.5; .5 MG/3ML; MG/3ML
3 SOLUTION RESPIRATORY (INHALATION)
Status: DISCONTINUED | OUTPATIENT
Start: 2020-12-05 | End: 2020-12-06 | Stop reason: HOSPADM

## 2020-12-05 RX ORDER — PREDNISONE 20 MG/1
40 TABLET ORAL
Status: DISCONTINUED | OUTPATIENT
Start: 2020-12-05 | End: 2020-12-06 | Stop reason: HOSPADM

## 2020-12-05 RX ADMIN — SERTRALINE HYDROCHLORIDE 12.5 MG: 25 TABLET ORAL at 19:50

## 2020-12-05 RX ADMIN — MECLIZINE 12.5 MG: 12.5 TABLET ORAL at 18:10

## 2020-12-05 RX ADMIN — HEPARIN SODIUM 5000 UNITS: 5000 INJECTION INTRAVENOUS; SUBCUTANEOUS at 19:49

## 2020-12-05 RX ADMIN — IPRATROPIUM BROMIDE AND ALBUTEROL SULFATE 3 ML: .5; 3 SOLUTION RESPIRATORY (INHALATION) at 15:55

## 2020-12-05 RX ADMIN — Medication 10 ML: at 19:51

## 2020-12-05 RX ADMIN — LEVOFLOXACIN 750 MG: 5 INJECTION, SOLUTION INTRAVENOUS at 15:50

## 2020-12-05 NOTE — ROUTINE PROCESS
Bedside shift change report given to Hilary Lynn RN (oncoming nurse) by Noelle Sanchez RN (offgoing nurse). Report included the following information SBAR, Kardex, Intake/Output, MAR and Accordion.

## 2020-12-05 NOTE — PROGRESS NOTES
Bedside, Verbal and Written shift change report given to Maura GERMAN (oncoming nurse) by Cristy Sanchez (offgoing nurse). Report included the following information SBAR, Kardex, ED Summary, OR Summary, Intake/Output, MAR, Accordion and Med Rec Status.

## 2020-12-05 NOTE — PROGRESS NOTES
Patient has been refusing nebulizer treatments and does not want to be bothered. Discussed with him the importance and benefits of the treatments. Patient stated he didn't want anything given to him. Changed nebulizer treatments to Q4 PRN.

## 2020-12-05 NOTE — HOSPICE
[facility]  [phone]    Patient Name: Hitesh Ocampo  YOB: 1932  Age: 80 y.o. [facility] Liaison Note:     Consult received, reviewed chart. In to see patient and daughter at the bedside, hospice philosophy and medicare benefit explained, patient and daughter had no questions. Daughter states she doesn't think her father needs hospice right now and \"hopefully not for a long time. \" Daughter states she was hoping for him to go to a rehab facility. At this time patient is lying in bed, appears to be pleasantly confused, no agitation or anxiety noted, respirations are even and unlabored, when asked if patient was in pain or discomfort he states \"no I feel fine. \" Patient does not qualify for inpatient hospice per Medicare guidelines, while patient is in hospital we will evaluate for this daily. Thank you for allowing us to be involved in this patient's care.

## 2020-12-05 NOTE — PROGRESS NOTES
Spiritual Care Assessment/Progress Note  [address]      NAME: Burke Washington      MRN: [id number]  AGE: 80 y.o.  SEX: male  Buddhist Affiliation: Non Anabaptism   Language: English     12/5/2020     Total Time (in minutes): 6     Spiritual Assessment begun in [facility] 5M1 MED SURG 1 through conversation with:         [x]Patient        [] Family    [] Friend(s)        Reason for Consult: Palliative Care, Initial/Spiritual Assessment     Spiritual beliefs: (Please include comment if needed)     [x] Identifies with a donaldo tradition: Jew per previous chart notes        [] Supported by a donaldo community:            [] Claims no spiritual orientation:           [] Seeking spiritual identity:                [] Adheres to an individual form of spirituality:           [] Not able to assess:                           Identified resources for coping:      [] Prayer                               [] Music                  [] Guided Imagery     [] Family/friends                 [] Pet visits     [] Devotional reading                         [x] Unknown     [] Other:                                              Interventions offered during this visit: (See comments for more details)    Patient Interventions: Catharsis/review of pertinent events in supportive environment           Plan of Care:     [] Support spiritual and/or cultural needs    [] Support AMD and/or advance care planning process      [] Support grieving process   [] Coordinate Rites and/or Rituals    [] Coordination with community clergy   [] No spiritual needs identified at this time   [] Detailed Plan of Care below (See Comments)  [] Make referral to Music Therapy  [] Make referral to Pet Therapy     [] Make referral to Addiction services  [] Make referral to [facility]  [] Make referral to Spiritual Care Partner  [] No future visits requested        [x] Follow up upon further referrals     Comments:  visited Mr. Manuel Bell for a palliative care initial spiritual assessment on the [facility]. Mr. Ester Reese was awake, alert, and sitting up in bed when the  came into the room. Upon introducing herself, . Orlando Fermin declined the visit, as he shared that he didn't need any help and wanted to have time alone. Per previous  notes, Mr. Ester Reese has a strong Yazidism donaldo. Unable to assess sources of strength and comfort at this time.  are available upon further referall. Luca 97. Gill Mathis.      Paging Service: [phone])

## 2020-12-05 NOTE — PROGRESS NOTES
11:44 AM    CM weekend coverage note:    Hospice consult noted. CM spoke to pt's daughter over the phone regarding consult. She is unsure of what her father would like to do, on 12/4/20 pt had said he wanted a referral sent to [facility] and rehab but then is now unsure. Per chart pt is refusing medical treatment at this time. Palliative is consulted. CM will follow.  Rasheeda Lu

## 2020-12-05 NOTE — PROGRESS NOTES
Problem: Falls - Risk of  Goal: *Absence of Falls  Description: Document Beverly Jones Fall Risk and appropriate interventions in the flowsheet. Outcome: Progressing Towards Goal  Note: Fall Risk Interventions:  Mobility Interventions: Bed/chair exit alarm    Mentation Interventions: Room close to nurse's station    Medication Interventions: Bed/chair exit alarm    Elimination Interventions: Bed/chair exit alarm    History of Falls Interventions: Consult care management for discharge planning         Problem: Patient Education: Go to Patient Education Activity  Goal: Patient/Family Education  Outcome: Progressing Towards Goal     Problem: Pressure Injury - Risk of  Goal: *Prevention of pressure injury  Description: Document Daniel Scale and appropriate interventions in the flowsheet.   Outcome: Progressing Towards Goal  Note: Pressure Injury Interventions:  Sensory Interventions: Assess changes in LOC    Moisture Interventions: Absorbent underpads    Activity Interventions: Increase time out of bed    Mobility Interventions: Assess need for specialty bed    Nutrition Interventions: Document food/fluid/supplement intake    Friction and Shear Interventions: HOB 30 degrees or less

## 2020-12-05 NOTE — PROGRESS NOTES
Bedside and Verbal shift change report given to Bev patel  (oncoming nurse) by Adair Woodward  (offgoing nurse). Report included the following information SBAR and Kardex.

## 2020-12-05 NOTE — PROGRESS NOTES
6762 Patient refused all morning medications, does not want to eat or drink. Patient stated he is \"ready to meet his maker. \"       [phone] Patient dtr at bedside says patient is agreeable to take abx. RN to bedside, patient stated that it was okay to give abx and he wants to take it. See MAR for administration. Patient also said he is open to anti-depressant and would like to try something small. Will continue to monitor.

## 2020-12-05 NOTE — PROGRESS NOTES
Since early this morning the patient has been refusing oxygen and has removed his nasal cannula. Initially he was saturating well but recently his oxygen saturations have decreased to the 80s. I presented to patients room with Dr Phoebe Urbano and Dr Radha Li. Patient reported that he does not feel an increase in his shortness of breath and even if he did, that he would not want us to treat him and that he just wants to die. Patient is alert and orientedx3. Completely lucid. Berhane Lazcano MD  1:49pm    Addendum:    Patient has completely changed his mind. He's eating, drinking and not refusing meds. Has asked for an antidepressant as long as it's a small pill. Both daughter of patient and patient have agreed to SNF.     Berhane Lazcano MD  5:56 PM  12/05/20

## 2020-12-06 VITALS
DIASTOLIC BLOOD PRESSURE: 79 MMHG | WEIGHT: 141.76 LBS | OXYGEN SATURATION: 93 % | SYSTOLIC BLOOD PRESSURE: 134 MMHG | TEMPERATURE: 97.7 F | BODY MASS INDEX: 25.12 KG/M2 | HEIGHT: 63 IN | RESPIRATION RATE: 16 BRPM | HEART RATE: 77 BPM

## 2020-12-06 LAB
ALBUMIN SERPL-MCNC: 2.3 G/DL (ref 3.5–5)
ALBUMIN/GLOB SERPL: 0.9 {RATIO} (ref 1.1–2.2)
ALP SERPL-CCNC: 62 U/L (ref 45–117)
ALT SERPL-CCNC: 17 U/L (ref 12–78)
ANION GAP SERPL CALC-SCNC: 4 MMOL/L (ref 5–15)
AST SERPL-CCNC: 41 U/L (ref 15–37)
BASOPHILS # BLD: 0 K/UL (ref 0–0.1)
BASOPHILS NFR BLD: 0 % (ref 0–1)
BILIRUB SERPL-MCNC: 1 MG/DL (ref 0.2–1)
BNP SERPL-MCNC: ABNORMAL PG/ML
BUN SERPL-MCNC: 38 MG/DL (ref 6–20)
BUN/CREAT SERPL: 24 (ref 12–20)
CALCIUM SERPL-MCNC: 8.4 MG/DL (ref 8.5–10.1)
CHLORIDE SERPL-SCNC: 104 MMOL/L (ref 97–108)
CO2 SERPL-SCNC: 30 MMOL/L (ref 21–32)
CREAT SERPL-MCNC: 1.61 MG/DL (ref 0.7–1.3)
DIFFERENTIAL METHOD BLD: ABNORMAL
EOSINOPHIL # BLD: 0 K/UL (ref 0–0.4)
EOSINOPHIL NFR BLD: 0 % (ref 0–7)
ERYTHROCYTE [DISTWIDTH] IN BLOOD BY AUTOMATED COUNT: 16 % (ref 11.5–14.5)
GLOBULIN SER CALC-MCNC: 2.7 G/DL (ref 2–4)
GLUCOSE SERPL-MCNC: 99 MG/DL (ref 65–100)
HCT VFR BLD AUTO: 28.5 % (ref 36.6–50.3)
HGB BLD-MCNC: 9.8 G/DL (ref 12.1–17)
IMM GRANULOCYTES # BLD AUTO: 0.2 K/UL (ref 0–0.04)
IMM GRANULOCYTES NFR BLD AUTO: 2 % (ref 0–0.5)
LYMPHOCYTES # BLD: 0.5 K/UL (ref 0.8–3.5)
LYMPHOCYTES NFR BLD: 4 % (ref 12–49)
MAGNESIUM SERPL-MCNC: 1.8 MG/DL (ref 1.6–2.4)
MCH RBC QN AUTO: 31.1 PG (ref 26–34)
MCHC RBC AUTO-ENTMCNC: 34.4 G/DL (ref 30–36.5)
MCV RBC AUTO: 90.5 FL (ref 80–99)
MONOCYTES # BLD: 1.1 K/UL (ref 0–1)
MONOCYTES NFR BLD: 10 % (ref 5–13)
NEUTS SEG # BLD: 9.9 K/UL (ref 1.8–8)
NEUTS SEG NFR BLD: 84 % (ref 32–75)
NRBC # BLD: 0 K/UL (ref 0–0.01)
NRBC BLD-RTO: 0 PER 100 WBC
PHOSPHATE SERPL-MCNC: 2.2 MG/DL (ref 2.6–4.7)
PLATELET # BLD AUTO: 195 K/UL (ref 150–400)
PMV BLD AUTO: 11.1 FL (ref 8.9–12.9)
POTASSIUM SERPL-SCNC: 3.7 MMOL/L (ref 3.5–5.1)
PROT SERPL-MCNC: 5 G/DL (ref 6.4–8.2)
RBC # BLD AUTO: 3.15 M/UL (ref 4.1–5.7)
SODIUM SERPL-SCNC: 138 MMOL/L (ref 136–145)
WBC # BLD AUTO: 11.7 K/UL (ref 4.1–11.1)

## 2020-12-06 PROCEDURE — 99238 HOSP IP/OBS DSCHRG MGMT 30/<: CPT | Performed by: FAMILY MEDICINE

## 2020-12-06 PROCEDURE — 74011250637 HC RX REV CODE- 250/637: Performed by: STUDENT IN AN ORGANIZED HEALTH CARE EDUCATION/TRAINING PROGRAM

## 2020-12-06 PROCEDURE — 83880 ASSAY OF NATRIURETIC PEPTIDE: CPT

## 2020-12-06 PROCEDURE — 36415 COLL VENOUS BLD VENIPUNCTURE: CPT

## 2020-12-06 PROCEDURE — 74011250636 HC RX REV CODE- 250/636: Performed by: STUDENT IN AN ORGANIZED HEALTH CARE EDUCATION/TRAINING PROGRAM

## 2020-12-06 PROCEDURE — 97530 THERAPEUTIC ACTIVITIES: CPT

## 2020-12-06 PROCEDURE — 83735 ASSAY OF MAGNESIUM: CPT

## 2020-12-06 PROCEDURE — 74011250636 HC RX REV CODE- 250/636: Performed by: NURSE PRACTITIONER

## 2020-12-06 PROCEDURE — 74011250636 HC RX REV CODE- 250/636: Performed by: FAMILY MEDICINE

## 2020-12-06 PROCEDURE — 80053 COMPREHEN METABOLIC PANEL: CPT

## 2020-12-06 PROCEDURE — 85025 COMPLETE CBC W/AUTO DIFF WBC: CPT

## 2020-12-06 PROCEDURE — 74011636637 HC RX REV CODE- 636/637: Performed by: FAMILY MEDICINE

## 2020-12-06 PROCEDURE — 84100 ASSAY OF PHOSPHORUS: CPT

## 2020-12-06 PROCEDURE — 74011000250 HC RX REV CODE- 250: Performed by: STUDENT IN AN ORGANIZED HEALTH CARE EDUCATION/TRAINING PROGRAM

## 2020-12-06 PROCEDURE — 94760 N-INVAS EAR/PLS OXIMETRY 1: CPT

## 2020-12-06 RX ORDER — SERTRALINE HYDROCHLORIDE 25 MG/1
12.5 TABLET, FILM COATED ORAL DAILY
Qty: 15 TAB | Refills: 0 | Status: SHIPPED | OUTPATIENT
Start: 2020-12-06 | End: 2020-12-21

## 2020-12-06 RX ORDER — MAGNESIUM SULFATE HEPTAHYDRATE 40 MG/ML
2 INJECTION, SOLUTION INTRAVENOUS ONCE
Status: COMPLETED | OUTPATIENT
Start: 2020-12-06 | End: 2020-12-06

## 2020-12-06 RX ORDER — ALLOPURINOL 100 MG/1
50 TABLET ORAL EVERY OTHER DAY
Qty: 15 TAB | Refills: 0 | Status: SHIPPED | OUTPATIENT
Start: 2020-12-08 | End: 2021-01-17

## 2020-12-06 RX ORDER — POTASSIUM CHLORIDE 750 MG/1
40 TABLET, FILM COATED, EXTENDED RELEASE ORAL
Status: COMPLETED | OUTPATIENT
Start: 2020-12-06 | End: 2020-12-06

## 2020-12-06 RX ORDER — MECLIZINE HCL 12.5 MG 12.5 MG/1
12.5 TABLET ORAL 2 TIMES DAILY
Qty: 20 TAB | Refills: 0 | Status: SHIPPED | OUTPATIENT
Start: 2020-12-06 | End: 2020-12-21

## 2020-12-06 RX ORDER — FUROSEMIDE 10 MG/ML
40 INJECTION INTRAMUSCULAR; INTRAVENOUS ONCE
Status: COMPLETED | OUTPATIENT
Start: 2020-12-06 | End: 2020-12-06

## 2020-12-06 RX ORDER — PREDNISONE 20 MG/1
40 TABLET ORAL
Qty: 4 TAB | Refills: 0 | Status: SHIPPED | OUTPATIENT
Start: 2020-12-07 | End: 2020-12-09

## 2020-12-06 RX ORDER — FUROSEMIDE 40 MG/1
TABLET ORAL
Qty: 30 TAB | Refills: 0 | Status: SHIPPED
Start: 2020-12-06 | End: 2020-12-21

## 2020-12-06 RX ORDER — LEVOFLOXACIN 750 MG/1
750 TABLET ORAL DAILY
Qty: 2 TAB | Refills: 0 | Status: SHIPPED | OUTPATIENT
Start: 2020-12-07 | End: 2020-12-09

## 2020-12-06 RX ADMIN — FUROSEMIDE 40 MG: 10 INJECTION, SOLUTION INTRAMUSCULAR; INTRAVENOUS at 16:52

## 2020-12-06 RX ADMIN — ALLOPURINOL 50 MG: 100 TABLET ORAL at 08:28

## 2020-12-06 RX ADMIN — ATENOLOL 50 MG: 50 TABLET ORAL at 08:27

## 2020-12-06 RX ADMIN — PREDNISONE 40 MG: 20 TABLET ORAL at 08:28

## 2020-12-06 RX ADMIN — MAGNESIUM SULFATE HEPTAHYDRATE 2 G: 40 INJECTION, SOLUTION INTRAVENOUS at 06:29

## 2020-12-06 RX ADMIN — POTASSIUM CHLORIDE 40 MEQ: 750 TABLET, FILM COATED, EXTENDED RELEASE ORAL at 06:29

## 2020-12-06 RX ADMIN — POTASSIUM PHOSPHATE, MONOBASIC AND POTASSIUM PHOSPHATE, DIBASIC: 224; 236 INJECTION, SOLUTION, CONCENTRATE INTRAVENOUS at 08:27

## 2020-12-06 RX ADMIN — Medication 10 ML: at 04:58

## 2020-12-06 RX ADMIN — FUROSEMIDE 20 MG: 10 INJECTION, SOLUTION INTRAMUSCULAR; INTRAVENOUS at 08:27

## 2020-12-06 RX ADMIN — HEPARIN SODIUM 5000 UNITS: 5000 INJECTION INTRAVENOUS; SUBCUTANEOUS at 11:22

## 2020-12-06 RX ADMIN — MECLIZINE 12.5 MG: 12.5 TABLET ORAL at 08:28

## 2020-12-06 RX ADMIN — Medication 10 ML: at 13:41

## 2020-12-06 RX ADMIN — HEPARIN SODIUM 5000 UNITS: 5000 INJECTION INTRAVENOUS; SUBCUTANEOUS at 04:57

## 2020-12-06 NOTE — PROGRESS NOTES
[address] [facility] [address] [place] [address]   Office [phone]  Fax [phone] [address] [facility] Residency Attending Addendum:  I saw and evaluated the patient on the day of the encounter with Dr. Morris Bowen, performing the key elements of the service. I discussed the findings, assessment and plan with the resident and agree with the resident's findings and plan as documented in the resident's note. Patient continues to improve. Creatinine better, leukocytosis improved. Plan to discharge to SNF once accepted. Tereza Banuelos MD, FAAFP, CAQSM, RMSK           Assessment and Plan   Christopher Ellsworth a 80 y. o. male with a PMHx of prostate CA, B cell lymphoma, HTN, and gout who is admitted for severe sepsis with acute hypoxic respiratory failure 2/2 PNA.      Overnight events: Patient refused some medications yesterday but did change his mind and would like to continue receiving medical treatment. He was started on an SSRI. Telemetry reviewed: NSR with rate in the 60s-70s with few PACs and PVCs. Severe sepsis w/ SIRS 3/4 (WBC:17.5 RR:24 HR:98) w/ AHRF: Improving. Pt with a recent hx of 7 days hospitalization d/t aspiration pneumonia. CTA Chest (12/1): Bilateral pleural effusions. Repeat CXR 12/2 w/ improved lung aeration & small BL pleural effusions. COVID, RVP, and PNA labs neg. Blood culture no growth 5 days. S/p flagyl (Dc'd 12/1), vanc (Dc'd 12/2), cefepime (Dc'd 12/2). - Continue levaquin for 10 day course until 12/10  - CBC and CMP daily     AHRF 2/2 HAP vs HF exacerbation: CXR: Right lower lobe pneumonia. Pro-BNP [id number]. Trop neg x 3. ECHO on 11/21/20 EF: 60-65%. Repeat ECHO: LVEF is 55 - 60%. Normal cavity size, wall thickness and EF. Primary etiology likely HAP causing HF exacerbation and AHRF.    - Pulmonology and cardiology consults, appreciate recs  - Taper steroids - Prednisone 40 mg PO x 3 days (pt refused yesterday's dose)  - IV lasix 20 mg daily    - Duo-nebs q4h PRN  - Albuterol nebs q6h PRN  - Supplemental O2 as needed, currently on RA    BPPV: Vertigo reproducible with modified [place] Hallpike maneuver but no nystagmus  - Continue Meclizine     Dysphagia: Patient no prior hx of dysphagia and aspiration PNA on previous admission.  - Speech therapy consulted, appreciate recs   - Liquid form or crushed pills when possible   - Upright when swallowing    Hematuria: Patient presented with urinary incontinence. UA showed trace LE and moderate blood. UCx no growth, UTI ruled out  - Follow up outpt, repeat UA with PCP     Covid neg: Discontinue COVID labs and meds     At risk of BENJI in the setting of CKD III - Improved: POA CR: 2.24 (BL:2.0). Likely 2/2 dehydration.   - CMP daily   - Avoid nephrotoxic agents     Elevated D-dimer - no PE: Likely increased as acute phase reactant 2/2 HAP vs UTI. D-dimer:8.20>4.10>3. 07. Repeat CTA neg for PE.     Spinal stenosis: recurrent history of weakness, dizziness and falls.   - Follow out OP     Hypertension: Stable. - Continuing home Atenolol 50 mg daily   - Will continue to monitor at this time and readjust as BP's trend.     Anemia: POA 11.8 (BL: 13) likely 2/2 CKD. No hx of active bleeding.  - CBC daily     Gout: stable. Home Allopurinol 150 mg daily   - Continue Allopurinol 50 mg every other day. Dose changed based on renal function.      H/o B Cell Lymphoma: Stable; remission normal PET scan in 2011. Bone Marrow Bx on 11/27/20: Hypercellular marrow with maturing trilineage hematopoiesis   No morphologic or immunophenotypic evidence of B cell non-Hodgkin's lymphoma   Flow cytometry shows left shifted myeloid maturation with less than 5% blasts   - Follow as OP.      Prostate Cancer: stable; s/p prostatectomy     Depression ?: Patient with depressed mood yesterday and was started on SSRI. Will do PQH-9 today. - Continue Sertraline 12.5 mg daily       FEN/GI - Renal diet. Replete electrolytes PRN.    Activity - Out of bed w/ assistance. DVT prophylaxis - Heparin  GI prophylaxis - Not indicated at this time  Fall prophylaxis - Fall precautions ordered. Disposition -. Plan to d/c to SNF. PT, OT, CM consulted. Code Status - DNR. Discussed with patient / caregivers. Next of Kin Name and Eb Day- [phone]      Tori Todd MD  Family Medicine Resident         Subjective / Objective     Subjective Patient seen and examined at bedside. He reports continued cough and SOB which have improved slightly. He is feeling weak/tired. Denies fever, chills, chest pain, abdominal pain, nausea, vomiting. Temp (24hrs), Av.8 °F (36.6 °C), Min:97.6 °F (36.4 °C), Max:98 °F (36.7 °C)     Objective:  Vital signs: (most recent): Blood pressure 113/61, pulse 69, temperature 97.6 °F (36.4 °C), resp. rate 16, height 5' 3\" (1.6 m), weight 141 lb 12.1 oz (64.3 kg), SpO2 96 %. Respiratory: O2 Flow Rate (L/min): 0 l/min O2 Device: Room air   Visit Vitals  /61 (BP 1 Location: Right arm, BP Patient Position: At rest)   Pulse 69   Temp 97.6 °F (36.4 °C)   Resp 16   Ht 5' 3\" (1.6 m)   Wt 141 lb 12.1 oz (64.3 kg)   SpO2 96%   BMI 25.11 kg/m²       Physical Exam  Constitutional:       Appearance: Normal appearance. Elderly and ill appearing. HENT:      Head: Normocephalic and atraumatic. Mouth: Mucous membranes are moist.      Pharynx: Oropharynx is clear. Eyes:      Pupils: Pupils are equal, round  Neck:      Musculoskeletal: Normal range of motion and neck supple. Cardiovascular:      Rate and Rhythm: Normal rate and regular rhythm. Pulses: Normal pulses. Heart sounds: No murmur. No friction rub. No gallop. Pulmonary:      Effort: Pulmonary effort is normal.      Breath sounds: Breath sounds diminished bilaterally in lower fields. Scattered coarse breath sounds throughout lung fields. Abdominal:      General: Abdomen is flat. Bowel sounds are normal.      Palpations: Abdomen is soft. Extremities: No peripheral edema. Skin:     General: Skin is warm and dry. Capillary Refill: Capillary refill takes less than 2 seconds. Neurological:      General: No focal deficit present. Mental Status: He is alert and oriented to person, place, and time. Psychiatric:         Mood and Affect: Mood normal.     I/O:  Date 12/04/20 1900 - 12/05/20 0659 12/05/20 0700 - 12/06/20 0659   Shift 3773-8048 24 Hour Total 2860-3784 5293-2103 24 Hour Total   INTAKE   P.O.  640 240  240     P. O.  640 240  240   I. V.(mL/kg/hr)   160(0.2)  160     I.V.   10  10     Volume (levoFLOXacin (LEVAQUIN) 750 mg in D5W IVPB)   150  150   Shift Total(mL/kg)  640(9.9) 400(6.2)  400(6.2)   OUTPUT   Urine(mL/kg/hr)  855 570(0.7)  570     Urine Voided  855 570  570     Urine Occurrence(s)  4 x 2 x  2 x   Stool          Stool Occurrence(s)  1 x      Shift Total(mL/kg)  855(13.2) 570(8.9)  570(8.9)   NET  -215 -170  -170   Weight (kg) 64.9 64.9 64.3 64.3 64.3       CBC:  Recent Labs     12/04/20  0640 12/03/20  0634 12/02/20  2125   WBC 15.3* 9.7 12.7*   HGB 10.1* 10.0* 10.3*   HCT 29.1* 29.7* 30.3*    212 308       Metabolic Panel:  Recent Labs     12/04/20  0640 12/03/20  0634 12/02/20  2125    138 137   K 2.8* 3.5 3.2*    103 105   CO2 27 27 23   BUN 40* 42* 42*   CREA 1.79* 1.92* 1.80*   * 127* 125*   CA 8.3* 8.0* 8.5   MG 2.0 1.7  --    PHOS 3.4 5.1*  --    ALB 2.5* 2.3*  --    ALT 16 15  --           For Billing    Chief Complaint   Patient presents with    Shortness of Breath       Hospital Problems  Date Reviewed: 12/1/2020          Codes Class Noted POA    BPPV (benign paroxysmal positional vertigo) ICD-10-CM: H81.10  ICD-9-CM: 386.11  12/3/2020 No        CHF (congestive heart failure) (HCC) ICD-10-CM: I50.9  ICD-9-CM: 428.0  12/1/2020 Unknown        * (Principal) Acute hypoxemic respiratory failure (HCC) ICD-10-CM: J96.01  ICD-9-CM: 518.81  12/1/2020 Unknown        Severe sepsis ([facility]) ICD-10-CM: A41.9, R65.20  ICD-9-CM: 038.9, 995.92  12/1/2020 Unknown        Person under investigation for COVID-19 ICD-10-CM: Z20.828  ICD-9-CM: V01.79  12/1/2020 Unknown        HAP (hospital-acquired pneumonia) ICD-10-CM: J18.9, Y95  ICD-9-CM: 405  11/30/2020 Unknown        SIRS (systemic inflammatory response syndrome) ([facility] ICD-10-CM: R65.10  ICD-9-CM: 995.90  11/30/2020 Unknown        History of B-cell lymphoma ICD-10-CM: Z85.72  ICD-9-CM: V10.79  11/22/2020 Yes        Anemia ICD-10-CM: D64.9  ICD-9-CM: 285.9  11/21/2020 Yes        CRI (chronic renal insufficiency) (Chronic) ICD-10-CM: N18.9  ICD-9-CM: 585.9  12/13/2012 Yes        Lymphoma ([facility] ICD-10-CM: C85.90  ICD-9-CM: 202.80  1/4/2011 Yes        HTN (hypertension) ICD-10-CM: I10  ICD-9-CM: 401.9  1/4/2011 Yes        Gout ICD-10-CM: M10.9  ICD-9-CM: 274.9  1/4/2011 Yes

## 2020-12-06 NOTE — PROGRESS NOTES
Problem: Falls - Risk of  Goal: *Absence of Falls  Description: Document Danielle Marcos Fall Risk and appropriate interventions in the flowsheet. Outcome: Progressing Towards Goal  Note: Fall Risk Interventions:  Mobility Interventions: Bed/chair exit alarm    Mentation Interventions: Bed/chair exit alarm    Medication Interventions: Bed/chair exit alarm    Elimination Interventions: Bed/chair exit alarm    History of Falls Interventions: Bed/chair exit alarm         Problem: Patient Education: Go to Patient Education Activity  Goal: Patient/Family Education  Outcome: Progressing Towards Goal     Problem: Pressure Injury - Risk of  Goal: *Prevention of pressure injury  Description: Document Daniel Scale and appropriate interventions in the flowsheet.   Outcome: Progressing Towards Goal  Note: Pressure Injury Interventions:  Sensory Interventions: Assess need for specialty bed, Assess changes in LOC    Moisture Interventions: Absorbent underpads    Activity Interventions: Increase time out of bed    Mobility Interventions: HOB 30 degrees or less    Nutrition Interventions: Document food/fluid/supplement intake    Friction and Shear Interventions: Apply protective barrier, creams and emollients                Problem: Breathing Pattern - Ineffective  Goal: *Absence of hypoxia  Outcome: Progressing Towards Goal  Goal: *Use of effective breathing techniques  Outcome: Progressing Towards Goal     Problem: Patient Education: Go to Patient Education Activity  Goal: Patient/Family Education  Outcome: Progressing Towards Goal

## 2020-12-06 NOTE — DISCHARGE SUMMARY
[facility]1 [facility] [address] [place] [address]   Office [phone]  Fax [phone]       Discharge / Transfer / Off-Service Note     Name: Akil Edwards MRN: [id number]  Sex: Male   YOB: 1932  Age: 80 y.o. PCP: Hien Thomas MD     Date of admission: 11/30/2020  Date of discharge/transfer: 12/6/2020    Date of admission: 11/30/2020  Date of discharge/transfer: 12/6/2020    Attending physician at admission: Bruna Simmonds, MD  Attending physician at discharge/transfer: Salvador Covington MD  Resident physician at discharge/transfer: Idania Hernandez MD     Consultants during hospitalization  IP CONSULT TO [facility]  IP CONSULT  S [address] TO [address] TO PULMONOLOGY     Admission diagnoses   HAP (hospital-acquired pneumonia) [J18.9, Y95]  SIRS (systemic inflammatory response syndrome) ([facility] [R65.10]    Recommended follow-up after discharge    1. PCP - virtual follow up on 12/14/2020  2. Cardiology - follow up on 12/16/2020  3. Hematology/oncology - regarding history of B cell lymphoma and recent bone marrow biopsy results   4.  Urology - regarding hematuria      Follow up tests after discharge   - Repeat CBC (to re-check WBC and Hgb) and CMP (to re-check K, creatinine)  - Repeat UA to follow up on hematuria      Medication Changes:  START  - Levaquin 750 mg by mouth every 48 hours for 2 more doses   - Prednisone 40 mg by mouth daily for 2 more doses   - Sertraline 12.5 mg by mouth daily   - Meclizine 12.5 mg by mouth twice daily   - Lasix 40 mg by mouth daily   STOP  - Flagyl 500 mg twice daily   CHANGES  - Allopurinol 100 mg by mouth every other day    No other changes were made to your medications, please take all your other home medicines as previously prescribed.    ----------------------------------------------------------------------------------------------------------------------------  The patient was well enough to be discharged from the hospital. However, because they were inpatient in a hospital, they are at greater risk of having been exposed to the coronavirus. PLEASE stay inside and self-quarantine for 14 days to prevent further spread of the coronavirus.  ------------------------------------------------------------------------------------------------------------------  History of Present Illness  Per the admitting physicians H&P Keny Cline is a 80 y.o. male with A PMHx of prostate CA, HTN, Gout, hx of lymphoma  who presents to the ED complaining of SOB. Pt was recently admitted on 11/20 - 11/27 fro AHR 2/2 Aspiration Pneumonia. He was treated with broad spectrum antibiotics and d/c home with Flagyl and Levaquin PO. He states that he has bee feeling SOB since his discharge and has had sputum production, small amount, green in color. Pt had a virtual visit with his where it was found to have low oxygen saturation in the low 80s and was advise to come to the ED promptly. Patient denies fever, chills, n/v/d, palpitations, CP, HA.      Of note, he was found to have retroperitoneal adenopathy on CT abd during his admission and had BM biopsy done showing hypercellular marrow and  left shifted myeloid maturation without B cell lymphoma. \"    Hospital course  Erin Pagan was admitted into the Family Medicine Service from 11/30/2020 to 12/06/2020 for HAP (hospital-acquired pneumonia) [J18.9, Y95]  SIRS (systemic inflammatory response syndrome) (HCC) [R65.10]. During the course of this admission, the following conditions were addressed/managed:    Severe sepsis w/ SIRS 3/4 (WBC:17.5 RR:24 HR:98) w/ AHRF: Resovled. Pt with a recent hx of 7 days hospitalization d/t aspiration pneumonia. CTA Chest (12/1): Bilateral pleural effusions. Repeat CXR 12/2 w/ improved lung aeration & small BL pleural effusions. COVID, RVP, and PNA labs neg. Blood culture no growth 5 days. S/p flagyl (Dc'd 12/1), vanc (Dc'd 12/2), cefepime (Dc'd 12/2).  Discharged to SNF for rehabilitation.   - Continue levaquin 750 mg PO q48h to complete 10 day course (until 12/10)   - No O2 requirement on day of discharge      AHRF 2/2 HAP vs HF exacerbation: CXR: Right lower lobe pneumonia. POA Pro-BNP 89070. Trop neg x 3. ECHO on 11/21/20 EF: 60-65%. Repeat ECHO (12/1/20): LVEF is 55 - 60%. Normal cavity size, wall thickness and EF. Followed by pulmonology and cardiology while inpatient. - Continue steroid taper - Prednisone 40 mg PO x 2 days  - Start Lasix 40mg daily    - Duo-nebs q4h PRN  - Albuterol nebs q6h PRN     BPPV: Vertigo reproducible with modified Jed Hallpike maneuver but no nystagmus  - Continue Meclizine      Dysphagia: Patient no prior hx of dysphagia and aspiration PNA on previous admission.  - Speech therapy consulted and recs are:              - Liquid form or crushed pills when possible              - Upright when swallowing     Hematuria: Patient presented with urinary incontinence. UA showed trace LE and moderate blood. UCx no growth, UTI ruled out  - Follow up outpt, repeat UA with PCP     Covid neg: on 12/4/2020     At risk of BENJI in the setting of CKD III: POA CR: 2.24 (BL: 2.0). Cr at discharge 1.61. Likely 2/2 dehydration.   - Avoid nephrotoxic agents  - Repeat CMP outpatient (to re-check K, creatinine)     Elevated D-dimer - no PE: Trended down before discharge. Likely increased as acute phase reactant 2/2 HAP. Rpt CTA neg for PE.     Spinal stenosis: recurrent history of weakness, dizziness and falls.   - F/u outpatient     Hypertension: Stable. - Continue home Atenolol 50 mg daily      Anemia: POA 11.8 (BL: 13) likely 2/2 CKD. No hx of active bleeding.  - Repeat CBC outpatient (to re-check WBC and Hgb)     Gout: stable. Previously on home Allopurinol 150 mg daily   - Continue Allopurinol 50 mg every other day. Dose changed based on renal function.      H/o B Cell Lymphoma: Stable; remission normal PET scan in 2011.   Bone Marrow Bx on 11/27/20: Hypercellular marrow with maturing trilineage hematopoiesis   No morphologic or immunophenotypic evidence of B cell non-Hodgkin's lymphoma   Flow cytometry shows left shifted myeloid maturation with less than 5% blasts   - F/u outpatient with hematology/oncology      Prostate Cancer: stable; s/p prostatectomy      Depression: Patient with depressed mood yesterday and was started on SSRI. - Continue Sertraline 12.5 mg daily     Condition at discharge: Stable. Labs  Recent Labs     12/06/20  0028 12/04/20  0640   WBC 11.7* 15.3*   HGB 9.8* 10.1*   HCT 28.5* 29.1*    223     Recent Labs     12/06/20  0028 12/04/20  0640    137   K 3.7 2.8*    101   CO2 30 27   BUN 38* 40*   CREA 1.61* 1.79*   GLU 99 147*   CA 8.4* 8.3*   MG 1.8 2.0   PHOS 2.2* 3.4     Recent Labs     12/06/20  0028 12/04/20  0640   ALT 17 16   AP 62 67   TBILI 1.0 0.9   TP 5.0* 5.3*   ALB 2.3* 2.5*   GLOB 2.7 2.8     No results for input(s): PH, PCO2, PO2, TNIPOC, TROIQ, INR, PTP, APTT, FE, TIBC, PSAT, FERR, GLUCPOC, INREXT, INREXT in the last 72 hours. No lab exists for component: GLPOC  Cultures  · Blood Culture (11/30): No growth 5 days    Procedures / Diagnostic Studies  · ECHO (12/1): LVEF is 55 - 60%. Normal cavity size, wall thickness and EF. Imaging  Results from Hospital Encounter encounter on 11/30/20   XR CHEST PORT    Narrative EXAM: XR CHEST PORT    INDICATION: Acute hypoxemic respiratory failure. Bilateral pleural effusions. COMPARISON: Portable chest on 11/30/2020. CT angiography chest on 1/20/2020. TECHNIQUE: Upright portable chest AP view    FINDINGS: Cardiac monitoring wires are visible. The cardiomediastinal and hilar  contours are within normal limits. The pulmonary vasculature is within normal  limits. Bilateral small pleural effusions are unchanged. Decreased opacity at the right  lung base. Decreased opacity at the left lung base. Upper lobes are clear. No  pneumothorax.  The visualized bones and upper abdomen are age-appropriate. Impression IMPRESSION:    Improved lung aeration. Unchanged small bilateral pleural effusions. No results found for this or any previous visit. Results from [place] encounter on 11/30/20   CTA CHEST W OR W WO CONT    Narrative EXAM:  CTA CHEST W OR W WO CONT    INDICATION: Elevated d-dimer    COMPARISON: None. TECHNIQUE: Helical thin section chest CT following uneventful intravenous  administration of nonionic contrast 85 mL of isovue 370 according to  departmental PE protocol. Coronal and sagittal reformats were performed. 3D post  processing was performed. CT dose reduction was achieved through the use of a  standardized protocol tailored for this examination and automatic exposure  control for dose modulation. FINDINGS: This is a limited quality study for the evaluation of pulmonary  embolism to the proximal segmental arterial level. There is no pulmonary  embolism to this level. THYROID: No nodule. MEDIASTINUM: No mass or lymphadenopathy. MARTELL: No mass or lymphadenopathy. THORACIC AORTA: No aneurysm. HEART: Coronary atherosclerotic disease  ESOPHAGUS: No wall thickening or dilatation. TRACHEA/BRONCHI: Patent. PLEURA: Bilateral pleural effusions  LUNGS: Bilateral lower lobe volume loss. Mild right middle lobe volume loss. UPPER ABDOMEN: Distended gallbladder. Lobulated appearance of the spleen. Possible retroperitoneal adenopathy is again noted. BONES: No aggressive bone lesion or fracture. Impression IMPRESSION:     1.  [Limited study. No evidence of pulmonary embolus. 2.  Bilateral pleural effusions and bilateral lower lobe volume loss. Mild right  middle lobe volume loss. 3.  Distended gallbladder. This may be related to fasting state. Correlate  clinically. Lobulated spleen and possible retroperitoneal adenopathy is again  noted              No procedure found.       Chronic diagnoses   Problem List as of 12/6/2020 Date Reviewed: 12/1/2020          Codes Class Noted - Resolved    BPPV (benign paroxysmal positional vertigo) ICD-10-CM: H81.10  ICD-9-CM: 386.11  12/3/2020 - Present        CHF (congestive heart failure) ([facility] ICD-10-CM: I50.9  ICD-9-CM: 428.0  12/1/2020 - Present        * (Principal) Acute hypoxemic respiratory failure ([facility] ICD-10-CM: J96.01  ICD-9-CM: 518.81  12/1/2020 - Present        Severe sepsis ([facility] ICD-10-CM: A41.9, R65.20  ICD-9-CM: 038.9, 995.92  12/1/2020 - Present        Person under investigation for COVID-19 ICD-10-CM: Z20.828  ICD-9-CM: V01.79  12/1/2020 - Present        HAP (hospital-acquired pneumonia) ICD-10-CM: J18.9, Y95  ICD-9-CM: 576  11/30/2020 - Present        SIRS (systemic inflammatory response syndrome) ([facility] ICD-10-CM: R65.10  ICD-9-CM: 995.90  11/30/2020 - Present        Hypercalcemia ICD-10-CM: G10.72  ICD-9-CM: 275.42  11/23/2020 - Present        History of B-cell lymphoma ICD-10-CM: Z85.72  ICD-9-CM: V10.79  11/22/2020 - Present        Non-Hodgkin's lymphoma in relapse [facility]) ICD-10-CM: C85.90  ICD-9-CM: 202.80  11/22/2020 - Present        Weakness ICD-10-CM: R53.1  ICD-9-CM: 780.79  11/21/2020 - Present        Anemia ICD-10-CM: D64.9  ICD-9-CM: 285.9  11/21/2020 - Present        Opioid use ICD-10-CM: F11.90  ICD-9-CM: 305.50  11/21/2020 - Present        Hypercalcemia of malignancy ICD-10-CM: E83.52  ICD-9-CM: 275.42  11/20/2020 - Present        Cervical stenosis of spinal canal ICD-10-CM: M48.02  ICD-9-CM: 723.0  11/20/2020 - Present        Acute renal failure (ARF) ([facility] ICD-10-CM: N17.9  ICD-9-CM: 584.9  11/20/2020 - Present        Lumbar canal stenosis ICD-10-CM: M48.061  ICD-9-CM: 724.02  11/20/2020 - Present        Frequent falls ICD-10-CM: R29.6  ICD-9-CM: V15.88  11/20/2020 - Present        Multiple falls ICD-10-CM: R29.6  ICD-9-CM: V15.88  11/20/2020 - Present        CRI (chronic renal insufficiency) (Chronic) ICD-10-CM: N18.9  ICD-9-CM: 585.9  12/13/2012 - Present Lymphoma ([facility] ICD-10-CM: C85.90  ICD-9-CM: 202.80  1/4/2011 - Present        HTN (hypertension) ICD-10-CM: I10  ICD-9-CM: 401.9  1/4/2011 - Present        Prostate CA ([facility] ICD-10-CM: Lurena Lipa  ICD-9-CM: 943  1/4/2011 - Present        Gout ICD-10-CM: M10.9  ICD-9-CM: 274.9  1/4/2011 - Present              Discharge/Transfer Medications  Discharge Medication List as of 12/6/2020  4:05 PM      START taking these medications    Details   meclizine (ANTIVERT) 12.5 mg tablet Take 1 Tab by mouth two (2) times a day for 10 days. , Print, Disp-20 Tab,R-0      predniSONE (DELTASONE) 20 mg tablet Take 40 mg by mouth daily (with breakfast) for 2 doses. , Print, Disp-4 Tab,R-0      sertraline (ZOLOFT) 25 mg tablet Take 0.5 Tabs by mouth daily. , Print, Disp-15 Tab,R-0         CONTINUE these medications which have CHANGED    Details   allopurinoL (ZYLOPRIM) 100 mg tablet Take 0.5 Tabs by mouth every other day., Print, Disp-15 Tab,R-0      levoFLOXacin (LEVAQUIN) 750 mg tablet Take 1 Tab by mouth daily for 2 doses. , Print, Disp-2 Tab,R-0         CONTINUE these medications which have NOT CHANGED    Details   vitamin e (E GEMS) 100 unit capsule Take 100 Units by mouth daily. , Historical Med      multivitamin (ONE A DAY) tablet Take 1 Tab by mouth daily. , Historical Med      atenoloL (TENORMIN) 50 mg tablet Take 1 Tab by mouth daily. , Normal, Disp-90 Tab,R-3         STOP taking these medications       metroNIDAZOLE (FLAGYL) 500 mg tablet Comments:   Reason for Stopping:                Diet:  Renal Diet.     Activity:  As tolerated    Disposition: Home or Self Care    Discharge instructions to patient/family  Please seek medical attention for any new or worsening symptoms particularly fever, chest pain, shortness of breath, abdominal pain, nausea, vomiting    Follow up plans/appointments  Follow-up Information     Follow up With Specialties Details Why Contact Info    Ivonne Caballero NP Nurse Practitioner On 12/16/2020 10:20 am  [address] [facility] [address]      Sharlene Martinez NP Nurse Practitioner On 12/14/2020 Virtual appointment at 10:15am, follow up for recent hospitalization for sepsis [id number] [address] [phone]      Carmina Bell MD Hematology and Oncology, Internal Medicine, Hematology, Oncology Schedule an appointment as soon as possible for a visit to follow up regarding history of B cell lymphoma and recent bone marrow biosy results [address] [facility] [address]  [facility]  Schedule an appointment as soon as possible for a visit to follow up regarding hematuria (blood in urine) [address]    Sky Gomez MD Family Medicine Call To make a follow up visit appointment after discharge from nursing home [address] [facility] [address]             Bruna aKhn MD  Family Medicine Resident     For Billing    Chief Complaint   Patient presents with    [facility] Problems  Date Reviewed: 12/1/2020          Codes Class Noted POA    BPPV (benign paroxysmal positional vertigo) ICD-10-CM: H81.10  ICD-9-CM: 386.11  12/3/2020 No        CHF (congestive heart failure) ([facility] ICD-10-CM: I50.9  ICD-9-CM: 428.0  12/1/2020 Unknown        * (Principal) Acute hypoxemic respiratory failure ([facility] ICD-10-CM: J96.01  ICD-9-CM: 518.81  12/1/2020 Unknown        Severe sepsis [facility]) ICD-10-CM: A41.9, R65.20  ICD-9-CM: 038.9, 995.92  12/1/2020 Unknown        Person under investigation for COVID-19 ICD-10-CM: Z20.828  ICD-9-CM: V01.79  12/1/2020 Unknown        HAP (hospital-acquired pneumonia) ICD-10-CM: J18.9, Y95  ICD-9-CM: 714  11/30/2020 Unknown        SIRS (systemic inflammatory response syndrome) ([facility] ICD-10-CM: R65.10  ICD-9-CM: 995.90  11/30/2020 Unknown        History of B-cell lymphoma ICD-10-CM: Z85.72  ICD-9-CM: V10.79  11/22/2020 Yes Anemia ICD-10-CM: D64.9  ICD-9-CM: 285.9  11/21/2020 Yes        CRI (chronic renal insufficiency) (Chronic) ICD-10-CM: N18.9  ICD-9-CM: 585.9  12/13/2012 Yes        Lymphoma ([address] ICD-10-CM: C85.90  ICD-9-CM: 202.80  1/4/2011 Yes        HTN (hypertension) ICD-10-CM: I10  ICD-9-CM: 401.9  1/4/2011 Yes        Gout ICD-10-CM: M10.9  ICD-9-CM: 274.9  1/4/2011 Yes               [address] Medicine Residency Attending Addendum:  I saw and evaluated the patient on the day of the encounter with Dr. Codey Centeno, performing the guallpa elements of the service. I discussed the findings, assessment and plan with the resident and agree with the resident's findings and plan as documented in the resident's note.       Bay Esquivel MD, FAAFP, CAQSM, RMSK

## 2020-12-06 NOTE — PROGRESS NOTES
3:46 PM    12/06/20   patient is able to be discharged per MD, [facility] will transport at 4:00 PM. [facility] has accepted and medical records sent through [employer]. Report can be called to [phone]. The patient will be going to room 205-A. Daughter was at bedside and made aware of discharge and room number. Leonor Cesar RN BSN [facility]     10:57 AM  12/06/20     Transition of Care Plan:     RUR-35%    1. PT/OT recommend SNF  [facility] has accepted patient. Patient is willing to go. No discharge order at this time. 2.Daughter Chet Vieira [phone] supportive. 2. O2 weaned to 1L; will need O2 eval prior to d/c  3. Covid negative 12/4  4.  CM will continue to follow    [address] [facility]

## 2020-12-06 NOTE — PROGRESS NOTES
Slight bump in BNP today. Spoke to Dr Naseem Hernandez regarding diuresis, recommending to give one dose of Lasix IV 40 now and discharging on Lasix 40 PO daily. Patient got a bed in SNF, transport will be here soon. Discharge summary to follow.      Niya Daniels MD

## 2020-12-06 NOTE — ROUTINE PROCESS
TRANSFER - OUT REPORT:    Verbal report given to MAXI Mendoza(name) on hospitals  being transferred to [address] [place] [address] [address](unit) for routine progression of care       Report consisted of patients Situation, Background, Assessment and   Recommendations(SBAR). Information from the following report(s) SBAR, Kardex, Intake/Output, MAR and Accordion was reviewed with the receiving nurse. Opportunity for questions and clarification was provided. Patient transported with:      [facility]. PIV removed x 2 with tip intact. Personal belongings gathered at bedside.

## 2020-12-06 NOTE — PROGRESS NOTES
Problem: Mobility Impaired (Adult and Pediatric)  Goal: *Acute Goals and Plan of Care (Insert Text)  Description: FUNCTIONAL STATUS PRIOR TO ADMISSION: At baseline patient ambulatory with RW but after last hospital D/C went home and remained bedbound stating \"no energy to get OOB\"    HOME SUPPORT PRIOR TO ADMISSION: The patient lived with family. Physical Therapy Goals  Initiated 12/2/2020  1. Patient will move from supine to sit and sit to supine , scoot up and down, and roll side to side in bed with minimal assistance/contact guard assist within 7 day(s). 2.  Patient will transfer from bed to chair and chair to bed with minimal assistance/contact guard assist using the least restrictive device within 7 day(s). 3.  Patient will perform sit to stand with minimal assistance/contact guard assist within 7 day(s). 4.  Patient will ambulate with minimal assistance/contact guard assist for 50 feet with the least restrictive device within 7 day(s). 5.  Patient will ascend/descend 4 stairs with 1 handrail(s) with minimal assistance/contact guard assist within 7 day(s). Outcome: Progressing Towards Goal    PHYSICAL THERAPY TREATMENT  Patient: Alfonso Almodovar (89 y.o. male)  Date: 12/6/2020  Diagnosis: HAP (hospital-acquired pneumonia) [J18.9, Y95]  SIRS (systemic inflammatory response syndrome) (HCC) [R65.10]   Acute hypoxemic respiratory failure (HCC)       Precautions: Bed Alarm, Fall  Chart, physical therapy assessment, plan of care and goals were reviewed. ASSESSMENT  Patient continues with skilled PT services and is progressing towards goals. Pt participates in bed mobility, upright sitting, transfers, and brief ambulation toward head of bed using RW. SpO2 95-96% throughout encounter using room air. 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 LSO is not in facility (discussed with daughter on Friday) thus out of bed positioning deferred.      Current Level of Function Impacting Discharge (mobility/balance): min assist    Other factors to consider for discharge: affirms dizziness upon initial sitting when asked, otherwise without complaints. PLAN :  Patient continues to benefit from skilled intervention to address the above impairments. Continue treatment per established plan of care. to address goals. Recommendation for discharge: (in order for the patient to meet his/her long term goals)  Therapy up to 5 days/week in SNF setting    This discharge recommendation:  Has not yet been discussed the attending provider and/or case management    IF patient discharges home will need the following DME: to be determined (TBD)       SUBJECTIVE:   Patient stated Can you pull me up in bed?     Pt received supine, agreeable to PT and cleared by RN. OBJECTIVE DATA SUMMARY:   Critical Behavior:  Neurologic State: Alert  Orientation Level: Oriented X4  Cognition: Follows commands  Safety/Judgement: Decreased insight into deficits  Functional Mobility Training:  Bed Mobility:  Rolling: Minimum assistance  Supine to Sit: Moderate assistance; Adaptive equipment;Bed Modified  Sit to Supine: Moderate assistance; Adaptive equipment  Scooting: Additional time;Contact guard assistance        Transfers:  Sit to Stand: Minimum assistance; Additional time  Stand to Sit: Minimum assistance; Additional time                             Balance:  Sitting: Without support  Sitting - Static: Good (unsupported)  Sitting - Dynamic: Good (unsupported)  Standing: With support  Standing - Static: Fair  Standing - Dynamic : Fair  Ambulation/Gait Training:  Distance (ft): 3 Feet (ft)  Assistive Device: Walker, rolling;Gait belt  Ambulation - Level of Assistance: Minimal assistance        Gait Abnormalities: Decreased step clearance        Base of Support: Narrowed     Speed/Kika: Pace decreased (<100 feet/min)                    Exercises:  LAQ x 10  Marching x 10 in sitting  Ankle pumps x 20        Pain Rating:  Denies pain    Activity Tolerance:   requires rest breaks    After treatment patient left in no apparent distress:   Supine in bed, Call bell within reach, Bed / chair alarm activated, Side rails x 3, and bed in partial chair position    COMMUNICATION/COLLABORATION:   The patients plan of care was discussed with: Registered nurse and Rehabilitation technician.      Trinidad Michaud, PT, DPT   Time Calculation: 28 mins

## 2020-12-06 NOTE — PROGRESS NOTES
Bedside, Verbal and Written shift change report given to Maura GERMAN (oncoming nurse) by Tone Caballero (offgoing nurse). Report included the following information SBAR, Kardex, ED Summary, Procedure Summary, Intake/Output, MAR, Recent Results and Med Rec Status.

## 2020-12-06 NOTE — DISCHARGE INSTRUCTIONS
Patient Discharge Instructions    Aleah Monique / [id number] : 1932    Admitted 2020 Discharged: 2020 12:29 PM     Primary care provider: Adrian Mclaughlin MD    Discharging provider:  Brenda Menchaca DO  - Family Medicine Resident  Rocio Faust MD, MD - Family Medicine, Attending    . . . . . . . . . . . . . . . . . . . . . . . . . . . . . . . . . . . . . . . . . . . . . . . . . . . . . . . . . . . . . . . . . . . . . . . . MEDICATION CHANGES  START  - Levaquin 750 mg by mouth every 48 hours for 2 more doses   - Prednisone 40 mg by mouth daily for 2 more doses   - Sertraline 12.5 mg by mouth daily   - Meclizine 12.5 mg by mouth twice daily   - Lasix 40 mg by mouth daily  STOP  - Flagyl 500 mg twice daily   CHANGES  - Allopurinol 100 mg by mouth every other day   No other changes were made to your medications, please take all your other home medicines as previously prescribed. . . . . . . . . . . . . . . . . . . . . . . . . . . . . . . . . . . . . . . . . . . . . . . . . . . . . . . . . . . . . . . . . . . . . . . Joel Toure FINAL DIAGNOSES & HOSPITAL COURSE:       History of Present Illness  Per the admitting physicians H&P Kyle Part a 80 y. o. male with A PMHx of prostate CA, HTN, Gout, hx of lymphoma  who presents to the ED complaining of SOB.   Pt was recently admitted on  -  fro AHR 2/2 Aspiration Pneumonia. He was treated with broad spectrum antibiotics and d/c home with Flagyl and Levaquin PO. He states that he has bee feeling SOB since his discharge and has had sputum production, small amount, green in color.  Pt had a virtual visit with his where it was found to have low oxygen saturation in the low 80s and was advise to come to the ED promptly. Patient denies fever, chills, n/v/d, palpitations, CP, HA.      Of note, he was found to have retroperitoneal adenopathy on CT abd during his admission and had BM biopsy done showing hypercellular marrow and  left shifted myeloid maturation without B cell lymphoma. \"     Hospital course  Susi Cunningham was admitted into the [facility] from 2020 to 2020 for HAP (hospital-acquired pneumonia) [J18.9, Y95]  SIRS (systemic inflammatory response syndrome) ([facility] [R65.10]. During the course of this admission, the following conditions were addressed/managed:     Severe sepsis w/ SIRS 3/4 w/ AHRF: Resolved. Pt with a recent hx of 7 days hospitalization d/t aspiration pneumonia.  CTA Chest (): Bilateral pleural effusions. Repeat CXR  w/ improved lung aeration & small BL pleural effusions. COVID, RVP, and PNA labs neg. Blood culture no growth 5 days. S/p flagyl (Dc'd ), vanc (Dc'd ), cefepime (Dc'd ). - Continue levaquin for 10 day course until 12/10   - No O2 requirement on day of discharge      AHRF 2/2 HAP vs HF exacerbation: CXR: Right lower lobe pneumonia. Pro-BNP 78387. Trop neg x 3. ECHO on 20 EF: 60-65%. Repeat ECHO: LVEF is 55 - 60%. Normal cavity size, wall thickness and EF. Followed by pulmonology and cardiology while inpatient. - Continue steroid taper - Prednisone 40 mg PO x 2 days   - Start lasix 40 mg daily    - Duo-nebs q4h PRN  - Albuterol nebs q6h PRN     BPPV: Vertigo reproducible with modified Archbold Hallpike maneuver but no nystagmus  - Continue Meclizine      Dysphagia: Patient no prior hx of dysphagia and aspiration PNA on previous admission.  - Speech therapy was consulted and recommendations are:              - Liquid form or crushed pills when possible              - Upright when swallowing     Hematuria: Patient presented with urinary incontinence. UA showed trace LE and moderate blood. UCx no growth, UTI ruled out  - Follow up outpt, repeat UA with PCP     Covid ne20     At risk of BENJI in the setting of CKD III - Improved: POA CR: 2.24 (BL:2.0).  Likely 2/2 dehydration.   - CMP daily   - Avoid nephrotoxic agents     Elevated D-dimer - no PE: Trended down before discharge. Likely increased as acute phase reactant 2/2 HAP.      Spinal stenosis: recurrent history of weakness, dizziness and falls.   - Follow out OP     Hypertension: Stable.   - Continuing home Atenolol 50 mg daily      Anemia: POA 11.8 (BL: 13) likely 2/2 CKD. No hx of active bleeding.  - CBC daily     Gout: stable. Home Allopurinol 150 mg daily   - Continue Allopurinol 50 mg every other day. Dose changed based on renal function.      H/o B Cell Lymphoma: Stable; remission normal PET scan in 2011. Bone Marrow Bx on 11/27/20: Hypercellular marrow with maturing trilineage hematopoiesis   No morphologic or immunophenotypic evidence of B cell non-Hodgkin's lymphoma   Flow cytometry shows left shifted myeloid maturation with less than 5% blasts   - Follow as OP.      Prostate Cancer: stable; s/p prostatectomy      Depression: Patient with depressed mood yesterday and was started on SSRI. Will do PQH-9 today.    - Continue Sertraline 12.5 mg daily       FOLLOW-UP CARE RECOMMENDATIONS:    APPOINTMENTS:  Follow-up Information     Follow up With Specialties Details Why Contact Claudia Luis NP Nurse Practitioner On 12/16/2020 10:20 am  [address] [facility]  [address] [facility]      Sai Casas NP Nurse Practitioner On 12/14/2020 Virtual appointment at 10:15am, follow up for recent hospitalization for sepsis [id number] [address] [place] [address]  [id number] [phone]      Megan Green MD Hematology and Oncology, Internal Medicine, Hematology, Oncology Schedule an appointment as soon as possible for a visit to follow up regarding history of B cell lymphoma and recent bone marrow biosy results [address] [address]  [facility]  Schedule an appointment as soon as possible for a visit to follow up regarding hematuria (blood in urine) [phone]          It is very important that you keep follow-up appointment(s). Bring discharge papers, medication list (and/or medication bottles) to follow-up appointments for review by outpatient provider(s). FOLLOW-UP TESTS RECOMMENDED:   - Repeat CBC (to re-check WBC and Hgb) and CMP (to re-check K, creatinine)    ONGOING TREATMENT PLAN: Continue antibiotics (Levaquin) and steroids (prednisone) for pneumonia, diuretics (lasix) for heart failure. PENDING TEST RESULTS:  At the time of discharge the following test results are still pending: none. Please review these results as they become available. Specific symptoms to watch for: chest pain, shortness of breath, fever, chills, nausea, vomiting, diarrhea, change in mentation, falling, weakness, bleeding. DIET:  Renal Diet    ACTIVITY:  Activity as tolerated and PT/OT Eval and Treat    WOUND CARE: None needed    EQUIPMENT needed:  Per PT/OT    INCIDENTAL FINDINGS:  Hematuria on urinalysis     GOALS OF CARE:       [x] Eventual return to home/independent/assisted living       [] Long term SNF       [] Hospice    Patient condition at discharge:   Functional status       [] Poor       [x] Deconditioned       [] Independent  Cognition       [x] Lucid       [] Forgetful (Some senescence)       [] Dementia  Catheters/lines (plus indication)       [] Perez       [] PICC           [] PEG       [x] None  Code status       [] Full code       [x] DNR  . . . . . . . . . . . . . . . . . . . . . . . . . . . . . . . . . . . . . . . . . . . . . . . . . . . . . . . . . . . . . . . . . . . . . . . Stefan Stewart      CHRONIC MEDICAL CONDITIONS:  Problem List as of 12/6/2020 Date Reviewed: 12/1/2020          Codes Class Noted - Resolved    BPPV (benign paroxysmal positional vertigo) ICD-10-CM: H81.10  ICD-9-CM: 386.11  12/3/2020 - Present        CHF (congestive heart failure) ([facility] ICD-10-CM: I50.9  ICD-9-CM: 428.0  12/1/2020 - Present        * (Principal) Acute hypoxemic respiratory failure ([facility] ICD-10-CM: J96.01  ICD-9-CM: 518.81  12/1/2020 - Present        Severe sepsis ([facility] ICD-10-CM: A41.9, R65.20  ICD-9-CM: 038.9, 995.92  12/1/2020 - Present        Person under investigation for COVID-19 ICD-10-CM: Z20.828  ICD-9-CM: V01.79  12/1/2020 - Present        HAP (hospital-acquired pneumonia) ICD-10-CM: J18.9, Y95  ICD-9-CM: 499  11/30/2020 - Present        SIRS (systemic inflammatory response syndrome) ([facility] ICD-10-CM: R65.10  ICD-9-CM: 995.90  11/30/2020 - Present        Hypercalcemia ICD-10-CM: E83.52  ICD-9-CM: 275.42  11/23/2020 - Present        History of B-cell lymphoma ICD-10-CM: Z85.72  ICD-9-CM: V10.79  11/22/2020 - Present        Non-Hodgkin's lymphoma in relapse [facility]) ICD-10-CM: C85.90  ICD-9-CM: 202.80  11/22/2020 - Present        Weakness ICD-10-CM: R53.1  ICD-9-CM: 780.79  11/21/2020 - Present        Anemia ICD-10-CM: D64.9  ICD-9-CM: 285.9  11/21/2020 - Present        Opioid use ICD-10-CM: F11.90  ICD-9-CM: 305.50  11/21/2020 - Present        Hypercalcemia of malignancy ICD-10-CM: E83.52  ICD-9-CM: 275.42  11/20/2020 - Present        Cervical stenosis of spinal canal ICD-10-CM: M48.02  ICD-9-CM: 723.0  11/20/2020 - Present        Acute renal failure (ARF) ([facility] ICD-10-CM: N17.9  ICD-9-CM: 584.9  11/20/2020 - Present        Lumbar canal stenosis ICD-10-CM: M48.061  ICD-9-CM: 724.02  11/20/2020 - Present        Frequent falls ICD-10-CM: R29.6  ICD-9-CM: V15.88  11/20/2020 - Present        Multiple falls ICD-10-CM: R29.6  ICD-9-CM: V15.88  11/20/2020 - Present        CRI (chronic renal insufficiency) (Chronic) ICD-10-CM: N18.9  ICD-9-CM: 585.9  12/13/2012 - Present        Lymphoma ([facility] ICD-10-CM: C85.90  ICD-9-CM: 202.80  1/4/2011 - Present        HTN (hypertension) ICD-10-CM: I10  ICD-9-CM: 401.9  1/4/2011 - Present        Prostate CA ([facility] ICD-10-CM: C61  ICD-9-CM: 707  1/4/2011 - Present        Gout ICD-10-CM: M10.9  ICD-9-CM: 274.9  1/4/2011 - Present              Information obtained by :   I understand that if any problems occur once I am at home I am to contact my physician. I understand and acknowledge receipt of the instructions indicated above.                                                                                                                                              Physician's or R.N.'s Signature                                                                  Date/Time                                                                                                                                              Patient or Representative Signature                                                          Date/Time

## 2020-12-07 ENCOUNTER — PATIENT OUTREACH (OUTPATIENT)
Dept: CASE MANAGEMENT | Age: 85
End: 2020-12-07

## 2020-12-07 NOTE — PROGRESS NOTES
Covid/Transition of care outreach postponed for 7 days due to patient's discharge to inpatient rehab facility.

## 2020-12-11 ENCOUNTER — HOSPITAL ENCOUNTER (INPATIENT)
Age: 85
LOS: 10 days | Discharge: NURSING, REHAB OR LONG-TERM CARE | DRG: 871 | End: 2020-12-21
Attending: EMERGENCY MEDICINE | Admitting: HOSPITALIST
Payer: MEDICARE

## 2020-12-11 ENCOUNTER — APPOINTMENT (OUTPATIENT)
Dept: CT IMAGING | Age: 85
DRG: 871 | End: 2020-12-11
Attending: PHYSICIAN ASSISTANT
Payer: MEDICARE

## 2020-12-11 ENCOUNTER — APPOINTMENT (OUTPATIENT)
Dept: GENERAL RADIOLOGY | Age: 85
DRG: 871 | End: 2020-12-11
Attending: PHYSICIAN ASSISTANT
Payer: MEDICARE

## 2020-12-11 ENCOUNTER — APPOINTMENT (OUTPATIENT)
Dept: GENERAL RADIOLOGY | Age: 85
DRG: 871 | End: 2020-12-11
Attending: EMERGENCY MEDICINE
Payer: MEDICARE

## 2020-12-11 DIAGNOSIS — J18.9 PNEUMONIA DUE TO INFECTIOUS ORGANISM, UNSPECIFIED LATERALITY, UNSPECIFIED PART OF LUNG: Primary | ICD-10-CM

## 2020-12-11 DIAGNOSIS — Z20.822 SUSPECTED COVID-19 VIRUS INFECTION: ICD-10-CM

## 2020-12-11 DIAGNOSIS — R09.02 HYPOXIA: ICD-10-CM

## 2020-12-11 DIAGNOSIS — A41.9 SEPSIS, DUE TO UNSPECIFIED ORGANISM, UNSPECIFIED WHETHER ACUTE ORGAN DYSFUNCTION PRESENT (HCC): ICD-10-CM

## 2020-12-11 LAB
ALBUMIN SERPL-MCNC: 2.4 G/DL (ref 3.5–5)
ALBUMIN/GLOB SERPL: 0.7 {RATIO} (ref 1.1–2.2)
ALP SERPL-CCNC: 75 U/L (ref 45–117)
ALT SERPL-CCNC: 26 U/L (ref 12–78)
ANION GAP SERPL CALC-SCNC: 13 MMOL/L (ref 5–15)
APPEARANCE UR: ABNORMAL
APTT PPP: 26.6 SEC (ref 23–35.7)
ARTERIAL PATENCY WRIST A: ABNORMAL
AST SERPL W P-5'-P-CCNC: 59 U/L (ref 15–37)
ATRIAL RATE: 98 BPM
BACTERIA URNS QL MICRO: NEGATIVE /HPF
BASE DEFICIT BLDA-SCNC: 2.4 MMOL/L (ref 0–2)
BASOPHILS # BLD: 0 K/UL (ref 0–0.1)
BASOPHILS NFR BLD: 0 % (ref 0–1)
BDY SITE: ABNORMAL
BILIRUB SERPL-MCNC: 1.3 MG/DL (ref 0.2–1)
BILIRUB UR QL: NEGATIVE
BNP SERPL-MCNC: 8012 PG/ML
BUN SERPL-MCNC: 66 MG/DL (ref 6–20)
BUN/CREAT SERPL: 25 (ref 12–20)
CA-I BLD-MCNC: 9.6 MG/DL (ref 8.5–10.1)
CALCULATED P AXIS, ECG09: 37 DEGREES
CALCULATED R AXIS, ECG10: 27 DEGREES
CALCULATED T AXIS, ECG11: 44 DEGREES
CHLORIDE SERPL-SCNC: 105 MMOL/L (ref 97–108)
CO2 SERPL-SCNC: 26 MMOL/L (ref 21–32)
COLOR UR: ABNORMAL
COVID-19 RAPID TEST, COVR: NOT DETECTED
CREAT SERPL-MCNC: 2.68 MG/DL (ref 0.7–1.3)
DIAGNOSIS, 93000: NORMAL
DIFFERENTIAL METHOD BLD: ABNORMAL
EOSINOPHIL # BLD: 0 K/UL (ref 0–0.4)
EOSINOPHIL NFR BLD: 0 % (ref 0–7)
EPAP/CPAP/PEEP, PAPEEP: 0
ERYTHROCYTE [DISTWIDTH] IN BLOOD BY AUTOMATED COUNT: 17.1 % (ref 11.5–14.5)
FIO2 ON VENT: 100 %
GAS FLOW.O2 O2 DELIVERY SYS: 15 L/MIN
GLOBULIN SER CALC-MCNC: 3.3 G/DL (ref 2–4)
GLUCOSE SERPL-MCNC: 82 MG/DL (ref 65–100)
GLUCOSE UR STRIP.AUTO-MCNC: NEGATIVE MG/DL
HCO3 BLDA-SCNC: 22 MMOL/L (ref 22–26)
HCT VFR BLD AUTO: 36.1 % (ref 36.6–50.3)
HGB BLD-MCNC: 12.1 G/DL (ref 12.1–17)
HGB UR QL STRIP: ABNORMAL
IMM GRANULOCYTES # BLD AUTO: 0 K/UL (ref 0–0.04)
IMM GRANULOCYTES NFR BLD AUTO: 0 % (ref 0–0.5)
INR PPP: 1.3 (ref 0.9–1.1)
IPAP/PIP, IPAPIP: 0
KETONES UR QL STRIP.AUTO: NEGATIVE MG/DL
LACTATE SERPL-SCNC: 4 MMOL/L (ref 0.4–2)
LACTATE SERPL-SCNC: 5.3 MMOL/L (ref 0.4–2)
LEUKOCYTE ESTERASE UR QL STRIP.AUTO: NEGATIVE
LYMPHOCYTES # BLD: 0.3 K/UL (ref 0.8–3.5)
LYMPHOCYTES NFR BLD: 4 % (ref 12–49)
MAGNESIUM SERPL-MCNC: 2 MG/DL (ref 1.6–2.4)
MCH RBC QN AUTO: 30.6 PG (ref 26–34)
MCHC RBC AUTO-ENTMCNC: 33.5 G/DL (ref 30–36.5)
MCV RBC AUTO: 91.4 FL (ref 80–99)
MONOCYTES # BLD: 0.3 K/UL (ref 0–1)
MONOCYTES NFR BLD: 4 % (ref 5–13)
MUCOUS THREADS URNS QL MICRO: ABNORMAL /LPF
NEUTS SEG # BLD: 7.6 K/UL (ref 1.8–8)
NEUTS SEG NFR BLD: 92 % (ref 32–75)
NITRITE UR QL STRIP.AUTO: NEGATIVE
P-R INTERVAL, ECG05: 120 MS
PCO2 BLDA: 32 MMHG (ref 35–45)
PH BLDA: 7.43 [PH] (ref 7.35–7.45)
PH UR STRIP: 5 [PH] (ref 5–8)
PLATELET # BLD AUTO: 193 K/UL (ref 150–400)
PMV BLD AUTO: 11.5 FL (ref 8.9–12.9)
PO2 BLDA: 74 MMHG (ref 75–100)
POTASSIUM SERPL-SCNC: 3.3 MMOL/L (ref 3.5–5.1)
PROCALCITONIN SERPL-MCNC: 6.86 NG/ML
PROT SERPL-MCNC: 5.7 G/DL (ref 6.4–8.2)
PROT UR STRIP-MCNC: NEGATIVE MG/DL
PROTHROMBIN TIME: 16.1 SEC (ref 11.9–14.7)
Q-T INTERVAL, ECG07: 404 MS
QRS DURATION, ECG06: 76 MS
QTC CALCULATION (BEZET), ECG08: 515 MS
RBC # BLD AUTO: 3.95 M/UL (ref 4.1–5.7)
RBC #/AREA URNS HPF: ABNORMAL /HPF (ref 0–5)
SAO2 % BLD: 95 %
SARS-COV-2, COV2: NORMAL
SODIUM SERPL-SCNC: 144 MMOL/L (ref 136–145)
SP GR UR REFRACTOMETRY: 1.01 (ref 1–1.03)
SPECIMEN SOURCE: NORMAL
THERAPEUTIC RANGE,PTTT: NORMAL SEC (ref 68–109)
TROPONIN I SERPL-MCNC: 0.07 NG/ML
TROPONIN I SERPL-MCNC: 0.12 NG/ML
UA: UC IF INDICATED,UAUC: ABNORMAL
UROBILINOGEN UR QL STRIP.AUTO: 0.1 EU/DL (ref 0.1–1)
VENTILATION MODE VENT: ABNORMAL
VENTRICULAR RATE, ECG03: 98 BPM
WBC # BLD AUTO: 8.2 K/UL (ref 4.1–11.1)
WBC URNS QL MICRO: ABNORMAL /HPF (ref 0–4)

## 2020-12-11 PROCEDURE — 93005 ELECTROCARDIOGRAM TRACING: CPT

## 2020-12-11 PROCEDURE — 87641 MR-STAPH DNA AMP PROBE: CPT

## 2020-12-11 PROCEDURE — 85730 THROMBOPLASTIN TIME PARTIAL: CPT

## 2020-12-11 PROCEDURE — 71250 CT THORAX DX C-: CPT

## 2020-12-11 PROCEDURE — 5A09357 ASSISTANCE WITH RESPIRATORY VENTILATION, LESS THAN 24 CONSECUTIVE HOURS, CONTINUOUS POSITIVE AIRWAY PRESSURE: ICD-10-PCS | Performed by: HOSPITALIST

## 2020-12-11 PROCEDURE — 65610000006 HC RM INTENSIVE CARE

## 2020-12-11 PROCEDURE — 94660 CPAP INITIATION&MGMT: CPT

## 2020-12-11 PROCEDURE — 80053 COMPREHEN METABOLIC PANEL: CPT

## 2020-12-11 PROCEDURE — 74011250636 HC RX REV CODE- 250/636: Performed by: PHYSICIAN ASSISTANT

## 2020-12-11 PROCEDURE — 85025 COMPLETE CBC W/AUTO DIFF WBC: CPT

## 2020-12-11 PROCEDURE — 71045 X-RAY EXAM CHEST 1 VIEW: CPT

## 2020-12-11 PROCEDURE — 74011000250 HC RX REV CODE- 250: Performed by: INTERNAL MEDICINE

## 2020-12-11 PROCEDURE — 87040 BLOOD CULTURE FOR BACTERIA: CPT

## 2020-12-11 PROCEDURE — 83605 ASSAY OF LACTIC ACID: CPT

## 2020-12-11 PROCEDURE — 84484 ASSAY OF TROPONIN QUANT: CPT

## 2020-12-11 PROCEDURE — 74011250637 HC RX REV CODE- 250/637: Performed by: EMERGENCY MEDICINE

## 2020-12-11 PROCEDURE — 74011000250 HC RX REV CODE- 250: Performed by: PHYSICIAN ASSISTANT

## 2020-12-11 PROCEDURE — 82803 BLOOD GASES ANY COMBINATION: CPT

## 2020-12-11 PROCEDURE — 74011250636 HC RX REV CODE- 250/636: Performed by: EMERGENCY MEDICINE

## 2020-12-11 PROCEDURE — 83880 ASSAY OF NATRIURETIC PEPTIDE: CPT

## 2020-12-11 PROCEDURE — 74011000250 HC RX REV CODE- 250: Performed by: EMERGENCY MEDICINE

## 2020-12-11 PROCEDURE — 81001 URINALYSIS AUTO W/SCOPE: CPT

## 2020-12-11 PROCEDURE — 99285 EMERGENCY DEPT VISIT HI MDM: CPT

## 2020-12-11 PROCEDURE — 84145 PROCALCITONIN (PCT): CPT

## 2020-12-11 PROCEDURE — 85610 PROTHROMBIN TIME: CPT

## 2020-12-11 PROCEDURE — 02HV33Z INSERTION OF INFUSION DEVICE INTO SUPERIOR VENA CAVA, PERCUTANEOUS APPROACH: ICD-10-PCS | Performed by: PHYSICIAN ASSISTANT

## 2020-12-11 PROCEDURE — 83735 ASSAY OF MAGNESIUM: CPT

## 2020-12-11 PROCEDURE — 87635 SARS-COV-2 COVID-19 AMP PRB: CPT

## 2020-12-11 PROCEDURE — 87077 CULTURE AEROBIC IDENTIFY: CPT

## 2020-12-11 PROCEDURE — 96374 THER/PROPH/DIAG INJ IV PUSH: CPT

## 2020-12-11 PROCEDURE — 87186 SC STD MICRODIL/AGAR DIL: CPT

## 2020-12-11 PROCEDURE — 96361 HYDRATE IV INFUSION ADD-ON: CPT

## 2020-12-11 PROCEDURE — 96375 TX/PRO/DX INJ NEW DRUG ADDON: CPT

## 2020-12-11 RX ORDER — ONDANSETRON 2 MG/ML
4 INJECTION INTRAMUSCULAR; INTRAVENOUS
Status: DISCONTINUED | OUTPATIENT
Start: 2020-12-11 | End: 2020-12-21 | Stop reason: HOSPADM

## 2020-12-11 RX ORDER — POLYETHYLENE GLYCOL 3350 17 G/17G
17 POWDER, FOR SOLUTION ORAL DAILY PRN
Status: DISCONTINUED | OUTPATIENT
Start: 2020-12-11 | End: 2020-12-18

## 2020-12-11 RX ORDER — SODIUM CHLORIDE 0.9 % (FLUSH) 0.9 %
5-40 SYRINGE (ML) INJECTION EVERY 8 HOURS
Status: DISCONTINUED | OUTPATIENT
Start: 2020-12-11 | End: 2020-12-21 | Stop reason: HOSPADM

## 2020-12-11 RX ORDER — HYDROCORTISONE SODIUM SUCCINATE 100 MG/2ML
50 INJECTION, POWDER, FOR SOLUTION INTRAMUSCULAR; INTRAVENOUS EVERY 6 HOURS
Status: DISCONTINUED | OUTPATIENT
Start: 2020-12-11 | End: 2020-12-13

## 2020-12-11 RX ORDER — IPRATROPIUM BROMIDE AND ALBUTEROL SULFATE 2.5; .5 MG/3ML; MG/3ML
3 SOLUTION RESPIRATORY (INHALATION)
Status: DISCONTINUED | OUTPATIENT
Start: 2020-12-11 | End: 2020-12-21 | Stop reason: HOSPADM

## 2020-12-11 RX ORDER — PROMETHAZINE HYDROCHLORIDE 25 MG/1
12.5 TABLET ORAL
Status: DISCONTINUED | OUTPATIENT
Start: 2020-12-11 | End: 2020-12-18

## 2020-12-11 RX ORDER — SODIUM CHLORIDE 0.9 % (FLUSH) 0.9 %
5-40 SYRINGE (ML) INJECTION AS NEEDED
Status: DISCONTINUED | OUTPATIENT
Start: 2020-12-11 | End: 2020-12-21 | Stop reason: HOSPADM

## 2020-12-11 RX ORDER — HEPARIN SODIUM 5000 [USP'U]/ML
5000 INJECTION, SOLUTION INTRAVENOUS; SUBCUTANEOUS EVERY 12 HOURS
Status: DISCONTINUED | OUTPATIENT
Start: 2020-12-11 | End: 2020-12-16

## 2020-12-11 RX ORDER — ASPIRIN 325 MG
325 TABLET ORAL
Status: COMPLETED | OUTPATIENT
Start: 2020-12-11 | End: 2020-12-11

## 2020-12-11 RX ORDER — NOREPINEPHRINE BITARTRATE/D5W 8 MG/250ML
.5-3 PLASTIC BAG, INJECTION (ML) INTRAVENOUS
Status: DISCONTINUED | OUTPATIENT
Start: 2020-12-11 | End: 2020-12-14

## 2020-12-11 RX ORDER — HYDROCORTISONE SODIUM SUCCINATE 100 MG/2ML
100 INJECTION, POWDER, FOR SOLUTION INTRAMUSCULAR; INTRAVENOUS
Status: COMPLETED | OUTPATIENT
Start: 2020-12-11 | End: 2020-12-11

## 2020-12-11 RX ORDER — SERTRALINE HYDROCHLORIDE 25 MG/1
12.5 TABLET, FILM COATED ORAL DAILY
Status: CANCELLED | OUTPATIENT
Start: 2020-12-12

## 2020-12-11 RX ORDER — ACETAMINOPHEN 650 MG/1
650 SUPPOSITORY RECTAL
Status: DISCONTINUED | OUTPATIENT
Start: 2020-12-11 | End: 2020-12-18

## 2020-12-11 RX ORDER — FUROSEMIDE 10 MG/ML
40 INJECTION INTRAMUSCULAR; INTRAVENOUS ONCE
Status: DISPENSED | OUTPATIENT
Start: 2020-12-11 | End: 2020-12-12

## 2020-12-11 RX ORDER — NOREPINEPHRINE BITARTRATE/D5W 8 MG/250ML
2 PLASTIC BAG, INJECTION (ML) INTRAVENOUS
Status: DISCONTINUED | OUTPATIENT
Start: 2020-12-11 | End: 2020-12-11

## 2020-12-11 RX ORDER — ACETAMINOPHEN 325 MG/1
650 TABLET ORAL
Status: DISCONTINUED | OUTPATIENT
Start: 2020-12-11 | End: 2020-12-21 | Stop reason: HOSPADM

## 2020-12-11 RX ADMIN — Medication 10 ML: at 21:41

## 2020-12-11 RX ADMIN — VANCOMYCIN HYDROCHLORIDE 1000 MG: 1 INJECTION, POWDER, LYOPHILIZED, FOR SOLUTION INTRAVENOUS at 14:08

## 2020-12-11 RX ADMIN — HYDROCORTISONE SODIUM SUCCINATE 100 MG: 100 INJECTION, POWDER, FOR SOLUTION INTRAMUSCULAR; INTRAVENOUS at 14:58

## 2020-12-11 RX ADMIN — SODIUM CHLORIDE 1000 ML: 9 INJECTION, SOLUTION INTRAVENOUS at 13:01

## 2020-12-11 RX ADMIN — SODIUM CHLORIDE 1000 ML: 9 INJECTION, SOLUTION INTRAVENOUS at 14:35

## 2020-12-11 RX ADMIN — FAMOTIDINE 20 MG: 10 INJECTION INTRAVENOUS at 21:39

## 2020-12-11 RX ADMIN — Medication 2 MCG/MIN: at 15:57

## 2020-12-11 RX ADMIN — Medication 10 ML: at 15:56

## 2020-12-11 RX ADMIN — MEROPENEM 1 G: 1 INJECTION, POWDER, FOR SOLUTION INTRAVENOUS at 12:55

## 2020-12-11 RX ADMIN — MEROPENEM 1 G: 1 INJECTION, POWDER, FOR SOLUTION INTRAVENOUS at 21:40

## 2020-12-11 RX ADMIN — ASPIRIN 325 MG ORAL TABLET 325 MG: 325 PILL ORAL at 14:18

## 2020-12-11 RX ADMIN — AZITHROMYCIN MONOHYDRATE 500 MG: 500 INJECTION, POWDER, LYOPHILIZED, FOR SOLUTION INTRAVENOUS at 12:30

## 2020-12-11 RX ADMIN — SODIUM CHLORIDE 1000 ML: 9 INJECTION, SOLUTION INTRAVENOUS at 11:37

## 2020-12-11 RX ADMIN — HYDROCORTISONE SODIUM SUCCINATE 50 MG: 100 INJECTION, POWDER, FOR SOLUTION INTRAMUSCULAR; INTRAVENOUS at 21:39

## 2020-12-11 NOTE — ED NOTES
[phone] Paged Dr. Harini Arreguin regarding pt O2 status and fluctuating blood pressure and what Dr. Harini Arreguin advises to do. Dr. Harini Arreguin stated that he/or Carolyn Schuler will be down to evaluate pt soon.

## 2020-12-11 NOTE — PROGRESS NOTES
Consult for Vancomycin Dosing by Pharmacy by Gretchen Phillips. Consult provided for this 80y.o. year old male , for indication of Sepsis. Day of Therapy: 1  Goal of Level(s): 15-20mcg/dL    Other Current Antibiotics: Meropenem    Serum Creatinine Creatinine   Date Value Ref Range Status   12/11/2020 2.68 (H) 0.70 - 1.30 mg/dL Final   12/06/2020 1.61 (H) 0.70 - 1.30 MG/DL Final   12/04/2020 1.79 (H) 0.70 - 1.30 MG/DL Final      Creatinine Clearance CrCl cannot be calculated (Unknown ideal weight.). BUN Lab Results   Component Value Date/Time    BUN 66 (H) 12/11/2020 11:44 AM      WBC Lab Results   Component Value Date/Time    WBC 8.2 12/11/2020 11:44 AM      Temp 98.5 °F (36.9 °C)     Last Level: No results found for: VANCT   Vancomycin, random   Date Value Ref Range Status   12/02/2020 17.3 UG/ML Final     Comment:     No reference range has been established. Ht Readings from Last 1 Encounters:   12/03/20 160 cm (63\")        Wt Readings from Last 1 Encounters:   12/05/20 64.3 kg (141 lb 12.1 oz)       New Regimen:   Patient received a one-time dose of 1000 mg at 1408. Patient is currently experiencing acute renal failure. Will pulse dose until renal function improves. Random level ordered for tomorrow 12/12 in the AM.    Pharmacy to follow daily and will make changes to dose and/or frequency based on clinical status.     _________________________________     Pharmacist Anam Sanches, YANGD

## 2020-12-11 NOTE — Clinical Note
1% lidocaine administered to L pleural region using ultrasound guidance, one step needle inserted to L pleural region using ultrasound guidance, draining clear serous pleural fluid, specimens collected. One step catheter removed, manual pressure held at site, total amt drained 200ml. Specimens taken to lab. Bandaid applied, dry and intact.

## 2020-12-11 NOTE — ROUTINE PROCESS
TRANSFER - OUT REPORT:    Verbal report given to [facility], RN(name) on Neil Simmons  being transferred to CVICU(unit) for routine progression of care       Report consisted of patients Situation, Background, Assessment and   Recommendations(SBAR). Information from the following report(s) SBAR, Kardex, Intake/Output, MAR and Recent Results was reviewed with the receiving nurse. Lines:   Triple Lumen triple lumen 12/11/20 Anterior; Left Neck (Active)       Peripheral IV 12/11/20 Left Antecubital (Active)       Peripheral IV 12/11/20 Right Antecubital (Active)        Opportunity for questions and clarification was provided.       Patient transported with:   Monitor  O2 @ bipap liters, pt belongings and chart

## 2020-12-11 NOTE — H&P
History and Physical    Patient: Lashay Cage MRN: [id number]  SSN: [id number]    YOB: 1932  Age: 80 y.o. Sex: male      Subjective:      Lashay Cage is a 80 y.o. male who presents emergency department with acute respiratory failure with hypoxia and hypotension, SPO2 of 88% on 100% nonrebreather. Patient was discharged 4 days ago from healthcare facility due to recurrent pneumonia. At that facility was treated with Levaquin and Flagyl. Patient has had recurrent pneumonia hospitalizations according to the family. Chest x-ray reveals a left lower lobe pneumonia although does not appear to be significant enough for this presentation. Procalcitonin is greater than 6. Patient with acute kidney injury with a history of chronic kidney disease, stage III. Patient has been given meropenem in the emergency department. Patient has profound lactic acidosis with a lactic acid of greater than 5. Has received 2 L of IV fluids. Long discussion with family, daughter via phone and patient is declared a DNR. At this point family would like to continue with conservative treatments to include noninvasive positive ventilation, BiPAP if necessary. .     Past Medical History:   Diagnosis Date    CRI (chronic renal insufficiency) 12/13/2012    Gout 1/4/2011    Hypertension     Lymphoma ([facility]     Prostate CA ([facility] 1/4/2011     Past Surgical History:   Procedure Laterality Date    ENDOSCOPY, COLON, DIAGNOSTIC  2003    neg    HC BONE MARROW BIOPSY      HX PROSTATECTOMY        Family History   Problem Relation Age of Onset    Hypertension Mother      Social History     Tobacco Use    Smoking status: Never Smoker    Smokeless tobacco: Never Used   Substance Use Topics    Alcohol use: No      Prior to Admission medications    Medication Sig Start Date End Date Taking? Authorizing Provider   allopurinoL (ZYLOPRIM) 100 mg tablet Take 0.5 Tabs by mouth every other day.  12/8/20  Yes Avtar Schreiber MD meclizine (ANTIVERT) 12.5 mg tablet Take 1 Tab by mouth two (2) times a day for 10 days. 12/6/20 12/16/20 Yes Danny Cloud MD   sertraline (ZOLOFT) 25 mg tablet Take 0.5 Tabs by mouth daily. 12/6/20  Yes Danny Cloud MD   furosemide (LASIX) 40 mg tablet Take 1 tb daily 12/6/20  Yes Gustabo Evans MD   vitamin e (E GEMS) 100 unit capsule Take 100 Units by mouth daily. Yes Provider, Historical   multivitamin (ONE A DAY) tablet Take 1 Tab by mouth daily. Yes Provider, Historical   atenoloL (TENORMIN) 50 mg tablet Take 1 Tab by mouth daily. 9/9/20  Yes Fili Alcaraz MD        Allergies   Allergen Reactions    Pcn [Penicillins] Swelling       Review of Systems:  Review of Systems   Constitutional: Positive for malaise/fatigue. Negative for fever. HENT: Negative. Respiratory: Positive for cough and shortness of breath. Negative for wheezing. Cardiovascular: Positive for orthopnea. Negative for chest pain and leg swelling. Gastrointestinal: Negative for abdominal pain, nausea and vomiting. Genitourinary: Negative for flank pain and hematuria. Musculoskeletal: Negative. Skin: Negative. Neurological: Negative for dizziness and headaches. Psychiatric/Behavioral: Negative for depression. Objective:     Recent Results (from the past 24 hour(s))   CBC WITH AUTOMATED DIFF    Collection Time: 12/11/20 11:44 AM   Result Value Ref Range    WBC 8.2 4.1 - 11.1 K/uL    RBC 3.95 (L) 4.10 - 5.70 M/uL    HGB 12.1 12.1 - 17.0 g/dL    HCT 36.1 (L) 36.6 - 50.3 %    MCV 91.4 80.0 - 99.0 FL    MCH 30.6 26.0 - 34.0 PG    MCHC 33.5 30.0 - 36.5 g/dL    RDW 17.1 (H) 11.5 - 14.5 %    PLATELET 935 254 - 906 K/uL    MPV 11.5 8.9 - 12.9 FL    NEUTROPHILS 92 (H) 32 - 75 %    LYMPHOCYTES 4 (L) 12 - 49 %    MONOCYTES 4 (L) 5 - 13 %    EOSINOPHILS 0 0 - 7 %    BASOPHILS 0 0 - 1 %    IMMATURE GRANULOCYTES 0 0.0 - 0.5 %    ABS. NEUTROPHILS 7.6 1.8 - 8.0 K/UL    ABS.  LYMPHOCYTES 0.3 (L) 0.8 - 3.5 K/UL    ABS. MONOCYTES 0.3 0.0 - 1.0 K/UL    ABS. EOSINOPHILS 0.0 0.0 - 0.4 K/UL    ABS. BASOPHILS 0.0 0.0 - 0.1 K/UL    ABS. IMM. GRANS. 0.0 0.00 - 0.04 K/UL    DF AUTOMATED     METABOLIC PANEL, COMPREHENSIVE    Collection Time: 12/11/20 11:44 AM   Result Value Ref Range    Sodium 144 136 - 145 mmol/L    Potassium 3.3 (L) 3.5 - 5.1 mmol/L    Chloride 105 97 - 108 mmol/L    CO2 26 21 - 32 mmol/L    Anion gap 13 5 - 15 mmol/L    Glucose 82 65 - 100 mg/dL    BUN 66 (H) 6 - 20 mg/dL    Creatinine 2.68 (H) 0.70 - 1.30 mg/dL    BUN/Creatinine ratio 25 (H) 12 - 20      GFR est AA 27 (L) >60 ml/min/1.73m2    GFR est non-AA 23 (L) >60 ml/min/1.73m2    Calcium 9.6 8.5 - 10.1 mg/dL    Bilirubin, total 1.3 (H) 0.2 - 1.0 mg/dL    AST (SGOT) 59 (H) 15 - 37 U/L    ALT (SGPT) 26 12 - 78 U/L    Alk.  phosphatase 75 45 - 117 U/L    Protein, total 5.7 (L) 6.4 - 8.2 g/dL    Albumin 2.4 (L) 3.5 - 5.0 g/dL    Globulin 3.3 2.0 - 4.0 g/dL    A-G Ratio 0.7 (L) 1.1 - 2.2     TROPONIN I    Collection Time: 12/11/20 11:44 AM   Result Value Ref Range    Troponin-I, Qt. 0.07 (H) <0.05 ng/mL   BNP    Collection Time: 12/11/20 11:44 AM   Result Value Ref Range    NT pro-BNP 8,012 (H) <450 pg/mL   PROTHROMBIN TIME + INR    Collection Time: 12/11/20 11:44 AM   Result Value Ref Range    Prothrombin time 16.1 (H) 11.9 - 14.7 sec    INR 1.3 (H) 0.9 - 1.1     PTT    Collection Time: 12/11/20 11:44 AM   Result Value Ref Range    aPTT 26.6 23.0 - 35.7 sec    aPTT, therapeutic range   68 - 109 sec   PROCALCITONIN    Collection Time: 12/11/20 11:44 AM   Result Value Ref Range    Procalcitonin 6.86 (H) 0 ng/mL   LACTIC ACID    Collection Time: 12/11/20 11:44 AM   Result Value Ref Range    Lactic acid 5.3 (HH) 0.4 - 2.0 mmol/L   MAGNESIUM    Collection Time: 12/11/20 11:44 AM   Result Value Ref Range    Magnesium 2.0 1.6 - 2.4 mg/dL   BLOOD GAS, ARTERIAL    Collection Time: 12/11/20  1:00 PM   Result Value Ref Range    pH 7.43 7.35 - 7.45      PCO2 32 (L) 35 - 45 mmHg    PO2 74 (L) 75 - 100 mmHg    O2 SAT 95 (L) >95 %    BICARBONATE 22 22 - 26 mmol/L    BASE DEFICIT 2.4 (H) 0 - 2 mmol/L    O2 METHOD PENDING     O2 FLOW RATE 15.0 L/min    FIO2 100.0 %    MODE NRB mask      Tidal volume PENDING     PRESSURE SUPPORT PENDING     IPAP/PIP 0      EPAP/CPAP/PEEP 0      SITE Right Radial      NEW'S TEST PASS          XR CHEST SNGL V   Final Result           Vitals:    12/11/20 1132 12/11/20 1243 12/11/20 1414   BP: (!) 86/50 (!) 105/48 (!) 86/62   Pulse: (!) 106 98 95   Resp: (!) 36 (!) 35    Temp: 98.5 °F (36.9 °C)     SpO2:  92% 90%        Physical Exam:  Physical Exam  Vitals signs reviewed. Constitutional:       General: He is in acute distress. Appearance: He is ill-appearing. HENT:      Head: Normocephalic and atraumatic. Nose: Nose normal.   Eyes:      Conjunctiva/sclera: Conjunctivae normal.   Neck:      Musculoskeletal: Normal range of motion and neck supple. Cardiovascular:      Rate and Rhythm: Tachycardia present. Heart sounds: Normal heart sounds. Pulmonary:      Comments: Diminished breath sounds bilaterally with crackles in the left base  Abdominal:      General: Abdomen is flat. Bowel sounds are normal.      Palpations: Abdomen is soft. Musculoskeletal: Normal range of motion. Skin:     General: Skin is warm and dry. Neurological:      General: No focal deficit present. Mental Status: He is alert and oriented to person, place, and time.    Psychiatric:         Mood and Affect: Mood normal.         Assessment:     Hospital Problems  Date Reviewed: 12/1/2020          Codes Class Noted POA    Severe sepsis ([facility] ICD-10-CM: A41.9, R65.20  ICD-9-CM: 038.9, 995.92  12/1/2020 Yes        HAP (hospital-acquired pneumonia) ICD-10-CM: J18.9, Y95  ICD-9-CM: 155  11/30/2020 Yes        Cervical stenosis of spinal canal ICD-10-CM: M48.02  ICD-9-CM: 723.0  11/20/2020 Yes        Acute renal failure (ARF) ([facility]) ICD-10-CM: N17.9  ICD-9-CM: 584.9 11/20/2020 Yes        CRI (chronic renal insufficiency) (Chronic) ICD-10-CM: N18.9  ICD-9-CM: 585.9  12/13/2012 Yes        HTN (hypertension) ICD-10-CM: I10  ICD-9-CM: 401.9  1/4/2011 Yes            Impression:    1. Severe sepsis  2. Acute respiratory failure with hypoxia  3. Hypotension  4. Acute on chronic renal failure  5. Hospital-acquired pneumonia  6. Lactic acidosis  7. History of lymphoma    Plan:     Plan:    1. Severe sepsis  Most likely etiology is respiratory  Penicillin allergy and will consider proceed with meropenem as it was already given in the emergency department  Cultures from previous hospitalization revealed gram-positive cocci in the sputum  Continue vancomycin  Severe sepsis IV fluid protocol  Norepinephrine due to septic shock  Urinalysis and urine culture pending  Blood culture pending  Hydrocortisone x1  Central line placement for vasopressors    2. Acute respiratory failure with hypoxia with associated hospital-acquired pneumonia  Continue vancomycin and meropenem  RT consult for high flow oxygen\BiPAP  Patient is DNR but will accept noninvasive positive pressure  Pulmonary consultation  ABG with a PO2 of 74 while on 100% nonrebreather  No obvious history of reactive airway disease  Duonebs as needed    3. Acute on chronic kidney disease  Baseline creatinine appears to be proxy 1.61  Current creatinine is 2.6 a consistent with dehydration  Patient has received aggressive IV hydration  We will continue to monitor with serial laboratory data    4. Lactic acidosis  Trend every 4 hours, has received aggressive hydration    5. History of lymphoma  Appears to be stable although lobulated spleen on CT at previous hospitalization  Will need further evaluation with ultrasound    6. Troponin elevation, non-ST elevation MI  Troponin currently 0.7 and trending    7.   Acute systolic heart failure with a previous EF of 60-65% and grade 1 diastolic dysfunction  Cardiology consultation  Repeat echo    CODE STATUS: DNR    DVT prophylaxis Heparin  Ulcer prophylaxis: Famotidine    Critical care time: 90 minutes combined with TLC insertion    Signed By: Asda Cotton PA-C     December 11, 2020

## 2020-12-11 NOTE — PROCEDURES
Procedure: Triple-lumen catheter insertion    Indication: Poor venous access and requirement for vasopressor    Medications: Lidocaine 1%    Consent for procedure obtained by Daughter via phone and patient verbally    Procedure note:    Using sterile technique and 1% lidocaine for local anesthesia the left internal jugular vein was percutaneously punctured under ultrasound guidance. Utilizing Seldinger technique, a guidewire was placed. The subcutaneous tunnel was percutaneously and progressively dilated. A triple-lumen catheter was placed over the guidewire and inserted into the SVC right atrial junction. Good blood flow was aspirated without difficulty. Catheter was secured in place. Complications none. Chest x-ray revealed no acute complications post procedure. Plan:    Catheter is ready for immediate use.

## 2020-12-11 NOTE — CONSULTS
[facility] CARDIOLOGY  CARDIOLOGY CONSULTATION    REASON FOR CONSULT:  Shortness of breath    CHIEF COMPLAINT:  Shortness of breath    HISTORY OF PRESENT ILLNESS:  Mr. Charles Christianson is an 80year-old male with past medical history significant for recent hospitalization for pneumonia, heart failure, gout, CKD stage III, and hypertesion who presents today for evaluation of shortness of breath. He was recently hospitalized at [facility] [place] for pneumonia. He is hypotensive, septic with elevated lactic acid levels and a COVID rule out. Labs and Imaging reviewed:  Troponin 0.07, BNP 8, 012, Lactic acid 4, potassium 3.3, CXR with airspace opacity in LLL. EKG and echocardiogram pending. On examination he has increase work of breathing on high flow NC 15L, 85%. He endorses cough, and shortness of breath. Denies chest pain or swelling. REVIEW OF SYSTEMS:  Per HPI above    Telemetry reviewed      Physical examination:  Vital signs are stable, Blood pressure 86/62,  Pulse 75  Increased work of breathing on high flow NC 15L  Heart is regular, rate and rhythm. Normal S1, S2, no murmurs are appreciated. Lungs with crackles L>R. Abdomen is soft, nontender, normal bowel sounds. Extremities have no edema. Recent labs results and imaging reviewed. Discussed case with Dr. David Mendoza. This our impression and recommendation:    1. Pneumonia: Noted on CXR. Treament per primary. 2. Heart failure: BNP elevated but has decreased since last hospitalization. Recent echocardiogram shows preserved EF 55-60%. Will repeat limited echocardiogram to assess for  IVC dilation. Maintain optimal ventilation, consider BiPAP. Place on cardiac monitor. Keep potassium between 4-5 and magnesium level > 2. Strict I&Os, and daily weights. 3. Hypertension: Hypotensive currently. Will start on Levophed for support. Likely due to sepsis. Will continue to monitor. Thank you for involving us in the care of this patient.   Please do not hesitate to call if additional questions arise.

## 2020-12-12 ENCOUNTER — APPOINTMENT (OUTPATIENT)
Dept: NON INVASIVE DIAGNOSTICS | Age: 85
DRG: 871 | End: 2020-12-12
Attending: PHYSICIAN ASSISTANT
Payer: MEDICARE

## 2020-12-12 LAB
ALBUMIN SERPL-MCNC: 1.8 G/DL (ref 3.5–5)
ALBUMIN/GLOB SERPL: 0.6 {RATIO} (ref 1.1–2.2)
ALP SERPL-CCNC: 77 U/L (ref 45–117)
ALT SERPL-CCNC: 21 U/L (ref 12–78)
ANION GAP SERPL CALC-SCNC: 9 MMOL/L (ref 5–15)
AST SERPL W P-5'-P-CCNC: 48 U/L (ref 15–37)
BASOPHILS # BLD: 0 K/UL (ref 0–0.1)
BASOPHILS NFR BLD: 0 % (ref 0–1)
BILIRUB SERPL-MCNC: 1 MG/DL (ref 0.2–1)
BUN SERPL-MCNC: 66 MG/DL (ref 6–20)
BUN/CREAT SERPL: 25 (ref 12–20)
CA-I BLD-MCNC: 8.5 MG/DL (ref 8.5–10.1)
CHLORIDE SERPL-SCNC: 112 MMOL/L (ref 97–108)
CO2 SERPL-SCNC: 24 MMOL/L (ref 21–32)
CREAT SERPL-MCNC: 2.67 MG/DL (ref 0.7–1.3)
DATE LAST DOSE: 0
DIFFERENTIAL METHOD BLD: ABNORMAL
ECHO AO ROOT DIAM: 3.2 CM
ECHO AR MAX VEL PISA: 261 CM/S
ECHO AV PEAK GRADIENT: 5 MMHG
ECHO AV REGURGITANT PHT: 703 MS
ECHO EST RA PRESSURE: 3 MMHG
ECHO LA TO AORTIC ROOT RATIO: 1.13
ECHO LV E' SEPTAL VELOCITY: 5.75 CM/S
ECHO LV EDV A2C: 57.5 CM3
ECHO LV EJECTION FRACTION BIPLANE: 64.2 % (ref 55–100)
ECHO LV ESV A2C: 16.2 CM3
ECHO LV INTERNAL DIMENSION DIASTOLIC: 3.86 CM (ref 4.2–5.9)
ECHO LV INTERNAL DIMENSION SYSTOLIC: 2.53 CM
ECHO LV IVSD: 1.08 CM (ref 0.6–1)
ECHO LV MASS 2D: 127.1 G (ref 88–224)
ECHO LV MASS INDEX 2D: 76.1 G/M2 (ref 49–115)
ECHO LV POSTERIOR WALL DIASTOLIC: 1 CM (ref 0.6–1)
ECHO LVOT PEAK GRADIENT: 3 MMHG
ECHO MV A VELOCITY: 83.9 CM/S
ECHO MV AREA PHT: 3.14 CM2
ECHO MV E DECELERATION TIME (DT): 370 MS
ECHO MV E VELOCITY: 80.8 CM/S
ECHO MV E/A RATIO: 0.96
ECHO MV E/E' SEPTAL: 14.05
ECHO MV PRESSURE HALF TIME (PHT): 70 MS
ECHO PV PEAK INSTANTANEOUS GRADIENT SYSTOLIC: 3 MMHG
ECHO PV PEAK INSTANTANEOUS GRADIENT SYSTOLIC: 8 MMHG
ECHO PV REGURGITANT MAX VELOCITY: 110 CM/S
ECHO PV REGURGITANT MAX VELOCITY: 137 CM/S
ECHO PV REGURGITANT MAX VELOCITY: 496 CM/S
ECHO PV REGURGITANT MAX VELOCITY: 82.7 CM/S
ECHO PV REGURGITANT MAX VELOCITY: 87.5 CM/S
ECHO RIGHT VENTRICULAR SYSTOLIC PRESSURE (RVSP): 32 MMHG
ECHO RV INTERNAL DIMENSION: 3.91 CM
ECHO TV MAX VELOCITY: 271 CM/S
ECHO TV MAX VELOCITY: 271 CM/S
ECHO TV REGURGITANT PEAK GRADIENT: 29 MMHG
EOSINOPHIL # BLD: 0 K/UL (ref 0–0.4)
EOSINOPHIL NFR BLD: 0 % (ref 0–7)
ERYTHROCYTE [DISTWIDTH] IN BLOOD BY AUTOMATED COUNT: 17.6 % (ref 11.5–14.5)
GLOBULIN SER CALC-MCNC: 3 G/DL (ref 2–4)
GLUCOSE SERPL-MCNC: 126 MG/DL (ref 65–100)
HCT VFR BLD AUTO: 31.2 % (ref 36.6–50.3)
HGB BLD-MCNC: 10.6 G/DL (ref 12.1–17)
IMM GRANULOCYTES # BLD AUTO: 0.5 K/UL (ref 0–0.04)
IMM GRANULOCYTES NFR BLD AUTO: 3 % (ref 0–0.5)
LACTATE SERPL-SCNC: 2.5 MMOL/L (ref 0.4–2)
LYMPHOCYTES # BLD: 0.4 K/UL (ref 0.8–3.5)
LYMPHOCYTES NFR BLD: 2 % (ref 12–49)
MCH RBC QN AUTO: 30.7 PG (ref 26–34)
MCHC RBC AUTO-ENTMCNC: 34 G/DL (ref 30–36.5)
MCV RBC AUTO: 90.4 FL (ref 80–99)
MONOCYTES # BLD: 0.5 K/UL (ref 0–1)
MONOCYTES NFR BLD: 3 % (ref 5–13)
MRSA DNA SPEC QL NAA+PROBE: DETECTED
MV DEC SLOPE: 3610 MM/S2
MV DEC SLOPE: 3610 MM/S2
NEUTS SEG # BLD: 16.3 K/UL (ref 1.8–8)
NEUTS SEG NFR BLD: 92 % (ref 32–75)
PLATELET # BLD AUTO: 184 K/UL (ref 150–400)
PMV BLD AUTO: 12 FL (ref 8.9–12.9)
POTASSIUM SERPL-SCNC: 3.6 MMOL/L (ref 3.5–5.1)
PROT SERPL-MCNC: 4.8 G/DL (ref 6.4–8.2)
RBC # BLD AUTO: 3.45 M/UL (ref 4.1–5.7)
RBC MORPH BLD: ABNORMAL
REPORTED DOSE,DOSE: 0 UNITS
SODIUM SERPL-SCNC: 145 MMOL/L (ref 136–145)
TROPONIN I SERPL-MCNC: 0.1 NG/ML
VANCOMYCIN SERPL-MCNC: 12 UG/ML
WBC # BLD AUTO: 17.7 K/UL (ref 4.1–11.1)

## 2020-12-12 PROCEDURE — 93306 TTE W/DOPPLER COMPLETE: CPT

## 2020-12-12 PROCEDURE — 74011250636 HC RX REV CODE- 250/636: Performed by: EMERGENCY MEDICINE

## 2020-12-12 PROCEDURE — 74011250637 HC RX REV CODE- 250/637: Performed by: INTERNAL MEDICINE

## 2020-12-12 PROCEDURE — 74011000258 HC RX REV CODE- 258: Performed by: PHYSICIAN ASSISTANT

## 2020-12-12 PROCEDURE — 74011250636 HC RX REV CODE- 250/636: Performed by: PHYSICIAN ASSISTANT

## 2020-12-12 PROCEDURE — 80053 COMPREHEN METABOLIC PANEL: CPT

## 2020-12-12 PROCEDURE — 85025 COMPLETE CBC W/AUTO DIFF WBC: CPT

## 2020-12-12 PROCEDURE — 97162 PT EVAL MOD COMPLEX 30 MIN: CPT | Performed by: PHYSICAL THERAPIST

## 2020-12-12 PROCEDURE — 74011000250 HC RX REV CODE- 250: Performed by: EMERGENCY MEDICINE

## 2020-12-12 PROCEDURE — 65610000006 HC RM INTENSIVE CARE

## 2020-12-12 PROCEDURE — 80202 ASSAY OF VANCOMYCIN: CPT

## 2020-12-12 PROCEDURE — 97530 THERAPEUTIC ACTIVITIES: CPT | Performed by: PHYSICAL THERAPIST

## 2020-12-12 PROCEDURE — 5A09357 ASSISTANCE WITH RESPIRATORY VENTILATION, LESS THAN 24 CONSECUTIVE HOURS, CONTINUOUS POSITIVE AIRWAY PRESSURE: ICD-10-PCS | Performed by: HOSPITALIST

## 2020-12-12 PROCEDURE — 77010033678 HC OXYGEN DAILY

## 2020-12-12 PROCEDURE — 83605 ASSAY OF LACTIC ACID: CPT

## 2020-12-12 PROCEDURE — 94660 CPAP INITIATION&MGMT: CPT

## 2020-12-12 PROCEDURE — 36415 COLL VENOUS BLD VENIPUNCTURE: CPT

## 2020-12-12 PROCEDURE — 74011000250 HC RX REV CODE- 250: Performed by: PHYSICIAN ASSISTANT

## 2020-12-12 PROCEDURE — 84484 ASSAY OF TROPONIN QUANT: CPT

## 2020-12-12 RX ORDER — MUPIROCIN 20 MG/G
OINTMENT TOPICAL DAILY
Status: COMPLETED | OUTPATIENT
Start: 2020-12-12 | End: 2020-12-16

## 2020-12-12 RX ADMIN — FAMOTIDINE 20 MG: 10 INJECTION INTRAVENOUS at 21:51

## 2020-12-12 RX ADMIN — MEROPENEM 1 G: 1 INJECTION, POWDER, FOR SOLUTION INTRAVENOUS at 07:52

## 2020-12-12 RX ADMIN — VANCOMYCIN HYDROCHLORIDE 1000 MG: 1 INJECTION, POWDER, LYOPHILIZED, FOR SOLUTION INTRAVENOUS at 11:32

## 2020-12-12 RX ADMIN — Medication 10 ML: at 07:54

## 2020-12-12 RX ADMIN — FAMOTIDINE 20 MG: 10 INJECTION INTRAVENOUS at 08:21

## 2020-12-12 RX ADMIN — HYDROCORTISONE SODIUM SUCCINATE 50 MG: 100 INJECTION, POWDER, FOR SOLUTION INTRAMUSCULAR; INTRAVENOUS at 07:52

## 2020-12-12 RX ADMIN — MUPIROCIN: 20 OINTMENT TOPICAL at 11:50

## 2020-12-12 RX ADMIN — MEROPENEM 1 G: 1 INJECTION, POWDER, FOR SOLUTION INTRAVENOUS at 11:50

## 2020-12-12 RX ADMIN — SODIUM CHLORIDE 500 ML: 9 INJECTION, SOLUTION INTRAVENOUS at 08:42

## 2020-12-12 RX ADMIN — Medication 14.5 MCG/MIN: at 02:44

## 2020-12-12 RX ADMIN — Medication 10 ML: at 21:51

## 2020-12-12 RX ADMIN — Medication 4 MCG/MIN: at 15:43

## 2020-12-12 RX ADMIN — Medication 10 ML: at 15:44

## 2020-12-12 RX ADMIN — HEPARIN SODIUM 5000 UNITS: 5000 INJECTION INTRAVENOUS; SUBCUTANEOUS at 15:43

## 2020-12-12 RX ADMIN — MEROPENEM 1 G: 1 INJECTION, POWDER, FOR SOLUTION INTRAVENOUS at 21:51

## 2020-12-12 RX ADMIN — HYDROCORTISONE SODIUM SUCCINATE 50 MG: 100 INJECTION, POWDER, FOR SOLUTION INTRAMUSCULAR; INTRAVENOUS at 21:51

## 2020-12-12 RX ADMIN — HEPARIN SODIUM 5000 UNITS: 5000 INJECTION INTRAVENOUS; SUBCUTANEOUS at 07:53

## 2020-12-12 RX ADMIN — HYDROCORTISONE SODIUM SUCCINATE 50 MG: 100 INJECTION, POWDER, FOR SOLUTION INTRAMUSCULAR; INTRAVENOUS at 15:43

## 2020-12-12 NOTE — PROGRESS NOTES
Hospitalist Progress Note    Subjective:   Daily Progress Note: 12/12/2020 8:50 AM    Remains on BiPAP for life-threatening acute respiratory failure with hypoxia. Also with septic shock and requiring high doses of norepinephrine. Denies chest pain. States he is much more comfortable on the BiPAP. Current Facility-Administered Medications   Medication Dose Route Frequency    sodium chloride 0.9 % bolus infusion 500 mL  500 mL IntraVENous ONCE    mupirocin (BACTROBAN) 2 % ointment   Topical DAILY    meropenem (MERREM) 1 g in sterile water (preservative free) 20 mL IV syringe  1 g IntraVENous Q8H    sodium chloride (NS) flush 5-40 mL  5-40 mL IntraVENous Q8H    sodium chloride (NS) flush 5-40 mL  5-40 mL IntraVENous PRN    acetaminophen (TYLENOL) tablet 650 mg  650 mg Oral Q6H PRN    Or    acetaminophen (TYLENOL) suppository 650 mg  650 mg Rectal Q6H PRN    polyethylene glycol (MIRALAX) packet 17 g  17 g Oral DAILY PRN    promethazine (PHENERGAN) tablet 12.5 mg  12.5 mg Oral Q6H PRN    Or    ondansetron (ZOFRAN) injection 4 mg  4 mg IntraVENous Q6H PRN    famotidine (PF) (PEPCID) 20 mg in 0.9% sodium chloride 10 mL injection  20 mg IntraVENous BID    heparin (porcine) injection 5,000 Units  5,000 Units SubCUTAneous Q12H    NOREPINephrine (LEVOPHED) 8 mg in 5% dextrose 250mL (32 mcg/mL) infusion  0.5-30 mcg/min IntraVENous TITRATE    albuterol-ipratropium (DUO-NEB) 2.5 MG-0.5 MG/3 ML  3 mL Nebulization Q4H PRN    Vancomycin Dosed by Pharmacy  1 Each Other Rx Dosing/Monitoring    hydrocortisone Sod Succ (PF) (SOLU-CORTEF) injection 50 mg  50 mg IntraVENous Q6H        Review of Systems  Review of Systems   Constitutional: Positive for malaise/fatigue. Negative for chills and fever. Eyes: Negative. Respiratory: Positive for cough and shortness of breath. Negative for wheezing. Cardiovascular: Negative for chest pain and leg swelling.    Gastrointestinal: Negative for abdominal pain, nausea and vomiting. Genitourinary: Negative for dysuria and urgency. Musculoskeletal: Negative. Skin: Negative. Neurological: Negative. Psychiatric/Behavioral: Negative. Objective:     Visit Vitals  BP (!) 155/74 (BP 1 Location: Right arm, BP Patient Position: At rest;Head of bed elevated (Comment degrees))   Pulse 67   Temp 99 °F (37.2 °C)   Resp 18   Ht 5' 7\" (1.702 m)   Wt 56 kg (123 lb 7.3 oz)   SpO2 100%   BMI 19.34 kg/m²      O2 Device: BIPAP    Temp (24hrs), Av.8 °F (37.1 °C), Min:98.5 °F (36.9 °C), Max:99 °F (37.2 °C)      No intake/output data recorded. 12/10 1901 -  0700  In: 3000 [I.V.:3000]  Out: 30 [Urine:30]    Recent Results (from the past 24 hour(s))   CULTURE, BLOOD, PAIRED    Collection Time: 20 11:21 AM    Specimen: Blood   Result Value Ref Range    Special Requests: No Special Requests      Culture result: No growth after 19 hours     CBC WITH AUTOMATED DIFF    Collection Time: 20 11:44 AM   Result Value Ref Range    WBC 8.2 4.1 - 11.1 K/uL    RBC 3.95 (L) 4.10 - 5.70 M/uL    HGB 12.1 12.1 - 17.0 g/dL    HCT 36.1 (L) 36.6 - 50.3 %    MCV 91.4 80.0 - 99.0 FL    MCH 30.6 26.0 - 34.0 PG    MCHC 33.5 30.0 - 36.5 g/dL    RDW 17.1 (H) 11.5 - 14.5 %    PLATELET 930 088 - 035 K/uL    MPV 11.5 8.9 - 12.9 FL    NEUTROPHILS 92 (H) 32 - 75 %    LYMPHOCYTES 4 (L) 12 - 49 %    MONOCYTES 4 (L) 5 - 13 %    EOSINOPHILS 0 0 - 7 %    BASOPHILS 0 0 - 1 %    IMMATURE GRANULOCYTES 0 0.0 - 0.5 %    ABS. NEUTROPHILS 7.6 1.8 - 8.0 K/UL    ABS. LYMPHOCYTES 0.3 (L) 0.8 - 3.5 K/UL    ABS. MONOCYTES 0.3 0.0 - 1.0 K/UL    ABS. EOSINOPHILS 0.0 0.0 - 0.4 K/UL    ABS. BASOPHILS 0.0 0.0 - 0.1 K/UL    ABS. IMM.  GRANS. 0.0 0.00 - 0.04 K/UL    DF AUTOMATED     METABOLIC PANEL, COMPREHENSIVE    Collection Time: 20 11:44 AM   Result Value Ref Range    Sodium 144 136 - 145 mmol/L    Potassium 3.3 (L) 3.5 - 5.1 mmol/L    Chloride 105 97 - 108 mmol/L    CO2 26 21 - 32 mmol/L    Anion gap 13 5 - 15 mmol/L    Glucose 82 65 - 100 mg/dL    BUN 66 (H) 6 - 20 mg/dL    Creatinine 2.68 (H) 0.70 - 1.30 mg/dL    BUN/Creatinine ratio 25 (H) 12 - 20      GFR est AA 27 (L) >60 ml/min/1.73m2    GFR est non-AA 23 (L) >60 ml/min/1.73m2    Calcium 9.6 8.5 - 10.1 mg/dL    Bilirubin, total 1.3 (H) 0.2 - 1.0 mg/dL    AST (SGOT) 59 (H) 15 - 37 U/L    ALT (SGPT) 26 12 - 78 U/L    Alk.  phosphatase 75 45 - 117 U/L    Protein, total 5.7 (L) 6.4 - 8.2 g/dL    Albumin 2.4 (L) 3.5 - 5.0 g/dL    Globulin 3.3 2.0 - 4.0 g/dL    A-G Ratio 0.7 (L) 1.1 - 2.2     TROPONIN I    Collection Time: 12/11/20 11:44 AM   Result Value Ref Range    Troponin-I, Qt. 0.07 (H) <0.05 ng/mL   BNP    Collection Time: 12/11/20 11:44 AM   Result Value Ref Range    NT pro-BNP 8,012 (H) <450 pg/mL   PROTHROMBIN TIME + INR    Collection Time: 12/11/20 11:44 AM   Result Value Ref Range    Prothrombin time 16.1 (H) 11.9 - 14.7 sec    INR 1.3 (H) 0.9 - 1.1     PTT    Collection Time: 12/11/20 11:44 AM   Result Value Ref Range    aPTT 26.6 23.0 - 35.7 sec    aPTT, therapeutic range   68 - 109 sec   PROCALCITONIN    Collection Time: 12/11/20 11:44 AM   Result Value Ref Range    Procalcitonin 6.86 (H) 0 ng/mL   LACTIC ACID    Collection Time: 12/11/20 11:44 AM   Result Value Ref Range    Lactic acid 5.3 (HH) 0.4 - 2.0 mmol/L   MAGNESIUM    Collection Time: 12/11/20 11:44 AM   Result Value Ref Range    Magnesium 2.0 1.6 - 2.4 mg/dL   URINALYSIS W/ REFLEX CULTURE    Collection Time: 12/11/20 11:45 AM    Specimen: Urine   Result Value Ref Range    Color Yellow/Straw      Appearance Turbid (A) Clear      Specific gravity 1.014 1.003 - 1.030      pH (UA) 5.0 5.0 - 8.0      Protein Negative Negative mg/dL    Glucose Negative Negative mg/dL    Ketone Negative Negative mg/dL    Bilirubin Negative Negative      Blood Moderate (A) Negative      Urobilinogen 0.1 0.1 - 1.0 EU/dL    Nitrites Negative Negative      Leukocyte Esterase Negative Negative      UA:UC IF INDICATED Culture not indicated by UA result Culture not indicated by UA result      WBC 0-4 0 - 4 /hpf    RBC 0-5 0 - 5 /hpf    Bacteria Negative Negative /hpf    Mucus Trace /lpf   EKG, 12 LEAD, INITIAL    Collection Time: 12/11/20 11:58 AM   Result Value Ref Range    Ventricular Rate 98 BPM    Atrial Rate 98 BPM    P-R Interval 120 ms    QRS Duration 76 ms    Q-T Interval 404 ms    QTC Calculation (Bezet) 515 ms    Calculated P Axis 37 degrees    Calculated R Axis 27 degrees    Calculated T Axis 44 degrees    Diagnosis       Sinus rhythm with occasional Premature ventricular complexes  Low voltage QRS  ST & T wave abnormality, consider anterior ischemia  Abnormal ECG  When compared with ECG of 11-DEC-2020 11:58, (Unconfirmed)  ST more depressed Anterior leads  QT has shortened  Confirmed by Emperatriz Ruiz ([id number]) on 12/11/2020 4:46:46 PM     BLOOD GAS, ARTERIAL    Collection Time: 12/11/20  1:00 PM   Result Value Ref Range    pH 7.43 7.35 - 7.45      PCO2 32 (L) 35 - 45 mmHg    PO2 74 (L) 75 - 100 mmHg    O2 SAT 95 (L) >95 %    BICARBONATE 22 22 - 26 mmol/L    BASE DEFICIT 2.4 (H) 0 - 2 mmol/L    O2 METHOD PENDING     O2 FLOW RATE 15.0 L/min    FIO2 100.0 %    MODE NRB mask      Tidal volume PENDING     PRESSURE SUPPORT PENDING     IPAP/PIP 0      EPAP/CPAP/PEEP 0      SITE Right Radial      NEW'S TEST PASS     LACTIC ACID    Collection Time: 12/11/20  3:25 PM   Result Value Ref Range    Lactic acid 4.0 (HH) 0.4 - 2.0 mmol/L   TROPONIN I    Collection Time: 12/11/20  3:45 PM   Result Value Ref Range    Troponin-I, Qt. 0.12 (H) <0.05 ng/mL   SARS-COV-2    Collection Time: 12/11/20  3:45 PM   Result Value Ref Range    SARS-CoV-2 Please find results under separate order     COVID-19 RAPID TEST    Collection Time: 12/11/20  3:45 PM   Result Value Ref Range    Specimen source Nasopharyngeal      COVID-19 rapid test Not Detected Not Detected     MRSA SCREEN - PCR (NASAL)    Collection Time: 12/11/20  9:00 PM   Result Value Ref Range    MRSA by PCR, Nasal DETECTED (A) Not Detected     TROPONIN I    Collection Time: 12/12/20  4:00 AM   Result Value Ref Range    Troponin-I, Qt. 0.10 (H) <0.05 ng/mL   CBC WITH AUTOMATED DIFF    Collection Time: 12/12/20  4:00 AM   Result Value Ref Range    WBC 17.7 (H) 4.1 - 11.1 K/uL    RBC 3.45 (L) 4.10 - 5.70 M/uL    HGB 10.6 (L) 12.1 - 17.0 g/dL    HCT 31.2 (L) 36.6 - 50.3 %    MCV 90.4 80.0 - 99.0 FL    MCH 30.7 26.0 - 34.0 PG    MCHC 34.0 30.0 - 36.5 g/dL    RDW 17.6 (H) 11.5 - 14.5 %    PLATELET 649 615 - 050 K/uL    MPV 12.0 8.9 - 12.9 FL    NEUTROPHILS 92 (H) 32 - 75 %    LYMPHOCYTES 2 (L) 12 - 49 %    MONOCYTES 3 (L) 5 - 13 %    EOSINOPHILS 0 0 - 7 %    BASOPHILS 0 0 - 1 %    IMMATURE GRANULOCYTES 3 (H) 0.0 - 0.5 %    ABS. NEUTROPHILS 16.3 (H) 1.8 - 8.0 K/UL    ABS. LYMPHOCYTES 0.4 (L) 0.8 - 3.5 K/UL    ABS. MONOCYTES 0.5 0.0 - 1.0 K/UL    ABS. EOSINOPHILS 0.0 0.0 - 0.4 K/UL    ABS. BASOPHILS 0.0 0.0 - 0.1 K/UL    ABS. IMM. GRANS. 0.5 (H) 0.00 - 0.04 K/UL    DF AUTOMATED      RBC COMMENTS Normocytic, Normochromic     METABOLIC PANEL, COMPREHENSIVE    Collection Time: 12/12/20  4:00 AM   Result Value Ref Range    Sodium 145 136 - 145 mmol/L    Potassium 3.6 3.5 - 5.1 mmol/L    Chloride 112 (H) 97 - 108 mmol/L    CO2 24 21 - 32 mmol/L    Anion gap 9 5 - 15 mmol/L    Glucose 126 (H) 65 - 100 mg/dL    BUN 66 (H) 6 - 20 mg/dL    Creatinine 2.67 (H) 0.70 - 1.30 mg/dL    BUN/Creatinine ratio 25 (H) 12 - 20      GFR est AA 28 (L) >60 ml/min/1.73m2    GFR est non-AA 23 (L) >60 ml/min/1.73m2    Calcium 8.5 8.5 - 10.1 mg/dL    Bilirubin, total 1.0 0.2 - 1.0 mg/dL    AST (SGOT) 48 (H) 15 - 37 U/L    ALT (SGPT) 21 12 - 78 U/L    Alk.  phosphatase 77 45 - 117 U/L    Protein, total 4.8 (L) 6.4 - 8.2 g/dL    Albumin 1.8 (L) 3.5 - 5.0 g/dL    Globulin 3.0 2.0 - 4.0 g/dL    A-G Ratio 0.6 (L) 1.1 - 2.2     VANCOMYCIN, RANDOM    Collection Time: 12/12/20  4:00 AM   Result Value Ref Range    Vancomycin, random 12.0 ug/mL    Reported dose date 0      Reported dose: 0 Units        CT CHEST WO CONT   Final Result   IMPRESSION:   1. Bilateral pleural effusions and lower lung consolidations, the left lower   lung being completely collapsed, this is similar to previous. 2. Interval development patchy airspace disease in the aerated lungs consistent   with pneumonia. 3. Abnormal spleen, small retroperitoneal lymphadenopathy unchanged. 4. Atherosclerosis. XR CHEST SNGL V   Final Result   FINDINGS: Impression: Frontal single view chest.      Interval placement left central catheter distal tip SVC/RA junction region. Unchanged cardiomediastinal silhouette. Slightly increasing bilateral pulmonary   airspace edema and/or pneumonia and/or atelectasis. Increasing consolidation of   the left lower lobe. Interval development of small left pleural effusion. No pneumothorax. XR CHEST SNGL V   Final Result      XR CHEST PORT    (Results Pending)        PHYSICAL EXAM:    Physical Exam  Vitals signs reviewed. Constitutional:       Appearance: He is ill-appearing. HENT:      Head: Normocephalic and atraumatic. Nose: Nose normal.      Mouth/Throat:      Mouth: Mucous membranes are dry. Neck:      Musculoskeletal: Normal range of motion. Cardiovascular:      Rate and Rhythm: Normal rate and regular rhythm. Heart sounds: Normal heart sounds. Pulmonary:      Comments: Crackles bilaterally with diminished breath sounds bilaterally  Abdominal:      General: Abdomen is flat. Bowel sounds are normal.      Palpations: Abdomen is soft. Musculoskeletal: Normal range of motion. Skin:     General: Skin is warm and dry. Neurological:      General: No focal deficit present. Mental Status: He is alert and oriented to person, place, and time. Mental status is at baseline.    Psychiatric:         Mood and Affect: Mood normal.          Data Review    Recent Results (from the past 24 hour(s))   CULTURE, BLOOD, PAIRED    Collection Time: 12/11/20 11:21 AM    Specimen: Blood   Result Value Ref Range    Special Requests: No Special Requests      Culture result: No growth after 19 hours     CBC WITH AUTOMATED DIFF    Collection Time: 12/11/20 11:44 AM   Result Value Ref Range    WBC 8.2 4.1 - 11.1 K/uL    RBC 3.95 (L) 4.10 - 5.70 M/uL    HGB 12.1 12.1 - 17.0 g/dL    HCT 36.1 (L) 36.6 - 50.3 %    MCV 91.4 80.0 - 99.0 FL    MCH 30.6 26.0 - 34.0 PG    MCHC 33.5 30.0 - 36.5 g/dL    RDW 17.1 (H) 11.5 - 14.5 %    PLATELET 083 612 - 566 K/uL    MPV 11.5 8.9 - 12.9 FL    NEUTROPHILS 92 (H) 32 - 75 %    LYMPHOCYTES 4 (L) 12 - 49 %    MONOCYTES 4 (L) 5 - 13 %    EOSINOPHILS 0 0 - 7 %    BASOPHILS 0 0 - 1 %    IMMATURE GRANULOCYTES 0 0.0 - 0.5 %    ABS. NEUTROPHILS 7.6 1.8 - 8.0 K/UL    ABS. LYMPHOCYTES 0.3 (L) 0.8 - 3.5 K/UL    ABS. MONOCYTES 0.3 0.0 - 1.0 K/UL    ABS. EOSINOPHILS 0.0 0.0 - 0.4 K/UL    ABS. BASOPHILS 0.0 0.0 - 0.1 K/UL    ABS. IMM. GRANS. 0.0 0.00 - 0.04 K/UL    DF AUTOMATED     METABOLIC PANEL, COMPREHENSIVE    Collection Time: 12/11/20 11:44 AM   Result Value Ref Range    Sodium 144 136 - 145 mmol/L    Potassium 3.3 (L) 3.5 - 5.1 mmol/L    Chloride 105 97 - 108 mmol/L    CO2 26 21 - 32 mmol/L    Anion gap 13 5 - 15 mmol/L    Glucose 82 65 - 100 mg/dL    BUN 66 (H) 6 - 20 mg/dL    Creatinine 2.68 (H) 0.70 - 1.30 mg/dL    BUN/Creatinine ratio 25 (H) 12 - 20      GFR est AA 27 (L) >60 ml/min/1.73m2    GFR est non-AA 23 (L) >60 ml/min/1.73m2    Calcium 9.6 8.5 - 10.1 mg/dL    Bilirubin, total 1.3 (H) 0.2 - 1.0 mg/dL    AST (SGOT) 59 (H) 15 - 37 U/L    ALT (SGPT) 26 12 - 78 U/L    Alk.  phosphatase 75 45 - 117 U/L    Protein, total 5.7 (L) 6.4 - 8.2 g/dL    Albumin 2.4 (L) 3.5 - 5.0 g/dL    Globulin 3.3 2.0 - 4.0 g/dL    A-G Ratio 0.7 (L) 1.1 - 2.2     TROPONIN I    Collection Time: 12/11/20 11:44 AM   Result Value Ref Range    Troponin-I, Qt. 0.07 (H) <0.05 ng/mL   BNP    Collection Time: 12/11/20 11:44 AM   Result Value Ref Range    NT pro-BNP 8,012 (H) <450 pg/mL   PROTHROMBIN TIME + INR    Collection Time: 12/11/20 11:44 AM   Result Value Ref Range    Prothrombin time 16.1 (H) 11.9 - 14.7 sec    INR 1.3 (H) 0.9 - 1.1     PTT    Collection Time: 12/11/20 11:44 AM   Result Value Ref Range    aPTT 26.6 23.0 - 35.7 sec    aPTT, therapeutic range   68 - 109 sec   PROCALCITONIN    Collection Time: 12/11/20 11:44 AM   Result Value Ref Range    Procalcitonin 6.86 (H) 0 ng/mL   LACTIC ACID    Collection Time: 12/11/20 11:44 AM   Result Value Ref Range    Lactic acid 5.3 (HH) 0.4 - 2.0 mmol/L   MAGNESIUM    Collection Time: 12/11/20 11:44 AM   Result Value Ref Range    Magnesium 2.0 1.6 - 2.4 mg/dL   URINALYSIS W/ REFLEX CULTURE    Collection Time: 12/11/20 11:45 AM    Specimen: Urine   Result Value Ref Range    Color Yellow/Straw      Appearance Turbid (A) Clear      Specific gravity 1.014 1.003 - 1.030      pH (UA) 5.0 5.0 - 8.0      Protein Negative Negative mg/dL    Glucose Negative Negative mg/dL    Ketone Negative Negative mg/dL    Bilirubin Negative Negative      Blood Moderate (A) Negative      Urobilinogen 0.1 0.1 - 1.0 EU/dL    Nitrites Negative Negative      Leukocyte Esterase Negative Negative      UA:UC IF INDICATED Culture not indicated by UA result Culture not indicated by UA result      WBC 0-4 0 - 4 /hpf    RBC 0-5 0 - 5 /hpf    Bacteria Negative Negative /hpf    Mucus Trace /lpf   EKG, 12 LEAD, INITIAL    Collection Time: 12/11/20 11:58 AM   Result Value Ref Range    Ventricular Rate 98 BPM    Atrial Rate 98 BPM    P-R Interval 120 ms    QRS Duration 76 ms    Q-T Interval 404 ms    QTC Calculation (Bezet) 515 ms    Calculated P Axis 37 degrees    Calculated R Axis 27 degrees    Calculated T Axis 44 degrees    Diagnosis       Sinus rhythm with occasional Premature ventricular complexes  Low voltage QRS  ST & T wave abnormality, consider anterior ischemia  Abnormal ECG  When compared with ECG of 11-DEC-2020 11:58, (Unconfirmed)  ST more depressed Anterior leads  QT has shortened  Confirmed by Emperatriz Ruiz ([id number]) on 12/11/2020 4:46:46 PM     BLOOD GAS, ARTERIAL    Collection Time: 12/11/20  1:00 PM   Result Value Ref Range    pH 7.43 7.35 - 7.45      PCO2 32 (L) 35 - 45 mmHg    PO2 74 (L) 75 - 100 mmHg    O2 SAT 95 (L) >95 %    BICARBONATE 22 22 - 26 mmol/L    BASE DEFICIT 2.4 (H) 0 - 2 mmol/L    O2 METHOD PENDING     O2 FLOW RATE 15.0 L/min    FIO2 100.0 %    MODE NRB mask      Tidal volume PENDING     PRESSURE SUPPORT PENDING     IPAP/PIP 0      EPAP/CPAP/PEEP 0      SITE Right Radial      NEW'S TEST PASS     LACTIC ACID    Collection Time: 12/11/20  3:25 PM   Result Value Ref Range    Lactic acid 4.0 (HH) 0.4 - 2.0 mmol/L   TROPONIN I    Collection Time: 12/11/20  3:45 PM   Result Value Ref Range    Troponin-I, Qt. 0.12 (H) <0.05 ng/mL   SARS-COV-2    Collection Time: 12/11/20  3:45 PM   Result Value Ref Range    SARS-CoV-2 Please find results under separate order     COVID-19 RAPID TEST    Collection Time: 12/11/20  3:45 PM   Result Value Ref Range    Specimen source Nasopharyngeal      COVID-19 rapid test Not Detected Not Detected     MRSA SCREEN - PCR (NASAL)    Collection Time: 12/11/20  9:00 PM   Result Value Ref Range    MRSA by PCR, Nasal DETECTED (A) Not Detected     TROPONIN I    Collection Time: 12/12/20  4:00 AM   Result Value Ref Range    Troponin-I, Qt. 0.10 (H) <0.05 ng/mL   CBC WITH AUTOMATED DIFF    Collection Time: 12/12/20  4:00 AM   Result Value Ref Range    WBC 17.7 (H) 4.1 - 11.1 K/uL    RBC 3.45 (L) 4.10 - 5.70 M/uL    HGB 10.6 (L) 12.1 - 17.0 g/dL    HCT 31.2 (L) 36.6 - 50.3 %    MCV 90.4 80.0 - 99.0 FL    MCH 30.7 26.0 - 34.0 PG    MCHC 34.0 30.0 - 36.5 g/dL    RDW 17.6 (H) 11.5 - 14.5 %    PLATELET 366 246 - 824 K/uL    MPV 12.0 8.9 - 12.9 FL    NEUTROPHILS 92 (H) 32 - 75 %    LYMPHOCYTES 2 (L) 12 - 49 %    MONOCYTES 3 (L) 5 - 13 %    EOSINOPHILS 0 0 - 7 %    BASOPHILS 0 0 - 1 %    IMMATURE GRANULOCYTES 3 (H) 0.0 - 0.5 %    ABS. NEUTROPHILS 16.3 (H) 1.8 - 8.0 K/UL    ABS. LYMPHOCYTES 0.4 (L) 0.8 - 3.5 K/UL    ABS. MONOCYTES 0.5 0.0 - 1.0 K/UL    ABS. EOSINOPHILS 0.0 0.0 - 0.4 K/UL    ABS. BASOPHILS 0.0 0.0 - 0.1 K/UL    ABS. IMM. GRANS. 0.5 (H) 0.00 - 0.04 K/UL    DF AUTOMATED      RBC COMMENTS Normocytic, Normochromic     METABOLIC PANEL, COMPREHENSIVE    Collection Time: 12/12/20  4:00 AM   Result Value Ref Range    Sodium 145 136 - 145 mmol/L    Potassium 3.6 3.5 - 5.1 mmol/L    Chloride 112 (H) 97 - 108 mmol/L    CO2 24 21 - 32 mmol/L    Anion gap 9 5 - 15 mmol/L    Glucose 126 (H) 65 - 100 mg/dL    BUN 66 (H) 6 - 20 mg/dL    Creatinine 2.67 (H) 0.70 - 1.30 mg/dL    BUN/Creatinine ratio 25 (H) 12 - 20      GFR est AA 28 (L) >60 ml/min/1.73m2    GFR est non-AA 23 (L) >60 ml/min/1.73m2    Calcium 8.5 8.5 - 10.1 mg/dL    Bilirubin, total 1.0 0.2 - 1.0 mg/dL    AST (SGOT) 48 (H) 15 - 37 U/L    ALT (SGPT) 21 12 - 78 U/L    Alk. phosphatase 77 45 - 117 U/L    Protein, total 4.8 (L) 6.4 - 8.2 g/dL    Albumin 1.8 (L) 3.5 - 5.0 g/dL    Globulin 3.0 2.0 - 4.0 g/dL    A-G Ratio 0.6 (L) 1.1 - 2.2     VANCOMYCIN, RANDOM    Collection Time: 12/12/20  4:00 AM   Result Value Ref Range    Vancomycin, random 12.0 ug/mL    Reported dose date 0      Reported dose: 0 Units        Assessment/Plan:     Active Problems:    HTN (hypertension) (1/4/2011)      CRI (chronic renal insufficiency) (12/13/2012)      Cervical stenosis of spinal canal (11/20/2020)      Acute renal failure (ARF) (HCC) (11/20/2020)      HAP (hospital-acquired pneumonia) (11/30/2020)      Severe sepsis ([address] (12/1/2020)      Hospital course: This is an 71-year-old male admitted on 12/11/2020 with acute respiratory failure with hypoxia secondary to bilateral pneumonia as well as septic shock requiring norepinephrine. Triple-lumen catheter was inserted and patient was transferred to the intensive care unit.   He is 100% dependent on BiPAP at this point secondary to hypoxia. Much improved while on BiPAP. CT of the chest reveals bilateral pleural effusions with lower lung consolidations as well as left lower lobe collapse. There are patchy airspace disease present throughout the lungs consistent with multifocal pneumonia. The spleen is abnormal with retroperitoneal lymphadenopathy present. CTA performed on December 1 revealed no obvious pulmonary emboli. Patient has had multiple admissions at outside facilities for recurrent pneumonia. Family feels that the patient's pneumonia is never completely resolved prior to discharge. Still with severe sepsis requiring norepinephrine at high doses. Continue vancomycin and meropenem at this point. Patient is a DNR and has been confirmed via family and the patient. Pulmonary consultation and cardiac consultation. Also with acute on chronic kidney disease. Baseline creatinine 1.6 and current creatinine is greater than 2.5. Continue with aggressive hydration. Lactic acidosis improving. History of lymphoma although peripheral smear November 2020 with no obvious lymphoma present. Echocardiogram pending previous echo revealed an EF of 60 to 65%. Mildly elevated troponin. Impression:     1. Severe sepsis  2. Acute respiratory failure with hypoxia  3. Hypotension  4. Acute on chronic renal failure  5. Hospital-acquired pneumonia  6. Lactic acidosis  7. History of lymphoma     Plan:     1. Severe sepsis  Penicillin allergy and proceed with meropenem as it was already given in the emergency department  Cultures from previous hospitalization revealed gram-positive cocci in the sputum  Continue vancomycin  Severe sepsis IV fluid protocol  Norepinephrine due to septic shock  Urinalysis and urine culture pending  Blood culture pending  Hydrocortisone scheduled  Central line placement 12/11 for vasopressors     2.   Acute respiratory failure with hypoxia with associated hospital-acquired pneumonia  Continue vancomycin and meropenem  RT consult for high flow oxygen\BiPAP  Patient is DNR but will accept noninvasive positive pressure ventilation  Pulmonary consultation  No obvious history of reactive airway disease  Duonebs as needed     3. Acute on chronic kidney disease  Baseline creatinine appears to be proxy 1.61  Current creatinine is 2.6 consistent with dehydration  Patient has received aggressive IV hydration  We will continue to monitor with serial laboratory data     4. Lactic acidosis  Trend every 4 hours, has received aggressive hydration  Trending down to 4     5. History of lymphoma  Appears to be stable although lobulated spleen on CT at previous hospitalization  Will need further evaluation with ultrasound     6. Troponin elevation, non-ST elevation MI  Troponin currently 0.7-->0.12-->0.10     7.   Acute systolic heart failure with a previous EF of 60-65% and grade 1 diastolic dysfunction  Cardiology consultation  Repeat echo     CODE STATUS: DNR     DVT prophylaxis Heparin  Ulcer prophylaxis: Famotidine    Discussed case with daughter Madi Villar Am[address] discussed with: Patient/Family    Critical care time: 48 minutes

## 2020-12-12 NOTE — PROGRESS NOTES
Problem: Mobility Impaired (Adult and Pediatric)  Goal: *Acute Goals and Plan of Care (Insert Text)  Description: Pt will be I with LE HEP in 7 days. Pt will perform bed mobility with Min A x1 in 7 days. Pt will perform transfers with Min A x1 in 7 days. Outcome: Not Met     Problem: Patient Education: Go to Patient Education Activity  Goal: Patient/Family Education  Outcome: Not Met     PHYSICAL THERAPY EVALUATION  Patient: Hitesh Ocampo [de-identified]80 y.o. male)  Date: 12/12/2020  Primary Diagnosis: Severe sepsis ([facility] [address] [A41.9, R65.20]        Precautions: falls, BPPV      ASSESSMENT  80 yom came to hospital on 12/11/20 due to CRI. Pt was recently in the hosptial from 11/30/20 to 12/6/20 due to sepsis, HAP, and SIRS. covid NEG 12/4/20. PMHx: BPPV, dysphagia, CHF, lymphoma, anemia, spinal stenosis, falls, CRI, HTN, prostate CA, gout. Pt lives alone in 1 story home with 2 ERICK with railing on R side. Uses [facility] at baseline and does not own any other AD. Due to pt getting tired quickly with speaking, interview portion of PT eval was limited. Pt on BiPap 80% at time of PT eval. A&O x4. Pt was Mod A with sitting up in bed. He fatigued quickly with this and reported dizziness. With pt's hx of BPPV, eply maneuver was not landon to be completed due to hospital lines and pt weakness. After pt layed back down with head propped up, he requested his mouth to be swabbed. As therapist was swabbing his mouth, pt push BiPap out of the way and indicated that he wanted to use NC. PT explained that he can't use NC at this time yet and attempted to place BiPap back onto pt but he fought therapist. Nursing had to help therapist place BiPap back onto pt. Pt demos weakness, and decreased endurance which impaires his funcitonal mobitly. He would benefit from acute PT to address his limitaitons. PT rec DC to SNF at this time.      Due to vertigo from BPPV, may need to coordinate medication timing with treatment along with perform eply maneuver. Patient will benefit from skilled therapy intervention to address the above noted impairments. PLAN :  Recommendations and Planned Interventions: bed mobility training, transfer training, gait training, therapeutic exercises, neuromuscular re-education, and therapeutic activities      Frequency/Duration: Patient will be followed by physical therapy:  4 times a week to address goals. Recommendation for discharge: (in order for the patient to meet his/her long term goals)  SNF    This discharge recommendation:  Has not yet been discussed the attending provider and/or case management    IF patient discharges home will need the following DME: to be determined (TBD) at [facility]         SUBJECTIVE:       OBJECTIVE DATA SUMMARY:   HISTORY:    Past Medical History:   Diagnosis Date    CRI (chronic renal insufficiency) 12/13/2012    Gout 1/4/2011    Hypertension     Lymphoma ([facility] [address]     Prostate CA ([facility] [address] 1/4/2011     Past Surgical History:   Procedure Laterality Date    ENDOSCOPY, COLON, DIAGNOSTIC  2003    neg    HC BONE MARROW BIOPSY      HX PROSTATECTOMY         Personal factors and/or comorbidities impacting plan of care: see pt chart    Home Situation  Home Environment: Private residence  # Steps to Enter: 2  Rails to Enter: Yes  Hand Rails : Right  One/Two Story Residence: One story  Living Alone: Yes  Patient Expects to be Discharged to[de-identified] Skilled nursing facility  Current DME Used/Available at Home: Cane, straight    PLOF: Pt MOD I for ADLS/IADLS, MOD I with mobility prior to admission. EXAMINATION/PRESENTATION/DECISION MAKING:   Critical Behavior:  Neurologic State: Alert  Orientation Level: Oriented X4          Range Of Motion:   decreased                       Strength:        decreased                   Functional Mobility:  Bed Mobility:     Supine to Sit: Moderate assistance  Sit to Supine:  Moderate assistance       Functional Measure:    [address] Mobility Inpatient Short Form  How much difficulty does the patient currently have. .. Unable A Lot A Little None   1. Turning over in bed (including adjusting bedclothes, sheets and blankets)? [] 1   [x] 2   [] 3   [] 4   2. Sitting down on and standing up from a chair with arms ( e.g., wheelchair, bedside commode, etc.)   [x] 1   [] 2   [] 3   [] 4   3. Moving from lying on back to sitting on the side of the bed? [] 1   [x] 2   [] 3   [] 4          How much help from another person does the patient currently need. .. Total A Lot A Little None   4. Moving to and from a bed to a chair (including a wheelchair)? [x] 1   [] 2   [] 3   [] 4   5. Need to walk in hospital room? [x] 1   [] 2   [] 3   [] 4   6. Climbing 3-5 steps with a railing? [x] 1   [] 2   [] 3   [] 4   © , Trustees of [address] [place] [address], under license to [email or web address]. All rights reserved     Score:  Initial: MS Most Recent: X (Date: -- )   Interpretation of Tool:  Represents activities that are increasingly more difficult (i.e. Bed mobility, Transfers, Gait). Score 24 23 22-20 19-15 14-10 9-7 6   Modifier CH CI CJ CK CL CM CN          Physical Therapy Evaluation Charge Determination   History Examination Presentation Decision-Making   HIGH Complexity :3+ comorbidities / personal factors will impact the outcome/ POC  MEDIUM Complexity : 3 Standardized tests and measures addressing body structure, function, activity limitation and / or participation in recreation  HIGH Complexity : Unstable and unpredictable characteristics  Other Functional Measure [facility] 6 8      Based on the above components, the patient evaluation is determined to be of the following complexity level: MEDIUM    Pain Ratin/10    Activity Tolerance:   observed SOB with activity  Please refer to the flowsheet for vital signs taken during this treatment.     After treatment patient left in no apparent distress:   Supine in bed and Call bell within reach    COMMUNICATION/EDUCATION:   The patients plan of care was discussed with: Registered nurse. Patient understands intent and goals of therapy, but is neutral about his/her participation.     Thank you for this referral.  Carolin Patle   Time Calculation: 27 mins

## 2020-12-12 NOTE — PROGRESS NOTES
Consult for Vancomycin Dosing by Pharmacy by Alfred Abdi, [address] [place] [address] provided for this 80y.o. year old male , for indication of sepsis / HAP. Day of Therapy: 2  Goal of Level(s): 15-20mcg/dL    Other Current Antibiotics: Merrem      Serum Creatinine Creatinine   Date Value Ref Range Status   12/12/2020 2.67 (H) 0.70 - 1.30 mg/dL Final   12/11/2020 2.68 (H) 0.70 - 1.30 mg/dL Final   12/06/2020 1.61 (H) 0.70 - 1.30 MG/DL Final      Creatinine Clearance Estimated Creatinine Clearance: 15.1 mL/min (A) (based on SCr of 2.67 mg/dL (H)). BUN Lab Results   Component Value Date/Time    BUN 66 (H) 12/12/2020 04:00 AM      WBC Lab Results   Component Value Date/Time    WBC 17.7 (H) 12/12/2020 04:00 AM      Temp 99 °F (37.2 °C)     Last Level:    Vancomycin, random   Date Value Ref Range Status   12/12/2020 12.0 ug/mL Final     Comment:     No reference range has been established. Ht Readings from Last 1 Encounters:   12/11/20 170.2 cm (67\")        Wt Readings from Last 1 Encounters:   12/11/20 56 kg (123 lb 7.3 oz)     Ideal body weight: 66.1 kg (145 lb 11.6 oz)     Previous Regimen: 1000mg IV x1 on 12/11    New Regimen:   Patient is still BENJI. Pharmacy ordered another 1000mg IV today and draw a random level tomorrow morning. Pharmacy to follow daily and will make changes to dose and/or frequency based on clinical status.     _________________________________     Pharmacist Maliha Ochoa

## 2020-12-12 NOTE — PROGRESS NOTES
Progress Note    Patient: Val Berry MRN: [id number]  SSN: [id number]    YOB: 1932  Age: 80 y.o. Sex: male      Admit Date: 12/11/2020    LOS: 1 day     Subjective:     No complaints. Eating with the help of the aid. Objective:     Vitals:    12/12/20 1100 12/12/20 1204 12/12/20 1500 12/12/20 1520   BP: 128/76 117/75 (!) 110/58 (!) 100/53   Pulse: 69 66 68    Resp: 29 18 16    Temp: 97.6 °F (36.4 °C)  97.9 °F (36.6 °C)    SpO2: 100% 100% 100%    Weight:    64.3 kg (141 lb 12.1 oz)   Height:    5' 2.99\" (1.6 m)        Intake and Output:  Current Shift: No intake/output data recorded. Last three shifts: 12/10 1901 - 12/12 0700  In: 3000 [I.V.:3000]  Out: 30 [Urine:30]    Physical Exam:   LUNG: clear to auscultation bilaterally  HEART: regular rate and rhythm, S1, S2 normal, no murmur, click, rub or gallop  ABDOMEN: soft, non-tender. Bowel sounds normal. No masses,  no organomegaly  EXTREMITIES:  No edema    Lab/Data Review:  Recent Results (from the past 24 hour(s))   MRSA SCREEN - PCR (NASAL)    Collection Time: 12/11/20  9:00 PM   Result Value Ref Range    MRSA by PCR, Nasal DETECTED (A) Not Detected     TROPONIN I    Collection Time: 12/12/20  4:00 AM   Result Value Ref Range    Troponin-I, Qt. 0.10 (H) <0.05 ng/mL   CBC WITH AUTOMATED DIFF    Collection Time: 12/12/20  4:00 AM   Result Value Ref Range    WBC 17.7 (H) 4.1 - 11.1 K/uL    RBC 3.45 (L) 4.10 - 5.70 M/uL    HGB 10.6 (L) 12.1 - 17.0 g/dL    HCT 31.2 (L) 36.6 - 50.3 %    MCV 90.4 80.0 - 99.0 FL    MCH 30.7 26.0 - 34.0 PG    MCHC 34.0 30.0 - 36.5 g/dL    RDW 17.6 (H) 11.5 - 14.5 %    PLATELET 715 145 - 525 K/uL    MPV 12.0 8.9 - 12.9 FL    NEUTROPHILS 92 (H) 32 - 75 %    LYMPHOCYTES 2 (L) 12 - 49 %    MONOCYTES 3 (L) 5 - 13 %    EOSINOPHILS 0 0 - 7 %    BASOPHILS 0 0 - 1 %    IMMATURE GRANULOCYTES 3 (H) 0.0 - 0.5 %    ABS. NEUTROPHILS 16.3 (H) 1.8 - 8.0 K/UL    ABS. LYMPHOCYTES 0.4 (L) 0.8 - 3.5 K/UL    ABS.  MONOCYTES 0.5 0.0 - 1.0 K/UL    ABS. EOSINOPHILS 0.0 0.0 - 0.4 K/UL    ABS. BASOPHILS 0.0 0.0 - 0.1 K/UL    ABS. IMM. GRANS. 0.5 (H) 0.00 - 0.04 K/UL    DF AUTOMATED      RBC COMMENTS Normocytic, Normochromic     METABOLIC PANEL, COMPREHENSIVE    Collection Time: 12/12/20  4:00 AM   Result Value Ref Range    Sodium 145 136 - 145 mmol/L    Potassium 3.6 3.5 - 5.1 mmol/L    Chloride 112 (H) 97 - 108 mmol/L    CO2 24 21 - 32 mmol/L    Anion gap 9 5 - 15 mmol/L    Glucose 126 (H) 65 - 100 mg/dL    BUN 66 (H) 6 - 20 mg/dL    Creatinine 2.67 (H) 0.70 - 1.30 mg/dL    BUN/Creatinine ratio 25 (H) 12 - 20      GFR est AA 28 (L) >60 ml/min/1.73m2    GFR est non-AA 23 (L) >60 ml/min/1.73m2    Calcium 8.5 8.5 - 10.1 mg/dL    Bilirubin, total 1.0 0.2 - 1.0 mg/dL    AST (SGOT) 48 (H) 15 - 37 U/L    ALT (SGPT) 21 12 - 78 U/L    Alk.  phosphatase 77 45 - 117 U/L    Protein, total 4.8 (L) 6.4 - 8.2 g/dL    Albumin 1.8 (L) 3.5 - 5.0 g/dL    Globulin 3.0 2.0 - 4.0 g/dL    A-G Ratio 0.6 (L) 1.1 - 2.2     VANCOMYCIN, RANDOM    Collection Time: 12/12/20  4:00 AM   Result Value Ref Range    Vancomycin, random 12.0 ug/mL    Reported dose date 0      Reported dose: 0 Units   LACTIC ACID    Collection Time: 12/12/20  8:39 AM   Result Value Ref Range    Lactic acid 2.5 (HH) 0.4 - 2.0 mmol/L   ECHO ADULT COMPLETE    Collection Time: 12/12/20  3:21 PM   Result Value Ref Range    Aortic Regurgitant Pressure Half-time 703.00 ms    AR Max Yvon 261.00 cm/s    Pulmonic Regurgitant End Max Velocity 110.00 cm/s    AoV PG 5.00 mmHg    Ao Root D 3.20 cm    IVSd 1.08 (A) 0.6 - 1.0 cm    LVIDd 3.86 (A) 4.2 - 5.9 cm    LVIDs 2.53 cm    Pulmonic Regurgitant End Max Velocity 82.70 cm/s    LVOT Peak Gradient 3.00 mmHg    LVPWd 1.00 0.6 - 1.0 cm    LV E' Septal Velocity 5.75 cm/s    LV ED Vol A2C 57.50 cm3    BP EF 64.2 55 - 100 %    LV ES Vol A2C 16.20 cm3    E/E' septal 14.05     Left Atrium to Aortic Root Ratio 1.13     Pulmonic Regurgitant End Max Velocity 496.00 cm/s Mitral Valve Deceleration [place] 3,610.00 mm/s2    Mitral Valve Deceleration [place] 3,610.00 mm/s2    Mitral Valve E Wave Deceleration Time 370.00 ms    Mitral Valve Pressure Half-time 70.00 ms    MV A Yvon 83.90 cm/s    MV E Yvon 80.80 cm/s    MVA (PHT) 3.14 cm2    MV E/A 0.96     Pulmonic Regurgitant End Max Velocity 137.00 cm/s    Pulmonic Valve Systolic Peak Instantaneous Gradient 8.00 mmHg    Pulmonic Regurgitant End Max Velocity 87.50 cm/s    Pulmonic Valve Systolic Peak Instantaneous Gradient 3.00 mmHg    Est. RA Pressure 3.00 mmHg    RVIDd 3.91 cm    RVSP 32.00 mmHg    Tricuspid Valve Max Velocity 271.00 cm/s    Tricuspid Valve Max Velocity 271.00 cm/s    Triscuspid Valve Regurgitation Peak Gradient 29.00 mmHg    LV Mass .1 88 - 224 g    LV Mass AL Index 76.1 49 - 115 g/m2             Assessment:     Active Problems:    HTN (hypertension) (1/4/2011)      CRI (chronic renal insufficiency) (12/13/2012)      Cervical stenosis of spinal canal (11/20/2020)      Acute renal failure (ARF) (HCC) (11/20/2020)      HAP (hospital-acquired pneumonia) (11/30/2020)      Severe sepsis ([address] (12/1/2020)        Plan:     1. Pneumonia: IV antibiotic per intensivists.      2. Acute on chronic diastolic heart failure with EF 64%: Continue current diuretic regimen while treating the pneumonia. Watch renal function carefully as he is likely volume depleted give his entire immunological state.      3. Hypertension: Blood pressure currently at goal.     4. Sepsis: improved overall.     Signed By: Jp Lopez MD     December 12, 2020

## 2020-12-12 NOTE — CONSULTS
PULMONARY ICU CONSULT  [address] [place] [address]    Name: Zoltan Garcia MRN: [id number]   : 1932 Hospital: [facility]   Date: 2020  Admission date: 2020 Hospital Day: 2       HPI:     Hospital Problems  Date Reviewed: 2020          Codes Class Noted POA    Severe sepsis ([facility] [address] ICD-10-CM: A41.9, R65.20  ICD-9-CM: 038.9, 995.92  2020 Yes        HAP (hospital-acquired pneumonia) ICD-10-CM: J18.9, Y95  ICD-9-CM: 235  2020 Yes        Cervical stenosis of spinal canal ICD-10-CM: M48.02  ICD-9-CM: 723.0  2020 Yes        Acute renal failure (ARF) ([facility] [address] ICD-10-CM: N17.9  ICD-9-CM: 584.9  2020 Yes        CRI (chronic renal insufficiency) (Chronic) ICD-10-CM: N18.9  ICD-9-CM: 585.9  2012 Yes        HTN (hypertension) ICD-10-CM: I10  ICD-9-CM: 401.9  2011 Yes                   [x] High complexity decision making was performed  [x] See my orders for details      Subjective/Initial History:     I was asked by Karin Ramirez MD to see Zoltan Garcia  a 80 y.o.  male in consultation     Excerpts from admission 2020 or consult notes as follows:   , 70-year-old male came in because of shortness of breath dyspnea and he was hypoxic he had history of pneumonia recently discharged 4 days ago he has recurrent hospitalization because of infection pneumonia significant past medical history of chronic kidney disease, lymphoma and prostate cancer he was put on 100% nonrebreather mask his saturation was in 80s now he is on blood percent FiO2 and a shock state he was put on vasopressor Levophed so admitted and critical care consult was called.       Allergies   Allergen Reactions    Pcn [Penicillins] Swelling        MAR reviewed and pertinent medications noted or modified as needed     Current Facility-Administered Medications   Medication    meropenem (MERREM) 1 g in sterile water (preservative free) 20 mL IV syringe    sodium chloride (NS) flush 5-40 mL  sodium chloride (NS) flush 5-40 mL    acetaminophen (TYLENOL) tablet 650 mg    Or    acetaminophen (TYLENOL) suppository 650 mg    polyethylene glycol (MIRALAX) packet 17 g    promethazine (PHENERGAN) tablet 12.5 mg    Or    ondansetron (ZOFRAN) injection 4 mg    famotidine (PF) (PEPCID) 20 mg in 0.9% sodium chloride 10 mL injection    heparin (porcine) injection 5,000 Units    NOREPINephrine (LEVOPHED) 8 mg in 5% dextrose 250mL (32 mcg/mL) infusion    albuterol-ipratropium (DUO-NEB) 2.5 MG-0.5 MG/3 ML    Vancomycin Dosed by Pharmacy    hydrocortisone Sod Succ (PF) (SOLU-CORTEF) injection 50 mg      Patient PCP: Irene Tellez MD  PMH:  has a past medical history of CRI (chronic renal insufficiency) (2012), Gout (2011), Hypertension, Lymphoma ([facility] [address] and Prostate CA ([facility] [address] (2011). PSH:   has a past surgical history that includes hx prostatectomy; hc bone marrow biopsy; and endoscopy, colon, diagnostic (). FHX: family history includes Hypertension in his mother. SHX:  reports that he has never smoked. He has never used smokeless tobacco. He reports previous drug use. He reports that he does not drink alcohol. ROS:    Review of Systems   Constitutional: Positive for malaise/fatigue. HENT: Negative. Eyes: Negative. Respiratory: Positive for cough and shortness of breath. Cardiovascular: Negative. Gastrointestinal: Negative. Genitourinary: Negative. Musculoskeletal: Negative. Skin: Negative. Neurological: Negative. Endo/Heme/Allergies: Negative. Psychiatric/Behavioral: Negative.          Objective:     Vital Signs: Telemetry:    normal sinus rhythm Intake/Output:   Visit Vitals  BP (!) 155/74 (BP 1 Location: Right arm, BP Patient Position: At rest;Head of bed elevated (Comment degrees))   Pulse 67   Temp 99 °F (37.2 °C)   Resp 18   Ht 5' 7\" (1.702 m)   Wt 56 kg (123 lb 7.3 oz)   SpO2 100%   BMI 19.34 kg/m²       Temp (24hrs), Av.8 °F (37.1 °C), Min:98.5 °F (36.9 °C), Max:99 °F (37.2 °C)        O2 Device: BIPAP         Wt Readings from Last 4 Encounters:   12/11/20 56 kg (123 lb 7.3 oz)   12/05/20 64.3 kg (141 lb 12.1 oz)   11/27/20 64.9 kg (143 lb)   11/11/20 61.7 kg (136 lb)          Intake/Output Summary (Last 24 hours) at 12/12/2020 0818  Last data filed at 12/11/2020 1700  Gross per 24 hour   Intake 3000 ml   Output 30 ml   Net 2970 ml       Last shift:      No intake/output data recorded. Last 3 shifts: 12/10 1901 - 12/12 0700  In: 3000 [I.V.:3000]  Out: 30 [Urine:30]       Physical Exam:     Physical Exam   Constitutional: He appears distressed. HENT:   Head: Normocephalic and atraumatic. Eyes: Pupils are equal, round, and reactive to light. Conjunctivae are normal.   Neck: Normal range of motion. Neck supple. Cardiovascular: Normal rate and regular rhythm. Pulmonary/Chest: He has rales. Abdominal: Soft. Musculoskeletal: Normal range of motion. Neurological: He is alert.         Labs:    Recent Labs     12/12/20  0400 12/11/20  1144   WBC 17.7* 8.2   HGB 10.6* 12.1    193   INR  --  1.3*   APTT  --  26.6     Recent Labs     12/12/20  0400 12/11/20  1525 12/11/20  1144     --  144   K 3.6  --  3.3*   *  --  105   CO2 24  --  26   *  --  82   BUN 66*  --  66*   CREA 2.67*  --  2.68*   CA 8.5  --  9.6   MG  --   --  2.0   LAC  --  4.0* 5.3*   ALB 1.8*  --  2.4*   ALT 21  --  26     Recent Labs     12/11/20  1300   PH 7.43   PCO2 32*   PO2 74*   HCO3 22   FIO2 100.0     Recent Labs     12/12/20  0400 12/11/20  1545 12/11/20  1144   TROIQ 0.10* 0.12* 0.07*     No results found for: BNPP, BNP   Lab Results   Component Value Date/Time    Culture result: SCANT NORMAL RESPIRATORY FAMILIA 12/02/2020 06:13 PM    Culture result: MRSA NOT PRESENT 12/01/2020 07:09 AM    Culture result:  12/01/2020 07:09 AM         Screening of patient nares for MRSA is for surveillance purposes and, if positive, to facilitate isolation considerations in high risk settings. It is not intended for automatic decolonization interventions per se as regimens are not sufficiently effective to warrant routine use. Lab Results   Component Value Date/Time    TSH 1.79 11/20/2020 01:31 PM       Imaging:    CXR Results  (Last 48 hours)               12/11/20 1839  XR CHEST SNGL V Final result    Impression:  FINDINGS: Impression: Frontal single view chest.       Interval placement left central catheter distal tip SVC/RA junction region. Unchanged cardiomediastinal silhouette. Slightly increasing bilateral pulmonary   airspace edema and/or pneumonia and/or atelectasis. Increasing consolidation of   the left lower lobe. Interval development of small left pleural effusion. No pneumothorax. Narrative:  Central line placement. Comparison chest x-ray 12/11/2020 at 1154 hours. 12/11/20 1210  XR CHEST SNGL V Final result    Narrative:  Chest single view. Comparison single view chest December 2, 2020. Now present, there is airspace opacity left lower lobe lung. Likely pneumonia. Cardiac and mediastinal structures unchanged benign thoracic aorta   atherosclerosis. No pneumothorax or sizable pleural effusion. Follow-up to clearance recommended. Results from [place] encounter on 12/11/20   XR CHEST SNGL V    Narrative Central line placement. Comparison chest x-ray 12/11/2020 at 1154 hours. Impression FINDINGS: Impression: Frontal single view chest.    Interval placement left central catheter distal tip SVC/RA junction region. Unchanged cardiomediastinal silhouette. Slightly increasing bilateral pulmonary  airspace edema and/or pneumonia and/or atelectasis. Increasing consolidation of  the left lower lobe. Interval development of small left pleural effusion. No pneumothorax. XR CHEST SNGL V    Narrative Chest single view. Comparison single view chest December 2, 2020.     Now present, there is airspace opacity left lower lobe lung. Likely pneumonia. Cardiac and mediastinal structures unchanged benign thoracic aorta  atherosclerosis. No pneumothorax or sizable pleural effusion. Follow-up to clearance recommended. Results from [place] encounter on 11/30/20   XR CHEST PORT    Narrative EXAM: XR CHEST PORT    INDICATION: Acute hypoxemic respiratory failure. Bilateral pleural effusions. COMPARISON: Portable chest on 11/30/2020. CT angiography chest on 1/20/2020. TECHNIQUE: Upright portable chest AP view    FINDINGS: Cardiac monitoring wires are visible. The cardiomediastinal and hilar  contours are within normal limits. The pulmonary vasculature is within normal  limits. Bilateral small pleural effusions are unchanged. Decreased opacity at the right  lung base. Decreased opacity at the left lung base. Upper lobes are clear. No  pneumothorax. The visualized bones and upper abdomen are age-appropriate. Impression IMPRESSION:    Improved lung aeration. Unchanged small bilateral pleural effusions. Results from [place] encounter on 12/11/20   CT CHEST WO CONT    Narrative Comparison chest x-ray 12/11/2020 [facility] and [facility] TECHNIQUE: Axial imaging of the chest without IV contrast, with multiplanar  reformatting, MIPs. Dose reduction: All CT scans at this facility are performed using dose reduction  optimization techniques as appropriate to a performed exam including the  following: Automated exposure control, adjustments of the mA and/or kV according  to patient's size, or use of iterative reconstruction technique. FINDINGS: Left central catheter distal tip SVC. Mild cardiomegaly. Multivessel  coronary artery calcification. No pericardial effusion. Low-density intracardiac  blood suggests anemia. Calcified thoracic aorta without aneurysm. Pulmonary  arteries are not dilated. No mediastinal or hilar lymphadenopathy. Tubular  structure posterior to the esophagus possibly dilated vessel. Bilateral pleural  effusions and lower lung consolidations without air bronchograms. The left lower  lung is completely consolidated/collapsed. The aerated lungs demonstrate patchy  areas of airspace disease. No axillary adenopathy. Included thyroid is  unremarkable. Included upper abdomen demonstrates lobulated, heterogeneous  structure spleen and small retroperitoneal lymphadenopathy; unchanged. Degenerative changes of the bony structures. Soft tissue anasarca. Impression IMPRESSION:  1. Bilateral pleural effusions and lower lung consolidations, the left lower  lung being completely collapsed, this is similar to previous. 2. Interval development patchy airspace disease in the aerated lungs consistent  with pneumonia. 3. Abnormal spleen, small retroperitoneal lymphadenopathy unchanged. 4. Atherosclerosis. IMPRESSION:   1. Acute respiratory failure with Hypoxia   2. Severe sepsis with septic shock  3. Community-acquired versus hospital-acquired pneumonia  4. History of lymphoma and prostate cancer  5. Acute on chronic kidney disease  6. CHF with diastolic dysfunction  7. Lactic acidosis with fluid behind  8. Body mass index is 19.34 kg/m². 9. Elevated troponin  10. Multiorgan dysfunction as outlined above: Pt has one or more acute or chronic illnesses with severe exacerbation with progression or side effects of treatment that poses a threat to life or bodily function  11. Additional workup outlined below  12. Pt is requiring Drug therapy requiring intensive monitoring for toxicity  13. Pt is unstable, unpredictable needing inpatient monitoring; is acutely ill and at high risk of sudden decline and decompensation with severe consequenses and continued end organ dysfunction and failure  14. Prognosis guarded       RECOMMENDATIONS/PLAN:     1.  BIPAP for non invasive ventilatory life support to prevent worsening respiratory acidosis  2. Agree with Empiric IV antibiotics pending culture results   3. Follow culture results on meropenem and vancomycin  4. Patient is on vasopressors Levophed we will try to wean  5. Supplemental O2 to keep sats > 93%  6. Aspiration precautions  7. Labs to follow electrolytes, renal function and and blood counts  8. Glucose monitoring and SSI  9. Bronchial hygiene with respiratory therapy techniques, bronchodilators  10. DVT, SUP prophylaxis       Patient is DNR       This care involved high complexity medical decision making: I personally:  · Reviewed the flowsheet and previous days notes  · Reviewed and summarized records or history from previous days note or discussions with staff, family  · High Risk Drug therapy requiring intensive monitoring for toxicity: eg steroids, pressors, antibiotics  · Reviewed and/or ordered Clinical lab tests  · Reviewed images and/or ordered Radiology tests  · Reviewed the patients ECG / Telemetry  · Reviewed and/or adjusted NiPPV settings  · discussed my assessment/management with : Nursing, Hospitalist and Family for coordination of care    I have spent critical care time involved in lab review, consultations with specialist, family decision-making, and documentation. During this entire length of time I was immediately available to the patient. The reason for providing this level of medical care for this critically ill patient was due to a critical illness that impaired one or more vital organ systems such that there was a high probability of imminent or life threatening deterioration in the patients condition. This care involved high complexity decision making to assess, manipulate, and support vital system functions, to treat this degreee vital organ system failure and to prevent further life threatening deterioration of the patients condition.  Time spent 55 min      Courtney Carcamo MD

## 2020-12-13 ENCOUNTER — APPOINTMENT (OUTPATIENT)
Dept: GENERAL RADIOLOGY | Age: 85
DRG: 871 | End: 2020-12-13
Attending: INTERNAL MEDICINE
Payer: MEDICARE

## 2020-12-13 LAB
ALBUMIN SERPL-MCNC: 1.7 G/DL (ref 3.5–5)
ALBUMIN/GLOB SERPL: 0.6 {RATIO} (ref 1.1–2.2)
ALP SERPL-CCNC: 85 U/L (ref 45–117)
ALT SERPL-CCNC: 22 U/L (ref 12–78)
ANION GAP SERPL CALC-SCNC: 13 MMOL/L (ref 5–15)
ARTERIAL PATENCY WRIST A: YES
AST SERPL W P-5'-P-CCNC: 70 U/L (ref 15–37)
BASE DEFICIT BLDA-SCNC: 1.8 MMOL/L (ref 0–2)
BASOPHILS # BLD: 0 K/UL (ref 0–0.1)
BASOPHILS NFR BLD: 0 % (ref 0–1)
BDY SITE: ABNORMAL
BILIRUB SERPL-MCNC: 1 MG/DL (ref 0.2–1)
BUN SERPL-MCNC: 78 MG/DL (ref 6–20)
BUN/CREAT SERPL: 33 (ref 12–20)
CA-I BLD-MCNC: 8.4 MG/DL (ref 8.5–10.1)
CHLORIDE SERPL-SCNC: 114 MMOL/L (ref 97–108)
CO2 SERPL-SCNC: 24 MMOL/L (ref 21–32)
CREAT SERPL-MCNC: 2.39 MG/DL (ref 0.7–1.3)
DATE LAST DOSE: NORMAL
DIFFERENTIAL METHOD BLD: ABNORMAL
EOSINOPHIL # BLD: 0 K/UL (ref 0–0.4)
EOSINOPHIL NFR BLD: 0 % (ref 0–7)
EPAP/CPAP/PEEP, PAPEEP: 5
ERYTHROCYTE [DISTWIDTH] IN BLOOD BY AUTOMATED COUNT: 17.7 % (ref 11.5–14.5)
FIO2 ON VENT: 50 %
GAS FLOW.O2 SETTING OXYMISER: 14 L/MIN
GLOBULIN SER CALC-MCNC: 2.9 G/DL (ref 2–4)
GLUCOSE SERPL-MCNC: 95 MG/DL (ref 65–100)
HCO3 BLDA-SCNC: 23 MMOL/L (ref 22–26)
HCT VFR BLD AUTO: 26.9 % (ref 36.6–50.3)
HGB BLD-MCNC: 9.5 G/DL (ref 12.1–17)
IMM GRANULOCYTES # BLD AUTO: 0.5 K/UL (ref 0–0.04)
IMM GRANULOCYTES NFR BLD AUTO: 3 % (ref 0–0.5)
LYMPHOCYTES # BLD: 0.5 K/UL (ref 0.8–3.5)
LYMPHOCYTES NFR BLD: 3 % (ref 12–49)
MCH RBC QN AUTO: 31.7 PG (ref 26–34)
MCHC RBC AUTO-ENTMCNC: 35.3 G/DL (ref 30–36.5)
MCV RBC AUTO: 89.7 FL (ref 80–99)
MONOCYTES # BLD: 0.4 K/UL (ref 0–1)
MONOCYTES NFR BLD: 2 % (ref 5–13)
NEUTS SEG # BLD: 17.2 K/UL (ref 1.8–8)
NEUTS SEG NFR BLD: 95 % (ref 32–75)
PCO2 BLDA: 28 MMHG (ref 35–45)
PEEP MAX SETTING VENT: 10 CM[H2O]
PH BLDA: 7.48 [PH] (ref 7.35–7.45)
PLATELET # BLD AUTO: 147 K/UL (ref 150–400)
PMV BLD AUTO: 12.8 FL (ref 8.9–12.9)
PO2 BLDA: 88 MMHG (ref 75–100)
POTASSIUM SERPL-SCNC: 3.2 MMOL/L (ref 3.5–5.1)
PROT SERPL-MCNC: 4.6 G/DL (ref 6.4–8.2)
RBC # BLD AUTO: 3 M/UL (ref 4.1–5.7)
REPORTED DOSE,DOSE: NORMAL UNITS
SAO2 % BLD: 98 %
SAO2% DEVICE SAO2% SENSOR NAME: ABNORMAL
SODIUM SERPL-SCNC: 151 MMOL/L (ref 136–145)
SPECIMEN SITE: ABNORMAL
VANCOMYCIN SERPL-MCNC: 19.7 UG/ML
WBC # BLD AUTO: 18.1 K/UL (ref 4.1–11.1)

## 2020-12-13 PROCEDURE — 80202 ASSAY OF VANCOMYCIN: CPT

## 2020-12-13 PROCEDURE — 74011250636 HC RX REV CODE- 250/636: Performed by: EMERGENCY MEDICINE

## 2020-12-13 PROCEDURE — 74011250636 HC RX REV CODE- 250/636: Performed by: INTERNAL MEDICINE

## 2020-12-13 PROCEDURE — 74011250636 HC RX REV CODE- 250/636

## 2020-12-13 PROCEDURE — 74011250637 HC RX REV CODE- 250/637: Performed by: INTERNAL MEDICINE

## 2020-12-13 PROCEDURE — 77010033711 HC HIGH FLOW OXYGEN

## 2020-12-13 PROCEDURE — 65270000029 HC RM PRIVATE

## 2020-12-13 PROCEDURE — 71045 X-RAY EXAM CHEST 1 VIEW: CPT

## 2020-12-13 PROCEDURE — 74011250636 HC RX REV CODE- 250/636: Performed by: PHYSICIAN ASSISTANT

## 2020-12-13 PROCEDURE — 74011000250 HC RX REV CODE- 250: Performed by: EMERGENCY MEDICINE

## 2020-12-13 PROCEDURE — 92610 EVALUATE SWALLOWING FUNCTION: CPT

## 2020-12-13 PROCEDURE — 74011000258 HC RX REV CODE- 258: Performed by: PHYSICIAN ASSISTANT

## 2020-12-13 PROCEDURE — 82803 BLOOD GASES ANY COMBINATION: CPT

## 2020-12-13 PROCEDURE — 36415 COLL VENOUS BLD VENIPUNCTURE: CPT

## 2020-12-13 PROCEDURE — 94660 CPAP INITIATION&MGMT: CPT

## 2020-12-13 PROCEDURE — 85025 COMPLETE CBC W/AUTO DIFF WBC: CPT

## 2020-12-13 PROCEDURE — 77010033678 HC OXYGEN DAILY

## 2020-12-13 PROCEDURE — 5A09357 ASSISTANCE WITH RESPIRATORY VENTILATION, LESS THAN 24 CONSECUTIVE HOURS, CONTINUOUS POSITIVE AIRWAY PRESSURE: ICD-10-PCS | Performed by: HOSPITALIST

## 2020-12-13 PROCEDURE — 80053 COMPREHEN METABOLIC PANEL: CPT

## 2020-12-13 RX ORDER — HYDROCORTISONE SODIUM SUCCINATE 100 MG/2ML
50 INJECTION, POWDER, FOR SOLUTION INTRAMUSCULAR; INTRAVENOUS EVERY 12 HOURS
Status: DISCONTINUED | OUTPATIENT
Start: 2020-12-13 | End: 2020-12-14

## 2020-12-13 RX ORDER — POTASSIUM CHLORIDE 7.45 MG/ML
INJECTION INTRAVENOUS
Status: COMPLETED
Start: 2020-12-13 | End: 2020-12-13

## 2020-12-13 RX ORDER — DEXTROSE MONOHYDRATE 50 MG/ML
25 INJECTION, SOLUTION INTRAVENOUS CONTINUOUS
Status: DISCONTINUED | OUTPATIENT
Start: 2020-12-13 | End: 2020-12-21 | Stop reason: HOSPADM

## 2020-12-13 RX ORDER — POTASSIUM CHLORIDE 7.45 MG/ML
10 INJECTION INTRAVENOUS
Status: COMPLETED | OUTPATIENT
Start: 2020-12-13 | End: 2020-12-13

## 2020-12-13 RX ADMIN — DEXTROSE MONOHYDRATE 50 ML/HR: 50 INJECTION, SOLUTION INTRAVENOUS at 09:23

## 2020-12-13 RX ADMIN — POTASSIUM CHLORIDE 10 MEQ: 7.46 INJECTION, SOLUTION INTRAVENOUS at 11:10

## 2020-12-13 RX ADMIN — POTASSIUM CHLORIDE 10 MEQ: 7.46 INJECTION, SOLUTION INTRAVENOUS at 09:23

## 2020-12-13 RX ADMIN — HEPARIN SODIUM 5000 UNITS: 5000 INJECTION INTRAVENOUS; SUBCUTANEOUS at 17:12

## 2020-12-13 RX ADMIN — HYDROCORTISONE SODIUM SUCCINATE 50 MG: 100 INJECTION, POWDER, FOR SOLUTION INTRAMUSCULAR; INTRAVENOUS at 21:40

## 2020-12-13 RX ADMIN — Medication 10 ML: at 06:12

## 2020-12-13 RX ADMIN — MEROPENEM 1 G: 1 INJECTION, POWDER, FOR SOLUTION INTRAVENOUS at 12:10

## 2020-12-13 RX ADMIN — VANCOMYCIN HYDROCHLORIDE 500 MG: 500 INJECTION, POWDER, LYOPHILIZED, FOR SOLUTION INTRAVENOUS at 13:13

## 2020-12-13 RX ADMIN — Medication 10 ML: at 13:14

## 2020-12-13 RX ADMIN — MEROPENEM 1 G: 1 INJECTION, POWDER, FOR SOLUTION INTRAVENOUS at 21:40

## 2020-12-13 RX ADMIN — Medication 10 ML: at 21:40

## 2020-12-13 RX ADMIN — HEPARIN SODIUM 5000 UNITS: 5000 INJECTION INTRAVENOUS; SUBCUTANEOUS at 02:19

## 2020-12-13 RX ADMIN — FAMOTIDINE 20 MG: 10 INJECTION INTRAVENOUS at 21:40

## 2020-12-13 RX ADMIN — HYDROCORTISONE SODIUM SUCCINATE 50 MG: 100 INJECTION, POWDER, FOR SOLUTION INTRAMUSCULAR; INTRAVENOUS at 02:19

## 2020-12-13 RX ADMIN — MUPIROCIN: 20 OINTMENT TOPICAL at 09:11

## 2020-12-13 RX ADMIN — MEROPENEM 1 G: 1 INJECTION, POWDER, FOR SOLUTION INTRAVENOUS at 04:21

## 2020-12-13 RX ADMIN — FAMOTIDINE 20 MG: 10 INJECTION INTRAVENOUS at 09:11

## 2020-12-13 NOTE — CONSULTS
PULMONARY ICU CONSULT  [address] [place] [address]    Name: Sai Lee MRN: [id number]   : 1932 Hospital: [address] [place] [address]   Date: 2020  Admission date: 2020 Hospital Day: 3       HPI:     Hospital Problems  Date Reviewed: 2020          Codes Class Noted POA    Severe sepsis ([address] ICD-10-CM: A41.9, R65.20  ICD-9-CM: 038.9, 995.92  2020 Yes        HAP (hospital-acquired pneumonia) ICD-10-CM: J18.9, Y95  ICD-9-CM: 114  2020 Yes        Cervical stenosis of spinal canal ICD-10-CM: M48.02  ICD-9-CM: 723.0  2020 Yes        Acute renal failure (ARF) (HCC) ICD-10-CM: N17.9  ICD-9-CM: 584.9  2020 Yes        CRI (chronic renal insufficiency) (Chronic) ICD-10-CM: N18.9  ICD-9-CM: 585.9  2012 Yes        HTN (hypertension) ICD-10-CM: I10  ICD-9-CM: 401.9  2011 Yes                   [x] High complexity decision making was performed  [x] See my orders for details      Subjective/Initial History:     I was asked by Diya Toribio MD to see [employer]  a 80 y.o.  male in consultation     Excerpts from admission 2020 or consult notes as follows:   , 27-year-old male came in because of shortness of breath dyspnea and he was hypoxic he had history of pneumonia recently discharged 4 days ago he has recurrent hospitalization because of infection pneumonia significant past medical history of chronic kidney disease, lymphoma and prostate cancer he was put on 100% nonrebreather mask his saturation was in 80s now he is on blood percent FiO2 and a shock state he was put on vasopressor Levophed so admitted and critical care consult was called.  awake alert asking for water has gurgly upper airway noise. Norepinephrine down to 2 mics with well-maintained blood pressure.     Allergies   Allergen Reactions    Pcn [Penicillins] Swelling        MAR reviewed and pertinent medications noted or modified as needed     Current Facility-Administered Medications Medication    dextrose 5% infusion    hydrocortisone Sod Succ (PF) (SOLU-CORTEF) injection 50 mg    mupirocin (BACTROBAN) 2 % ointment    meropenem (MERREM) 1 g in sterile water (preservative free) 20 mL IV syringe    sodium chloride (NS) flush 5-40 mL    sodium chloride (NS) flush 5-40 mL    acetaminophen (TYLENOL) tablet 650 mg    Or    acetaminophen (TYLENOL) suppository 650 mg    polyethylene glycol (MIRALAX) packet 17 g    promethazine (PHENERGAN) tablet 12.5 mg    Or    ondansetron (ZOFRAN) injection 4 mg    famotidine (PF) (PEPCID) 20 mg in 0.9% sodium chloride 10 mL injection    heparin (porcine) injection 5,000 Units    NOREPINephrine (LEVOPHED) 8 mg in 5% dextrose 250mL (32 mcg/mL) infusion    albuterol-ipratropium (DUO-NEB) 2.5 MG-0.5 MG/3 ML    Vancomycin Dosed by Pharmacy      Patient PCP: Kirk Cooper MD  PMH:  has a past medical history of CRI (chronic renal insufficiency) (12/13/2012), Gout (1/4/2011), Hypertension, Lymphoma ([facility] [address] and Prostate CA ([facility] [address] (1/4/2011). PSH:   has a past surgical history that includes hx prostatectomy; hc bone marrow biopsy; and endoscopy, colon, diagnostic (2003). FHX: family history includes Hypertension in his mother. SHX:  reports that he has never smoked. He has never used smokeless tobacco. He reports previous drug use. He reports that he does not drink alcohol. ROS:    Review of Systems   Constitutional: Positive for malaise/fatigue. HENT: Negative. Eyes: Negative. Respiratory: Positive for cough and shortness of breath. Cardiovascular: Negative. Gastrointestinal: Negative. Genitourinary: Negative. Musculoskeletal: Negative. Skin: Negative. Neurological: Negative. Endo/Heme/Allergies: Negative. Psychiatric/Behavioral: Negative.          Objective:     Vital Signs: Telemetry:    normal sinus rhythm Intake/Output:   Visit Vitals  BP (!) 146/69 (BP Patient Position: At rest)   Pulse 82   Temp 98.1 °F (36.7 °C)   Resp 23   Ht 5' 2.99\" (1.6 m)   Wt 64.3 kg (141 lb 12.1 oz)   SpO2 97%   BMI 25.12 kg/m²       Temp (24hrs), Av.8 °F (36.6 °C), Min:97.6 °F (36.4 °C), Max:98.1 °F (36.7 °C)        O2 Device: BIPAP         Wt Readings from Last 4 Encounters:   20 64.3 kg (141 lb 12.1 oz)   20 64.3 kg (141 lb 12.1 oz)   20 64.9 kg (143 lb)   20 61.7 kg (136 lb)          Intake/Output Summary (Last 24 hours) at 2020 0901  Last data filed at 2020 0600  Gross per 24 hour   Intake 760.86 ml   Output 1120 ml   Net -359.14 ml       Last shift:      No intake/output data recorded. Last 3 shifts:  1901 -  0700  In: 760.9 [I.V.:760.9]  Out: 1120 [Urine:1120]       Physical Exam:     Physical Exam   Constitutional: He is oriented to person, place, and time. Thin awake alert no distress has upper airway noise   HENT:   Head: Normocephalic and atraumatic. Eyes: Pupils are equal, round, and reactive to light. Conjunctivae and EOM are normal.   Neck: Normal range of motion. Neck supple. Cardiovascular: Normal rate and regular rhythm. Pulmonary/Chest: Effort normal. He has rales. Expiratory rhonchi has upper airway noise over the neck   Abdominal: Soft. Bowel sounds are normal.   Thin   Musculoskeletal: Normal range of motion. Neurological: He is alert and oriented to person, place, and time. Skin: Skin is warm and dry. Psychiatric: He has a normal mood and affect.         Labs:    Recent Labs     20  0422 20  0400 20  1144   WBC 18.1* 17.7* 8.2   HGB 9.5* 10.6* 12.1   * 184 193   INR  --   --  1.3*   APTT  --   --  26.6     Recent Labs     20  0422 20  0839 20  0400 20  1525 20  1144   *  --  145  --  144   K 3.2*  --  3.6  --  3.3*   *  --  112*  --  105   CO2 24  --  24  --  26   GLU 95  --  126*  --  82   BUN 78*  --  66*  --  66*   CREA 2.39*  --  2.67*  --  2.68*   CA 8.4*  --  8.5  --  9.6   MG  --   -- --   --  2.0   LAC  --  2.5*  --  4.0* 5.3*   ALB 1.7*  --  1.8*  --  2.4*   ALT 22  --  21  --  26     Recent Labs     12/13/20  0440 12/11/20  1300   PH 7.48* 7.43   PCO2 28* 32*   PO2 88 74*   HCO3 23 22   FIO2 50 100.0     Recent Labs     12/12/20  0400 12/11/20  1545 12/11/20  1144   TROIQ 0.10* 0.12* 0.07*       Lab Results   Component Value Date/Time    Culture result:  12/11/2020 11:21 AM     One of four bottles has been flagged positive by instrument. Bottle has been sent to [facility] laboratory to assess for possible growth. Culture result:  12/11/2020 11:21 AM     Gram Positive Cocci in clusters CALLED TO AND READ BACK BY Ortiz Hill on 12.12.2020 at 13:37 by [email or web address]    Culture result: SCANT NORMAL RESPIRATORY FAMILIA 12/02/2020 06:13 PM     Lab Results   Component Value Date/Time    TSH 1.79 11/20/2020 01:31 PM       Imaging:    CXR Results  (Last 48 hours)               12/13/20 0200  XR CHEST PORT Final result    Narrative:  Chest single view. Comparison single view chest December 11, 2020. Improved aeration through lung bases. Stable left neck central venous catheter. Cardiac and mediastinal structures unchanged. Thoracic aorta atherosclerosis. No   pneumothorax or sizable pleural effusion. 12/11/20 1839  XR CHEST SNGL V Final result    Impression:  FINDINGS: Impression: Frontal single view chest.       Interval placement left central catheter distal tip SVC/RA junction region. Unchanged cardiomediastinal silhouette. Slightly increasing bilateral pulmonary   airspace edema and/or pneumonia and/or atelectasis. Increasing consolidation of   the left lower lobe. Interval development of small left pleural effusion. No pneumothorax. Narrative:  Central line placement. Comparison chest x-ray 12/11/2020 at 1154 hours. 12/11/20 1210  XR CHEST SNGL V Final result    Narrative:  Chest single view. Comparison single view chest December 2, 2020.        Now present, there is airspace opacity left lower lobe lung. Likely pneumonia. Cardiac and mediastinal structures unchanged benign thoracic aorta   atherosclerosis. No pneumothorax or sizable pleural effusion. Follow-up to clearance recommended. Results from [place] encounter on 12/11/20   XR CHEST PORT    Narrative Chest single view. Comparison single view chest December 11, 2020. Improved aeration through lung bases. Stable left neck central venous catheter. Cardiac and mediastinal structures unchanged. Thoracic aorta atherosclerosis. No  pneumothorax or sizable pleural effusion. XR CHEST SNGL V    Narrative Central line placement. Comparison chest x-ray 12/11/2020 at 1154 hours. Impression FINDINGS: Impression: Frontal single view chest.    Interval placement left central catheter distal tip SVC/RA junction region. Unchanged cardiomediastinal silhouette. Slightly increasing bilateral pulmonary  airspace edema and/or pneumonia and/or atelectasis. Increasing consolidation of  the left lower lobe. Interval development of small left pleural effusion. No pneumothorax. XR CHEST SNGL V    Narrative Chest single view. Comparison single view chest December 2, 2020. Now present, there is airspace opacity left lower lobe lung. Likely pneumonia. Cardiac and mediastinal structures unchanged benign thoracic aorta  atherosclerosis. No pneumothorax or sizable pleural effusion. Follow-up to clearance recommended. Results from [place] encounter on 12/11/20   CT CHEST WO CONT    Narrative Comparison chest x-ray 12/11/2020 [facility] and chest CTA 11/25/2020 [facility]. TECHNIQUE: Axial imaging of the chest without IV contrast, with multiplanar  reformatting, MIPs.   Dose reduction: All CT scans at this facility are performed using dose reduction  optimization techniques as appropriate to a performed exam including the  following: Automated exposure control, adjustments of the mA and/or kV according  to patient's size, or use of iterative reconstruction technique. FINDINGS: Left central catheter distal tip SVC. Mild cardiomegaly. Multivessel  coronary artery calcification. No pericardial effusion. Low-density intracardiac  blood suggests anemia. Calcified thoracic aorta without aneurysm. Pulmonary  arteries are not dilated. No mediastinal or hilar lymphadenopathy. Tubular  structure posterior to the esophagus possibly dilated vessel. Bilateral pleural  effusions and lower lung consolidations without air bronchograms. The left lower  lung is completely consolidated/collapsed. The aerated lungs demonstrate patchy  areas of airspace disease. No axillary adenopathy. Included thyroid is  unremarkable. Included upper abdomen demonstrates lobulated, heterogeneous  structure spleen and small retroperitoneal lymphadenopathy; unchanged. Degenerative changes of the bony structures. Soft tissue anasarca. Impression IMPRESSION:  1. Bilateral pleural effusions and lower lung consolidations, the left lower  lung being completely collapsed, this is similar to previous. 2. Interval development patchy airspace disease in the aerated lungs consistent  with pneumonia. 3. Abnormal spleen, small retroperitoneal lymphadenopathy unchanged. 4. Atherosclerosis. IMPRESSION:   1. Acute respiratory failure with Hypoxia have switched to nasal high flow 50%  2. Severe sepsis with septic shock blood pressure well maintained norepinephrine decreased  3. Community-acquired versus hospital-acquired pneumonia has gram-positive cocci in 1 of 4 blood bottles MRSA in nares  4. Hypokalemia to be repleted  5. Pharyngeal dysphagia speech has evaluated and he has totally ineffective swallow discussed with him placing NG tube for tube feeding  6. Hypernatremia we will change IV  7. History of lymphoma and prostate cancer  8. Acute on chronic kidney disease creatinine improved  9.  CHF with diastolic dysfunction chest x-ray improving  10. Lactic acidosis   11. Body mass index is 25.12 kg/m². 12. Elevated troponin  13. RECOMMENDATIONS/PLAN:     1. Patient has stated he does not want BiPAP anymore. Is switched to nasal high flow with well-maintained oxygen saturation  2. Blood culture 1 of 4 with gram-positive cocci may be skin contaminant but remains on vancomycin  3. Nasal MRSA smear positive   4. Pressors decreased  5. We will ask speech to evaluate for swallow     chest x-ray is improved with decreasing left effusion and infiltrate right side almost clear at this point. Does have hypernatremia and hypokalemia will replace IV  And appropriate for oral intake by speech evaluation.   Will place NG tube and start tube feeding  Time of care 30 minutes      Geremias Marie MD

## 2020-12-13 NOTE — PROGRESS NOTES
OT eval order received and acknowledged. OT eval attempted at 1148 however pt declined therapy at this time as pt not feeling well. Will continue to follow patient and attempt OT eval at a later time. Thank you.

## 2020-12-13 NOTE — PROGRESS NOTES
SPEECH LANGUAGE PATHOLOGY BEDSIDE SWALLOW EVALUATIONS  Patient: Dong Joe  (75 y.o. )  Date: 12/13/2020  Primary Diagnosis:   Sepsis  Precautions:  Aspiration    ASSESSMENT :  Informally, pt with weak vocal quality. HFNC present. Administering trace PO trials of ice chips x3. Pt with reduced ROM with BOT and upon digital palpation: pt's swallow appears absent. Pt with exhibits wet gurgly cough post all po trials. Patient will benefit from skilled intervention to address the above impairments. Patients rehabilitation potential is considered to be Guarded     PLAN :  Recommendations and Planned Interventions:  Initiate alternate source of nutrient intake as deemed appropriate by MD, patient is not appropriate for PO intake. MBS when patient medically appropriate and not appropriate at this time s/t dependent on HFNC. ST to follow for initiation of therapeutic swallow exercises. Frequency/Duration: Patient will be followed by speech-language pathology daily to address goals. Discharge Recommendations: To Be Determined     SUBJECTIVE:   Rn present. Pt presenting as globally weak and informally, vocal quality is weak and raspy. Dr. Larisa Rodriguez reports pt with PNA and recent hospitalization for PNA. OBJECTIVE:     Past Medical History:   Diagnosis Date    CRI (chronic renal insufficiency) 12/13/2012    Gout 1/4/2011    Hypertension     Lymphoma ([facility] [address]     Prostate CA ([facility] [address] 1/4/2011       CXR Results  (Last 48 hours)                 12/13/20 0200  XR CHEST PORT Final result    Narrative:  Chest single view. Comparison single view chest December 11, 2020. Improved aeration through lung bases. Stable left neck central venous catheter. Cardiac and mediastinal structures unchanged. Thoracic aorta atherosclerosis. No   pneumothorax or sizable pleural effusion.        12/11/20 1839  XR CHEST SNGL V Final result    Impression:  FINDINGS: Impression: Frontal single view chest.       Interval placement left central catheter distal tip SVC/RA junction region. Unchanged cardiomediastinal silhouette. Slightly increasing bilateral pulmonary   airspace edema and/or pneumonia and/or atelectasis. Increasing consolidation of   the left lower lobe. Interval development of small left pleural effusion. No pneumothorax. Narrative:  Central line placement. Comparison chest x-ray 12/11/2020 at 1154 hours. 12/11/20 1210  XR CHEST SNGL V Final result    Narrative:  Chest single view. Comparison single view chest December 2, 2020. Now present, there is airspace opacity left lower lobe lung. Likely pneumonia. Cardiac and mediastinal structures unchanged benign thoracic aorta   atherosclerosis. No pneumothorax or sizable pleural effusion. Follow-up to clearance recommended. Current Diet:  DIET NPO  DIET TUBE FEEDING     Cognitive and Communication Status:  Neurologic State: Alert  Orientation Level: Disoriented X4  Cognition: Impaired decision making    Swallowing Evaluation:   Oral Assessment:  Oral Assessment  Labial: No impairment  Dentition: Poor  Lingual: (reduced ROM, specifically retraction)  P.O. Trials:  Patient Position: UPright     Consistency Presented: Ice chips    Bolus Acceptance: No impairment     Propulsion: Delayed (# of seconds); Discoordination  Oral Residue: None  Initiation of Swallow: Absent  Laryngeal Elevation: Absent  Aspiration Signs/Symptoms: Change vocal quality;Delayed cough/throat clear    After treatment:   Nursing notified    COMMUNICATION/EDUCATION:   Patient was educated regarding his deficit(s) of dysphagia though requires additional training and education concerning severity of swallow function. The patient's plan of care including recommendations, planned interventions, and recommended diet changes were discussed with: Registered nurse and Physician. Patient is unable to participate in goal setting and plan of care.     Thank you for this referral.  Trish Lew M.S., CCC-SLP  Time Calculation: 8 mins     Problem: Dysphagia (Adult)  Goal: *Acute Goals and Plan of Care (Insert Text)  Description: Speech Therapy Swallow Goals  Initiated 12/13/2020  -Patient will tolerate PO trials without clinical indicators of aspiration given maximal cues within 1-2 day(s). [x] Not met  [ ]  MET   [ ] Progressing  [ ] Yeison Daly  -Patient will participate in modified barium swallow study when medically appropriate, currently on HFNC         [X] Not met  [ ]  MET   [ ] Progressing  [ ] Discontinue  -Patient will perform therapeutic swallowing exercises with maximal cues within 7 day(s). [x] Not met  [ ]  MET   [ ] Progressing  [ ] Discontinue  -Patient will demonstrate understanding of swallow safety precautions and aspiration precautions, diet recs with maximal cues within 7 day(s).         [x] Not met  [ ]  MET   [ ] Progressing  [ ] Discontinue     Outcome: Not Met     Problem: Patient Education: Go to Patient Education Activity  Goal: Patient/Family Education  Outcome: Not Met

## 2020-12-13 NOTE — PROGRESS NOTES
Consult for Vancomycin Dosing by Pharmacy by [facility] Rice     Consult provided for this 80y.o. 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 Day of Therapy: 3  Goal of Level(s): 15-20mcg/dL     Other Current Antibiotics: Merrem      Serum Creatinine Creatinine   Date Value Ref Range Status   12/13/2020 2.39 (H) 0.70 - 1.30 mg/dL Final   12/12/2020 2.67 (H) 0.70 - 1.30 mg/dL Final   12/11/2020 2.68 (H) 0.70 - 1.30 mg/dL Final      Creatinine Clearance Estimated Creatinine Clearance: 17.2 mL/min (A) (based on SCr of 2.39 mg/dL (H)). BUN Lab Results   Component Value Date/Time    BUN 78 (H) 12/13/2020 04:22 AM      WBC Lab Results   Component Value Date/Time    WBC 18.1 (H) 12/13/2020 04:22 AM      Temp 97.9 °F (36.6 °C)     Last Level:    Vancomycin, random   Date Value Ref Range Status   12/13/2020 19.7 ug/mL Final     Comment:     No reference range has been established. Ht Readings from Last 1 Encounters:   12/12/20 160 cm (62.99\")        Wt Readings from Last 1 Encounters:   12/12/20 64.3 kg (141 lb 12.1 oz)     Ideal body weight: 56.9 kg (125 lb 6.4 oz)  Adjusted ideal body weight: 59.8 kg (131 lb 15.1 oz)     Previous Regimen: 1000mg IV x1 on 12/12    New Regimen:   Vancomycin random level is therapeutic. Patient is still BENJI. Give Vancomycin 500mg IV today and draw random level tomorrow morning. Pharmacy to follow daily and will make changes to dose and/or frequency based on clinical status.     _________________________________     Pharmacist Maliha Ca

## 2020-12-13 NOTE — PROGRESS NOTES
Hospitalist Progress Note    Subjective:   Daily Progress Note: 12/13/2020 8:50 AM    Weaned off BiPAP to high flow nasal cannula. Seems to be tolerating this well. Overall respiratory function and functional status is decreased over the last 2 days. Current Facility-Administered Medications   Medication Dose Route Frequency    dextrose 5% infusion  50 mL/hr IntraVENous CONTINUOUS    hydrocortisone Sod Succ (PF) (SOLU-CORTEF) injection 50 mg  50 mg IntraVENous Q12H    [START ON 12/14/2020] VANCOMYCIN RANDOM LAB REMINDER   Other ONCE    mupirocin (BACTROBAN) 2 % ointment   Topical DAILY    meropenem (MERREM) 1 g in sterile water (preservative free) 20 mL IV syringe  1 g IntraVENous Q8H    sodium chloride (NS) flush 5-40 mL  5-40 mL IntraVENous Q8H    sodium chloride (NS) flush 5-40 mL  5-40 mL IntraVENous PRN    acetaminophen (TYLENOL) tablet 650 mg  650 mg Oral Q6H PRN    Or    acetaminophen (TYLENOL) suppository 650 mg  650 mg Rectal Q6H PRN    polyethylene glycol (MIRALAX) packet 17 g  17 g Oral DAILY PRN    promethazine (PHENERGAN) tablet 12.5 mg  12.5 mg Oral Q6H PRN    Or    ondansetron (ZOFRAN) injection 4 mg  4 mg IntraVENous Q6H PRN    famotidine (PF) (PEPCID) 20 mg in 0.9% sodium chloride 10 mL injection  20 mg IntraVENous BID    heparin (porcine) injection 5,000 Units  5,000 Units SubCUTAneous Q12H    NOREPINephrine (LEVOPHED) 8 mg in 5% dextrose 250mL (32 mcg/mL) infusion  0.5-30 mcg/min IntraVENous TITRATE    albuterol-ipratropium (DUO-NEB) 2.5 MG-0.5 MG/3 ML  3 mL Nebulization Q4H PRN    Vancomycin Dosed by Pharmacy  1 Each Other Rx Dosing/Monitoring        Review of Systems  Review of Systems   Constitutional: Positive for malaise/fatigue. Negative for chills and fever. Eyes: Negative. Respiratory: Positive for cough and shortness of breath. Negative for wheezing. Cardiovascular: Negative for chest pain and leg swelling.    Gastrointestinal: Negative for abdominal pain, nausea and vomiting. Genitourinary: Negative for dysuria and urgency. Musculoskeletal: Negative. Skin: Negative. Neurological: Negative. Psychiatric/Behavioral: Negative. Objective:     Visit Vitals  /88 (BP Patient Position: At rest)   Pulse 71   Temp 97.9 °F (36.6 °C)   Resp 18   Ht 5' 2.99\" (1.6 m)   Wt 64.3 kg (141 lb 12.1 oz)   SpO2 100%   BMI 25.12 kg/m²    O2 Flow Rate (L/min): 40 l/min O2 Device: Hi flow nasal cannula    Temp (24hrs), Av.8 °F (36.6 °C), Min:97.6 °F (36.4 °C), Max:98.1 °F (36.7 °C)      No intake/output data recorded.  1901 -  0700  In: 760.9 [I.V.:760.9]  Out: 1120 [Urine:1120]    Recent Results (from the past 24 hour(s))   CBC WITH AUTOMATED DIFF    Collection Time: 20  4:22 AM   Result Value Ref Range    WBC 18.1 (H) 4.1 - 11.1 K/uL    RBC 3.00 (L) 4.10 - 5.70 M/uL    HGB 9.5 (L) 12.1 - 17.0 g/dL    HCT 26.9 (L) 36.6 - 50.3 %    MCV 89.7 80.0 - 99.0 FL    MCH 31.7 26.0 - 34.0 PG    MCHC 35.3 30.0 - 36.5 g/dL    RDW 17.7 (H) 11.5 - 14.5 %    PLATELET 223 (L) 424 - 400 K/uL    MPV 12.8 8.9 - 12.9 FL    NEUTROPHILS 95 (H) 32 - 75 %    LYMPHOCYTES 3 (L) 12 - 49 %    MONOCYTES 2 (L) 5 - 13 %    EOSINOPHILS 0 0 - 7 %    BASOPHILS 0 0 - 1 %    IMMATURE GRANULOCYTES 3 (H) 0.0 - 0.5 %    ABS. NEUTROPHILS 17.2 (H) 1.8 - 8.0 K/UL    ABS. LYMPHOCYTES 0.5 (L) 0.8 - 3.5 K/UL    ABS. MONOCYTES 0.4 0.0 - 1.0 K/UL    ABS. EOSINOPHILS 0.0 0.0 - 0.4 K/UL    ABS. BASOPHILS 0.0 0.0 - 0.1 K/UL    ABS. IMM.  GRANS. 0.5 (H) 0.00 - 0.04 K/UL    DF AUTOMATED     METABOLIC PANEL, COMPREHENSIVE    Collection Time: 20  4:22 AM   Result Value Ref Range    Sodium 151 (H) 136 - 145 mmol/L    Potassium 3.2 (L) 3.5 - 5.1 mmol/L    Chloride 114 (H) 97 - 108 mmol/L    CO2 24 21 - 32 mmol/L    Anion gap 13 5 - 15 mmol/L    Glucose 95 65 - 100 mg/dL    BUN 78 (H) 6 - 20 mg/dL    Creatinine 2.39 (H) 0.70 - 1.30 mg/dL    BUN/Creatinine ratio 33 (H) 12 - 20      GFR est AA 31 (L) >60 ml/min/1.73m2    GFR est non-AA 26 (L) >60 ml/min/1.73m2    Calcium 8.4 (L) 8.5 - 10.1 mg/dL    Bilirubin, total 1.0 0.2 - 1.0 mg/dL    AST (SGOT) 70 (H) 15 - 37 U/L    ALT (SGPT) 22 12 - 78 U/L    Alk. phosphatase 85 45 - 117 U/L    Protein, total 4.6 (L) 6.4 - 8.2 g/dL    Albumin 1.7 (L) 3.5 - 5.0 g/dL    Globulin 2.9 2.0 - 4.0 g/dL    A-G Ratio 0.6 (L) 1.1 - 2.2     VANCOMYCIN, RANDOM    Collection Time: 12/13/20  4:22 AM   Result Value Ref Range    Vancomycin, random 19.7 ug/mL    Reported dose date Not provided      Reported dose: Not provided Units   BLOOD GAS, ARTERIAL    Collection Time: 12/13/20  4:40 AM   Result Value Ref Range    pH 7.48 (H) 7.35 - 7.45      PCO2 28 (L) 35 - 45 mmHg    PO2 88 75 - 100 mmHg    O2 SAT 98 >95 %    BICARBONATE 23 22 - 26 mmol/L    BASE DEFICIT 1.8 0 - 2 mmol/L    O2 METHOD BiPAP      FIO2 50 %    SET RATE 14      EPAP/CPAP/PEEP 5      High PEEP 10      Sample source Arterial      SITE Right Radial      NEW'S TEST YES          XR CHEST PORT   Final Result      CT CHEST WO CONT   Final Result   IMPRESSION:   1. Bilateral pleural effusions and lower lung consolidations, the left lower   lung being completely collapsed, this is similar to previous. 2. Interval development patchy airspace disease in the aerated lungs consistent   with pneumonia. 3. Abnormal spleen, small retroperitoneal lymphadenopathy unchanged. 4. Atherosclerosis. XR CHEST SNGL V   Final Result   FINDINGS: Impression: Frontal single view chest.      Interval placement left central catheter distal tip SVC/RA junction region. Unchanged cardiomediastinal silhouette. Slightly increasing bilateral pulmonary   airspace edema and/or pneumonia and/or atelectasis. Increasing consolidation of   the left lower lobe. Interval development of small left pleural effusion. No pneumothorax.       XR CHEST SNGL V   Final Result      US SPLEEN    (Results Pending)   XR CHEST PORT    (Results Pending)        PHYSICAL EXAM:    Physical Exam  Vitals signs reviewed. Constitutional:       Appearance: He is ill-appearing. HENT:      Head: Normocephalic and atraumatic. Nose: Nose normal.      Mouth/Throat:      Mouth: Mucous membranes are dry. Neck:      Musculoskeletal: Normal range of motion. Cardiovascular:      Rate and Rhythm: Normal rate and regular rhythm. Heart sounds: Normal heart sounds. Pulmonary:      Comments: Crackles bilaterally with diminished breath sounds bilaterally  Abdominal:      General: Abdomen is flat. Bowel sounds are normal.      Palpations: Abdomen is soft. Musculoskeletal: Normal range of motion. Skin:     General: Skin is warm and dry. Neurological:      General: No focal deficit present. Mental Status: He is alert and oriented to person, place, and time. Mental status is at baseline. Psychiatric:         Mood and Affect: Mood normal.          Data Review    Recent Results (from the past 24 hour(s))   CBC WITH AUTOMATED DIFF    Collection Time: 12/13/20  4:22 AM   Result Value Ref Range    WBC 18.1 (H) 4.1 - 11.1 K/uL    RBC 3.00 (L) 4.10 - 5.70 M/uL    HGB 9.5 (L) 12.1 - 17.0 g/dL    HCT 26.9 (L) 36.6 - 50.3 %    MCV 89.7 80.0 - 99.0 FL    MCH 31.7 26.0 - 34.0 PG    MCHC 35.3 30.0 - 36.5 g/dL    RDW 17.7 (H) 11.5 - 14.5 %    PLATELET 186 (L) 646 - 400 K/uL    MPV 12.8 8.9 - 12.9 FL    NEUTROPHILS 95 (H) 32 - 75 %    LYMPHOCYTES 3 (L) 12 - 49 %    MONOCYTES 2 (L) 5 - 13 %    EOSINOPHILS 0 0 - 7 %    BASOPHILS 0 0 - 1 %    IMMATURE GRANULOCYTES 3 (H) 0.0 - 0.5 %    ABS. NEUTROPHILS 17.2 (H) 1.8 - 8.0 K/UL    ABS. LYMPHOCYTES 0.5 (L) 0.8 - 3.5 K/UL    ABS. MONOCYTES 0.4 0.0 - 1.0 K/UL    ABS. EOSINOPHILS 0.0 0.0 - 0.4 K/UL    ABS. BASOPHILS 0.0 0.0 - 0.1 K/UL    ABS. IMM.  GRANS. 0.5 (H) 0.00 - 0.04 K/UL    DF AUTOMATED     METABOLIC PANEL, COMPREHENSIVE    Collection Time: 12/13/20  4:22 AM   Result Value Ref Range    Sodium 151 (H) 136 - 145 mmol/L Potassium 3.2 (L) 3.5 - 5.1 mmol/L    Chloride 114 (H) 97 - 108 mmol/L    CO2 24 21 - 32 mmol/L    Anion gap 13 5 - 15 mmol/L    Glucose 95 65 - 100 mg/dL    BUN 78 (H) 6 - 20 mg/dL    Creatinine 2.39 (H) 0.70 - 1.30 mg/dL    BUN/Creatinine ratio 33 (H) 12 - 20      GFR est AA 31 (L) >60 ml/min/1.73m2    GFR est non-AA 26 (L) >60 ml/min/1.73m2    Calcium 8.4 (L) 8.5 - 10.1 mg/dL    Bilirubin, total 1.0 0.2 - 1.0 mg/dL    AST (SGOT) 70 (H) 15 - 37 U/L    ALT (SGPT) 22 12 - 78 U/L    Alk. phosphatase 85 45 - 117 U/L    Protein, total 4.6 (L) 6.4 - 8.2 g/dL    Albumin 1.7 (L) 3.5 - 5.0 g/dL    Globulin 2.9 2.0 - 4.0 g/dL    A-G Ratio 0.6 (L) 1.1 - 2.2     VANCOMYCIN, RANDOM    Collection Time: 12/13/20  4:22 AM   Result Value Ref Range    Vancomycin, random 19.7 ug/mL    Reported dose date Not provided      Reported dose: Not provided Units   BLOOD GAS, ARTERIAL    Collection Time: 12/13/20  4:40 AM   Result Value Ref Range    pH 7.48 (H) 7.35 - 7.45      PCO2 28 (L) 35 - 45 mmHg    PO2 88 75 - 100 mmHg    O2 SAT 98 >95 %    BICARBONATE 23 22 - 26 mmol/L    BASE DEFICIT 1.8 0 - 2 mmol/L    O2 METHOD BiPAP      FIO2 50 %    SET RATE 14      EPAP/CPAP/PEEP 5      High PEEP 10      Sample source Arterial      SITE Right Radial      NEW'S TEST YES          Assessment/Plan:     Active Problems:    HTN (hypertension) (1/4/2011)      CRI (chronic renal insufficiency) (12/13/2012)      Cervical stenosis of spinal canal (11/20/2020)      Acute renal failure (ARF) (HCC) (11/20/2020)      HAP (hospital-acquired pneumonia) (11/30/2020)      Severe sepsis (HCC) (12/1/2020)      Hospital course: This is an 20-year-old male admitted on 12/11/2020 with acute respiratory failure with hypoxia secondary to bilateral pneumonia as well as septic shock requiring norepinephrine. Triple-lumen catheter was inserted and patient was transferred to the intensive care unit. He is 100% dependent on BiPAP at this point secondary to hypoxia. Much improved while on BiPAP. CT of the chest reveals bilateral pleural effusions with lower lung consolidations as well as left lower lobe collapse. There are patchy airspace disease present throughout the lungs consistent with multifocal pneumonia. The spleen is abnormal with retroperitoneal lymphadenopathy present. CTA performed on December 1 revealed no obvious pulmonary emboli. Patient has had multiple admissions at outside facilities for recurrent pneumonia. Family feels that the patient's pneumonia is never completely resolved prior to discharge. Still with severe sepsis requiring norepinephrine at high doses. Continue vancomycin and meropenem at this point. Patient is a DNR and has been confirmed via family and the patient. Pulmonary consultation and cardiac consultation. Also with acute on chronic kidney disease. Baseline creatinine 1.6 and current creatinine is greater than 2.5. Continue with aggressive hydration. Lactic acidosis improving. History of lymphoma although peripheral smear November 2020 with no obvious lymphoma present. Echocardiogram pending previous echo revealed an EF of 60 to 65%. Mildly elevated troponin. Impression:     1. Severe sepsis  2. Acute respiratory failure with hypoxia  3. Hypotension  4. Acute on chronic renal failure  5. Hospital-acquired pneumonia  6. Lactic acidosis  7. History of lymphoma     Plan:     1. Severe sepsis  Penicillin allergy and proceed with meropenem as it was already given in the emergency department  Cultures from previous hospitalization revealed gram-positive cocci in the sputum  Continue vancomycin  Severe sepsis IV fluid protocol  Norepinephrine due to septic shock  Urinalysis and urine culture pending  Blood culture pending  Hydrocortisone scheduled  Central line placement 12/11 for vasopressors, nearly weaned off. Still with life-threatening hypotension     2.   Acute respiratory failure with hypoxia with associated hospital-acquired pneumonia  Continue vancomycin and meropenem  RT consult for high flow oxygen\BiPAP  Patient is DNR but will accept noninvasive positive pressure ventilation  Pulmonary consultation  No obvious history of reactive airway disease  Duonebs as needed     3. Acute on chronic kidney disease  Baseline creatinine appears to be proxy 1.61  Current creatinine is 2.3 consistent with dehydration  Patient has received aggressive IV hydration  We will continue to monitor with serial laboratory data     4. Lactic acidosis  2.5     5. History of lymphoma  Appears to be stable although lobulated spleen on CT at previous hospitalization  Will need further evaluation with ultrasound     6. Troponin elevation, non-ST elevation MI  Troponin currently 0.7-->0.12-->0.10     7. Acute systolic heart failure with a previous EF of 60-65% and grade 1 diastolic dysfunction  Cardiology consultation  Echocardiogram     CODE STATUS: DNR     FEN:    Fluids: Patient will be started on D5W due to hypernatremia  Electrolytes: Aggressive potassium replacement  Nutrition via NG tube feeds feeds    DVT prophylaxis Heparin  Ulcer prophylaxis: Famotidine    Discussed case with daughter Precious Cardenas, [facility] discussion as patient no longer with except BiPAP. Transition to high flow nasal cannula. Will accept NG tube for nutrition. Due to his rapidly declining functional status patient is unable to even clear his own secretions. Explained this to the patient's daughter who will speak with him this evening.     Care Plan discussed with: Patient/Family    Critical care time: 44 minutes

## 2020-12-13 NOTE — PROGRESS NOTES
Bedside shift change report given to Rui Cosme RN by RISHABH Cruz RN. Report included the following information SBAR.

## 2020-12-13 NOTE — PROGRESS NOTES
Bedside shift change report given to Rafael Cooper RN (oncoming nurse) by Chadwick Choi RN (offgoing nurse). Report included the following information SBAR.

## 2020-12-13 NOTE — PROGRESS NOTES
Progress Note    Patient: Arlene Mccartney MRN: [id number]  SSN: [id number]    YOB: 1932  Age: 80 y.o. Sex: male      Admit Date: 12/11/2020    LOS: 2 days     Subjective:   No cardiac issues today. Objective:     Vitals:    12/13/20 1514 12/13/20 1628 12/13/20 1700 12/13/20 1804   BP: 129/88 124/71 137/79 (!) 119/58   Pulse: 71 80 77 71   Resp: 18 19 21 19   Temp:       SpO2: 100% 100% 100% 100%   Weight:       Height:            Intake and Output:  Current Shift: 12/13 0701 - 12/13 1900  In: -   Out: 700 [Urine:700]  Last three shifts: 12/11 1901 - 12/13 0700  In: 760.9 [I.V.:760.9]  Out: 1120 [Urine:1120]    Physical Exam:   LUNG: Bilateral base crackles. HEART: regular rate and rhythm, S1, S2 normal,click, rub or gallop, 1/6 murmur  ABDOMEN: soft, non-tender. Bowel sounds normal. No masses,  no organomegaly  EXTREMITIES:  extremities normal, atraumatic, no cyanosis or edema    Lab/Data Review:  Recent Results (from the past 24 hour(s))   CBC WITH AUTOMATED DIFF    Collection Time: 12/13/20  4:22 AM   Result Value Ref Range    WBC 18.1 (H) 4.1 - 11.1 K/uL    RBC 3.00 (L) 4.10 - 5.70 M/uL    HGB 9.5 (L) 12.1 - 17.0 g/dL    HCT 26.9 (L) 36.6 - 50.3 %    MCV 89.7 80.0 - 99.0 FL    MCH 31.7 26.0 - 34.0 PG    MCHC 35.3 30.0 - 36.5 g/dL    RDW 17.7 (H) 11.5 - 14.5 %    PLATELET 312 (L) 198 - 400 K/uL    MPV 12.8 8.9 - 12.9 FL    NEUTROPHILS 95 (H) 32 - 75 %    LYMPHOCYTES 3 (L) 12 - 49 %    MONOCYTES 2 (L) 5 - 13 %    EOSINOPHILS 0 0 - 7 %    BASOPHILS 0 0 - 1 %    IMMATURE GRANULOCYTES 3 (H) 0.0 - 0.5 %    ABS. NEUTROPHILS 17.2 (H) 1.8 - 8.0 K/UL    ABS. LYMPHOCYTES 0.5 (L) 0.8 - 3.5 K/UL    ABS. MONOCYTES 0.4 0.0 - 1.0 K/UL    ABS. EOSINOPHILS 0.0 0.0 - 0.4 K/UL    ABS. BASOPHILS 0.0 0.0 - 0.1 K/UL    ABS. IMM.  GRANS. 0.5 (H) 0.00 - 0.04 K/UL    DF AUTOMATED     METABOLIC PANEL, COMPREHENSIVE    Collection Time: 12/13/20  4:22 AM   Result Value Ref Range    Sodium 151 (H) 136 - 145 mmol/L Potassium 3.2 (L) 3.5 - 5.1 mmol/L    Chloride 114 (H) 97 - 108 mmol/L    CO2 24 21 - 32 mmol/L    Anion gap 13 5 - 15 mmol/L    Glucose 95 65 - 100 mg/dL    BUN 78 (H) 6 - 20 mg/dL    Creatinine 2.39 (H) 0.70 - 1.30 mg/dL    BUN/Creatinine ratio 33 (H) 12 - 20      GFR est AA 31 (L) >60 ml/min/1.73m2    GFR est non-AA 26 (L) >60 ml/min/1.73m2    Calcium 8.4 (L) 8.5 - 10.1 mg/dL    Bilirubin, total 1.0 0.2 - 1.0 mg/dL    AST (SGOT) 70 (H) 15 - 37 U/L    ALT (SGPT) 22 12 - 78 U/L    Alk. phosphatase 85 45 - 117 U/L    Protein, total 4.6 (L) 6.4 - 8.2 g/dL    Albumin 1.7 (L) 3.5 - 5.0 g/dL    Globulin 2.9 2.0 - 4.0 g/dL    A-G Ratio 0.6 (L) 1.1 - 2.2     VANCOMYCIN, RANDOM    Collection Time: 12/13/20  4:22 AM   Result Value Ref Range    Vancomycin, random 19.7 ug/mL    Reported dose date Not provided      Reported dose: Not provided Units   BLOOD GAS, ARTERIAL    Collection Time: 12/13/20  4:40 AM   Result Value Ref Range    pH 7.48 (H) 7.35 - 7.45      PCO2 28 (L) 35 - 45 mmHg    PO2 88 75 - 100 mmHg    O2 SAT 98 >95 %    BICARBONATE 23 22 - 26 mmol/L    BASE DEFICIT 1.8 0 - 2 mmol/L    O2 METHOD BiPAP      FIO2 50 %    SET RATE 14      EPAP/CPAP/PEEP 5      High PEEP 10      Sample source Arterial      SITE Right Radial      NEW'S TEST YES               Assessment:     Active Problems:    HTN (hypertension) (1/4/2011)      CRI (chronic renal insufficiency) (12/13/2012)      Cervical stenosis of spinal canal (11/20/2020)      Acute renal failure (ARF) (HCC) (11/20/2020)      HAP (hospital-acquired pneumonia) (11/30/2020)      Severe sepsis (HCC) (12/1/2020)        Plan:     1. Pneumonia with left lower lung collapse: IV antibiotic per intensivists and Dr. Carlota Martinez.      2. Acute on chronic diastolic heart failure with EF 64%: Continue current diuretic regimen while treating the pneumonia. Watch renal function carefully as he is likely volume depleted give his entire immunological state. Nothing more to add today.    3. Hypertension: Blood pressure currently at goal.      4. Sepsis: improved overall. 5. Speech pathologist consulted for his voice.     Signed By: Gracy Weiss MD     December 13, 2020

## 2020-12-14 ENCOUNTER — APPOINTMENT (OUTPATIENT)
Dept: GENERAL RADIOLOGY | Age: 85
DRG: 871 | End: 2020-12-14
Attending: INTERNAL MEDICINE
Payer: MEDICARE

## 2020-12-14 ENCOUNTER — APPOINTMENT (OUTPATIENT)
Dept: ULTRASOUND IMAGING | Age: 85
DRG: 871 | End: 2020-12-14
Attending: PHYSICIAN ASSISTANT
Payer: MEDICARE

## 2020-12-14 LAB
ALBUMIN SERPL-MCNC: 1.6 G/DL (ref 3.5–5)
ALBUMIN SERPL-MCNC: 1.8 G/DL (ref 3.5–5)
ALBUMIN/GLOB SERPL: 0.8 {RATIO} (ref 1.1–2.2)
ALP SERPL-CCNC: 142 U/L (ref 45–117)
ALT SERPL-CCNC: 74 U/L (ref 12–78)
ANION GAP SERPL CALC-SCNC: 8 MMOL/L (ref 5–15)
ANION GAP SERPL CALC-SCNC: 9 MMOL/L (ref 5–15)
AST SERPL W P-5'-P-CCNC: 218 U/L (ref 15–37)
BASOPHILS # BLD: 0 K/UL (ref 0–0.1)
BASOPHILS NFR BLD: 0 % (ref 0–1)
BILIRUB SERPL-MCNC: 0.8 MG/DL (ref 0.2–1)
BNP SERPL-MCNC: ABNORMAL PG/ML
BUN SERPL-MCNC: 72 MG/DL (ref 6–20)
BUN SERPL-MCNC: 74 MG/DL (ref 6–20)
BUN/CREAT SERPL: 41 (ref 12–20)
BUN/CREAT SERPL: 44 (ref 12–20)
CA-I BLD-MCNC: 8.5 MG/DL (ref 8.5–10.1)
CA-I BLD-MCNC: 8.8 MG/DL (ref 8.5–10.1)
CHLORIDE SERPL-SCNC: 115 MMOL/L (ref 97–108)
CHLORIDE SERPL-SCNC: 115 MMOL/L (ref 97–108)
CO2 SERPL-SCNC: 27 MMOL/L (ref 21–32)
CO2 SERPL-SCNC: 27 MMOL/L (ref 21–32)
CREAT SERPL-MCNC: 1.7 MG/DL (ref 0.7–1.3)
CREAT SERPL-MCNC: 1.76 MG/DL (ref 0.7–1.3)
DIFFERENTIAL METHOD BLD: ABNORMAL
EOSINOPHIL # BLD: 0 K/UL (ref 0–0.4)
EOSINOPHIL NFR BLD: 0 % (ref 0–7)
ERYTHROCYTE [DISTWIDTH] IN BLOOD BY AUTOMATED COUNT: 17.8 % (ref 11.5–14.5)
GLOBULIN SER CALC-MCNC: 2.4 G/DL (ref 2–4)
GLUCOSE BLD STRIP.AUTO-MCNC: 190 MG/DL (ref 65–100)
GLUCOSE SERPL-MCNC: 155 MG/DL (ref 65–100)
GLUCOSE SERPL-MCNC: 156 MG/DL (ref 65–100)
HCT VFR BLD AUTO: 30.5 % (ref 36.6–50.3)
HGB BLD-MCNC: 10 G/DL (ref 12.1–17)
IMM GRANULOCYTES # BLD AUTO: 0.1 K/UL (ref 0–0.04)
IMM GRANULOCYTES NFR BLD AUTO: 1 % (ref 0–0.5)
LYMPHOCYTES # BLD: 0.5 K/UL (ref 0.8–3.5)
LYMPHOCYTES NFR BLD: 3 % (ref 12–49)
MCH RBC QN AUTO: 30.1 PG (ref 26–34)
MCHC RBC AUTO-ENTMCNC: 32.8 G/DL (ref 30–36.5)
MCV RBC AUTO: 91.9 FL (ref 80–99)
MONOCYTES # BLD: 0.5 K/UL (ref 0–1)
MONOCYTES NFR BLD: 3 % (ref 5–13)
NEUTS SEG # BLD: 14.7 K/UL (ref 1.8–8)
NEUTS SEG NFR BLD: 93 % (ref 32–75)
PERFORMED BY, TECHID: ABNORMAL
PHOSPHATE SERPL-MCNC: 2.9 MG/DL (ref 2.6–4.7)
PLATELET # BLD AUTO: 92 K/UL (ref 150–400)
PMV BLD AUTO: 12 FL (ref 8.9–12.9)
POTASSIUM SERPL-SCNC: 2.8 MMOL/L (ref 3.5–5.1)
POTASSIUM SERPL-SCNC: 2.9 MMOL/L (ref 3.5–5.1)
PROT SERPL-MCNC: 4.2 G/DL (ref 6.4–8.2)
RBC # BLD AUTO: 3.32 M/UL (ref 4.1–5.7)
SODIUM SERPL-SCNC: 150 MMOL/L (ref 136–145)
SODIUM SERPL-SCNC: 151 MMOL/L (ref 136–145)
VANCOMYCIN SERPL-MCNC: 17.5 UG/ML
WBC # BLD AUTO: 15.7 K/UL (ref 4.1–11.1)

## 2020-12-14 PROCEDURE — 87015 SPECIMEN INFECT AGNT CONCNTJ: CPT

## 2020-12-14 PROCEDURE — 36415 COLL VENOUS BLD VENIPUNCTURE: CPT

## 2020-12-14 PROCEDURE — 94760 N-INVAS EAR/PLS OXIMETRY 1: CPT

## 2020-12-14 PROCEDURE — 74011250636 HC RX REV CODE- 250/636: Performed by: PHYSICIAN ASSISTANT

## 2020-12-14 PROCEDURE — 74011250637 HC RX REV CODE- 250/637: Performed by: PHYSICIAN ASSISTANT

## 2020-12-14 PROCEDURE — 94660 CPAP INITIATION&MGMT: CPT

## 2020-12-14 PROCEDURE — 74011000258 HC RX REV CODE- 258: Performed by: HOSPITALIST

## 2020-12-14 PROCEDURE — 74011000250 HC RX REV CODE- 250: Performed by: PHYSICIAN ASSISTANT

## 2020-12-14 PROCEDURE — 74011250637 HC RX REV CODE- 250/637: Performed by: INTERNAL MEDICINE

## 2020-12-14 PROCEDURE — 85025 COMPLETE CBC W/AUTO DIFF WBC: CPT

## 2020-12-14 PROCEDURE — 5A09357 ASSISTANCE WITH RESPIRATORY VENTILATION, LESS THAN 24 CONSECUTIVE HOURS, CONTINUOUS POSITIVE AIRWAY PRESSURE: ICD-10-PCS | Performed by: HOSPITALIST

## 2020-12-14 PROCEDURE — 74011250636 HC RX REV CODE- 250/636: Performed by: HOSPITALIST

## 2020-12-14 PROCEDURE — 74011250636 HC RX REV CODE- 250/636: Performed by: INTERNAL MEDICINE

## 2020-12-14 PROCEDURE — 80069 RENAL FUNCTION PANEL: CPT

## 2020-12-14 PROCEDURE — 82962 GLUCOSE BLOOD TEST: CPT

## 2020-12-14 PROCEDURE — 65270000029 HC RM PRIVATE

## 2020-12-14 PROCEDURE — 74011250636 HC RX REV CODE- 250/636: Performed by: EMERGENCY MEDICINE

## 2020-12-14 PROCEDURE — 97110 THERAPEUTIC EXERCISES: CPT

## 2020-12-14 PROCEDURE — 74011000250 HC RX REV CODE- 250: Performed by: EMERGENCY MEDICINE

## 2020-12-14 PROCEDURE — 83880 ASSAY OF NATRIURETIC PEPTIDE: CPT

## 2020-12-14 PROCEDURE — 80202 ASSAY OF VANCOMYCIN: CPT

## 2020-12-14 PROCEDURE — 76705 ECHO EXAM OF ABDOMEN: CPT

## 2020-12-14 PROCEDURE — 77010033711 HC HIGH FLOW OXYGEN

## 2020-12-14 PROCEDURE — 80053 COMPREHEN METABOLIC PANEL: CPT

## 2020-12-14 PROCEDURE — 89050 BODY FLUID CELL COUNT: CPT

## 2020-12-14 PROCEDURE — 87205 SMEAR GRAM STAIN: CPT

## 2020-12-14 PROCEDURE — 71045 X-RAY EXAM CHEST 1 VIEW: CPT

## 2020-12-14 RX ORDER — POTASSIUM CHLORIDE 7.45 MG/ML
10 INJECTION INTRAVENOUS
Status: COMPLETED | OUTPATIENT
Start: 2020-12-14 | End: 2020-12-14

## 2020-12-14 RX ORDER — FUROSEMIDE 10 MG/ML
40 INJECTION INTRAMUSCULAR; INTRAVENOUS DAILY
Status: DISCONTINUED | OUTPATIENT
Start: 2020-12-14 | End: 2020-12-17

## 2020-12-14 RX ORDER — POTASSIUM CHLORIDE 1.5 G/1.77G
40 POWDER, FOR SOLUTION ORAL 2 TIMES DAILY WITH MEALS
Status: COMPLETED | OUTPATIENT
Start: 2020-12-14 | End: 2020-12-15

## 2020-12-14 RX ORDER — HYDRALAZINE HYDROCHLORIDE 10 MG/1
10 TABLET, FILM COATED ORAL 3 TIMES DAILY
Status: DISCONTINUED | OUTPATIENT
Start: 2020-12-14 | End: 2020-12-17

## 2020-12-14 RX ORDER — POTASSIUM CHLORIDE 1.5 G/1.77G
40 POWDER, FOR SOLUTION ORAL
Status: COMPLETED | OUTPATIENT
Start: 2020-12-14 | End: 2020-12-14

## 2020-12-14 RX ORDER — ISOSORBIDE MONONITRATE 30 MG/1
30 TABLET, EXTENDED RELEASE ORAL DAILY
Status: DISCONTINUED | OUTPATIENT
Start: 2020-12-15 | End: 2020-12-19

## 2020-12-14 RX ADMIN — MEROPENEM 1 G: 1 INJECTION, POWDER, FOR SOLUTION INTRAVENOUS at 23:28

## 2020-12-14 RX ADMIN — POTASSIUM CHLORIDE 40 MEQ: 1.5 FOR SOLUTION ORAL at 15:17

## 2020-12-14 RX ADMIN — MEROPENEM 1 G: 1 INJECTION, POWDER, FOR SOLUTION INTRAVENOUS at 13:32

## 2020-12-14 RX ADMIN — POTASSIUM CHLORIDE 40 MEQ: 1.5 FOR SOLUTION ORAL at 23:32

## 2020-12-14 RX ADMIN — HYDROCORTISONE SODIUM SUCCINATE 50 MG: 100 INJECTION, POWDER, FOR SOLUTION INTRAMUSCULAR; INTRAVENOUS at 13:33

## 2020-12-14 RX ADMIN — POTASSIUM CHLORIDE 10 MEQ: 7.46 INJECTION, SOLUTION INTRAVENOUS at 15:19

## 2020-12-14 RX ADMIN — Medication 10 ML: at 13:34

## 2020-12-14 RX ADMIN — POTASSIUM CHLORIDE 10 MEQ: 7.46 INJECTION, SOLUTION INTRAVENOUS at 16:40

## 2020-12-14 RX ADMIN — HYDRALAZINE HYDROCHLORIDE 10 MG: 10 TABLET, FILM COATED ORAL at 16:08

## 2020-12-14 RX ADMIN — Medication 10 ML: at 23:33

## 2020-12-14 RX ADMIN — HEPARIN SODIUM 5000 UNITS: 5000 INJECTION INTRAVENOUS; SUBCUTANEOUS at 15:17

## 2020-12-14 RX ADMIN — DEXTROSE MONOHYDRATE 50 ML/HR: 50 INJECTION, SOLUTION INTRAVENOUS at 04:54

## 2020-12-14 RX ADMIN — MEROPENEM 1 G: 1 INJECTION, POWDER, FOR SOLUTION INTRAVENOUS at 04:48

## 2020-12-14 RX ADMIN — POTASSIUM CHLORIDE 10 MEQ: 7.46 INJECTION, SOLUTION INTRAVENOUS at 17:50

## 2020-12-14 RX ADMIN — VANCOMYCIN HYDROCHLORIDE 500 MG: 500 INJECTION, POWDER, LYOPHILIZED, FOR SOLUTION INTRAVENOUS at 13:55

## 2020-12-14 RX ADMIN — HYDRALAZINE HYDROCHLORIDE 10 MG: 10 TABLET, FILM COATED ORAL at 23:29

## 2020-12-14 RX ADMIN — Medication 10 ML: at 05:57

## 2020-12-14 RX ADMIN — FAMOTIDINE 20 MG: 10 INJECTION INTRAVENOUS at 13:32

## 2020-12-14 RX ADMIN — HEPARIN SODIUM 5000 UNITS: 5000 INJECTION INTRAVENOUS; SUBCUTANEOUS at 04:48

## 2020-12-14 RX ADMIN — MUPIROCIN: 20 OINTMENT TOPICAL at 13:34

## 2020-12-14 RX ADMIN — FAMOTIDINE 20 MG: 10 INJECTION INTRAVENOUS at 23:28

## 2020-12-14 RX ADMIN — FUROSEMIDE 40 MG: 10 INJECTION, SOLUTION INTRAMUSCULAR; INTRAVENOUS at 16:08

## 2020-12-14 NOTE — PROGRESS NOTES
Consult for Vancomycin Dosing by Pharmacy by [facility] Pate     Consult provided for this 80y.o. 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 Day of Therapy: 3  Goal of Level(s): 15-20mcg/dL     Other Current Antibiotics: Merrem    Cultures:   12/11 - Blood - Gram Positive Cocci 2/2 12/11 - Nasal Swab - MRSA detected    Serum Creatinine Creatinine   Date Value Ref Range Status   12/14/2020 1.70 (H) 0.70 - 1.30 mg/dL Final   12/14/2020 1.76 (H) 0.70 - 1.30 mg/dL Final   12/13/2020 2.39 (H) 0.70 - 1.30 mg/dL Final      Creatinine Clearance Estimated Creatinine Clearance: 24.2 mL/min (A) (based on SCr of 1.7 mg/dL (H)). BUN Lab Results   Component Value Date/Time    BUN 74 (H) 12/14/2020 08:12 AM    BUN 72 (H) 12/14/2020 08:12 AM      WBC Lab Results   Component Value Date/Time    WBC 15.7 (H) 12/14/2020 08:12 AM      Temp 98.3 °F (36.8 °C)     Last Level:    Vancomycin, random   Date Value Ref Range Status   12/14/2020 17.5 ug/mL Final     Comment:     No reference range has been established. Ht Readings from Last 1 Encounters:   12/14/20 160 cm (62.99\")        Wt Readings from Last 1 Encounters:   12/13/20 58.3 kg (128 lb 8.5 oz)     Ideal body weight: 56.9 kg (125 lb 6.4 oz)  Adjusted ideal body weight: 57.4 kg (126 lb 10.4 oz)     Previous Regimen: 500mg IV x1 on 12/13    New Regimen:   Vancomycin random level is therapeutic. Patient is still BENJI, but improving. Will give Vancomycin 500mg IV x1 today and draw random level tomorrow morning. Pharmacy to follow daily and will make changes to dose and/or frequency based on clinical status.     _________________________________     Pharmacist Earla Nyhan, PHARMD

## 2020-12-14 NOTE — CONSULTS
PULMONARY ICU NOTE  [facility]    Name: Oswaldo Jacobsen MRN: [id number]   : 1932 Hospital: [address] [place] [address]   Date: 2020  Admission date: 2020 Hospital Day: 4       HPI:     Hospital Problems  Date Reviewed: 2020          Codes Class Noted POA    Severe sepsis ([address] ICD-10-CM: A41.9, R65.20  ICD-9-CM: 038.9, 995.92  2020 Yes        HAP (hospital-acquired pneumonia) ICD-10-CM: J18.9, Y95  ICD-9-CM: 266  2020 Yes        Cervical stenosis of spinal canal ICD-10-CM: M48.02  ICD-9-CM: 723.0  2020 Yes        Acute renal failure (ARF) (HCC) ICD-10-CM: N17.9  ICD-9-CM: 584.9  2020 Yes        CRI (chronic renal insufficiency) (Chronic) ICD-10-CM: N18.9  ICD-9-CM: 585.9  2012 Yes        HTN (hypertension) ICD-10-CM: I10  ICD-9-CM: 401.9  2011 Yes                   [x] High complexity decision making was performed  [x] See my orders for details      Subjective/Initial History:     I was asked by Pastor Molly MD to see Oswaldo Jacobsen  a 80 y.o.  male in consultation     Excerpts from admission 2020 or consult notes as follows:   , 57-year-old male came in because of shortness of breath dyspnea and he was hypoxic he had history of pneumonia recently discharged 4 days ago he has recurrent hospitalization because of infection pneumonia significant past medical history of chronic kidney disease, lymphoma and prostate cancer he was put on 100% nonrebreather mask his saturation was in 80s now he is on blood percent FiO2 and a shock state he was put on vasopressor Levophed so admitted and critical care consult was called.  awake alert asking for water has gurgly upper airway noise. Norepinephrine down to 2 mics with well-maintained blood pressure.     Allergies   Allergen Reactions    Pcn [Penicillins] Swelling        MAR reviewed and pertinent medications noted or modified as needed     Current Facility-Administered Medications Medication    dextrose 5% infusion    hydrocortisone Sod Succ (PF) (SOLU-CORTEF) injection 50 mg    VANCOMYCIN RANDOM LAB REMINDER    mupirocin (BACTROBAN) 2 % ointment    meropenem (MERREM) 1 g in sterile water (preservative free) 20 mL IV syringe    sodium chloride (NS) flush 5-40 mL    sodium chloride (NS) flush 5-40 mL    acetaminophen (TYLENOL) tablet 650 mg    Or    acetaminophen (TYLENOL) suppository 650 mg    polyethylene glycol (MIRALAX) packet 17 g    promethazine (PHENERGAN) tablet 12.5 mg    Or    ondansetron (ZOFRAN) injection 4 mg    famotidine (PF) (PEPCID) 20 mg in 0.9% sodium chloride 10 mL injection    heparin (porcine) injection 5,000 Units    NOREPINephrine (LEVOPHED) 8 mg in 5% dextrose 250mL (32 mcg/mL) infusion    albuterol-ipratropium (DUO-NEB) 2.5 MG-0.5 MG/3 ML    Vancomycin Dosed by Pharmacy      Patient PCP: Zaria Zepeda MD  PMH:  has a past medical history of CRI (chronic renal insufficiency) (12/13/2012), Gout (1/4/2011), Hypertension, Lymphoma ([facility]), and Prostate CA ([facility]) (1/4/2011). PSH:   has a past surgical history that includes hx prostatectomy; hc bone marrow biopsy; and endoscopy, colon, diagnostic (2003). FHX: family history includes Hypertension in his mother. SHX:  reports that he has never smoked. He has never used smokeless tobacco. He reports previous drug use. He reports that he does not drink alcohol. ROS:    Review of Systems   Constitutional: Positive for malaise/fatigue. HENT: Negative. Eyes: Negative. Respiratory: Positive for cough and shortness of breath. Cardiovascular: Negative. Gastrointestinal: Negative. Genitourinary: Negative. Musculoskeletal: Negative. Skin: Negative. Neurological: Negative. Endo/Heme/Allergies: Negative. Psychiatric/Behavioral: Negative.          Objective:     Vital Signs: Telemetry:    normal sinus rhythm Intake/Output:   Visit Vitals  BP (!) 140/78 (BP 1 Location: Right arm, BP Patient Position: At rest)   Pulse 84   Temp 98.3 °F (36.8 °C)   Resp 25   Ht 5' 2.99\" (1.6 m)   Wt 58.3 kg (128 lb 8.5 oz)   SpO2 96%   BMI 22.77 kg/m²       Temp (24hrs), Av.1 °F (36.7 °C), Min:97.9 °F (36.6 °C), Max:98.3 °F (36.8 °C)        O2 Device: Heated, Hi flow nasal cannula O2 Flow Rate (L/min): 40 l/min       Wt Readings from Last 4 Encounters:   20 58.3 kg (128 lb 8.5 oz)   20 64.3 kg (141 lb 12.1 oz)   20 64.9 kg (143 lb)   20 61.7 kg (136 lb)          Intake/Output Summary (Last 24 hours) at 2020 1041  Last data filed at 2020 1591  Gross per 24 hour   Intake 510 ml   Output 1600 ml   Net -1090 ml       Last shift:      No intake/output data recorded. Last 3 shifts:  1901 -  0700  In: 640 [I.V.:130]  Out:  [Urine:]       Physical Exam:     Physical Exam   Constitutional: He is oriented to person, place, and time. Thin awake alert no distress has upper airway noise   HENT:   Head: Normocephalic and atraumatic. Eyes: Pupils are equal, round, and reactive to light. Conjunctivae and EOM are normal.   Neck: Normal range of motion. Neck supple. Cardiovascular: Normal rate and regular rhythm. Pulmonary/Chest: Effort normal. He has rales. Expiratory rhonchi has upper airway noise over the neck   Abdominal: Soft. Bowel sounds are normal.   Thin   Musculoskeletal: Normal range of motion. Neurological: He is alert and oriented to person, place, and time. Skin: Skin is warm and dry. Psychiatric: He has a normal mood and affect.         Labs:    Recent Labs     20  0422 20  0400 20  1144   WBC 18.1* 17.7* 8.2   HGB 9.5* 10.6* 12.1   * 184 193   INR  --   --  1.3*   APTT  --   --  26.6     Recent Labs     20  0422 20  0839 20  0400 20  1525 20  1144   *  --  145  --  144   K 3.2*  --  3.6  --  3.3*   *  --  112*  --  105   CO2 24  --  24  --  26   GLU 95  --  126*  --  82   BUN 78*  --  66*  --  66*   CREA 2.39*  --  2.67*  --  2.68*   CA 8.4*  --  8.5  --  9.6   MG  --   --   --   --  2.0   LAC  --  2.5*  --  4.0* 5.3*   ALB 1.7*  --  1.8*  --  2.4*   ALT 22  --  21  --  26     Recent Labs     12/13/20  0440 12/11/20  1300   PH 7.48* 7.43   PCO2 28* 32*   PO2 88 74*   HCO3 23 22   FIO2 50 100.0     Recent Labs     12/12/20  0400 12/11/20  1545 12/11/20  1144   TROIQ 0.10* 0.12* 0.07*       Lab Results   Component Value Date/Time    Culture result: (A) 12/11/2020 11:21 AM     Gram Positive Cocci in clusters CALLED TO AND READ BACK BY Ortiz Hill on 12.12.2020 at 13:37 by [email or web address]    Culture result:  12/11/2020 11:21 AM     Staphylococcus species, coagulase negative , ISOLATED FROM AEROBIC BOTTLE RECEIVED    Culture result:  12/11/2020 11:21 AM     Gram Positive Cocci in clusters ALSO GROWING I THE ANAEROBIC BOTTLE     Lab Results   Component Value Date/Time    TSH 1.79 11/20/2020 01:31 PM       Imaging:    CXR Results  (Last 48 hours)               12/14/20 0845  XR CHEST PORT Final result    Impression:  Impression:Left lung airspace disease and suspected pleural fluid. Right basilar   pulmonary haziness compatible with airspace disease/atelectasis and trace of   pleural fluid. Narrative:  Portable chest:       History:Respiratory failure       COMPARISON: Portable chest 12/13/2020       Lungs show hypoaeration. There is ill-defined hazy density at the right lung   base. Hazy parenchymal infiltration is seen in the left lung, left hemidiaphragm   is obscured. Heart is normal size. Calcified plaque involving the aortic arch. Left jugular venous catheter with the tip at the junction of SVC and right   atrium. Nasogastric tube with the tip in the stomach. 12/13/20 0200  XR CHEST PORT Final result    Narrative:  Chest single view. Comparison single view chest December 11, 2020. Improved aeration through lung bases. Stable left neck central venous catheter.    Cardiac and mediastinal structures unchanged. Thoracic aorta atherosclerosis. No   pneumothorax or sizable pleural effusion. Results from Hospital Encounter encounter on 12/11/20   XR CHEST PORT    Narrative Portable chest:    History:Respiratory failure    COMPARISON: Portable chest 12/13/2020    Lungs show hypoaeration. There is ill-defined hazy density at the right lung  base. Hazy parenchymal infiltration is seen in the left lung, left hemidiaphragm  is obscured. Heart is normal size. Calcified plaque involving the aortic arch. Left jugular venous catheter with the tip at the junction of SVC and right  atrium. Nasogastric tube with the tip in the stomach. Impression Impression:Left lung airspace disease and suspected pleural fluid. Right basilar  pulmonary haziness compatible with airspace disease/atelectasis and trace of  pleural fluid. XR CHEST PORT    Narrative Chest single view. Comparison single view chest December 11, 2020. Improved aeration through lung bases. Stable left neck central venous catheter. Cardiac and mediastinal structures unchanged. Thoracic aorta atherosclerosis. No  pneumothorax or sizable pleural effusion. XR CHEST SNGL V    Narrative Central line placement. Comparison chest x-ray 12/11/2020 at 1154 hours. Impression FINDINGS: Impression: Frontal single view chest.    Interval placement left central catheter distal tip SVC/RA junction region. Unchanged cardiomediastinal silhouette. Slightly increasing bilateral pulmonary  airspace edema and/or pneumonia and/or atelectasis. Increasing consolidation of  the left lower lobe. Interval development of small left pleural effusion. No pneumothorax. Results from [place] encounter on 12/11/20   CT CHEST WO CONT    Narrative Comparison chest x-ray 12/11/2020 [facility] and chest CTA 11/25/2020 [facility].     TECHNIQUE: Axial imaging of the chest without IV contrast, with multiplanar  reformatting, MIPs.  Dose reduction: All CT scans at this facility are performed using dose reduction  optimization techniques as appropriate to a performed exam including the  following: Automated exposure control, adjustments of the mA and/or kV according  to patient's size, or use of iterative reconstruction technique. FINDINGS: Left central catheter distal tip SVC. Mild cardiomegaly. Multivessel  coronary artery calcification. No pericardial effusion. Low-density intracardiac  blood suggests anemia. Calcified thoracic aorta without aneurysm. Pulmonary  arteries are not dilated. No mediastinal or hilar lymphadenopathy. Tubular  structure posterior to the esophagus possibly dilated vessel. Bilateral pleural  effusions and lower lung consolidations without air bronchograms. The left lower  lung is completely consolidated/collapsed. The aerated lungs demonstrate patchy  areas of airspace disease. No axillary adenopathy. Included thyroid is  unremarkable. Included upper abdomen demonstrates lobulated, heterogeneous  structure spleen and small retroperitoneal lymphadenopathy; unchanged. Degenerative changes of the bony structures. Soft tissue anasarca. Impression IMPRESSION:  1. Bilateral pleural effusions and lower lung consolidations, the left lower  lung being completely collapsed, this is similar to previous. 2. Interval development patchy airspace disease in the aerated lungs consistent  with pneumonia. 3. Abnormal spleen, small retroperitoneal lymphadenopathy unchanged. 4. Atherosclerosis. IMPRESSION:   1. Acute respiratory failure with Hypoxia have switched to nasal high flow 45%  2. Severe sepsis off vasopressors  3. Community-acquired versus hospital-acquired pneumonia has gram-positive cocci in 1 of 4 blood bottles MRSA in nares  4. Hypokalemia to be repleted  5.  Pharyngeal dysphagia speech has evaluated and he has totally ineffective swallow discussed with him placing NG tube for tube feeding  6. Hypernatremia we will change IV  7. History of lymphoma and prostate cancer  8. Acute on chronic kidney disease creatinine improved  9. CHF with diastolic dysfunction chest x-ray improving  10. Lactic acidosis   11. Body mass index is 22.77 kg/m². 12. Elevated troponin      RECOMMENDATIONS/PLAN:     1. Patient is on nasal high flow 45 % with well-maintained oxygen saturation  2. Blood culture 1 of 4 with gram-positive cocci may be skin contaminant but remains on vancomycin  3. Nasal MRSA smear positive  On Bactroban  4. Pressors decreased  5. We will ask speech to evaluate for swallow     chest x-ray is improved with decreasing left effusion and infiltrate right side almost clear at this point. Does have hypernatremia and hypokalemia will replace IV  And appropriate for oral intake by speech evaluation.   Will place NG tube and start tube feeding  Time of care 30 minutes      Valerie Carroll MD

## 2020-12-14 NOTE — PROGRESS NOTES
Two person skin assessment performed by myself and Abrahan López RN. Patient has a mepilex in place for prophylactic reasons due to lack of mobility, NG tube in place in the right nares, yang is in place and draining with no abnormalities. Left lateral abdomen displays a large bruise that is red and purple in color. Patient has an area of redness on the lower buttock that is blanchable, z guard paste applied and repositioning instituted. Feet are extremely dry with flaking skin. Bilateral redness and slight boggy to touch areas on the heels. Skin is thin and dry with generalized small bruising but otherwise all other skin is warm, dry and intact with no signs of skin breakdown.

## 2020-12-14 NOTE — PROGRESS NOTES
Reason for Admission:   Acute respiratory failure/Hypoxia               RUR Score:     37%    PCP: First and Last name:  Dr. Rancho Ruano [phone]   Name of Practice:   Are you a current patient: Yes/No:   Approximate date of last visit:    Can you do a virtual visit with your PCP:              Resources/supports as identified by patient/family:   Patient has a son and a daughter who is [address] [facility], Agustín Bautista [phone]. Patient's children has been coming to his house to help him recently before hospitalization at [facility] a few weeks ago. Top Challenges facing patient (as identified by patient/family and CM): Finances/Medication cost?                    Transportation? Support system or lack thereof? Living arrangements? Patient lives alone           Self-care/ADLs/Cognition? Patient became unable to care for himself recently. Was independent prior to a few weeks ago. Current Advanced Directive/Advance Care Plan:  Patient's POA is his daughter, Agustín Bautista [phone] (preferred number). Plan for utilizing home health:    Patient will return to SNF. Transition of Care Plan:                CM met with patient at the bedside for DCP assessment. Patient was recently discharged from [facility] to [facility]. Patient was at [facility] 2 days before returning to the hospital. Prior to hospitalization at [facility], patient lived alone in a 1 floor home. Patient does not have home oxygen. Patient gave CM permission to speak with his daughter, Zoe [place]. Bhavna is unsure at this time if patient will be long term or short term at a facility, however she states he will return to [facility]. Anticipate SNF at discharge-> return to [facility].

## 2020-12-14 NOTE — PROGRESS NOTES
Comprehensive Nutrition Assessment    Type and Reason for Visit: Initial(Tube feeding)    Nutrition Recommendations/Plan:   Adjust TF via NG to Jevity 1.5 at 40ml/hr   Advance by 10 after 4 hrs to goal of 50ml/hr   Flush with 150ml water q4h  At goal, feeds provide 1800kcals, 77g pro, and 1812ml fluid (Meets 100% EENs, 120% Protein needs, and 100% fluid needs)   *D5 provides 204kcals additional    Document rates, wts, and BMs    Nutrition Assessment:  Admitted to ICU for sepsis causing respiratory failure and hypotension, required NRB and x1 pressor. Daughter made DNR, conservative treatments only, OK with BiPAP and TF. (12/13) SLP eval rec'd alternate source nutrition, NG placed and pt transferred to medical unit. Currently weaned to HFNC. D5 (50ml/hr) providing 204kcals/d. intensiveist ordered TF of Promote at 50ml, FWF 100ml q4, and Beneprotein with no amounts listed (provides 1200kcals, 75g pro, and 1607ml water-meets ~67 %EENs and 117% pro, and 89% fluid needs, not optimal). RD adjusted TF in AM to better meet pt goals, discussed with RN to adjust appropriately. Unable to interview pt, will f/u for tolerance. Labs: H/H 9.5/26.9, Na 151, K+ 3.2, BUN 78, Cr 2.39, Corrected Ca 10.2, AST 70. Meds: D5 at 50ml/hr, Pepcid, heparin, Solu-cortef, meropenem, antiemetics, vanc. Malnutrition Assessment:  Malnutrition Status:  Mild malnutrition    Context:  Acute illness     Findings of the 6 clinical characteristics of malnutrition:   Energy Intake:  7 - 50% or less of est energy requirements for 5 or more days  Weight Loss:  No significant weight loss     Body Fat Loss:  Unable to assess,     Muscle Mass Loss:  Unable to assess,    Fluid Accumulation:  No significant fluid accumulation,      Estimated Daily Nutrient Needs:  Energy (kcal): 1800kcals (28kcals/kg); Weight Used for Energy Requirements: Admission  Protein (g): 64g (1.0g/kg);  Weight Used for Protein Requirements: Admission  Fluid (ml/day): 1800ml; Method Used for Fluid Requirements: 1 ml/kcal   Needs for maintenance considering CKD-3  using 12/5 wt of 64.3kg    Nutrition Related Findings:  Unable to complete NFPE at this time, will f/u as able. No edema. No N/V/D/C per EMR. Last BM 12/13, loose. SLP following, rec'd alternate source of nutrition. NG placed 12/13, currently infusing TF per intensivist's orders. Wounds:    None       Current Nutrition Therapies:  DIET NPO  DIET TUBE FEEDING Jevity 1.5 Jevity 1.5  Current Tube Feeding (TF) Orders:   · Feeding Route: Nasogastric  · Formula: Promote  · Schedule:Continuous    · Regimen: 35ml/hr  · Additives/Modulars:    · Water Flushes: 100ml q4h  · Current TF & Flush Orders Provides: 840kcals, 53g pro, and 1305ml fluid (meets 47% EENs, 83% Pro, and 73% fluid needs-- inadequate)  · Goal TF & Flush Orders Provides: Rec'd Jevity 1.5 goal of 50ml/hr, flushes of 150ml water q4h; providing 1800kcals,77g pro, 1812ml fluid      Anthropometric Measures:  · Height:  5' 2.99\" (160 cm)  · Current Body Wt:  64.3 kg (141 lb 12.1 oz)(12/5)   · Admission Body Wt:  141 lb 12.1 oz(12/5)     · Ideal Body Wt:  124 lbs:  114.3 %   · BMI Category: Overweight (BMI 25.0-29. 9)     Wt Readings from Last 10 Encounters:   12/13/20 58.3 kg (128 lb 8.5 oz)   12/05/20 64.3 kg (141 lb 12.1 oz)   11/27/20 64.9 kg (143 lb)   11/11/20 61.7 kg (136 lb)   08/13/20 62.6 kg (138 lb)   02/19/19 67.6 kg (149 lb)   01/28/19 68.5 kg (151 lb)   12/31/18 69.4 kg (153 lb)   12/20/17 67.7 kg (149 lb 3.2 oz)   12/19/16 68.2 kg (150 lb 5 oz)   ? veracity most recent wt, other wise pt appears fairly wt stable x1 year.       Nutrition Diagnosis:   · Inadequate oral intake related to swallowing difficulty, impaired respiratory function as evidenced by swallowing study results, nutrition support-enteral nutrition      Nutrition Interventions:   Food and/or Nutrient Delivery: Continue NPO, Modify tube feeding(Adjust regimen)  Nutrition Education and Counseling: No recommendations at this time  Coordination of Nutrition Care: Continue to monitor while inpatient, Speech therapy    Goals:  Intakes >/=75% of EENs in >5 days  Wt maintenance within +/-0.5kg in 7 days  Lytes wnl       Nutrition Monitoring and Evaluation:   Behavioral-Environmental Outcomes: None identified  Food/Nutrient Intake Outcomes: Diet advancement/tolerance, Enteral nutrition intake/tolerance  Physical Signs/Symptoms Outcomes: Weight, Chewing or swallowing, Diarrhea    Discharge Planning:    No discharge needs at this time, Too soon to determine     Electronically signed by Мария Washington RD on 12/14/2020 at 7:24 AM    Contact: Ext [phone]

## 2020-12-14 NOTE — PROGRESS NOTES
Patient transferred to [facility] room 518 on high flow 40l/min and fio2 45%. Report given to [facility]. Daughter Addie Abel( [phone]) informed.

## 2020-12-14 NOTE — PROGRESS NOTES
Hospitalist Progress Note    Subjective:   Daily Progress Note: 12/14/2020 8:50 AM    Weaned off BiPAP to high flow nasal cannula. Seems to be tolerating this well. Overall respiratory function and functional status is decreased over the last 2 days. SOB, moderate    Current Facility-Administered Medications   Medication Dose Route Frequency    vancomycin (VANCOCIN) 500 mg in 0.9% sodium chloride (MBP/ADV) 100 mL MBP  500 mg IntraVENous ONCE    dextrose 5% infusion  50 mL/hr IntraVENous CONTINUOUS    mupirocin (BACTROBAN) 2 % ointment   Topical DAILY    meropenem (MERREM) 1 g in sterile water (preservative free) 20 mL IV syringe  1 g IntraVENous Q8H    sodium chloride (NS) flush 5-40 mL  5-40 mL IntraVENous Q8H    sodium chloride (NS) flush 5-40 mL  5-40 mL IntraVENous PRN    acetaminophen (TYLENOL) tablet 650 mg  650 mg Oral Q6H PRN    Or    acetaminophen (TYLENOL) suppository 650 mg  650 mg Rectal Q6H PRN    polyethylene glycol (MIRALAX) packet 17 g  17 g Oral DAILY PRN    promethazine (PHENERGAN) tablet 12.5 mg  12.5 mg Oral Q6H PRN    Or    ondansetron (ZOFRAN) injection 4 mg  4 mg IntraVENous Q6H PRN    famotidine (PF) (PEPCID) 20 mg in 0.9% sodium chloride 10 mL injection  20 mg IntraVENous BID    heparin (porcine) injection 5,000 Units  5,000 Units SubCUTAneous Q12H    albuterol-ipratropium (DUO-NEB) 2.5 MG-0.5 MG/3 ML  3 mL Nebulization Q4H PRN    Vancomycin Dosed by Pharmacy  1 Each Other Rx Dosing/Monitoring        Review of Systems  Review of Systems   Constitutional: Positive for malaise/fatigue. Negative for chills and fever. Eyes: Negative. Respiratory: Positive for cough and shortness of breath. Negative for wheezing. Cardiovascular: Negative for chest pain and leg swelling. Gastrointestinal: Negative for abdominal pain, nausea and vomiting. Genitourinary: Negative for dysuria and urgency. Musculoskeletal: Negative. Skin: Negative. Neurological: Negative. Psychiatric/Behavioral: Negative. Objective:     Visit Vitals  /77 (BP 1 Location: Right arm)   Pulse 84   Temp 97.9 °F (36.6 °C)   Resp 18   Ht 5' 2.99\" (1.6 m)   Wt 58.3 kg (128 lb 8.5 oz)   SpO2 96%   BMI 22.77 kg/m²    O2 Flow Rate (L/min): 40 l/min O2 Device: Heated, Hi flow nasal cannula    Temp (24hrs), Av.1 °F (36.7 °C), Min:97.9 °F (36.6 °C), Max:98.3 °F (36.8 °C)      No intake/output data recorded.  1901 -  0700  In: 640 [I.V.:130]  Out:  [Urine:]    Recent Results (from the past 24 hour(s))   METABOLIC PANEL, COMPREHENSIVE    Collection Time: 20  8:12 AM   Result Value Ref Range    Sodium 151 (H) 136 - 145 mmol/L    Potassium 2.9 (L) 3.5 - 5.1 mmol/L    Chloride 115 (H) 97 - 108 mmol/L    CO2 27 21 - 32 mmol/L    Anion gap 9 5 - 15 mmol/L    Glucose 155 (H) 65 - 100 mg/dL    BUN 74 (H) 6 - 20 mg/dL    Creatinine 1.70 (H) 0.70 - 1.30 mg/dL    BUN/Creatinine ratio 44 (H) 12 - 20      GFR est AA 46 (L) >60 ml/min/1.73m2    GFR est non-AA 38 (L) >60 ml/min/1.73m2    Calcium 8.5 8.5 - 10.1 mg/dL    Bilirubin, total 0.8 0.2 - 1.0 mg/dL    AST (SGOT) 218 (H) 15 - 37 U/L    ALT (SGPT) 74 12 - 78 U/L    Alk.  phosphatase 142 (H) 45 - 117 U/L    Protein, total 4.2 (L) 6.4 - 8.2 g/dL    Albumin 1.8 (L) 3.5 - 5.0 g/dL    Globulin 2.4 2.0 - 4.0 g/dL    A-G Ratio 0.8 (L) 1.1 - 2.2     RENAL FUNCTION PANEL    Collection Time: 20  8:12 AM   Result Value Ref Range    Sodium 150 (H) 136 - 145 mmol/L    Potassium 2.8 (L) 3.5 - 5.1 mmol/L    Chloride 115 (H) 97 - 108 mmol/L    CO2 27 21 - 32 mmol/L    Anion gap 8 5 - 15 mmol/L    Glucose 156 (H) 65 - 100 mg/dL    BUN 72 (H) 6 - 20 mg/dL    Creatinine 1.76 (H) 0.70 - 1.30 mg/dL    BUN/Creatinine ratio 41 (H) 12 - 20      GFR est AA 45 (L) >60 ml/min/1.73m2    GFR est non-AA 37 (L) >60 ml/min/1.73m2    Calcium 8.8 8.5 - 10.1 mg/dL    Phosphorus 2.9 2.6 - 4.7 mg/dL    Albumin 1.6 (L) 3.5 - 5.0 g/dL   CBC WITH AUTOMATED DIFF Collection Time: 12/14/20  8:12 AM   Result Value Ref Range    WBC 15.7 (H) 4.1 - 11.1 K/uL    RBC 3.32 (L) 4.10 - 5.70 M/uL    HGB 10.0 (L) 12.1 - 17.0 g/dL    HCT 30.5 (L) 36.6 - 50.3 %    MCV 91.9 80.0 - 99.0 FL    MCH 30.1 26.0 - 34.0 PG    MCHC 32.8 30.0 - 36.5 g/dL    RDW 17.8 (H) 11.5 - 14.5 %    PLATELET 92 (L) 132 - 400 K/uL    MPV 12.0 8.9 - 12.9 FL    NEUTROPHILS 93 (H) 32 - 75 %    LYMPHOCYTES 3 (L) 12 - 49 %    MONOCYTES 3 (L) 5 - 13 %    EOSINOPHILS 0 0 - 7 %    BASOPHILS 0 0 - 1 %    IMMATURE GRANULOCYTES 1 (H) 0.0 - 0.5 %    ABS. NEUTROPHILS 14.7 (H) 1.8 - 8.0 K/UL    ABS. LYMPHOCYTES 0.5 (L) 0.8 - 3.5 K/UL    ABS. MONOCYTES 0.5 0.0 - 1.0 K/UL    ABS. EOSINOPHILS 0.0 0.0 - 0.4 K/UL    ABS. BASOPHILS 0.0 0.0 - 0.1 K/UL    ABS. IMM. GRANS. 0.1 (H) 0.00 - 0.04 K/UL    DF AUTOMATED     BNP    Collection Time: 12/14/20  8:12 AM   Result Value Ref Range    NT pro-BNP 14,355 (H) <450 pg/mL   VANCOMYCIN, RANDOM    Collection Time: 12/14/20  8:12 AM   Result Value Ref Range    Vancomycin, random 17.5 ug/mL        XR CHEST PORT   Final Result   Impression:Left lung airspace disease and suspected pleural fluid. Right basilar   pulmonary haziness compatible with airspace disease/atelectasis and trace of   pleural fluid. US SPLEEN   Final Result   IMPRESSION: At least 2 heterogeneously hypoechoic to adjacent splenic tissue   mass lesions within the spleen. Findings are nonspecific and could represent   lymphoma or infection. Lymphoma would typically be hot on a PET/CT. If   clinically warranted, follow-up may be helpful. XR CHEST PORT   Final Result      CT CHEST WO CONT   Final Result   IMPRESSION:   1. Bilateral pleural effusions and lower lung consolidations, the left lower   lung being completely collapsed, this is similar to previous. 2. Interval development patchy airspace disease in the aerated lungs consistent   with pneumonia.    3. Abnormal spleen, small retroperitoneal lymphadenopathy unchanged. 4. Atherosclerosis. XR CHEST SNGL V   Final Result   FINDINGS: Impression: Frontal single view chest.      Interval placement left central catheter distal tip SVC/RA junction region. Unchanged cardiomediastinal silhouette. Slightly increasing bilateral pulmonary   airspace edema and/or pneumonia and/or atelectasis. Increasing consolidation of   the left lower lobe. Interval development of small left pleural effusion. No pneumothorax. XR CHEST SNGL V   Final Result           PHYSICAL EXAM:    Physical Exam  Vitals signs reviewed. Constitutional:       Appearance: He is ill-appearing. HENT:      Head: Normocephalic and atraumatic. Nose: Nose normal.      Mouth/Throat:      Mouth: Mucous membranes are dry. Neck:      Musculoskeletal: Normal range of motion. Cardiovascular:      Rate and Rhythm: Normal rate and regular rhythm. Heart sounds: Normal heart sounds. Pulmonary:      Comments: Crackles bilaterally with diminished breath sounds bilaterally  Abdominal:      General: Abdomen is flat. Bowel sounds are normal.      Palpations: Abdomen is soft. Musculoskeletal: Normal range of motion. Skin:     General: Skin is warm and dry. Neurological:      General: No focal deficit present. Mental Status: He is alert and oriented to person, place, and time. Mental status is at baseline.    Psychiatric:         Mood and Affect: Mood normal.          Data Review    Recent Results (from the past 24 hour(s))   METABOLIC PANEL, COMPREHENSIVE    Collection Time: 12/14/20  8:12 AM   Result Value Ref Range    Sodium 151 (H) 136 - 145 mmol/L    Potassium 2.9 (L) 3.5 - 5.1 mmol/L    Chloride 115 (H) 97 - 108 mmol/L    CO2 27 21 - 32 mmol/L    Anion gap 9 5 - 15 mmol/L    Glucose 155 (H) 65 - 100 mg/dL    BUN 74 (H) 6 - 20 mg/dL    Creatinine 1.70 (H) 0.70 - 1.30 mg/dL    BUN/Creatinine ratio 44 (H) 12 - 20      GFR est AA 46 (L) >60 ml/min/1.73m2    GFR est non-AA 38 (L) >60 ml/min/1.73m2    Calcium 8.5 8.5 - 10.1 mg/dL    Bilirubin, total 0.8 0.2 - 1.0 mg/dL    AST (SGOT) 218 (H) 15 - 37 U/L    ALT (SGPT) 74 12 - 78 U/L    Alk. phosphatase 142 (H) 45 - 117 U/L    Protein, total 4.2 (L) 6.4 - 8.2 g/dL    Albumin 1.8 (L) 3.5 - 5.0 g/dL    Globulin 2.4 2.0 - 4.0 g/dL    A-G Ratio 0.8 (L) 1.1 - 2.2     RENAL FUNCTION PANEL    Collection Time: 12/14/20  8:12 AM   Result Value Ref Range    Sodium 150 (H) 136 - 145 mmol/L    Potassium 2.8 (L) 3.5 - 5.1 mmol/L    Chloride 115 (H) 97 - 108 mmol/L    CO2 27 21 - 32 mmol/L    Anion gap 8 5 - 15 mmol/L    Glucose 156 (H) 65 - 100 mg/dL    BUN 72 (H) 6 - 20 mg/dL    Creatinine 1.76 (H) 0.70 - 1.30 mg/dL    BUN/Creatinine ratio 41 (H) 12 - 20      GFR est AA 45 (L) >60 ml/min/1.73m2    GFR est non-AA 37 (L) >60 ml/min/1.73m2    Calcium 8.8 8.5 - 10.1 mg/dL    Phosphorus 2.9 2.6 - 4.7 mg/dL    Albumin 1.6 (L) 3.5 - 5.0 g/dL   CBC WITH AUTOMATED DIFF    Collection Time: 12/14/20  8:12 AM   Result Value Ref Range    WBC 15.7 (H) 4.1 - 11.1 K/uL    RBC 3.32 (L) 4.10 - 5.70 M/uL    HGB 10.0 (L) 12.1 - 17.0 g/dL    HCT 30.5 (L) 36.6 - 50.3 %    MCV 91.9 80.0 - 99.0 FL    MCH 30.1 26.0 - 34.0 PG    MCHC 32.8 30.0 - 36.5 g/dL    RDW 17.8 (H) 11.5 - 14.5 %    PLATELET 92 (L) 446 - 400 K/uL    MPV 12.0 8.9 - 12.9 FL    NEUTROPHILS 93 (H) 32 - 75 %    LYMPHOCYTES 3 (L) 12 - 49 %    MONOCYTES 3 (L) 5 - 13 %    EOSINOPHILS 0 0 - 7 %    BASOPHILS 0 0 - 1 %    IMMATURE GRANULOCYTES 1 (H) 0.0 - 0.5 %    ABS. NEUTROPHILS 14.7 (H) 1.8 - 8.0 K/UL    ABS. LYMPHOCYTES 0.5 (L) 0.8 - 3.5 K/UL    ABS. MONOCYTES 0.5 0.0 - 1.0 K/UL    ABS. EOSINOPHILS 0.0 0.0 - 0.4 K/UL    ABS. BASOPHILS 0.0 0.0 - 0.1 K/UL    ABS. IMM.  GRANS. 0.1 (H) 0.00 - 0.04 K/UL    DF AUTOMATED     BNP    Collection Time: 12/14/20  8:12 AM   Result Value Ref Range    NT pro-BNP 14,355 (H) <450 pg/mL   VANCOMYCIN, RANDOM    Collection Time: 12/14/20  8:12 AM   Result Value Ref Range    Vancomycin, random 17.5 ug/mL        Assessment/Plan:     Active Problems:    HTN (hypertension) (1/4/2011)      CRI (chronic renal insufficiency) (12/13/2012)      Cervical stenosis of spinal canal (11/20/2020)      Acute renal failure (ARF) ([facility] [address] (11/20/2020)      HAP (hospital-acquired pneumonia) (11/30/2020)      Severe sepsis ([address] (12/1/2020)      Hospital course: This is an 80-year-old male admitted on 12/11/2020 with acute respiratory failure with hypoxia secondary to bilateral pneumonia as well as septic shock requiring norepinephrine. Triple-lumen catheter was inserted and patient was transferred to the intensive care unit. He was 100% dependent on BiPAP at this point secondary to hypoxia. Much improved while on BiPAP. CT of the chest reveals bilateral pleural effusions with lower lung consolidations as well as left lower lobe collapse. There are patchy airspace disease present throughout the lungs consistent with multifocal pneumonia. The spleen is abnormal with retroperitoneal lymphadenopathy present. CTA performed on December 1 revealed no obvious pulmonary emboli. Patient has had multiple admissions at outside facilities for recurrent pneumonia. Family feels that the patient's pneumonia has never completely resolved prior to discharge. Still with severe sepsis requiring norepinephrine at high doses although weaned off 12/13. Continue vancomycin and meropenem. Patient is a DNR and has been confirmed via family and the patient. Pulmonary consultation and cardiac consultation. Also with acute on chronic kidney disease. Baseline creatinine 1.6 and has transitioned appropriately to near normal.  Continue with  Hydration due to hyponatremia. Lactic acidosis resolved. History of lymphoma although peripheral smear November 2020 with no obvious lymphoma present. Echocardiogram pending previous echo revealed an EF of 60 to 65%. Mildly elevated troponin. Impression:     1. Severe sepsis  2.   Acute respiratory failure with hypoxia  3. Hypotension  4. Acute on chronic renal failure  5. Hospital-acquired pneumonia  6. Lactic acidosis  7. History of lymphoma  8. Left pleural effusion  9.      Plan:     1. Severe sepsis  Penicillin allergy and proceed with meropenem Cultures from previous hospitalization revealed gram-positive cocci in the sputum  Continue vancomycin  Severe sepsis IV fluid protocol  Norepinephrine due to septic shock  Urinalysis and urine culture pending  Blood culture Gram positive cocci  Hydrocortisone discontinued, 12/14  Central line placement 12/11      2. Acute respiratory failure with hypoxia with associated hospital-acquired pneumonia  Continue vancomycin and meropenem  RT consult for high flow oxygen\BiPAP  Patient is DNR but will accept noninvasive positive pressure ventilation, currently on Hi flow 40 liters FIO2 45%  Pulmonary consultation  No obvious history of reactive airway disease  Duonebs as needed     3. Acute on chronic kidney disease  Baseline creatinine 1.61  Patient has received aggressive IV hydration  We will continue to monitor with serial laboratory data     4. Lactic acidosis  resolved     5. History of lymphoma  Appears to be stable although lobulated spleen on CT at previous hospitalization  Will need further evaluation with ultrasound     6. Troponin elevation, non-ST elevation MI  Troponin currently 0.7-->0.12-->0.10     7.   Acute systolic heart failure with a previous EF of 60-65% and grade 1 diastolic dysfunction  Cardiology consultation  Echocardiogram EF 64%  Worsening left pleural effusion, Thoracentesis ordered for 12/15     CODE STATUS: DNR     FEN:    Fluids: Patient will be started on D5W due to hypernatremia  Electrolytes: Aggressive potassium replacement  Nutrition via NG tube feeds feeds    DVT prophylaxis Heparin  Ulcer prophylaxis: Famotidine    Discussed case with daughter Agustín Bautista, [phone]  Long discussion as patient no longer with accept BiPAP. Transition to high flow nasal cannula. Will accept NG tube for nutrition. Due to his rapidly declining functional status patient is unable to even clear his own secretions. Explained this to the patient's daughter who will speak with him this evening.  Pulmonary toilet    Care Plan discussed with: Patient/Family    Critical care time: 48 minutes

## 2020-12-14 NOTE — PROGRESS NOTES
CARDIOLOGY PROGRESS NOTE - NP    Patient seen and examined. This is a patient who is followed for shortness of breath. Patient appears lethargic. On BiPAP this am.  No other complaints reported. Telemetry reviewed, there were no events noted in the past 24 hours. SR 90s with PVCs. Pertinent review of systems items noted above, all other systems are negative. Current medications reviewed. Physical Examination  Vital signs are stable. Blood pressure 140/78 , Pulse 84  No apparent distress. Heart is regular, rate and rhythm. Normal S1, S2, no murmurs are appreciated. Lungs are diminished bilaterally. Abdomen is soft, nontender, normal bowel sounds. Extremities have no edema. Labs reviewed: K 2.8, BNP 14,355, Cr 1.76    2-D echo 12/14    · LV: Calculated LVEF is 64%. Normal cavity size, wall thickness and systolic function (ejection fraction normal). Mild (grade 1) left ventricular diastolic dysfunction. · MV: Mild mitral annular calcification. Mild mitral valve regurgitation is present. · PV: Mild pulmonic valve regurgitation is present. · TV: Moderate tricuspid valve regurgitation is present. · AV: Mild aortic valve regurgitation is present. · LA: Moderately dilated left atrium. Case discussed with Dr. Whaley Seen and our impression and recommendations are as follows:    1. Pneumonia: Noted on CXR. Treament per primary.      2. Heart failure: BNP elevated but has decreased since last hospitalization. Recent echocardiogram shows preserved EF 55-60%. Will repeat limited echocardiogram to assess for  IVC dilation. Maintain optimal ventilation, continue BiPAP. Place on cardiac monitor. Keep potassium between 4-5 and magnesium level > 2. Strict I&Os, and daily weights. Replace K and repeat labs in the am.     3. Hypertension: Blood pressure stable. Will continue to monitor. Please do not hesitate to call me or Dr. Whaley Seen if additional questions arise. Ania Todd

## 2020-12-14 NOTE — PROGRESS NOTES
OT order received, chart reviewed. OT eval attempted at 0853, however despite max encouragement, education on importance of participation pt declined at this time. When asked why, he stated \"I just can't, not today\" \"maybe another day\". Even w/ in bed activity/excercise, pt declined.  Cont to follow to attempt OT eval. Thank  you

## 2020-12-14 NOTE — PROGRESS NOTES
Physician Progress Note      PATIENT:               Cathy Price  CSN #:                  [id number]  :                       1932  ADMIT DATE:       2020 11:16 AM  [address] [place] DATE:  RESPONDING  PROVIDER #:        Clari Tracey MD          QUERY TEXT:    Dr. Yolande Harry    Patient admitted with Sepsis, noted to have elevated sodium levels. If possible, please document in progress notes and discharge summary if you are evaluating and/or treating any of the following: The medical record reflects the following:  Risk Factors: sepsis, pneumonia, acute respiratory failure  Clinical Indicators: Sodium levels  = 151   = 150  Treatment: IV D5 @ 50 ml/hr    Thank you,  Ute Gomez RN, CCDS, CPC  Options provided:  -- Hypernatremia  -- Elevated sodium not clinically significant  -- Other - I will add my own diagnosis  -- Disagree - Not applicable / Not valid  -- Disagree - Clinically unable to determine / Unknown  -- Refer to Clinical Documentation Reviewer    PROVIDER RESPONSE TEXT:    This patient has Hypernatremia.     Query created by: Henrik Price on 2020 12:50 PM      Electronically signed by:  Clari Tracey MD 2020 2:48 PM

## 2020-12-15 ENCOUNTER — APPOINTMENT (OUTPATIENT)
Dept: INTERVENTIONAL RADIOLOGY/VASCULAR | Age: 85
DRG: 871 | End: 2020-12-15
Attending: PHYSICIAN ASSISTANT
Payer: MEDICARE

## 2020-12-15 LAB
ALBUMIN SERPL-MCNC: 1.9 G/DL (ref 3.5–5)
ALBUMIN/GLOB SERPL: 0.5 {RATIO} (ref 1.1–2.2)
ALP SERPL-CCNC: 197 U/L (ref 45–117)
ALT SERPL-CCNC: 100 U/L (ref 12–78)
ANION GAP SERPL CALC-SCNC: 6 MMOL/L (ref 5–15)
ANION GAP SERPL CALC-SCNC: 8 MMOL/L (ref 5–15)
APPEARANCE FLD: CLEAR
AST SERPL W P-5'-P-CCNC: 144 U/L (ref 15–37)
BASOPHILS # BLD: 0 K/UL (ref 0–0.1)
BASOPHILS # BLD: 0 K/UL (ref 0–0.1)
BASOPHILS NFR BLD: 0 % (ref 0–1)
BASOPHILS NFR BLD: 0 % (ref 0–1)
BILIRUB SERPL-MCNC: 0.8 MG/DL (ref 0.2–1)
BUN SERPL-MCNC: 62 MG/DL (ref 6–20)
BUN SERPL-MCNC: 63 MG/DL (ref 6–20)
BUN/CREAT SERPL: 43 (ref 12–20)
BUN/CREAT SERPL: 46 (ref 12–20)
CA-I BLD-MCNC: 9.5 MG/DL (ref 8.5–10.1)
CA-I BLD-MCNC: 9.7 MG/DL (ref 8.5–10.1)
CHLORIDE SERPL-SCNC: 109 MMOL/L (ref 97–108)
CHLORIDE SERPL-SCNC: 110 MMOL/L (ref 97–108)
CO2 SERPL-SCNC: 30 MMOL/L (ref 21–32)
CO2 SERPL-SCNC: 32 MMOL/L (ref 21–32)
COLOR FLD: YELLOW
CREAT SERPL-MCNC: 1.34 MG/DL (ref 0.7–1.3)
CREAT SERPL-MCNC: 1.48 MG/DL (ref 0.7–1.3)
DATE LAST DOSE: NORMAL
DIFFERENTIAL METHOD BLD: ABNORMAL
DIFFERENTIAL METHOD BLD: ABNORMAL
EOSINOPHIL # BLD: 0 K/UL (ref 0–0.4)
EOSINOPHIL # BLD: 0.1 K/UL (ref 0–0.4)
EOSINOPHIL NFR BLD: 0 % (ref 0–7)
EOSINOPHIL NFR BLD: 1 % (ref 0–7)
ERYTHROCYTE [DISTWIDTH] IN BLOOD BY AUTOMATED COUNT: 17.4 % (ref 11.5–14.5)
ERYTHROCYTE [DISTWIDTH] IN BLOOD BY AUTOMATED COUNT: 17.5 % (ref 11.5–14.5)
GLOBULIN SER CALC-MCNC: 3.5 G/DL (ref 2–4)
GLUCOSE SERPL-MCNC: 154 MG/DL (ref 65–100)
GLUCOSE SERPL-MCNC: 70 MG/DL (ref 65–100)
HCT VFR BLD AUTO: 33.7 % (ref 36.6–50.3)
HCT VFR BLD AUTO: 34.5 % (ref 36.6–50.3)
HGB BLD-MCNC: 11 G/DL (ref 12.1–17)
HGB BLD-MCNC: 11.2 G/DL (ref 12.1–17)
IMM GRANULOCYTES # BLD AUTO: 0.1 K/UL (ref 0–0.04)
IMM GRANULOCYTES # BLD AUTO: 0.1 K/UL (ref 0–0.04)
IMM GRANULOCYTES NFR BLD AUTO: 0 % (ref 0–0.5)
IMM GRANULOCYTES NFR BLD AUTO: 1 % (ref 0–0.5)
LYMPHOCYTES # BLD: 0.7 K/UL (ref 0.8–3.5)
LYMPHOCYTES # BLD: 0.9 K/UL (ref 0.8–3.5)
LYMPHOCYTES NFR BLD: 6 % (ref 12–49)
LYMPHOCYTES NFR BLD: 6 % (ref 12–49)
LYMPHOCYTES NFR FLD: 23 %
MCH RBC QN AUTO: 29.9 PG (ref 26–34)
MCH RBC QN AUTO: 30.3 PG (ref 26–34)
MCHC RBC AUTO-ENTMCNC: 32.5 G/DL (ref 30–36.5)
MCHC RBC AUTO-ENTMCNC: 32.6 G/DL (ref 30–36.5)
MCV RBC AUTO: 92.2 FL (ref 80–99)
MCV RBC AUTO: 92.8 FL (ref 80–99)
MESOTHL CELL NFR FLD: 4 %
MONOCYTES # BLD: 0.9 K/UL (ref 0–1)
MONOCYTES # BLD: 1.1 K/UL (ref 0–1)
MONOCYTES NFR BLD: 8 % (ref 5–13)
MONOCYTES NFR BLD: 9 % (ref 5–13)
MONOS+MACROS NFR FLD: 31 %
NEUTROPHILS NFR FLD: 42 %
NEUTS SEG # BLD: 11.6 K/UL (ref 1.8–8)
NEUTS SEG # BLD: 9 K/UL (ref 1.8–8)
NEUTS SEG NFR BLD: 83 % (ref 32–75)
NEUTS SEG NFR BLD: 86 % (ref 32–75)
NUC CELL # FLD: 1353 /CU MM (ref 0–5)
PLATELET # BLD AUTO: 67 K/UL (ref 150–400)
PLATELET # BLD AUTO: 82 K/UL (ref 150–400)
POTASSIUM SERPL-SCNC: 4.4 MMOL/L (ref 3.5–5.1)
POTASSIUM SERPL-SCNC: 4.5 MMOL/L (ref 3.5–5.1)
PROT SERPL-MCNC: 5.4 G/DL (ref 6.4–8.2)
RBC # BLD AUTO: 3.63 M/UL (ref 4.1–5.7)
RBC # BLD AUTO: 3.74 M/UL (ref 4.1–5.7)
RBC # FLD: 3000 /CU MM
REPORTED DOSE,DOSE: NORMAL UNITS
SODIUM SERPL-SCNC: 147 MMOL/L (ref 136–145)
SODIUM SERPL-SCNC: 148 MMOL/L (ref 136–145)
SPECIMEN SOURCE FLD: ABNORMAL
VANCOMYCIN SERPL-MCNC: 17.6 UG/ML
WBC # BLD AUTO: 10.7 K/UL (ref 4.1–11.1)
WBC # BLD AUTO: 13.6 K/UL (ref 4.1–11.1)

## 2020-12-15 PROCEDURE — 0W9B3ZX DRAINAGE OF LEFT PLEURAL CAVITY, PERCUTANEOUS APPROACH, DIAGNOSTIC: ICD-10-PCS | Performed by: RADIOLOGY

## 2020-12-15 PROCEDURE — 74011250636 HC RX REV CODE- 250/636: Performed by: INTERNAL MEDICINE

## 2020-12-15 PROCEDURE — 74011000250 HC RX REV CODE- 250: Performed by: PHYSICIAN ASSISTANT

## 2020-12-15 PROCEDURE — 74011250637 HC RX REV CODE- 250/637: Performed by: PHYSICIAN ASSISTANT

## 2020-12-15 PROCEDURE — 80048 BASIC METABOLIC PNL TOTAL CA: CPT

## 2020-12-15 PROCEDURE — 74011250637 HC RX REV CODE- 250/637: Performed by: INTERNAL MEDICINE

## 2020-12-15 PROCEDURE — 87040 BLOOD CULTURE FOR BACTERIA: CPT

## 2020-12-15 PROCEDURE — 80053 COMPREHEN METABOLIC PANEL: CPT

## 2020-12-15 PROCEDURE — 92526 ORAL FUNCTION THERAPY: CPT

## 2020-12-15 PROCEDURE — 97535 SELF CARE MNGMENT TRAINING: CPT

## 2020-12-15 PROCEDURE — 74011250636 HC RX REV CODE- 250/636: Performed by: EMERGENCY MEDICINE

## 2020-12-15 PROCEDURE — 5A09357 ASSISTANCE WITH RESPIRATORY VENTILATION, LESS THAN 24 CONSECUTIVE HOURS, CONTINUOUS POSITIVE AIRWAY PRESSURE: ICD-10-PCS | Performed by: INTERNAL MEDICINE

## 2020-12-15 PROCEDURE — 74011000250 HC RX REV CODE- 250: Performed by: EMERGENCY MEDICINE

## 2020-12-15 PROCEDURE — 80202 ASSAY OF VANCOMYCIN: CPT

## 2020-12-15 PROCEDURE — 85025 COMPLETE CBC W/AUTO DIFF WBC: CPT

## 2020-12-15 PROCEDURE — C1729 CATH, DRAINAGE: HCPCS

## 2020-12-15 PROCEDURE — 74011000258 HC RX REV CODE- 258: Performed by: PHYSICIAN ASSISTANT

## 2020-12-15 PROCEDURE — 77010033711 HC HIGH FLOW OXYGEN

## 2020-12-15 PROCEDURE — 94760 N-INVAS EAR/PLS OXIMETRY 1: CPT

## 2020-12-15 PROCEDURE — 65270000029 HC RM PRIVATE

## 2020-12-15 PROCEDURE — 97167 OT EVAL HIGH COMPLEX 60 MIN: CPT

## 2020-12-15 PROCEDURE — 94660 CPAP INITIATION&MGMT: CPT

## 2020-12-15 PROCEDURE — 74011250636 HC RX REV CODE- 250/636: Performed by: PHYSICIAN ASSISTANT

## 2020-12-15 RX ADMIN — FAMOTIDINE 20 MG: 10 INJECTION INTRAVENOUS at 22:37

## 2020-12-15 RX ADMIN — MEROPENEM 1 G: 1 INJECTION, POWDER, FOR SOLUTION INTRAVENOUS at 23:47

## 2020-12-15 RX ADMIN — HEPARIN SODIUM 5000 UNITS: 5000 INJECTION INTRAVENOUS; SUBCUTANEOUS at 06:32

## 2020-12-15 RX ADMIN — FUROSEMIDE 40 MG: 10 INJECTION, SOLUTION INTRAMUSCULAR; INTRAVENOUS at 08:50

## 2020-12-15 RX ADMIN — ISOSORBIDE MONONITRATE 30 MG: 30 TABLET, EXTENDED RELEASE ORAL at 08:50

## 2020-12-15 RX ADMIN — HEPARIN SODIUM 5000 UNITS: 5000 INJECTION INTRAVENOUS; SUBCUTANEOUS at 16:37

## 2020-12-15 RX ADMIN — MEROPENEM 1 G: 1 INJECTION, POWDER, FOR SOLUTION INTRAVENOUS at 08:49

## 2020-12-15 RX ADMIN — DEXTROSE MONOHYDRATE 50 ML/HR: 50 INJECTION, SOLUTION INTRAVENOUS at 22:37

## 2020-12-15 RX ADMIN — POTASSIUM CHLORIDE 40 MEQ: 1.5 FOR SOLUTION ORAL at 16:38

## 2020-12-15 RX ADMIN — DEXTROSE MONOHYDRATE 50 ML/HR: 50 INJECTION, SOLUTION INTRAVENOUS at 06:33

## 2020-12-15 RX ADMIN — Medication 10 ML: at 22:46

## 2020-12-15 RX ADMIN — POTASSIUM CHLORIDE 40 MEQ: 1.5 FOR SOLUTION ORAL at 08:50

## 2020-12-15 RX ADMIN — MUPIROCIN: 20 OINTMENT TOPICAL at 08:49

## 2020-12-15 RX ADMIN — FAMOTIDINE 20 MG: 10 INJECTION INTRAVENOUS at 08:50

## 2020-12-15 RX ADMIN — VANCOMYCIN HYDROCHLORIDE 500 MG: 500 INJECTION, POWDER, LYOPHILIZED, FOR SOLUTION INTRAVENOUS at 16:38

## 2020-12-15 RX ADMIN — Medication 10 ML: at 16:37

## 2020-12-15 RX ADMIN — MEROPENEM 1 G: 1 INJECTION, POWDER, FOR SOLUTION INTRAVENOUS at 16:42

## 2020-12-15 RX ADMIN — Medication 10 ML: at 08:49

## 2020-12-15 RX ADMIN — HYDRALAZINE HYDROCHLORIDE 10 MG: 10 TABLET, FILM COATED ORAL at 08:50

## 2020-12-15 NOTE — PROGRESS NOTES
Vancomycin Update:12/15/20  Day:4  Previous Regimen: 500mg IV x1 on 12/14. Pt vanc random level resulted today at 17.6. Pt Scr is 1.48 today. Pt will receive ONE dose of 500 mg today. Random has been ordered for tomorrow AM.  Pharmacy to follow daily and will make changes to dose and/or frequency based on clinical status.

## 2020-12-15 NOTE — PROGRESS NOTES
Hospitalist Progress Note             Daily Progress Note: 12/15/2020      Subjective: The patient is seen for follow  up. Patient is an 78-year-old man with history of recurrent pneumonia who was admitted on 12/11/2020 due to shortness of breath with hypoxia. He was also found to be in septic shock requiring norepinephrine. Respiratory failure found to be secondary to pneumonia. He was on BiPAP for respiratory failure and later transitioned to high flow on 12/14/2020. Patient has been on meropenem and vancomycin. Blood cultures collected on 12/11/2020 grew gram-positive cocci in clusters likely contamination. Due to poor  nutrition he is on NG tube feeding.     Patient seen and examined at bedside  He is resting comfortably no acute distress  He is on high flow oxygen with O2 sats at 98%  He is being fed via NG tube    Problem List:  Patient Active Problem List   Diagnosis Code    Lymphoma ([facility] C85.90    HTN (hypertension) I10    Prostate CA ([facility] C61    Gout M10.9    CRI (chronic renal insufficiency) N18.9    Hypercalcemia of malignancy E83.52    Cervical stenosis of spinal canal M48.02    Acute renal failure (ARF) ([facility]) N17.9    Lumbar canal stenosis M48.061    Frequent falls R29.6    Multiple falls R29.6    Weakness R53.1    Anemia D64.9    Opioid use F11.90    History of B-cell lymphoma Z85.72    Non-Hodgkin's lymphoma in relapse ([facility]) C85.90    Hypercalcemia E83.52    HAP (hospital-acquired pneumonia) J18.9, Y95    SIRS (systemic inflammatory response syndrome) ([facility]) R65.10    CHF (congestive heart failure) ([facility]) I50.9    Acute hypoxemic respiratory failure ([facility]) J96.01    Severe sepsis ([facility]) A41.9, R65.20    Person under investigation for COVID-19 Z20.828    BPPV (benign paroxysmal positional vertigo) H81.10         Medications reviewed  Current Facility-Administered Medications   Medication Dose Route Frequency    potassium chloride (KLOR-CON) packet for solution 40 mEq  40 mEq Oral BID WITH MEALS    hydrALAZINE (APRESOLINE) tablet 10 mg  10 mg Oral TID    isosorbide mononitrate ER (IMDUR) tablet 30 mg  30 mg Oral DAILY    furosemide (LASIX) injection 40 mg  40 mg IntraVENous DAILY    dextrose 5% infusion  50 mL/hr IntraVENous CONTINUOUS    mupirocin (BACTROBAN) 2 % ointment   Topical DAILY    meropenem (MERREM) 1 g in sterile water (preservative free) 20 mL IV syringe  1 g IntraVENous Q8H    sodium chloride (NS) flush 5-40 mL  5-40 mL IntraVENous Q8H    sodium chloride (NS) flush 5-40 mL  5-40 mL IntraVENous PRN    acetaminophen (TYLENOL) tablet 650 mg  650 mg Oral Q6H PRN    Or    acetaminophen (TYLENOL) suppository 650 mg  650 mg Rectal Q6H PRN    polyethylene glycol (MIRALAX) packet 17 g  17 g Oral DAILY PRN    promethazine (PHENERGAN) tablet 12.5 mg  12.5 mg Oral Q6H PRN    Or    ondansetron (ZOFRAN) injection 4 mg  4 mg IntraVENous Q6H PRN    famotidine (PF) (PEPCID) 20 mg in 0.9% sodium chloride 10 mL injection  20 mg IntraVENous BID    heparin (porcine) injection 5,000 Units  5,000 Units SubCUTAneous Q12H    albuterol-ipratropium (DUO-NEB) 2.5 MG-0.5 MG/3 ML  3 mL Nebulization Q4H PRN    Vancomycin Dosed by Pharmacy  1 Each Other Rx Dosing/Monitoring       Review of Systems:   Review of Systems   Constitutional: Negative for chills, diaphoresis, fever, malaise/fatigue and weight loss. HENT: Negative for ear discharge, ear pain, hearing loss, nosebleeds, sinus pain and tinnitus. Respiratory: Negative for cough, hemoptysis, sputum production, shortness of breath and wheezing. Cardiovascular: Negative for chest pain, palpitations, orthopnea, claudication and leg swelling. Gastrointestinal: Negative for abdominal pain, constipation, diarrhea, heartburn, nausea and vomiting. Genitourinary: Negative for dysuria, flank pain, frequency, hematuria and urgency.    Musculoskeletal: Negative for back pain, falls, joint pain, myalgias and neck pain. Neurological: Positive for weakness. Negative for dizziness, tingling, focal weakness, seizures and headaches. Psychiatric/Behavioral: Negative for depression, hallucinations, memory loss, substance abuse and suicidal ideas. The patient is not nervous/anxious. Objective:   Physical Exam  Constitutional:       General: He is not in acute distress. Appearance: He is ill-appearing. HENT:      Head: Normocephalic and atraumatic. Mouth/Throat:      Mouth: Mucous membranes are moist.      Pharynx: Oropharynx is clear. Eyes:      Pupils: Pupils are equal, round, and reactive to light. Neck:      Musculoskeletal: No neck rigidity. Cardiovascular:      Rate and Rhythm: Normal rate and regular rhythm. Heart sounds: No murmur. No friction rub. No gallop. Pulmonary:      Effort: Pulmonary effort is normal. No respiratory distress. Breath sounds: Normal breath sounds. No wheezing or rales. Abdominal:      General: Bowel sounds are normal. There is no distension. Palpations: Abdomen is soft. Tenderness: There is no abdominal tenderness. There is no rebound. Musculoskeletal: Normal range of motion. General: No swelling, tenderness, deformity or signs of injury. Skin:     General: Skin is warm and dry. Coloration: Skin is not jaundiced. Findings: No bruising, erythema or rash. Neurological:      General: No focal deficit present. Mental Status: He is alert and oriented to person, place, and time. Sensory: No sensory deficit. Motor: Weakness present. Gait: Gait normal.   Psychiatric:         Mood and Affect: Mood normal.         Behavior: Behavior normal.         Thought Content:  Thought content normal.          Visit Vitals  BP (!) 147/81 (BP 1 Location: Right arm, BP Patient Position: At rest)   Pulse 86   Temp 96.9 °F (36.1 °C)   Resp 20   Ht 5' 2.99\" (1.6 m)   Wt 58.3 kg (128 lb 8.5 oz)   SpO2 97%   BMI 22.77 kg/m²         Data Review:       Recent Days:  Recent Labs     12/14/20  0812 12/13/20 0422   WBC 15.7* 18.1*   HGB 10.0* 9.5*   HCT 30.5* 26.9*   PLT 92* 147*     Recent Labs     12/14/20  0812 12/13/20 0422   *  151* 151*   K 2.8*  2.9* 3.2*   *  115* 114*   CO2 27  27 24   *  155* 95   BUN 72*  74* 78*   CREA 1.76*  1.70* 2.39*   CA 8.8  8.5 8.4*   PHOS 2.9  --    ALB 1.6*  1.8* 1.7*   TBILI 0.8 1.0   ALT 74 22     Recent Labs     12/13/20 0440   PH 7.48*   PCO2 28*   PO2 88   HCO3 23   FIO2 50           Assessment/Plan     1. Severe sepsis   Due to pneumonia  Continue with vancomycin and pending  Repeat blood cultures    2. Acute hypoxic respiratory failure  Secondary to pneumonia  Is on high flow oxygen  Wean off oxygen to nasal cannula  Continue antibiotic    3. Acute on chronic kidney disease stage III  Located by sepsis  Creatinine is trending down appropriately  Baseline around 1.6-1.7  Continue gentle IV rehydration    4. Bilateral lobe pneumonia  Hospital-acquired  Continue with meropenem and vancomycin  Repeat blood cultures    5. Hypernatremia  Sodium is 150 secondary to dehydration  Continue with D5 W gently    6. Dysphagia  Likely contributing to recurrent pneumonia  Currently being fed via NG tube  Speech therapist has been consulted for continued therapy  If he ends up not able to tolerate p.o. will recommend PEG tube      CBC, BMP in a.m. Heparin for DVT prophylaxis    Comments: Continue current antibiotics and repeat blood cultures. Wean off high flow to nasal cannula. Continue speech therapy. If patient not able to tolerate p.o. over the next few days will recommend PEG tube to family.     I will call daughter Tarah Spence, [phone] after I speak with speech therapist      Juanis Verdin NP

## 2020-12-15 NOTE — PROGRESS NOTES
Chart reviewed. Patient still with NG tube and Hi Flow nasal cannula. Patient will return to [facility] when medically stable for discharge.

## 2020-12-15 NOTE — PROGRESS NOTES
PULMONARY NOTE  [facility]    Name: Arlene Mccartney MRN: [id number]   : 1932 Hospital: [address] [place] [address]   Date: 12/15/2020  Admission date: 2020 Hospital Day: 5       HPI:     Hospital Problems  Date Reviewed: 2020          Codes Class Noted POA    Severe sepsis ([address] ICD-10-CM: A41.9, R65.20  ICD-9-CM: 038.9, 995.92  2020 Yes        HAP (hospital-acquired pneumonia) ICD-10-CM: J18.9, Y95  ICD-9-CM: 758  2020 Yes        Cervical stenosis of spinal canal ICD-10-CM: M48.02  ICD-9-CM: 723.0  2020 Yes        Acute renal failure (ARF) (H[facility]) ICD-10-CM: N17.9  ICD-9-CM: 584.9  2020 Yes        CRI (chronic renal insufficiency) (Chronic) ICD-10-CM: N18.9  ICD-9-CM: 585.9  2012 Yes        HTN (hypertension) ICD-10-CM: I10  ICD-9-CM: 401.9  2011 Yes                   [x] High complexity decision making was performed  [x] See my orders for details      Subjective/Initial History:     I was asked by Sandro Quiroz MD to see Arlene Mccartney  a 80 y.o.  male in consultation     Excerpts from admission 2020 or consult notes as follows:   , 22-year-old male came in because of shortness of breath dyspnea and he was hypoxic he had history of pneumonia recently discharged 4 days ago he has recurrent hospitalization because of infection pneumonia significant past medical history of chronic kidney disease, lymphoma and prostate cancer he was put on 100% nonrebreather mask his saturation was in 80s now he is on blood percent FiO2 and a shock state he was put on vasopressor Levophed so admitted and critical care consult was called.  awake alert asking for water has gurgly upper airway noise. Norepinephrine down to 2 mics with well-maintained blood pressure. 12/15: Patient is seen on follow up. Resting comfortably on 45% high flow.     Allergies   Allergen Reactions    Pcn [Penicillins] Swelling        MAR reviewed and pertinent medications noted or modified as needed     Current Facility-Administered Medications   Medication    potassium chloride (KLOR-CON) packet for solution 40 mEq    hydrALAZINE (APRESOLINE) tablet 10 mg    isosorbide mononitrate ER (IMDUR) tablet 30 mg    furosemide (LASIX) injection 40 mg    dextrose 5% infusion    mupirocin (BACTROBAN) 2 % ointment    meropenem (MERREM) 1 g in sterile water (preservative free) 20 mL IV syringe    sodium chloride (NS) flush 5-40 mL    sodium chloride (NS) flush 5-40 mL    acetaminophen (TYLENOL) tablet 650 mg    Or    acetaminophen (TYLENOL) suppository 650 mg    polyethylene glycol (MIRALAX) packet 17 g    promethazine (PHENERGAN) tablet 12.5 mg    Or    ondansetron (ZOFRAN) injection 4 mg    famotidine (PF) (PEPCID) 20 mg in 0.9% sodium chloride 10 mL injection    heparin (porcine) injection 5,000 Units    albuterol-ipratropium (DUO-NEB) 2.5 MG-0.5 MG/3 ML    Vancomycin Dosed by Pharmacy      Patient PCP: Emerald Tavera MD  PMH:  has a past medical history of CRI (chronic renal insufficiency) (12/13/2012), Gout (1/4/2011), Hypertension, Lymphoma ([facility] and Prostate CA ([facility] (1/4/2011). PSH:   has a past surgical history that includes hx prostatectomy; hc bone marrow biopsy; and endoscopy, colon, diagnostic (2003). FHX: family history includes Hypertension in his mother. SHX:  reports that he has never smoked. He has never used smokeless tobacco. He reports previous drug use. He reports that he does not drink alcohol. ROS:    Review of Systems   Constitutional: Positive for malaise/fatigue. HENT: Negative. Eyes: Negative. Respiratory: Positive for cough and shortness of breath. Cardiovascular: Negative. Gastrointestinal: Negative. Genitourinary: Negative. Musculoskeletal: Negative. Skin: Negative. Neurological: Negative. Endo/Heme/Allergies: Negative. Psychiatric/Behavioral: Negative.          Objective:     Vital Signs: Telemetry: normal sinus rhythm Intake/Output:   Visit Vitals  BP (!) 147/81 (BP 1 Location: Right arm, BP Patient Position: At rest)   Pulse 80   Temp 96.9 °F (36.1 °C)   Resp 20   Ht 5' 2.99\" (1.6 m)   Wt 58.3 kg (128 lb 8.5 oz)   SpO2 97%   BMI 22.77 kg/m²       Temp (24hrs), Av.5 °F (36.4 °C), Min:96.9 °F (36.1 °C), Max:98 °F (36.7 °C)        O2 Device: Heated, Hi flow nasal cannula O2 Flow Rate (L/min): 30 l/min       Wt Readings from Last 4 Encounters:   20 58.3 kg (128 lb 8.5 oz)   20 64.3 kg (141 lb 12.1 oz)   20 64.9 kg (143 lb)   20 61.7 kg (136 lb)          Intake/Output Summary (Last 24 hours) at 12/15/2020 1058  Last data filed at 12/15/2020 1042  Gross per 24 hour   Intake 1973 ml   Output 2500 ml   Net -527 ml       Last shift:      12/15 07 - 12/15 1900  In: 055 [I.V.:584]  Out: -   Last 3 shifts: 1901 - 12/15 0700  In: 7859   Out: 2643 [Urine:3400]       Physical Exam:     Physical Exam   Constitutional: He is oriented to person, place, and time. HENT:   Head: Normocephalic and atraumatic. Eyes: Pupils are equal, round, and reactive to light. Conjunctivae and EOM are normal.   Neck: Normal range of motion. Neck supple. Cardiovascular: Normal rate and regular rhythm. Pulmonary/Chest: Effort normal. He has rales. Expiratory rhonchi has upper airway noise over the neck   Abdominal: Soft. Bowel sounds are normal.   Thin   Musculoskeletal: Normal range of motion. Neurological: He is alert and oriented to person, place, and time. Skin: Skin is warm and dry. Psychiatric: He has a normal mood and affect.         Labs:    Recent Labs     20  0812 20  0422   WBC 15.7* 18.1*   HGB 10.0* 9.5*   PLT 92* 147*     Recent Labs     20  0812 20  0422   *  151* 151*   K 2.8*  2.9* 3.2*   *  115* 114*   CO2 27  27 24   *  155* 95   BUN 72*  74* 78*   CREA 1.76*  1.70* 2.39*   CA 8.8  8.5 8.4*   PHOS 2.9  --    ALB 1.6*  1.8* 1.7* ALT 74 22     Recent Labs     12/13/20  0440   PH 7.48*   PCO2 28*   PO2 88   HCO3 23   FIO2 50     No results for input(s): CPK, CKNDX, TROIQ in the last 72 hours. No lab exists for component: CPKMB    Lab Results   Component Value Date/Time    Culture result: (A) 12/11/2020 11:21 AM     Gram Positive Cocci in clusters CALLED TO AND READ BACK BY Ortiz Hill on 12.12.2020 at 13:37 by [email or web address]    Culture result:  12/11/2020 11:21 AM     Staphylococcus species, coagulase negative , ISOLATED FROM AEROBIC BOTTLE RECEIVED    Culture result:  12/11/2020 11:21 AM     Staphylococcus aureus ** ISOLATED FROM ANAEROBIC BOTTLE     Lab Results   Component Value Date/Time    TSH 1.79 11/20/2020 01:31 PM       Imaging:    CXR Results  (Last 48 hours)               12/14/20 0845  XR CHEST PORT Final result    Impression:  Impression:Left lung airspace disease and suspected pleural fluid. Right basilar   pulmonary haziness compatible with airspace disease/atelectasis and trace of   pleural fluid. Narrative:  Portable chest:       History:Respiratory failure       COMPARISON: Portable chest 12/13/2020       Lungs show hypoaeration. There is ill-defined hazy density at the right lung   base. Hazy parenchymal infiltration is seen in the left lung, left hemidiaphragm   is obscured. Heart is normal size. Calcified plaque involving the aortic arch. Left jugular venous catheter with the tip at the junction of SVC and right   atrium. Nasogastric tube with the tip in the stomach. Results from Hospital Encounter encounter on 12/11/20   XR CHEST PORT    Narrative Portable chest:    History:Respiratory failure    COMPARISON: Portable chest 12/13/2020    Lungs show hypoaeration. There is ill-defined hazy density at the right lung  base. Hazy parenchymal infiltration is seen in the left lung, left hemidiaphragm  is obscured. Heart is normal size. Calcified plaque involving the aortic arch.   Left jugular venous catheter with the tip at the junction of SVC and right  atrium. Nasogastric tube with the tip in the stomach. Impression Impression:Left lung airspace disease and suspected pleural fluid. Right basilar  pulmonary haziness compatible with airspace disease/atelectasis and trace of  pleural fluid. XR CHEST PORT    Narrative Chest single view. Comparison single view chest December 11, 2020. Improved aeration through lung bases. Stable left neck central venous catheter. Cardiac and mediastinal structures unchanged. Thoracic aorta atherosclerosis. No  pneumothorax or sizable pleural effusion. XR CHEST SNGL V    Narrative Central line placement. Comparison chest x-ray 12/11/2020 at 1154 hours. Impression FINDINGS: Impression: Frontal single view chest.    Interval placement left central catheter distal tip SVC/RA junction region. Unchanged cardiomediastinal silhouette. Slightly increasing bilateral pulmonary  airspace edema and/or pneumonia and/or atelectasis. Increasing consolidation of  the left lower lobe. Interval development of small left pleural effusion. No pneumothorax. Results from [place] encounter on 12/11/20   CT CHEST WO CONT    Narrative Comparison chest x-ray 12/11/2020 [facility] and chest CTA 11/25/2020 [address] [place] [address]. TECHNIQUE: Axial imaging of the chest without IV contrast, with multiplanar  reformatting, MIPs. Dose reduction: All CT scans at this facility are performed using dose reduction  optimization techniques as appropriate to a performed exam including the  following: Automated exposure control, adjustments of the mA and/or kV according  to patient's size, or use of iterative reconstruction technique. FINDINGS: Left central catheter distal tip SVC. Mild cardiomegaly. Multivessel  coronary artery calcification. No pericardial effusion. Low-density intracardiac  blood suggests anemia. Calcified thoracic aorta without aneurysm. Pulmonary  arteries are not dilated. No mediastinal or hilar lymphadenopathy. Tubular  structure posterior to the esophagus possibly dilated vessel. Bilateral pleural  effusions and lower lung consolidations without air bronchograms. The left lower  lung is completely consolidated/collapsed. The aerated lungs demonstrate patchy  areas of airspace disease. No axillary adenopathy. Included thyroid is  unremarkable. Included upper abdomen demonstrates lobulated, heterogeneous  structure spleen and small retroperitoneal lymphadenopathy; unchanged. Degenerative changes of the bony structures. Soft tissue anasarca. Impression IMPRESSION:  1. Bilateral pleural effusions and lower lung consolidations, the left lower  lung being completely collapsed, this is similar to previous. 2. Interval development patchy airspace disease in the aerated lungs consistent  with pneumonia. 3. Abnormal spleen, small retroperitoneal lymphadenopathy unchanged. 4. Atherosclerosis. IMPRESSION:   1. Acute respiratory failure with Hypoxia continues to be on nasal high flow 45%  2. Severe sepsis off vasopressors  3. Community-acquired versus hospital-acquired pneumonia has gram-positive cocci in 1 of 4 blood bottles MRSA in nares  4. Hypokalemia on klor-con  5. Pharyngeal dysphagia speech has evaluated and he has totally ineffective swallow discussed with him placing NG tube for tube feeding  6. Hypernatremia. 7. History of lymphoma and prostate cancer  8. Acute on chronic kidney disease creatinine improved (baseline is 1.6)  9. CHF with diastolic dysfunction chest x-ray improving  10. Lactic acidosis, resolved. 11. Body mass index is 22.77 kg/m². 12. Elevated troponin, trending down. RECOMMENDATIONS/PLAN:     1. Patient is on nasal high flow 45 % with well-maintained oxygen saturation  2. Blood culture 1 of 4 with gram-positive cocci may be skin contaminant but remains on vancomycin & merrem, WBC trending down: 15.7  3.  Nasal MRSA smear positive  On Bactroban  4. Hypernatremia: 150; Hypokalemia: 2.8; switched to Dextrose 5% infusion  5. Repeat labs, ABG,       Michael Grove MD      *ATTENTION:  This note has been created by a medical student for educational purposes only. Please do not refer to the content of this note for clinical decision-making, billing, or other purposes. Please see attending physicians note to obtain clinical information on this patient. *

## 2020-12-15 NOTE — PROGRESS NOTES
Bedside shift change report given to RISHABH King RN(oncoming nurse) by MARY Ayala RN (offgoing nurse). Report included the following information SBAR, Kardex, Intake/Output and Recent Results.

## 2020-12-15 NOTE — PROGRESS NOTES
PULMONARY ICU NOTE  [facility]    Name: Aleah Monique MRN: [id number]   : 1932 Hospital: [address] [place] [address]   Date: 12/15/2020  Admission date: 2020 Hospital Day: 5       HPI:     Hospital Problems  Date Reviewed: 2020          Codes Class Noted POA    Severe sepsis ([address] ICD-10-CM: A41.9, R65.20  ICD-9-CM: 038.9, 995.92  2020 Yes        HAP (hospital-acquired pneumonia) ICD-10-CM: J18.9, Y95  ICD-9-CM: 094  2020 Yes        Cervical stenosis of spinal canal ICD-10-CM: M48.02  ICD-9-CM: 723.0  2020 Yes        Acute renal failure (ARF) (HCC) ICD-10-CM: N17.9  ICD-9-CM: 584.9  2020 Yes        CRI (chronic renal insufficiency) (Chronic) ICD-10-CM: N18.9  ICD-9-CM: 585.9  2012 Yes        HTN (hypertension) ICD-10-CM: I10  ICD-9-CM: 401.9  2011 Yes                   [x] High complexity decision making was performed  [x] See my orders for details      Subjective/Initial History:     I was asked by Lavon Pires MD to see Aleah Monique  a 80 y.o.  male in consultation     Excerpts from admission 2020 or consult notes as follows:   , 59-year-old male came in because of shortness of breath dyspnea and he was hypoxic he had history of pneumonia recently discharged 4 days ago he has recurrent hospitalization because of infection pneumonia significant past medical history of chronic kidney disease, lymphoma and prostate cancer he was put on 100% nonrebreather mask his saturation was in 80s now he is on blood percent FiO2 and a shock state he was put on vasopressor Levophed so admitted and critical care consult was called.  awake alert asking for water has gurgly upper airway noise. Norepinephrine down to 2 mics with well-maintained blood pressure. 12/15: Patient is seen on follow up. Resting comfortably on 45% high flow.     Allergies   Allergen Reactions    Pcn [Penicillins] Swelling        MAR reviewed and pertinent medications noted or modified as needed     Current Facility-Administered Medications   Medication    potassium chloride (KLOR-CON) packet for solution 40 mEq    hydrALAZINE (APRESOLINE) tablet 10 mg    isosorbide mononitrate ER (IMDUR) tablet 30 mg    furosemide (LASIX) injection 40 mg    dextrose 5% infusion    mupirocin (BACTROBAN) 2 % ointment    meropenem (MERREM) 1 g in sterile water (preservative free) 20 mL IV syringe    sodium chloride (NS) flush 5-40 mL    sodium chloride (NS) flush 5-40 mL    acetaminophen (TYLENOL) tablet 650 mg    Or    acetaminophen (TYLENOL) suppository 650 mg    polyethylene glycol (MIRALAX) packet 17 g    promethazine (PHENERGAN) tablet 12.5 mg    Or    ondansetron (ZOFRAN) injection 4 mg    famotidine (PF) (PEPCID) 20 mg in 0.9% sodium chloride 10 mL injection    heparin (porcine) injection 5,000 Units    albuterol-ipratropium (DUO-NEB) 2.5 MG-0.5 MG/3 ML    Vancomycin Dosed by Pharmacy      Patient PCP: Phillip Talamantes MD  PMH:  has a past medical history of CRI (chronic renal insufficiency) (12/13/2012), Gout (1/4/2011), Hypertension, Lymphoma ([facility] [address] and Prostate CA ([facility] [address] (1/4/2011). PSH:   has a past surgical history that includes hx prostatectomy; hc bone marrow biopsy; and endoscopy, colon, diagnostic (2003). FHX: family history includes Hypertension in his mother. SHX:  reports that he has never smoked. He has never used smokeless tobacco. He reports previous drug use. He reports that he does not drink alcohol. ROS:    Review of Systems   Constitutional: Positive for malaise/fatigue. HENT: Negative. Eyes: Negative. Respiratory: Positive for cough and shortness of breath. Cardiovascular: Negative. Gastrointestinal: Negative. Genitourinary: Negative. Musculoskeletal: Negative. Skin: Negative. Neurological: Negative. Endo/Heme/Allergies: Negative. Psychiatric/Behavioral: Negative.          Objective:     Vital Signs: Telemetry:    normal sinus rhythm Intake/Output:   Visit Vitals  BP (!) 147/81 (BP 1 Location: Right arm, BP Patient Position: At rest)   Pulse 80   Temp 96.9 °F (36.1 °C)   Resp 20   Ht 5' 2.99\" (1.6 m)   Wt 58.3 kg (128 lb 8.5 oz)   SpO2 97%   BMI 22.77 kg/m²       Temp (24hrs), Av.5 °F (36.4 °C), Min:96.9 °F (36.1 °C), Max:98 °F (36.7 °C)        O2 Device: Heated, Hi flow nasal cannula O2 Flow Rate (L/min): 30 l/min       Wt Readings from Last 4 Encounters:   20 58.3 kg (128 lb 8.5 oz)   20 64.3 kg (141 lb 12.1 oz)   20 64.9 kg (143 lb)   20 61.7 kg (136 lb)          Intake/Output Summary (Last 24 hours) at 12/15/2020 1003  Last data filed at 12/15/2020 0818  Gross per 24 hour   Intake 1823 ml   Output 2500 ml   Net -677 ml       Last shift:      12/15 0701 - 12/15 1900  In: 584 [I.V.:584]  Out: -   Last 3 shifts:  190 - 12/15 0700  In: 3853   Out: 9408 [Urine:3400]       Physical Exam:     Physical Exam   Constitutional: He is oriented to person, place, and time. HENT:   Head: Normocephalic and atraumatic. Eyes: Pupils are equal, round, and reactive to light. Conjunctivae and EOM are normal.   Neck: Normal range of motion. Neck supple. Cardiovascular: Normal rate and regular rhythm. Pulmonary/Chest: Effort normal. He has rales. Expiratory rhonchi has upper airway noise over the neck   Abdominal: Soft. Bowel sounds are normal.   Thin   Musculoskeletal: Normal range of motion. Neurological: He is alert and oriented to person, place, and time. Skin: Skin is warm and dry. Psychiatric: He has a normal mood and affect.         Labs:    Recent Labs     20  0812 20  0422   WBC 15.7* 18.1*   HGB 10.0* 9.5*   PLT 92* 147*     Recent Labs     20  0812 20  0422   *  151* 151*   K 2.8*  2.9* 3.2*   *  115* 114*   CO2 27  27 24   *  155* 95   BUN 72*  74* 78*   CREA 1.76*  1.70* 2.39*   CA 8.8  8.5 8.4*   PHOS 2.9  --    ALB 1. 6*  1.8* 1.7*   ALT 74 22     Recent Labs     12/13/20  0440   PH 7.48*   PCO2 28*   PO2 88   HCO3 23   FIO2 50     No results for input(s): CPK, CKNDX, TROIQ in the last 72 hours. No lab exists for component: CPKMB    Lab Results   Component Value Date/Time    Culture result: (A) 12/11/2020 11:21 AM     Gram Positive Cocci in clusters CALLED TO AND READ BACK BY Ortiz Hill on 12.12.2020 at 13:37 by [email or web address]    Culture result:  12/11/2020 11:21 AM     Staphylococcus species, coagulase negative , ISOLATED FROM AEROBIC BOTTLE RECEIVED    Culture result:  12/11/2020 11:21 AM     Probable Staphylococcus aureus ** ISOLATED FROM ANAEROBIC BOTTLE     Lab Results   Component Value Date/Time    TSH 1.79 11/20/2020 01:31 PM       Imaging:    CXR Results  (Last 48 hours)               12/14/20 0845  XR CHEST PORT Final result    Impression:  Impression:Left lung airspace disease and suspected pleural fluid. Right basilar   pulmonary haziness compatible with airspace disease/atelectasis and trace of   pleural fluid. Narrative:  Portable chest:       History:Respiratory failure       COMPARISON: Portable chest 12/13/2020       Lungs show hypoaeration. There is ill-defined hazy density at the right lung   base. Hazy parenchymal infiltration is seen in the left lung, left hemidiaphragm   is obscured. Heart is normal size. Calcified plaque involving the aortic arch. Left jugular venous catheter with the tip at the junction of SVC and right   atrium. Nasogastric tube with the tip in the stomach. Results from Hospital Encounter encounter on 12/11/20   XR CHEST PORT    Narrative Portable chest:    History:Respiratory failure    COMPARISON: Portable chest 12/13/2020    Lungs show hypoaeration. There is ill-defined hazy density at the right lung  base. Hazy parenchymal infiltration is seen in the left lung, left hemidiaphragm  is obscured. Heart is normal size. Calcified plaque involving the aortic arch.   Left jugular venous catheter with the tip at the junction of SVC and right  atrium. Nasogastric tube with the tip in the stomach. Impression Impression:Left lung airspace disease and suspected pleural fluid. Right basilar  pulmonary haziness compatible with airspace disease/atelectasis and trace of  pleural fluid. XR CHEST PORT    Narrative Chest single view. Comparison single view chest December 11, 2020. Improved aeration through lung bases. Stable left neck central venous catheter. Cardiac and mediastinal structures unchanged. Thoracic aorta atherosclerosis. No  pneumothorax or sizable pleural effusion. XR CHEST SNGL V    Narrative Central line placement. Comparison chest x-ray 12/11/2020 at 1154 hours. Impression FINDINGS: Impression: Frontal single view chest.    Interval placement left central catheter distal tip SVC/RA junction region. Unchanged cardiomediastinal silhouette. Slightly increasing bilateral pulmonary  airspace edema and/or pneumonia and/or atelectasis. Increasing consolidation of  the left lower lobe. Interval development of small left pleural effusion. No pneumothorax. Results from [place] encounter on 12/11/20   CT CHEST WO CONT    Narrative Comparison chest x-ray 12/11/2020 [facility] and chest CTA 11/25/2020 [facility]. TECHNIQUE: Axial imaging of the chest without IV contrast, with multiplanar  reformatting, MIPs. Dose reduction: All CT scans at this facility are performed using dose reduction  optimization techniques as appropriate to a performed exam including the  following: Automated exposure control, adjustments of the mA and/or kV according  to patient's size, or use of iterative reconstruction technique. FINDINGS: Left central catheter distal tip SVC. Mild cardiomegaly. Multivessel  coronary artery calcification. No pericardial effusion. Low-density intracardiac  blood suggests anemia. Calcified thoracic aorta without aneurysm. Pulmonary  arteries are not dilated. No mediastinal or hilar lymphadenopathy. Tubular  structure posterior to the esophagus possibly dilated vessel. Bilateral pleural  effusions and lower lung consolidations without air bronchograms. The left lower  lung is completely consolidated/collapsed. The aerated lungs demonstrate patchy  areas of airspace disease. No axillary adenopathy. Included thyroid is  unremarkable. Included upper abdomen demonstrates lobulated, heterogeneous  structure spleen and small retroperitoneal lymphadenopathy; unchanged. Degenerative changes of the bony structures. Soft tissue anasarca. Impression IMPRESSION:  1. Bilateral pleural effusions and lower lung consolidations, the left lower  lung being completely collapsed, this is similar to previous. 2. Interval development patchy airspace disease in the aerated lungs consistent  with pneumonia. 3. Abnormal spleen, small retroperitoneal lymphadenopathy unchanged. 4. Atherosclerosis. IMPRESSION:   1. Acute respiratory failure with Hypoxia continues to be on nasal high flow 45%  2. Severe sepsis off vasopressors  3. Community-acquired versus hospital-acquired pneumonia has gram-positive cocci in 1 of 4 blood bottles MRSA in nares  4. Hypokalemia on klor-con  5. Pharyngeal dysphagia speech has evaluated and he has totally ineffective swallow discussed with him placing NG tube for tube feeding  6. Hypernatremia. 7. History of lymphoma and prostate cancer  8. Acute on chronic kidney disease creatinine improved (baseline is 1.6)  9. CHF with diastolic dysfunction chest x-ray improving  10. Lactic acidosis, resolved. 11. Body mass index is 22.77 kg/m². 12. Elevated troponin, trending down. RECOMMENDATIONS/PLAN:     1. Patient is on nasal high flow 45 % with well-maintained oxygen saturation  2.  Blood culture 1 of 4 with gram-positive cocci may be skin contaminant but remains on vancomycin & merrem, WBC trending down: 15.7  3. Nasal MRSA smear positive  On Bactroban  4. Hypernatremia: 150; Hypokalemia: 2.8; switched to Dextrose 5% infusion  5. Repeat labs, ABG,       Jeana Washington      *ATTENTION:  This note has been created by a medical student for educational purposes only. Please do not refer to the content of this note for clinical decision-making, billing, or other purposes. Please see attending physicians note to obtain clinical information on this patient. *

## 2020-12-15 NOTE — PROGRESS NOTES
Problem: Self Care Deficits Care Plan (Adult)  Goal: *Acute Goals and Plan of Care (Insert Text)  Description: GOAL 1 - PT WILL TOLERATE 10 MINUTES OF BUE T/E EACH OT SESSION. GOAL 2 - PT WILL TRANSFER SUPINE TO EOB MAX ASSISTANCE. AND MAINTAIN MIDLINE POSITION X 3 MINUTES  GOAL 3- PT WILL PERFORM GROOMING TASK GIVEN MIN ASSISTANCE. Outcome: Not Met  OCCUPATIONAL THERAPY EVALUATION  Patient: Lion Justice [de-identified]80 y.o. male)  Date: 12/15/2020  Primary Diagnosis: Severe sepsis ([facility] [address] [A41.9, R65.20]        Precautions: FALL, ASPIRATION        History of Present Illness  Lion Justice is a 80 y.o. male with PMHx of Hx of prostate CA, HTN, Gout, hx of lymphoma who presents to the ED complaining of a week hx of generalized weakness and fatigue. Pt and daughter refers recurrent fall episodes for the past 5 days associated with head trauma but w/o LOC. Pt has difficulty ambulating. Family notes a decline in his ADL,  from driving his own car and ambulating with walker when to go to the barely being able to ambulate. CT CHEST WO CONT  Final Result  IMPRESSION:  1. Bilateral pleural effusions and lower lung consolidations, the left lower  lung being completely collapsed, this is similar to previous. 2. Interval development patchy airspace disease in the aerated lungs consistent  with pneumonia. 3. Abnormal spleen, small retroperitoneal lymphadenopathy unchanged. 4. Atherosclerosis. ASSESSMENT  Based on the objective data described below, the patient presents with DECREASE FUNCTIONAL MOBILITY, DECREASED ENDURANCE, ACTIVITY TOLERANCE, OVER ALL STRENGTH, RESPIRATORY DISTRESS    Current Level of Function Impacting Discharge (ADLs/self-care): TOTAL CARE NEEDED FOR ALL ADLS. Functional Outcome Measure:   The patient scored 6/24on the AM-PAC  outcome measure which is indicative of  TOTAL ASSISTANCE NEEDED WITH SELF CARE AND MOBILITY  Other factors to consider for discharge: ABILITY TO TOLERATE UP TO 2 - 3 HOURS OF THERAPY DAILY     Patient will benefit from skilled therapy intervention to address the above noted impairments. PLAN :  Recommendations and Planned Interventions: self care training, functional mobility training, therapeutic exercise, balance training, therapeutic activities, endurance activities, patient education, and family training/education    Frequency/Duration: Patient will be followed by occupational therapy 3 times a week to address goals. Recommendation for discharge: (in order for the patient to meet his/her long term goals)  SNF/LTF    This discharge recommendation:  Has not yet been discussed the attending provider and/or case management    IF patient discharges home will need the following DME: TBD       SUBJECTIVE:   Patient stated I NEED TO DO #2.    OBJECTIVE DATA SUMMARY:   HISTORY:   Past Medical History:   Diagnosis Date    CRI (chronic renal insufficiency) 12/13/2012    Gout 1/4/2011    Hypertension     Lymphoma ([facility] [address]     Prostate CA ([facility] [address] 1/4/2011     Past Surgical History:   Procedure Laterality Date    ENDOSCOPY, COLON, DIAGNOSTIC  2003    neg    HC BONE MARROW BIOPSY      HX PROSTATECTOMY         Expanded or extensive additional review of patient history:     Home Situation  Home Environment: Private residence  # Steps to Enter: 2  Rails to Enter: Yes  Hand Rails : Bilateral  One/Two Story Residence: One story  Living Alone: Yes  Patient Expects to be Discharged to[de-identified] Skilled nursing facility  Current DME Used/Available at Home: Cane, straight  Tub or Shower Type: Tub/Shower combination    PLOF: Pt INDP for ADLS/IADLS, RW with mobility prior to admission. Hand dominance: Right    EXAMINATION OF PERFORMANCE DEFICITS:  Cognitive/Behavioral Status:        Cognition: Follows commands      Skin: SEE NURSES NOTE      Hearing:  WNLS       Vision/Perceptual:  WNLS    Range of Motion:    LUE:  BETTINA WFLS - PAINFUL AT SHOULDER WHEN FLEXED APPROX 90 DEGREES  RUE:  BETTINA WFLS      Strength:   BUE:   MMT:  3/5  THROUGHOUT      Coordination:     Fine Motor Skills-Upper: Right Impaired;Left Impaired  - ALYSE HAND FUNCTION:  PT ABLE TO MAKE FIST; TOUCH THUMB TO DIGITS- WEAKNESS EFFECTING FM/GM FUNCTION  Gross Motor Skills-Upper: Left Intact; Right Intact      Balance:  Sitting: Impaired  Sitting - Static: Poor (constant support)    Functional Mobility and Transfers for ADLs:  Bed Mobility:  Rolling: Total assistance  Supine to Sit: Total assistance  Sit to Supine: Total assistance    Transfers:  Sit to Stand: Total assistance  Stand to Sit: Total assistance  Bed to Chair: Total assistance    ADL Assessment:  Feeding: Total assistance(PEG) - ST WORKING WITH PT        ADL Intervention and task modifications:  Feeding  Feeding Assistance: Total assistance (dependent)         Toileting  Toileting Assistance: Total assistance(dependent)      [facility] AM-PACTM \"6 Clicks\"                                                       Daily Activity Inpatient Short Form  How much help from another person does the patient currently need. .. Total; A Lot A Little None   1. Putting on and taking off regular lower body clothing? [x]  1 []  2 []  3 []  4   2. Bathing (including washing, rinsing, drying)? [x]  1 []  2 []  3 []  4   3. Toileting, which includes using toilet, bedpan or urinal? [x] 1 []  2 []  3 []  4   4. Putting on and taking off regular upper body clothing? [x]  1 []  2 []  3 []  4   5. Taking care of personal grooming such as brushing teeth? [x]  1 []  2 []  3 []  4   6. Eating meals? [x]  1 []  2 []  3 []  4   © 2007, Trustees of [facility] [employer], under license to [employer]. All rights reserved     Score: 6/24     Interpretation of Tool:  Represents clinically-significant functional categories (i.e. Activities of daily living).   Percentage of Impairment CH    0%   CI    1-19% CJ    20-39% CK    40-59% CL    60-79% CM    80-99% CN     100%   AMPAC  Score 6-24 24 23 20-22 15-19 10-14 7-9 6        Occupational Therapy Evaluation Charge Determination   History Examination Decision-Making   HIGH Complexity : Extensive review of history including physical, cognitive and psychosocial history  HIGH Complexity : 5 or more performance deficits relating to physical, cognitive , or psychosocial skils that result in activity limitations and / or participation restrictions HIGH Complexity : Patient presents with comorbidities that affect occupational performance. Signifigant modification of tasks or assistance (eg, physical or verbal) with assessment (s) is necessary to enable patient to complete evaluation       Based on the above components, the patient evaluation is determined to be of the following complexity level: HIGH   Pain Ratin/10    Activity Tolerance:   Poor  Please refer to the flowsheet for vital signs taken during this treatment. After treatment patient left in no apparent distress:    Supine in bed and HOB 30 DEGREES DUE TO TUBE FEEDING    COMMUNICATION/EDUCATION:   The patients plan of care was discussed with: Speech therapist.     Patient/family have participated as able in goal setting and plan of care. This patients plan of care is appropriate for delegation to JANEE.     Thank you for this referral.  Coco Salazar OT  Time Calculation: 30 mins

## 2020-12-15 NOTE — PROGRESS NOTES
SPEECH LANGUAGE PATHOLOGY DYSPHAGIA TREATMENT  Patient: Alfonso Almodovar (80 y.o. male)  Date: 12/15/2020  Diagnosis: Severe sepsis ([facility] [address] [A41.9, R65.20]     Precautions:  Contact, Fall, aspiration    ASSESSMENT :  Patient alert, appears weak, cachetic w/ audible upper airway congestion. He is on HFNC tolerating per RN. NGT present educated patient on remaining >30 while receiving TF. Patient presents w/ moderate pharyngeal dysphagia. Oral phase largely wfl for ice chips trials. Pharyngeal phase c/b mild swallow delay and HLE is reduced upon palpation. Multiple audible swallows. Overt s/s of pen/asp c/b wet weak cough w/ all trials. Patient is at significant risk for aspiration. Patient will benefit from skilled intervention to address the above impairments. Patients rehabilitation potential is considered to be Fair     PLAN :  Recommendations and Planned Interventions:  Cont NPO  Frequency/Duration: Patient will be followed by speech-language pathology 5 times a week to address goals. Discharge Recommendations: Skilled Nursing Facility     SUBJECTIVE:   Patient reports dysphagia x6 months. He denies odynophagia and vocal changes does report frequent pna. OBJECTIVE:     Past Medical History:   Diagnosis Date    CRI (chronic renal insufficiency) 12/13/2012    Gout 1/4/2011    Hypertension     Lymphoma ([facility] [address]     Prostate CA ([facility] [address] 1/4/2011       CXR Results  (Last 48 hours)                 12/14/20 0845  XR CHEST PORT Final result    Impression:  Impression:Left lung airspace disease and suspected pleural fluid. Right basilar   pulmonary haziness compatible with airspace disease/atelectasis and trace of   pleural fluid. Narrative:  Portable chest:       History:Respiratory failure       COMPARISON: Portable chest 12/13/2020       Lungs show hypoaeration. There is ill-defined hazy density at the right lung   base.  Hazy parenchymal infiltration is seen in the left lung, left hemidiaphragm   is obscured. Heart is normal size. Calcified plaque involving the aortic arch. Left jugular venous catheter with the tip at the junction of SVC and right   atrium. Nasogastric tube with the tip in the stomach. Diet prior to admission: unknown  Current Diet:  DIET NPO  DIET TUBE FEEDING     Cognitive and Communication Status:  Neurologic State: Alert  Orientation Level: Oriented X4  Cognition: Follows commands           Dysphagia Treatment:  Oral Assessment:     P.O. Trials:  Patient Position: upright in bed  Vocal quality prior to P.O.: Breathy;Low volume; Wet  Consistency Presented: Ice chips  How Presented: SLP-fed/presented;Spoon     Bolus Acceptance: No impairment  Bolus Formation/Control: No impairment     Propulsion: Delayed (# of seconds)  Oral Residue: None  Initiation of Swallow: Delayed (# of seconds)  Laryngeal Elevation: Weak  Aspiration Signs/Symptoms: Weak cough  Pharyngeal Phase Characteristics: Audible swallow;Double swallow;Easily fatigued              Oral Phase Severity: Mild  Pharyngeal Phase Severity : Moderate    Pain:  Pain Scale 1: Numeric (0 - 10)  Pain Intensity 1: 0       After treatment:   Patient left in no apparent distress in bed, Call bell within reach, and Nursing notified. OT present at bedside. COMMUNICATION/EDUCATION:   Patient was educated regarding his deficit(s) of dysphagia as this relates to his diagnosis of recurrent pna. He demonstrated Good understanding as evidenced by engagement and appropriate questions. .    The patient's plan of care including recommendations, planned interventions, and recommended diet changes were discussed with: Occupational therapist and Registered nurse. Patient/family agree to work toward stated goals and plan of care.       Problem: Dysphagia (Adult)  Goal: *Acute Goals and Plan of Care (Insert Text)  Description: Speech Therapy Swallow Goals  Initiated 12/13/2020  -Patient will tolerate PO trials without clinical indicators of aspiration given maximal cues within 1-2 day(s). [x] Not met  [ ]  MET   [ ] Progressing  [ ] Marilyn Brink  -Patient will participate in modified barium swallow study when medically appropriate, currently on HFNC         [X] Not met  [ ]  MET   [ ] Progressing  [ ] Discontinue  -Patient will perform therapeutic swallowing exercises with maximal cues within 7 day(s). [x] Not met  [ ]  MET   [ ] Progressing  [ ] Discontinue  -Patient will demonstrate understanding of swallow safety precautions and aspiration precautions, diet recs with maximal cues within 7 day(s).         [x] Not met  [ ]  MET   [ ] Progressing  [ ] Discontinue     Outcome: Progressing Towards Goal   Thank you for this referral.  Ivan Moore M.S., M.Ed., CCC-SLP  Time Calculation: 12 mins

## 2020-12-15 NOTE — PROGRESS NOTES
CARDIOLOGY PROGRESS NOTE - NP    Patient seen and examined. This is a patient who is followed for shortness of breath. Patient appears lethargic. On high flow this am.  No other complaints reported. Telemetry reviewed, there were no events noted in the past 24 hours. SR 70s with PVCs. Pertinent review of systems items noted above, all other systems are negative. Current medications reviewed. Physical Examination  Vital signs are stable. Blood pressure 147/81 , Pulse 86  No apparent distress. Heart is regular, rate and rhythm. Normal S1, S2, no murmurs are appreciated. Lungs are diminished bilaterally. Abdomen is soft, nontender, normal bowel sounds. Extremities have no edema. Labs reviewed: 12/14/20: K 2.8, BNP 14,355, Cr 1.76    2-D echo 12/14    LV: Calculated LVEF is 64%. Normal cavity size, wall thickness and systolic function (ejection fraction normal). Mild (grade 1) left ventricular diastolic dysfunction. MV: Mild mitral annular calcification. Mild mitral valve regurgitation is present. PV: Mild pulmonic valve regurgitation is present. TV: Moderate tricuspid valve regurgitation is present. AV: Mild aortic valve regurgitation is present. LA: Moderately dilated left atrium. Case discussed with Dr. Robe Bowen and our impression and recommendations are as follows:    1. Pneumonia: Pulmonary on the case. Continue with current treatments. 2. Heart failure: BNP elevated but has decreased since last hospitalization. Recent echocardiogram shows preserved EF 55-60%. Maintain optimal ventilation, continue BiPAP. Place on cardiac monitor. Keep potassium between 4-5 and magnesium level > 2. Strict I&Os, and daily weights. Replace K and repeat labs in the am.     3. Hypertension: Blood pressure stable. Will continue to monitor. Please do not hesitate to call me or Dr. Robe Bowen if additional questions arise. Swati Esquivel

## 2020-12-16 ENCOUNTER — APPOINTMENT (OUTPATIENT)
Dept: ENDOSCOPY | Age: 85
DRG: 871 | End: 2020-12-16
Attending: INTERNAL MEDICINE
Payer: MEDICARE

## 2020-12-16 ENCOUNTER — ANESTHESIA EVENT (OUTPATIENT)
Dept: ENDOSCOPY | Age: 85
DRG: 871 | End: 2020-12-16
Payer: MEDICARE

## 2020-12-16 LAB
ANION GAP SERPL CALC-SCNC: 7 MMOL/L (ref 5–15)
BACTERIA SPEC CULT: NORMAL
BASOPHILS # BLD: 0 K/UL (ref 0–0.1)
BASOPHILS NFR BLD: 0 % (ref 0–1)
BUN SERPL-MCNC: 58 MG/DL (ref 6–20)
BUN/CREAT SERPL: 47 (ref 12–20)
CA-I BLD-MCNC: 9.3 MG/DL (ref 8.5–10.1)
CHLORIDE SERPL-SCNC: 106 MMOL/L (ref 97–108)
CO2 SERPL-SCNC: 31 MMOL/L (ref 21–32)
CREAT SERPL-MCNC: 1.24 MG/DL (ref 0.7–1.3)
DATE LAST DOSE: NORMAL
DIFFERENTIAL METHOD BLD: ABNORMAL
EOSINOPHIL # BLD: 0.1 K/UL (ref 0–0.4)
EOSINOPHIL NFR BLD: 1 % (ref 0–7)
ERYTHROCYTE [DISTWIDTH] IN BLOOD BY AUTOMATED COUNT: 17.6 % (ref 11.5–14.5)
GLUCOSE SERPL-MCNC: 132 MG/DL (ref 65–100)
HCT VFR BLD AUTO: 32.1 % (ref 36.6–50.3)
HGB BLD-MCNC: 10.4 G/DL (ref 12.1–17)
IMM GRANULOCYTES # BLD AUTO: 0 K/UL (ref 0–0.04)
IMM GRANULOCYTES NFR BLD AUTO: 0 % (ref 0–0.5)
LYMPHOCYTES # BLD: 0.4 K/UL (ref 0.8–3.5)
LYMPHOCYTES NFR BLD: 5 % (ref 12–49)
MCH RBC QN AUTO: 30.1 PG (ref 26–34)
MCHC RBC AUTO-ENTMCNC: 32.4 G/DL (ref 30–36.5)
MCV RBC AUTO: 92.8 FL (ref 80–99)
MONOCYTES # BLD: 0.8 K/UL (ref 0–1)
MONOCYTES NFR BLD: 10 % (ref 5–13)
NEUTS SEG # BLD: 7.1 K/UL (ref 1.8–8)
NEUTS SEG NFR BLD: 84 % (ref 32–75)
PLATELET # BLD AUTO: 56 K/UL (ref 150–400)
POTASSIUM SERPL-SCNC: 4.3 MMOL/L (ref 3.5–5.1)
RBC # BLD AUTO: 3.46 M/UL (ref 4.1–5.7)
REPORTED DOSE,DOSE: NORMAL UNITS
SODIUM SERPL-SCNC: 144 MMOL/L (ref 136–145)
SPECIAL REQUESTS,SREQ: NORMAL
VANCOMYCIN SERPL-MCNC: 18.9 UG/ML
WBC # BLD AUTO: 8.5 K/UL (ref 4.1–11.1)

## 2020-12-16 PROCEDURE — 65270000029 HC RM PRIVATE

## 2020-12-16 PROCEDURE — 74011000250 HC RX REV CODE- 250: Performed by: PHYSICIAN ASSISTANT

## 2020-12-16 PROCEDURE — 74011000258 HC RX REV CODE- 258: Performed by: PHYSICIAN ASSISTANT

## 2020-12-16 PROCEDURE — 74011000250 HC RX REV CODE- 250: Performed by: EMERGENCY MEDICINE

## 2020-12-16 PROCEDURE — 74011250636 HC RX REV CODE- 250/636: Performed by: PHYSICIAN ASSISTANT

## 2020-12-16 PROCEDURE — 74011250637 HC RX REV CODE- 250/637: Performed by: INTERNAL MEDICINE

## 2020-12-16 PROCEDURE — 85025 COMPLETE CBC W/AUTO DIFF WBC: CPT

## 2020-12-16 PROCEDURE — 80048 BASIC METABOLIC PNL TOTAL CA: CPT

## 2020-12-16 PROCEDURE — 74011250636 HC RX REV CODE- 250/636: Performed by: EMERGENCY MEDICINE

## 2020-12-16 PROCEDURE — 80202 ASSAY OF VANCOMYCIN: CPT

## 2020-12-16 PROCEDURE — 36415 COLL VENOUS BLD VENIPUNCTURE: CPT

## 2020-12-16 PROCEDURE — 92526 ORAL FUNCTION THERAPY: CPT

## 2020-12-16 RX ADMIN — FAMOTIDINE 20 MG: 10 INJECTION INTRAVENOUS at 20:46

## 2020-12-16 RX ADMIN — VANCOMYCIN HYDROCHLORIDE 500 MG: 500 INJECTION, POWDER, LYOPHILIZED, FOR SOLUTION INTRAVENOUS at 15:49

## 2020-12-16 RX ADMIN — FAMOTIDINE 20 MG: 10 INJECTION INTRAVENOUS at 08:49

## 2020-12-16 RX ADMIN — MEROPENEM 1 G: 1 INJECTION, POWDER, FOR SOLUTION INTRAVENOUS at 15:54

## 2020-12-16 RX ADMIN — Medication 10 ML: at 20:47

## 2020-12-16 RX ADMIN — Medication 10 ML: at 15:50

## 2020-12-16 RX ADMIN — MUPIROCIN: 20 OINTMENT TOPICAL at 08:49

## 2020-12-16 RX ADMIN — HYDRALAZINE HYDROCHLORIDE 10 MG: 10 TABLET, FILM COATED ORAL at 20:46

## 2020-12-16 RX ADMIN — Medication 10 ML: at 06:42

## 2020-12-16 RX ADMIN — HEPARIN SODIUM 5000 UNITS: 5000 INJECTION INTRAVENOUS; SUBCUTANEOUS at 15:49

## 2020-12-16 RX ADMIN — MEROPENEM 1 G: 1 INJECTION, POWDER, FOR SOLUTION INTRAVENOUS at 08:49

## 2020-12-16 RX ADMIN — HEPARIN SODIUM 5000 UNITS: 5000 INJECTION INTRAVENOUS; SUBCUTANEOUS at 06:02

## 2020-12-16 NOTE — PROGRESS NOTES
PULMONARY NOTE  [facility]    Name: Salvador Nur MRN: [id number]   : 1932 Hospital: [address] [place] [address]   Date: 2020  Admission date: 2020 Hospital Day: 6       HPI:     Hospital Problems  Date Reviewed: 2020          Codes Class Noted POA    Severe sepsis ([address] ICD-10-CM: A41.9, R65.20  ICD-9-CM: 038.9, 995.92  2020 Yes        HAP (hospital-acquired pneumonia) ICD-10-CM: J18.9, Y95  ICD-9-CM: 762  2020 Yes        Cervical stenosis of spinal canal ICD-10-CM: M48.02  ICD-9-CM: 723.0  2020 Yes        Acute renal failure (ARF) (HCC) ICD-10-CM: N17.9  ICD-9-CM: 584.9  2020 Yes        CRI (chronic renal insufficiency) (Chronic) ICD-10-CM: N18.9  ICD-9-CM: 585.9  2012 Yes        HTN (hypertension) ICD-10-CM: I10  ICD-9-CM: 401.9  2011 Yes                   [x] High complexity decision making was performed  [x] See my orders for details      Subjective/Initial History:     I was asked by Jyoti Cruz MD to see Salvador Nur  a 80 y.o.  male in consultation     Excerpts from admission 2020 or consult notes as follows:   , 51-year-old male came in because of shortness of breath dyspnea and he was hypoxic he had history of pneumonia recently discharged 4 days ago he has recurrent hospitalization because of infection pneumonia significant past medical history of chronic kidney disease, lymphoma and prostate cancer he was put on 100% nonrebreather mask his saturation was in 80s now he is on blood percent FiO2 and a shock state he was put on vasopressor Levophed so admitted and critical care consult was called.  awake alert asking for water has gurgly upper airway noise. Norepinephrine down to 2 mics with well-maintained blood pressure. 12/15: Patient is seen on follow up. Resting comfortably on 45% high flow. : Patient is seen on follow up. He is now on 3.5L O2 via nasal cannula and O2 sats are 99%.  Resting comfortably. Allergies   Allergen Reactions    Pcn [Penicillins] Swelling        MAR reviewed and pertinent medications noted or modified as needed     Current Facility-Administered Medications   Medication    hydrALAZINE (APRESOLINE) tablet 10 mg    isosorbide mononitrate ER (IMDUR) tablet 30 mg    furosemide (LASIX) injection 40 mg    dextrose 5% infusion    meropenem (MERREM) 1 g in sterile water (preservative free) 20 mL IV syringe    sodium chloride (NS) flush 5-40 mL    sodium chloride (NS) flush 5-40 mL    acetaminophen (TYLENOL) tablet 650 mg    Or    acetaminophen (TYLENOL) suppository 650 mg    polyethylene glycol (MIRALAX) packet 17 g    promethazine (PHENERGAN) tablet 12.5 mg    Or    ondansetron (ZOFRAN) injection 4 mg    famotidine (PF) (PEPCID) 20 mg in 0.9% sodium chloride 10 mL injection    heparin (porcine) injection 5,000 Units    albuterol-ipratropium (DUO-NEB) 2.5 MG-0.5 MG/3 ML    Vancomycin Dosed by Pharmacy      Patient PCP: Shawnee Pereyra MD  PMH:  has a past medical history of CRI (chronic renal insufficiency) (12/13/2012), Gout (1/4/2011), Hypertension, Lymphoma ([facility] and Prostate CA ([facility] (1/4/2011). PSH:   has a past surgical history that includes hx prostatectomy; hc bone marrow biopsy; endoscopy, colon, diagnostic (2003); and ir thoracentesis cath w image (12/15/2020). FHX: family history includes Hypertension in his mother. SHX:  reports that he has never smoked. He has never used smokeless tobacco. He reports previous drug use. He reports that he does not drink alcohol. ROS:    Review of Systems   Constitutional: Positive for malaise/fatigue. HENT: Negative. Eyes: Negative. Respiratory: Positive for cough and shortness of breath. Cardiovascular: Negative. Gastrointestinal: Negative. Genitourinary: Negative. Musculoskeletal: Negative. Skin: Negative. Neurological: Negative. Endo/Heme/Allergies: Negative. Psychiatric/Behavioral: Negative. Objective:     Vital Signs: Telemetry:    normal sinus rhythm Intake/Output:   Visit Vitals  /64 (BP 1 Location: Right arm, BP Patient Position: At rest)   Pulse 60   Temp 98 °F (36.7 °C)   Resp 18   Ht 5' 2.99\" (1.6 m)   Wt 58.6 kg (129 lb 3 oz)   SpO2 99%   BMI 22.89 kg/m²       Temp (24hrs), Av °F (36.7 °C), Min:97.7 °F (36.5 °C), Max:98.2 °F (36.8 °C)        O2 Device: Nasal cannula O2 Flow Rate (L/min): 3 l/min       Wt Readings from Last 4 Encounters:   12/15/20 58.6 kg (129 lb 3 oz)   20 64.3 kg (141 lb 12.1 oz)   20 64.9 kg (143 lb)   20 61.7 kg (136 lb)          Intake/Output Summary (Last 24 hours) at 2020 1156  Last data filed at 2020 1026  Gross per 24 hour   Intake 1275 ml   Output 1250 ml   Net 25 ml       Last shift:       07 - 1900  In: 150   Out: 550 [Urine:550]  Last 3 shifts: 1901 -  0700  In: 9770 [I.V.:584]  Out: 2500 [Urine:2500]       Physical Exam:     Physical Exam   Constitutional: He is oriented to person, place, and time. HENT:   Head: Normocephalic and atraumatic. Eyes: Pupils are equal, round, and reactive to light. Conjunctivae and EOM are normal.   Neck: Normal range of motion. Neck supple. Cardiovascular: Normal rate and regular rhythm. Pulmonary/Chest: Effort normal. He has rales. Expiratory rhonchi has upper airway noise over the neck   Abdominal: Soft. Bowel sounds are normal.   Thin   Musculoskeletal: Normal range of motion. Neurological: He is alert and oriented to person, place, and time. Skin: Skin is warm and dry. Psychiatric: He has a normal mood and affect.         Labs:    Recent Labs     12/15/20  2033 12/15/20  1103 20  0812   WBC 10.7 13.6* 15.7*   HGB 11.0* 11.2* 10.0*   PLT 67* 82* 92*     Recent Labs     20  0907 12/15/20  2033 12/15/20  1103 20  0812    147* 148* 150*  151*   K 4.3 4.5 4.4 2.8*  2.9*    109* 110* 115*  115*   CO2 31 32 30 27  27   * 154* 70 156*  155*   BUN 58* 62* 63* 72*  74*   CREA 1.24 1.34* 1.48* 1.76*  1.70*   CA 9.3 9.5 9.7 8.8  8.5   PHOS  --   --   --  2.9   ALB  --   --  1.9* 1.6*  1.8*   ALT  --   --  100* 74     No results for input(s): PH, PCO2, PO2, HCO3, FIO2 in the last 72 hours. No results for input(s): CPK, CKNDX, TROIQ in the last 72 hours. No lab exists for component: CPKMB    Lab Results   Component Value Date/Time    Culture result:  12/11/2020 11:21 AM     Gram Positive Cocci CALLED TO AND READ BACK BY JOSSELIN GERMAN 12/12/20 1337 TLW    Culture result:  12/11/2020 11:21 AM     Staphylococcus species, coagulase negative , ISOLATED FROM AEROBIC BOTTLE RECEIVED    Culture result:  12/11/2020 11:21 AM     * methicillin resistant staphylococcus aureus * ** ISOLATED FROM ANAEROBIC BOTTLE    Culture result: CALLED MRSA TO [facility] 1100 12/15/20 12/11/2020 11:21 AM     Lab Results   Component Value Date/Time    TSH 1.79 11/20/2020 01:31 PM       Imaging:    CXR Results  (Last 48 hours)    None        Results from Hospital Encounter encounter on 12/11/20   XR CHEST PORT    Narrative Portable chest:    History:Respiratory failure    COMPARISON: Portable chest 12/13/2020    Lungs show hypoaeration. There is ill-defined hazy density at the right lung  base. Hazy parenchymal infiltration is seen in the left lung, left hemidiaphragm  is obscured. Heart is normal size. Calcified plaque involving the aortic arch. Left jugular venous catheter with the tip at the junction of SVC and right  atrium. Nasogastric tube with the tip in the stomach. Impression Impression:Left lung airspace disease and suspected pleural fluid. Right basilar  pulmonary haziness compatible with airspace disease/atelectasis and trace of  pleural fluid. XR CHEST PORT    Narrative Chest single view. Comparison single view chest December 11, 2020. Improved aeration through lung bases.  Stable left neck central venous catheter. Cardiac and mediastinal structures unchanged. Thoracic aorta atherosclerosis. No  pneumothorax or sizable pleural effusion. XR CHEST SNGL V    Narrative Central line placement. Comparison chest x-ray 12/11/2020 at 1154 hours. Impression FINDINGS: Impression: Frontal single view chest.    Interval placement left central catheter distal tip SVC/RA junction region. Unchanged cardiomediastinal silhouette. Slightly increasing bilateral pulmonary  airspace edema and/or pneumonia and/or atelectasis. Increasing consolidation of  the left lower lobe. Interval development of small left pleural effusion. No pneumothorax. Results from [place] encounter on 12/11/20   CT CHEST WO CONT    Narrative Comparison chest x-ray 12/11/2020 [facility] and chest CTA 11/25/2020 [facility]. TECHNIQUE: Axial imaging of the chest without IV contrast, with multiplanar  reformatting, MIPs. Dose reduction: All CT scans at this facility are performed using dose reduction  optimization techniques as appropriate to a performed exam including the  following: Automated exposure control, adjustments of the mA and/or kV according  to patient's size, or use of iterative reconstruction technique. FINDINGS: Left central catheter distal tip SVC. Mild cardiomegaly. Multivessel  coronary artery calcification. No pericardial effusion. Low-density intracardiac  blood suggests anemia. Calcified thoracic aorta without aneurysm. Pulmonary  arteries are not dilated. No mediastinal or hilar lymphadenopathy. Tubular  structure posterior to the esophagus possibly dilated vessel. Bilateral pleural  effusions and lower lung consolidations without air bronchograms. The left lower  lung is completely consolidated/collapsed. The aerated lungs demonstrate patchy  areas of airspace disease. No axillary adenopathy. Included thyroid is  unremarkable.  Included upper abdomen demonstrates lobulated, heterogeneous  structure spleen and small retroperitoneal lymphadenopathy; unchanged. Degenerative changes of the bony structures. Soft tissue anasarca. Impression IMPRESSION:  1. Bilateral pleural effusions and lower lung consolidations, the left lower  lung being completely collapsed, this is similar to previous. 2. Interval development patchy airspace disease in the aerated lungs consistent  with pneumonia. 3. Abnormal spleen, small retroperitoneal lymphadenopathy unchanged. 4. Atherosclerosis. IMPRESSION:   1. Acute respiratory failure with Hypoxia continues to be on nasal cannula 3.5L O2  2. Severe sepsis off vasopressors  3. Community-acquired versus hospital-acquired pneumonia has gram-positive cocci in 1 of 4 blood bottles MRSA in nares  4. Hypokalemia on klor-con  5. Pharyngeal dysphagia speech has evaluated and he has totally ineffective swallow discussed with him placing NG tube for tube feeding  6. Hypernatremia. 7. History of lymphoma and prostate cancer  8. Acute on chronic kidney disease creatinine improved (baseline is 1.6)  9. CHF with diastolic dysfunction chest x-ray improving  10. Lactic acidosis, resolved. 11. Body mass index is 22.89 kg/m². 12. Elevated troponin, trending down. RECOMMENDATIONS/PLAN:     1. Patient is on nasal cannula 3.5L O2 with well-maintained oxygen saturation  2. Blood culture 1 of 4 with gram-positive cocci may be skin contaminant but remains on vancomycin & merrem, WBC trending down: 10.7  3. Nasal MRSA smear positive  On Bactroban  4. Hypernatremia: 147 trending down; Hypokalemia resolved: 4.5  5.  Repeat labs in Berny Sparks MD

## 2020-12-16 NOTE — PROGRESS NOTES
Hospitalist Progress Note             Daily Progress Note: 12/16/2020      Subjective: The patient is seen for follow  up. Patient is an 51-year-old man with history of recurrent pneumonia who was admitted on 12/11/2020 due to shortness of breath with hypoxia. He was also found to be in septic shock requiring norepinephrine. Respiratory failure found to be secondary to pneumonia. He was on BiPAP for respiratory failure and later transitioned to high flow on 12/14/2020. Patient has been on meropenem and vancomycin. Blood cultures collected on 12/11/2020 grew gram-positive cocci in clusters likely contamination. Due to poor  nutrition he is on NG tube feeding.     Patient seen and examined at bedside  He is resting comfortably no acute distress  He is now on oxygen at 3.5L with O2 sats at 98%  He is being fed via NG tube    Problem List:  Patient Active Problem List   Diagnosis Code    Lymphoma ([address] C85.90    HTN (hypertension) I10    Prostate CA ([facility] [address] C61    Gout M10.9    CRI (chronic renal insufficiency) N18.9    Hypercalcemia of malignancy E83.52    Cervical stenosis of spinal canal M48.02    Acute renal failure (ARF) ([facility]) N17.9    Lumbar canal stenosis M48.061    Frequent falls R29.6    Multiple falls R29.6    Weakness R53.1    Anemia D64.9    Opioid use F11.90    History of B-cell lymphoma Z85.72    Non-Hodgkin's lymphoma in relapse ([facility]) C85.90    Hypercalcemia E83.52    HAP (hospital-acquired pneumonia) J18.9, Y95    SIRS (systemic inflammatory response syndrome) ([facility]) R65.10    CHF (congestive heart failure) ([facility]) I50.9    Acute hypoxemic respiratory failure ([facility]) J96.01    Severe sepsis ([facility]) A41.9, R65.20    Person under investigation for COVID-19 Z20.828    BPPV (benign paroxysmal positional vertigo) H81.10         Medications reviewed  Current Facility-Administered Medications   Medication Dose Route Frequency    hydrALAZINE (APRESOLINE) tablet 10 mg  10 mg Oral TID    isosorbide mononitrate ER (IMDUR) tablet 30 mg  30 mg Oral DAILY    furosemide (LASIX) injection 40 mg  40 mg IntraVENous DAILY    dextrose 5% infusion  50 mL/hr IntraVENous CONTINUOUS    meropenem (MERREM) 1 g in sterile water (preservative free) 20 mL IV syringe  1 g IntraVENous Q8H    sodium chloride (NS) flush 5-40 mL  5-40 mL IntraVENous Q8H    sodium chloride (NS) flush 5-40 mL  5-40 mL IntraVENous PRN    acetaminophen (TYLENOL) tablet 650 mg  650 mg Oral Q6H PRN    Or    acetaminophen (TYLENOL) suppository 650 mg  650 mg Rectal Q6H PRN    polyethylene glycol (MIRALAX) packet 17 g  17 g Oral DAILY PRN    promethazine (PHENERGAN) tablet 12.5 mg  12.5 mg Oral Q6H PRN    Or    ondansetron (ZOFRAN) injection 4 mg  4 mg IntraVENous Q6H PRN    famotidine (PF) (PEPCID) 20 mg in 0.9% sodium chloride 10 mL injection  20 mg IntraVENous BID    heparin (porcine) injection 5,000 Units  5,000 Units SubCUTAneous Q12H    albuterol-ipratropium (DUO-NEB) 2.5 MG-0.5 MG/3 ML  3 mL Nebulization Q4H PRN    Vancomycin Dosed by Pharmacy  1 Each Other Rx Dosing/Monitoring       Review of Systems:   Review of Systems   Constitutional: Negative for chills, diaphoresis, fever, malaise/fatigue and weight loss. HENT: Negative for ear discharge, ear pain, hearing loss, nosebleeds, sinus pain and tinnitus. Respiratory: Negative for cough, hemoptysis, sputum production, shortness of breath and wheezing. Cardiovascular: Negative for chest pain, palpitations, orthopnea, claudication and leg swelling. Gastrointestinal: Negative for abdominal pain, constipation, diarrhea, heartburn, nausea and vomiting. Genitourinary: Negative for dysuria, flank pain, frequency, hematuria and urgency. Musculoskeletal: Negative for back pain, falls, joint pain, myalgias and neck pain. Neurological: Positive for weakness. Negative for dizziness, tingling, focal weakness, seizures and headaches. Psychiatric/Behavioral: Negative for depression, hallucinations, memory loss, substance abuse and suicidal ideas. The patient is not nervous/anxious. Objective:   Physical Exam  Constitutional:       General: He is not in acute distress. Appearance: He is ill-appearing. HENT:      Head: Normocephalic and atraumatic. Mouth/Throat:      Mouth: Mucous membranes are moist.      Pharynx: Oropharynx is clear. Eyes:      Pupils: Pupils are equal, round, and reactive to light. Neck:      Musculoskeletal: No neck rigidity. Cardiovascular:      Rate and Rhythm: Normal rate and regular rhythm. Heart sounds: No murmur. No friction rub. No gallop. Pulmonary:      Effort: Pulmonary effort is normal. No respiratory distress. Breath sounds: Normal breath sounds. No wheezing or rales. Abdominal:      General: Bowel sounds are normal. There is no distension. Palpations: Abdomen is soft. Tenderness: There is no abdominal tenderness. There is no rebound. Musculoskeletal: Normal range of motion. General: No swelling, tenderness, deformity or signs of injury. Skin:     General: Skin is warm and dry. Coloration: Skin is not jaundiced. Findings: No bruising, erythema or rash. Neurological:      General: No focal deficit present. Mental Status: He is alert and oriented to person, place, and time. Sensory: No sensory deficit. Motor: Weakness present. Gait: Gait normal.   Psychiatric:         Mood and Affect: Mood normal.         Behavior: Behavior normal.         Thought Content:  Thought content normal.          Visit Vitals  /64 (BP 1 Location: Right arm, BP Patient Position: At rest)   Pulse 60   Temp 98 °F (36.7 °C)   Resp 18   Ht 5' 2.99\" (1.6 m)   Wt 58.6 kg (129 lb 3 oz)   SpO2 99%   BMI 22.89 kg/m²         Data Review:       Recent Days:  Recent Labs     12/15/20  2033 12/15/20  1103 12/14/20  0812   WBC 10.7 13.6* 15.7*   HGB 11.0* 11.2* 10.0*   HCT 33.7* 34.5* 30.5*   PLT 67* 82* 92*     Recent Labs     12/16/20  0907 12/15/20  2033 12/15/20  1103 12/14/20  0812    147* 148* 150*  151*   K 4.3 4.5 4.4 2.8*  2.9*    109* 110* 115*  115*   CO2 31 32 30 27  27   * 154* 70 156*  155*   BUN 58* 62* 63* 72*  74*   CREA 1.24 1.34* 1.48* 1.76*  1.70*   CA 9.3 9.5 9.7 8.8  8.5   PHOS  --   --   --  2.9   ALB  --   --  1.9* 1.6*  1.8*   TBILI  --   --  0.8 0.8   ALT  --   --  100* 74     No results for input(s): PH, PCO2, PO2, HCO3, FIO2 in the last 72 hours. Assessment/Plan     1. Severe sepsis   Due to pneumonia  Continue with vancomycin and meropenem   Repeat blood cultures collected on 12/15/2020 pending    2. Acute hypoxic respiratory failure  Secondary to pneumonia. He is now on oxygen at 3.5 liters  via NC  Continue weaning off oxygen   Continue antibiotics    3. Acute on chronic kidney disease stage III  Complicated by sepsis  Creatinine is trending down appropriately  Baseline around 1.6-1.7  Continue gentle IV rehydration    4. Aspiration pneumonia  Secondary to dysphagia  Continue with meropenem and vancomycin  Repeat blood cultures collected on 12/15/2020 pending  GI has been consulted for J-tube placement ~limit recurrent aspiration    5. Hypernatremia  Now resolved. Continue with D5 W gently    6. Dysphagia  Likely contributing to recurrent pneumonia  Currently being fed via NG tube  He is not able to tolerate p.o. due to aspiration  GI has been consulted for J-tube  Placement. CBC, BMP in a.m. Heparin for DVT prophylaxis    Comments: Continue current antibiotics for aspiration pneumonia. Repeat blood cultures collected on 12/15/2020 pending. Due to patient not being able to tolerate p.o. with recurrent aspiration patient's POA has agreed for J-tube placement. GI has been consulted.     I will called patient's [address] [place] [address], [phone] and discussed treatment plan with her and she has decided to proceed with J-tube tube placement.       Angi Barrios NP

## 2020-12-16 NOTE — PROGRESS NOTES
CARDIOLOGY PROGRESS NOTE - NP    Patient seen and examined. This is a patient who is followed for shortness of breath. No acute cardiac events noted overnight. Telemetry reviewed, there were no events noted in the past 24 hours. SR 90s with PVCs. Pertinent review of systems items noted above, all other systems are negative. Current medications reviewed. Physical Examination  Vital signs are stable. Blood pressure 119/64 , Pulse 60  No apparent distress. Heart is regular, rate and rhythm. Normal S1, S2, no murmurs are appreciated. Lungs are diminished bilaterally. Abdomen is soft, nontender, normal bowel sounds. Extremities have no edema. Labs reviewed: 12/14/20: K 2.8, BNP 14,355, Cr 1.76    2-D echo 12/14    · LV: Calculated LVEF is 64%. Normal cavity size, wall thickness and systolic function (ejection fraction normal). Mild (grade 1) left ventricular diastolic dysfunction. · MV: Mild mitral annular calcification. Mild mitral valve regurgitation is present. · PV: Mild pulmonic valve regurgitation is present. · TV: Moderate tricuspid valve regurgitation is present. · AV: Mild aortic valve regurgitation is present. · LA: Moderately dilated left atrium. Case discussed with Dr. Solomon Bunn and our impression and recommendations are as follows:    1. Pneumonia: Pulmonary on the case. Patient on high flow NC. Continue with current treatments. 2. Heart failure: BNP elevated but has decreased since last hospitalization. Recent echocardiogram shows preserved EF 55-60%. Maintain optimal ventilation, continue high flow NC and wean as tolerated. Place on cardiac monitor. Keep potassium between 4-5 and magnesium level > 2. Strict I&Os, and daily weights. Replace K and repeat labs in the am.     3. Hypertension: Blood pressure stable. Will continue to monitor. Please do not hesitate to call me or Dr. Solomon Bunn if additional questions arise. Tiffany Snider

## 2020-12-16 NOTE — PROGRESS NOTES
Bedside shift change report given to RISHABH King RN (oncoming nurse) by MARY Ayala RN (offgoing nurse). Report included the following information SBAR, Intake/Output, MAR and Recent Results.

## 2020-12-16 NOTE — PROGRESS NOTES
Consult for Vancomycin Dosing by Pharmacy by FINA Banda     Consult provided for this 80y.o. 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 Day of Therapy: 5  Goal of Level(s): 15-20mcg/dL     Other Current Antibiotics: Merrem    Cultures:   12/11 - Blood - Gram Positive Cocci 2/2 12/11 - Nasal Swab - MRSA detected    Serum Creatinine Creatinine   Date Value Ref Range Status   12/16/2020 1.24 0.70 - 1.30 mg/dL Final   12/15/2020 1.34 (H) 0.70 - 1.30 mg/dL Final   12/15/2020 1.48 (H) 0.70 - 1.30 mg/dL Final      Creatinine Clearance Estimated Creatinine Clearance: 33.1 mL/min (based on SCr of 1.24 mg/dL). BUN Lab Results   Component Value Date/Time    BUN 58 (H) 12/16/2020 09:07 AM      WBC Lab Results   Component Value Date/Time    WBC 10.7 12/15/2020 08:33 PM      Temp 98 °F (36.7 °C)     Last Level:    Vancomycin, random   Date Value Ref Range Status   12/16/2020 18.9 ug/mL Final     Comment:     No reference range has been established. Ht Readings from Last 1 Encounters:   12/14/20 160 cm (62.99\")        Wt Readings from Last 1 Encounters:   12/15/20 58.6 kg (129 lb 3 oz)     Ideal body weight: 56.9 kg (125 lb 6.4 oz)  Adjusted ideal body weight: 57.6 kg (126 lb 14.7 oz)     Previous Regimen: 500mg IV x1 on 12/15/2020    New Regimen:     Patient is still BENJI, but improving. Will give Vancomycin 500mg IV x1 today and draw random level tomorrow morning 12/17/2020 at 0600. Pharmacy to follow daily and will make changes to dose and/or frequency based on clinical status.     _________________________________     Pharmacist [place], PHARMD

## 2020-12-16 NOTE — PROGRESS NOTES
Pt. Remaining semi supine in bed and refusing physical therapy this afternoon. Will follow up next treatment cycle.

## 2020-12-16 NOTE — PROGRESS NOTES
PULMONARY NOTE  [facility]    Name: Rj Martin MRN: [id number]   : 1932 Hospital: [facility]   Date: 2020  Admission date: 2020 Hospital Day: 6       HPI:     Hospital Problems  Date Reviewed: 2020          Codes Class Noted POA    Severe sepsis ([address] ICD-10-CM: A41.9, R65.20  ICD-9-CM: 038.9, 995.92  2020 Yes        HAP (hospital-acquired pneumonia) ICD-10-CM: J18.9, Y95  ICD-9-CM: 140  2020 Yes        Cervical stenosis of spinal canal ICD-10-CM: M48.02  ICD-9-CM: 723.0  2020 Yes        Acute renal failure (ARF) (HCC) ICD-10-CM: N17.9  ICD-9-CM: 584.9  2020 Yes        CRI (chronic renal insufficiency) (Chronic) ICD-10-CM: N18.9  ICD-9-CM: 585.9  2012 Yes        HTN (hypertension) ICD-10-CM: I10  ICD-9-CM: 401.9  2011 Yes                   [x] High complexity decision making was performed  [x] See my orders for details      Subjective/Initial History:     I was asked by Mariela Law MD to see Rj Martin  a 80 y.o.  male in consultation     Excerpts from admission 2020 or consult notes as follows:   , 80-year-old male came in because of shortness of breath dyspnea and he was hypoxic he had history of pneumonia recently discharged 4 days ago he has recurrent hospitalization because of infection pneumonia significant past medical history of chronic kidney disease, lymphoma and prostate cancer he was put on 100% nonrebreather mask his saturation was in 80s now he is on blood percent FiO2 and a shock state he was put on vasopressor Levophed so admitted and critical care consult was called.  awake alert asking for water has gurgly upper airway noise. Norepinephrine down to 2 mics with well-maintained blood pressure. 12/15: Patient is seen on follow up. Resting comfortably on 45% high flow. : Patient is seen on follow up. He is now on 3.5L O2 via nasal cannula and O2 sats are 99%.  Resting comfortably. Allergies   Allergen Reactions    Pcn [Penicillins] Swelling        MAR reviewed and pertinent medications noted or modified as needed     Current Facility-Administered Medications   Medication    hydrALAZINE (APRESOLINE) tablet 10 mg    isosorbide mononitrate ER (IMDUR) tablet 30 mg    furosemide (LASIX) injection 40 mg    dextrose 5% infusion    mupirocin (BACTROBAN) 2 % ointment    meropenem (MERREM) 1 g in sterile water (preservative free) 20 mL IV syringe    sodium chloride (NS) flush 5-40 mL    sodium chloride (NS) flush 5-40 mL    acetaminophen (TYLENOL) tablet 650 mg    Or    acetaminophen (TYLENOL) suppository 650 mg    polyethylene glycol (MIRALAX) packet 17 g    promethazine (PHENERGAN) tablet 12.5 mg    Or    ondansetron (ZOFRAN) injection 4 mg    famotidine (PF) (PEPCID) 20 mg in 0.9% sodium chloride 10 mL injection    heparin (porcine) injection 5,000 Units    albuterol-ipratropium (DUO-NEB) 2.5 MG-0.5 MG/3 ML    Vancomycin Dosed by Pharmacy      Patient PCP: Aleena Waterman MD  PMH:  has a past medical history of CRI (chronic renal insufficiency) (12/13/2012), Gout (1/4/2011), Hypertension, [address]oma ([facility] [address] and Prostate CA ([facility] [address] (1/4/2011). PSH:   has a past surgical history that includes hx prostatectomy; hc bone marrow biopsy; endoscopy, colon, diagnostic (2003); and ir thoracentesis cath w image (12/15/2020). FHX: family history includes Hypertension in his mother. SHX:  reports that he has never smoked. He has never used smokeless tobacco. He reports previous drug use. He reports that he does not drink alcohol. ROS:    Review of Systems   Constitutional: Positive for malaise/fatigue. HENT: Negative. Eyes: Negative. Respiratory: Positive for cough and shortness of breath. Cardiovascular: Negative. Gastrointestinal: Negative. Genitourinary: Negative. Musculoskeletal: Negative. Skin: Negative. Neurological: Negative. Endo/Heme/Allergies: Negative. Psychiatric/Behavioral: Negative. Objective:     Vital Signs: Telemetry:    normal sinus rhythm Intake/Output:   Visit Vitals  /64 (BP 1 Location: Right arm, BP Patient Position: At rest)   Pulse 60   Temp 98 °F (36.7 °C)   Resp 18   Ht 5' 2.99\" (1.6 m)   Wt 58.6 kg (129 lb 3 oz)   SpO2 99%   BMI 22.89 kg/m²       Temp (24hrs), Av °F (36.7 °C), Min:97.7 °F (36.5 °C), Max:98.2 °F (36.8 °C)        O2 Device: Nasal cannula O2 Flow Rate (L/min): 3 l/min       Wt Readings from Last 4 Encounters:   12/15/20 58.6 kg (129 lb 3 oz)   20 64.3 kg (141 lb 12.1 oz)   20 64.9 kg (143 lb)   20 61.7 kg (136 lb)          Intake/Output Summary (Last 24 hours) at 2020 0845  Last data filed at 2020 0801  Gross per 24 hour   Intake 1275 ml   Output 1250 ml   Net 25 ml       Last shift:       07 - 1900  In: -   Out: 550 [Urine:550]  Last 3 shifts: 1901 -  07  In: 8902 [I.V.:584]  Out: 2500 [Urine:2500]       Physical Exam:     Physical Exam   Constitutional: He is oriented to person, place, and time. HENT:   Head: Normocephalic and atraumatic. Eyes: Pupils are equal, round, and reactive to light. Conjunctivae and EOM are normal.   Neck: Normal range of motion. Neck supple. Cardiovascular: Normal rate and regular rhythm. Pulmonary/Chest: Effort normal. He has rales. Expiratory rhonchi has upper airway noise over the neck   Abdominal: Soft. Bowel sounds are normal.   Thin   Musculoskeletal: Normal range of motion. Neurological: He is alert and oriented to person, place, and time. Skin: Skin is warm and dry. Psychiatric: He has a normal mood and affect.         Labs:    Recent Labs     12/15/20  2033 12/15/20  1103 20  0812   WBC 10.7 13.6* 15.7*   HGB 11.0* 11.2* 10.0*   PLT 67* 82* 92*     Recent Labs     12/15/20  2033 12/15/20  1103 20  0812   * 148* 150*  151*   K 4.5 4.4 2.8*  2.9*   * 110* 115*  115*   CO2 32 30 27  27   * 70 156*  155*   BUN 62* 63* 72*  74*   CREA 1.34* 1.48* 1.76*  1.70*   CA 9.5 9.7 8.8  8.5   PHOS  --   --  2.9   ALB  --  1.9* 1.6*  1.8*   ALT  --  100* 74     No results for input(s): PH, PCO2, PO2, HCO3, FIO2 in the last 72 hours. No results for input(s): CPK, CKNDX, TROIQ in the last 72 hours. No lab exists for component: CPKMB    Lab Results   Component Value Date/Time    Culture result: (A) 12/11/2020 11:21 AM     Gram Positive Cocci in clusters CALLED TO AND READ BACK BY Ortiz Hill on 12.12.2020 at 13:37 by [email or web address]    Culture result:  12/11/2020 11:21 AM     Staphylococcus species, coagulase negative , ISOLATED FROM AEROBIC BOTTLE RECEIVED    Culture result:  12/11/2020 11:21 AM     Staphylococcus aureus ** ISOLATED FROM ANAEROBIC BOTTLE     Lab Results   Component Value Date/Time    TSH 1.79 11/20/2020 01:31 PM       Imaging:    CXR Results  (Last 48 hours)    None        Results from Hospital Encounter encounter on 12/11/20   XR CHEST PORT    Narrative Portable chest:    History:Respiratory failure    COMPARISON: Portable chest 12/13/2020    Lungs show hypoaeration. There is ill-defined hazy density at the right lung  base. Hazy parenchymal infiltration is seen in the left lung, left hemidiaphragm  is obscured. Heart is normal size. Calcified plaque involving the aortic arch. Left jugular venous catheter with the tip at the junction of SVC and right  atrium. Nasogastric tube with the tip in the stomach. Impression Impression:Left lung airspace disease and suspected pleural fluid. Right basilar  pulmonary haziness compatible with airspace disease/atelectasis and trace of  pleural fluid. XR CHEST PORT    Narrative Chest single view. Comparison single view chest December 11, 2020. Improved aeration through lung bases. Stable left neck central venous catheter. Cardiac and mediastinal structures unchanged. Thoracic aorta atherosclerosis. No  pneumothorax or sizable pleural effusion. XR CHEST SNGL V    Narrative Central line placement. Comparison chest x-ray 12/11/2020 at 1154 hours. Impression FINDINGS: Impression: Frontal single view chest.    Interval placement left central catheter distal tip SVC/RA junction region. Unchanged cardiomediastinal silhouette. Slightly increasing bilateral pulmonary  airspace edema and/or pneumonia and/or atelectasis. Increasing consolidation of  the left lower lobe. Interval development of small left pleural effusion. No pneumothorax. Results from [place] encounter on 12/11/20   CT CHEST WO CONT    Narrative Comparison chest x-ray 12/11/2020 [facility] and chest CTA 11/25/2020 [facility]. TECHNIQUE: Axial imaging of the chest without IV contrast, with multiplanar  reformatting, MIPs. Dose reduction: All CT scans at this facility are performed using dose reduction  optimization techniques as appropriate to a performed exam including the  following: Automated exposure control, adjustments of the mA and/or kV according  to patient's size, or use of iterative reconstruction technique. FINDINGS: Left central catheter distal tip SVC. Mild cardiomegaly. Multivessel  coronary artery calcification. No pericardial effusion. Low-density intracardiac  blood suggests anemia. Calcified thoracic aorta without aneurysm. Pulmonary  arteries are not dilated. No mediastinal or hilar lymphadenopathy. Tubular  structure posterior to the esophagus possibly dilated vessel. Bilateral pleural  effusions and lower lung consolidations without air bronchograms. The left lower  lung is completely consolidated/collapsed. The aerated lungs demonstrate patchy  areas of airspace disease. No axillary adenopathy. Included thyroid is  unremarkable. Included upper abdomen demonstrates lobulated, heterogeneous  structure spleen and small retroperitoneal lymphadenopathy; unchanged.   Degenerative changes of the bony structures. Soft tissue anasarca. Impression IMPRESSION:  1. Bilateral pleural effusions and lower lung consolidations, the left lower  lung being completely collapsed, this is similar to previous. 2. Interval development patchy airspace disease in the aerated lungs consistent  with pneumonia. 3. Abnormal spleen, small retroperitoneal lymphadenopathy unchanged. 4. Atherosclerosis. IMPRESSION:   1. Acute respiratory failure with Hypoxia continues to be on nasal cannula 3.5L O2  2. Severe sepsis off vasopressors  3. Community-acquired versus hospital-acquired pneumonia has gram-positive cocci in 1 of 4 blood bottles MRSA in nares  4. Hypokalemia on klor-con  5. Pharyngeal dysphagia speech has evaluated and he has totally ineffective swallow discussed with him placing NG tube for tube feeding  6. Hypernatremia. 7. History of lymphoma and prostate cancer  8. Acute on chronic kidney disease creatinine improved (baseline is 1.6)  9. CHF with diastolic dysfunction chest x-ray improving  10. Lactic acidosis, resolved. 11. Body mass index is 22.89 kg/m². 12. Elevated troponin, trending down. RECOMMENDATIONS/PLAN:     1. Patient is on nasal cannula 3.5L O2 with well-maintained oxygen saturation  2. Blood culture 1 of 4 with gram-positive cocci may be skin contaminant but remains on vancomycin & merrem, WBC trending down: 10.7  3. Nasal MRSA smear positive  On Bactroban  4. Hypernatremia: 147 trending down; Hypokalemia resolved: 4.5  5. Repeat labs in AM.       Juan C Garvin      *ATTENTION:  This note has been created by a medical student for educational purposes only. Please do not refer to the content of this note for clinical decision-making, billing, or other purposes. Please see attending physicians note to obtain clinical information on this patient. *

## 2020-12-16 NOTE — PROGRESS NOTES
SPEECH LANGUAGE PATHOLOGY DYSPHAGIA TREATMENT  Patient: Hitesh Ocampo [de-identified]80 y.o. male)  Date: 12/16/2020  Diagnosis: Severe sepsis ([facility] [address] [A41.9, R65.20] <principal problem not specified>  Procedure(s) (LRB):  PERCUTANEOUS ENDOSCOPIC GASTROSTOMY TUBE INSERTION (N/A)    Precautions:  Aspiration, fall and contact    ASSESSMENT :  Per nsg, patient scheduled for PEG placement on 12/17/2020 s/t pharyngeal dysphagia and recurrent aspiration pna. Patient alert on nc, receiving TF via NGT,  w/ audible upper airway congestion. Patient continues to present w/ moderate pharyngeal dysphagia. Oral phase largely wfl for ice chips trials. Pharyngeal phase c/b mild swallow delay and HLE is reduced upon palpation (limited protraction). Swallow appears weaker to palpation this date. Multiple audible swallows. Overt s/s of pen/asp c/b wet weak cough w/ all trials. Patient is at significant risk for aspiration. DO NOT recommend ice chips. Patient will benefit from skilled intervention to address the above impairments. Patients rehabilitation potential is considered to be Fair      PLAN :  Recommendations and Planned Interventions:  Cont NPO w/ TF via NGT as medically appropriate. Rec MBS to further assess oropharyngeal phase of swallow. Frequency/Duration: Patient will be followed by speech-language pathology 5 times a week to address goals. Discharge Recommendations: Skilled Nursing Facility     SUBJECTIVE:   Patient receiving ice chips, notified RN patient is NPO and do not recommend ice chips.      OBJECTIVE:     Past Medical History:   Diagnosis Date    CRI (chronic renal insufficiency) 12/13/2012    Gout 1/4/2011    Hypertension     Lymphoma ([facility] [address]     Prostate CA ([facility] [address] 1/4/2011       CXR Results  (Last 48 hours)      None            Current Diet:  DIET NPO  DIET TUBE FEEDING     Cognitive and Communication Status:  Neurologic State: Alert  Orientation Level: Oriented X4  Cognition: Follows commands  Dysphagia Treatment:  Oral Assessment:  P.O. Trials:  Patient Position: upright in bed  Vocal quality prior to P.O.: Breathy;Low volume; Wet  Consistency Presented: Ice chips  How Presented: SLP-fed/presented;Spoon  Bolus Acceptance: No impairment  Bolus Formation/Control: No impairment  Propulsion: Delayed (# of seconds)  Oral Residue: None  Initiation of Swallow: Delayed (# of seconds)  Laryngeal Elevation: Weak  Aspiration Signs/Symptoms: Weak cough  Pharyngeal Phase Characteristics: Audible swallow;Double swallow;Easily fatigued      Oral Phase Severity: Mild  Pharyngeal Phase Severity : Moderate     Pain:  Pain Scale 1: Numeric (0 - 10)  Pain Intensity 1: 0     After treatment:   Patient left in no apparent distress in bed, Call bell within reach, and Nursing notified. COMMUNICATION/EDUCATION:   Patient was educated regarding his deficit(s) of dysphagia as this relates to his diagnosis of recurrent pna. He demonstrated Good understanding as evidenced by engagement and appropriate questions. .     The patient's plan of care including recommendations, planned interventions, and recommended diet changes were discussed with: Occupational therapist and Registered nurse. Patient/family agree to work toward stated goals and plan of care. Problem: Dysphagia (Adult)  Goal: *Acute Goals and Plan of Care (Insert Text)  Description: Speech Therapy Swallow Goals  Initiated 12/13/2020  -Patient will tolerate PO trials without clinical indicators of aspiration given maximal cues within 1-2 day(s). [x] Not met  [ ]  MET   [ ] Progressing  [ ] Laxmi Jordan  -Patient will participate in modified barium swallow study when medically appropriate, currently on HFNC         [X] Not met  [ ]  MET   [ ] Progressing  [ ] Discontinue  -Patient will perform therapeutic swallowing exercises with maximal cues within 7 day(s).         [x] Not met  [ ]  MET   [ ] Progressing  [ ] Discontinue  -Patient will demonstrate understanding of swallow safety precautions and aspiration precautions, diet recs with maximal cues within 7 day(s).         [x] Not met  [ ]  MET   [ ] Progressing  [ ] Discontinue     Outcome: Progressing Towards Goal     Thank you for this referral.  Sera Thomas M.S., M.Ed., CCC-SLP  Time Calculation: 10 mins

## 2020-12-16 NOTE — PROGRESS NOTES
Chart reviewed. Patient is currently on 3.5L nasal cannula. Patient on NG tube feeds. GI is being consulted for J-tube placement. Plan is patient will discharge back to [facility] when medically stable. CM will continue to follow.

## 2020-12-17 ENCOUNTER — ANESTHESIA (OUTPATIENT)
Dept: ENDOSCOPY | Age: 85
DRG: 871 | End: 2020-12-17
Payer: MEDICARE

## 2020-12-17 ENCOUNTER — APPOINTMENT (OUTPATIENT)
Dept: ENDOSCOPY | Age: 85
DRG: 871 | End: 2020-12-17
Attending: INTERNAL MEDICINE
Payer: MEDICARE

## 2020-12-17 LAB
ANION GAP SERPL CALC-SCNC: 3 MMOL/L (ref 5–15)
BASOPHILS # BLD: 0 K/UL (ref 0–0.1)
BASOPHILS NFR BLD: 0 % (ref 0–1)
BUN SERPL-MCNC: 59 MG/DL (ref 6–20)
BUN/CREAT SERPL: 45 (ref 12–20)
CA-I BLD-MCNC: 8.7 MG/DL (ref 8.5–10.1)
CHLORIDE SERPL-SCNC: 103 MMOL/L (ref 97–108)
CO2 SERPL-SCNC: 34 MMOL/L (ref 21–32)
CREAT SERPL-MCNC: 1.32 MG/DL (ref 0.7–1.3)
DIFFERENTIAL METHOD BLD: ABNORMAL
EOSINOPHIL # BLD: 0.2 K/UL (ref 0–0.4)
EOSINOPHIL NFR BLD: 3 % (ref 0–7)
ERYTHROCYTE [DISTWIDTH] IN BLOOD BY AUTOMATED COUNT: 17 % (ref 11.5–14.5)
GLUCOSE SERPL-MCNC: 96 MG/DL (ref 65–100)
HCT VFR BLD AUTO: 28.9 % (ref 36.6–50.3)
HGB BLD-MCNC: 9.5 G/DL (ref 12.1–17)
IMM GRANULOCYTES # BLD AUTO: 0 K/UL (ref 0–0.04)
IMM GRANULOCYTES NFR BLD AUTO: 1 % (ref 0–0.5)
LYMPHOCYTES # BLD: 0.5 K/UL (ref 0.8–3.5)
LYMPHOCYTES NFR BLD: 6 % (ref 12–49)
MCH RBC QN AUTO: 30.1 PG (ref 26–34)
MCHC RBC AUTO-ENTMCNC: 32.9 G/DL (ref 30–36.5)
MCV RBC AUTO: 91.5 FL (ref 80–99)
MONOCYTES # BLD: 0.7 K/UL (ref 0–1)
MONOCYTES NFR BLD: 8 % (ref 5–13)
NEUTS SEG # BLD: 7.2 K/UL (ref 1.8–8)
NEUTS SEG NFR BLD: 82 % (ref 32–75)
PLATELET # BLD AUTO: 44 K/UL (ref 150–400)
POTASSIUM SERPL-SCNC: 4.5 MMOL/L (ref 3.5–5.1)
RBC # BLD AUTO: 3.16 M/UL (ref 4.1–5.7)
SODIUM SERPL-SCNC: 140 MMOL/L (ref 136–145)
WBC # BLD AUTO: 8.6 K/UL (ref 4.1–11.1)

## 2020-12-17 PROCEDURE — 80048 BASIC METABOLIC PNL TOTAL CA: CPT

## 2020-12-17 PROCEDURE — 74011000250 HC RX REV CODE- 250: Performed by: EMERGENCY MEDICINE

## 2020-12-17 PROCEDURE — 65270000029 HC RM PRIVATE

## 2020-12-17 PROCEDURE — 2709999900 HC NON-CHARGEABLE SUPPLY: Performed by: INTERNAL MEDICINE

## 2020-12-17 PROCEDURE — 94760 N-INVAS EAR/PLS OXIMETRY 1: CPT

## 2020-12-17 PROCEDURE — 85025 COMPLETE CBC W/AUTO DIFF WBC: CPT

## 2020-12-17 PROCEDURE — 74011250636 HC RX REV CODE- 250/636: Performed by: NURSE ANESTHETIST, CERTIFIED REGISTERED

## 2020-12-17 PROCEDURE — 76040000007: Performed by: INTERNAL MEDICINE

## 2020-12-17 PROCEDURE — 74011000250 HC RX REV CODE- 250: Performed by: PHYSICIAN ASSISTANT

## 2020-12-17 PROCEDURE — 76060000032 HC ANESTHESIA 0.5 TO 1 HR: Performed by: INTERNAL MEDICINE

## 2020-12-17 PROCEDURE — 74011250636 HC RX REV CODE- 250/636: Performed by: EMERGENCY MEDICINE

## 2020-12-17 PROCEDURE — 36415 COLL VENOUS BLD VENIPUNCTURE: CPT

## 2020-12-17 PROCEDURE — 74011250636 HC RX REV CODE- 250/636: Performed by: PHYSICIAN ASSISTANT

## 2020-12-17 PROCEDURE — 0DH63UZ INSERTION OF FEEDING DEVICE INTO STOMACH, PERCUTANEOUS APPROACH: ICD-10-PCS | Performed by: INTERNAL MEDICINE

## 2020-12-17 PROCEDURE — 80202 ASSAY OF VANCOMYCIN: CPT

## 2020-12-17 PROCEDURE — 74011000250 HC RX REV CODE- 250: Performed by: NURSE ANESTHETIST, CERTIFIED REGISTERED

## 2020-12-17 PROCEDURE — 77030005122 HC CATH GASTMY PEG BSC -B: Performed by: INTERNAL MEDICINE

## 2020-12-17 PROCEDURE — 77010033678 HC OXYGEN DAILY

## 2020-12-17 RX ORDER — LIDOCAINE HYDROCHLORIDE 20 MG/ML
INJECTION, SOLUTION EPIDURAL; INFILTRATION; INTRACAUDAL; PERINEURAL AS NEEDED
Status: DISCONTINUED | OUTPATIENT
Start: 2020-12-17 | End: 2020-12-17 | Stop reason: HOSPADM

## 2020-12-17 RX ORDER — SODIUM CHLORIDE 9 MG/ML
INJECTION, SOLUTION INTRAVENOUS
Status: DISCONTINUED | OUTPATIENT
Start: 2020-12-17 | End: 2020-12-17 | Stop reason: HOSPADM

## 2020-12-17 RX ORDER — PROPOFOL 10 MG/ML
INJECTION, EMULSION INTRAVENOUS AS NEEDED
Status: DISCONTINUED | OUTPATIENT
Start: 2020-12-17 | End: 2020-12-17 | Stop reason: HOSPADM

## 2020-12-17 RX ADMIN — Medication 10 ML: at 16:32

## 2020-12-17 RX ADMIN — MEROPENEM 1 G: 1 INJECTION, POWDER, FOR SOLUTION INTRAVENOUS at 21:10

## 2020-12-17 RX ADMIN — MEROPENEM 1 G: 1 INJECTION, POWDER, FOR SOLUTION INTRAVENOUS at 08:57

## 2020-12-17 RX ADMIN — PROPOFOL 50 MG: 10 INJECTION, EMULSION INTRAVENOUS at 13:08

## 2020-12-17 RX ADMIN — Medication 10 ML: at 04:45

## 2020-12-17 RX ADMIN — LIDOCAINE HYDROCHLORIDE 60 MG: 20 INJECTION, SOLUTION EPIDURAL; INFILTRATION; INTRACAUDAL; PERINEURAL at 13:08

## 2020-12-17 RX ADMIN — PROPOFOL 20 MG: 10 INJECTION, EMULSION INTRAVENOUS at 13:15

## 2020-12-17 RX ADMIN — FAMOTIDINE 20 MG: 10 INJECTION INTRAVENOUS at 08:57

## 2020-12-17 RX ADMIN — Medication 10 ML: at 21:08

## 2020-12-17 RX ADMIN — FAMOTIDINE 20 MG: 10 INJECTION INTRAVENOUS at 21:10

## 2020-12-17 RX ADMIN — MEROPENEM 1 G: 1 INJECTION, POWDER, FOR SOLUTION INTRAVENOUS at 16:32

## 2020-12-17 RX ADMIN — MEROPENEM 1 G: 1 INJECTION, POWDER, FOR SOLUTION INTRAVENOUS at 00:08

## 2020-12-17 RX ADMIN — SODIUM CHLORIDE: 9 INJECTION, SOLUTION INTRAVENOUS at 13:00

## 2020-12-17 NOTE — ANESTHESIA PREPROCEDURE EVALUATION
Relevant Problems   RESPIRATORY SYSTEM   (+) HAP (hospital-acquired pneumonia)      CARDIOVASCULAR   (+) CHF (congestive heart failure) (HCC)   (+) HTN (hypertension)      RENAL FAILURE   (+) Acute renal failure (ARF) (HCC)   (+) CRI (chronic renal insufficiency)      HEMATOLOGY   (+) Anemia      PERSONAL HX & FAMILY HX OF CANCER   (+) Lymphoma (HCC)   (+) Non-Hodgkin's lymphoma in relapse (HCC)   (+) Prostate CA ([facility] [address]       Anesthetic History   No history of anesthetic complications            Review of Systems / Medical History  Patient summary reviewed, nursing notes reviewed and pertinent labs reviewed    Pulmonary  Within defined limits                 Neuro/Psych   Within defined limits           Cardiovascular    Hypertension      CHF          Comments: TTE:  · LV: Calculated LVEF is 64%. Normal cavity size, wall thickness and systolic function (ejection fraction normal). Mild (grade 1) left ventricular diastolic dysfunction. · MV: Mild mitral annular calcification. Mild mitral valve regurgitation is present. · PV: Mild pulmonic valve regurgitation is present. · TV: Moderate tricuspid valve regurgitation is present. · AV: Mild aortic valve regurgitation is present. · LA: Moderately dilated left atrium.    GI/Hepatic/Renal         Renal disease: ARF and CRI       Endo/Other        Cancer     Other Findings            Past Medical History:   Diagnosis Date    CRI (chronic renal insufficiency) 12/13/2012    Gout 1/4/2011    Hypertension     Lymphoma ([facility] [address]     Prostate CA ([facility] [address] 1/4/2011       Past Surgical History:   Procedure Laterality Date    ENDOSCOPY, COLON, DIAGNOSTIC  2003    neg    HC BONE MARROW BIOPSY      HX PROSTATECTOMY      IR THORACENTESIS CATH W IMAGE  12/15/2020       Current Outpatient Medications   Medication Instructions    allopurinoL (ZYLOPRIM) 50 mg, Oral, EVERY OTHER DAY    atenoloL (TENORMIN) 50 mg, Oral, DAILY    furosemide (LASIX) 40 mg tablet Take 1 tb daily    meclizine (ANTIVERT) 12.5 mg, Oral, 2 TIMES DAILY    multivitamin (ONE A DAY) tablet 1 Tab, Oral, DAILY    sertraline (ZOLOFT) 12.5 mg, Oral, DAILY    vitamin e (E GEMS) 100 Units, Oral, DAILY       Current Facility-Administered Medications   Medication Dose Route Frequency    [START ON 12/17/2020] Random Vancomycin level to be drawn on 12/17/2020 at 0600.    Other ONCE    hydrALAZINE (APRESOLINE) tablet 10 mg  10 mg Oral TID    isosorbide mononitrate ER (IMDUR) tablet 30 mg  30 mg Oral DAILY    furosemide (LASIX) injection 40 mg  40 mg IntraVENous DAILY    dextrose 5% infusion  50 mL/hr IntraVENous CONTINUOUS    meropenem (MERREM) 1 g in sterile water (preservative free) 20 mL IV syringe  1 g IntraVENous Q8H    sodium chloride (NS) flush 5-40 mL  5-40 mL IntraVENous Q8H    sodium chloride (NS) flush 5-40 mL  5-40 mL IntraVENous PRN    acetaminophen (TYLENOL) tablet 650 mg  650 mg Oral Q6H PRN    Or    acetaminophen (TYLENOL) suppository 650 mg  650 mg Rectal Q6H PRN    polyethylene glycol (MIRALAX) packet 17 g  17 g Oral DAILY PRN    promethazine (PHENERGAN) tablet 12.5 mg  12.5 mg Oral Q6H PRN    Or    ondansetron (ZOFRAN) injection 4 mg  4 mg IntraVENous Q6H PRN    famotidine (PF) (PEPCID) 20 mg in 0.9% sodium chloride 10 mL injection  20 mg IntraVENous BID    heparin (porcine) injection 5,000 Units  5,000 Units SubCUTAneous Q12H    albuterol-ipratropium (DUO-NEB) 2.5 MG-0.5 MG/3 ML  3 mL Nebulization Q4H PRN    Vancomycin Dosed by Pharmacy  1 Each Other Rx Dosing/Monitoring       Patient Vitals for the past 24 hrs:   Temp Pulse Resp BP SpO2   12/16/20 2048     99 %   12/16/20 2039 36.9 °C (98.5 °F) (!) 102 18 128/75 99 %   12/16/20 2000  (!) 103      12/16/20 1600  98      12/16/20 1548 36.8 °C (98.2 °F) (!) 55 18 (!) 119/55 98 %   12/16/20 1200  100      12/16/20 0833 36.7 °C (98 °F) 60 18 119/64 99 %   12/16/20 0800  94      12/16/20 0400  71      12/16/20 0110 36.6 °C (97.9 °F) 72 16 111/67 100 %   12/16/20 0000  76      12/15/20 2258     98 %   12/15/20 2238 36.8 °C (98.2 °F) 73 19 117/67 98 %   12/15/20 2200  68  117/67        Lab Results   Component Value Date/Time    WBC 8.5 12/16/2020 09:07 AM    HGB 10.4 (L) 12/16/2020 09:07 AM    HCT 32.1 (L) 12/16/2020 09:07 AM    PLATELET 56 (L) 02/23/8040 09:07 AM    MCV 92.8 12/16/2020 09:07 AM     Lab Results   Component Value Date/Time    Sodium 144 12/16/2020 09:07 AM    Potassium 4.3 12/16/2020 09:07 AM    Chloride 106 12/16/2020 09:07 AM    CO2 31 12/16/2020 09:07 AM    Anion gap 7 12/16/2020 09:07 AM    Glucose 132 (H) 12/16/2020 09:07 AM    BUN 58 (H) 12/16/2020 09:07 AM    Creatinine 1.24 12/16/2020 09:07 AM    BUN/Creatinine ratio 47 (H) 12/16/2020 09:07 AM    GFR est AA >60 12/16/2020 09:07 AM    GFR est non-AA 55 (L) 12/16/2020 09:07 AM    Calcium 9.3 12/16/2020 09:07 AM     No results found for: APTT, PTP, INR, INREXT  Lab Results   Component Value Date/Time    Glucose 132 (H) 12/16/2020 09:07 AM    Glucose (POC) 190 (H) 12/14/2020 11:27 PM     Physical Exam    Airway  Mallampati: II  TM Distance: 4 - 6 cm  Neck ROM: normal range of motion   Mouth opening: Normal     Cardiovascular    Rhythm: regular  Rate: normal         Dental         Pulmonary  Breath sounds clear to auscultation               Abdominal  GI exam deferred       Other Findings            Anesthetic Plan    ASA: 4  Anesthesia type: total IV anesthesia and general          Induction: Intravenous  Anesthetic plan and risks discussed with: Patient and Family      General anesthesia was prescribed for this patient because by definition it is \"a drug-induced loss of consciousness during which patients are not arousable, even by painful stimulation. \" Sometimes, the ability to independently maintain ventilatory function is often impaired and patients often require assistance in maintaining a patent airway.  Occasionally, positive pressure ventilation may be required because of depressed spontaneous ventilation or drug-induced depression of neuromuscular function. This depth of anesthesia is preferred for endoscopic procedures to facilitate the procedure and for patient safety/quality of care.

## 2020-12-17 NOTE — PROGRESS NOTES
PULMONARY NOTE  [facility]    Name: Oswaldo Jacobsen MRN: [id number]   : 1932 Hospital: [address] [place] [address]   Date: 2020  Admission date: 2020 Hospital Day: 7       HPI:     Hospital Problems  Date Reviewed: 2020          Codes Class Noted POA    Severe sepsis ([address] ICD-10-CM: A41.9, R65.20  ICD-9-CM: 038.9, 995.92  2020 Yes        HAP (hospital-acquired pneumonia) ICD-10-CM: J18.9, Y95  ICD-9-CM: 640  2020 Yes        Cervical stenosis of spinal canal ICD-10-CM: M48.02  ICD-9-CM: 723.0  2020 Yes        Acute renal failure (ARF) (HCC) ICD-10-CM: N17.9  ICD-9-CM: 584.9  2020 Yes        CRI (chronic renal insufficiency) (Chronic) ICD-10-CM: N18.9  ICD-9-CM: 585.9  2012 Yes        HTN (hypertension) ICD-10-CM: I10  ICD-9-CM: 401.9  2011 Yes                   [x] High complexity decision making was performed  [x] See my orders for details      Subjective/Initial History:     I was asked by Pastor Molly MD to see Oswaldo Jacobsen  a 80 y.o.  male in consultation     Excerpts from admission 2020 or consult notes as follows:   , 80-year-old male came in because of shortness of breath dyspnea and he was hypoxic he had history of pneumonia recently discharged 4 days ago he has recurrent hospitalization because of infection pneumonia significant past medical history of chronic kidney disease, lymphoma and prostate cancer he was put on 100% nonrebreather mask his saturation was in 80s now he is on blood percent FiO2 and a shock state he was put on vasopressor Levophed so admitted and critical care consult was called.  awake alert asking for water has gurgly upper airway noise. Norepinephrine down to 2 mics with well-maintained blood pressure. 12/15: Patient is seen on follow up. Resting comfortably on 45% high flow. : Patient is seen on follow up. He is now on 3.5L O2 via nasal cannula and O2 sats are 99%.  Resting comfortably. 12/17: Patient continues to improve work of breathing. He is now tolerating 2.5L O2 via nasal cannula and oxygen saturation is 99%. Allergies   Allergen Reactions    Pcn [Penicillins] Swelling        MAR reviewed and pertinent medications noted or modified as needed     Current Facility-Administered Medications   Medication    Random Vancomycin level to be drawn on 12/17/2020 at 0600.  hydrALAZINE (APRESOLINE) tablet 10 mg    isosorbide mononitrate ER (IMDUR) tablet 30 mg    furosemide (LASIX) injection 40 mg    dextrose 5% infusion    meropenem (MERREM) 1 g in sterile water (preservative free) 20 mL IV syringe    sodium chloride (NS) flush 5-40 mL    sodium chloride (NS) flush 5-40 mL    acetaminophen (TYLENOL) tablet 650 mg    Or    acetaminophen (TYLENOL) suppository 650 mg    polyethylene glycol (MIRALAX) packet 17 g    promethazine (PHENERGAN) tablet 12.5 mg    Or    ondansetron (ZOFRAN) injection 4 mg    famotidine (PF) (PEPCID) 20 mg in 0.9% sodium chloride 10 mL injection    albuterol-ipratropium (DUO-NEB) 2.5 MG-0.5 MG/3 ML    Vancomycin Dosed by Pharmacy      Patient PCP: Greg Ortega MD  PMH:  has a past medical history of CRI (chronic renal insufficiency) (12/13/2012), Gout (1/4/2011), Hypertension, Lymphoma ([facility] [address] and Prostate CA ([facility] [address] (1/4/2011). PSH:   has a past surgical history that includes hx prostatectomy; hc bone marrow biopsy; endoscopy, colon, diagnostic (2003); and ir thoracentesis cath w image (12/15/2020). FHX: family history includes Hypertension in his mother. SHX:  reports that he has never smoked. He has never used smokeless tobacco. He reports previous drug use. He reports that he does not drink alcohol. ROS:    Review of Systems   Constitutional: Positive for malaise/fatigue. HENT: Negative. Eyes: Negative. Respiratory: Positive for cough and shortness of breath. Cardiovascular: Negative. Gastrointestinal: Negative. Genitourinary: Negative. Musculoskeletal: Negative. Skin: Negative. Neurological: Negative. Endo/Heme/Allergies: Negative. Psychiatric/Behavioral: Negative. Objective:     Vital Signs: Telemetry:    normal sinus rhythm Intake/Output:   Visit Vitals  BP (!) 105/59 (BP Patient Position: At rest)   Pulse 88   Temp 98.1 °F (36.7 °C)   Resp 18   Ht 5' 2.99\" (1.6 m)   Wt 60.9 kg (134 lb 4.2 oz)   SpO2 99%   BMI 23.79 kg/m²       Temp (24hrs), Av.3 °F (36.8 °C), Min:98.1 °F (36.7 °C), Max:98.5 °F (36.9 °C)        O2 Device: Nasal cannula O2 Flow Rate (L/min): 2 l/min       Wt Readings from Last 4 Encounters:   20 60.9 kg (134 lb 4.2 oz)   20 64.3 kg (141 lb 12.1 oz)   20 64.9 kg (143 lb)   20 61.7 kg (136 lb)          Intake/Output Summary (Last 24 hours) at 2020 1015  Last data filed at 2020 0411  Gross per 24 hour   Intake 300 ml   Output 1200 ml   Net -900 ml       Last shift:      No intake/output data recorded. Last 3 shifts: 12/15 1901 -  0700  In: 1425   Out: 1750 [Urine:1750]       Physical Exam:     Physical Exam   Constitutional: He is oriented to person, place, and time. HENT:   Head: Normocephalic and atraumatic. Eyes: Pupils are equal, round, and reactive to light. Conjunctivae and EOM are normal.   Neck: Normal range of motion. Neck supple. Cardiovascular: Normal rate and regular rhythm. Pulmonary/Chest: Effort normal. He has rales. Expiratory rhonchi has upper airway noise over the neck   Abdominal: Soft. Bowel sounds are normal.   Thin   Musculoskeletal: Normal range of motion. Neurological: He is alert and oriented to person, place, and time. Skin: Skin is warm and dry. Psychiatric: He has a normal mood and affect.         Labs:    Recent Labs     20  0636 20  0907 12/15/20  2033   WBC 8.6 8.5 10.7   HGB 9.5* 10.4* 11.0*   PLT 44* 56* 67*     Recent Labs     20  0636 12/16/20  0907 12/15/20  2033 12/15/20  1103    144 147* 148*   K 4.5 4.3 4.5 4.4    106 109* 110*   CO2 34* 31 32 30   GLU 96 132* 154* 70   BUN 59* 58* 62* 63*   CREA 1.32* 1.24 1.34* 1.48*   CA 8.7 9.3 9.5 9.7   ALB  --   --   --  1.9*   ALT  --   --   --  100*     No results for input(s): PH, PCO2, PO2, HCO3, FIO2 in the last 72 hours. No results for input(s): CPK, CKNDX, TROIQ in the last 72 hours. No lab exists for component: CPKMB    Lab Results   Component Value Date/Time    Culture result: No growth 1 day 12/15/2020 08:33 PM    Culture result: PENDING 12/14/2020 02:45 PM    Culture result:  12/11/2020 11:21 AM     Gram Positive Cocci CALLED TO AND READ BACK BY JOSSELIN GERMAN 12/12/20 1337 TLW    Culture result:  12/11/2020 11:21 AM     Staphylococcus species, coagulase negative , ISOLATED FROM AEROBIC BOTTLE RECEIVED    Culture result:  12/11/2020 11:21 AM     * methicillin resistant staphylococcus aureus * ** ISOLATED FROM ANAEROBIC BOTTLE    Culture result: CALLED MRSA TO [facility] 1100 12/15/20 12/11/2020 11:21 AM     Lab Results   Component Value Date/Time    TSH 1.79 11/20/2020 01:31 PM       Imaging:    CXR Results  (Last 48 hours)    None        Results from Hospital Encounter encounter on 12/11/20   XR CHEST PORT    Narrative Portable chest:    History:Respiratory failure    COMPARISON: Portable chest 12/13/2020    Lungs show hypoaeration. There is ill-defined hazy density at the right lung  base. Hazy parenchymal infiltration is seen in the left lung, left hemidiaphragm  is obscured. Heart is normal size. Calcified plaque involving the aortic arch. Left jugular venous catheter with the tip at the junction of SVC and right  atrium. Nasogastric tube with the tip in the stomach. Impression Impression:Left lung airspace disease and suspected pleural fluid. Right basilar  pulmonary haziness compatible with airspace disease/atelectasis and trace of  pleural fluid.    XR CHEST PORT    Narrative Chest single view.    Comparison single view chest December 11, 2020. Improved aeration through lung bases. Stable left neck central venous catheter. Cardiac and mediastinal structures unchanged. Thoracic aorta atherosclerosis. No  pneumothorax or sizable pleural effusion. XR CHEST SNGL V    Narrative Central line placement. Comparison chest x-ray 12/11/2020 at 1154 hours. Impression FINDINGS: Impression: Frontal single view chest.    Interval placement left central catheter distal tip SVC/RA junction region. Unchanged cardiomediastinal silhouette. Slightly increasing bilateral pulmonary  airspace edema and/or pneumonia and/or atelectasis. Increasing consolidation of  the left lower lobe. Interval development of small left pleural effusion. No pneumothorax. Results from [place] encounter on 12/11/20   CT CHEST WO CONT    Narrative Comparison chest x-ray 12/11/2020 [facility] and chest CTA 11/25/2020 Lyndsey Stahl. TECHNIQUE: Axial imaging of the chest without IV contrast, with multiplanar  reformatting, MIPs. Dose reduction: All CT scans at this facility are performed using dose reduction  optimization techniques as appropriate to a performed exam including the  following: Automated exposure control, adjustments of the mA and/or kV according  to patient's size, or use of iterative reconstruction technique. FINDINGS: Left central catheter distal tip SVC. Mild cardiomegaly. Multivessel  coronary artery calcification. No pericardial effusion. Low-density intracardiac  blood suggests anemia. Calcified thoracic aorta without aneurysm. Pulmonary  arteries are not dilated. No mediastinal or hilar lymphadenopathy. Tubular  structure posterior to the esophagus possibly dilated vessel. Bilateral pleural  effusions and lower lung consolidations without air bronchograms. The left lower  lung is completely consolidated/collapsed. The aerated lungs demonstrate patchy  areas of airspace disease.  No axillary adenopathy. Included thyroid is  unremarkable. Included upper abdomen demonstrates lobulated, heterogeneous  structure spleen and small retroperitoneal lymphadenopathy; unchanged. Degenerative changes of the bony structures. Soft tissue anasarca. Impression IMPRESSION:  1. Bilateral pleural effusions and lower lung consolidations, the left lower  lung being completely collapsed, this is similar to previous. 2. Interval development patchy airspace disease in the aerated lungs consistent  with pneumonia. 3. Abnormal spleen, small retroperitoneal lymphadenopathy unchanged. 4. Atherosclerosis. IMPRESSION:   1. Acute respiratory failure with Hypoxia continues to be on nasal cannula 2.5L O2, continue to wean. 2. Severe sepsis off vasopressors  3. Community-acquired versus hospital-acquired pneumonia has gram-positive cocci in 1 of 4 blood bottles MRSA in nares  4. Hypokalemia, resolved  5. Pharyngeal dysphagia speech has evaluated and he has totally ineffective swallow discussed with him placing NG tube for tube feeding  6. Hypernatremia, resolved. 7. History of lymphoma and prostate cancer  8. Acute on chronic kidney disease creatinine improved (baseline is 1.6)  9. CHF with diastolic dysfunction chest x-ray improving  10. Lactic acidosis, resolved. 11. Body mass index is 23.79 kg/m². 12. Elevated troponin, trending down. RECOMMENDATIONS/PLAN:     1. Patient is on nasal cannula 2.5L O2 with well-maintained oxygen saturation  2. Blood culture 1 of 4 with gram-positive cocci may be skin contaminant but remains on vancomycin & merrem, WBC trending down: 8.6  3. Nasal MRSA smear positive; on Bactroban. 4. Hypernatremia resolved: 140; Hypokalemia resolved: 4.5  5.  Repeat labs in AM.       Aren Lat

## 2020-12-17 NOTE — PROGRESS NOTES
Chart reviewed. Patient is on 3L nasal cannula, going for PEG placement today. Patient will return to [facility] when stable for discharge. CM will continue to follow.

## 2020-12-17 NOTE — ANESTHESIA POSTPROCEDURE EVALUATION
Procedure(s):  PERCUTANEOUS ENDOSCOPIC GASTROSTOMY TUBE INSERTION  ESOPHAGOGASTRODUODENOSCOPY (EGD). total IV anesthesia, general    Anesthesia Post Evaluation      Multimodal analgesia: multimodal analgesia not used between 6 hours prior to anesthesia start to PACU discharge  Patient location during evaluation: bedside  Patient participation: waiting for patient participation  Level of consciousness: sleepy but conscious  Pain score: 0  Pain management: adequate  Airway patency: patent  Anesthetic complications: no  Cardiovascular status: acceptable  Respiratory status: acceptable  Hydration status: acceptable  Post anesthesia nausea and vomiting:  none  Final Post Anesthesia Temperature Assessment:  Normothermia (36.0-37.5 degrees C)      INITIAL Post-op Vital signs: No vitals data found for the desired time range.

## 2020-12-17 NOTE — CONSULTS
Consult    Patient: Princess Solis MRN: [id number]  SSN: [id number]    YOB: 1932  Age: 80 y.o. Sex: male      Subjective:      Princess Solis is a 80 y.o. male who is being seen for dysphagia, patient is able to give some of the history, patient appears to be awake and alert,due to shortness of breath with hypoxia. He was also found to be in septic shock requiring norepinephrine. Respiratory failure found to be secondary to pneumonia. He was on BiPAP for respiratory failure and later transitioned to high flow on 12/14/2020. Subsequently been transferred to medical floor, Patient has been on meropenem and vancomycin,has been NG tube feeding for long time,  May need to PEG tube placement for nursing home replacement  Patient denied any pain, no fever, no nausea vomiting, no GI bleeding        Past Medical History:   Diagnosis Date    CRI (chronic renal insufficiency) 12/13/2012    Gout 1/4/2011    Hypertension     Lymphoma ([facility] [address]     Prostate CA ([facility] [address] 1/4/2011     Past Surgical History:   Procedure Laterality Date    ENDOSCOPY, COLON, DIAGNOSTIC  2003    neg    HC BONE MARROW BIOPSY      HX PROSTATECTOMY      IR THORACENTESIS CATH W IMAGE  12/15/2020      Family History   Problem Relation Age of Onset    Hypertension Mother      Social History     Tobacco Use    Smoking status: Never Smoker    Smokeless tobacco: Never Used   Substance Use Topics    Alcohol use: No      Current Facility-Administered Medications   Medication Dose Route Frequency Provider Last Rate Last Admin    [START ON 12/17/2020] Random Vancomycin level to be drawn on 12/17/2020 at 0600.    Other ONCE Reasoner, Nasir Sneed PA-C        hydrALAZINE (APRESOLINE) tablet 10 mg  10 mg Oral TID Deya Lzao MD   10 mg at 12/16/20 2046    isosorbide mononitrate ER (IMDUR) tablet 30 mg  30 mg Oral DAILY Deya Lazo MD   Stopped at 12/16/20 0900    furosemide (LASIX) injection 40 mg  40 mg IntraVENous DAILY Usama Nelson PA-C   Stopped at 12/16/20 0900    dextrose 5% infusion  50 mL/hr IntraVENous CONTINUOUS Uriel Arthur MD 50 mL/hr at 12/15/20 2237 50 mL/hr at 12/15/20 2237    meropenem (MERREM) 1 g in sterile water (preservative free) 20 mL IV syringe  1 g IntraVENous Q8H Andrew Ruiz MD   1 g at 12/16/20 1554    sodium chloride (NS) flush 5-40 mL  5-40 mL IntraVENous Q8H Lindsey Ley PA-C   10 mL at 12/16/20 2047    sodium chloride (NS) flush 5-40 mL  5-40 mL IntraVENous PRN Andre Kraus PA-C        acetaminophen (TYLENOL) tablet 650 mg  650 mg Oral Q6H PRN Andre Kraus PA-C        Or    acetaminophen (TYLENOL) suppository 650 mg  650 mg Rectal Q6H PRN Andre Kraus PA-C        polyethylene glycol (MIRALAX) packet 17 g  17 g Oral DAILY PRN Andre Kraus PA-C        promethazine (PHENERGAN) tablet 12.5 mg  12.5 mg Oral Q6H PRN Andre Kraus PA-C        Or    ondansetron (ZOFRAN) injection 4 mg  4 mg IntraVENous Q6H PRN Andre Kraus PA-C        famotidine (PF) (PEPCID) 20 mg in 0.9% sodium chloride 10 mL injection  20 mg IntraVENous BID Usama Nelson PA-C   20 mg at 12/16/20 2046    heparin (porcine) injection 5,000 Units  5,000 Units SubCUTAneous Q12H Usama Nelson PA-C   5,000 Units at 12/16/20 1549    albuterol-ipratropium (DUO-NEB) 2.5 MG-0.5 MG/3 ML  3 mL Nebulization Q4H PRN Andre Kraus PA-C        Vancomycin Dosed by Pharmacy  1 Each Other Rx Dosing/Monitoring Andre Kraus PA-C            Allergies   Allergen Reactions    Pcn [Penicillins] Swelling       Review of Systems:  Review of Systems   Unable to perform ROS: Mental acuity        Objective:     Vitals:    12/16/20 1600 12/16/20 2000 12/16/20 2039 12/16/20 2048   BP:   128/75    Pulse: 98 (!) 103 (!) 102    Resp:   18    Temp:   98.5 °F (36.9 °C)    SpO2:   99% 99%   Weight:       Height:            Physical Exam:  Physical Exam   Constitutional: He appears cachectic. He has a sickly appearance. He appears ill. He appears distressed. Face mask in place. HENT:   Head: Atraumatic. Neck: Normal carotid pulses, no hepatojugular reflux and no JVD present. Cardiovascular: An irregular rhythm present. Pulmonary/Chest: Effort normal. No apnea and no tachypnea. He has wheezes in the left upper field. Abdominal: He exhibits no distension, no pulsatile liver, no fluid wave, no abdominal bruit, no ascites and no mass. Musculoskeletal:         General: Edema present. Lymphadenopathy:        Head (left side): No submental and no submandibular adenopathy present. Neurological: He is alert. Skin: Skin is dry. Recent Results (from the past 24 hour(s))   CBC WITH AUTOMATED DIFF    Collection Time: 12/16/20  9:07 AM   Result Value Ref Range    WBC 8.5 4.1 - 11.1 K/uL    RBC 3.46 (L) 4.10 - 5.70 M/uL    HGB 10.4 (L) 12.1 - 17.0 g/dL    HCT 32.1 (L) 36.6 - 50.3 %    MCV 92.8 80.0 - 99.0 FL    MCH 30.1 26.0 - 34.0 PG    MCHC 32.4 30.0 - 36.5 g/dL    RDW 17.6 (H) 11.5 - 14.5 %    PLATELET 56 (L) 640 - 400 K/uL    NEUTROPHILS 84 (H) 32 - 75 %    LYMPHOCYTES 5 (L) 12 - 49 %    MONOCYTES 10 5 - 13 %    EOSINOPHILS 1 0 - 7 %    BASOPHILS 0 0 - 1 %    IMMATURE GRANULOCYTES 0 0.0 - 0.5 %    ABS. NEUTROPHILS 7.1 1.8 - 8.0 K/UL    ABS. LYMPHOCYTES 0.4 (L) 0.8 - 3.5 K/UL    ABS. MONOCYTES 0.8 0.0 - 1.0 K/UL    ABS. EOSINOPHILS 0.1 0.0 - 0.4 K/UL    ABS. BASOPHILS 0.0 0.0 - 0.1 K/UL    ABS. IMM.  GRANS. 0.0 0.00 - 0.04 K/UL    DF AUTOMATED     METABOLIC PANEL, BASIC    Collection Time: 12/16/20  9:07 AM   Result Value Ref Range    Sodium 144 136 - 145 mmol/L    Potassium 4.3 3.5 - 5.1 mmol/L    Chloride 106 97 - 108 mmol/L    CO2 31 21 - 32 mmol/L    Anion gap 7 5 - 15 mmol/L    Glucose 132 (H) 65 - 100 mg/dL    BUN 58 (H) 6 - 20 mg/dL    Creatinine 1.24 0.70 - 1.30 mg/dL    BUN/Creatinine ratio 47 (H) 12 - 20      GFR est AA >60 >60 ml/min/1.73m2    GFR est non-AA 55 (L) >60 ml/min/1.73m2    Calcium 9.3 8.5 - 10.1 mg/dL   VANCOMYCIN, RANDOM    Collection Time: 12/16/20  9:07 AM   Result Value Ref Range    Vancomycin, random 18.9 ug/mL    Reported dose date Not provided      Reported dose: Not provided Units        IR THORACENTESIS CATH W IMAGE   Final Result   Impression:    Successful left thoracentesis. XR CHEST PORT   Final Result   Impression:Left lung airspace disease and suspected pleural fluid. Right basilar   pulmonary haziness compatible with airspace disease/atelectasis and trace of   pleural fluid. US SPLEEN   Final Result   IMPRESSION: At least 2 heterogeneously hypoechoic to adjacent splenic tissue   mass lesions within the spleen. Findings are nonspecific and could represent   lymphoma or infection. Lymphoma would typically be hot on a PET/CT. If   clinically warranted, follow-up may be helpful. XR CHEST PORT   Final Result      CT CHEST WO CONT   Final Result   IMPRESSION:   1. Bilateral pleural effusions and lower lung consolidations, the left lower   lung being completely collapsed, this is similar to previous. 2. Interval development patchy airspace disease in the aerated lungs consistent   with pneumonia. 3. Abnormal spleen, small retroperitoneal lymphadenopathy unchanged. 4. Atherosclerosis. XR CHEST SNGL V   Final Result   FINDINGS: Impression: Frontal single view chest.      Interval placement left central catheter distal tip SVC/RA junction region. Unchanged cardiomediastinal silhouette. Slightly increasing bilateral pulmonary   airspace edema and/or pneumonia and/or atelectasis. Increasing consolidation of   the left lower lobe. Interval development of small left pleural effusion. No pneumothorax.       XR CHEST SNGL V   Final Result         Assessment:     Hospital Problems  Date Reviewed: 12/1/2020          Codes Class Noted POA    Severe sepsis ([facility] [address] ICD-10-CM: A41.9, R65.20  ICD-9-CM: 038.9, 995.92  12/1/2020 Yes HAP (hospital-acquired pneumonia) ICD-10-CM: J18.9, Y95  ICD-9-CM: 294  11/30/2020 Yes        Cervical stenosis of spinal canal ICD-10-CM: M48.02  ICD-9-CM: 723.0  11/20/2020 Yes        Acute renal failure (ARF) (HCC) ICD-10-CM: N17.9  ICD-9-CM: 584.9  11/20/2020 Yes        CRI (chronic renal insufficiency) (Chronic) ICD-10-CM: N18.9  ICD-9-CM: 585.9  12/13/2012 Yes        HTN (hypertension) ICD-10-CM: I10  ICD-9-CM: 401.9  1/4/2011 Yes          Severe sepsis    Acute hypoxic respiratory failure   Aspiration pneumonia     dysphagia      Plan:   continue with vancomycin and meropenem   PPI GI prophylaxis  Speech and the nutrition consult to follow the patient  We will schedule patient for EGD PEG tube placement,         Signed By: Penelope De La Cruz MD     December 16, 2020         Thank you for allowing me to participate in this patients care  Cc Referring Physician   Lucas Quiroz MD

## 2020-12-17 NOTE — PROGRESS NOTES
PULMONARY NOTE  [facility]    Name: Lashay Cage MRN: [id number]   : 1932 Hospital: [address] [place] [address]   Date: 2020  Admission date: 2020 Hospital Day: 7       HPI:     Hospital Problems  Date Reviewed: 2020          Codes Class Noted POA    Severe sepsis ([address] ICD-10-CM: A41.9, R65.20  ICD-9-CM: 038.9, 995.92  2020 Yes        HAP (hospital-acquired pneumonia) ICD-10-CM: J18.9, Y95  ICD-9-CM: 997  2020 Yes        Cervical stenosis of spinal canal ICD-10-CM: M48.02  ICD-9-CM: 723.0  2020 Yes        Acute renal failure (ARF) (HCC) ICD-10-CM: N17.9  ICD-9-CM: 584.9  2020 Yes        CRI (chronic renal insufficiency) (Chronic) ICD-10-CM: N18.9  ICD-9-CM: 585.9  2012 Yes        HTN (hypertension) ICD-10-CM: I10  ICD-9-CM: 401.9  2011 Yes                   [x] High complexity decision making was performed  [x] See my orders for details      Subjective/Initial History:     I was asked by Emili Lucas MD to see Lashay Cage  a 80 y.o.  male in consultation     Excerpts from admission 2020 or consult notes as follows:   , 80-year-old male came in because of shortness of breath dyspnea and he was hypoxic he had history of pneumonia recently discharged 4 days ago he has recurrent hospitalization because of infection pneumonia significant past medical history of chronic kidney disease, lymphoma and prostate cancer he was put on 100% nonrebreather mask his saturation was in 80s now he is on blood percent FiO2 and a shock state he was put on vasopressor Levophed so admitted and critical care consult was called.  awake alert asking for water has gurgly upper airway noise. Norepinephrine down to 2 mics with well-maintained blood pressure. 12/15: Patient is seen on follow up. Resting comfortably on 45% high flow. : Patient is seen on follow up. He is now on 3.5L O2 via nasal cannula and O2 sats are 99%.  Resting comfortably. 12/17: Patient continues to improve work of breathing. He is now tolerating 2.5L O2 via nasal cannula and oxygen saturation is 99%. Allergies   Allergen Reactions    Pcn [Penicillins] Swelling        MAR reviewed and pertinent medications noted or modified as needed     Current Facility-Administered Medications   Medication    Random Vancomycin level to be drawn on 12/17/2020 at 0600.  hydrALAZINE (APRESOLINE) tablet 10 mg    isosorbide mononitrate ER (IMDUR) tablet 30 mg    furosemide (LASIX) injection 40 mg    dextrose 5% infusion    meropenem (MERREM) 1 g in sterile water (preservative free) 20 mL IV syringe    sodium chloride (NS) flush 5-40 mL    sodium chloride (NS) flush 5-40 mL    acetaminophen (TYLENOL) tablet 650 mg    Or    acetaminophen (TYLENOL) suppository 650 mg    polyethylene glycol (MIRALAX) packet 17 g    promethazine (PHENERGAN) tablet 12.5 mg    Or    ondansetron (ZOFRAN) injection 4 mg    famotidine (PF) (PEPCID) 20 mg in 0.9% sodium chloride 10 mL injection    albuterol-ipratropium (DUO-NEB) 2.5 MG-0.5 MG/3 ML    Vancomycin Dosed by Pharmacy      Patient PCP: Alma Conde MD  PMH:  has a past medical history of CRI (chronic renal insufficiency) (12/13/2012), Gout (1/4/2011), Hypertension, [address]oma ([facility] [address] and Prostate CA ([facility] [address] (1/4/2011). PSH:   has a past surgical history that includes hx prostatectomy; hc bone marrow biopsy; endoscopy, colon, diagnostic (2003); and ir thoracentesis cath w image (12/15/2020). FHX: family history includes Hypertension in his mother. SHX:  reports that he has never smoked. He has never used smokeless tobacco. He reports previous drug use. He reports that he does not drink alcohol. ROS:    Review of Systems   Constitutional: Positive for malaise/fatigue. HENT: Negative. Eyes: Negative. Respiratory: Positive for cough and shortness of breath. Cardiovascular: Negative. Gastrointestinal: Negative. Genitourinary: Negative. Musculoskeletal: Negative. Skin: Negative. Neurological: Negative. Endo/Heme/Allergies: Negative. Psychiatric/Behavioral: Negative. Objective:     Vital Signs: Telemetry:    normal sinus rhythm Intake/Output:   Visit Vitals  /65   Pulse 83   Temp 98.1 °F (36.7 °C)   Resp 20   Ht 5' 2.99\" (1.6 m)   Wt 60.9 kg (134 lb 4.2 oz)   SpO2 100%   BMI 23.79 kg/m²       Temp (24hrs), Av.3 °F (36.8 °C), Min:98.1 °F (36.7 °C), Max:98.5 °F (36.9 °C)        O2 Device: Nasal cannula O2 Flow Rate (L/min): 3 l/min       Wt Readings from Last 4 Encounters:   20 60.9 kg (134 lb 4.2 oz)   20 64.3 kg (141 lb 12.1 oz)   20 64.9 kg (143 lb)   20 61.7 kg (136 lb)          Intake/Output Summary (Last 24 hours) at 2020 1150  Last data filed at 2020 0411  Gross per 24 hour   Intake 150 ml   Output 1200 ml   Net -1050 ml       Last shift:      No intake/output data recorded. Last 3 shifts: 12/15 1901 -  0700  In: 1425   Out: 1750 [Urine:1750]       Physical Exam:     Physical Exam   Constitutional: He is oriented to person, place, and time. HENT:   Head: Normocephalic and atraumatic. Eyes: Pupils are equal, round, and reactive to light. Conjunctivae and EOM are normal.   Neck: Normal range of motion. Neck supple. Cardiovascular: Normal rate and regular rhythm. Pulmonary/Chest: Effort normal. He has rales. Expiratory rhonchi has upper airway noise over the neck   Abdominal: Soft. Bowel sounds are normal.   Thin   Musculoskeletal: Normal range of motion. Neurological: He is alert and oriented to person, place, and time. Skin: Skin is warm and dry. Psychiatric: He has a normal mood and affect.         Labs:    Recent Labs     20  0636 20  0907 12/15/20  2033   WBC 8.6 8.5 10.7   HGB 9.5* 10.4* 11.0*   PLT 44* 56* 67*     Recent Labs     20  0636 20  0907 12/15/20  2033 12/15/20  1103    144 147* 148* K 4.5 4.3 4.5 4.4    106 109* 110*   CO2 34* 31 32 30   GLU 96 132* 154* 70   BUN 59* 58* 62* 63*   CREA 1.32* 1.24 1.34* 1.48*   CA 8.7 9.3 9.5 9.7   ALB  --   --   --  1.9*   ALT  --   --   --  100*     No results for input(s): PH, PCO2, PO2, HCO3, FIO2 in the last 72 hours. No results for input(s): CPK, CKNDX, TROIQ in the last 72 hours. No lab exists for component: CPKMB    Lab Results   Component Value Date/Time    Culture result: No growth 1 day 12/15/2020 08:33 PM    Culture result: No growth after 21 hours 12/14/2020 02:45 PM    Culture result:  12/11/2020 11:21 AM     Gram Positive Cocci CALLED TO AND READ BACK BY JOSSELIN GERMAN 12/12/20 1337 TLW    Culture result:  12/11/2020 11:21 AM     Staphylococcus species, coagulase negative , ISOLATED FROM AEROBIC BOTTLE RECEIVED    Culture result:  12/11/2020 11:21 AM     * methicillin resistant staphylococcus aureus * ** ISOLATED FROM ANAEROBIC BOTTLE    Culture result: CALLED MRSA TO [facility] 1100 12/15/20 12/11/2020 11:21 AM     Lab Results   Component Value Date/Time    TSH 1.79 11/20/2020 01:31 PM       Imaging:    CXR Results  (Last 48 hours)    None        Results from Hospital Encounter encounter on 12/11/20   XR CHEST PORT    Narrative Portable chest:    History:Respiratory failure    COMPARISON: Portable chest 12/13/2020    Lungs show hypoaeration. There is ill-defined hazy density at the right lung  base. Hazy parenchymal infiltration is seen in the left lung, left hemidiaphragm  is obscured. Heart is normal size. Calcified plaque involving the aortic arch. Left jugular venous catheter with the tip at the junction of SVC and right  atrium. Nasogastric tube with the tip in the stomach. Impression Impression:Left lung airspace disease and suspected pleural fluid. Right basilar  pulmonary haziness compatible with airspace disease/atelectasis and trace of  pleural fluid. XR CHEST PORT    Narrative Chest single view.     Comparison single view chest December 11, 2020. Improved aeration through lung bases. Stable left neck central venous catheter. Cardiac and mediastinal structures unchanged. Thoracic aorta atherosclerosis. No  pneumothorax or sizable pleural effusion. XR CHEST SNGL V    Narrative Central line placement. Comparison chest x-ray 12/11/2020 at 1154 hours. Impression FINDINGS: Impression: Frontal single view chest.    Interval placement left central catheter distal tip SVC/RA junction region. Unchanged cardiomediastinal silhouette. Slightly increasing bilateral pulmonary  airspace edema and/or pneumonia and/or atelectasis. Increasing consolidation of  the left lower lobe. Interval development of small left pleural effusion. No pneumothorax. Results from [place] encounter on 12/11/20   CT CHEST WO CONT    Narrative Comparison chest x-ray 12/11/2020 [facility] and chest CTA 11/25/2020 [facility]. TECHNIQUE: Axial imaging of the chest without IV contrast, with multiplanar  reformatting, MIPs. Dose reduction: All CT scans at this facility are performed using dose reduction  optimization techniques as appropriate to a performed exam including the  following: Automated exposure control, adjustments of the mA and/or kV according  to patient's size, or use of iterative reconstruction technique. FINDINGS: Left central catheter distal tip SVC. Mild cardiomegaly. Multivessel  coronary artery calcification. No pericardial effusion. Low-density intracardiac  blood suggests anemia. Calcified thoracic aorta without aneurysm. Pulmonary  arteries are not dilated. No mediastinal or hilar lymphadenopathy. Tubular  structure posterior to the esophagus possibly dilated vessel. Bilateral pleural  effusions and lower lung consolidations without air bronchograms. The left lower  lung is completely consolidated/collapsed. The aerated lungs demonstrate patchy  areas of airspace disease. No axillary adenopathy.  Included thyroid is  unremarkable. Included upper abdomen demonstrates lobulated, heterogeneous  structure spleen and small retroperitoneal lymphadenopathy; unchanged. Degenerative changes of the bony structures. Soft tissue anasarca. Impression IMPRESSION:  1. Bilateral pleural effusions and lower lung consolidations, the left lower  lung being completely collapsed, this is similar to previous. 2. Interval development patchy airspace disease in the aerated lungs consistent  with pneumonia. 3. Abnormal spleen, small retroperitoneal lymphadenopathy unchanged. 4. Atherosclerosis. IMPRESSION:   1. Acute respiratory failure with Hypoxia continues to be on nasal cannula 2.5L O2, continue to wean. 2. Severe sepsis off vasopressors  3. Community-acquired versus hospital-acquired pneumonia has gram-positive cocci in 1 of 4 blood bottles MRSA in nares  4. Hypokalemia, resolved  5. Pharyngeal dysphagia speech has evaluated and he has totally ineffective swallow discussed with him placing NG tube for tube feeding  6. Hypernatremia, resolved. 7. History of lymphoma and prostate cancer  8. Acute on chronic kidney disease creatinine improved (baseline is 1.6)  9. CHF with diastolic dysfunction chest x-ray improving  10. Lactic acidosis, resolved. 11. Body mass index is 23.79 kg/m². 12. Elevated troponin, trending down. RECOMMENDATIONS/PLAN:     1. Patient is on nasal cannula 2.5L O2 with well-maintained oxygen saturation  2. Blood culture 1 of 4 with gram-positive cocci may be skin contaminant but remains on vancomycin & merrem, WBC trending down: 8.6  3. Nasal MRSA smear positive; on Bactroban.   4. Hypernatremia resolved: 140; Hypokalemia resolved: 4.5         Linnea Rebolledo MD

## 2020-12-17 NOTE — PROGRESS NOTES
CARDIOLOGY PROGRESS NOTE - NP    Patient seen and examined. Resting comfortably in no acute distress. Offers no new cardiac complaints this morning. Telemetry reviewed, there were no events noted in the past 24 hours. SR 90s with PVCs. Pertinent review of systems items noted above, all other systems are negative. Current medications reviewed. Physical Examination  Vital signs are stable. Blood pressure 119/64 , Pulse 60  No apparent distress. Heart is regular, rate and rhythm. Normal S1, S2, no murmurs are appreciated. Lungs are diminished bilaterally. Abdomen is soft, nontender, normal bowel sounds. Extremities have no edema. Labs reviewed: 12/14/20: K 2.8, BNP 14,355, Cr 1.76    2-D echo 12/14    · LV: Calculated LVEF is 64%. Normal cavity size, wall thickness and systolic function (ejection fraction normal). Mild (grade 1) left ventricular diastolic dysfunction. · MV: Mild mitral annular calcification. Mild mitral valve regurgitation is present. · PV: Mild pulmonic valve regurgitation is present. · TV: Moderate tricuspid valve regurgitation is present. · AV: Mild aortic valve regurgitation is present. · LA: Moderately dilated left atrium. Case discussed with Dr. Alissa Joe and our impression and recommendations are as follows:    1. Pneumonia: Pulmonary on the case. Patient on 3.5 L NC and tolerating well. Continue with current treatments. 2. Heart failure: BNP elevated but has decreased since last hospitalization. Recent echocardiogram showed a preserved EF 55-60%. Maintain optimal ventilation, continue O2 per protocol and wean as tolerated. Place on cardiac monitor. Keep potassium between 4-5 and magnesium level > 2. Strict I&Os, and daily weights. Continue to monitor. 3. Hypertension: Blood pressure stable. Will continue to monitor. Will sign off of patient at this time. Please do not hesitate to call me or Dr. Alissa Joe if additional questions arise. Joel Toure

## 2020-12-17 NOTE — PROGRESS NOTES
Hospitalist Progress Note             Daily Progress Note: 12/17/2020      Subjective: The patient is seen for follow  up. Patient is an 66-year-old man with history of recurrent pneumonia who was admitted on 12/11/2020 due to shortness of breath with hypoxia. He was also found to be in septic shock requiring norepinephrine. Respiratory failure found to be secondary to pneumonia. He was on BiPAP for respiratory failure and later transitioned to high flow on 12/14/2020. Patient has been on meropenem and vancomycin. Blood cultures collected on 12/11/2020 grew gram-positive cocci in clusters likely contamination. Due to poor  nutrition he is on NG tube feeding. He is scheduled for PEG tube placement this afternoon  No acute events overnight    Problem List:  Patient Active Problem List   Diagnosis Code    Lymphoma ([facility] [address] C85.90    HTN (hypertension) I10    Prostate CA (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    Non-Hodgkin's lymphoma in relapse ([facility]) C85.90    Hypercalcemia E83.52    HAP (hospital-acquired pneumonia) J18.9, Y95    SIRS (systemic inflammatory response syndrome) ([facility]) R65.10    CHF (congestive heart failure) ([facility]) I50.9    Acute hypoxemic respiratory failure ([facility]) J96.01    Severe sepsis ([facility]) A41.9, R65.20    Person under investigation for COVID-19 Z20.828    BPPV (benign paroxysmal positional vertigo) H81.10         Medications reviewed  Current Facility-Administered Medications   Medication Dose Route Frequency    Random Vancomycin level to be drawn on 12/17/2020 at 0600.    Other ONCE    isosorbide mononitrate ER (IMDUR) tablet 30 mg  30 mg Oral DAILY    furosemide (LASIX) injection 40 mg  40 mg IntraVENous DAILY    dextrose 5% infusion  50 mL/hr IntraVENous CONTINUOUS    meropenem (MERREM) 1 g in sterile water (preservative free) 20 mL IV syringe  1 g IntraVENous Q8H    sodium chloride (NS) flush 5-40 mL  5-40 mL IntraVENous Q8H    sodium chloride (NS) flush 5-40 mL  5-40 mL IntraVENous PRN    acetaminophen (TYLENOL) tablet 650 mg  650 mg Oral Q6H PRN    Or    acetaminophen (TYLENOL) suppository 650 mg  650 mg Rectal Q6H PRN    polyethylene glycol (MIRALAX) packet 17 g  17 g Oral DAILY PRN    promethazine (PHENERGAN) tablet 12.5 mg  12.5 mg Oral Q6H PRN    Or    ondansetron (ZOFRAN) injection 4 mg  4 mg IntraVENous Q6H PRN    famotidine (PF) (PEPCID) 20 mg in 0.9% sodium chloride 10 mL injection  20 mg IntraVENous BID    albuterol-ipratropium (DUO-NEB) 2.5 MG-0.5 MG/3 ML  3 mL Nebulization Q4H PRN    Vancomycin Dosed by Pharmacy  1 Each Other Rx Dosing/Monitoring     Facility-Administered Medications Ordered in Other Encounters   Medication Dose Route Frequency    0.9% sodium chloride infusion   IntraVENous CONTINUOUS    lidocaine (PF) (XYLOCAINE) 20 mg/mL (2 %) injection   IntraVENous PRN    propofoL (DIPRIVAN) 10 mg/mL injection   IntraVENous PRN       Review of Systems:   Review of Systems   Constitutional: Negative for chills, diaphoresis, fever, malaise/fatigue and weight loss. HENT: Negative for ear discharge, ear pain, hearing loss, nosebleeds, sinus pain and tinnitus. Respiratory: Negative for cough, hemoptysis, sputum production, shortness of breath and wheezing. Cardiovascular: Negative for chest pain, palpitations, orthopnea, claudication and leg swelling. Gastrointestinal: Negative for abdominal pain, constipation, diarrhea, heartburn, nausea and vomiting. Genitourinary: Negative for dysuria, flank pain, frequency, hematuria and urgency.    Musculoskeletal: Negative for back pain, falls, joint pain, myalgias and neck pain. Neurological: Positive for weakness. Negative for dizziness, tingling, focal weakness, seizures and headaches. Psychiatric/Behavioral: Negative for depression, hallucinations, memory loss, substance abuse and suicidal ideas. The patient is not nervous/anxious. Objective:   Physical Exam  Constitutional:       General: He is not in acute distress. Appearance: He is ill-appearing. HENT:      Head: Normocephalic and atraumatic. Mouth/Throat:      Mouth: Mucous membranes are moist.      Pharynx: Oropharynx is clear. Eyes:      Pupils: Pupils are equal, round, and reactive to light. Neck:      Musculoskeletal: No neck rigidity. Cardiovascular:      Rate and Rhythm: Normal rate and regular rhythm. Heart sounds: No murmur. No friction rub. No gallop. Pulmonary:      Effort: Pulmonary effort is normal. No respiratory distress. Breath sounds: Normal breath sounds. No wheezing or rales. Abdominal:      General: Bowel sounds are normal. There is no distension. Palpations: Abdomen is soft. Tenderness: There is no abdominal tenderness. There is no rebound. Musculoskeletal: Normal range of motion. General: No swelling, tenderness, deformity or signs of injury. Skin:     General: Skin is warm and dry. Coloration: Skin is not jaundiced. Findings: No bruising, erythema or rash. Neurological:      General: No focal deficit present. Mental Status: He is alert and oriented to person, place, and time. Sensory: No sensory deficit. Motor: Weakness present. Gait: Gait normal.   Psychiatric:         Mood and Affect: Mood normal.         Behavior: Behavior normal.         Thought Content:  Thought content normal.          Visit Vitals  /65 (BP 1 Location: Right arm)   Pulse 83   Temp 98.1 °F (36.7 °C)   Resp 20   Ht 5' 2.99\" (1.6 m)   Wt 60.9 kg (134 lb 4.2 oz)   SpO2 100%   BMI 23.79 kg/m² Data Review:       Recent Days:  Recent Labs     12/17/20  0636 12/16/20  0907 12/15/20  2033   WBC 8.6 8.5 10.7   HGB 9.5* 10.4* 11.0*   HCT 28.9* 32.1* 33.7*   PLT 44* 56* 67*     Recent Labs     12/17/20  0636 12/16/20  0907 12/15/20  2033 12/15/20  1103    144 147* 148*   K 4.5 4.3 4.5 4.4    106 109* 110*   CO2 34* 31 32 30   GLU 96 132* 154* 70   BUN 59* 58* 62* 63*   CREA 1.32* 1.24 1.34* 1.48*   CA 8.7 9.3 9.5 9.7   ALB  --   --   --  1.9*   TBILI  --   --   --  0.8   ALT  --   --   --  100*       Assessment/Plan     1. Severe sepsis   Due to pneumonia  Continue with vancomycin and meropenem   Repeat blood culture collected on 12/15/2020 showing no growth so far     2. Acute hypoxic respiratory failure  Secondary to pneumonia. He is now on oxygen at 3.5 liters  via NC  Continue weaning off oxygen   Continue antibiotics    3. Acute on chronic kidney disease stage III  Complicated by sepsis  Creatinine is trending down appropriately (1.32)  Baseline around 1.6-1.7  Continue gentle IV rehydration    4. Aspiration pneumonia  Secondary to dysphagia  Continue with meropenem and vancomycin  Repeat blood cultures collected on 12/15/2020 pending  GI has been consulted for J-tube placement  To limit recurrent aspiration today    5. Hypernatremia  Now resolved. Continue with D5 W gently    6. Dysphagia  Likely contributing to recurrent pneumonia  Currently being fed via NG tube  He is not able to tolerate p.o. due to aspiration  GI has been consulted for J-tube  Placement. CBC, BMP in a.m. Heparin for DVT prophylaxis    Comments: Continue current antibiotics for aspiration pneumonia. Repeat blood cultures collected on 12/15/2020 pending. Due to patient not being able to tolerate p.o. with recurrent aspiration patient's POA has agreed for J-tube placement which replaced this afternoon.     I will called patient's [facility], [phone] and discussed treatment plan with her and she has decided to proceed with J-tube tube placement.       Creasie Lefort, NP

## 2020-12-17 NOTE — PROGRESS NOTES
Comprehensive Nutrition Assessment    Type and Reason for Visit: Reassess(Interim)    Nutrition Recommendations/Plan:   Once PEG placed and ready to use, reinitiate TF per previous order;  Jevity 1.5 at goal of 50ml/hr              Flush with 150ml water q4h  At goal, feeds provide 1800kcals, 77g pro, and 1812ml fluid (Meets 100% EENs, 120% Protein needs, and 100% fluid needs)              *D5 provides 204kcals additional    RD to adjust TF to bolus feeds as appropriate at d/c     Document rates, wts, and BMs    Nutrition Assessment:  Admitted to ICU for sepsis causing respiratory failure and hypotension, required NRB and x1 pressor. Daughter made DNR, conservative treatments only, OK with BiPAP and TF. (12/13) SLP eval rec'd alternate source nutrition, NG placed and pt transferred to medical unit. Currently weaned to HFNC. D5 (50ml/hr) providing 204kcals/d. Intensiveist ordered TF of Promote at 50ml, FWF 100ml q4, and Beneprotein with no amounts listed (provides 1200kcals, 75g pro, and 1607ml water-meets ~67 %EENs and 117% pro, and 89% fluid needs, not optimal). RD adjusted TF in AM to better meet pt goals, discussed with RN to adjust appropriately. Unable to interview pt x2, noted today pt not in room d/t having PEG placed today. Per EMR pt TF at goal with GRV <10ml and no issues noted. TF recs remain same, will adjust to bolus feeds as needed for d/c. Labs: H/H 9.5/28.9, BUN 59, Cr 1.32, , , Alk phos 197. Meds: D5 at 50ml/hr, Pepcid, meropenem, antiemetics, PRN Miralax, vanc. Malnutrition Assessment:  Malnutrition Status:  Mild malnutrition    Context:  Acute illness       Estimated Daily Nutrient Needs:  Energy (kcal): 1800kcals (28kcals/kg); Weight Used for Energy Requirements: Admission  Protein (g): 64g (1.0g/kg);  Weight Used for Protein Requirements: Admission  Fluid (ml/day): 1800ml; Method Used for Fluid Requirements: 1 ml/kcal   Needs for maintenance considering CKD-3  using 12/5 wt of 64.3kg    Nutrition Related Findings:  Unable to complete NFPE at initial, unable to do today as pt obtaining PEG. No edema per EMR. No  N/V/D/C, last BM 12/16, loose. SLP following, rec'd alternate source of nutrition. NG placed 12/13, having PEG placed today.       Wounds:    None       Current Nutrition Therapies:  DIET TUBE FEEDING Jevity 1.5 Jevity 1.5  DIET NPO  Current Tube Feeding (TF) Orders:   · Feeding Route: Nasogastric  · Formula: Jevity 1.5  · Schedule:Continuous    · Regimen: 50ml/hr  · Additives/Modulars:    · Water Flushes: 150ml q4h  · Current TF & Flush Orders Provides: 1800kcals, 77g pro, and 1812ml fluid (Meets 100% EENs, 120% Protein needs, and 100% fluid needs)  · Goal TF & Flush Orders Provides: TF at goal      Anthropometric Measures:  · Height:  5' 2.99\" (160 cm)  · Current Body Wt:  60.9 kg (134 lb 4.2 oz)(12/17)   · Admission Body Wt:  141 lb 12.1 oz(12/5)     · Ideal Body Wt:  124 lbs:  108.3 %   · BMI Category:  Normal weight (BMI 22.0-24.9) age over 72     Wt Readings from Last 10 Encounters:   12/17/20 60.9 kg (134 lb 4.2 oz)   12/05/20 64.3 kg (141 lb 12.1 oz)   11/27/20 64.9 kg (143 lb)   11/11/20 61.7 kg (136 lb)   08/13/20 62.6 kg (138 lb)   02/19/19 67.6 kg (149 lb)   01/28/19 68.5 kg (151 lb)   12/31/18 69.4 kg (153 lb)   12/20/17 67.7 kg (149 lb 3.2 oz)   12/19/16 68.2 kg (150 lb 5 oz)   Per last 2 wts appears pt with wt loss of 3.4kg in ~2 weeks (5.5%, severe)     Nutrition Diagnosis:   · Inadequate oral intake related to swallowing difficulty, impaired respiratory function as evidenced by swallowing study results, nutrition support-enteral nutrition    Nutrition Interventions:   Food and/or Nutrient Delivery: Continue NPO, Continue tube feeding  Nutrition Education and Counseling: No recommendations at this time  Coordination of Nutrition Care: Continue to monitor while inpatient, Speech therapy    Goals:  Intakes >/=75% of EENs in >5 days (progressing)  Wt maintenance within +/-0.5kg in 7 days (progressing)  Lytes wnl    (progressing)       Nutrition Monitoring and Evaluation:   Behavioral-Environmental Outcomes: None identified  Food/Nutrient Intake Outcomes: Enteral nutrition intake/tolerance, Diet advancement/tolerance  Physical Signs/Symptoms Outcomes: Weight, Chewing or swallowing, Diarrhea    Discharge Planning:    Enteral nutrition     Electronically signed by Elizabeth Alvarez RD on 12/17/2020 at 3:25 PM    Contact: Ext [phone]

## 2020-12-18 ENCOUNTER — APPOINTMENT (OUTPATIENT)
Dept: GENERAL RADIOLOGY | Age: 85
DRG: 871 | End: 2020-12-18
Attending: NURSE PRACTITIONER
Payer: MEDICARE

## 2020-12-18 LAB
ANION GAP SERPL CALC-SCNC: 5 MMOL/L (ref 5–15)
BASOPHILS # BLD: 0 K/UL (ref 0–0.1)
BASOPHILS NFR BLD: 0 % (ref 0–1)
BUN SERPL-MCNC: 56 MG/DL (ref 6–20)
BUN/CREAT SERPL: 37 (ref 12–20)
CA-I BLD-MCNC: 8.5 MG/DL (ref 8.5–10.1)
CHLORIDE SERPL-SCNC: 101 MMOL/L (ref 97–108)
CO2 SERPL-SCNC: 32 MMOL/L (ref 21–32)
CREAT SERPL-MCNC: 1.52 MG/DL (ref 0.7–1.3)
DATE LAST DOSE: ABNORMAL
DIFFERENTIAL METHOD BLD: ABNORMAL
EOSINOPHIL # BLD: 0.2 K/UL (ref 0–0.4)
EOSINOPHIL NFR BLD: 2 % (ref 0–7)
ERYTHROCYTE [DISTWIDTH] IN BLOOD BY AUTOMATED COUNT: 17 % (ref 11.5–14.5)
GLUCOSE SERPL-MCNC: 109 MG/DL (ref 65–100)
HCT VFR BLD AUTO: 29 % (ref 36.6–50.3)
HGB BLD-MCNC: 9.4 G/DL (ref 12.1–17)
IMM GRANULOCYTES # BLD AUTO: 0.1 K/UL (ref 0–0.04)
IMM GRANULOCYTES NFR BLD AUTO: 1 % (ref 0–0.5)
LYMPHOCYTES # BLD: 0.6 K/UL (ref 0.8–3.5)
LYMPHOCYTES NFR BLD: 7 % (ref 12–49)
MCH RBC QN AUTO: 29.9 PG (ref 26–34)
MCHC RBC AUTO-ENTMCNC: 32.4 G/DL (ref 30–36.5)
MCV RBC AUTO: 92.4 FL (ref 80–99)
MONOCYTES # BLD: 0.6 K/UL (ref 0–1)
MONOCYTES NFR BLD: 6 % (ref 5–13)
NEUTS SEG # BLD: 8 K/UL (ref 1.8–8)
NEUTS SEG NFR BLD: 84 % (ref 32–75)
PLATELET # BLD AUTO: 56 K/UL (ref 150–400)
POTASSIUM SERPL-SCNC: 4.5 MMOL/L (ref 3.5–5.1)
RBC # BLD AUTO: 3.14 M/UL (ref 4.1–5.7)
REPORTED DOSE,DOSE: ABNORMAL UNITS
SODIUM SERPL-SCNC: 138 MMOL/L (ref 136–145)
VANCOMYCIN TROUGH SERPL-MCNC: 17 UG/ML (ref 5–10)
WBC # BLD AUTO: 9.4 K/UL (ref 4.1–11.1)

## 2020-12-18 PROCEDURE — 80048 BASIC METABOLIC PNL TOTAL CA: CPT

## 2020-12-18 PROCEDURE — 74011250636 HC RX REV CODE- 250/636: Performed by: PHYSICIAN ASSISTANT

## 2020-12-18 PROCEDURE — 74011250636 HC RX REV CODE- 250/636: Performed by: EMERGENCY MEDICINE

## 2020-12-18 PROCEDURE — 36415 COLL VENOUS BLD VENIPUNCTURE: CPT

## 2020-12-18 PROCEDURE — 74011250637 HC RX REV CODE- 250/637: Performed by: INTERNAL MEDICINE

## 2020-12-18 PROCEDURE — 92611 MOTION FLUOROSCOPY/SWALLOW: CPT

## 2020-12-18 PROCEDURE — 74011250637 HC RX REV CODE- 250/637: Performed by: NURSE PRACTITIONER

## 2020-12-18 PROCEDURE — 74011000255 HC RX REV CODE- 255: Performed by: INTERNAL MEDICINE

## 2020-12-18 PROCEDURE — 94760 N-INVAS EAR/PLS OXIMETRY 1: CPT

## 2020-12-18 PROCEDURE — 77010033678 HC OXYGEN DAILY

## 2020-12-18 PROCEDURE — 65270000029 HC RM PRIVATE

## 2020-12-18 PROCEDURE — 74011000258 HC RX REV CODE- 258: Performed by: PHYSICIAN ASSISTANT

## 2020-12-18 PROCEDURE — 85025 COMPLETE CBC W/AUTO DIFF WBC: CPT

## 2020-12-18 PROCEDURE — 74011000250 HC RX REV CODE- 250: Performed by: PHYSICIAN ASSISTANT

## 2020-12-18 PROCEDURE — 74011000250 HC RX REV CODE- 250: Performed by: EMERGENCY MEDICINE

## 2020-12-18 PROCEDURE — 74230 X-RAY XM SWLNG FUNCJ C+: CPT

## 2020-12-18 PROCEDURE — 74011250636 HC RX REV CODE- 250/636: Performed by: NURSE PRACTITIONER

## 2020-12-18 RX ORDER — LORAZEPAM 2 MG/ML
1 INJECTION INTRAMUSCULAR ONCE
Status: COMPLETED | OUTPATIENT
Start: 2020-12-18 | End: 2020-12-18

## 2020-12-18 RX ORDER — SERTRALINE HYDROCHLORIDE 50 MG/1
50 TABLET, FILM COATED ORAL EVERY EVENING
Status: DISCONTINUED | OUTPATIENT
Start: 2020-12-18 | End: 2020-12-21 | Stop reason: HOSPADM

## 2020-12-18 RX ORDER — HYDROXYZINE PAMOATE 25 MG/1
25 CAPSULE ORAL
Status: DISCONTINUED | OUTPATIENT
Start: 2020-12-18 | End: 2020-12-21 | Stop reason: HOSPADM

## 2020-12-18 RX ADMIN — MEROPENEM 1 G: 1 INJECTION, POWDER, FOR SOLUTION INTRAVENOUS at 16:00

## 2020-12-18 RX ADMIN — LORAZEPAM 1 MG: 2 INJECTION INTRAMUSCULAR; INTRAVENOUS at 14:57

## 2020-12-18 RX ADMIN — ISOSORBIDE MONONITRATE 30 MG: 30 TABLET, EXTENDED RELEASE ORAL at 08:45

## 2020-12-18 RX ADMIN — FAMOTIDINE 20 MG: 10 INJECTION INTRAVENOUS at 08:44

## 2020-12-18 RX ADMIN — Medication 10 ML: at 14:57

## 2020-12-18 RX ADMIN — BARIUM SULFATE 5 ML: 400 SUSPENSION ORAL at 11:00

## 2020-12-18 RX ADMIN — SERTRALINE HYDROCHLORIDE 50 MG: 50 TABLET ORAL at 21:52

## 2020-12-18 RX ADMIN — MEROPENEM 1 G: 1 INJECTION, POWDER, FOR SOLUTION INTRAVENOUS at 23:15

## 2020-12-18 RX ADMIN — BARIUM SULFATE 5 ML: 0.81 POWDER, FOR SUSPENSION ORAL at 11:00

## 2020-12-18 RX ADMIN — VANCOMYCIN HYDROCHLORIDE 500 MG: 500 INJECTION, POWDER, LYOPHILIZED, FOR SOLUTION INTRAVENOUS at 04:24

## 2020-12-18 RX ADMIN — MEROPENEM 1 G: 1 INJECTION, POWDER, FOR SOLUTION INTRAVENOUS at 08:44

## 2020-12-18 RX ADMIN — Medication 10 ML: at 22:48

## 2020-12-18 RX ADMIN — FAMOTIDINE 20 MG: 10 INJECTION INTRAVENOUS at 21:52

## 2020-12-18 NOTE — PROGRESS NOTES
Bedside shift change report given to Yefri (oncoming nurse) by Yeison rankin (offgoing nurse). Report included the following information SBAR, Intake/Output, MAR and Recent Results.

## 2020-12-18 NOTE — PROGRESS NOTES
I called [address] [place] [address], [phone]  this evening and updated her about patient's status and treatment plan.

## 2020-12-18 NOTE — PROGRESS NOTES
[address] [facility] STUDY  Patient: Jonny Caal [de-identified]80 y.o. male)  Date: 12/18/2020  Primary Diagnosis: Severe sepsis ([facility] [address] [A41.9, R65.20]  Procedure(s) (LRB):  PERCUTANEOUS ENDOSCOPIC GASTROSTOMY TUBE INSERTION (N/A)  ESOPHAGOGASTRODUODENOSCOPY (EGD) (N/A) 1 Day Post-Op   Precautions: Contact, aspiration and fall     ASSESSMENT :  Limited trials and volume of trials s/t severity of dysphagia and patient reporting SOB w/ trials. Based on the objective data described below, the patient presents with severe pharyngeal dysphagia. Oral phase c/b reduced A-P transit. All consistencies initiate at the level of the vallecula. Overall, mild swallow delay appreciated w/ effortful initiation. Moderate reduction in BOT retraction, laryngeal elevation, and pharyngeal constriction. Minimal hyoid protraction. Epiglottis w/ curled morphology that does not invert. Reduced pressure drive and reduced relaxation and duration of relaxation of UES s/t global weakness. Laryngeal penetration of pharyngeal residue difficult to determine consistency s/t significant residue w/ all trials. Chin tuck does not improve swallow fxn. Significant pharyngeal residue w/ limited piecemeal clearance w/ cued double swallows. Asymmetric distended pyriforms head turn attempted this does not reduce residual.   Retrograde movement of bolus from proximal esophagus to level of the pyriforms. Concerns for esophageal dysfunction. GI following. Patient is at significant risk for aspiration, pna, respiratory failure and worst case death w/ PO intake. Patient will benefit from skilled intervention to address the above impairments. Patients rehabilitation potential is considered to be Guarded     PLAN :  Recommendations and Planned Interventions:  Rec NPO w/ TF via PEG as medically appropriate. Discussed w/ NP, continue ice chips (few ice chips per hour w/ oral care) for QOL.    Frequency/Duration: Patient will be followed by speech-language pathology 5 times a week to address goals. Discharge Recommendations: Skilled Nursing Facility     SUBJECTIVE:   Patient reports he is going to die and does not want therapy. Encouraged patient to discuss w/ MD and participate in therapy if his goal is to resume PO intake. OBJECTIVE:     Past Medical History:   Diagnosis Date    CRI (chronic renal insufficiency) 12/13/2012    Gout 1/4/2011    Hypertension     Lymphoma ([facility] [address]     Prostate CA ([facility] [address] 1/4/2011     Past Surgical History:   Procedure Laterality Date    ENDOSCOPY, COLON, DIAGNOSTIC  2003    neg    HC BONE MARROW BIOPSY      HX PROSTATECTOMY      IR THORACENTESIS CATH W IMAGE  12/15/2020        CXR Results  (Last 48 hours)      None            Current Diet:  DIET TUBE FEEDING  DIET NPO     Radiologist: CHELSIE Avendano  Film Views: Lateral  Patient Position: upright in chair    Video Flouroscopic Procedures  [x] Lateral View   [] A-P View [] Scanned to level of Sternum    [] Seated at 90 deg.   [] Other:    Presentation:   [x] Spoon   [] Cup   [] Straw   [] Syringe   [] Consecutive Swallows  [] Other:    Consistencies:   [x] Ba+ liquid   [x] Ba+ liquid (nectar)   [x] Ba+ liquid (honey)     [] Ba+ pudding   [] Ba+ crunched cookie   [] Ba+ cookie   [] Other:     Testing Discontinued: [x] Due to: severity of dysphagia and increased SOB/anxiety    Treatment Techniques Attempted     [x] Head Turn: [x] Right [x] Left     [] Head Tilt: [] Right [] Left     [x] Chin Down:  [] Thermal Sensitization:  [] Supraglottic Swallow:  [] Mendelson's Maneuver:  [] Other:    Results  Dysphagia Present:    [x] Yes  [] No    Ratings of Dysphagia:    [] Mild  [] Moderate [x] Severe    Stages of Breakdown:   [] Oral [x] Pharyngeal   [] Esophageal    Aspiration: [] Yes    [x] No  [x] At Risk  [] Trace (<10%) [] Significant (>10%):     %  [x] Penetration:  Level of:   Cough: [x] Yes      [] No    Motility Problems with:  [] Lip Closure:   [] Sucking:   [] Mastication:   [] Bolus Formation:   [] Bolus Control:  [] A-P Transport:  [] Posterior Tongue Elevation:  [x] Swallow Response (delayed):  [x] Velopharyngeal Closure:  [x] Laryngeal Elevation:  [x] Laryngeal Adduction:  [x] Cricopharyngeal Relaxation:  [] Esophageal Peristalsis:  [] Reflux:  [] Other:      Transit Time Delay:  [] >1 Second  Oral  [x] >1 Second Pharyngeal  [] >20 Second Esophageal     Residuals:  [] Buccal Cavity   [x] Velum/posterior pharyngeal wall  [x] Valleculae  [x] Pyriforms    Decreased Tongue Base Retraction?: Yes  Laryngeal Elevation: Minimal movement of larynx/epiglottis  Aspiration/Penetration Score: 5 (Penetration/Visible residue-Contrast contacts the folds, but is not ejected)   Pharyngeal Symmetry: Bilateral residue  Pharyngeal-Esophageal Segment: Decreased relaxation of upper esophageal segment; Suspected esophageal dysphagia  Pharyngeal Dysfunction: Crico-pharyngeal dysfunction;Decreased tongue base retraction;Decreased strength;Decreased elevation/closure;Decreased pharyngeal wall constriction    Oral Phase Severity: Minimal  Pharyngeal Phase Severity: Severe      COMMUNICATION/EDUCATION:   Patient agitated and poorly receptive of education regarding MBS results, s/s aspiration and aspiration precautions, He  demonstrated Guarded understanding as evidenced by agitation. The patients plan of care including findings from MBS, recommendations, planned interventions, and recommended diet changes were discussed with:  NP .     Patient understands intent and goals of therapy, but is neutral about his/her participation. Problem: Dysphagia (Adult)  Goal: *Acute Goals and Plan of Care (Insert Text)  Description: Speech Therapy Swallow Goals  Initiated 12/13/2020  -Patient will tolerate PO trials without clinical indicators of aspiration given maximal cues within 1-2 day(s).          [x] Not met  [ ]  MET   [ ] Progressing  [ ] Bryanna Campoverde  -Patient will participate in modified barium swallow study when medically appropriate, currently on HFNC         [] Not met  [ x]  MET   [ ] Progressing  [ ] Discontinue  -Patient will perform therapeutic swallowing exercises with maximal cues within 7 day(s). [x] Not met  [ ]  MET   [ ] Progressing  [ ] Discontinue  -Patient will demonstrate understanding of swallow safety precautions and aspiration precautions, diet recs with maximal cues within 7 day(s).         [x] Not met  [ ]  MET   [ ] Progressing  [ ] Discontinue     Outcome: Not Progressing Towards Goal   Thank you for this referral.  Jerona Bamberger, M.S., M.Ed., CCC-SLP  Time Calculation: 25 mins

## 2020-12-18 NOTE — PROGRESS NOTES
PULMONARY NOTE  [facility]    Name: Neil Simmons MRN: [id number]   : 1932 Hospital: [address] [place] [address]   Date: 2020  Admission date: 2020 Hospital Day: 8       HPI:     Hospital Problems  Date Reviewed: 2020          Codes Class Noted POA    Severe sepsis ([address] ICD-10-CM: A41.9, R65.20  ICD-9-CM: 038.9, 995.92  2020 Yes        HAP (hospital-acquired pneumonia) ICD-10-CM: J18.9, Y95  ICD-9-CM: 368  2020 Yes        Cervical stenosis of spinal canal ICD-10-CM: M48.02  ICD-9-CM: 723.0  2020 Yes        Acute renal failure (ARF) (HCC) ICD-10-CM: N17.9  ICD-9-CM: 584.9  2020 Yes        CRI (chronic renal insufficiency) (Chronic) ICD-10-CM: N18.9  ICD-9-CM: 585.9  2012 Yes        HTN (hypertension) ICD-10-CM: I10  ICD-9-CM: 401.9  2011 Yes                   [x] High complexity decision making was performed  [x] See my orders for details      Subjective/Initial History:     I was asked by Ayaka Leon MD to see Neil Simmons  a 80 y.o.  male in consultation     Excerpts from admission 2020 or consult notes as follows:   , 49-year-old male came in because of shortness of breath dyspnea and he was hypoxic he had history of pneumonia recently discharged 4 days ago he has recurrent hospitalization because of infection pneumonia significant past medical history of chronic kidney disease, lymphoma and prostate cancer he was put on 100% nonrebreather mask his saturation was in 80s now he is on blood percent FiO2 and a shock state he was put on vasopressor Levophed so admitted and critical care consult was called.  awake alert asking for water has gurgly upper airway noise. Norepinephrine down to 2 mics with well-maintained blood pressure. 12/15: Patient is seen on follow up. Resting comfortably on 45% high flow. : Patient is seen on follow up. He is now on 3.5L O2 via nasal cannula and O2 sats are 99%.  Resting comfortably. 12/17: Patient continues to improve work of breathing. He is now tolerating 2.5L O2 via nasal cannula and oxygen saturation is 99%. Allergies   Allergen Reactions    Pcn [Penicillins] Swelling        MAR reviewed and pertinent medications noted or modified as needed     Current Facility-Administered Medications   Medication    [COMPLETED] barium sulfate (VARIBAR THIN HONEY) 40 %(w/v), 29% (w/w)(1500 CPS) contrast suspension 15 mL    [COMPLETED] barium sulfate (VARIBAR NECTAR) 40 % (w/v) contrast suspension 15 mL    [COMPLETED] barium sulfate (VARIBAR THIN) 81 % (w/w) oral powder 15 mL    isosorbide mononitrate ER (IMDUR) tablet 30 mg    dextrose 5% infusion    meropenem (MERREM) 1 g in sterile water (preservative free) 20 mL IV syringe    sodium chloride (NS) flush 5-40 mL    sodium chloride (NS) flush 5-40 mL    acetaminophen (TYLENOL) tablet 650 mg    ondansetron (ZOFRAN) injection 4 mg    famotidine (PF) (PEPCID) 20 mg in 0.9% sodium chloride 10 mL injection    albuterol-ipratropium (DUO-NEB) 2.5 MG-0.5 MG/3 ML      Patient PCP: Sky Gomez MD  PMH:  has a past medical history of CRI (chronic renal insufficiency) (12/13/2012), Gout (1/4/2011), Hypertension, Lymphoma ([facility] [address] and Prostate CA ([facility] [address] (1/4/2011). PSH:   has a past surgical history that includes hx prostatectomy;  bone marrow biopsy; endoscopy, colon, diagnostic (2003); and ir thoracentesis cath w image (12/15/2020). FHX: family history includes Hypertension in his mother. SHX:  reports that he has never smoked. He has never used smokeless tobacco. He reports previous drug use. He reports that he does not drink alcohol. ROS:    Review of Systems   Constitutional: Positive for malaise/fatigue. HENT: Negative. Eyes: Negative. Respiratory: Positive for cough and shortness of breath. Cardiovascular: Negative. Gastrointestinal: Negative. Genitourinary: Negative. Musculoskeletal: Negative. Skin: Negative. Neurological: Negative. Endo/Heme/Allergies: Negative. Psychiatric/Behavioral: Negative. Objective:     Vital Signs: Telemetry:    normal sinus rhythm Intake/Output:   Visit Vitals  /67 (BP 1 Location: Left arm, BP Patient Position: At rest)   Pulse 98   Temp 98.2 °F (36.8 °C)   Resp 18   Ht 5' 2.99\" (1.6 m)   Wt 60.9 kg (134 lb 4.2 oz)   SpO2 96%   BMI 23.79 kg/m²       Temp (24hrs), Av.4 °F (36.9 °C), Min:98.1 °F (36.7 °C), Max:98.7 °F (37.1 °C)        O2 Device: Nasal cannula O2 Flow Rate (L/min): 2 l/min       Wt Readings from Last 4 Encounters:   20 60.9 kg (134 lb 4.2 oz)   20 64.3 kg (141 lb 12.1 oz)   20 64.9 kg (143 lb)   20 61.7 kg (136 lb)          Intake/Output Summary (Last 24 hours) at 2020 1059  Last data filed at 2020 0946  Gross per 24 hour   Intake 1514 ml   Output 1300 ml   Net 214 ml       Last shift:       07 -  1900  In: 150   Out: -   Last 3 shifts: 1901 -  0700  In: 3081 [I.V.:877]  Out: 1900 [Urine:1900]       Physical Exam:     Physical Exam   Constitutional: He is oriented to person, place, and time. HENT:   Head: Normocephalic and atraumatic. Eyes: Pupils are equal, round, and reactive to light. Conjunctivae and EOM are normal.   Neck: Normal range of motion. Neck supple. Cardiovascular: Normal rate and regular rhythm. Pulmonary/Chest: Effort normal. He has rales. Expiratory rhonchi has upper airway noise over the neck   Abdominal: Soft. Bowel sounds are normal.   Thin   Musculoskeletal: Normal range of motion. Neurological: He is alert and oriented to person, place, and time. Skin: Skin is warm and dry. Psychiatric: He has a normal mood and affect.         Labs:    Recent Labs     20  0629 20  0636 20  0907   WBC 9.4 8.6 8.5   HGB 9.4* 9.5* 10.4*   PLT 56* 44* 56*     Recent Labs     20  0629 20  0636 20  0907 12/15/20  1103 12/15/20  1103    140 144   < > 148*   K 4.5 4.5 4.3   < > 4.4    103 106   < > 110*   CO2 32 34* 31   < > 30   * 96 132*   < > 70   BUN 56* 59* 58*   < > 63*   CREA 1.52* 1.32* 1.24   < > 1.48*   CA 8.5 8.7 9.3   < > 9.7   ALB  --   --   --   --  1.9*   ALT  --   --   --   --  100*    < > = values in this interval not displayed. No results for input(s): PH, PCO2, PO2, HCO3, FIO2 in the last 72 hours. No results for input(s): CPK, CKNDX, TROIQ in the last 72 hours. No lab exists for component: CPKMB    Lab Results   Component Value Date/Time    Culture result: No growth 1 day 12/15/2020 08:33 PM    Culture result: No growth 2 days 12/14/2020 02:45 PM    Culture result:  12/11/2020 11:21 AM     Gram Positive Cocci CALLED TO AND READ BACK BY JOSSELIN GERMAN 12/12/20 1337 TLW    Culture result:  12/11/2020 11:21 AM     Staphylococcus species, coagulase negative , ISOLATED FROM AEROBIC BOTTLE RECEIVED    Culture result:  12/11/2020 11:21 AM     * methicillin resistant staphylococcus aureus * ** ISOLATED FROM ANAEROBIC BOTTLE    Culture result: CALLED MRSA TO [facility] 1100 12/15/20 12/11/2020 11:21 AM     Lab Results   Component Value Date/Time    TSH 1.79 11/20/2020 01:31 PM       Imaging:    CXR Results  (Last 48 hours)    None        Results from [place] encounter on 12/11/20   XR SWALLOW FUNC VIDEO    Narrative Modified barium swallow. Weak swallow. Initially with nectar and honey consistency oral contrast, there is no  demonstrated aspiration or penetration. Significant retention through vallecula  more so than piriform sinuses. Patient has difficulty clearing material from  vallecula and piriform sinuses. With subsequent small volume thin consistency oral contrast, there is  penetration. Repetitive swallowing occurs in an attempt to clear material from  vallecula and piriform sinuses. With repetitive swallowing there is cumulative  deep penetration.     Study reviewed with speech pathology. 280.9 DAP/2:16 min   XR CHEST PORT    Narrative Portable chest:    History:Respiratory failure    COMPARISON: Portable chest 12/13/2020    Lungs show hypoaeration. There is ill-defined hazy density at the right lung  base. Hazy parenchymal infiltration is seen in the left lung, left hemidiaphragm  is obscured. Heart is normal size. Calcified plaque involving the aortic arch. Left jugular venous catheter with the tip at the junction of SVC and right  atrium. Nasogastric tube with the tip in the stomach. Impression Impression:Left lung airspace disease and suspected pleural fluid. Right basilar  pulmonary haziness compatible with airspace disease/atelectasis and trace of  pleural fluid. XR CHEST PORT    Narrative Chest single view. Comparison single view chest December 11, 2020. Improved aeration through lung bases. Stable left neck central venous catheter. Cardiac and mediastinal structures unchanged. Thoracic aorta atherosclerosis. No  pneumothorax or sizable pleural effusion. Results from [place] encounter on 12/11/20   CT CHEST WO CONT    Narrative Comparison chest x-ray 12/11/2020 [facility] and chest CTA 11/25/2020 [address] [place] [address]. TECHNIQUE: Axial imaging of the chest without IV contrast, with multiplanar  reformatting, MIPs. Dose reduction: All CT scans at this facility are performed using dose reduction  optimization techniques as appropriate to a performed exam including the  following: Automated exposure control, adjustments of the mA and/or kV according  to patient's size, or use of iterative reconstruction technique. FINDINGS: Left central catheter distal tip SVC. Mild cardiomegaly. Multivessel  coronary artery calcification. No pericardial effusion. Low-density intracardiac  blood suggests anemia. Calcified thoracic aorta without aneurysm. Pulmonary  arteries are not dilated. No mediastinal or hilar lymphadenopathy.  Tubular  structure posterior to the esophagus possibly dilated vessel. Bilateral pleural  effusions and lower lung consolidations without air bronchograms. The left lower  lung is completely consolidated/collapsed. The aerated lungs demonstrate patchy  areas of airspace disease. No axillary adenopathy. Included thyroid is  unremarkable. Included upper abdomen demonstrates lobulated, heterogeneous  structure spleen and small retroperitoneal lymphadenopathy; unchanged. Degenerative changes of the bony structures. Soft tissue anasarca. Impression IMPRESSION:  1. Bilateral pleural effusions and lower lung consolidations, the left lower  lung being completely collapsed, this is similar to previous. 2. Interval development patchy airspace disease in the aerated lungs consistent  with pneumonia. 3. Abnormal spleen, small retroperitoneal lymphadenopathy unchanged. 4. Atherosclerosis. IMPRESSION:   1. Acute respiratory failure with Hypoxia continues to be on nasal cannula 2.5L O2, continue to wean. 2. Sepsis   3. Community-acquired versus hospital-acquired pneumonia has gram-positive cocci in 1 of 4 blood bottles MRSA in nares  4. Hypokalemia, resolved  5. Pharyngeal dysphagia speech has evaluated and he has totally ineffective swallow discussed with him placing NG tube for tube feeding  6. Hypernatremia, resolved. 7. History of lymphoma and prostate cancer  8. Acute on chronic kidney disease creatinine improved (baseline is 1.6)  9. CHF with diastolic dysfunction chest x-ray improving  10. Lactic acidosis, resolved. 11. Body mass index is 23.79 kg/m². 12. Elevated troponin, trending down. RECOMMENDATIONS/PLAN:     1. Patient is on nasal cannula 2.5L O2 with well-maintained oxygen saturation  2. Blood culture 1 of 4 with gram-positive cocci may be skin contaminant but remains on vancomycin & merrem, WBC trending down: 8.6  3. Nasal MRSA smear positive; on Bactroban.   4. Hypernatremia resolved: 140; Hypokalemia resolved: 4.5         Salbador Penaloza MD

## 2020-12-18 NOTE — PROGRESS NOTES
PT treatment attempted . However , pt in deep sleep and not waking up . Nsg informed pt was hallucinating and received ativan an hour ago. PT will reattempt for PT treatment at a later time/date. Thanks

## 2020-12-18 NOTE — PROGRESS NOTES
Patient s/p PEG placement s/t recurrent aspiration. Rec cont TF via PEG. Will d/c from ST s/t severity of dysphagia. If PO is desired, rec MBS prior to initiation. Please place MBS as medicallyl appropriate.

## 2020-12-18 NOTE — PROGRESS NOTES
Progress Note    Patient: Delroy Vang MRN: [id number]  SSN: [id number]    YOB: 1932  Age: 80 y.o.   Sex: male      Admit Date: 12/11/2020    LOS: 7 days     Subjective:   Patient examined, no nausea on tube feeding,  Past Medical History:   Diagnosis Date    CRI (chronic renal insufficiency) 12/13/2012    Gout 1/4/2011    Hypertension     Lymph[facility] ([facility]     Prostate CA ([facility] 1/4/2011        Current Facility-Administered Medications:     hydrOXYzine pamoate (VISTARIL) capsule 25 mg, 25 mg, Oral, QID PRN, Flako Daugherty NP    isosorbide mononitrate ER (IMDUR) tablet 30 mg, 30 mg, Oral, DAILY, Karl Lefort, MD, 30 mg at 12/18/20 0845    dextrose 5% infusion, 50 mL/hr, IntraVENous, CONTINUOUS, Robert Taylor MD, Last Rate: 50 mL/hr at 12/15/20 2237, 50 mL/hr at 12/15/20 2237    meropenem (MERREM) 1 g in sterile water (preservative free) 20 mL IV syringe, 1 g, IntraVENous, Q8H, Terell Ruiz MD, 1 g at 12/18/20 1600    sodium chloride (NS) flush 5-40 mL, 5-40 mL, IntraVENous, Q8H, Ken Kraus PA-C, 10 mL at 12/18/20 1457    sodium chloride (NS) flush 5-40 mL, 5-40 mL, IntraVENous, PRN, Noelle Kraus PA-C    acetaminophen (TYLENOL) tablet 650 mg, 650 mg, Oral, Q6H PRN **OR** [DISCONTINUED] acetaminophen (TYLENOL) suppository 650 mg, 650 mg, Rectal, Q6H PRN, Noelle Kraus PA-C    [DISCONTINUED] promethazine (PHENERGAN) tablet 12.5 mg, 12.5 mg, Oral, Q6H PRN **OR** ondansetron (ZOFRAN) injection 4 mg, 4 mg, IntraVENous, Q6H PRN, Ken Kraus PA-C    famotidine (PF) (PEPCID) 20 mg in 0.9% sodium chloride 10 mL injection, 20 mg, IntraVENous, BID, ReasonerKen PA-C, 20 mg at 12/18/20 0844    albuterol-ipratropium (DUO-NEB) 2.5 MG-0.5 MG/3 ML, 3 mL, Nebulization, Q4H PRN, Kassandra Barron PA-C    Objective:     Vitals:    12/18/20 0727 12/18/20 0903 12/18/20 1129 12/18/20 1502   BP: 126/67   115/63   Pulse: 98   (!) 103   Resp: 18   18   Temp: 98.2 °F (36.8 °C)   97.1 °F (36.2 °C)   SpO2: 99% 96% 95% 98%   Weight:       Height:            Intake and Output:  Current Shift: 12/18 0701 - 12/18 1900  In: 150   Out: -   Last three shifts: 12/16 1901 - 12/18 0700  In: 0319 [I.V.:877]  Out: 1900 [Urine:1900]    Physical Exam:   Physical Exam   Constitutional: He appears lethargic. He appears distressed. HENT:   Head: Atraumatic. Eyes: Conjunctivae are normal.   Neck: No JVD present. No tracheal deviation present. Cardiovascular: Normal heart sounds. Pulmonary/Chest: Effort normal.   Abdominal: There is no abdominal tenderness. There is no rebound and no guarding. PEG tube site skin fine, no drainage, no cellulitis, no bleeding   Musculoskeletal: Normal range of motion. Neurological: He appears lethargic. Skin: Skin is dry. Lab/Data Review:  Recent Results (from the past 24 hour(s))   CBC WITH AUTOMATED DIFF    Collection Time: 12/18/20  6:29 AM   Result Value Ref Range    WBC 9.4 4.1 - 11.1 K/uL    RBC 3.14 (L) 4.10 - 5.70 M/uL    HGB 9.4 (L) 12.1 - 17.0 g/dL    HCT 29.0 (L) 36.6 - 50.3 %    MCV 92.4 80.0 - 99.0 FL    MCH 29.9 26.0 - 34.0 PG    MCHC 32.4 30.0 - 36.5 g/dL    RDW 17.0 (H) 11.5 - 14.5 %    PLATELET 56 (L) 090 - 400 K/uL    NEUTROPHILS 84 (H) 32 - 75 %    LYMPHOCYTES 7 (L) 12 - 49 %    MONOCYTES 6 5 - 13 %    EOSINOPHILS 2 0 - 7 %    BASOPHILS 0 0 - 1 %    IMMATURE GRANULOCYTES 1 (H) 0.0 - 0.5 %    ABS. NEUTROPHILS 8.0 1.8 - 8.0 K/UL    ABS. LYMPHOCYTES 0.6 (L) 0.8 - 3.5 K/UL    ABS. MONOCYTES 0.6 0.0 - 1.0 K/UL    ABS. EOSINOPHILS 0.2 0.0 - 0.4 K/UL    ABS. BASOPHILS 0.0 0.0 - 0.1 K/UL    ABS. IMM.  GRANS. 0.1 (H) 0.00 - 0.04 K/UL    DF AUTOMATED     METABOLIC PANEL, BASIC    Collection Time: 12/18/20  6:29 AM   Result Value Ref Range    Sodium 138 136 - 145 mmol/L    Potassium 4.5 3.5 - 5.1 mmol/L    Chloride 101 97 - 108 mmol/L    CO2 32 21 - 32 mmol/L    Anion gap 5 5 - 15 mmol/L    Glucose 109 (H) 65 - 100 mg/dL    BUN 56 (H) 6 - 20 mg/dL    Creatinine 1.52 (H) 0.70 - 1.30 mg/dL    BUN/Creatinine ratio 37 (H) 12 - 20      GFR est AA 53 (L) >60 ml/min/1.73m2    GFR est non-AA 44 (L) >60 ml/min/1.73m2    Calcium 8.5 8.5 - 10.1 mg/dL        XR SWALLOW FUNC VIDEO   Final Result      IR THORACENTESIS CATH W IMAGE   Final Result   Impression:    Successful left thoracentesis. XR CHEST PORT   Final Result   Impression:Left lung airspace disease and suspected pleural fluid. Right basilar   pulmonary haziness compatible with airspace disease/atelectasis and trace of   pleural fluid. US SPLEEN   Final Result   IMPRESSION: At least 2 heterogeneously hypoechoic to adjacent splenic tissue   mass lesions within the spleen. Findings are nonspecific and could represent   lymphoma or infection. Lymphoma would typically be hot on a PET/CT. If   clinically warranted, follow-up may be helpful. XR CHEST PORT   Final Result      CT CHEST WO CONT   Final Result   IMPRESSION:   1. Bilateral pleural effusions and lower lung consolidations, the left lower   lung being completely collapsed, this is similar to previous. 2. Interval development patchy airspace disease in the aerated lungs consistent   with pneumonia. 3. Abnormal spleen, small retroperitoneal lymphadenopathy unchanged. 4. Atherosclerosis. XR CHEST SNGL V   Final Result   FINDINGS: Impression: Frontal single view chest.      Interval placement left central catheter distal tip SVC/RA junction region. Unchanged cardiomediastinal silhouette. Slightly increasing bilateral pulmonary   airspace edema and/or pneumonia and/or atelectasis. Increasing consolidation of   the left lower lobe. Interval development of small left pleural effusion. No pneumothorax.       XR CHEST SNGL V   Final Result           Assessment:     Active Problems:    HTN (hypertension) (1/4/2011)      CRI (chronic renal insufficiency) (12/13/2012)      Cervical stenosis of spinal canal (11/20/2020)      Acute renal failure (ARF) ([facility] (11/20/2020)      HAP (hospital-acquired pneumonia) (11/30/2020)      Severe sepsis ([facility] (12/1/2020)      Acute hypoxic respiratory failure   Aspiration pneumonia      dysphagia   s/p peg placement     Plan:   continue with vancomycin and meropenem   Continue on feeding  Nutrition to follow the patient   sign off    Plan:       Signed By: Trupti Waller MD     December 18, 2020        Thank you for allowing me to participate in this patients care  Cc Referring Physician   Parvin Muir MD

## 2020-12-18 NOTE — PROGRESS NOTES
Consult for Vancomycin Dosing by Pharmacy by FINA Garcia     Consult provided for this 80y.o. 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 Day of Therapy: 7  Goal of Level(s): 15 - 20 mcg/dL     Other Current Antibiotics: Merrem      Serum Creatinine Creatinine   Date Value Ref Range Status   12/17/2020 1.32 (H) 0.70 - 1.30 mg/dL Final   12/16/2020 1.24 0.70 - 1.30 mg/dL Final   12/15/2020 1.34 (H) 0.70 - 1.30 mg/dL Final      Creatinine Clearance Estimated Creatinine Clearance: 31.1 mL/min (A) (based on SCr of 1.32 mg/dL (H)). BUN Lab Results   Component Value Date/Time    BUN 59 (H) 12/17/2020 06:36 AM      WBC Lab Results   Component Value Date/Time    WBC 8.6 12/17/2020 06:36 AM      Temp 98.2 °F (36.8 °C)     Last Level:   Vancomycin,trough   Date Value Ref Range Status   12/17/2020 17.0 (H) 5.0 - 10.0 ug/mL Final     Comment:        Trough levels of 15-20 ug/mL should be targeted for patients with coagulase negative Staphylococcus and MRSA pneumonia, endocarditis,osteomyelitis, meningitis, and bacteremia, as well as patients not responding to lower levels. Trough levels of 10-15 ug/mL for infections from other sources (e.g. urinary tract, cellulitis) are appropriate. All patients receiving concomitant nephrotoxic therapies should have their function closely monitored regardless of peak or trough levels. Ht Readings from Last 1 Encounters:   12/17/20 160 cm (62.99\")        Wt Readings from Last 1 Encounters:   12/17/20 60.9 kg (134 lb 4.2 oz)     Ideal body weight: 56.9 kg (125 lb 6.4 oz)  Adjusted ideal body weight: 58.5 kg (128 lb 15.1 oz)     Previous Regimen: Vancomycin 500 mg iv x 1 dose on 12/16/2020    New Regimen:     Delayed vancomycin level from yesterday afternoon (of note, ordered as a trough instead of a random level) resulted at 17 mcg/ml. Will give Vancomycin 500 mg x 1 dose today and order another random level for tomorrow with AM labs . Pharmacy to follow daily and will make changes to dose and/or frequency based on clinical status.     _________________________________     Pharmacist [address], PHARMD

## 2020-12-18 NOTE — PROGRESS NOTES
Hospitalist Progress Note             Daily Progress Note: 12/18/2020      Subjective: The patient is seen for follow  up. Patient is an 49-year-old man with history of recurrent pneumonia who was admitted on 12/11/2020 due to shortness of breath with hypoxia. He was also found to be in septic shock requiring norepinephrine. Respiratory failure found to be secondary to pneumonia. He was on BiPAP for respiratory failure and later transitioned to high flow on 12/14/2020. Patient has been on meropenem and vancomycin. Blood cultures collected on 12/11/2020 grew gram-positive cocci in clusters likely contamination. Due to poor  nutrition and persisted aspiration Peg tube was placed on 12/17/2020 for nutrition.     Patient seen and examined at bedside  He is in no acute distress  Oxygen has been tapered down to 2 liters via NC  He is tolerating tube feeds    Problem List:  Patient Active Problem List   Diagnosis Code    Lymphoma ([facility] C85.90    HTN (hypertension) I10    Prostate CA ([facility] C61    Gout M10.9    CRI (chronic renal insufficiency) N18.9    Hypercalcemia of malignancy E83.52    Cervical stenosis of spinal canal M48.02    Acute renal failure (ARF) ([facility]) N17.9    Lumbar canal stenosis M48.061    Frequent falls R29.6    Multiple falls R29.6    Weakness R53.1    Anemia D64.9    Opioid use F11.90    History of B-cell lymphoma Z85.72    Non-Hodgkin's lymphoma in relapse ([facility]) C85.90    Hypercalcemia E83.52    HAP (hospital-acquired pneumonia) J18.9, Y95    SIRS (systemic inflammatory response syndrome) ([facility]) R65.10    CHF (congestive heart failure) ([facility]) I50.9    Acute hypoxemic respiratory failure ([facility]) J96.01    Severe sepsis ([facility]) A41.9, R65.20    Person under investigation for COVID-19 Z20.828    BPPV (benign paroxysmal positional vertigo) H81.10         Medications reviewed  Current Facility-Administered Medications   Medication Dose Route Frequency  [START ON 12/19/2020] VANCOMYCIN INFORMATION NOTE   Other ONCE    isosorbide mononitrate ER (IMDUR) tablet 30 mg  30 mg Oral DAILY    dextrose 5% infusion  50 mL/hr IntraVENous CONTINUOUS    meropenem (MERREM) 1 g in sterile water (preservative free) 20 mL IV syringe  1 g IntraVENous Q8H    sodium chloride (NS) flush 5-40 mL  5-40 mL IntraVENous Q8H    sodium chloride (NS) flush 5-40 mL  5-40 mL IntraVENous PRN    acetaminophen (TYLENOL) tablet 650 mg  650 mg Oral Q6H PRN    Or    acetaminophen (TYLENOL) suppository 650 mg  650 mg Rectal Q6H PRN    polyethylene glycol (MIRALAX) packet 17 g  17 g Oral DAILY PRN    promethazine (PHENERGAN) tablet 12.5 mg  12.5 mg Oral Q6H PRN    Or    ondansetron (ZOFRAN) injection 4 mg  4 mg IntraVENous Q6H PRN    famotidine (PF) (PEPCID) 20 mg in 0.9% sodium chloride 10 mL injection  20 mg IntraVENous BID    albuterol-ipratropium (DUO-NEB) 2.5 MG-0.5 MG/3 ML  3 mL Nebulization Q4H PRN    Vancomycin Dosed by Pharmacy  1 Each Other Rx Dosing/Monitoring       Review of Systems:   Review of Systems   Constitutional: Negative for chills, diaphoresis, fever, malaise/fatigue and weight loss. HENT: Negative for ear discharge, ear pain, hearing loss, nosebleeds, sinus pain and tinnitus. Respiratory: Negative for cough, hemoptysis, sputum production, shortness of breath and wheezing. Cardiovascular: Negative for chest pain, palpitations, orthopnea, claudication and leg swelling. Gastrointestinal: Negative for abdominal pain, constipation, diarrhea, heartburn, nausea and vomiting. Genitourinary: Negative for dysuria, flank pain, frequency, hematuria and urgency. Musculoskeletal: Negative for back pain, falls, joint pain, myalgias and neck pain. Neurological: Positive for weakness. Negative for dizziness, tingling, focal weakness, seizures and headaches.    Psychiatric/Behavioral: Negative for depression, hallucinations, memory loss, substance abuse and suicidal ideas. The patient is not nervous/anxious. Objective:   Physical Exam  Constitutional:       General: He is not in acute distress. Appearance: He is ill-appearing. HENT:      Head: Normocephalic and atraumatic. Mouth/Throat:      Mouth: Mucous membranes are moist.      Pharynx: Oropharynx is clear. Eyes:      Pupils: Pupils are equal, round, and reactive to light. Neck:      Musculoskeletal: No neck rigidity. Cardiovascular:      Rate and Rhythm: Normal rate and regular rhythm. Heart sounds: No murmur. No friction rub. No gallop. Pulmonary:      Effort: Pulmonary effort is normal. No respiratory distress. Breath sounds: Normal breath sounds. No wheezing or rales. Abdominal:      General: Bowel sounds are normal. There is no distension. Palpations: Abdomen is soft. Tenderness: There is no abdominal tenderness. There is no rebound. Musculoskeletal: Normal range of motion. General: No swelling, tenderness, deformity or signs of injury. Skin:     General: Skin is warm and dry. Coloration: Skin is not jaundiced. Findings: No bruising, erythema or rash. Neurological:      General: No focal deficit present. Mental Status: He is alert and oriented to person, place, and time. Sensory: No sensory deficit. Motor: Weakness present. Gait: Gait normal.   Psychiatric:         Mood and Affect: Mood normal.         Behavior: Behavior normal.         Thought Content:  Thought content normal.          Visit Vitals  /67 (BP 1 Location: Left arm, BP Patient Position: At rest)   Pulse 98   Temp 98.2 °F (36.8 °C)   Resp 18   Ht 5' 2.99\" (1.6 m)   Wt 60.9 kg (134 lb 4.2 oz)   SpO2 99%   BMI 23.79 kg/m²         Data Review:       Recent Days:  Recent Labs     12/17/20  0636 12/16/20  0907 12/15/20  2033   WBC 8.6 8.5 10.7   HGB 9.5* 10.4* 11.0*   HCT 28.9* 32.1* 33.7*   PLT 44* 56* 67*     Recent Labs     12/18/20  0629 12/17/20  0636 12/16/20  0907 12/15/20  1103 12/15/20  1103    140 144   < > 148*   K 4.5 4.5 4.3   < > 4.4    103 106   < > 110*   CO2 32 34* 31   < > 30   * 96 132*   < > 70   BUN 56* 59* 58*   < > 63*   CREA 1.52* 1.32* 1.24   < > 1.48*   CA 8.5 8.7 9.3   < > 9.7   ALB  --   --   --   --  1.9*   TBILI  --   --   --   --  0.8   ALT  --   --   --   --  100*    < > = values in this interval not displayed. Assessment/Plan     1. Severe sepsis   Due to pneumonia  Repeat blood culture collected on 12  Continue with vancomycin and meropenem   Repeat blood culture collected on 12/15/2020 showing no growth so far. Stop Vancomycin    2. Acute hypoxic respiratory failure  Secondary to pneumonia. He is now on oxygen at 2 liters  via NC  Continue weaning off oxygen   Continue antibiotics. 3.  Acute on chronic kidney disease stage III  Complicated by sepsis  Creatinine is 1.52, baseline around (1.6-1.7)  Continue gentle IV rehydration    4. Aspiration pneumonia  Secondary to dysphagia  Peg tube was placed for nutrition on 12/17/2020 and he is tolerating feeds. He is scheduled for a MBS today  Repeat blood cultures collected on 12/15/2020 showing no growth so far  Continue with meropenem     5. Hypernatremia  Now resolved. Continue with D5 W gently    6. Dysphagia  Contributing to recurrent pneumonia  Peg tube was placed for nutrition on 12/17/2020 and he is tolerating feeds. He is scheduled for a MBS today. CBC, BMP in a.m. Heparin for DVT prophylaxis    Comments: Peg tube was placed on 12/17/2020 for nutrition and he is tolerating feeds. He is scheduled for MBS today. Repeat blood culture collected on 12/15/2020 negative so will stop vancomycin. Start PT/OT    SHANE Ferrer, [phone] . Treatment plan has been discussed with her.       Estuardo Burgos NP

## 2020-12-19 PROCEDURE — 74011250636 HC RX REV CODE- 250/636: Performed by: PHYSICIAN ASSISTANT

## 2020-12-19 PROCEDURE — 74011250636 HC RX REV CODE- 250/636: Performed by: EMERGENCY MEDICINE

## 2020-12-19 PROCEDURE — 97530 THERAPEUTIC ACTIVITIES: CPT | Performed by: PHYSICAL THERAPIST

## 2020-12-19 PROCEDURE — 74011000250 HC RX REV CODE- 250: Performed by: PHYSICIAN ASSISTANT

## 2020-12-19 PROCEDURE — 97530 THERAPEUTIC ACTIVITIES: CPT

## 2020-12-19 PROCEDURE — 74011000250 HC RX REV CODE- 250: Performed by: EMERGENCY MEDICINE

## 2020-12-19 PROCEDURE — 65270000029 HC RM PRIVATE

## 2020-12-19 PROCEDURE — 74011250637 HC RX REV CODE- 250/637: Performed by: NURSE PRACTITIONER

## 2020-12-19 RX ORDER — FAMOTIDINE 20 MG/1
20 TABLET, FILM COATED ORAL EVERY 24 HOURS
Status: DISCONTINUED | OUTPATIENT
Start: 2020-12-19 | End: 2020-12-21 | Stop reason: HOSPADM

## 2020-12-19 RX ORDER — METOPROLOL TARTRATE 25 MG/1
12.5 TABLET, FILM COATED ORAL EVERY 12 HOURS
Status: DISCONTINUED | OUTPATIENT
Start: 2020-12-19 | End: 2020-12-21 | Stop reason: HOSPADM

## 2020-12-19 RX ADMIN — METOPROLOL TARTRATE 12.5 MG: 25 TABLET, FILM COATED ORAL at 13:05

## 2020-12-19 RX ADMIN — MEROPENEM 1 G: 1 INJECTION, POWDER, FOR SOLUTION INTRAVENOUS at 09:26

## 2020-12-19 RX ADMIN — Medication 10 ML: at 13:07

## 2020-12-19 RX ADMIN — METOPROLOL TARTRATE 12.5 MG: 25 TABLET, FILM COATED ORAL at 22:41

## 2020-12-19 RX ADMIN — FAMOTIDINE 20 MG: 10 INJECTION INTRAVENOUS at 09:26

## 2020-12-19 RX ADMIN — Medication 10 ML: at 22:41

## 2020-12-19 RX ADMIN — MEROPENEM 1 G: 1 INJECTION, POWDER, FOR SOLUTION INTRAVENOUS at 17:52

## 2020-12-19 RX ADMIN — Medication 10 ML: at 12:35

## 2020-12-19 RX ADMIN — Medication 10 ML: at 05:35

## 2020-12-19 RX ADMIN — SERTRALINE HYDROCHLORIDE 50 MG: 50 TABLET ORAL at 17:56

## 2020-12-19 NOTE — PROGRESS NOTES
Problem: Mobility Impaired (Adult and Pediatric)  Goal: *Acute Goals and Plan of Care (Insert Text)  Description: Pt will be I with LE HEP in 7 days. Pt will perform bed mobility with Min A x1 in 7 days. Pt will perform transfers with Min A x1 in 7 days. Outcome: Progressing Towards Goal     Problem: Patient Education: Go to Patient Education Activity  Goal: Patient/Family Education  Outcome: Progressing Towards Goal     PHYSICAL THERAPY REEVALUATION  Patient: Princess Solis [de-identified]80 y.o. male)  Date: 12/19/2020  Primary Diagnosis: Severe sepsis ([facility] [A41.9, R65.20]  Procedure(s) (LRB):  PERCUTANEOUS ENDOSCOPIC GASTROSTOMY TUBE INSERTION (N/A)  ESOPHAGOGASTRODUODENOSCOPY (EGD) (N/A) 2 Days Post-Op   Precautions: BPPV       ASSESSMENT  PT reassessment today. Per nursing, pt had Avectraan Llanos last night so he may still have effects of medication this AM.   Pt had his eyes closed when PT/OT entered in pt room. He opened his eyes with sternal rub. Supine to sit at TA x2 with HOB elevated to help. While pt sat EOB, he required Min A x2 progressing to SBA once L UE was positioned so that he could prop himself up for 1 minute. After sitting for 1 minute at SBA, he became fatigued and required Min A x1 again to stay seated EOB. While sitting EOB, pt reported dizziness but no vertigo or n/v. Sit to supine at TA x2 and scooting towards [facility] TA x2 with two pillow under his head to keep his head in close to an upright position due to hx of BPPV. Pt has not met any therapy goals today. He is making very slow progression towards goals. When he first started PT POC, he was not able to tolerate sitting up EOB but was able to today, indicating improved truncal stability. Pt continues to demo weakness, reduced activity tolerance, and poor balance which impair his ability to perfrom bed mobility, transfers, and ambulation. Pt would benefit form continued acute PT to address his limitiatons. PT rec DC to SNF at this time. Current Level of Function Impacting Discharge (mobility/balance): TD x2 with bed mobility    Other factors to consider for discharge: BPPV, lives alone     Patient will benefit from skilled therapy intervention to address the above noted impairments. PLAN :  Recommendations and Planned Interventions: bed mobility training, transfer training, therapeutic exercises, neuromuscular re-education, and therapeutic activities      Frequency/Duration: Patient will be followed by physical therapy:  4 times a week to address goals.     Recommendation for discharge: (in order for the patient to meet his/her long term goals)  Therapy up to 5 days/week in SNF setting    This discharge recommendation:  Has not yet been discussed the attending provider and/or case management    Equipment recommendations for successful discharge (if) home: none         SUBJECTIVE:   Patient did not say much at PT Reassessment    OBJECTIVE DATA SUMMARY:   HISTORY:    Past Medical History:   Diagnosis Date    CRI (chronic renal insufficiency) 12/13/2012    Gout 1/4/2011    Hypertension     Lymphoma ([facility] [address]     Prostate CA ([facility] [address] 1/4/2011     Past Surgical History:   Procedure Laterality Date    ENDOSCOPY, COLON, DIAGNOSTIC  2003    neg    HC BONE MARROW BIOPSY      HX PROSTATECTOMY      IR THORACENTESIS CATH [facility]  12/15/2020     Hospital course since last seen and reason for reevaluation: LOS    Personal factors and/or comorbidities impacting plan of care: BPPV, weakness    Home Situation  Home Environment: Private residence  # Steps to Enter: 2  Rails to Enter: Yes  Hand Rails : Bilateral  One/Two Story Residence: One story  Living Alone: No  Support Systems: Spouse/Significant Other/Partner  Patient Expects to be Discharged to[de-identified] Skilled nursing facility  Current DME Used/Available at Home: Cane, straight  Tub or Shower Type: Tub/Shower combination    EXAMINATION/PRESENTATION/DECISION MAKING:   Critical Behavior:  Neurologic State: Drowsy, Lethargic  Orientation Level: Oriented X4  Cognition: Appropriate decision making, Appropriate for age attention/concentration, Appropriate safety awareness, Follows commands     Hearing: Auditory  Auditory Impairment: None    Range Of Motion:   limited                       Strength:     Limited, decreased                  Functional Mobility:  Bed Mobility:     Supine to Sit: Assist x2;Total assistance  Sit to Supine: Assist x2;Total assistance             Balance:   Sitting: Impaired; With support(see note)  Sitting - Static: (see note)         Pain Ratin/10    Activity Tolerance:   Poor  Please refer to the flowsheet for vital signs taken during this treatment. After treatment patient left in no apparent distress:   Supine in bed, Call bell within reach, and Bed / chair alarm activated    COMMUNICATION/EDUCATION:   The patients plan of care was discussed with: Occupational therapist.     Patient understands intent and goals of therapy, but is neutral about his/her participation.     Thank you for this referral.  Carolin Patel   Time Calculation: 28 mins

## 2020-12-19 NOTE — PROGRESS NOTES
Problem: Pressure Injury - Risk of  Goal: *Prevention of pressure injury  Description: Document Daniel Scale and appropriate interventions in the flowsheet.   Outcome: Progressing Towards Goal  Note: Pressure Injury Interventions:  Sensory Interventions: Assess changes in LOC, Assess need for specialty bed    Moisture Interventions: Absorbent underpads, Apply protective barrier, creams and emollients    Activity Interventions: Increase time out of bed    Mobility Interventions: PT/OT evaluation    Nutrition Interventions: Document food/fluid/supplement intake    Friction and Shear Interventions: Transferring/repositioning devices, HOB 30 degrees or less, Minimize layers

## 2020-12-19 NOTE — PROGRESS NOTES
PULMONARY NOTE  [facility]    Name: Ravi Rodriguez MRN: [id number]   : 1932 Hospital: [address] [place] [address]   Date: 2020  Admission date: 2020 Hospital Day: 9       HPI:     Hospital Problems  Date Reviewed: 2020          Codes Class Noted POA    Severe sepsis ([address] ICD-10-CM: A41.9, R65.20  ICD-9-CM: 038.9, 995.92  2020 Yes        HAP (hospital-acquired pneumonia) ICD-10-CM: J18.9, Y95  ICD-9-CM: 854  2020 Yes        Cervical stenosis of spinal canal ICD-10-CM: M48.02  ICD-9-CM: 723.0  2020 Yes        Acute renal failure (ARF) (HCC) ICD-10-CM: N17.9  ICD-9-CM: 584.9  2020 Yes        CRI (chronic renal insufficiency) (Chronic) ICD-10-CM: N18.9  ICD-9-CM: 585.9  2012 Yes        HTN (hypertension) ICD-10-CM: I10  ICD-9-CM: 401.9  2011 Yes                   [x] High complexity decision making was performed  [x] See my orders for details      Subjective/Initial History:     I was asked by Sammy Shin MD to see Ravi Rodriguez  a 80 y.o.  male in consultation     Excerpts from admission 2020 or consult notes as follows:   , 19-year-old male came in because of shortness of breath dyspnea and he was hypoxic he had history of pneumonia recently discharged 4 days ago he has recurrent hospitalization because of infection pneumonia significant past medical history of chronic kidney disease, lymphoma and prostate cancer he was put on 100% nonrebreather mask his saturation was in 80s now he is on blood percent FiO2 and a shock state he was put on vasopressor Levophed so admitted and critical care consult was called.  awake alert asking for water has gurgly upper airway noise. Norepinephrine down to 2 mics with well-maintained blood pressure. 12/15: Patient is seen on follow up. Resting comfortably on 45% high flow. : Patient is seen on follow up. He is now on 3.5L O2 via nasal cannula and O2 sats are 99%.  Resting comfortably. 12/17: Patient continues to improve work of breathing. He is now tolerating 2.5L O2 via nasal cannula and oxygen saturation is 99%. Allergies   Allergen Reactions    Pcn [Penicillins] Swelling        MAR reviewed and pertinent medications noted or modified as needed     Current Facility-Administered Medications   Medication    metoprolol tartrate (LOPRESSOR) tablet 12.5 mg    famotidine (PEPCID) tablet 20 mg    hydrOXYzine pamoate (VISTARIL) capsule 25 mg    sertraline (ZOLOFT) tablet 50 mg    dextrose 5% infusion    meropenem (MERREM) 1 g in sterile water (preservative free) 20 mL IV syringe    sodium chloride (NS) flush 5-40 mL    sodium chloride (NS) flush 5-40 mL    acetaminophen (TYLENOL) tablet 650 mg    ondansetron (ZOFRAN) injection 4 mg    albuterol-ipratropium (DUO-NEB) 2.5 MG-0.5 MG/3 ML      Patient PCP: Raghav Al MD  PMH:  has a past medical history of CRI (chronic renal insufficiency) (12/13/2012), Gout (1/4/2011), Hypertension, Lymphoma ([facility] and Prostate CA ([facility] (1/4/2011). PSH:   has a past surgical history that includes hx prostatectomy; hc bone marrow biopsy; endoscopy, colon, diagnostic (2003); and ir thoracentesis cath w image (12/15/2020). FHX: family history includes Hypertension in his mother. SHX:  reports that he has never smoked. He has never used smokeless tobacco. He reports previous drug use. He reports that he does not drink alcohol. ROS:    Review of Systems   Constitutional: Positive for malaise/fatigue. HENT: Negative. Eyes: Negative. Respiratory: Positive for cough and shortness of breath. Cardiovascular: Negative. Gastrointestinal: Negative. Genitourinary: Negative. Musculoskeletal: Negative. Skin: Negative. Neurological: Negative. Endo/Heme/Allergies: Negative. Psychiatric/Behavioral: Negative.          Objective:     Vital Signs: Telemetry:    normal sinus rhythm Intake/Output:   Visit Vitals  BP (!) 140/71   Pulse (!) 109   Temp 98.3 °F (36.8 °C)   Resp 20   Ht 5' 2.99\" (1.6 m)   Wt 60.9 kg (134 lb 4.2 oz)   SpO2 95%   BMI 23.79 kg/m²       Temp (24hrs), Av.9 °F (36.6 °C), Min:97.1 °F (36.2 °C), Max:98.3 °F (36.8 °C)        O2 Device: Room air O2 Flow Rate (L/min): 2 l/min       Wt Readings from Last 4 Encounters:   20 60.9 kg (134 lb 4.2 oz)   20 64.3 kg (141 lb 12.1 oz)   20 64.9 kg (143 lb)   20 61.7 kg (136 lb)          Intake/Output Summary (Last 24 hours) at 2020 1126  Last data filed at 2020 1829  Gross per 24 hour   Intake --   Output 1200 ml   Net -1200 ml       Last shift:      No intake/output data recorded. Last 3 shifts:  1901 -  0700  In: 1514 [I.V.:877]  Out: 2000 [Urine:2000]       Physical Exam:     Physical Exam   Constitutional: He is oriented to person, place, and time. HENT:   Head: Normocephalic and atraumatic. Eyes: Pupils are equal, round, and reactive to light. Conjunctivae and EOM are normal.   Neck: Normal range of motion. Neck supple. Cardiovascular: Normal rate and regular rhythm. Pulmonary/Chest: Effort normal. He has rales. Expiratory rhonchi has upper airway noise over the neck   Abdominal: Soft. Bowel sounds are normal.   Thin   Musculoskeletal: Normal range of motion. Neurological: He is alert and oriented to person, place, and time. Skin: Skin is warm and dry. Psychiatric: He has a normal mood and affect. Labs:    Recent Labs     20  0629 20  0636   WBC 9.4 8.6   HGB 9.4* 9.5*   PLT 56* 44*     Recent Labs     20  0629 20  0636    140   K 4.5 4.5    103   CO2 32 34*   * 96   BUN 56* 59*   CREA 1.52* 1.32*   CA 8.5 8.7     No results for input(s): PH, PCO2, PO2, HCO3, FIO2 in the last 72 hours. No results for input(s): CPK, CKNDX, TROIQ in the last 72 hours.     No lab exists for component: CPKMB    Lab Results   Component Value Date/Time    Culture result: No growth 3 days 12/15/2020 08:33 PM    Culture result: No growth 2 days 12/14/2020 02:45 PM    Culture result:  12/11/2020 11:21 AM     Gram Positive Cocci CALLED TO AND READ BACK BY JOSSELIN GERMAN 12/12/20 1337 TLW    Culture result:  12/11/2020 11:21 AM     Staphylococcus species, coagulase negative , ISOLATED FROM AEROBIC BOTTLE RECEIVED    Culture result:  12/11/2020 11:21 AM     * methicillin resistant staphylococcus aureus * ** ISOLATED FROM ANAEROBIC BOTTLE    Culture result: CALLED MRSA TO [facility] 1100 12/15/20 12/11/2020 11:21 AM     Lab Results   Component Value Date/Time    TSH 1.79 11/20/2020 01:31 PM       Imaging:    CXR Results  (Last 48 hours)    None        Results from [place] encounter on 12/11/20   XR SWALLOW FUNC VIDEO    Narrative Modified barium swallow. Weak swallow. Initially with nectar and honey consistency oral contrast, there is no  demonstrated aspiration or penetration. Significant retention through vallecula  more so than piriform sinuses. Patient has difficulty clearing material from  vallecula and piriform sinuses. With subsequent small volume thin consistency oral contrast, there is  penetration. Repetitive swallowing occurs in an attempt to clear material from  vallecula and piriform sinuses. With repetitive swallowing there is cumulative  deep penetration. Study reviewed with speech pathology. 280.9 DAP/2:16 min   XR CHEST PORT    Narrative Portable chest:    History:Respiratory failure    COMPARISON: Portable chest 12/13/2020    Lungs show hypoaeration. There is ill-defined hazy density at the right lung  base. Hazy parenchymal infiltration is seen in the left lung, left hemidiaphragm  is obscured. Heart is normal size. Calcified plaque involving the aortic arch. Left jugular venous catheter with the tip at the junction of SVC and right  atrium. Nasogastric tube with the tip in the stomach.       Impression Impression:Left lung airspace disease and suspected pleural fluid. Right basilar  pulmonary haziness compatible with airspace disease/atelectasis and trace of  pleural fluid. XR CHEST PORT    Narrative Chest single view. Comparison single view chest December 11, 2020. Improved aeration through lung bases. Stable left neck central venous catheter. Cardiac and mediastinal structures unchanged. Thoracic aorta atherosclerosis. No  pneumothorax or sizable pleural effusion. Results from [place] encounter on 12/11/20   CT CHEST WO CONT    Narrative Comparison chest x-ray 12/11/2020 [facility] and chest CTA 11/25/2020 [address] [place] [address]. TECHNIQUE: Axial imaging of the chest without IV contrast, with multiplanar  reformatting, MIPs. Dose reduction: All CT scans at this facility are performed using dose reduction  optimization techniques as appropriate to a performed exam including the  following: Automated exposure control, adjustments of the mA and/or kV according  to patient's size, or use of iterative reconstruction technique. FINDINGS: Left central catheter distal tip SVC. Mild cardiomegaly. Multivessel  coronary artery calcification. No pericardial effusion. Low-density intracardiac  blood suggests anemia. Calcified thoracic aorta without aneurysm. Pulmonary  arteries are not dilated. No mediastinal or hilar lymphadenopathy. Tubular  structure posterior to the esophagus possibly dilated vessel. Bilateral pleural  effusions and lower lung consolidations without air bronchograms. The left lower  lung is completely consolidated/collapsed. The aerated lungs demonstrate patchy  areas of airspace disease. No axillary adenopathy. Included thyroid is  unremarkable. Included upper abdomen demonstrates lobulated, heterogeneous  structure spleen and small retroperitoneal lymphadenopathy; unchanged. Degenerative changes of the bony structures. Soft tissue anasarca. Impression IMPRESSION:  1.  Bilateral pleural effusions and lower lung consolidations, the left lower  lung being completely collapsed, this is similar to previous. 2. Interval development patchy airspace disease in the aerated lungs consistent  with pneumonia. 3. Abnormal spleen, small retroperitoneal lymphadenopathy unchanged. 4. Atherosclerosis. IMPRESSION:   1. Acute respiratory failure resolved  2. Sepsis   3. Community-acquired versus hospital-acquired pneumonia has gram-positive cocci in 1 of 4 blood bottles MRSA in nares  4. Hypokalemia, resolved  5. Pharyngeal dysphagia speech has evaluated and he has totally ineffective swallow discussed with him placing NG tube for tube feeding  6. Hypernatremia, resolved. 7. History of lymphoma and prostate cancer  8. Acute on chronic kidney disease creatinine improved (baseline is 1.6)  9. CHF with diastolic dysfunction chest x-ray improving        RECOMMENDATIONS/PLAN:     1. Patient is on room air  2. Blood culture 1 of 4 with gram-positive cocci may be skin contaminant but remains on vancomycin & merrem, WBC trending down: 8.6  3. Nasal MRSA smear positive; on Bactroban.   4. Hypernatremia resolved: 140; Hypokalemia resolved: 4.5         Alena Lennox, MD

## 2020-12-19 NOTE — PROGRESS NOTES
Hospitalist Progress Note             Daily Progress Note: 12/19/2020      Subjective: The patient is seen for follow  up. Patient is an 80-year-old man with history of recurrent pneumonia who was admitted on 12/11/2020 due to shortness of breath with hypoxia. He was also found to be in septic shock requiring norepinephrine. Respiratory failure found to be secondary to pneumonia. He was on BiPAP for respiratory failure and later transitioned to high flow on 12/14/2020. Patient has been on meropenem and vancomycin. Blood cultures collected on 12/11/2020 grew gram-positive cocci in clusters likely contamination.      Patient seen and examined at bedside  He is in no acute distress  He is now on room air  Peg tube was placed on 12/17/2020 for nutrition  He is tolerating tube feeding      Problem List:  Patient Active Problem List   Diagnosis Code    Lymphoma ([facility] C85.90    HTN (hypertension) I10    Prostate CA ([facility] C61    Gout M10.9    CRI (chronic renal insufficiency) N18.9    Hypercalcemia of malignancy E83.52    Cervical stenosis of spinal canal M48.02    Acute [facility] failure ([facility]F) ([facility]) N17.9    Lumbar canal stenosis M48.061    Frequent falls R29.6    Multiple falls R29.6    Weakness R53.1    Anemia D64.9    Opioid use F11.90    History of B-cell lymphoma Z85.72    Non-Hodgkin's lymphoma in relapse ([facility]) C85.90    Hypercalcemia E83.52    HAP (hospital-acquired pneumonia) J18.9, Y95    SIRS (systemic inflammatory response syndrome) ([facility]) R65.10    CHF (congestive heart failure) ([facility]) I50.9    Acute hypoxemic respiratory failure ([facility]) J96.01    Severe sepsis ([facility]) A41.9, R65.20    Person under investigation for COVID-19 Z20.828    BPPV (benign paroxysmal positional vertigo) H81.10         Medications reviewed  Current Facility-Administered Medications   Medication Dose Route Frequency    hydrOXYzine pamoate (VISTARIL) capsule 25 mg  25 mg Oral QID PRN  sertraline (ZOLOFT) tablet 50 mg  50 mg Oral QPM    isosorbide mononitrate ER (IMDUR) tablet 30 mg  30 mg Oral DAILY    dextrose 5% infusion  25 mL/hr IntraVENous CONTINUOUS    meropenem (MERREM) 1 g in sterile water (preservative free) 20 mL IV syringe  1 g IntraVENous Q8H    sodium chloride (NS) flush 5-40 mL  5-40 mL IntraVENous Q8H    sodium chloride (NS) flush 5-40 mL  5-40 mL IntraVENous PRN    acetaminophen (TYLENOL) tablet 650 mg  650 mg Oral Q6H PRN    ondansetron (ZOFRAN) injection 4 mg  4 mg IntraVENous Q6H PRN    famotidine (PF) (PEPCID) 20 mg in 0.9% sodium chloride 10 mL injection  20 mg IntraVENous BID    albuterol-ipratropium (DUO-NEB) 2.5 MG-0.5 MG/3 ML  3 mL Nebulization Q4H PRN       Review of Systems:   Review of Systems   Constitutional: Negative for chills, diaphoresis, fever, malaise/fatigue and weight loss. HENT: Negative for ear discharge, ear pain, hearing loss, nosebleeds, sinus pain and tinnitus. Respiratory: Negative for cough, hemoptysis, sputum production, shortness of breath and wheezing. Cardiovascular: Negative for chest pain, palpitations, orthopnea, claudication and leg swelling. Gastrointestinal: Negative for abdominal pain, constipation, diarrhea, heartburn, nausea and vomiting. Genitourinary: Negative for dysuria, flank pain, frequency, hematuria and urgency. Musculoskeletal: Negative for back pain, falls, joint pain, myalgias and neck pain. Neurological: Positive for weakness. Negative for dizziness, tingling, focal weakness, seizures and headaches. Psychiatric/Behavioral: Negative for depression, hallucinations, memory loss, substance abuse and suicidal ideas. The patient is not nervous/anxious. Objective:   Physical Exam  Constitutional:       General: He is not in acute distress. Appearance: He is ill-appearing. HENT:      Head: Normocephalic and atraumatic.       Mouth/Throat:      Mouth: Mucous membranes are moist. Pharynx: Oropharynx is clear. Eyes:      Pupils: Pupils are equal, round, and reactive to light. Neck:      Musculoskeletal: No neck rigidity. Cardiovascular:      Rate and Rhythm: Normal rate and regular rhythm. Heart sounds: No murmur. No friction rub. No gallop. Pulmonary:      Effort: Pulmonary effort is normal. No respiratory distress. Breath sounds: Normal breath sounds. No wheezing or rales. Abdominal:      General: Bowel sounds are normal. There is no distension. Palpations: Abdomen is soft. Tenderness: There is no abdominal tenderness. There is no rebound. Musculoskeletal: Normal range of motion. General: No swelling, tenderness, deformity or signs of injury. Skin:     General: Skin is warm and dry. Coloration: Skin is not jaundiced. Findings: No bruising, erythema or rash. Neurological:      General: No focal deficit present. Mental Status: He is alert and oriented to person, place, and time. Sensory: No sensory deficit. Motor: Weakness present. Gait: Gait normal.   Psychiatric:         Mood and Affect: Mood normal.         Behavior: Behavior normal.         Thought Content: Thought content normal.          Visit Vitals  BP (!) 140/71   Pulse (!) 109   Temp 98.3 °F (36.8 °C)   Resp 20   Ht 5' 2.99\" (1.6 m)   Wt 60.9 kg (134 lb 4.2 oz)   SpO2 95%   BMI 23.79 kg/m²         Data Review:       Recent Days:  Recent Labs     12/18/20  0629 12/17/20  0636   WBC 9.4 8.6   HGB 9.4* 9.5*   HCT 29.0* 28.9*   PLT 56* 44*     Recent Labs     12/18/20  0629 12/17/20  0636    140   K 4.5 4.5    103   CO2 32 34*   * 96   BUN 56* 59*   CREA 1.52* 1.32*   CA 8.5 8.7       Assessment/Plan     1. Severe sepsis   Due to pneumonia  Repeat blood culture collected on 12/15/2020 showing no growth so far   Continue with  Meropenem stop vancomycin    2. Acute hypoxic respiratory failure  Resolved  Secondary to pneumonia.   He is now on room air     3. Acute on chronic kidney disease stage III  Complicated by sepsis  Creatinine is  1.52  Baseline around 1.6-1.7  Continue gentle IV rehydration    4. Aspiration pneumonia  Secondary to dysphagia  Pegtube was placed on 12/17/2020 for nutrition  Continue with meropenem and transition to Augmentin on discharge  Repeat blood cultures collected on 12/15/2020 negative    5. Hypernatremia  Now resolved. 6.  Dysphagia  Contributing to recurrent pneumonia  MBS was done on 12/18/20 and patient could not tolerate any food consistency with out aspirating. Continue PEG tube feeding for nutrition    CBC, BMP in a.m. Heparin for DVT prophylaxis    Comments: He is improving clinically and now able to tolerate PEG tube feeding. He is now on room air. I am anticipating discharge on MOnday    I will called patient's [address] [place] [address], [phone] and discussed treatment plan with her.     Estuardo Burgos NP

## 2020-12-19 NOTE — PROGRESS NOTES
Hospitalist Progress Note             Daily Progress Note: 12/19/2020      Subjective: The patient is seen for follow  up. Patient is an 43-year-old man with history of recurrent pneumonia who was admitted on 12/11/2020 due to shortness of breath with hypoxia. He was also found to be in septic shock requiring norepinephrine. Respiratory failure found to be secondary to pneumonia. He was on BiPAP for respiratory failure and later transitioned to high flow on 12/14/2020. Patient has been on meropenem and vancomycin. Blood cultures collected on 12/11/2020 grew gram-positive cocci in clusters likely contamination. Due to poor  nutrition he is on NG tube feeding.     Patient seen and examined at bedside  He is in no acute distress  He is now on room air  Peg tube was placed on 12/17/2020 for nutrition  He is tolerating tube feeding      Problem List:  Patient Active Problem List   Diagnosis Code    Lymphoma ([facility] C85.90    HTN (hypertension) I10    Prostate CA ([facility] C61    Gout M10.9    CRI (chronic renal insufficiency) N18.9    Hypercalcemia of malignancy E83.52    Cervical stenosis of spinal canal M48.02    Acute renal failure (ARF) ([facility] N17.9    Lumbar canal stenosis M48.061    Frequent falls R29.6    Multiple falls R29.6    Weakness R53.1    Anemia D64.9    Opioid use F11.90    History of B-cell lymphoma Z85.72    Non-Hodgkin's lymphoma in relapse ([facility]) C85.90    Hypercalcemia E83.52    HAP (hospital-acquired pneumonia) J18.9, Y95    SIRS (systemic inflammatory response syndrome) ([facility]) R65.10    CHF (congestive heart failure) ([facility]) I50.9    Acute hypoxemic respiratory failure ([facility]) J96.01    Severe sepsis ([facility]) A41.9, R65.20    Person under investigation for COVID-19 Z20.828    BPPV (benign paroxysmal positional vertigo) H81.10         Medications reviewed  Current Facility-Administered Medications   Medication Dose Route Frequency    hydrOXYzine pamoate (VISTARIL) capsule 25 mg  25 mg Oral QID PRN    sertraline (ZOLOFT) tablet 50 mg  50 mg Oral QPM    isosorbide mononitrate ER (IMDUR) tablet 30 mg  30 mg Oral DAILY    dextrose 5% infusion  50 mL/hr IntraVENous CONTINUOUS    meropenem (MERREM) 1 g in sterile water (preservative free) 20 mL IV syringe  1 g IntraVENous Q8H    sodium chloride (NS) flush 5-40 mL  5-40 mL IntraVENous Q8H    sodium chloride (NS) flush 5-40 mL  5-40 mL IntraVENous PRN    acetaminophen (TYLENOL) tablet 650 mg  650 mg Oral Q6H PRN    ondansetron (ZOFRAN) injection 4 mg  4 mg IntraVENous Q6H PRN    famotidine (PF) (PEPCID) 20 mg in 0.9% sodium chloride 10 mL injection  20 mg IntraVENous BID    albuterol-ipratropium (DUO-NEB) 2.5 MG-0.5 MG/3 ML  3 mL Nebulization Q4H PRN       Review of Systems:   Review of Systems   Constitutional: Negative for chills, diaphoresis, fever, malaise/fatigue and weight loss. HENT: Negative for ear discharge, ear pain, hearing loss, nosebleeds, sinus pain and tinnitus. Respiratory: Negative for cough, hemoptysis, sputum production, shortness of breath and wheezing. Cardiovascular: Negative for chest pain, palpitations, orthopnea, claudication and leg swelling. Gastrointestinal: Negative for abdominal pain, constipation, diarrhea, heartburn, nausea and vomiting. Genitourinary: Negative for dysuria, flank pain, frequency, hematuria and urgency. Musculoskeletal: Negative for back pain, falls, joint pain, myalgias and neck pain. Neurological: Positive for weakness. Negative for dizziness, tingling, focal weakness, seizures and headaches. Psychiatric/Behavioral: Negative for depression, hallucinations, memory loss, substance abuse and suicidal ideas. The patient is not nervous/anxious. Objective:   Physical Exam  Constitutional:       General: He is not in acute distress. Appearance: He is ill-appearing. HENT:      Head: Normocephalic and atraumatic. Mouth/Throat:      Mouth: Mucous membranes are moist.      Pharynx: Oropharynx is clear. Eyes:      Pupils: Pupils are equal, round, and reactive to light. Neck:      Musculoskeletal: No neck rigidity. Cardiovascular:      Rate and Rhythm: Normal rate and regular rhythm. Heart sounds: No murmur. No friction rub. No gallop. Pulmonary:      Effort: Pulmonary effort is normal. No respiratory distress. Breath sounds: Normal breath sounds. No wheezing or rales. Abdominal:      General: Bowel sounds are normal. There is no distension. Palpations: Abdomen is soft. Tenderness: There is no abdominal tenderness. There is no rebound. Musculoskeletal: Normal range of motion. General: No swelling, tenderness, deformity or signs of injury. Skin:     General: Skin is warm and dry. Coloration: Skin is not jaundiced. Findings: No bruising, erythema or rash. Neurological:      General: No focal deficit present. Mental Status: He is alert and oriented to person, place, and time. Sensory: No sensory deficit. Motor: Weakness present. Gait: Gait normal.   Psychiatric:         Mood and Affect: Mood normal.         Behavior: Behavior normal.         Thought Content: Thought content normal.          Visit Vitals  BP (!) 140/71   Pulse (!) 109   Temp 98.3 °F (36.8 °C)   Resp 20   Ht 5' 2.99\" (1.6 m)   Wt 60.9 kg (134 lb 4.2 oz)   SpO2 95%   BMI 23.79 kg/m²         Data Review:       Recent Days:  Recent Labs     12/18/20  0629 12/17/20  0636   WBC 9.4 8.6   HGB 9.4* 9.5*   HCT 29.0* 28.9*   PLT 56* 44*     Recent Labs     12/18/20  0629 12/17/20  0636    140   K 4.5 4.5    103   CO2 32 34*   * 96   BUN 56* 59*   CREA 1.52* 1.32*   CA 8.5 8.7       Assessment/Plan     1. Severe sepsis   Due to pneumonia  Repeat blood culture collected on 12/15/2020 showing no growth so far   Continue with  Meropenem stop vancomycin    2.  Acute hypoxic respiratory failure  Resolved  Secondary to pneumonia. He is now on room air     3. Acute on chronic kidney disease stage III  Complicated by sepsis  Creatinine is  1.52  Baseline around 1.6-1.7  Continue gentle IV rehydration    4. Aspiration pneumonia  Secondary to dysphagia  Pegtube was placed on 12/17/2020 for nutrition  Continue with meropenem and transition to Augmentin on discharge  Repeat blood cultures collected on 12/15/2020 negative    5. Hypernatremia  Now resolved. 6.  Dysphagia  Contributing to recurrent pneumonia  Unable to tolerate PO due to aspiration so PEG tube has been placed for nutrition    CBC, BMP in a.m. Heparin for DVT prophylaxis    Comments: He is improving clinically and now able to tolerate PEG tube feeding. He is now on room air. I am anticipating discharge on MOnday    I will called patient's [address] [place] [address], [phone] and discussed treatment plan with her.     Abdon Grace NP

## 2020-12-19 NOTE — PROGRESS NOTES
Problem: Self Care Deficits Care Plan (Adult)  Goal: *Acute Goals and Plan of Care (Insert Text)  Description: GOAL 1 - PT WILL TOLERATE 10 MINUTES OF BUE T/E EACH OT SESSION. GOAL 2 - PT WILL TRANSFER SUPINE TO EOB MAX ASSISTANCE. AND MAINTAIN MIDLINE POSITION X 3 MINUTES  GOAL 3- PT WILL PERFORM GROOMING TASK GIVEN MIN ASSISTANCE. Outcome: Progressing Towards Goal   OCCUPATIONAL THERAPY TREATMENT  Patient: Ankur Guillory (15 y.o. male)  Date: 12/19/2020  Diagnosis: Severe sepsis ([facility] [A41.9, R65.20] <principal problem not specified>  Procedure(s) (LRB):  PERCUTANEOUS ENDOSCOPIC GASTROSTOMY TUBE INSERTION (N/A)  ESOPHAGOGASTRODUODENOSCOPY (EGD) (N/A) 2 Days Post-Op  Precautions:  BPPV  Chart, occupational therapy assessment, plan of care, and goals were reviewed. ASSESSMENT  Patient continues with skilled OT services and is progressing towards goals. Pt received in room with attending nurse providing IV medications; reported pt had Delio Larissa last night so he may still have side effects of medication this morning. Nurse reported pt may complete oral care with NPO order. Co-treat with PT. Pt opened eyes with tactile cues, improved with following verbal command to open eyes during session. Pt Dep x 2 for supine>seated EOB transfer with HOB elevated. Pt required min A x 2 for sitting balance, progressed to SBA with LUE positioned in full extension for propped sitting. Pt reported dizziness with sitting but improved with time. Pt able to maintain position for 1 minute with SBA then required min A x1 for to maintain position. Pt fatigued quickly, transferred seated>sup with 2 person assist; 2 person assist to scoot up in bed with two pillows positioned behind neck. Pt completed oral care supported upright in bed, able to verbalize/gesture he was L handed for task.  Pt required tactile/verbal cues to open mouth and eyes during task; max A for supporting LUE, brushing, and rotating brush in hand with occasional brushing completed by pt. Pt had difficulty spitting toothpaste out of mouth and required finger sweep with washcloth to clear mouth. Verbalized for pt to complete forced coughing after oral care for clearance. Notified nursing of pt's status in session. Pt demonstrated improved sitting tolerance today, and is slowing progressing towards goals. Pt is limited by his BPPV and decreased trunk stability and activity tolerance. Continue with POC. Recommend discharge to SNF at this time. Current Level of Function Impacting Discharge (ADLs): Max A for bed level grooming/oral care    Other factors to consider for discharge: BPPV, lives alone       PLAN :  Patient continues to benefit from skilled intervention to address the above impairments. Continue treatment per established plan of care. to address goals. Recommend with staff: encourage upright position in bed and BUE ROM with simple grooming    Recommend next OT session: sitting balance and tolerance for up to or over 1 minute; BUE AROM; core strengthening    Recommendation for discharge: (in order for the patient to meet his/her long term goals)  Therapy up to 5 days/week in SNF setting    This discharge recommendation:  Has not yet been discussed the attending provider and/or case management    IF patient discharges home will need the following DME: bedside commode, hospital bed, transfer bench and wheelchair       SUBJECTIVE:   Patient stated [place]. \" To dizziness upon sitting up. OBJECTIVE DATA SUMMARY:   Cognitive/Behavioral Status:  Neurologic State: Drowsy; Lethargic    Functional Mobility and Transfers for ADLs:  Bed Mobility:  Supine to Sit: Assist x2;Total assistance  Sit to Supine: Assist x2;Total assistance  Scooting: Total assistance;Assist x2    Transfers:  Unsafe at this time  Balance:  Sitting: Impaired; With support(see note)  Sitting - Static: (see note)    ADL Intervention:    Grooming  Grooming Assistance:  Moderate assistance  Position Performed: Long sitting on bed  Brushing Teeth: Maximum assistance  Cues: Physical assistance;Verbal cues provided    Pain:  Unable to report; pt grimaced at movement in bed    Activity Tolerance:   Fair and requires rest breaks  Please refer to the flowsheet for vital signs taken during this treatment. After treatment patient left in no apparent distress:   Supine in bed and Bed / chair alarm activated    COMMUNICATION/COLLABORATION:   The patients plan of care was discussed with: Physical therapist and Registered nurse.      Barbara Salinas OT  Time Calculation: 28 mins

## 2020-12-20 LAB
ANION GAP SERPL CALC-SCNC: 6 MMOL/L (ref 5–15)
BACTERIA SPEC CULT: NORMAL
BASOPHILS # BLD: 0 K/UL (ref 0–0.1)
BASOPHILS NFR BLD: 0 % (ref 0–1)
BUN SERPL-MCNC: 59 MG/DL (ref 6–20)
BUN/CREAT SERPL: 36 (ref 12–20)
CA-I BLD-MCNC: 8.6 MG/DL (ref 8.5–10.1)
CHLORIDE SERPL-SCNC: 97 MMOL/L (ref 97–108)
CO2 SERPL-SCNC: 30 MMOL/L (ref 21–32)
CREAT SERPL-MCNC: 1.64 MG/DL (ref 0.7–1.3)
DIFFERENTIAL METHOD BLD: ABNORMAL
EOSINOPHIL # BLD: 0.2 K/UL (ref 0–0.4)
EOSINOPHIL NFR BLD: 2 % (ref 0–7)
ERYTHROCYTE [DISTWIDTH] IN BLOOD BY AUTOMATED COUNT: 16.4 % (ref 11.5–14.5)
GLUCOSE BLD STRIP.AUTO-MCNC: 134 MG/DL (ref 65–100)
GLUCOSE SERPL-MCNC: 103 MG/DL (ref 65–100)
GRAM STN SPEC: NORMAL
GRAM STN SPEC: NORMAL
HCT VFR BLD AUTO: 28.6 % (ref 36.6–50.3)
HGB BLD-MCNC: 9.5 G/DL (ref 12.1–17)
IMM GRANULOCYTES # BLD AUTO: 0 K/UL
IMM GRANULOCYTES NFR BLD AUTO: 0 %
LYMPHOCYTES # BLD: 0.6 K/UL (ref 0.8–3.5)
LYMPHOCYTES NFR BLD: 5 % (ref 12–49)
MCH RBC QN AUTO: 29.8 PG (ref 26–34)
MCHC RBC AUTO-ENTMCNC: 33.2 G/DL (ref 30–36.5)
MCV RBC AUTO: 89.7 FL (ref 80–99)
MONOCYTES # BLD: 1.1 K/UL (ref 0–1)
MONOCYTES NFR BLD: 9 % (ref 5–13)
NEUTS SEG # BLD: 10 K/UL (ref 1.8–8)
NEUTS SEG NFR BLD: 84 % (ref 32–75)
NRBC # BLD: 0 K/UL (ref 0–0.01)
NRBC BLD-RTO: 0 PER 100 WBC
PERFORMED BY, TECHID: ABNORMAL
PLATELET # BLD AUTO: 85 K/UL (ref 150–400)
POTASSIUM SERPL-SCNC: 4.7 MMOL/L (ref 3.5–5.1)
RBC # BLD AUTO: 3.19 M/UL (ref 4.1–5.7)
RBC MORPH BLD: ABNORMAL
RBC MORPH BLD: ABNORMAL
SODIUM SERPL-SCNC: 133 MMOL/L (ref 136–145)
SPECIAL REQUESTS,SREQ: NORMAL
WBC # BLD AUTO: 11.9 K/UL (ref 4.1–11.1)

## 2020-12-20 PROCEDURE — 65270000029 HC RM PRIVATE

## 2020-12-20 PROCEDURE — 85025 COMPLETE CBC W/AUTO DIFF WBC: CPT

## 2020-12-20 PROCEDURE — 82962 GLUCOSE BLOOD TEST: CPT

## 2020-12-20 PROCEDURE — 36415 COLL VENOUS BLD VENIPUNCTURE: CPT

## 2020-12-20 PROCEDURE — 74011250637 HC RX REV CODE- 250/637: Performed by: NURSE PRACTITIONER

## 2020-12-20 PROCEDURE — 80048 BASIC METABOLIC PNL TOTAL CA: CPT

## 2020-12-20 PROCEDURE — 74011250636 HC RX REV CODE- 250/636: Performed by: EMERGENCY MEDICINE

## 2020-12-20 PROCEDURE — 74011000250 HC RX REV CODE- 250: Performed by: EMERGENCY MEDICINE

## 2020-12-20 RX ORDER — DOXYCYCLINE 100 MG/1
100 CAPSULE ORAL EVERY 12 HOURS
Status: DISCONTINUED | OUTPATIENT
Start: 2020-12-20 | End: 2020-12-21 | Stop reason: HOSPADM

## 2020-12-20 RX ADMIN — SERTRALINE HYDROCHLORIDE 50 MG: 50 TABLET ORAL at 17:36

## 2020-12-20 RX ADMIN — Medication 10 ML: at 22:00

## 2020-12-20 RX ADMIN — MEROPENEM 1 G: 1 INJECTION, POWDER, FOR SOLUTION INTRAVENOUS at 00:55

## 2020-12-20 RX ADMIN — DOXYCYCLINE HYCLATE 100 MG: 100 CAPSULE ORAL at 12:06

## 2020-12-20 RX ADMIN — MEROPENEM 1 G: 1 INJECTION, POWDER, FOR SOLUTION INTRAVENOUS at 09:37

## 2020-12-20 RX ADMIN — Medication 10 ML: at 06:35

## 2020-12-20 RX ADMIN — FAMOTIDINE 20 MG: 20 TABLET ORAL at 17:36

## 2020-12-20 RX ADMIN — DOXYCYCLINE HYCLATE 100 MG: 100 CAPSULE ORAL at 20:48

## 2020-12-20 RX ADMIN — METOPROLOL TARTRATE 12.5 MG: 25 TABLET, FILM COATED ORAL at 20:48

## 2020-12-20 NOTE — PROGRESS NOTES
Hospitalist Progress Note             Daily Progress Note: 12/20/2020      Subjective: The patient is seen for follow  up. Patient is an 25-year-old man with history of recurrent pneumonia who was admitted on 12/11/2020 due to shortness of breath with hypoxia. He was also found to be in septic shock requiring norepinephrine. Respiratory failure found to be secondary to pneumonia. He was on BiPAP for respiratory failure and later transitioned to high flow on 12/14/2020. Patient has been on meropenem and vancomycin. Blood cultures collected on 12/11/2020 grew gram-positive cocci in clusters likely contamination.      Patient seen and examined at bedside  He is in no acute distress  He is now on room air  Peg tube was placed on 12/17/2020 for nutrition  He is tolerating tube feeding      Problem List:  Patient Active Problem List   Diagnosis Code    Lymphoma ([facility] C85.90    HTN (hypertension) I10    Prostate CA ([facility] C61    Gout M10.9    CRI (chronic renal insufficiency) N18.9    Hypercalcemia of malignancy E83.52    Cervical stenosis of spinal canal M48.02    Acute renal failure (ARF) ([facility]) N17.9    Lumbar canal stenosis M48.061    Frequent falls R29.6    Multiple falls R29.6    Weakness R53.1    Anemia D64.9    Opioid use F11.90    History of B-cell lymphoma Z85.72    Non-Hodgkin's lymphoma in relapse ([facility]) C85.90    Hypercalcemia E83.52    HAP (hospital-acquired pneumonia) J18.9, Y95    SIRS (systemic inflammatory response syndrome) ([facility]) R65.10    CHF (congestive heart failure) ([facility]) I50.9    Acute hypoxemic respiratory failure ([facility]) J96.01    Severe sepsis ([facility]) A41.9, R65.20    Person under investigation for COVID-19 Z20.828    BPPV (benign paroxysmal positional vertigo) H81.10         Medications reviewed  Current Facility-Administered Medications   Medication Dose Route Frequency    metoprolol tartrate (LOPRESSOR) tablet 12.5 mg  12.5 mg Oral Q12H  famotidine (PEPCID) tablet 20 mg  20 mg Oral Q24H    hydrOXYzine pamoate (VISTARIL) capsule 25 mg  25 mg Oral QID PRN    sertraline (ZOLOFT) tablet 50 mg  50 mg Oral QPM    dextrose 5% infusion  25 mL/hr IntraVENous CONTINUOUS    meropenem (MERREM) 1 g in sterile water (preservative free) 20 mL IV syringe  1 g IntraVENous Q8H    sodium chloride (NS) flush 5-40 mL  5-40 mL IntraVENous Q8H    sodium chloride (NS) flush 5-40 mL  5-40 mL IntraVENous PRN    acetaminophen (TYLENOL) tablet 650 mg  650 mg Oral Q6H PRN    ondansetron (ZOFRAN) injection 4 mg  4 mg IntraVENous Q6H PRN    albuterol-ipratropium (DUO-NEB) 2.5 MG-0.5 MG/3 ML  3 mL Nebulization Q4H PRN       Review of Systems:   Review of Systems   Constitutional: Negative for chills, diaphoresis, fever, malaise/fatigue and weight loss. HENT: Negative for ear discharge, ear pain, hearing loss, nosebleeds, sinus pain and tinnitus. Respiratory: Negative for cough, hemoptysis, sputum production, shortness of breath and wheezing. Cardiovascular: Negative for chest pain, palpitations, orthopnea, claudication and leg swelling. Gastrointestinal: Negative for abdominal pain, constipation, diarrhea, heartburn, nausea and vomiting. Genitourinary: Negative for dysuria, flank pain, frequency, hematuria and urgency. Musculoskeletal: Negative for back pain, falls, joint pain, myalgias and neck pain. Neurological: Positive for weakness. Negative for dizziness, tingling, focal weakness, seizures and headaches. Psychiatric/Behavioral: Negative for depression, hallucinations, memory loss, substance abuse and suicidal ideas. The patient is not nervous/anxious. Objective:   Physical Exam  Constitutional:       General: He is not in acute distress. Appearance: He is ill-appearing. HENT:      Head: Normocephalic and atraumatic. Mouth/Throat:      Mouth: Mucous membranes are moist.      Pharynx: Oropharynx is clear.    Eyes: Pupils: Pupils are equal, round, and reactive to light. Neck:      Musculoskeletal: No neck rigidity. Cardiovascular:      Rate and Rhythm: Normal rate and regular rhythm. Heart sounds: No murmur. No friction rub. No gallop. Pulmonary:      Effort: Pulmonary effort is normal. No respiratory distress. Breath sounds: Normal breath sounds. No wheezing or rales. Abdominal:      General: Bowel sounds are normal. There is no distension. Palpations: Abdomen is soft. Tenderness: There is no abdominal tenderness. There is no rebound. Musculoskeletal: Normal range of motion. General: No swelling, tenderness, deformity or signs of injury. Skin:     General: Skin is warm and dry. Coloration: Skin is not jaundiced. Findings: No bruising, erythema or rash. Neurological:      General: No focal deficit present. Mental Status: He is alert and oriented to person, place, and time. Sensory: No sensory deficit. Motor: Weakness present. Gait: Gait normal.   Psychiatric:         Mood and Affect: Mood normal.         Behavior: Behavior normal.         Thought Content: Thought content normal.          Visit Vitals  BP (!) 113/44 (BP 1 Location: Left arm, BP Patient Position: At rest;Supine; Head of bed elevated (Comment degrees))   Pulse 97   Temp 97.2 °F (36.2 °C)   Resp 20   Ht 5' 2.99\" (1.6 m)   Wt 60.9 kg (134 lb 4.2 oz)   SpO2 94%   BMI 23.79 kg/m²         Data Review:       Recent Days:  Recent Labs     12/20/20  0811 12/18/20  0629   WBC 11.9* 9.4   HGB 9.5* 9.4*   HCT 28.6* 29.0*   PLT 85* 56*     Recent Labs     12/20/20  0811 12/18/20  0629   * 138   K 4.7 4.5   CL 97 101   CO2 30 32   * 109*   BUN 59* 56*   CREA 1.64* 1.52*   CA 8.6 8.5       Assessment/Plan     1. Severe sepsis   Due to pneumonia  Repeat blood culture collected on 12/15/2020 showing no growth so far   Stop and transition to Doxycycline 100 mg bid X 5 more days     2.  Acute hypoxic respiratory failure  Resolved  Secondary to pneumonia. He is now on room air     3. Acute on chronic kidney disease stage III  Complicated by sepsis  Creatinine is  1.64  Baseline around 1.6-1.7  Continue gentle IV rehydration    4. Aspiration pneumonia  Secondary to dysphagia  Pegtube was placed on 12/17/2020 for nutrition  Continue with meropenem and transition to Augmentin on discharge  Repeat blood cultures collected on 12/15/2020 negative    5. Hypernatremia  Now resolved. 6.  Dysphagia  Contributing to recurrent pneumonia  MBS was done on 12/18/20 and patient could not tolerate any food consistency with out aspirating. Continue PEG tube feeding for nutrition    CBC, BMP in a.m. Heparin for DVT prophylaxis    Comments: He is improving clinically and now able to tolerate PEG tube feeding. He is now on room air. I am anticipating discharge on Monday    I will called patient's POA Bryn Cranker, [phone] and discussed treatment plan with her.     Marek Bailey NP

## 2020-12-20 NOTE — PROGRESS NOTES
PULMONARY NOTE  [facility]    Name: Princess Solis MRN: [id number]   : 1932 Hospital: [facility]   Date: 2020  Admission date: 2020 Hospital Day: 10       HPI:     Hospital Problems  Date Reviewed: 2020          Codes Class Noted POA    Severe sepsis ([address] ICD-10-CM: A41.9, R65.20  ICD-9-CM: 038.9, 995.92  2020 Yes        HAP (hospital-acquired pneumonia) ICD-10-CM: J18.9, Y95  ICD-9-CM: 627  2020 Yes        Cervical stenosis of spinal canal ICD-10-CM: M48.02  ICD-9-CM: 723.0  2020 Yes        Acute renal failure (ARF) (HCC) ICD-10-CM: N17.9  ICD-9-CM: 584.9  2020 Yes        CRI (chronic renal insufficiency) (Chronic) ICD-10-CM: N18.9  ICD-9-CM: 585.9  2012 Yes        HTN (hypertension) ICD-10-CM: I10  ICD-9-CM: 401.9  2011 Yes                   [x] High complexity decision making was performed  [x] See my orders for details      Subjective/Initial History:     I was asked by Joseph Spencer MD to see Princess Solis  a 80 y.o.  male in consultation     Excerpts from admission 2020 or consult notes as follows:   , 72-year-old male came in because of shortness of breath dyspnea and he was hypoxic he had history of pneumonia recently discharged 4 days ago he has recurrent hospitalization because of infection pneumonia significant past medical history of chronic kidney disease, lymphoma and prostate cancer he was put on 100% nonrebreather mask his saturation was in 80s now he is on blood percent FiO2 and a shock state he was put on vasopressor Levophed so admitted and critical care consult was called.  awake alert asking for water has gurgly upper airway noise. Norepinephrine down to 2 mics with well-maintained blood pressure. 12/15: Patient is seen on follow up. Resting comfortably on 45% high flow. : Patient is seen on follow up. He is now on 3.5L O2 via nasal cannula and O2 sats are 99%.  Resting comfortably. 12/17: Patient continues to improve work of breathing. He is now tolerating 2.5L O2 via nasal cannula and oxygen saturation is 99%. Allergies   Allergen Reactions    Pcn [Penicillins] Swelling        MAR reviewed and pertinent medications noted or modified as needed     Current Facility-Administered Medications   Medication    doxycycline (VIBRAMYCIN) capsule 100 mg    metoprolol tartrate (LOPRESSOR) tablet 12.5 mg    famotidine (PEPCID) tablet 20 mg    hydrOXYzine pamoate (VISTARIL) capsule 25 mg    sertraline (ZOLOFT) tablet 50 mg    dextrose 5% infusion    sodium chloride (NS) flush 5-40 mL    sodium chloride (NS) flush 5-40 mL    acetaminophen (TYLENOL) tablet 650 mg    ondansetron (ZOFRAN) injection 4 mg    albuterol-ipratropium (DUO-NEB) 2.5 MG-0.5 MG/3 ML      Patient PCP: Shaun Diaz MD  PMH:  has a past medical history of CRI (chronic renal insufficiency) (12/13/2012), Gout (1/4/2011), Hypertension, Lymphoma ([facility] and Prostate CA ([facility] (1/4/2011). PSH:   has a past surgical history that includes hx prostatectomy; hc bone marrow biopsy; endoscopy, colon, diagnostic (2003); and ir thoracentesis cath w image (12/15/2020). FHX: family history includes Hypertension in his mother. SHX:  reports that he has never smoked. He has never used smokeless tobacco. He reports previous drug use. He reports that he does not drink alcohol. ROS:    Review of Systems   Constitutional: Positive for malaise/fatigue. HENT: Negative. Eyes: Negative. Respiratory: Positive for cough and shortness of breath. Cardiovascular: Negative. Gastrointestinal: Negative. Genitourinary: Negative. Musculoskeletal: Negative. Skin: Negative. Neurological: Negative. Endo/Heme/Allergies: Negative. Psychiatric/Behavioral: Negative.          Objective:     Vital Signs: Telemetry:    normal sinus rhythm Intake/Output:   Visit Vitals  BP (!) 113/44 (BP 1 Location: Left arm, BP Patient Position: At rest;Supine; Head of bed elevated (Comment degrees))   Pulse 97   Temp 97.2 °F (36.2 °C)   Resp 20   Ht 5' 2.99\" (1.6 m)   Wt 60.9 kg (134 lb 4.2 oz)   SpO2 94%   BMI 23.79 kg/m²       Temp (24hrs), Av.7 °F (36.5 °C), Min:97.2 °F (36.2 °C), Max:98.1 °F (36.7 °C)        O2 Device: Room air O2 Flow Rate (L/min): 2 l/min       Wt Readings from Last 4 Encounters:   20 60.9 kg (134 lb 4.2 oz)   20 64.3 kg (141 lb 12.1 oz)   20 64.9 kg (143 lb)   20 61.7 kg (136 lb)          Intake/Output Summary (Last 24 hours) at 2020 1107  Last data filed at 2020 1803  Gross per 24 hour   Intake 60 ml   Output --   Net 60 ml       Last shift:      No intake/output data recorded. Last 3 shifts:  1901 -  0700  In: 60   Out: -        Physical Exam:     Physical Exam   Constitutional: He is oriented to person, place, and time. HENT:   Head: Normocephalic and atraumatic. Eyes: Pupils are equal, round, and reactive to light. Conjunctivae and EOM are normal.   Neck: Normal range of motion. Neck supple. Cardiovascular: Normal rate and regular rhythm. Pulmonary/Chest: Effort normal. He has rales. Expiratory rhonchi has upper airway noise over the neck   Abdominal: Soft. Bowel sounds are normal.   Thin   Musculoskeletal: Normal range of motion. Neurological: He is alert and oriented to person, place, and time. Skin: Skin is warm and dry. Psychiatric: He has a normal mood and affect. Labs:    Recent Labs     20  0811 20  0629   WBC 11.9* 9.4   HGB 9.5* 9.4*   PLT 85* 56*     Recent Labs     20  0811 20  0629   * 138   K 4.7 4.5   CL 97 101   CO2 30 32   * 109*   BUN 59* 56*   CREA 1.64* 1.52*   CA 8.6 8.5     No results for input(s): PH, PCO2, PO2, HCO3, FIO2 in the last 72 hours. No results for input(s): CPK, CKNDX, TROIQ in the last 72 hours.     No lab exists for component: CPKMB    Lab Results   Component Value Date/Time    Culture result: No growth 4 days 12/15/2020 08:33 PM    Culture result: No growth 3 days 12/14/2020 02:45 PM    Culture result:  12/11/2020 11:21 AM     Gram Positive Cocci CALLED TO AND READ BACK BY JOSSELIN GERMAN 12/12/20 1337 TLW    Culture result:  12/11/2020 11:21 AM     Staphylococcus species, coagulase negative , ISOLATED FROM AEROBIC BOTTLE RECEIVED    Culture result:  12/11/2020 11:21 AM     * methicillin resistant staphylococcus aureus * ** ISOLATED FROM ANAEROBIC BOTTLE    Culture result: CALLED MRSA TO [facility] 1100 12/15/20 12/11/2020 11:21 AM     Lab Results   Component Value Date/Time    TSH 1.79 11/20/2020 01:31 PM       Imaging:    CXR Results  (Last 48 hours)    None        Results from [place] encounter on 12/11/20   XR SWALLOW FUNC VIDEO    Narrative Modified barium swallow. Weak swallow. Initially with nectar and honey consistency oral contrast, there is no  demonstrated aspiration or penetration. Significant retention through vallecula  more so than piriform sinuses. Patient has difficulty clearing material from  vallecula and piriform sinuses. With subsequent small volume thin consistency oral contrast, there is  penetration. Repetitive swallowing occurs in an attempt to clear material from  vallecula and piriform sinuses. With repetitive swallowing there is cumulative  deep penetration. Study reviewed with speech pathology. 280.9 DAP/2:16 min   XR CHEST PORT    Narrative Portable chest:    History:Respiratory failure    COMPARISON: Portable chest 12/13/2020    Lungs show hypoaeration. There is ill-defined hazy density at the right lung  base. Hazy parenchymal infiltration is seen in the left lung, left hemidiaphragm  is obscured. Heart is normal size. Calcified plaque involving the aortic arch. Left jugular venous catheter with the tip at the junction of SVC and right  atrium. Nasogastric tube with the tip in the stomach.       Impression Impression:Left lung airspace disease and suspected pleural fluid. Right basilar  pulmonary haziness compatible with airspace disease/atelectasis and trace of  pleural fluid. XR CHEST PORT    Narrative Chest single view. Comparison single view chest December 11, 2020. Improved aeration through lung bases. Stable left neck central venous catheter. Cardiac and mediastinal structures unchanged. Thoracic aorta atherosclerosis. No  pneumothorax or sizable pleural effusion. Results from [place] encounter on 12/11/20   CT CHEST WO CONT    Narrative Comparison chest x-ray 12/11/2020 [facility] and chest CTA 11/25/2020 [address] [place] [address]. TECHNIQUE: Axial imaging of the chest without IV contrast, with multiplanar  reformatting, MIPs. Dose reduction: All CT scans at this facility are performed using dose reduction  optimization techniques as appropriate to a performed exam including the  following: Automated exposure control, adjustments of the mA and/or kV according  to patient's size, or use of iterative reconstruction technique. FINDINGS: Left central catheter distal tip SVC. Mild cardiomegaly. Multivessel  coronary artery calcification. No pericardial effusion. Low-density intracardiac  blood suggests anemia. Calcified thoracic aorta without aneurysm. Pulmonary  arteries are not dilated. No mediastinal or hilar lymphadenopathy. Tubular  structure posterior to the esophagus possibly dilated vessel. Bilateral pleural  effusions and lower lung consolidations without air bronchograms. The left lower  lung is completely consolidated/collapsed. The aerated lungs demonstrate patchy  areas of airspace disease. No axillary adenopathy. Included thyroid is  unremarkable. Included upper abdomen demonstrates lobulated, heterogeneous  structure spleen and small retroperitoneal lymphadenopathy; unchanged. Degenerative changes of the bony structures. Soft tissue anasarca. Impression IMPRESSION:  1.  Bilateral pleural effusions and lower lung consolidations, the left lower  lung being completely collapsed, this is similar to previous. 2. Interval development patchy airspace disease in the aerated lungs consistent  with pneumonia. 3. Abnormal spleen, small retroperitoneal lymphadenopathy unchanged. 4. Atherosclerosis. IMPRESSION:   1. Acute respiratory failure resolved  2. Sepsis   3. Community-acquired versus hospital-acquired pneumonia has gram-positive cocci in 1 of 4 blood bottles MRSA in nares  4. Hypokalemia, resolved  5. Pharyngeal dysphagia speech has evaluated and he has totally ineffective swallow discussed with him placing NG tube for tube feeding  6. Hypernatremia, resolved. 7. History of lymphoma and prostate cancer  8. Acute on chronic kidney disease creatinine improved (baseline is 1.6)  9. CHF with diastolic dysfunction chest x-ray improving        RECOMMENDATIONS/PLAN:     1. Patient is on room air  2. Blood culture 1 of 4 with gram-positive cocci may be skin contaminant   3. Nasal MRSA smear positive; on Bactroban.   4. Hypernatremia resolved:  Hypokalemia resolved:         Possible discharge in am       Dhruv Tariq MD

## 2020-12-21 VITALS
HEIGHT: 63 IN | SYSTOLIC BLOOD PRESSURE: 104 MMHG | TEMPERATURE: 97.5 F | RESPIRATION RATE: 18 BRPM | OXYGEN SATURATION: 96 % | WEIGHT: 134.26 LBS | HEART RATE: 101 BPM | BODY MASS INDEX: 23.79 KG/M2 | DIASTOLIC BLOOD PRESSURE: 62 MMHG

## 2020-12-21 LAB
ANION GAP SERPL CALC-SCNC: 8 MMOL/L (ref 5–15)
BASOPHILS # BLD: 0 K/UL (ref 0–0.1)
BASOPHILS NFR BLD: 0 % (ref 0–1)
BUN SERPL-MCNC: 59 MG/DL (ref 6–20)
BUN/CREAT SERPL: 41 (ref 12–20)
CA-I BLD-MCNC: 8.5 MG/DL (ref 8.5–10.1)
CHLORIDE SERPL-SCNC: 95 MMOL/L (ref 97–108)
CO2 SERPL-SCNC: 30 MMOL/L (ref 21–32)
CREAT SERPL-MCNC: 1.43 MG/DL (ref 0.7–1.3)
DIFFERENTIAL METHOD BLD: ABNORMAL
EOSINOPHIL # BLD: 0.3 K/UL (ref 0–0.4)
EOSINOPHIL NFR BLD: 3 % (ref 0–7)
ERYTHROCYTE [DISTWIDTH] IN BLOOD BY AUTOMATED COUNT: 16.5 % (ref 11.5–14.5)
GLUCOSE BLD STRIP.AUTO-MCNC: 123 MG/DL (ref 65–100)
GLUCOSE SERPL-MCNC: 102 MG/DL (ref 65–100)
HCT VFR BLD AUTO: 25.9 % (ref 36.6–50.3)
HGB BLD-MCNC: 8.7 G/DL (ref 12.1–17)
IMM GRANULOCYTES # BLD AUTO: 0 K/UL (ref 0–0.04)
IMM GRANULOCYTES NFR BLD AUTO: 0 % (ref 0–0.5)
LYMPHOCYTES # BLD: 0.6 K/UL (ref 0.8–3.5)
LYMPHOCYTES NFR BLD: 6 % (ref 12–49)
MCH RBC QN AUTO: 29.9 PG (ref 26–34)
MCHC RBC AUTO-ENTMCNC: 33.6 G/DL (ref 30–36.5)
MCV RBC AUTO: 89 FL (ref 80–99)
MONOCYTES # BLD: 1.3 K/UL (ref 0–1)
MONOCYTES NFR BLD: 12 % (ref 5–13)
NEUTS SEG # BLD: 8.3 K/UL (ref 1.8–8)
NEUTS SEG NFR BLD: 79 % (ref 32–75)
PERFORMED BY, TECHID: ABNORMAL
PLATELET # BLD AUTO: 85 K/UL (ref 150–400)
POTASSIUM SERPL-SCNC: 5.1 MMOL/L (ref 3.5–5.1)
RBC # BLD AUTO: 2.91 M/UL (ref 4.1–5.7)
SODIUM SERPL-SCNC: 133 MMOL/L (ref 136–145)
WBC # BLD AUTO: 10.5 K/UL (ref 4.1–11.1)

## 2020-12-21 PROCEDURE — 36415 COLL VENOUS BLD VENIPUNCTURE: CPT

## 2020-12-21 PROCEDURE — 82962 GLUCOSE BLOOD TEST: CPT

## 2020-12-21 PROCEDURE — 85025 COMPLETE CBC W/AUTO DIFF WBC: CPT

## 2020-12-21 PROCEDURE — 80048 BASIC METABOLIC PNL TOTAL CA: CPT

## 2020-12-21 PROCEDURE — 97530 THERAPEUTIC ACTIVITIES: CPT

## 2020-12-21 PROCEDURE — 74011250637 HC RX REV CODE- 250/637: Performed by: NURSE PRACTITIONER

## 2020-12-21 RX ORDER — DOXYCYCLINE 100 MG/1
100 CAPSULE ORAL EVERY 12 HOURS
Qty: 10 CAP | Refills: 0 | Status: SHIPPED | OUTPATIENT
Start: 2020-12-21 | End: 2020-12-26

## 2020-12-21 RX ORDER — METOPROLOL TARTRATE 25 MG/1
12.5 TABLET, FILM COATED ORAL EVERY 12 HOURS
Qty: 30 TAB | Refills: 0 | Status: SHIPPED | OUTPATIENT
Start: 2020-12-21

## 2020-12-21 RX ORDER — HYDROXYZINE PAMOATE 25 MG/1
25 CAPSULE ORAL
Qty: 30 CAP | Refills: 0 | Status: SHIPPED | OUTPATIENT
Start: 2020-12-21 | End: 2021-01-04

## 2020-12-21 RX ORDER — FAMOTIDINE 20 MG/1
20 TABLET, FILM COATED ORAL EVERY 24 HOURS
Qty: 30 TAB | Refills: 0 | Status: SHIPPED | OUTPATIENT
Start: 2020-12-21

## 2020-12-21 RX ORDER — SERTRALINE HYDROCHLORIDE 50 MG/1
50 TABLET, FILM COATED ORAL EVERY EVENING
Qty: 30 TAB | Refills: 0 | Status: SHIPPED | OUTPATIENT
Start: 2020-12-21

## 2020-12-21 RX ORDER — IPRATROPIUM BROMIDE AND ALBUTEROL SULFATE 2.5; .5 MG/3ML; MG/3ML
3 SOLUTION RESPIRATORY (INHALATION)
Qty: 30 NEBULE | Refills: 0 | Status: SHIPPED | OUTPATIENT
Start: 2020-12-21 | End: 2021-01-17

## 2020-12-21 RX ADMIN — Medication 10 ML: at 06:19

## 2020-12-21 RX ADMIN — DOXYCYCLINE HYCLATE 100 MG: 100 CAPSULE ORAL at 09:34

## 2020-12-21 RX ADMIN — Medication 10 ML: at 14:33

## 2020-12-21 RX ADMIN — METOPROLOL TARTRATE 12.5 MG: 25 TABLET, FILM COATED ORAL at 09:34

## 2020-12-21 NOTE — PROGRESS NOTES
Patient continues to have a bed available at [facility] once medically stable. He will be going to room 203B. Report can be called to [phone]. ADDENDUM:  Patient has discharge orders to leave today. Notified patient's daughter, Ken Montnaa, at [phone], with no concerns.

## 2020-12-21 NOTE — PROGRESS NOTES
PHYSICAL THERAPY TREATMENT  Patient: Aleah Monique (35 y.o. male)  Date: 12/21/2020  Diagnosis: Severe sepsis ([facility] [A41.9, R65.20] <principal problem not specified>  Procedure(s) (LRB):  PERCUTANEOUS ENDOSCOPIC GASTROSTOMY TUBE INSERTION (N/A)  ESOPHAGOGASTRODUODENOSCOPY (EGD) (N/A) 4 Days Post-Op  Precautions:    Chart, physical therapy assessment, plan of care and goals were reviewed. ASSESSMENT  Patient continues with skilled PT services and is not progressing towards goals. Pt. Semi supine in bed upon arrival and agreeable to therapy session. Pt. [place], Poorly motivated, and gave up with semi supine LE TE. Complains of shoulder pain attempting supine pullovers. Held bed mobility and transfers due to BPPV and no secondary assistance. Recommend DC to SNF. Current Level of Function Impacting Discharge (mobility/balance): POOR MOTIVATION    Other factors to consider for discharge: TBD         PLAN :  Patient continues to benefit from skilled intervention to address the above impairments. Continue treatment per established plan of care. to address goals. Recommendation for discharge: (in order for the patient to meet his/her long term goals)  Therapy up to 5 days/week in SNF setting    This discharge recommendation:  Has been made in collaboration with the attending provider and/or case management    IF patient discharges home will need the following DME: to be determined (TBD)       SUBJECTIVE:   Patient stated HUH.    OBJECTIVE DATA SUMMARY:   Critical Behavior:  Neurologic State: Alert  Orientation Level: Oriented X4  Cognition: Follows commands     Functional Mobility Training:  Bed Mobility:  Rolling: Other (comment)(HELD DUE TO BPPV, NO secondary assitance)                 Transfers:                                   Balance:     Ambulation/Gait Training:                                                        Stairs:               Therapeutic Exercises:   Therapeutic Exercises:       EXERCISE Sets   Reps   Active Active Assist   Passive Self ROM   Comments   Ankle Pumps  20 [] [x] [] []    Quad Sets/Glut Sets   [] [] [] []    Hamstring Sets   [] [] [] []    Short Arc Quads   [] [] [] []    Heel Slides  20 [] [x] [] []    Straight Leg Raises   [] [] [] []    Hip abd/add  20 [x] [] [] []    Long Arc Quads   [] [] [] []    Marching   [] [] [] []       [] [] [] []        Pain Ratin    Activity Tolerance:   Poor  Please refer to the flowsheet for vital signs taken during this treatment. After treatment patient left in no apparent distress:   Supine in bed    COMMUNICATION/COLLABORATION:   The patients plan of care was discussed with: Physical therapy assistant.      Richard Jose   Time Calculation: 12 mins

## 2020-12-21 NOTE — PROGRESS NOTES
PULMONARY NOTE  [facility]    Name: Hannah Ty MRN: [id number]   : 1932 Hospital: [address] [place] [address]   Date: 2020  Admission date: 2020 Hospital Day: 11       HPI:     Hospital Problems  Date Reviewed: 2020          Codes Class Noted POA    Severe sepsis ([address] ICD-10-CM: A41.9, R65.20  ICD-9-CM: 038.9, 995.92  2020 Yes        HAP (hospital-acquired pneumonia) ICD-10-CM: J18.9, Y95  ICD-9-CM: 949  2020 Yes        Cervical stenosis of spinal canal ICD-10-CM: M48.02  ICD-9-CM: 723.0  2020 Yes        Acute renal failure (ARF) (HCC) ICD-10-CM: N17.9  ICD-9-CM: 584.9  2020 Yes        CRI (chronic renal insufficiency) (Chronic) ICD-10-CM: N18.9  ICD-9-CM: 585.9  2012 Yes        HTN (hypertension) ICD-10-CM: I10  ICD-9-CM: 401.9  2011 Yes                   [x] High complexity decision making was performed  [x] See my orders for details      Subjective/Initial History:     I was asked by Josh Talbot MD to see Hannah Ty  a 80 y.o.  male in consultation     Excerpts from admission 2020 or consult notes as follows:   , 42-year-old male came in because of shortness of breath dyspnea and he was hypoxic he had history of pneumonia recently discharged 4 days ago he has recurrent hospitalization because of infection pneumonia significant past medical history of chronic kidney disease, lymphoma and prostate cancer he was put on 100% nonrebreather mask his saturation was in 80s now he is on blood percent FiO2 and a shock state he was put on vasopressor Levophed so admitted and critical care consult was called.  awake alert asking for water has gurgly upper airway noise. Norepinephrine down to 2 mics with well-maintained blood pressure. 12/15: Patient is seen on follow up. Resting comfortably on 45% high flow. : Patient is seen on follow up. He is now on 3.5L O2 via nasal cannula and O2 sats are 99%.  Resting comfortably. 12/17: Patient continues to improve work of breathing. He is now tolerating 2.5L O2 via nasal cannula and oxygen saturation is 99%. Allergies   Allergen Reactions    Pcn [Penicillins] Swelling        MAR reviewed and pertinent medications noted or modified as needed     Current Facility-Administered Medications   Medication    doxycycline (VIBRAMYCIN) capsule 100 mg    metoprolol tartrate (LOPRESSOR) tablet 12.5 mg    famotidine (PEPCID) tablet 20 mg    hydrOXYzine pamoate (VISTARIL) capsule 25 mg    sertraline (ZOLOFT) tablet 50 mg    dextrose 5% infusion    sodium chloride (NS) flush 5-40 mL    sodium chloride (NS) flush 5-40 mL    acetaminophen (TYLENOL) tablet 650 mg    ondansetron (ZOFRAN) injection 4 mg    albuterol-ipratropium (DUO-NEB) 2.5 MG-0.5 MG/3 ML      Patient PCP: Zaria Zepeda MD  PMH:  has a past medical history of CRI (chronic renal insufficiency) (12/13/2012), Gout (1/4/2011), Hypertension, Lymphoma ([facility] and Prostate CA ([facility] (1/4/2011). PSH:   has a past surgical history that includes hx prostatectomy; hc bone marrow biopsy; endoscopy, colon, diagnostic (2003); and ir thoracentesis cath w image (12/15/2020). FHX: family history includes Hypertension in his mother. SHX:  reports that he has never smoked. He has never used smokeless tobacco. He reports previous drug use. He reports that he does not drink alcohol. ROS:    Review of Systems   Constitutional: Positive for malaise/fatigue. HENT: Negative. Eyes: Negative. Respiratory: Positive for cough and shortness of breath. Cardiovascular: Negative. Gastrointestinal: Negative. Genitourinary: Negative. Musculoskeletal: Negative. Skin: Negative. Neurological: Negative. Endo/Heme/Allergies: Negative. Psychiatric/Behavioral: Negative.          Objective:     Vital Signs: Telemetry:    normal sinus rhythm Intake/Output:   Visit Vitals  /66 (BP 1 Location: Left arm, BP Patient Position: At rest)   Pulse 98   Temp 98.4 °F (36.9 °C)   Resp 18   Ht 5' 2.99\" (1.6 m)   Wt 60.9 kg (134 lb 4.2 oz)   SpO2 97%   BMI 23.79 kg/m²       Temp (24hrs), Av.2 °F (36.8 °C), Min:97.7 °F (36.5 °C), Max:98.4 °F (36.9 °C)        O2 Device: Nasal cannula O2 Flow Rate (L/min): 1 l/min       Wt Readings from Last 4 Encounters:   20 60.9 kg (134 lb 4.2 oz)   20 64.3 kg (141 lb 12.1 oz)   20 64.9 kg (143 lb)   20 61.7 kg (136 lb)          Intake/Output Summary (Last 24 hours) at 2020 1100  Last data filed at 2020 1030  Gross per 24 hour   Intake 300 ml   Output 2350 ml   Net -2050 ml       Last shift:       07 -  190  In: 150   Out: -   Last 3 shifts:  190 -  0700  In: 150   Out: 2350 [Urine:2350]       Physical Exam:     Physical Exam   Constitutional: He is oriented to person, place, and time. HENT:   Head: Normocephalic and atraumatic. Eyes: Pupils are equal, round, and reactive to light. Conjunctivae and EOM are normal.   Neck: Normal range of motion. Neck supple. Cardiovascular: Normal rate and regular rhythm. Pulmonary/Chest: Effort normal. He has rales. Expiratory rhonchi has upper airway noise over the neck   Abdominal: Soft. Bowel sounds are normal.   Thin   Musculoskeletal: Normal range of motion. Neurological: He is alert and oriented to person, place, and time. Skin: Skin is warm and dry. Psychiatric: He has a normal mood and affect. Labs:    Recent Labs     20  0812 20  0811   WBC 10.5 11.9*   HGB 8.7* 9.5*   PLT 85* 85*     Recent Labs     20  0812 20  0811   * 133*   K 5.1 4.7   CL 95* 97   CO2 30 30   * 103*   BUN 59* 59*   CREA 1.43* 1.64*   CA 8.5 8.6     No results for input(s): PH, PCO2, PO2, HCO3, FIO2 in the last 72 hours. No results for input(s): CPK, CKNDX, TROIQ in the last 72 hours.     No lab exists for component: CPKMB    Lab Results   Component Value Date/Time    Culture result: No growth 4 days 12/15/2020 08:33 PM    Culture result: No growth 4 days 12/14/2020 02:45 PM    Culture result:  12/11/2020 11:21 AM     Gram Positive Cocci CALLED TO AND READ BACK BY JOSSELIN GERMAN 12/12/20 1337 TLW    Culture result:  12/11/2020 11:21 AM     Staphylococcus species, coagulase negative , ISOLATED FROM AEROBIC BOTTLE RECEIVED    Culture result:  12/11/2020 11:21 AM     * methicillin resistant staphylococcus aureus * ** ISOLATED FROM ANAEROBIC BOTTLE    Culture result: CALLED MRSA TO [facility] 1100 12/15/20 12/11/2020 11:21 AM     Lab Results   Component Value Date/Time    TSH 1.79 11/20/2020 01:31 PM       Imaging:    CXR Results  (Last 48 hours)    None        Results from [place] encounter on 12/11/20   XR SWALLOW FUNC VIDEO    Narrative Modified barium swallow. Weak swallow. Initially with nectar and honey consistency oral contrast, there is no  demonstrated aspiration or penetration. Significant retention through vallecula  more so than piriform sinuses. Patient has difficulty clearing material from  vallecula and piriform sinuses. With subsequent small volume thin consistency oral contrast, there is  penetration. Repetitive swallowing occurs in an attempt to clear material from  vallecula and piriform sinuses. With repetitive swallowing there is cumulative  deep penetration. Study reviewed with speech pathology. 280.9 DAP/2:16 min   XR CHEST PORT    Narrative Portable chest:    History:Respiratory failure    COMPARISON: Portable chest 12/13/2020    Lungs show hypoaeration. There is ill-defined hazy density at the right lung  base. Hazy parenchymal infiltration is seen in the left lung, left hemidiaphragm  is obscured. Heart is normal size. Calcified plaque involving the aortic arch. Left jugular venous catheter with the tip at the junction of SVC and right  atrium. Nasogastric tube with the tip in the stomach.       Impression Impression:Left lung airspace disease and suspected pleural fluid. Right basilar  pulmonary haziness compatible with airspace disease/atelectasis and trace of  pleural fluid. XR CHEST PORT    Narrative Chest single view. Comparison single view chest December 11, 2020. Improved aeration through lung bases. Stable left neck central venous catheter. Cardiac and mediastinal structures unchanged. Thoracic aorta atherosclerosis. No  pneumothorax or sizable pleural effusion. Results from [place] encounter on 12/11/20   CT CHEST WO CONT    Narrative Comparison chest x-ray 12/11/2020 [facility] and chest CTA 11/25/2020 [address] [place] [address]. TECHNIQUE: Axial imaging of the chest without IV contrast, with multiplanar  reformatting, MIPs. Dose reduction: All CT scans at this facility are performed using dose reduction  optimization techniques as appropriate to a performed exam including the  following: Automated exposure control, adjustments of the mA and/or kV according  to patient's size, or use of iterative reconstruction technique. FINDINGS: Left central catheter distal tip SVC. Mild cardiomegaly. Multivessel  coronary artery calcification. No pericardial effusion. Low-density intracardiac  blood suggests anemia. Calcified thoracic aorta without aneurysm. Pulmonary  arteries are not dilated. No mediastinal or hilar lymphadenopathy. Tubular  structure posterior to the esophagus possibly dilated vessel. Bilateral pleural  effusions and lower lung consolidations without air bronchograms. The left lower  lung is completely consolidated/collapsed. The aerated lungs demonstrate patchy  areas of airspace disease. No axillary adenopathy. Included thyroid is  unremarkable. Included upper abdomen demonstrates lobulated, heterogeneous  structure spleen and small retroperitoneal lymphadenopathy; unchanged. Degenerative changes of the bony structures. Soft tissue anasarca. Impression IMPRESSION:  1.  Bilateral pleural effusions and lower lung consolidations, the left lower  lung being completely collapsed, this is similar to previous. 2. Interval development patchy airspace disease in the aerated lungs consistent  with pneumonia. 3. Abnormal spleen, small retroperitoneal lymphadenopathy unchanged. 4. Atherosclerosis. IMPRESSION:   1. Acute respiratory failure resolved  2. Sepsis   3. Community-acquired versus hospital-acquired pneumonia has gram-positive cocci in 1 of 4 blood bottles MRSA in nares  4. Hypokalemia, resolved  5. Hypernatremia, resolved. 6. History of lymphoma and prostate cancer  7. Acute on chronic kidney disease creatinine improved (baseline is 1.6)  8. CHF with diastolic dysfunction chest x-ray improving        RECOMMENDATIONS/PLAN:     1. Patient is on room air  2. Blood culture 1 of 4 with gram-positive cocci may be skin contaminant   3. Nasal MRSA smear positive; on Bactroban.   4. Hypernatremia resolved:  Hypokalemia resolved:         Possible discharge today       Danii Zamarripa MD

## 2020-12-21 NOTE — PROGRESS NOTES
Comprehensive Nutrition Assessment Type and Reason for Visit: Reassess(Interim) Nutrition Recommendations/Plan:  
Continue TF of Jevity 1.5 at goal of 50ml/hr             ZQWDV with 150ml water q4h At goal, feeds provide 1800kcals, 77g pro, and 1812ml fluid (Meets 100% EENs, 120% Protein needs, and 100% fluid needs) 
            *D5 provides 204kcals additional 
  
RD to adjust TF to bolus feeds as appropriate at d/c 
  
Document rates, wts, and BMs 
  
 
Nutrition Assessment:  Admitted to ICU for sepsis causing respiratory failure and hypotension, required NRB and x1 pressor. Daughter made DNR, conservative treatments only, OK with BiPAP and TF. (12/13) SLP eval rec'd alternate source nutrition, NG placed and pt transferred to medical unit. Currently weaned to HFNC. D5 (50ml/hr) providing 204kcals/d. Intensiveist ordered TF of Promote at 50ml, FWF 100ml q4, and Beneprotein with no amounts listed (provides 1200kcals, 75g pro, and 1607ml water-meets ~67 %EENs and 117% pro, and 89% fluid needs, not optimal). RD adjusted TF in AM to better meet pt goals, discussed with RN to adjust appropriately. Unable to interview pt x2, noted today pt not in room d/t having PEG placed today. Per EMR pt TF at goal with GRV <10ml and no issues noted. TF recs remain same, will adjust to bolus feeds as needed for d/c. Labs: H/H 9.5/28.9, BUN 59, Cr 1.32, , , Alk phos 197. Meds: D5 at 50ml/hr, Pepcid, meropenem, antiemetics, PRN Miralax, vanc. Malnutrition Assessment: 
Malnutrition Status:  Mild malnutrition Context:  Acute illness Findings of the 6 clinical characteristics of malnutrition:  
{Malnutrition Characteristics:38748} Estimated Daily Nutrient Needs: 
Energy (kcal): 1800kcals (28kcals/kg); Weight Used for Energy Requirements: Admission Protein (g): 64g (1.0g/kg); Weight Used for Protein Requirements: Admission Fluid (ml/day): 1800ml; Method Used for Fluid Requirements: 1 ml/kcal 
 
 
 Nutrition Related Findings:  Unable to complete NFPE at initial, unable to do today as pt obtaining PEG. No edema per EMR. No  N/V/D/C, last BM 12/16, loose. SLP following, rec'd alternate source of nutrition. NG placed 12/13, having PEG placed today. Wounds:   
None Current Nutrition Therapies: 
{Current Nutrition Therapy:43248} Anthropometric Measures: 
· Height:  5' 2.99\" (160 cm) · Current Body Wt:  60.9 kg (134 lb 4.2 oz)(12/17) · Admission Body Wt:  141 lb 12.1 oz(12/5) · Usual Body Wt:       
· Ideal Body Wt:  124 lbs:  108.3 % · Adjusted Body Weight:   ; Weight Adjustment for: · Adjusted BMI:      
· BMI Category:  Normal weight (BMI 22.0-24.9) age over 72 Nutrition Diagnosis:  
{PES Statements:67518} Nutrition Interventions:  
Food and/or Nutrient Delivery: Continue NPO, Continue tube feeding Nutrition Education and Counseling: No recommendations at this time Coordination of Nutrition Care: Continue to monitor while inpatient, Speech therapy Goals: 
intakes >/=75% of EENs in >5 days, wt maintenance within +/-0.5kg in 7 days, lytes wnl Nutrition Monitoring and Evaluation:  
Behavioral-Environmental Outcomes: None identified Food/Nutrient Intake Outcomes: Enteral nutrition intake/tolerance, Diet advancement/tolerance Physical Signs/Symptoms Outcomes: Weight, Chewing or swallowing, Diarrhea Discharge Planning:   
Enteral nutrition Electronically signed by Sandy Quinonez on 12/21/2020 at 3:53 PM 
 
Contact: ***

## 2020-12-21 NOTE — DISCHARGE SUMMARY
Hospitalist         Discharge Summary Note: 12/21/2020      Subjective:        Patient is an 26-year-old man with history of recurrent pneumonia who was admitted on 12/11/2020 due to shortness of breath with hypoxia. He was also found to be in septic shock requiring norepinephrine. Respiratory failure found to be secondary to pneumonia. He was on BiPAP for respiratory failure and later transitioned to high flow on 12/14/2020 and subsequently weaned off and he is now on room air. Patient was treated with meropenem and vancomycin. Blood cultures collected on 12/11/2020 grew gram-positive cocci in clusters found to be contaminated. Blood cultures were repeated on 12/15/2020 and have shown no growth so far. Due to persistent dysphagia modified barium swallow was done and patient was fine unable to tolerate any consistency of food orally. Decision was made to place PEG tube for nutrition. He has been tolerating tube feedings with no difficulty and today he is stable for discharge back to skilled nursing facility.      Problem List:  Patient Active Problem List   Diagnosis Code    Lymphoma ([facility] C85.90    HTN (hypertension) I10    Prostate CA ([facility] C61    Gout M10.9    CRI (chronic renal insufficiency) N18.9    Hypercalcemia of malignancy E83.52    Cervical stenosis of spinal canal M48.02    Acute renal failure (ARF) ([facility]) N17.9    Lumbar canal stenosis M48.061    Frequent falls R29.6    Multiple falls R29.6    Weakness R53.1    Anemia D64.9    Opioid use F11.90    History of B-cell lymphoma Z85.72    Non-Hodgkin's lymphoma in relapse ([facility]) C85.90    Hypercalcemia E83.52    HAP (hospital-acquired pneumonia) J18.9, Y95    SIRS (systemic inflammatory response syndrome) ([facility]) R65.10    CHF (congestive heart failure) ([facility]) I50.9    Acute hypoxemic respiratory failure ([facility]) J96.01    Severe sepsis ([facility]) A41.9, R65.20    Person under investigation for COVID-19 Z20.828  BPPV (benign paroxysmal positional vertigo) H81.10         Medications reviewed  Current Facility-Administered Medications   Medication Dose Route Frequency    doxycycline (VIBRAMYCIN) capsule 100 mg  100 mg Oral Q12H    metoprolol tartrate (LOPRESSOR) tablet 12.5 mg  12.5 mg Oral Q12H    famotidine (PEPCID) tablet 20 mg  20 mg Oral Q24H    hydrOXYzine pamoate (VISTARIL) capsule 25 mg  25 mg Oral QID PRN    sertraline (ZOLOFT) tablet 50 mg  50 mg Oral QPM    dextrose 5% infusion  25 mL/hr IntraVENous CONTINUOUS    sodium chloride (NS) flush 5-40 mL  5-40 mL IntraVENous Q8H    sodium chloride (NS) flush 5-40 mL  5-40 mL IntraVENous PRN    acetaminophen (TYLENOL) tablet 650 mg  650 mg Oral Q6H PRN    ondansetron (ZOFRAN) injection 4 mg  4 mg IntraVENous Q6H PRN    albuterol-ipratropium (DUO-NEB) 2.5 MG-0.5 MG/3 ML  3 mL Nebulization Q4H PRN       Review of Systems:   ROS    Constitutional: Negative for chills, diaphoresis, fever, malaise/fatigue and weight loss. HENT: Negative for ear discharge, ear pain, hearing loss, nosebleeds, sinus pain and tinnitus. Respiratory: Negative for cough, hemoptysis, sputum production, shortness of breath and wheezing. Cardiovascular: Negative for chest pain, palpitations, orthopnea, claudication and leg swelling. Gastrointestinal: Negative for abdominal pain, constipation, diarrhea, heartburn, nausea and vomiting. Genitourinary: Negative for dysuria, flank pain, frequency, hematuria and urgency. Musculoskeletal: Negative for back pain, falls, joint pain, myalgias and neck pain. Neurological: Positive for weakness. Negative for dizziness, tingling, focal weakness, seizures and headaches. Psychiatric/Behavioral: Negative for depression, hallucinations, memory loss, substance abuse and suicidal ideas. The patient is not nervous/anxious.       Objective:   Physical Exam     Visit Vitals  /66 (BP 1 Location: Left arm, BP Patient Position: At rest) Pulse 98   Temp 98.4 °F (36.9 °C)   Resp 18   Ht 5' 2.99\" (1.6 m)   Wt 60.9 kg (134 lb 4.2 oz)   SpO2 97%   BMI 23.79 kg/m²      Constitutional:       General: He is not in acute distress. Appearance: He is ill-appearing. HENT:      Head: Normocephalic and atraumatic. Mouth/Throat:      Mouth: Mucous membranes are moist.      Pharynx: Oropharynx is clear. Eyes:      Pupils: Pupils are equal, round, and reactive to light. Neck:      Musculoskeletal: No neck rigidity. Cardiovascular:      Rate and Rhythm: Normal rate and regular rhythm. Heart sounds: No murmur. No friction rub. No gallop. Pulmonary:      Effort: Pulmonary effort is normal. No respiratory distress. Breath sounds: Normal breath sounds. No wheezing or rales. Abdominal:      General: Bowel sounds are normal. There is no distension. Palpations: Abdomen is soft. Tenderness: There is no abdominal tenderness. There is no rebound. Musculoskeletal: Normal range of motion. General: No swelling, tenderness, deformity or signs of injury. Skin:     General: Skin is warm and dry. Coloration: Skin is not jaundiced. Findings: No bruising, erythema or rash. Neurological:      General: No focal deficit present. Mental Status: He is alert and oriented to person, place, and time. Sensory: No sensory deficit. Motor: Weakness present. Gait: Gait normal.   Psychiatric:         Mood and Affect: Mood normal.         Behavior: Behavior normal.         Thought Content: Thought content normal.    Data Review:       Recent Days:  Recent Labs     12/21/20  0812 12/20/20  0811   WBC 10.5 11.9*   HGB 8.7* 9.5*   HCT 25.9* 28.6*   PLT 85* 85*     Recent Labs     12/21/20  0812 12/20/20  0811   * 133*   K 5.1 4.7   CL 95* 97   CO2 30 30   * 103*   BUN 59* 59*   CREA 1.43* 1.64*   CA 8.5 8.6         Assessment/Plan     1. Severe sepsis  2. Acute hypoxic respiratory failure  3.   Acute on chronic kidney disease stage III  4. Aspiration pneumonia  5. Hyponatremia  6. Dysphagia    Status post treatment with meropenem and vancomycin during hospital stay  Continue with doxycycline for 5 more days to complete treatment  Repeat blood cultures collected on 12/15/2020 showing no growth  He is now on room air with no respiratory distress  PEG tube was placed on 12/17/2020 due to persistent aspiration  Continue feeding and nutrition via PEG tube  Hypernatremia has resolved  Keep n.p.o. to prevent aspiration and all medications via PEG tube  Patient stable for discharge back to skilled nursing facility. Total time spent with patient: 45 minutes.     Marcela Richardson, NP

## 2020-12-21 NOTE — INTERDISCIPLINARY ROUNDS
Patient discharged back to [facility]; report called to Cain Farley LPN. Patient IVs x2 removed, yang removed, and peg clamped and flushed. Brief placed on patient for transport. Patient sent on PO doxcycline. Discharge instructions sent with Yessenia Laird and a copy was given to his daughter. Belongings packed at bedside. No further questions or concerns at this time.

## 2020-12-22 LAB
BACTERIA SPEC CULT: NORMAL
SPECIAL REQUESTS,SREQ: NORMAL

## 2020-12-28 ENCOUNTER — APPOINTMENT (OUTPATIENT)
Dept: GENERAL RADIOLOGY | Age: 85
End: 2020-12-28
Attending: EMERGENCY MEDICINE
Payer: COMMERCIAL

## 2020-12-28 ENCOUNTER — HOSPITAL ENCOUNTER (EMERGENCY)
Age: 85
Discharge: HOME | End: 2020-12-28
Attending: EMERGENCY MEDICINE
Payer: COMMERCIAL

## 2020-12-28 VITALS
HEART RATE: 100 BPM | RESPIRATION RATE: 16 BRPM | BODY MASS INDEX: 24.66 KG/M2 | WEIGHT: 134 LBS | TEMPERATURE: 97.6 F | HEIGHT: 62 IN | DIASTOLIC BLOOD PRESSURE: 52 MMHG | OXYGEN SATURATION: 98 % | SYSTOLIC BLOOD PRESSURE: 116 MMHG

## 2020-12-28 DIAGNOSIS — K94.23 PEG TUBE MALFUNCTION (HCC): Primary | ICD-10-CM

## 2020-12-28 DIAGNOSIS — R00.0 TACHYCARDIA: ICD-10-CM

## 2020-12-28 LAB
ANION GAP SERPL CALC-SCNC: 6 MMOL/L (ref 5–15)
APPEARANCE UR: ABNORMAL
ATRIAL RATE: 130 BPM
BACTERIA URNS QL MICRO: NEGATIVE /HPF
BASOPHILS # BLD: 0 K/UL (ref 0–0.1)
BASOPHILS NFR BLD: 1 % (ref 0–1)
BILIRUB UR QL: NEGATIVE
BUN SERPL-MCNC: 62 MG/DL (ref 6–20)
BUN/CREAT SERPL: 31 (ref 12–20)
CA-I BLD-MCNC: 10.7 MG/DL (ref 8.5–10.1)
CALCULATED P AXIS, ECG09: 46 DEGREES
CALCULATED R AXIS, ECG10: -47 DEGREES
CALCULATED T AXIS, ECG11: 45 DEGREES
CHLORIDE SERPL-SCNC: 101 MMOL/L (ref 97–108)
CO2 SERPL-SCNC: 30 MMOL/L (ref 21–32)
COLOR UR: ABNORMAL
CREAT SERPL-MCNC: 1.99 MG/DL (ref 0.7–1.3)
DIAGNOSIS, 93000: NORMAL
DIFFERENTIAL METHOD BLD: ABNORMAL
EOSINOPHIL # BLD: 0.1 K/UL (ref 0–0.4)
EOSINOPHIL NFR BLD: 1 % (ref 0–7)
ERYTHROCYTE [DISTWIDTH] IN BLOOD BY AUTOMATED COUNT: 16.9 % (ref 11.5–14.5)
GLUCOSE SERPL-MCNC: 105 MG/DL (ref 65–100)
GLUCOSE UR STRIP.AUTO-MCNC: NEGATIVE MG/DL
HCT VFR BLD AUTO: 26.3 % (ref 36.6–50.3)
HGB BLD-MCNC: 8.9 G/DL (ref 12.1–17)
HGB UR QL STRIP: NEGATIVE
IMM GRANULOCYTES # BLD AUTO: 0.1 K/UL (ref 0–0.04)
IMM GRANULOCYTES NFR BLD AUTO: 1 % (ref 0–0.5)
KETONES UR QL STRIP.AUTO: NEGATIVE MG/DL
LACTATE SERPL-SCNC: 1.4 MMOL/L (ref 0.4–2)
LEUKOCYTE ESTERASE UR QL STRIP.AUTO: NEGATIVE
LYMPHOCYTES # BLD: 0.6 K/UL (ref 0.8–3.5)
LYMPHOCYTES NFR BLD: 8 % (ref 12–49)
MCH RBC QN AUTO: 31 PG (ref 26–34)
MCHC RBC AUTO-ENTMCNC: 33.8 G/DL (ref 30–36.5)
MCV RBC AUTO: 91.6 FL (ref 80–99)
MONOCYTES # BLD: 1.5 K/UL (ref 0–1)
MONOCYTES NFR BLD: 18 % (ref 5–13)
NEUTS SEG # BLD: 6 K/UL (ref 1.8–8)
NEUTS SEG NFR BLD: 71 % (ref 32–75)
NITRITE UR QL STRIP.AUTO: NEGATIVE
P-R INTERVAL, ECG05: 158 MS
PH UR STRIP: 8 [PH] (ref 5–8)
PLATELET # BLD AUTO: 202 K/UL (ref 150–400)
PMV BLD AUTO: 11.9 FL (ref 8.9–12.9)
POTASSIUM SERPL-SCNC: 5 MMOL/L (ref 3.5–5.1)
PROT UR STRIP-MCNC: 30 MG/DL
Q-T INTERVAL, ECG07: 286 MS
QRS DURATION, ECG06: 92 MS
QTC CALCULATION (BEZET), ECG08: 420 MS
RBC # BLD AUTO: 2.87 M/UL (ref 4.1–5.7)
RBC #/AREA URNS HPF: ABNORMAL /HPF (ref 0–5)
SODIUM SERPL-SCNC: 137 MMOL/L (ref 136–145)
SP GR UR REFRACTOMETRY: 1.01 (ref 1–1.03)
TROPONIN I SERPL-MCNC: <0.05 NG/ML
UA: UC IF INDICATED,UAUC: ABNORMAL
UROBILINOGEN UR QL STRIP.AUTO: 0.1 EU/DL (ref 0.1–1)
VENTRICULAR RATE, ECG03: 130 BPM
WBC # BLD AUTO: 8.3 K/UL (ref 4.1–11.1)
WBC URNS QL MICRO: ABNORMAL /HPF (ref 0–4)

## 2020-12-28 PROCEDURE — 36415 COLL VENOUS BLD VENIPUNCTURE: CPT

## 2020-12-28 PROCEDURE — 99285 EMERGENCY DEPT VISIT HI MDM: CPT

## 2020-12-28 PROCEDURE — 93005 ELECTROCARDIOGRAM TRACING: CPT

## 2020-12-28 PROCEDURE — 74011000636 HC RX REV CODE- 636: Performed by: EMERGENCY MEDICINE

## 2020-12-28 PROCEDURE — 80048 BASIC METABOLIC PNL TOTAL CA: CPT

## 2020-12-28 PROCEDURE — 83605 ASSAY OF LACTIC ACID: CPT

## 2020-12-28 PROCEDURE — 71045 X-RAY EXAM CHEST 1 VIEW: CPT

## 2020-12-28 PROCEDURE — 85025 COMPLETE CBC W/AUTO DIFF WBC: CPT

## 2020-12-28 PROCEDURE — 81001 URINALYSIS AUTO W/SCOPE: CPT

## 2020-12-28 PROCEDURE — 74018 RADEX ABDOMEN 1 VIEW: CPT

## 2020-12-28 PROCEDURE — 84484 ASSAY OF TROPONIN QUANT: CPT

## 2020-12-28 PROCEDURE — 75810000109 HC GASTRO TUBE CHANGE

## 2020-12-28 PROCEDURE — 74011250636 HC RX REV CODE- 250/636: Performed by: EMERGENCY MEDICINE

## 2020-12-28 RX ADMIN — SODIUM CHLORIDE 1000 ML: 9 INJECTION, SOLUTION INTRAVENOUS at 05:00

## 2020-12-28 RX ADMIN — DIATRIZOATE MEGLUMINE AND DIATRIZOATE SODIUM 30 ML: 660; 100 LIQUID ORAL; RECTAL at 06:32

## 2020-12-28 NOTE — ED TRIAGE NOTES
Patient arrived via EMS from [facility]. Per EMS patient pulled out PEG tube at unknown time tonight. Unknown size of PEG tube.

## 2020-12-28 NOTE — ED NOTES
Report given to Lazarus Parkin at [facility]. Discharge instructions reviewed; Disease process discussed; Follow-up care reviewed. Valuables returned to patient. Pt's daughter (sona Mattson) updated on pt's condition and discharge. transportation to facility being prepared by .

## 2020-12-28 NOTE — DISCHARGE INSTRUCTIONS
Increase water intake to keep urine clear in color at all times. Return to the ED as needed or if you become concerned.

## 2020-12-28 NOTE — ED PROVIDER NOTES
EMERGENCY DEPARTMENT HISTORY AND PHYSICAL EXAM      Date: 12/28/2020  Patient Name: Amberly Fleming      History of Presenting Illness     Chief Complaint   Patient presents with    Feeding Tube Problem       History Provided By: Patient and Nursing Home/SNF/Rehab Center    HPI: Amberly Fleming, 80 y.o. male with a past medical history significant prostate cancer, lymphoma, CRI,  presents to the ED with cc of PEG tube malfunction. Patient was found with PEG tube out unsure how long he has been out. He denies any fevers chills abdominal pain nausea vomiting. There are no other complaints, changes, or physical findings at this time. PCP: Jh Martinez MD    Current Outpatient Medications   Medication Sig Dispense Refill    sertraline (ZOLOFT) 50 mg tablet Take 1 Tab by mouth every evening. 30 Tab 0    albuterol-ipratropium (DUO-NEB) 2.5 mg-0.5 mg/3 ml nebu 3 mL by Nebulization route every four (4) hours as needed for Wheezing. 30 Nebule 0    famotidine (PEPCID) 20 mg tablet Take 1 Tab by mouth every twenty-four (24) hours. 30 Tab 0    hydrOXYzine pamoate (VISTARIL) 25 mg capsule Take 1 Cap by mouth four (4) times daily as needed for Anxiety for up to 14 days. 30 Cap 0    metoprolol tartrate (LOPRESSOR) 25 mg tablet Take 0.5 Tabs by mouth every twelve (12) hours. 30 Tab 0    allopurinoL (ZYLOPRIM) 100 mg tablet Take 0.5 Tabs by mouth every other day.  15 Tab 0       Past History     Past Medical History:  Past Medical History:   Diagnosis Date    CRI (chronic renal insufficiency) 12/13/2012    Gout 1/4/2011    Hypertension     Lymphoma ([facility] [address]     Prostate CA ([facility] [address] 1/4/2011       Past Surgical History:  Past Surgical History:   Procedure Laterality Date    ENDOSCOPY, COLON, DIAGNOSTIC  2003    neg    HC BONE MARROW BIOPSY      HX PROSTATECTOMY      IR THORACENTESIS CATH W IMAGE  12/15/2020       Family History:  Family History   Problem Relation Age of Onset    Hypertension Mother        Social History:  Social History     Tobacco Use    Smoking status: Never Smoker    Smokeless tobacco: Never Used   Substance Use Topics    Alcohol use: No    Drug use: Not Currently       Allergies: Allergies   Allergen Reactions    Pcn [Penicillins] Swelling         Review of Systems     Review of Systems   Constitutional: Negative for activity change, fatigue and fever. HENT: Negative for congestion. Respiratory: Negative for chest tightness and shortness of breath. Cardiovascular: Negative for chest pain and palpitations. Gastrointestinal: Negative for abdominal pain, nausea and vomiting. Genitourinary: Negative for difficulty urinating and dysuria. Musculoskeletal: Negative for neck pain and neck stiffness. Skin: Negative for color change, rash and wound. Neurological: Negative for dizziness, weakness and numbness. Psychiatric/Behavioral: Negative for agitation. Physical Exam     Physical Exam  Vitals signs and nursing note reviewed. Constitutional:       General: He is not in acute distress. Appearance: Normal appearance. He is normal weight. He is not ill-appearing. HENT:      Head: Normocephalic and atraumatic. Right Ear: External ear normal.      Left Ear: External ear normal.      Nose: Nose normal. No rhinorrhea. Mouth/Throat:      Mouth: Mucous membranes are moist.      Pharynx: Oropharynx is clear. Eyes:      Extraocular Movements: Extraocular movements intact. Conjunctiva/sclera: Conjunctivae normal.      Pupils: Pupils are equal, round, and reactive to light. Neck:      Musculoskeletal: Normal range of motion and neck supple. Cardiovascular:      Rate and Rhythm: Regular rhythm. Tachycardia present. Pulses: Normal pulses. Heart sounds: Normal heart sounds. Pulmonary:      Effort: Pulmonary effort is normal. Tachypnea present. No respiratory distress. Breath sounds: Normal breath sounds. Abdominal:      General: Abdomen is flat. Bowel sounds are normal.      Palpations: Abdomen is soft. Musculoskeletal: Normal range of motion. General: No tenderness or deformity. Skin:     General: Skin is warm and dry. Capillary Refill: Capillary refill takes less than 2 seconds. Findings: No bruising, lesion or rash. Neurological:      General: No focal deficit present. Mental Status: He is alert and oriented to person, place, and time. Mental status is at baseline. Psychiatric:         Mood and Affect: Mood normal.         Behavior: Behavior normal.         Thought Content: Thought content normal.         Judgment: Judgment normal.         Lab and Diagnostic Study Results     Labs -     No results found for this or any previous visit (from the past 12 hour(s)). Radiologic Studies -   [unfilled]  CT Results  (Last 48 hours)    None        CXR Results  (Last 48 hours)               12/28/20 0633  XR CHEST PORT Final result    Impression:  Impression: Airspace consolidation in the lungs, improved. Left pleural   effusion, improved. Possible hydrostatic edema. Narrative:  Chest, 2 frontal views, 12/28/2020       History: Cough. Comparison: Including chest 12/15/2020. Findings: Feeding tube and left internal jugular catheter on chest 12/14/2020 oh   no longer present. The cardiac silhouette is within normal limits. The lungs   are underexpanded. There is mild prominence of the pulmonary vasculature and   hydrostatic edema is possible. Airspace consolidation most pronounced at the   bases is improved as compared to chest 12/15/2020. Left-sided pleural effusion   is improved and now small appearing. No right-sided pleural effusion is   definitively seen on this single view. No pneumothorax is identified. The   osseous structures are stable. Medical Decision Making and ED Course   - I am the first and primary provider for this patient AND AM THE PRIMARY PROVIDER OF RECORD.     - I reviewed the vital signs, available nursing notes, past medical history, past surgical history, family history and social history. - Initial assessment performed. The patients presenting problems have been discussed, and the staff are in agreement with the care plan formulated and outlined with them. I have encouraged them to ask questions as they arise throughout their visit. Vital Signs-Reviewed the patient's vital signs. No data found. EKG interpretation:   12/28/2020  7:22 AM - Johnny, Card Result In    Component Value Ref Range & Units Status   Ventricular Rate 130  BPM Final   Atrial Rate 130  BPM Final   P-R Interval 158  ms Final   QRS Duration 92  ms Final   Q-T Interval 286  ms Final   QTC Calculation (Bezet) 420  ms Final   Calculated P Axis 46  degrees Final   Calculated R Axis -47  degrees Final   Calculated T Axis 45  degrees Final   Diagnosis   Final   Sinus tachycardia with occasional Premature ventricular complexes and Fusion   complexes   Pulmonary disease pattern   Left anterior fascicular block   Inferior infarct , age undetermined   Abnormal ECG   When compared with ECG of 11-DEC-2020 11:58,   Fusion complexes are now Present   Left anterior fascicular block is now Present   ST no longer depressed in Anterior leads   T wave inversion no longer evident in Anterior leads   Confirmed by Binta Garcia ([id number]) on 12/28/2020 7:22:09 AM          Records Reviewed: Nursing Notes     ED Course:       ED Course as of Dec 29 1641   Mon Dec 28, 2020   0605 BUN(!): 62 [MC]   0605 Creatinine(!): 1.99 [MC]      ED Course User Index  [MC] Vonnie Burden MD         Provider Notes (Medical Decision Making):   Patient is an 72-year-old male presenting with PEG tube malfunction however on arrival he is noted to be significantly tachycardic with a rate of in the 120s 130s. Given his recent admissions for sepsis and pneumonia will check labs EKG chest x-ray and work-up for same.   [facility]       7:24 AM  Signed out to Dr. Geri Martin pending completion of work-up and final disposition. Disposition     Disposition:      Discharged    DISCHARGE PLAN:  1. Current Discharge Medication List      CONTINUE these medications which have NOT CHANGED    Details   sertraline (ZOLOFT) 50 mg tablet Take 1 Tab by mouth every evening. Qty: 30 Tab, Refills: 0      albuterol-ipratropium (DUO-NEB) 2.5 mg-0.5 mg/3 ml nebu 3 mL by Nebulization route every four (4) hours as needed for Wheezing. Qty: 30 Nebule, Refills: 0      famotidine (PEPCID) 20 mg tablet Take 1 Tab by mouth every twenty-four (24) hours. Qty: 30 Tab, Refills: 0      hydrOXYzine pamoate (VISTARIL) 25 mg capsule Take 1 Cap by mouth four (4) times daily as needed for Anxiety for up to 14 days. Qty: 30 Cap, Refills: 0      metoprolol tartrate (LOPRESSOR) 25 mg tablet Take 0.5 Tabs by mouth every twelve (12) hours. Qty: 30 Tab, Refills: 0      allopurinoL (ZYLOPRIM) 100 mg tablet Take 0.5 Tabs by mouth every other day. Qty: 15 Tab, Refills: 0           2. Follow-up Information     Follow up With Specialties Details Why Contact Info    Patrica Wu [address] [place] [address]  [phone]          3. Return to ED if worse   4. Discharge Medication List as of 12/28/2020 12:51 PM          Diagnosis     Clinical Impression:   1. PEG tube malfunction ([address]    2. Tachycardia        Attestations:    Miranda Kamara MD    Please note that this dictation was completed with [employer], the computer voice recognition software. Quite often unanticipated grammatical, syntax, homophones, and other interpretive errors are inadvertently transcribed by the computer software. Please disregard these errors. Please excuse any errors that have escaped final proofreading. Thank you.

## 2021-01-05 ENCOUNTER — APPOINTMENT (OUTPATIENT)
Dept: GENERAL RADIOLOGY | Age: 86
DRG: 871 | End: 2021-01-05
Attending: EMERGENCY MEDICINE
Payer: MEDICARE

## 2021-01-05 ENCOUNTER — HOSPITAL ENCOUNTER (INPATIENT)
Age: 86
LOS: 12 days | Discharge: NURSING, REHAB OR LONG-TERM CARE | DRG: 871 | End: 2021-01-17
Attending: EMERGENCY MEDICINE | Admitting: HOSPITALIST
Payer: MEDICARE

## 2021-01-05 DIAGNOSIS — U07.1 COVID-19: Primary | ICD-10-CM

## 2021-01-05 DIAGNOSIS — R09.02 HYPOXIA: ICD-10-CM

## 2021-01-05 PROBLEM — J18.9 PNEUMONIA: Status: ACTIVE | Noted: 2021-01-05

## 2021-01-05 LAB
ALBUMIN SERPL-MCNC: 1.8 G/DL (ref 3.5–5)
ALBUMIN/GLOB SERPL: 0.5 {RATIO} (ref 1.1–2.2)
ALP SERPL-CCNC: 130 U/L (ref 45–117)
ALT SERPL-CCNC: 102 U/L (ref 12–78)
ANION GAP SERPL CALC-SCNC: 10 MMOL/L (ref 5–15)
APPEARANCE UR: ABNORMAL
AST SERPL W P-5'-P-CCNC: 132 U/L (ref 15–37)
ATRIAL RATE: 113 BPM
BACTERIA URNS QL MICRO: NEGATIVE /HPF
BASOPHILS # BLD: 0 K/UL (ref 0–0.1)
BASOPHILS NFR BLD: 0 % (ref 0–1)
BILIRUB SERPL-MCNC: 0.4 MG/DL (ref 0.2–1)
BILIRUB UR QL: NEGATIVE
BUN SERPL-MCNC: 98 MG/DL (ref 6–20)
BUN/CREAT SERPL: 32 (ref 12–20)
CA-I BLD-MCNC: 10.8 MG/DL (ref 8.5–10.1)
CALCULATED P AXIS, ECG09: 55 DEGREES
CALCULATED R AXIS, ECG10: -17 DEGREES
CALCULATED T AXIS, ECG11: 66 DEGREES
CHLORIDE SERPL-SCNC: 107 MMOL/L (ref 97–108)
CO2 SERPL-SCNC: 31 MMOL/L (ref 21–32)
COLOR UR: ABNORMAL
CREAT SERPL-MCNC: 3.11 MG/DL (ref 0.7–1.3)
DIAGNOSIS, 93000: NORMAL
DIFFERENTIAL METHOD BLD: ABNORMAL
EOSINOPHIL # BLD: 0 K/UL (ref 0–0.4)
EOSINOPHIL NFR BLD: 0 % (ref 0–7)
ERYTHROCYTE [DISTWIDTH] IN BLOOD BY AUTOMATED COUNT: 18.8 % (ref 11.5–14.5)
FLUAV AG NPH QL IA: NEGATIVE
FLUBV AG NOSE QL IA: NEGATIVE
GLOBULIN SER CALC-MCNC: 4 G/DL (ref 2–4)
GLUCOSE BLD STRIP.AUTO-MCNC: 132 MG/DL (ref 65–100)
GLUCOSE SERPL-MCNC: 96 MG/DL (ref 65–100)
GLUCOSE UR STRIP.AUTO-MCNC: NEGATIVE MG/DL
HCT VFR BLD AUTO: 25.6 % (ref 36.6–50.3)
HGB BLD-MCNC: 8.3 G/DL (ref 12.1–17)
HGB UR QL STRIP: ABNORMAL
IMM GRANULOCYTES # BLD AUTO: 0 K/UL
IMM GRANULOCYTES NFR BLD AUTO: 0 %
KETONES UR QL STRIP.AUTO: NEGATIVE MG/DL
LACTATE SERPL-SCNC: 2.9 MMOL/L (ref 0.4–2)
LEUKOCYTE ESTERASE UR QL STRIP.AUTO: ABNORMAL
LYMPHOCYTES # BLD: 0.6 K/UL (ref 0.8–3.5)
LYMPHOCYTES NFR BLD: 5 % (ref 12–49)
MCH RBC QN AUTO: 28.8 PG (ref 26–34)
MCHC RBC AUTO-ENTMCNC: 32.4 G/DL (ref 30–36.5)
MCV RBC AUTO: 88.9 FL (ref 80–99)
METAMYELOCYTES NFR BLD MANUAL: 1 %
MONOCYTES # BLD: 0.6 K/UL (ref 0–1)
MONOCYTES NFR BLD: 5 % (ref 5–13)
MUCOUS THREADS URNS QL MICRO: ABNORMAL /LPF
NEUTS SEG # BLD: 11.3 K/UL (ref 1.8–8)
NEUTS SEG NFR BLD: 89 % (ref 32–75)
NITRITE UR QL STRIP.AUTO: NEGATIVE
NRBC # BLD: 0 K/UL (ref 0–0.01)
NRBC BLD-RTO: 0 PER 100 WBC
P-R INTERVAL, ECG05: 146 MS
PERFORMED BY, TECHID: ABNORMAL
PH UR STRIP: 7 [PH] (ref 5–8)
PLATELET # BLD AUTO: 302 K/UL (ref 150–400)
PMV BLD AUTO: 11.3 FL (ref 8.9–12.9)
POTASSIUM SERPL-SCNC: 4 MMOL/L (ref 3.5–5.1)
PROCALCITONIN SERPL-MCNC: 0.87 NG/ML
PROT SERPL-MCNC: 5.8 G/DL (ref 6.4–8.2)
PROT UR STRIP-MCNC: 30 MG/DL
Q-T INTERVAL, ECG07: 318 MS
QRS DURATION, ECG06: 72 MS
QTC CALCULATION (BEZET), ECG08: 436 MS
RBC # BLD AUTO: 2.88 M/UL (ref 4.1–5.7)
RBC #/AREA URNS HPF: ABNORMAL /HPF (ref 0–5)
RBC MORPH BLD: ABNORMAL
RBC MORPH BLD: ABNORMAL
SODIUM SERPL-SCNC: 148 MMOL/L (ref 136–145)
SP GR UR REFRACTOMETRY: 1.01 (ref 1–1.03)
TROPONIN I SERPL-MCNC: 0.07 NG/ML
UA: UC IF INDICATED,UAUC: ABNORMAL
UROBILINOGEN UR QL STRIP.AUTO: 0.1 EU/DL (ref 0.1–1)
VENTRICULAR RATE, ECG03: 113 BPM
WBC # BLD AUTO: 12.7 K/UL (ref 4.1–11.1)
WBC URNS QL MICRO: ABNORMAL /HPF (ref 0–4)

## 2021-01-05 PROCEDURE — 87804 INFLUENZA ASSAY W/OPTIC: CPT

## 2021-01-05 PROCEDURE — 36415 COLL VENOUS BLD VENIPUNCTURE: CPT

## 2021-01-05 PROCEDURE — 99285 EMERGENCY DEPT VISIT HI MDM: CPT

## 2021-01-05 PROCEDURE — 82962 GLUCOSE BLOOD TEST: CPT

## 2021-01-05 PROCEDURE — 84145 PROCALCITONIN (PCT): CPT

## 2021-01-05 PROCEDURE — 87040 BLOOD CULTURE FOR BACTERIA: CPT

## 2021-01-05 PROCEDURE — 74011250637 HC RX REV CODE- 250/637: Performed by: NURSE PRACTITIONER

## 2021-01-05 PROCEDURE — 93005 ELECTROCARDIOGRAM TRACING: CPT

## 2021-01-05 PROCEDURE — 85025 COMPLETE CBC W/AUTO DIFF WBC: CPT

## 2021-01-05 PROCEDURE — 80053 COMPREHEN METABOLIC PANEL: CPT

## 2021-01-05 PROCEDURE — 87086 URINE CULTURE/COLONY COUNT: CPT

## 2021-01-05 PROCEDURE — 74011250637 HC RX REV CODE- 250/637: Performed by: EMERGENCY MEDICINE

## 2021-01-05 PROCEDURE — 96366 THER/PROPH/DIAG IV INF ADDON: CPT

## 2021-01-05 PROCEDURE — 96365 THER/PROPH/DIAG IV INF INIT: CPT

## 2021-01-05 PROCEDURE — 83605 ASSAY OF LACTIC ACID: CPT

## 2021-01-05 PROCEDURE — 74011250636 HC RX REV CODE- 250/636: Performed by: NURSE PRACTITIONER

## 2021-01-05 PROCEDURE — 81001 URINALYSIS AUTO W/SCOPE: CPT

## 2021-01-05 PROCEDURE — 65270000029 HC RM PRIVATE

## 2021-01-05 PROCEDURE — 71045 X-RAY EXAM CHEST 1 VIEW: CPT

## 2021-01-05 PROCEDURE — 74011250636 HC RX REV CODE- 250/636: Performed by: EMERGENCY MEDICINE

## 2021-01-05 PROCEDURE — 84484 ASSAY OF TROPONIN QUANT: CPT

## 2021-01-05 RX ORDER — INSULIN LISPRO 100 [IU]/ML
INJECTION, SOLUTION INTRAVENOUS; SUBCUTANEOUS
Status: DISCONTINUED | OUTPATIENT
Start: 2021-01-05 | End: 2021-01-17 | Stop reason: HOSPADM

## 2021-01-05 RX ORDER — ACETAMINOPHEN 650 MG/1
650 SUPPOSITORY RECTAL
Status: DISCONTINUED | OUTPATIENT
Start: 2021-01-05 | End: 2021-01-17 | Stop reason: HOSPADM

## 2021-01-05 RX ORDER — SERTRALINE HYDROCHLORIDE 25 MG/1
50 TABLET, FILM COATED ORAL EVERY EVENING
Status: DISCONTINUED | OUTPATIENT
Start: 2021-01-05 | End: 2021-01-05

## 2021-01-05 RX ORDER — LEVOFLOXACIN 5 MG/ML
750 INJECTION, SOLUTION INTRAVENOUS EVERY 24 HOURS
Status: DISCONTINUED | OUTPATIENT
Start: 2021-01-05 | End: 2021-01-05

## 2021-01-05 RX ORDER — FAMOTIDINE 20 MG/1
20 TABLET, FILM COATED ORAL DAILY
Status: DISCONTINUED | OUTPATIENT
Start: 2021-01-06 | End: 2021-01-17 | Stop reason: HOSPADM

## 2021-01-05 RX ORDER — MUPIROCIN 20 MG/G
OINTMENT TOPICAL DAILY
Status: DISPENSED | OUTPATIENT
Start: 2021-01-05 | End: 2021-01-10

## 2021-01-05 RX ORDER — DEXTROSE 50 % IN WATER (D50W) INTRAVENOUS SYRINGE
25-50 AS NEEDED
Status: DISCONTINUED | OUTPATIENT
Start: 2021-01-05 | End: 2021-01-17 | Stop reason: HOSPADM

## 2021-01-05 RX ORDER — ONDANSETRON 2 MG/ML
4 INJECTION INTRAMUSCULAR; INTRAVENOUS
Status: DISCONTINUED | OUTPATIENT
Start: 2021-01-05 | End: 2021-01-17 | Stop reason: HOSPADM

## 2021-01-05 RX ORDER — SODIUM CHLORIDE 9 MG/ML
100 INJECTION, SOLUTION INTRAVENOUS CONTINUOUS
Status: DISCONTINUED | OUTPATIENT
Start: 2021-01-05 | End: 2021-01-05

## 2021-01-05 RX ORDER — SODIUM CHLORIDE 0.9 % (FLUSH) 0.9 %
5-10 SYRINGE (ML) INJECTION AS NEEDED
Status: DISCONTINUED | OUTPATIENT
Start: 2021-01-05 | End: 2021-01-17 | Stop reason: HOSPADM

## 2021-01-05 RX ORDER — SERTRALINE HYDROCHLORIDE 50 MG/1
50 TABLET, FILM COATED ORAL EVERY EVENING
Status: DISCONTINUED | OUTPATIENT
Start: 2021-01-05 | End: 2021-01-10

## 2021-01-05 RX ORDER — MAGNESIUM SULFATE 100 %
4 CRYSTALS MISCELLANEOUS AS NEEDED
Status: DISCONTINUED | OUTPATIENT
Start: 2021-01-05 | End: 2021-01-17 | Stop reason: HOSPADM

## 2021-01-05 RX ORDER — HYDROXYZINE PAMOATE 25 MG/1
25 CAPSULE ORAL
Status: DISCONTINUED | OUTPATIENT
Start: 2021-01-05 | End: 2021-01-17 | Stop reason: HOSPADM

## 2021-01-05 RX ORDER — ALLOPURINOL 100 MG/1
50 TABLET ORAL EVERY OTHER DAY
Status: DISCONTINUED | OUTPATIENT
Start: 2021-01-05 | End: 2021-01-17 | Stop reason: HOSPADM

## 2021-01-05 RX ORDER — ACETAMINOPHEN 325 MG/1
650 TABLET ORAL
Status: DISCONTINUED | OUTPATIENT
Start: 2021-01-05 | End: 2021-01-17 | Stop reason: HOSPADM

## 2021-01-05 RX ORDER — LEVOFLOXACIN 5 MG/ML
750 INJECTION, SOLUTION INTRAVENOUS
Status: DISCONTINUED | OUTPATIENT
Start: 2021-01-07 | End: 2021-01-17 | Stop reason: HOSPADM

## 2021-01-05 RX ORDER — DEXTROSE MONOHYDRATE 50 MG/ML
75 INJECTION, SOLUTION INTRAVENOUS CONTINUOUS
Status: DISCONTINUED | OUTPATIENT
Start: 2021-01-05 | End: 2021-01-06

## 2021-01-05 RX ORDER — ACETAMINOPHEN 650 MG/1
650 SUPPOSITORY RECTAL
Status: COMPLETED | OUTPATIENT
Start: 2021-01-05 | End: 2021-01-05

## 2021-01-05 RX ORDER — METOPROLOL TARTRATE 25 MG/1
12.5 TABLET, FILM COATED ORAL EVERY 12 HOURS
Status: DISCONTINUED | OUTPATIENT
Start: 2021-01-05 | End: 2021-01-09

## 2021-01-05 RX ADMIN — SERTRALINE HYDROCHLORIDE 50 MG: 25 TABLET ORAL at 21:42

## 2021-01-05 RX ADMIN — ACETAMINOPHEN 650 MG: 650 SUPPOSITORY RECTAL at 16:07

## 2021-01-05 RX ADMIN — SODIUM CHLORIDE 1000 ML: 9 INJECTION, SOLUTION INTRAVENOUS at 13:52

## 2021-01-05 RX ADMIN — VANCOMYCIN HYDROCHLORIDE 1000 MG: 1 INJECTION, POWDER, LYOPHILIZED, FOR SOLUTION INTRAVENOUS at 15:42

## 2021-01-05 RX ADMIN — SODIUM CHLORIDE 824 ML: 9 INJECTION, SOLUTION INTRAVENOUS at 15:45

## 2021-01-05 RX ADMIN — DEXTROSE MONOHYDRATE 75 ML/HR: 50 INJECTION, SOLUTION INTRAVENOUS at 17:53

## 2021-01-05 RX ADMIN — HYDROXYZINE PAMOATE 25 MG: 25 CAPSULE ORAL at 21:42

## 2021-01-05 NOTE — H&P
History and Physical    Patient: Alli Mcgee MRN: [id number]  SSN: [id number]    YOB: 1932  Age: 80 y.o. Sex: male      Subjective:      Alli Mcgee is a 80 y.o. male who lives in [facility] was brought to the hospital for shortness of breath and cough, rapid covid test is negative. PMH includes CKD, gout, hypertension, persistent dysphagia requiring PEG placement. CXR showing LLL pneumonia with posteromedial opacity. will admit for further treatment and evaluation. Past Medical History:   Diagnosis Date    CRI (chronic renal insufficiency) 12/13/2012    Gout 1/4/2011    Hypertension     [facility]oma ([facility] [address]     Prostate CA ([facility] [address] 1/4/2011     Past Surgical History:   Procedure Laterality Date    ENDOSCOPY, COLON, DIAGNOSTIC  2003    neg    HC BONE MARROW BIOPSY      HX PROSTATECTOMY      IR THORACENTESIS CATH W IMAGE  12/15/2020      Family History   Problem Relation Age of Onset    Hypertension Mother      Social History     Tobacco Use    Smoking status: Never Smoker    Smokeless tobacco: Never Used   Substance Use Topics    Alcohol use: No      Prior to Admission medications    Medication Sig Start Date End Date Taking? Authorizing Provider   sertraline (ZOLOFT) 50 mg tablet Take 1 Tab by mouth every evening. 12/21/20   Flako Daugherty NP   albuterol-ipratropium (DUO-NEB) 2.5 mg-0.5 mg/3 ml nebu 3 mL by Nebulization route every four (4) hours as needed for Wheezing. 12/21/20   Flako Daugherty NP   famotidine (PEPCID) 20 mg tablet Take 1 Tab by mouth every twenty-four (24) hours. 12/21/20   Josr Daugherty, NP   hydrOXYzine pamoate (VISTARIL) 25 mg capsule Take 1 Cap by mouth four (4) times daily as needed for Anxiety for up to 14 days. 12/21/20 1/4/21  Good Daugherty NP   metoprolol tartrate (LOPRESSOR) 25 mg tablet Take 0.5 Tabs by mouth every twelve (12) hours. 12/21/20   Flako Daugherty NP   allopurinoL (ZYLOPRIM) 100 mg tablet Take 0.5 Tabs by mouth every other day. 12/8/20   Parveen Walters MD        Allergies   Allergen Reactions    Pcn [Penicillins] Swelling       Review of Systems:  Review of Systems   HENT: Negative for congestion and sore throat. Respiratory: Positive for cough. Cardiovascular: Negative for chest pain. Gastrointestinal: Negative for heartburn and vomiting. Musculoskeletal: Positive for myalgias. Neurological: Negative for headaches. Objective:     Vitals:    01/05/21 1133 01/05/21 1304 01/05/21 1350   BP: (!) 104/54 (!) 92/52 107/63   Pulse: 62 (!) 112 (!) 106   Resp: 20 28    Temp:  (!) 101 °F (38.3 °C)    SpO2: 90% 100% 99%   Weight: 60.8 kg (134 lb)     Height: 5' 6\" (1.676 m)          Physical Exam:  Physical Exam   Constitutional: He appears cachectic. No distress. Cardiovascular: Normal rate, regular rhythm and intact distal pulses. Murmur heard. Pulmonary/Chest: He has rhonchi in the right lower field and the left lower field. Abdominal: Soft. Bowel sounds are normal.   PEG tube intact   Musculoskeletal: Normal range of motion. General: No edema. Neurological: He is alert. Follows commands,    Skin: Skin is warm and dry. No erythema.         Buttocks and scrotum redness   Psychiatric: His behavior is normal.        Recent Results (from the past 24 hour(s))   CULTURE, BLOOD, PAIRED    Collection Time: 01/05/21 12:20 PM    Specimen: Blood   Result Value Ref Range    Special Requests: No Special Requests      Culture result: No growth after 1 hour     CBC WITH AUTOMATED DIFF    Collection Time: 01/05/21 12:45 PM   Result Value Ref Range    WBC 12.7 (H) 4.1 - 11.1 K/uL    RBC 2.88 (L) 4.10 - 5.70 M/uL    HGB 8.3 (L) 12.1 - 17.0 g/dL    HCT 25.6 (L) 36.6 - 50.3 %    MCV 88.9 80.0 - 99.0 FL    MCH 28.8 26.0 - 34.0 PG    MCHC 32.4 30.0 - 36.5 g/dL    RDW 18.8 (H) 11.5 - 14.5 %    PLATELET 722 295 - 498 K/uL    MPV 11.3 8.9 - 12.9 FL    NRBC 0.0 0  WBC    ABSOLUTE NRBC 0.00 0.00 - 0.01 K/uL    NEUTROPHILS 89 (H) 32 - 75 %    LYMPHOCYTES 5 (L) 12 - 49 %    MONOCYTES 5 5 - 13 %    EOSINOPHILS 0 0 - 7 %    BASOPHILS 0 0 - 1 %    METAMYELOCYTES 1 (H) 0 %    IMMATURE GRANULOCYTES 0 %    ABS. NEUTROPHILS 11.3 (H) 1.8 - 8.0 K/UL    ABS. LYMPHOCYTES 0.6 (L) 0.8 - 3.5 K/UL    ABS. MONOCYTES 0.6 0.0 - 1.0 K/UL    ABS. EOSINOPHILS 0.0 0.0 - 0.4 K/UL    ABS. BASOPHILS 0.0 0.0 - 0.1 K/UL    ABS. IMM. GRANS. 0.0 K/UL    RBC COMMENTS Target Cells  2+        RBC COMMENTS Microcytosis  1+        DF Manual     METABOLIC PANEL, COMPREHENSIVE    Collection Time: 01/05/21 12:45 PM   Result Value Ref Range    Sodium 148 (H) 136 - 145 mmol/L    Potassium 4.0 3.5 - 5.1 mmol/L    Chloride 107 97 - 108 mmol/L    CO2 31 21 - 32 mmol/L    Anion gap 10 5 - 15 mmol/L    Glucose 96 65 - 100 mg/dL    BUN 98 (H) 6 - 20 mg/dL    Creatinine 3.11 (H) 0.70 - 1.30 mg/dL    BUN/Creatinine ratio 32 (H) 12 - 20      GFR est AA 23 (L) >60 ml/min/1.73m2    GFR est non-AA 19 (L) >60 ml/min/1.73m2    Calcium 10.8 (H) 8.5 - 10.1 mg/dL    Bilirubin, total 0.4 0.2 - 1.0 mg/dL    AST (SGOT) 132 (H) 15 - 37 U/L    ALT (SGPT) 102 (H) 12 - 78 U/L    Alk.  phosphatase 130 (H) 45 - 117 U/L    Protein, total 5.8 (L) 6.4 - 8.2 g/dL    Albumin 1.8 (L) 3.5 - 5.0 g/dL    Globulin 4.0 2.0 - 4.0 g/dL    A-G Ratio 0.5 (L) 1.1 - 2.2     TROPONIN I    Collection Time: 01/05/21 12:45 PM   Result Value Ref Range    Troponin-I, Qt. 0.07 (H) <0.05 ng/mL   LACTIC ACID    Collection Time: 01/05/21 12:45 PM   Result Value Ref Range    Lactic acid 2.9 (HH) 0.4 - 2.0 mmol/L   PROCALCITONIN    Collection Time: 01/05/21 12:45 PM   Result Value Ref Range    Procalcitonin 0.87 (H) 0 ng/mL   EKG, 12 LEAD, INITIAL    Collection Time: 01/05/21  1:45 PM   Result Value Ref Range    Ventricular Rate 113 BPM    Atrial Rate 113 BPM    P-R Interval 146 ms    QRS Duration 72 ms    Q-T Interval 318 ms    QTC Calculation (Bezet) 436 ms    Calculated P Axis 55 degrees    Calculated R Axis -17 degrees Calculated T Axis 66 degrees    Diagnosis       Sinus tachycardia with occasional Premature ventricular complexes  Otherwise normal ECG  When compared with ECG of 28-DEC-2020 05:04,  Fusion complexes are no longer Present  Questionable change in QRS duration  Criteria for Inferior infarct are no longer Present  Confirmed by Binta Garcia ([id number]) on 1/5/2021 2:09:54 PM         XR Results (maximum last 3): Results from [place] encounter on 01/05/21   XR CHEST PORT    Narrative Chest single view. Comparison single view chest 12/28/2020    LLL posteromedial opacity now present. Findings would fit with any clinical  concern for pneumonia. Otherwise, no gross interstitial or alveolar pulmonary edema. Cardiac and  mediastinal structures are unchanged noting thoracic aorta atherosclerosis. No  pneumothorax. Probable small dependent left pleural effusion. Results from [place] encounter on 12/28/20   XR ABD (KUB)    Narrative Abdomen, 2 views, 12/28/2020    History PEG tube placement. Comparison: None. Findings: Radiographs are obtained before and after administration of 60 mL of  equal parts Gastrografin and water into the percutaneous feeding tube. Contrast  opacification of the stomach is distended. Contrast opacification of small  bowel loops is also seen. The bowel gas pattern is nonobstructive. Stool  volume is small to moderate. Surgical clips are seen at the pelvis. Degenerative changes are present in the spine. Impression Impression: Percutaneous feeding tube in the stomach. Nonobstructive bowel gas  pattern. XR CHEST PORT    Narrative Chest, 2 frontal views, 12/28/2020    History: Cough. Comparison: Including chest 12/15/2020. Findings: Feeding tube and left internal jugular catheter on chest 12/14/2020 oh  no longer present. The cardiac silhouette is within normal limits. The lungs  are underexpanded.   There is mild prominence of the pulmonary vasculature and  hydrostatic edema is possible. Airspace consolidation most pronounced at the  bases is improved as compared to chest 12/15/2020. Left-sided pleural effusion  is improved and now small appearing. No right-sided pleural effusion is  definitively seen on this single view. No pneumothorax is identified. The  osseous structures are stable. Impression Impression: Airspace consolidation in the lungs, improved. Left pleural  effusion, improved. Possible hydrostatic edema. CT Results (maximum last 3): Results from [facility] encounter on 12/11/20   CT CHEST WO CONT    Narrative Comparison chest x-ray 12/11/2020 [facility] and chest CTA 11/25/2020 [facility]. TECHNIQUE: Axial imaging of the chest without IV contrast, with multiplanar  reformatting, MIPs. Dose reduction: All CT scans at this facility are performed using dose reduction  optimization techniques as appropriate to a performed exam including the  following: Automated exposure control, adjustments of the mA and/or kV according  to patient's size, or use of iterative reconstruction technique. FINDINGS: Left central catheter distal tip SVC. Mild cardiomegaly. Multivessel  coronary artery calcification. No pericardial effusion. Low-density intracardiac  blood suggests anemia. Calcified thoracic aorta without aneurysm. Pulmonary  arteries are not dilated. No mediastinal or hilar lymphadenopathy. Tubular  structure posterior to the esophagus possibly dilated vessel. Bilateral pleural  effusions and lower lung consolidations without air bronchograms. The left lower  lung is completely consolidated/collapsed. The aerated lungs demonstrate patchy  areas of airspace disease. No axillary adenopathy. Included thyroid is  unremarkable. Included upper abdomen demonstrates lobulated, heterogeneous  structure spleen and small retroperitoneal lymphadenopathy; unchanged. Degenerative changes of the bony structures. Soft tissue anasarca. Impression IMPRESSION:  1. Bilateral pleural effusions and lower lung consolidations, the left lower  lung being completely collapsed, this is similar to previous. 2. Interval development patchy airspace disease in the aerated lungs consistent  with pneumonia. 3. Abnormal spleen, small retroperitoneal lymphadenopathy unchanged. 4. Atherosclerosis. Results from [place] encounter on 11/30/20   CTA CHEST W OR W WO CONT    Narrative EXAM:  CTA CHEST W OR W WO CONT    INDICATION: Elevated d-dimer    COMPARISON: None. TECHNIQUE: Helical thin section chest CT following uneventful intravenous  administration of nonionic contrast 85 mL of isovue 370 according to  departmental PE protocol. Coronal and sagittal reformats were performed. 3D post  processing was performed. CT dose reduction was achieved through the use of a  standardized protocol tailored for this examination and automatic exposure  control for dose modulation. FINDINGS: This is a limited quality study for the evaluation of pulmonary  embolism to the proximal segmental arterial level. There is no pulmonary  embolism to this level. THYROID: No nodule. MEDIASTINUM: No mass or lymphadenopathy. MARTELL: No mass or lymphadenopathy. THORACIC AORTA: No aneurysm. HEART: Coronary atherosclerotic disease  ESOPHAGUS: No wall thickening or dilatation. TRACHEA/BRONCHI: Patent. PLEURA: Bilateral pleural effusions  LUNGS: Bilateral lower lobe volume loss. Mild right middle lobe volume loss. UPPER ABDOMEN: Distended gallbladder. Lobulated appearance of the spleen. Possible retroperitoneal adenopathy is again noted. BONES: No aggressive bone lesion or fracture. Impression IMPRESSION:     1.  [Limited study. No evidence of pulmonary embolus. 2.  Bilateral pleural effusions and bilateral lower lobe volume loss. Mild right  middle lobe volume loss. 3.  Distended gallbladder. This may be related to fasting state. Correlate  clinically. Lobulated spleen and possible retroperitoneal adenopathy is again  noted     Results from Hospital Encounter encounter on 11/20/20   CT BX BONE MARROW AND ASPIRATION    Narrative INDICATION: Retroperitoneal lymphadenopathy. Evaluate for lymphoma. PROCEDURE: Bone marrow biopsy under CT guidance. CT dose reduction was achieved  through use of a standardized protocol tailored for this examination and  automatic exposure control for dose modulation. ESTIMATED BLOOD LOSS: Less than 5 mL    OPERATING PHYSICIAN: Zia Fisher M.D. POSTOPERATIVE DIAGNOSIS: Retroperitoneal lymphadenopathy. SPECIMENS REMOVED: Bone marrow aspirate and core    SEDATION:  Moderate intravenous conscious sedation was supervised by Dr. Di Bolanos. The  patient was independently monitored by a registered nurse assigned to the  Department of Radiology using automated blood pressure, EKG, and pulse oximetry. The detailed conscious sedation record is stored in the hospital information  system. MEDICATION:  Versed: 3 mg  Fentanyl: 75 mcg  Intraprocedure time: 24 minutes    EXAM:     FINDINGS: Written and verbal consent was obtained from the patient after the  risks, benefits and alternatives of the procedure were explained. The patient  was placed prone on the CT gantry. Initial  images were obtained. Site on  the lower back was chosen. The skin was prepped and draped in the normal sterile  fashion. Skin and subcutaneous tissues were anesthetized with lidocaine. Under  CT guidance the [employer] power bone biopsy needle was advanced to the margin of  the posterior iliac bone. A 2 cm core biopsy was obtained and approximately 5 mL  marrow aspirate were handed to the cytology technologist in the CT suite. The  needle was removed after the sample was considered adequate. No immediate  complications. Patient was recovered in the recovery room without incident. Impression IMPRESSION:  1. Successful conscious sedation  2. Successful CT guided power bone marrow biopsy       MRI Results (maximum last 3):      Nuclear Medicine Results (maximum last 3): No results found for this or any previous visit. US Results (maximum last 3): Results from [place] encounter on 12/11/20   US SPLEEN    Narrative Real-time sonography of the spleen was performed. Comparison with CT scans dated  December 11, 2020, December 1, 2020 November 23, 2020. INDICATION: Lymphoma versus infection. The spleen measures 12.9 x 11.9 x 6.3 cm and contains at least 2 heterogeneously  hypoechoic to adjacent splenic tissue mass lesions or complex cystic  collections. A central lesion measures 2.7 x 3.9 x 3.6 cm and a more posterior  lesion measures 3.8 x 3.7 x 3.8 cm. No blood flow is seen within either of these  lesions. Overall, the lobulated appearance of the spleen seen on CT scan is not  as evident on ultrasound. .      Impression IMPRESSION: At least 2 heterogeneously hypoechoic to adjacent splenic tissue  mass lesions within the spleen. Findings are nonspecific and could represent  lymphoma or infection. Lymphoma would typically be hot on a PET/CT. If  clinically warranted, follow-up may be helpful. Active Problems:    Pneumonia (1/5/2021)        Assessment/Plan:   1. Sepsis secondary to  LLL pneumonia- start IV levaquin, O2 as needed, keep O2 saturation above 90%, received NS bolus, continue IV fluids, repeat LA, procalcitonin, in am. [facility] sent. 2.  Hypertension- hold BB due to hypotension,    3. Persistent dysphagia- has PEG tube, restart TF and water flushes, consult to nutrition    4.  Mild hypernatremia- will start D5W at 75/hr              DVT Prophylaxis  Heparin sq  Code Status  DNR  POA  Total Time 35 minutes      Signed By: Kelvin Kc NP     January 5, 2021

## 2021-01-05 NOTE — ED PROVIDER NOTES
EMERGENCY DEPARTMENT HISTORY AND PHYSICAL EXAM      Date: 1/5/2021  Patient Name: Delroy Rangel      History of Presenting Illness     No chief complaint on file. History Provided By: EMS    HPI: Delroy Rangel, 80 y.o. male with a past medical history significant Multiple comorbidities and significant past medical history and lives in [address] [place] [address] presents to the ED with cc of shortness of breath per EMS. There is no other history available acutely on this patient. He noted has a past medical history significant for hypertension lymphoma gout and chronic renal insufficiency. She is not able to provide any history at this point time is maintained on 4 L of oxygen. There are no other complaints, changes, or physical findings at this time. PCP: Farrah Monreal MD    Current Facility-Administered Medications   Medication Dose Route Frequency Provider Last Rate Last Admin    sodium chloride (NS) flush 5-10 mL  5-10 mL IntraVENous PRN Vanessa Martínez MD        vancomycin (VANCOCIN) 1,000 mg in 0.9% sodium chloride 250 mL (VIAL-MATE)  1,000 mg IntraVENous ONCE Vanessa Martínez MD   1,000 mg at 01/05/21 1542    Vancomycin dose per pharmacy protocol   Other Rx Dosing/Monitoring Vanessa Martínez MD        [START ON 1/7/2021] levoFLOXacin (LEVAQUIN) 750 mg in D5W IVPB  750 mg IntraVENous Q48H Vanessa Martínez MD        [START ON 1/6/2021] piperacillin-tazobactam (ZOSYN) 3.375 g in 0.9% sodium chloride (MBP/ADV) 100 mL MBP  3.375 g IntraVENous Q12H Vanessa Martínez MD         Current Outpatient Medications   Medication Sig Dispense Refill    sertraline (ZOLOFT) 50 mg tablet Take 1 Tab by mouth every evening. 30 Tab 0    albuterol-ipratropium (DUO-NEB) 2.5 mg-0.5 mg/3 ml nebu 3 mL by Nebulization route every four (4) hours as needed for Wheezing. 30 Nebule 0    famotidine (PEPCID) 20 mg tablet Take 1 Tab by mouth every twenty-four (24) hours.  27 Tab 0    metoprolol tartrate (LOPRESSOR) 25 mg tablet Take 0.5 Tabs by mouth every twelve (12) hours. 30 Tab 0    allopurinoL (ZYLOPRIM) 100 mg tablet Take 0.5 Tabs by mouth every other day. 15 Tab 0       Past History     Past Medical History:  Past Medical History:   Diagnosis Date    CRI (chronic renal insufficiency) 12/13/2012    Gout 1/4/2011    Hypertension     Lymphoma ([facility] [address]     Prostate CA ([facility] [address] 1/4/2011       Past Surgical History:  Past Surgical History:   Procedure Laterality Date    ENDOSCOPY, COLON, DIAGNOSTIC  2003    neg    HC BONE MARROW BIOPSY      HX PROSTATECTOMY      IR THORACENTESIS CATH W IMAGE  12/15/2020       Family History:  Family History   Problem Relation Age of Onset    Hypertension Mother        Social History:  Social History     Tobacco Use    Smoking status: Never Smoker    Smokeless tobacco: Never Used   Substance Use Topics    Alcohol use: No    Drug use: Not Currently       Allergies: Allergies   Allergen Reactions    Pcn [Penicillins] Swelling         Review of Systems     Review of Systems   Unable to perform ROS: Patient nonverbal       Physical Exam     Physical Exam  Constitutional:       General: He is in acute distress. Appearance: He is ill-appearing and toxic-appearing. HENT:      Head: Normocephalic and atraumatic. Nose: Nose normal.      Mouth/Throat:      Mouth: Mucous membranes are dry. Eyes:      Extraocular Movements: Extraocular movements intact. Pupils: Pupils are equal, round, and reactive to light. Neck:      Musculoskeletal: Normal range of motion and neck supple. Cardiovascular:      Rate and Rhythm: Tachycardia present. Heart sounds: No murmur. No gallop. Pulmonary:      Effort: Respiratory distress present. Breath sounds: Wheezing present. Abdominal:      General: Abdomen is flat.    Genitourinary:     Comments: Not examined  Musculoskeletal:      Comments: Unable to assess   Skin:     Comments: Cool and ashkan   Neurological:      Comments: Unable to assess   Psychiatric:      Comments: Unable to assess         Lab and Diagnostic Study Results     Labs -     Recent Results (from the past 12 hour(s))   CULTURE, BLOOD, PAIRED    Collection Time: 01/05/21 12:20 PM    Specimen: Blood   Result Value Ref Range    Special Requests: No Special Requests      Culture result: No growth after 1 hour     CBC WITH AUTOMATED DIFF    Collection Time: 01/05/21 12:45 PM   Result Value Ref Range    WBC 12.7 (H) 4.1 - 11.1 K/uL    RBC 2.88 (L) 4.10 - 5.70 M/uL    HGB 8.3 (L) 12.1 - 17.0 g/dL    HCT 25.6 (L) 36.6 - 50.3 %    MCV 88.9 80.0 - 99.0 FL    MCH 28.8 26.0 - 34.0 PG    MCHC 32.4 30.0 - 36.5 g/dL    RDW 18.8 (H) 11.5 - 14.5 %    PLATELET 698 843 - 613 K/uL    MPV 11.3 8.9 - 12.9 FL    NRBC 0.0 0  WBC    ABSOLUTE NRBC 0.00 0.00 - 0.01 K/uL    NEUTROPHILS 89 (H) 32 - 75 %    LYMPHOCYTES 5 (L) 12 - 49 %    MONOCYTES 5 5 - 13 %    EOSINOPHILS 0 0 - 7 %    BASOPHILS 0 0 - 1 %    METAMYELOCYTES 1 (H) 0 %    IMMATURE GRANULOCYTES 0 %    ABS. NEUTROPHILS 11.3 (H) 1.8 - 8.0 K/UL    ABS. LYMPHOCYTES 0.6 (L) 0.8 - 3.5 K/UL    ABS. MONOCYTES 0.6 0.0 - 1.0 K/UL    ABS. EOSINOPHILS 0.0 0.0 - 0.4 K/UL    ABS. BASOPHILS 0.0 0.0 - 0.1 K/UL    ABS. IMM.  GRANS. 0.0 K/UL    RBC COMMENTS Target Cells  2+        RBC COMMENTS Microcytosis  1+        DF Manual     METABOLIC PANEL, COMPREHENSIVE    Collection Time: 01/05/21 12:45 PM   Result Value Ref Range    Sodium 148 (H) 136 - 145 mmol/L    Potassium 4.0 3.5 - 5.1 mmol/L    Chloride 107 97 - 108 mmol/L    CO2 31 21 - 32 mmol/L    Anion gap 10 5 - 15 mmol/L    Glucose 96 65 - 100 mg/dL    BUN 98 (H) 6 - 20 mg/dL    Creatinine 3.11 (H) 0.70 - 1.30 mg/dL    BUN/Creatinine ratio 32 (H) 12 - 20      GFR est AA 23 (L) >60 ml/min/1.73m2    GFR est non-AA 19 (L) >60 ml/min/1.73m2    Calcium 10.8 (H) 8.5 - 10.1 mg/dL    Bilirubin, total 0.4 0.2 - 1.0 mg/dL    AST (SGOT) 132 (H) 15 - 37 U/L    ALT (SGPT) 102 (H) 12 - 78 U/L    Alk. phosphatase 130 (H) 45 - 117 U/L    Protein, total 5.8 (L) 6.4 - 8.2 g/dL    Albumin 1.8 (L) 3.5 - 5.0 g/dL    Globulin 4.0 2.0 - 4.0 g/dL    A-G Ratio 0.5 (L) 1.1 - 2.2     TROPONIN I    Collection Time: 01/05/21 12:45 PM   Result Value Ref Range    Troponin-I, Qt. 0.07 (H) <0.05 ng/mL   LACTIC ACID    Collection Time: 01/05/21 12:45 PM   Result Value Ref Range    Lactic acid 2.9 (HH) 0.4 - 2.0 mmol/L   PROCALCITONIN    Collection Time: 01/05/21 12:45 PM   Result Value Ref Range    Procalcitonin 0.87 (H) 0 ng/mL   EKG, 12 LEAD, INITIAL    Collection Time: 01/05/21  1:45 PM   Result Value Ref Range    Ventricular Rate 113 BPM    Atrial Rate 113 BPM    P-R Interval 146 ms    QRS Duration 72 ms    Q-T Interval 318 ms    QTC Calculation (Bezet) 436 ms    Calculated P Axis 55 degrees    Calculated R Axis -17 degrees    Calculated T Axis 66 degrees    Diagnosis       Sinus tachycardia with occasional Premature ventricular complexes  Otherwise normal ECG  When compared with ECG of 28-DEC-2020 05:04,  Fusion complexes are no longer Present  Questionable change in QRS duration  Criteria for Inferior infarct are no longer Present  Confirmed by Emely Nix ([id number]) on 1/5/2021 2:09:54 PM         Radiologic Studies -   [unfilled]  CT Results  (Last 48 hours)    None        CXR Results  (Last 48 hours)               01/05/21 1351  XR CHEST PORT Final result    Narrative:  Chest single view. Comparison single view chest 12/28/2020       LLL posteromedial opacity now present. Findings would fit with any clinical   concern for pneumonia. Otherwise, no gross interstitial or alveolar pulmonary edema. Cardiac and   mediastinal structures are unchanged noting thoracic aorta atherosclerosis. No   pneumothorax. Probable small dependent left pleural effusion.              Medical Decision Making and ED Course   - I am the first and primary provider for this patient AND AM THE PRIMARY PROVIDER OF RECORD. - I reviewed the vital signs, available nursing notes, past medical history, past surgical history, family history and social history. - Initial assessment performed. The patients presenting problems have been discussed, and the staff are in agreement with the care plan formulated and outlined with them. I have encouraged them to ask questions as they arise throughout their visit. Vital Signs-Reviewed the patient's vital signs. Patient Vitals for the past 12 hrs:   Temp Pulse Resp BP SpO2   01/05/21 1350  (!) 106  107/63 99 %   01/05/21 1304 (!) 101 °F (38.3 °C) (!) 112 28 (!) 92/52 100 %   01/05/21 1133  62 20 (!) 104/54 90 %       EKG interpretation: (Preliminary): Performed at 1345, and read at 1348  Ventricular rate 113 bpm,  ms, QRS duration 72 ms,  ms. Interpretation: Sinus tach with occasional PVCs. Otherwise normal EKG. Records Reviewed: Nursing Notes and Old Medical Records    The patient presents with tachycardia and fever with a differential diagnosis of pneumonia, sepsis, acute respiratory failure. ED Course: We will proceed with sepsis work-up. ED Course as of Jan 05 1609   Tue Jan 05, 2021   1558 I discussed the case with Dr. Zoe Vallejo who will evaluate the patient for admission    [CS]      ED Course User Index  [CS] Skyla Butler MD         Provider Notes (Medical Decision Making):   Patient is a pleasant 80-year-old male who lives in a skilled nursing facility his PCP is Dr. Tricia Newton. Patient presents with increasing shortness of breath and a positive Covid diagnosis. He is remained hemodynamically stable though soft blood pressures anywhere from the mid 90s to the mid 100s. He is responsive to IV fluids. He has a slight male I do not anticipate his elevated blood pressure to begin with. Patient appears to be septic with Covid pneumonia has been treated appropriately with antibiotics. Cultures have been drawn.  We will plan on admitting the patient to the hospital.  [facility]           Consultations:       Consultations: -  Hospitalist Consultant: Dr. Afshan Henderson: We have asked for emergent assistance with regard to this patient. We have discussed the patients HPI, ROS, PE and results this far. They will come and evaluate the patient for admission. Procedures and Critical Care       Disposition     Disposition: Admitted to Floor Medical Floor the case was discussed with the admitting physician Dr. Afshan Henderson    Admitted    Diagnosis     Clinical Impression:   1. COVID-19    2. Hypoxia        Attestations:    Nivia Rider MD    Please note that this dictation was completed with [employer], the computer voice recognition software. Quite often unanticipated grammatical, syntax, homophones, and other interpretive errors are inadvertently transcribed by the computer software. Please disregard these errors. Please excuse any errors that have escaped final proofreading. Thank you.

## 2021-01-05 NOTE — ED TRIAGE NOTES
Pt from [facility]. covid +. Staff called EMS for difficulty breathing no no acute distress.

## 2021-01-05 NOTE — PROGRESS NOTES
Vancomycin Note-01/05/2021  Admission date [id number]   Attending Jack Pacer   Indication  SEPSIS       Height Ht Readings from Last 1 Encounters:   01/05/21 167.6 cm (66\")       Weight Wt Readings from Last 1 Encounters:   01/05/21 60.8 kg (134 lb)      IBW ideal body weight      SCr Creatinine   Date Value Ref Range Status   01/05/2021 3.11 (H) 0.70 - 1.30 mg/dL Final   12/28/2020 1.99 (H) 0.70 - 1.30 mg/dL Final   12/21/2020 1.43 (H) 0.70 - 1.30 mg/dL Final      CrCl (based on IBW) 14.8 ml/min       Load/ER dose 1000mg @ 1500   Current regimen  Pulse dosing       NEW regimen Pulse dosing   Level Scheduled for 01/06 in AM         Current Antimicrobial Therapy (168h ago, onward)       Ordered     Start Stop    01/05/21 1514  levoFLOXacin (LEVAQUIN) 750 mg in D5W IVPB  750 mg,   IntraVENous,   EVERY 48 HOURS      01/07/21 1459 --    01/05/21 1515  piperacillin-tazobactam (ZOSYN) 3.375 g in 0.9% sodium chloride (MBP/ADV) 100 mL MBP  3.375 g,   IntraVENous,   EVERY 12 HOURS     Note to Pharmacy: 4 hour extended infusion time. 01/06/21 0259 --    01/05/21 1513  Vancomycin dose per pharmacy protocol  Other,   RX DOSING/MONITORING      01/05/21 1512 --    01/05/21 1458  vancomycin (VANCOCIN) 1,000 mg in 0.9% sodium chloride 250 mL (VIAL-MATE)  1,000 mg,   IntraVENous,   ONCE     Note to Pharmacy: Pharmacy to dose after once dose. 01/05/21 1459 01/06/21 0258                      Submitted by:  Buck Kohli, CACHORRO

## 2021-01-06 LAB
ANION GAP SERPL CALC-SCNC: 8 MMOL/L (ref 5–15)
ARTERIAL PATENCY WRIST A: ABNORMAL
BASE EXCESS BLDA CALC-SCNC: 2.4 MMOL/L (ref 0–2)
BDY SITE: ABNORMAL
BUN SERPL-MCNC: 90 MG/DL (ref 6–20)
BUN/CREAT SERPL: 31 (ref 12–20)
CA-I BLD-MCNC: 9.6 MG/DL (ref 8.5–10.1)
CHLORIDE SERPL-SCNC: 114 MMOL/L (ref 97–108)
CO2 SERPL-SCNC: 27 MMOL/L (ref 21–32)
COVID-19 RAPID TEST, COVR: DETECTED
CREAT SERPL-MCNC: 2.95 MG/DL (ref 0.7–1.3)
DATE LAST DOSE: NORMAL
ERYTHROCYTE [DISTWIDTH] IN BLOOD BY AUTOMATED COUNT: 18.8 % (ref 11.5–14.5)
GAS FLOW.O2 O2 DELIVERY SYS: 5 L/MIN
GLUCOSE BLD STRIP.AUTO-MCNC: 104 MG/DL (ref 65–100)
GLUCOSE BLD STRIP.AUTO-MCNC: 204 MG/DL (ref 65–100)
GLUCOSE SERPL-MCNC: 86 MG/DL (ref 65–100)
HCO3 BLDA-SCNC: 27 MMOL/L (ref 22–26)
HCT VFR BLD AUTO: 22.7 % (ref 36.6–50.3)
HGB BLD-MCNC: 7.4 G/DL (ref 12.1–17)
LACTATE SERPL-SCNC: 3.8 MMOL/L (ref 0.4–2)
MCH RBC QN AUTO: 29.4 PG (ref 26–34)
MCHC RBC AUTO-ENTMCNC: 32.6 G/DL (ref 30–36.5)
MCV RBC AUTO: 90.1 FL (ref 80–99)
PCO2 BLDA: 39 MMHG (ref 35–45)
PERFORMED BY, TECHID: ABNORMAL
PERFORMED BY, TECHID: ABNORMAL
PH BLDA: 7.44 [PH] (ref 7.35–7.45)
PLATELET # BLD AUTO: 264 K/UL (ref 150–400)
PMV BLD AUTO: 11.6 FL (ref 8.9–12.9)
PO2 BLDA: 96 MMHG (ref 75–100)
POTASSIUM SERPL-SCNC: 4 MMOL/L (ref 3.5–5.1)
PROCALCITONIN SERPL-MCNC: 0.58 NG/ML
RBC # BLD AUTO: 2.52 M/UL (ref 4.1–5.7)
REPORTED DOSE,DOSE: NORMAL UNITS
SAO2 % BLD: 98 %
SAO2% DEVICE SAO2% SENSOR NAME: ABNORMAL
SARS-COV-2, COV2: NORMAL
SODIUM SERPL-SCNC: 149 MMOL/L (ref 136–145)
SPECIMEN SOURCE: ABNORMAL
VANCOMYCIN SERPL-MCNC: 8.7 UG/ML
WBC # BLD AUTO: 11.9 K/UL (ref 4.1–11.1)

## 2021-01-06 PROCEDURE — 74011636637 HC RX REV CODE- 636/637: Performed by: NURSE PRACTITIONER

## 2021-01-06 PROCEDURE — 74011250637 HC RX REV CODE- 250/637: Performed by: NURSE PRACTITIONER

## 2021-01-06 PROCEDURE — 87635 SARS-COV-2 COVID-19 AMP PRB: CPT

## 2021-01-06 PROCEDURE — 36430 TRANSFUSION BLD/BLD COMPNT: CPT

## 2021-01-06 PROCEDURE — 65270000029 HC RM PRIVATE

## 2021-01-06 PROCEDURE — P9016 RBC LEUKOCYTES REDUCED: HCPCS

## 2021-01-06 PROCEDURE — 96375 TX/PRO/DX INJ NEW DRUG ADDON: CPT

## 2021-01-06 PROCEDURE — 36415 COLL VENOUS BLD VENIPUNCTURE: CPT

## 2021-01-06 PROCEDURE — 82803 BLOOD GASES ANY COMBINATION: CPT

## 2021-01-06 PROCEDURE — 30233N1 TRANSFUSION OF NONAUTOLOGOUS RED BLOOD CELLS INTO PERIPHERAL VEIN, PERCUTANEOUS APPROACH: ICD-10-PCS | Performed by: HOSPITALIST

## 2021-01-06 PROCEDURE — 80202 ASSAY OF VANCOMYCIN: CPT

## 2021-01-06 PROCEDURE — 83605 ASSAY OF LACTIC ACID: CPT

## 2021-01-06 PROCEDURE — 85027 COMPLETE CBC AUTOMATED: CPT

## 2021-01-06 PROCEDURE — 80048 BASIC METABOLIC PNL TOTAL CA: CPT

## 2021-01-06 PROCEDURE — 74011250636 HC RX REV CODE- 250/636: Performed by: NURSE PRACTITIONER

## 2021-01-06 PROCEDURE — 82962 GLUCOSE BLOOD TEST: CPT

## 2021-01-06 PROCEDURE — 86901 BLOOD TYPING SEROLOGIC RH(D): CPT

## 2021-01-06 PROCEDURE — 87070 CULTURE OTHR SPECIMN AEROBIC: CPT

## 2021-01-06 PROCEDURE — 84145 PROCALCITONIN (PCT): CPT

## 2021-01-06 PROCEDURE — 74011250636 HC RX REV CODE- 250/636: Performed by: HOSPITALIST

## 2021-01-06 RX ORDER — DEXAMETHASONE SODIUM PHOSPHATE 4 MG/ML
4 INJECTION, SOLUTION INTRA-ARTICULAR; INTRALESIONAL; INTRAMUSCULAR; INTRAVENOUS; SOFT TISSUE EVERY 6 HOURS
Status: DISCONTINUED | OUTPATIENT
Start: 2021-01-06 | End: 2021-01-17 | Stop reason: HOSPADM

## 2021-01-06 RX ORDER — DEXTROSE, SODIUM CHLORIDE, SODIUM LACTATE, POTASSIUM CHLORIDE, AND CALCIUM CHLORIDE 5; .6; .31; .03; .02 G/100ML; G/100ML; G/100ML; G/100ML; G/100ML
100 INJECTION, SOLUTION INTRAVENOUS CONTINUOUS
Status: DISCONTINUED | OUTPATIENT
Start: 2021-01-06 | End: 2021-01-06

## 2021-01-06 RX ORDER — SODIUM CHLORIDE 9 MG/ML
1000 INJECTION, SOLUTION INTRAVENOUS ONCE
Status: COMPLETED | OUTPATIENT
Start: 2021-01-06 | End: 2021-01-06

## 2021-01-06 RX ORDER — ALBUTEROL SULFATE 90 UG/1
2 AEROSOL, METERED RESPIRATORY (INHALATION)
Status: DISCONTINUED | OUTPATIENT
Start: 2021-01-06 | End: 2021-01-11 | Stop reason: SDUPTHER

## 2021-01-06 RX ORDER — SODIUM CHLORIDE 9 MG/ML
250 INJECTION, SOLUTION INTRAVENOUS AS NEEDED
Status: DISCONTINUED | OUTPATIENT
Start: 2021-01-06 | End: 2021-01-17 | Stop reason: HOSPADM

## 2021-01-06 RX ORDER — DEXTROSE MONOHYDRATE 50 MG/ML
20 INJECTION, SOLUTION INTRAVENOUS CONTINUOUS
Status: DISCONTINUED | OUTPATIENT
Start: 2021-01-06 | End: 2021-01-10

## 2021-01-06 RX ORDER — CALCIUM ACETATE 667 MG/1
2 CAPSULE ORAL
Status: DISCONTINUED | OUTPATIENT
Start: 2021-01-06 | End: 2021-01-17 | Stop reason: HOSPADM

## 2021-01-06 RX ADMIN — SODIUM CHLORIDE, SODIUM LACTATE, POTASSIUM CHLORIDE, CALCIUM CHLORIDE AND DEXTROSE MONOHYDRATE 125 ML/HR: 5; 600; 310; 30; 20 INJECTION, SOLUTION INTRAVENOUS at 04:34

## 2021-01-06 RX ADMIN — CALCIUM ACETATE 1334 MG: 667 CAPSULE ORAL at 16:36

## 2021-01-06 RX ADMIN — SERTRALINE HYDROCHLORIDE 50 MG: 25 TABLET ORAL at 18:01

## 2021-01-06 RX ADMIN — DEXTROSE MONOHYDRATE 75 ML/HR: 50 INJECTION, SOLUTION INTRAVENOUS at 18:01

## 2021-01-06 RX ADMIN — SODIUM CHLORIDE 1000 ML: 9 INJECTION, SOLUTION INTRAVENOUS at 02:03

## 2021-01-06 RX ADMIN — DEXAMETHASONE SODIUM PHOSPHATE 4 MG: 4 INJECTION, SOLUTION INTRA-ARTICULAR; INTRALESIONAL; INTRAMUSCULAR; INTRAVENOUS; SOFT TISSUE at 12:09

## 2021-01-06 RX ADMIN — MUPIROCIN: 20 OINTMENT TOPICAL at 09:00

## 2021-01-06 RX ADMIN — FAMOTIDINE 20 MG: 20 TABLET, FILM COATED ORAL at 12:12

## 2021-01-06 RX ADMIN — INSULIN LISPRO 4 UNITS: 100 INJECTION, SOLUTION INTRAVENOUS; SUBCUTANEOUS at 22:00

## 2021-01-06 RX ADMIN — DEXAMETHASONE SODIUM PHOSPHATE 4 MG: 4 INJECTION, SOLUTION INTRA-ARTICULAR; INTRALESIONAL; INTRAMUSCULAR; INTRAVENOUS; SOFT TISSUE at 18:01

## 2021-01-06 NOTE — PROGRESS NOTES
1/6/21. CM spoke with daughter Lupillo Slade @ [phone]). [place] [facility] [place] Sushma Milner. D/C Plan: To transfer back to SNF.

## 2021-01-06 NOTE — PROGRESS NOTES
Hospitalist Progress Note    Subjective:   Daily Progress Note: 1/6/2021 12:47 PM    Hospital Course:  Pt admitted for dyspnea, cough, fatigue, lives at [address] [place] [address]. Pt found to be covid 19 positive and CXR showing LLL pneumonia with opacities. Subjective: Pt seen in the ED, denies pain, is slightly dyspnea, on supplemental oxygen. Pulmonary in to see patient on consultation.      Current Facility-Administered Medications   Medication Dose Route Frequency    dextrose 5% lactated ringers infusion  100 mL/hr IntraVENous CONTINUOUS    0.9% sodium chloride infusion 250 mL  250 mL IntraVENous PRN    dexamethasone (DECADRON) 4 mg/mL injection 4 mg  4 mg IntraVENous Q6H    albuterol (PROVENTIL HFA, VENTOLIN HFA, PROAIR HFA) inhaler 2 Puff  2 Puff Inhalation Q4H PRN    VANCOMYCIN INFORMATION NOTE   Other PRN    calcium acetate(phosphat bind) (PHOSLO) capsule 1,334 mg  2 Cap Oral TID WITH MEALS    sodium chloride (NS) flush 5-10 mL  5-10 mL IntraVENous PRN    [START ON 1/7/2021] levoFLOXacin (LEVAQUIN) 750 mg in D5W IVPB  750 mg IntraVENous Q48H    allopurinoL (ZYLOPRIM) tablet 50 mg  50 mg Oral EVERY OTHER DAY    famotidine (PEPCID) tablet 20 mg  20 mg Per G Tube DAILY    [Held by provider] metoprolol tartrate (LOPRESSOR) tablet 12.5 mg  12.5 mg Oral Q12H    sertraline (ZOLOFT) tablet 50 mg  50 mg Per G Tube QPM    insulin lispro (HUMALOG) injection   SubCUTAneous AC&HS    glucose chewable tablet 16 g  4 Tab Oral PRN    glucagon (GLUCAGEN) injection 1 mg  1 mg IntraMUSCular PRN    dextrose (D50W) injection syrg 12.5-25 g  25-50 mL IntraVENous PRN    mupirocin (BACTROBAN) 2 % ointment   Both Nostrils DAILY    ondansetron (ZOFRAN) injection 4 mg  4 mg IntraVENous Q6H PRN    acetaminophen (TYLENOL) tablet 650 mg  650 mg Per G Tube Q4H PRN    acetaminophen (TYLENOL) suppository 650 mg  650 mg Rectal Q4H PRN    hydrOXYzine pamoate (VISTARIL) capsule 25 mg  25 mg Per G Tube TID PRN     Current Outpatient Medications   Medication Sig    sertraline (ZOLOFT) 50 mg tablet Take 1 Tab by mouth every evening.  albuterol-ipratropium (DUO-NEB) 2.5 mg-0.5 mg/3 ml nebu 3 mL by Nebulization route every four (4) hours as needed for Wheezing.  famotidine (PEPCID) 20 mg tablet Take 1 Tab by mouth every twenty-four (24) hours.  metoprolol tartrate (LOPRESSOR) 25 mg tablet Take 0.5 Tabs by mouth every twelve (12) hours.  allopurinoL (ZYLOPRIM) 100 mg tablet Take 0.5 Tabs by mouth every other day. Review of Systems:    Review of Systems   Constitutional: Negative for chills and fever. HENT: Negative for congestion and sore throat. Respiratory: Positive for cough and shortness of breath. Cardiovascular: Negative for chest pain and palpitations. Gastrointestinal: Negative for heartburn, nausea and vomiting. Genitourinary: Negative for dysuria. Neurological: Negative for dizziness and headaches. Objective:     Visit Vitals  /66   Pulse (!) 101   Temp 98 °F (36.7 °C)   Resp 18   Ht 5' 5.98\" (1.676 m)   Wt 60.8 kg (134 lb)   SpO2 100%   BMI 21.64 kg/m²    O2 Flow Rate (L/min): 5 l/min O2 Device: Room air    Temp (24hrs), Av.3 °F (37.4 °C), Min:98 °F (36.7 °C), Max:101 °F (38.3 °C)      No intake/output data recorded.  1901 -  0700  In: 3599 [I.V.:3599]  Out: -     PHYSICAL EXAM:    Physical Exam   Constitutional: He is oriented to person, place, and time. Genitourinary:    Penis normal.      Genitourinary Comments: Perez catheter patent, clear yellow urine     Neurological: He is alert and oriented to person, place, and time. Psychiatric: He has a normal mood and affect. His behavior is normal.      .   Cardiovascular: Normal rate, regular rhythm and intact distal pulses. Murmur heard. Pulmonary/Chest: He has rhonchi in the right lower field and the left lower field. Abdominal: Soft.  Bowel sounds are normal.   PEG tube intact   Musculoskeletal: Normal range of motion. General: No edema. Neurological: He is alert. Skin: Skin is warm and dry. Sacrum is reddened, no breakdown.       Data Review    Recent Results (from the past 24 hour(s))   EKG, 12 LEAD, INITIAL    Collection Time: 01/05/21  1:45 PM   Result Value Ref Range    Ventricular Rate 113 BPM    Atrial Rate 113 BPM    P-R Interval 146 ms    QRS Duration 72 ms    Q-T Interval 318 ms    QTC Calculation (Bezet) 436 ms    Calculated P Axis 55 degrees    Calculated R Axis -17 degrees    Calculated T Axis 66 degrees    Diagnosis       Sinus tachycardia with occasional Premature ventricular complexes  Otherwise normal ECG  When compared with ECG of 28-DEC-2020 05:04,  Fusion complexes are no longer Present  Questionable change in QRS duration  Criteria for Inferior infarct are no longer Present  Confirmed by Binta Garcia ([id number]) on 1/5/2021 2:09:54 PM     INFLUENZA A & B AG (RAPID TEST)    Collection Time: 01/05/21  7:40 PM   Result Value Ref Range    Influenza A Antigen Negative Negative      Influenza B Antigen Negative Negative     URINALYSIS W/ REFLEX CULTURE    Collection Time: 01/05/21  8:00 PM    Specimen: Urine   Result Value Ref Range    Color Yellow/Straw      Appearance Turbid (A) Clear      Specific gravity 1.013 1.003 - 1.030      pH (UA) 7.0 5.0 - 8.0      Protein 30 (A) Negative mg/dL    Glucose Negative Negative mg/dL    Ketone Negative Negative mg/dL    Bilirubin Negative Negative      Blood Small (A) Negative      Urobilinogen 0.1 0.1 - 1.0 EU/dL    Nitrites Negative Negative      Leukocyte Esterase Trace (A) Negative      UA:UC IF INDICATED Culture not indicated by UA result Culture not indicated by UA result      WBC 5-10 0 - 4 /hpf    RBC 10-20 0 - 5 /hpf    Bacteria Negative Negative /hpf    Mucus Trace /lpf   GLUCOSE, POC    Collection Time: 01/05/21  9:36 PM   Result Value Ref Range    Glucose (POC) 132 (H) 65 - 100 mg/dL    Performed by Millwood Forte    CBC W/O DIFF Collection Time: 01/06/21  4:23 AM   Result Value Ref Range    WBC 11.9 (H) 4.1 - 11.1 K/uL    RBC 2.52 (L) 4.10 - 5.70 M/uL    HGB 7.4 (L) 12.1 - 17.0 g/dL    HCT 22.7 (L) 36.6 - 50.3 %    MCV 90.1 80.0 - 99.0 FL    MCH 29.4 26.0 - 34.0 PG    MCHC 32.6 30.0 - 36.5 g/dL    RDW 18.8 (H) 11.5 - 14.5 %    PLATELET 194 218 - 658 K/uL    MPV 11.6 8.9 - 33.3 FL   METABOLIC PANEL, BASIC    Collection Time: 01/06/21  4:23 AM   Result Value Ref Range    Sodium 149 (H) 136 - 145 mmol/L    Potassium 4.0 3.5 - 5.1 mmol/L    Chloride 114 (H) 97 - 108 mmol/L    CO2 27 21 - 32 mmol/L    Anion gap 8 5 - 15 mmol/L    Glucose 86 65 - 100 mg/dL    BUN 90 (H) 6 - 20 mg/dL    Creatinine 2.95 (H) 0.70 - 1.30 mg/dL    BUN/Creatinine ratio 31 (H) 12 - 20      GFR est AA 25 (L) >60 ml/min/1.73m2    GFR est non-AA 20 (L) >60 ml/min/1.73m2    Calcium 9.6 8.5 - 10.1 mg/dL   LACTIC ACID    Collection Time: 01/06/21  4:23 AM   Result Value Ref Range    Lactic acid 3.8 (HH) 0.4 - 2.0 mmol/L   SARS-COV-2    Collection Time: 01/06/21  5:14 AM   Result Value Ref Range    SARS-CoV-2 Please find results under separate order     COVID-19 RAPID TEST    Collection Time: 01/06/21  5:14 AM   Result Value Ref Range    Specimen source Nasopharyngeal      COVID-19 rapid test DETECTED (A) Not Detected     TYPE & SCREEN    Collection Time: 01/06/21  7:50 AM   Result Value Ref Range    Crossmatch Expiration 01/09/2021,2359     ABO/Rh(D) O Positive     Antibody screen Negative     Unit number [id number]     Blood component type RC LR     Unit division 00     Status of unit Allocated     TRANSFUSION STATUS Ok to transfuse     Crossmatch result Compatible    PROCALCITONIN    Collection Time: 01/06/21  8:15 AM   Result Value Ref Range    Procalcitonin 0.58 (H) 0 ng/mL       XR CHEST PORT   Final Result      XR CHEST PORT    (Results Pending)       Active Problems:    Pneumonia (1/5/2021)        Assessment/Plan:   1.   Sepsis secondary to  LLL/covid 19  pneumonia- IV levaquin, O2 as needed, keep O2 saturation above 90%, IV vancomycin, pharmacy to dose, LA 3.8, procalcitonin down,   BC, URC pending. Consult to pulmonary      2. Hypertension- hold BB due to hypotension,     3. Persistent dysphagia- PEG tube, restart TF and water flushes per nutrition recs     4. Mild hypernatremia- continue D5LR at 100/hr. 5. BENJI- secondary to volume contraction, creatinine trending down, repeat bmp in am.    6. Acute anemia- HGB 7.4, transfuse one unit PRBCs today, repeat cbc in am,  Check stool for occult blood. 7. 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              DVT Prophylaxis:sequential compression  Code Status:  DNR  POA:    Care Plan discussed with:   ____staff nurse, pulmonary___________________________________________________________    Zia Quevedo NP

## 2021-01-06 NOTE — ED NOTES
Notified hospitalist of morning labs including lactic acid and hgb via [employer]. Received read receipt. No orders received at this time.

## 2021-01-06 NOTE — PROGRESS NOTES
Comprehensive Nutrition Assessment Type and Reason for Visit: Initial, Consult(Tube feeding) Nutrition Recommendations/Plan:  
Initiate TF via PEG of Jevity 1.5 at goal of 45ml/hr             KXOGO with 150ml water q4h At goal, feeds provide 1620kcals, 69g pro, and 1721ml fluid (Meets 95% EENs, 113% Protein needs, and 101% fluid needs) 
            *D5 provides 408cals additional 
  
Document rates, wts, and BMs Nutrition Assessment:  Admitted for COVIVD-19 pna, noted pt in ED <1 week ago for self-removed PEG tube. CXR on admit finding PNA. BP soft, still in ED awaiting bed. RD consulted for TF, will leave recs and f/u for tolerance. Labs: H/H 7.4/22.7, Na149, BUN 90, Cr 2.95, , , Alk Phos 130. Meds: D5 at 100ml/hr, Allopurinol, dexamethasone, pepcid, insulin. Malnutrition Assessment: 
Malnutrition Status:  Mild malnutrition Context:  Acute illness Findings of the 6 clinical characteristics of malnutrition:  
Energy Intake:  Unable to assess(unknown hx at [facility]) Weight Loss:  7.0 - Greater than 5% over 1 month Body Fat Loss:  (S) Unable to assess, Muscle Mass Loss:  (S) Unable to assess, Fluid Accumulation:  No significant fluid accumulation,   
 
Estimated Daily Nutrient Needs: 
Energy (kcal): 1702kcals (28kcals/kg); Weight Used for Energy Requirements: Current Protein (g): 61; Weight Used for Protein Requirements: Current Fluid (ml/day): 1700ml; Method Used for Fluid Requirements: 1 ml/kcal 
 Needs for maintenance considering CKD-3 Nutrition Related Findings:  Unable to complete NFPE d/t COVID-19 precautions, unable x2 at last admit d/t pt not in room, per H&P pt appears cachectic. No edema. No N/V/D/C, last BM pta. +dysphagia, PEG in place. Wounds:   
None Current Nutrition Therapies: DIET TUBE FEEDING Jevity 1.5 Jevity 1.5  
DIET NPO With Tube Feedings Current Tube Feeding (TF) Orders: · Feeding Route: PEG 
 · Goal TF & Flush Orders Provides: rec'd TF of Jevity 1.5 via PEG at goal of 45ml/hr, flush with 150ml q4h; provides 1620kcals, 69g pro, and 1721ml fluid Anthropometric Measures: 
· Height:  5' 5.98\" (167.6 cm) · Current Body Wt:  60.8 kg (134 lb 0.6 oz)(1/5) · Admission Body Wt:  134 lb 0.6 oz(1/5) · Usual Body Wt:  (paola) · Ideal Body Wt:  142 lbs:  94.4 % · BMI Category:  Underweight (BMI less than 22) age over 72 Wt hx: 60.8kg (1/5), 60.9kg (12/17), 64.3kg (12/5), 64.9kg (11/27), 62.6kg (8/13). No measured wt yet this admit. Pt appears to have lost wt x1 month of 3.5kg (5.7%, severe) Nutrition Diagnosis:  
· Inadequate oral intake related to swallowing difficulty, cognitive or neurological impairment, impaired respiratory function as evidenced by nutrition support-enteral nutrition Nutrition Interventions:  
Food and/or Nutrient Delivery: Continue NPO, Start tube feeding Nutrition Education and Counseling: No recommendations at this time Coordination of Nutrition Care: Continue to monitor while inpatient Goals: 
intakes >/=75% of EENs in >5 days 
wt maintenance within +/-0.5kg in 7 days 
lytes wnl Nutrition Monitoring and Evaluation:  
Behavioral-Environmental Outcomes: None identified Food/Nutrient Intake Outcomes: Enteral nutrition intake/tolerance Physical Signs/Symptoms Outcomes: Weight, Hemodynamic status Discharge Planning:   
Enteral nutrition Electronically signed by Morris Nobles rd on 1/6/2021 at 9:45 AM 
 
Contact: Ext [phone]

## 2021-01-06 NOTE — PROGRESS NOTES
1/6/21. HIGH RISK READMISSION DOT PHRASE:    Reason for Readmission: PNA    RUR Score: 41%    PCP: Dr. Catalino Nicolas - see q30 days. Current. No to Virtual Visits. Resources: Daughter Bijan Villalba @ [phone]. Staff @ SNF. Challenges: None. ADLs: Total care. W/C or bed bound. ACP; DNR    Plan for Utilizing Home health: None    Transition of Care: Transfer back to [facility]. Choice Letter agreed verbally via phone by daughter. Referral sent via [facility].

## 2021-01-06 NOTE — ED NOTES
TRANSFER - OUT REPORT:    Verbal report given to Marii(name) on Jose Russell  being transferred to 423(unit) for routine progression of care       Report consisted of patients Situation, Background, Assessment and   Recommendations(SBAR). Information from the following report(s) SBAR, ED Summary, [facility] and Recent Results was reviewed with the receiving nurse. Lines:   Peripheral IV 01/05/21 Right Antecubital (Active)       Peripheral IV 01/05/21 Left Wrist (Active)   Site Assessment Clean, dry, & intact 01/05/21 1949   Phlebitis Assessment 0 01/05/21 1949   Infiltration Assessment 0 01/05/21 1949   Dressing Status Clean, dry, & intact 01/05/21 1949   Dressing Type Tape;Transparent 01/05/21 1949   Hub Color/Line Status Blue;Flushed;Patent 01/05/21 1949   Action Taken Blood drawn 01/05/21 1949        Opportunity for questions and clarification was provided.       Patient transported with:   [employer]

## 2021-01-06 NOTE — CONSULTS
Comprehensive Nutrition Assessment    Type and Reason for Visit: Initial, Consult(Tube feeding)     Nutrition Recommendations/Plan:   Initiate TF via PEG of Jevity 1.5 at goal of 45ml/hr              Flush with 150ml water q4h  At goal, feeds provide 1620kcals, 69g pro, and 1721ml fluid (Meets 95% EENs, 113% Protein needs, and 101% fluid needs)              *D5 provides 408cals additional    If bolus feeds desired:  Jevity 1.5, 50ml bolus   Advance by 50ml at each feed to goal of 225ml bolus  5x/day    Flush with 170ml fluid after each feed  Provides 1688kcals, 72g pro, and 1697ml fluid (meets 99% EENs, 118% Pro needs, and 100% fluid needs)     Document rates, wts, and BMs     Nutrition Assessment:  Admitted for COVIVD-19 pna, noted pt in ED <1 week ago for self-removed PEG tube. CXR on admit finding PNA. BP soft, still in ED awaiting bed. RD consulted for TF, will leave recs and f/u for tolerance. Labs: H/H 7.4/22.7, Na149, BUN 90, Cr 2.95, , , Alk Phos 130. Meds: D5 at 100ml/hr, Allopurinol, dexamethasone, pepcid, insulin.      Malnutrition Assessment:  Malnutrition Status:  Mild malnutrition    Context:  Acute illness     Findings of the 6 clinical characteristics of malnutrition:   Energy Intake:  Unable to assess(unknown hx at [facility])  Weight Loss:  7.0 - Greater than 5% over 1 month     Body Fat Loss:  (S) Unable to assess,     Muscle Mass Loss:  (S) Unable to assess,    Fluid Accumulation:  No significant fluid accumulation,       Estimated Daily Nutrient Needs:  Energy (kcal): 1702kcals (28kcals/kg); Weight Used for Energy Requirements: Current  Protein (g): 61; Weight Used for Protein Requirements: Current  Fluid (ml/day): 1700ml; Method Used for Fluid Requirements: 1 ml/kcal              Needs for maintenance considering CKD-3     Nutrition Related Findings:  Unable to complete NFPE d/t COVID-19 precautions, unable x2 at last admit d/t pt not in room, per H&P pt appears cachectic. No edema.  No N/V/D/C, last BM pta. +dysphagia, PEG in place.       Wounds:    None        Current Nutrition Therapies:  DIET TUBE FEEDING Jevity 1.5 Jevity 1.5   DIET NPO With Tube Feedings  Current Tube Feeding (TF) Orders:   · Feeding Route: PEG  · Goal TF & Flush Orders Provides: rec'd TF of Jevity 1.5 via PEG at goal of 45ml/hr, flush with 150ml q4h; provides 1620kcals, 69g pro, and 1721ml fluid        Anthropometric Measures:  · Height:  5' 5.98\" (167.6 cm)  · Current Body Wt:  60.8 kg (134 lb 0.6 oz)(1/5)   · Admission Body Wt:  134 lb 0.6 oz(1/5)    · Usual Body Wt:  (paola)     · Ideal Body Wt:  142 lbs:  94.4 %   · BMI Category:  Underweight (BMI less than 22) age over 72     Wt hx: 60.8kg (1/5), 60.9kg (12/17), 64.3kg (12/5), 64.9kg (11/27), 62.6kg (8/13). No measured wt yet this admit.  Pt appears to have lost wt x1 month of 3.5kg (5.7%, severe)     Nutrition Diagnosis:   · Inadequate oral intake related to swallowing difficulty, cognitive or neurological impairment, impaired respiratory function as evidenced by nutrition support-enteral nutrition        Nutrition Interventions:   Food and/or Nutrient Delivery: Continue NPO, Start tube feeding  Nutrition Education and Counseling: No recommendations at this time  Coordination of Nutrition Care: Continue to monitor while inpatient     Goals:  intakes >/=75% of EENs in >5 days  wt maintenance within +/-0.5kg in 7 days  lytes wnl        Nutrition Monitoring and Evaluation:   Behavioral-Environmental Outcomes: None identified  Food/Nutrient Intake Outcomes: Enteral nutrition intake/tolerance  Physical Signs/Symptoms Outcomes: Weight, Hemodynamic status     Discharge Planning:    Enteral nutrition     Electronically signed by Nuris Veras RD on 1/6/2021 at 10:12 AM    Contact: Ext [phone]

## 2021-01-06 NOTE — CONSULTS
PULMONARY CONSULT  [facility]    Name: Saniya Mendoza MRN: [id number]   : 1932 Hospital: [facility]   Date: 2021  Admission date: 2021 Hospital Day: 2       HPI:     Hospital Problems  Date Reviewed: 2021          Codes Class Noted POA    Pneumonia ICD-10-CM: J18.9  ICD-9-CM: 479  2021 Unknown                   [x] High complexity decision making was performed  [x] See my orders for details      Subjective/Initial History:     I was asked by Zoraida Mclaughlin MD to see Saniya Mendoza  a 80 y.o.  male in consultation     Excerpts from admission 2021 or consult notes as follows:   80-year-old male came in because of shortness of breath dyspnea generalized weakness initial Covid rapid test was negative he has significant past medical history of chronic kidney disease gout hypertension dysphagia had a PEG tube in place history of lymphoma chest x-ray done which shows left lower lobe infiltrate followed by CAT scan which shows bilateral pleural effusion and atelectasis but now his COVID-19 came back positive and also MRSA and he is disheveled unable to get much history out of the patient on oxygen via nasal cannula so pulmonary consult was called for further evaluation.       Allergies   Allergen Reactions    Pcn [Penicillins] Swelling        MAR reviewed and pertinent medications noted or modified as needed     Current Facility-Administered Medications   Medication    dextrose 5% lactated ringers infusion    0.9% sodium chloride infusion 250 mL    dexamethasone (DECADRON) 4 mg/mL injection 4 mg    albuterol (PROVENTIL HFA, VENTOLIN HFA, PROAIR HFA) inhaler 2 Puff    VANCOMYCIN INFORMATION NOTE    sodium chloride (NS) flush 5-10 mL    [START ON 2021] levoFLOXacin (LEVAQUIN) 750 mg in D5W IVPB    allopurinoL (ZYLOPRIM) tablet 50 mg    famotidine (PEPCID) tablet 20 mg    [Held by provider] metoprolol tartrate (LOPRESSOR) tablet 12.5 mg    sertraline (ZOLOFT) tablet 50 mg    insulin lispro (HUMALOG) injection    glucose chewable tablet 16 g    glucagon (GLUCAGEN) injection 1 mg    dextrose (D50W) injection syrg 12.5-25 g    mupirocin (BACTROBAN) 2 % ointment    ondansetron (ZOFRAN) injection 4 mg    acetaminophen (TYLENOL) tablet 650 mg    acetaminophen (TYLENOL) suppository 650 mg    hydrOXYzine pamoate (VISTARIL) capsule 25 mg     Current Outpatient Medications   Medication Sig    sertraline (ZOLOFT) 50 mg tablet Take 1 Tab by mouth every evening.  albuterol-ipratropium (DUO-NEB) 2.5 mg-0.5 mg/3 ml nebu 3 mL by Nebulization route every four (4) hours as needed for Wheezing.  famotidine (PEPCID) 20 mg tablet Take 1 Tab by mouth every twenty-four (24) hours.  metoprolol tartrate (LOPRESSOR) 25 mg tablet Take 0.5 Tabs by mouth every twelve (12) hours.  allopurinoL (ZYLOPRIM) 100 mg tablet Take 0.5 Tabs by mouth every other day. Patient PCP: Breana Manley MD  PMH:  has a past medical history of CRI (chronic renal insufficiency) (2012), Gout (2011), Hypertension, Lymphoma ([facility] [address] and Prostate CA ([facility] [address] (2011). PSH:   has a past surgical history that includes hx prostatectomy; hc bone marrow biopsy; endoscopy, colon, diagnostic (); and ir thoracentesis cath w image (12/15/2020). FHX: family history includes Hypertension in his mother. SHX:  reports that he has never smoked. He has never used smokeless tobacco. He reports previous drug use. He reports that he does not drink alcohol.      ROS:    Unable to obtain    Objective:     Vital Signs: Telemetry:    normal sinus rhythm Intake/Output:   Visit Vitals  /62   Pulse 100   Temp 99 °F (37.2 °C)   Resp 20   Ht 5' 5.98\" (1.676 m)   Wt 60.8 kg (134 lb)   SpO2 100%   BMI 21.64 kg/m²       Temp (24hrs), Av °F (37.8 °C), Min:99 °F (37.2 °C), Max:101 °F (38.3 °C)        O2 Device: Nasal cannula O2 Flow Rate (L/min): 5 l/min       Wt Readings from Last 4 Encounters:   01/05/21 60.8 kg (134 lb)   12/28/20 60.8 kg (134 lb)   12/17/20 60.9 kg (134 lb 4.2 oz)   12/05/20 64.3 kg (141 lb 12.1 oz)          Intake/Output Summary (Last 24 hours) at 1/6/2021 1015  Last data filed at 1/6/2021 0434  Gross per 24 hour   Intake 3599 ml   Output    Net 3599 ml       Last shift:      No intake/output data recorded. Last 3 shifts: 01/04 1901 - 01/06 0700  In: 3599 [I.V.:3599]  Out: -        Physical Exam:     Physical Exam   Constitutional: He appears distressed. HENT:   Head: Normocephalic and atraumatic. Eyes: Pupils are equal, round, and reactive to light. Conjunctivae and EOM are normal.   Neck: Normal range of motion. Neck supple. Cardiovascular: Normal rate and regular rhythm. Pulmonary/Chest: He is in respiratory distress. He has rales. Abdominal: Soft. Musculoskeletal: Normal range of motion. Neurological:   Patient is lethargic drowsy        Labs:    Recent Labs     01/06/21  0423 01/05/21  1245   WBC 11.9* 12.7*   HGB 7.4* 8.3*    302     Recent Labs     01/06/21  0423 01/05/21  1245   * 148*   K 4.0 4.0   * 107   CO2 27 31   GLU 86 96   BUN 90* 98*   CREA 2.95* 3.11*   CA 9.6 10.8*   LAC 3.8* 2.9*   ALB  --  1.8*   ALT  --  102*     No results for input(s): PH, PCO2, PO2, HCO3, FIO2 in the last 72 hours. Recent Labs     01/05/21  1245   TROIQ 0.07*     No results found for: BNPP, BNP   Lab Results   Component Value Date/Time    Culture result: No growth after 18 hours 01/05/2021 12:20 PM    Culture result: No growth 6 days 12/15/2020 08:33 PM    Culture result: No growth 4 days 12/14/2020 02:45 PM     Lab Results   Component Value Date/Time    TSH 1.79 11/20/2020 01:31 PM       Imaging:    CXR Results  (Last 48 hours)               01/05/21 1351  XR CHEST PORT Final result    Narrative:  Chest single view. Comparison single view chest 12/28/2020       LLL posteromedial opacity now present.  Findings would fit with any clinical concern for pneumonia. Otherwise, no gross interstitial or alveolar pulmonary edema. Cardiac and   mediastinal structures are unchanged noting thoracic aorta atherosclerosis. No   pneumothorax. Probable small dependent left pleural effusion. Results from [place] encounter on 01/05/21   XR CHEST PORT    Narrative Chest single view. Comparison single view chest 12/28/2020    LLL posteromedial opacity now present. Findings would fit with any clinical  concern for pneumonia. Otherwise, no gross interstitial or alveolar pulmonary edema. Cardiac and  mediastinal structures are unchanged noting thoracic aorta atherosclerosis. No  pneumothorax. Probable small dependent left pleural effusion. Results from [place] encounter on 12/28/20   XR ABD (KUB)    Narrative Abdomen, 2 views, 12/28/2020    History PEG tube placement. Comparison: None. Findings: Radiographs are obtained before and after administration of 60 mL of  equal parts Gastrografin and water into the percutaneous feeding tube. Contrast  opacification of the stomach is distended. Contrast opacification of small  bowel loops is also seen. The bowel gas pattern is nonobstructive. Stool  volume is small to moderate. Surgical clips are seen at the pelvis. Degenerative changes are present in the spine. Impression Impression: Percutaneous feeding tube in the stomach. Nonobstructive bowel gas  pattern. XR CHEST PORT    Narrative Chest, 2 frontal views, 12/28/2020    History: Cough. Comparison: Including chest 12/15/2020. Findings: Feeding tube and left internal jugular catheter on chest 12/14/2020 oh  no longer present. The cardiac silhouette is within normal limits. The lungs  are underexpanded. There is mild prominence of the pulmonary vasculature and  hydrostatic edema is possible. Airspace consolidation most pronounced at the  bases is improved as compared to chest 12/15/2020.   Left-sided pleural effusion  is improved and now small appearing. No right-sided pleural effusion is  definitively seen on this single view. No pneumothorax is identified. The  osseous structures are stable. Impression Impression: Airspace consolidation in the lungs, improved. Left pleural  effusion, improved. Possible hydrostatic edema. Results from [facility] encounter on 12/11/20   CT CHEST WO CONT    Narrative Comparison chest x-ray 12/11/2020 [facility] and chest CTA 11/25/2020 [facility]. TECHNIQUE: Axial imaging of the chest without IV contrast, with multiplanar  reformatting, MIPs. Dose reduction: All CT scans at this facility are performed using dose reduction  optimization techniques as appropriate to a performed exam including the  following: Automated exposure control, adjustments of the mA and/or kV according  to patient's size, or use of iterative reconstruction technique. FINDINGS: Left central catheter distal tip SVC. Mild cardiomegaly. Multivessel  coronary artery calcification. No pericardial effusion. Low-density intracardiac  blood suggests anemia. Calcified thoracic aorta without aneurysm. Pulmonary  arteries are not dilated. No mediastinal or hilar lymphadenopathy. Tubular  structure posterior to the esophagus possibly dilated vessel. Bilateral pleural  effusions and lower lung consolidations without air bronchograms. The left lower  lung is completely consolidated/collapsed. The aerated lungs demonstrate patchy  areas of airspace disease. No axillary adenopathy. Included thyroid is  unremarkable. Included upper abdomen demonstrates lobulated, heterogeneous  structure spleen and small retroperitoneal lymphadenopathy; unchanged. Degenerative changes of the bony structures. Soft tissue anasarca. Impression IMPRESSION:  1. Bilateral pleural effusions and lower lung consolidations, the left lower  lung being completely collapsed, this is similar to previous.   2. Interval development patchy airspace disease in the aerated lungs consistent  with pneumonia. 3. Abnormal spleen, small retroperitoneal lymphadenopathy unchanged. 4. Atherosclerosis. IMPRESSION:   1. Acute hypoxic respiratory failure  2. COVID-19 pneumonia  3. Severe sepsis  4. Dysphagia  5. Hypernatremia  6. Bilateral pleural effusion with bilateral lower lobe atelectasis  7. Pt is requiring Drug therapy requiring intensive monitoring for toxicity  8. Pt is unstable, unpredictable needing inpatient monitoring; is acutely ill and at high risk of sudden decline and decompensation with severe consequenses and continued end organ dysfunction and failure  9. Prognosis guarded       RECOMMENDATIONS/PLAN:     1. Patient is on oxygen via nasal cannula 5 L  2. Cannot give remdesivir as GFR is 25 cannot give Actemra patient has lymphoma and prostate cancer by history, continue with Decadron and and he is already on vancomycin and Levaquin because of MRSA  3. Patient is on IV fluid D5 because of mild hyponatremia  4. Intubate and place on vent if NIV fails  5. Agree with Empiric IV antibiotics pending culture results   6. Follow culture results  7. Supplemental O2 to keep sats > 93%  8. Aspiration precautions  9. Labs to follow electrolytes, renal function and and blood counts  10. Glucose monitoring and SSI  11. Bronchial hygiene with respiratory therapy techniques, bronchodilators  12.  DVT, SUP prophylaxis         This care involved high complexity medical decision making: I personally:  · Reviewed the flowsheet and previous days notes  · Reviewed and summarized records or history from previous days note or discussions with staff, family  · High Risk Drug therapy requiring intensive monitoring for toxicity: eg steroids, pressors, antibiotics  · Reviewed and/or ordered Clinical lab tests  · Reviewed images and/or ordered Radiology tests  · Reviewed the patients ECG / Telemetry  · Reviewed and/or adjusted NiPPV settings  · Called and arranged for Radiologic procedures or interventions  · performed or ordered Diagnostic endoscopies with identified risk factors.   · discussed my assessment/management with : Nursing, Hospitalist and Family for coordination of care                 Time spent 54 min      Lucrecia Jain MD

## 2021-01-07 ENCOUNTER — APPOINTMENT (OUTPATIENT)
Dept: GENERAL RADIOLOGY | Age: 86
DRG: 871 | End: 2021-01-07
Attending: INTERNAL MEDICINE
Payer: MEDICARE

## 2021-01-07 LAB
ABO + RH BLD: NORMAL
ANION GAP SERPL CALC-SCNC: 7 MMOL/L (ref 5–15)
BACTERIA SPEC CULT: NORMAL
BLD PROD TYP BPU: NORMAL
BLOOD GROUP ANTIBODIES SERPL: NEGATIVE
BPU ID: NORMAL
BUN SERPL-MCNC: 79 MG/DL (ref 6–20)
BUN/CREAT SERPL: 30 (ref 12–20)
CA-I BLD-MCNC: 9.7 MG/DL (ref 8.5–10.1)
CHLORIDE SERPL-SCNC: 114 MMOL/L (ref 97–108)
CO2 SERPL-SCNC: 25 MMOL/L (ref 21–32)
CREAT SERPL-MCNC: 2.6 MG/DL (ref 0.7–1.3)
CROSSMATCH RESULT,%XM: NORMAL
DATE LAST DOSE: NOT DETECTED
ERYTHROCYTE [DISTWIDTH] IN BLOOD BY AUTOMATED COUNT: 17.6 % (ref 11.5–14.5)
GLUCOSE BLD STRIP.AUTO-MCNC: 136 MG/DL (ref 65–100)
GLUCOSE BLD STRIP.AUTO-MCNC: 184 MG/DL (ref 65–100)
GLUCOSE BLD STRIP.AUTO-MCNC: 219 MG/DL (ref 65–100)
GLUCOSE SERPL-MCNC: 205 MG/DL (ref 65–100)
HCT VFR BLD AUTO: 25.7 % (ref 36.6–50.3)
HGB BLD-MCNC: 8.5 G/DL (ref 12.1–17)
MCH RBC QN AUTO: 29.6 PG (ref 26–34)
MCHC RBC AUTO-ENTMCNC: 33.1 G/DL (ref 30–36.5)
MCV RBC AUTO: 89.5 FL (ref 80–99)
PERFORMED BY, TECHID: ABNORMAL
PLATELET # BLD AUTO: 214 K/UL (ref 150–400)
PMV BLD AUTO: 11.7 FL (ref 8.9–12.9)
POTASSIUM SERPL-SCNC: 3.5 MMOL/L (ref 3.5–5.1)
RBC # BLD AUTO: 2.87 M/UL (ref 4.1–5.7)
REPORTED DOSE,DOSE: NOT DETECTED UNITS
SODIUM SERPL-SCNC: 146 MMOL/L (ref 136–145)
SPECIAL REQUESTS,SREQ: NORMAL
SPECIMEN EXP DATE BLD: NORMAL
STATUS OF UNIT,%ST: NORMAL
TRANSFUSION STATUS PATIENT QL: NORMAL
UNIT DIVISION, %UDIV: 0
VANCOMYCIN TROUGH SERPL-MCNC: 19.8 UG/ML (ref 5–10)
WBC # BLD AUTO: 10.2 K/UL (ref 4.1–11.1)

## 2021-01-07 PROCEDURE — 82962 GLUCOSE BLOOD TEST: CPT

## 2021-01-07 PROCEDURE — 74011250637 HC RX REV CODE- 250/637: Performed by: NURSE PRACTITIONER

## 2021-01-07 PROCEDURE — 85027 COMPLETE CBC AUTOMATED: CPT

## 2021-01-07 PROCEDURE — 74011250636 HC RX REV CODE- 250/636: Performed by: NURSE PRACTITIONER

## 2021-01-07 PROCEDURE — 80048 BASIC METABOLIC PNL TOTAL CA: CPT

## 2021-01-07 PROCEDURE — 77010033678 HC OXYGEN DAILY

## 2021-01-07 PROCEDURE — 80202 ASSAY OF VANCOMYCIN: CPT

## 2021-01-07 PROCEDURE — 74011636637 HC RX REV CODE- 636/637: Performed by: NURSE PRACTITIONER

## 2021-01-07 PROCEDURE — 36415 COLL VENOUS BLD VENIPUNCTURE: CPT

## 2021-01-07 PROCEDURE — 71045 X-RAY EXAM CHEST 1 VIEW: CPT

## 2021-01-07 PROCEDURE — 94762 N-INVAS EAR/PLS OXIMTRY CONT: CPT

## 2021-01-07 PROCEDURE — 65270000029 HC RM PRIVATE

## 2021-01-07 PROCEDURE — 74011250636 HC RX REV CODE- 250/636: Performed by: HOSPITALIST

## 2021-01-07 RX ORDER — LANOLIN ALCOHOL/MO/W.PET/CERES
1 CREAM (GRAM) TOPICAL
Status: DISCONTINUED | OUTPATIENT
Start: 2021-01-08 | End: 2021-01-17 | Stop reason: HOSPADM

## 2021-01-07 RX ORDER — HEPARIN SODIUM 5000 [USP'U]/ML
5000 INJECTION, SOLUTION INTRAVENOUS; SUBCUTANEOUS EVERY 12 HOURS
Status: DISCONTINUED | OUTPATIENT
Start: 2021-01-07 | End: 2021-01-17 | Stop reason: HOSPADM

## 2021-01-07 RX ADMIN — DEXAMETHASONE SODIUM PHOSPHATE 4 MG: 4 INJECTION, SOLUTION INTRA-ARTICULAR; INTRALESIONAL; INTRAMUSCULAR; INTRAVENOUS; SOFT TISSUE at 19:58

## 2021-01-07 RX ADMIN — CALCIUM ACETATE 1334 MG: 667 CAPSULE ORAL at 09:09

## 2021-01-07 RX ADMIN — FAMOTIDINE 20 MG: 20 TABLET, FILM COATED ORAL at 09:09

## 2021-01-07 RX ADMIN — LEVOFLOXACIN 750 MG: 5 INJECTION, SOLUTION INTRAVENOUS at 14:32

## 2021-01-07 RX ADMIN — VANCOMYCIN HYDROCHLORIDE 1000 MG: 1 INJECTION, POWDER, LYOPHILIZED, FOR SOLUTION INTRAVENOUS at 23:49

## 2021-01-07 RX ADMIN — DEXAMETHASONE SODIUM PHOSPHATE 4 MG: 4 INJECTION, SOLUTION INTRA-ARTICULAR; INTRALESIONAL; INTRAMUSCULAR; INTRAVENOUS; SOFT TISSUE at 23:49

## 2021-01-07 RX ADMIN — DEXAMETHASONE SODIUM PHOSPHATE 4 MG: 4 INJECTION, SOLUTION INTRA-ARTICULAR; INTRALESIONAL; INTRAMUSCULAR; INTRAVENOUS; SOFT TISSUE at 14:31

## 2021-01-07 RX ADMIN — SERTRALINE HYDROCHLORIDE 50 MG: 25 TABLET ORAL at 19:57

## 2021-01-07 RX ADMIN — INSULIN LISPRO 4 UNITS: 100 INJECTION, SOLUTION INTRAVENOUS; SUBCUTANEOUS at 09:10

## 2021-01-07 RX ADMIN — ALLOPURINOL 50 MG: 100 TABLET ORAL at 19:57

## 2021-01-07 RX ADMIN — DEXAMETHASONE SODIUM PHOSPHATE 4 MG: 4 INJECTION, SOLUTION INTRA-ARTICULAR; INTRALESIONAL; INTRAMUSCULAR; INTRAVENOUS; SOFT TISSUE at 00:45

## 2021-01-07 RX ADMIN — DEXTROSE MONOHYDRATE 75 ML/HR: 50 INJECTION, SOLUTION INTRAVENOUS at 09:10

## 2021-01-07 RX ADMIN — CALCIUM ACETATE 1334 MG: 667 CAPSULE ORAL at 14:31

## 2021-01-07 RX ADMIN — HEPARIN SODIUM 5000 UNITS: 5000 INJECTION INTRAVENOUS; SUBCUTANEOUS at 19:57

## 2021-01-07 RX ADMIN — DEXAMETHASONE SODIUM PHOSPHATE 4 MG: 4 INJECTION, SOLUTION INTRA-ARTICULAR; INTRALESIONAL; INTRAMUSCULAR; INTRAVENOUS; SOFT TISSUE at 06:19

## 2021-01-07 RX ADMIN — CALCIUM ACETATE 1334 MG: 667 CAPSULE ORAL at 19:58

## 2021-01-07 RX ADMIN — VANCOMYCIN HYDROCHLORIDE 1000 MG: 1 INJECTION, POWDER, LYOPHILIZED, FOR SOLUTION INTRAVENOUS at 00:45

## 2021-01-07 RX ADMIN — MUPIROCIN: 20 OINTMENT TOPICAL at 09:09

## 2021-01-07 RX ADMIN — DEXTROSE MONOHYDRATE 75 ML/HR: 50 INJECTION, SOLUTION INTRAVENOUS at 23:24

## 2021-01-07 NOTE — PROGRESS NOTES
Hospitalist Progress Note    Subjective:   Daily Progress Note: 1/7/2021 2:38 PM    Hospital Course:  Pt admitted for dyspnea, cough, fatigue, lives at [address] [place] [address].  Pt found to be covid 19 positive and CXR showing LLL pneumonia with opacities    Subjective: Pt seen in room, more alert and awake, still on supplemental oxygen, wean as tolerated    Current Facility-Administered Medications   Medication Dose Route Frequency    Vancomycin Random Level at 1800   Other ONCE    0.9% sodium chloride infusion 250 mL  250 mL IntraVENous PRN    dexamethasone (DECADRON) 4 mg/mL injection 4 mg  4 mg IntraVENous Q6H    albuterol (PROVENTIL HFA, VENTOLIN HFA, PROAIR HFA) inhaler 2 Puff  2 Puff Inhalation Q4H PRN    VANCOMYCIN INFORMATION NOTE   Other PRN    calcium acetate(phosphat bind) (PHOSLO) capsule 1,334 mg  2 Cap Oral TID WITH MEALS    dextrose 5% infusion  75 mL/hr IntraVENous CONTINUOUS    sodium chloride (NS) flush 5-10 mL  5-10 mL IntraVENous PRN    levoFLOXacin (LEVAQUIN) 750 mg in D5W IVPB  750 mg IntraVENous Q48H    allopurinoL (ZYLOPRIM) tablet 50 mg  50 mg Oral EVERY OTHER DAY    famotidine (PEPCID) tablet 20 mg  20 mg Per G Tube DAILY    [Held by provider] metoprolol tartrate (LOPRESSOR) tablet 12.5 mg  12.5 mg Oral Q12H    sertraline (ZOLOFT) tablet 50 mg  50 mg Per G Tube QPM    insulin lispro (HUMALOG) injection   SubCUTAneous AC&HS    glucose chewable tablet 16 g  4 Tab Oral PRN    glucagon (GLUCAGEN) injection 1 mg  1 mg IntraMUSCular PRN    dextrose (D50W) injection syrg 12.5-25 g  25-50 mL IntraVENous PRN    mupirocin (BACTROBAN) 2 % ointment   Both Nostrils DAILY    ondansetron (ZOFRAN) injection 4 mg  4 mg IntraVENous Q6H PRN    acetaminophen (TYLENOL) tablet 650 mg  650 mg Per G Tube Q4H PRN    acetaminophen (TYLENOL) suppository 650 mg  650 mg Rectal Q4H PRN    hydrOXYzine pamoate (VISTARIL) capsule 25 mg  25 mg Per G Tube TID PRN        Review of Systems:    Review of Systems Constitutional: Negative for chills and fever. HENT: Negative for congestion and sore throat. Respiratory: Positive for cough. Cardiovascular: Negative for chest pain and palpitations. Gastrointestinal: Negative for heartburn, nausea and vomiting. Genitourinary: Negative for dysuria and frequency. Neurological: Negative for headaches. Objective:     Visit Vitals  /65   Pulse 89   Temp 97.8 °F (36.6 °C)   Resp 18   Ht 5' 5.98\" (1.676 m)   Wt 53.6 kg (118 lb 2.7 oz)   SpO2 98%   BMI 19.08 kg/m²    O2 Flow Rate (L/min): 3 l/min O2 Device: Nasal cannula    Temp (24hrs), Av.6 °F (36.4 °C), Min:97.1 °F (36.2 °C), Max:97.9 °F (36.6 °C)      No intake/output data recorded.  1901 -  0700  In: 2325 [I.V.:1775]  Out: 550 [Urine:550]    PHYSICAL EXAM:    Physical Exam   Constitutional: He appears cachectic. Neck: Normal range of motion. Neck supple. Cardiovascular: Normal rate, regular rhythm and intact distal pulses. Murmur heard. Pulmonary/Chest: He has rhonchi in the right lower field and the left lower field. Abdominal: Soft. Bowel sounds are normal.   PEG tube intact   Genitourinary:    Penis normal.      Genitourinary Comments: Perez catheter patent, clear yellow urine     Musculoskeletal: Normal range of motion. Neurological: He is alert. Confusion to time and events, follows commans   Skin: Skin is warm and dry.    Psychiatric: His behavior is normal.          Data Review    Recent Results (from the past 24 hour(s))   BLOOD GAS, ARTERIAL    Collection Time: 21  3:00 PM   Result Value Ref Range    pH 7.44 7.35 - 7.45      PCO2 39 35 - 45 mmHg    PO2 96 75 - 100 mmHg    O2 SAT 98 >95 %    BICARBONATE 27 (H) 22 - 26 mmol/L    BASE EXCESS 2.4 (H) 0 - 2 mmol/L    O2 METHOD Nasal Cannula      O2 FLOW RATE 5.0 L/min    SITE Left Radial      NEW'S TEST PASS     GLUCOSE, POC    Collection Time: 21  9:22 PM   Result Value Ref Range    Glucose (POC) 204 (H) 65 - 100 mg/dL    Performed by NITZA Hinton    Collection Time: 01/06/21  9:45 PM   Result Value Ref Range    Vancomycin, random 8.7 ug/mL    Reported dose date Not available      Reported dose: Not available Units   GLUCOSE, POC    Collection Time: 01/07/21  8:16 AM   Result Value Ref Range    Glucose (POC) 219 (H) 65 - 100 mg/dL    Performed by [facility]    CBC W/O DIFF    Collection Time: 01/07/21  8:35 AM   Result Value Ref Range    WBC 10.2 4.1 - 11.1 K/uL    RBC 2.87 (L) 4.10 - 5.70 M/uL    HGB 8.5 (L) 12.1 - 17.0 g/dL    HCT 25.7 (L) 36.6 - 50.3 %    MCV 89.5 80.0 - 99.0 FL    MCH 29.6 26.0 - 34.0 PG    MCHC 33.1 30.0 - 36.5 g/dL    RDW 17.6 (H) 11.5 - 14.5 %    PLATELET 433 523 - 506 K/uL    MPV 11.7 8.9 - 32.9 FL   METABOLIC PANEL, BASIC    Collection Time: 01/07/21  8:35 AM   Result Value Ref Range    Sodium 146 (H) 136 - 145 mmol/L    Potassium 3.5 3.5 - 5.1 mmol/L    Chloride 114 (H) 97 - 108 mmol/L    CO2 25 21 - 32 mmol/L    Anion gap 7 5 - 15 mmol/L    Glucose 205 (H) 65 - 100 mg/dL    BUN 79 (H) 6 - 20 mg/dL    Creatinine 2.60 (H) 0.70 - 1.30 mg/dL    BUN/Creatinine ratio 30 (H) 12 - 20      GFR est AA 28 (L) >60 ml/min/1.73m2    GFR est non-AA 23 (L) >60 ml/min/1.73m2    Calcium 9.7 8.5 - 10.1 mg/dL       XR CHEST PORT   Final Result   FINDINGS: Impression: Frontal single view chest.      Unchanged cardiomediastinal silhouette. Calcified aorta. Mild vascular   congestion. No pulmonary edema. Mild hypoinflation. Unchanged bilateral lower lung atelectasis or infiltrates   and left lower lung consolidation. Unchanged small left pleural effusion. No   pneumothorax. No free air under the diaphragm.       XR CHEST PORT   Final Result          Active Problems:    Pneumonia (1/5/2021)        Assessment/Plan:     1.  Sepsis secondary to  LLL/covid 19  pneumonia-  IV levaquin, O2 as needed, keep O2 saturation above 90%, IV vancomycin, pharmacy to dose,  procalcitonin down,   BC, URC pending, repeat LA in am,      2.  Hypertension- acceptable ,hold BB     3. Persistent dysphagia- PEG tube,  TF and water flushes per nutrition recs,  PEG leaking, GI consulted, manipulated at bedside,      4. Mild hypernatremia-  today, continue IV fluids x 24 hrs. Repeat bmp in am.     5. BENJI- creatinine trending down, continue yang for accurate I &O. Continue IV fluids.      6. Acute anemia- HGB 8.5 today  transfused one unit PRBCs yesterday, repeat cbc in am,  Check stool for occult blood.      7. 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        DVT Prophylaxis: sequential compression  Code Status:  DNR  POA:[facility], [address] [place] [address] discussed with:   __________staff nurse, patient, daughter ____________________________________________________    Warrick Severin, NP

## 2021-01-07 NOTE — PROGRESS NOTES
Patient examined,   the PEG tube 16 fr external bumper was a at 6 cm, which was pushed back to the 3 cm,skin was cleaned,   appropriate dress was placed,    Restart tube feeding,  Follow patient to see if patient still have any leaking of feeding fluids

## 2021-01-07 NOTE — CONSULTS
Consult    Patient: Vee Rothman MRN: [id number]  SSN: [id number]    YOB: 1932  Age: 80 y.o. Sex: male      Subjective:      Vee Rothman is a 80 y.o. male who is being seen for PEG malfunction  He was admitted with generalized weakness initial Covid   significant past medical history of dysphagia had a PEG tube in place , had leaking per nurse.   Past Medical History:   Diagnosis Date    CRI (chronic renal insufficiency) 12/13/2012    Gout 1/4/2011    Hypertension     Lymphoma ([facility] [address]     Prostate CA ([facility] [address] 1/4/2011     Past Surgical History:   Procedure Laterality Date    ENDOSCOPY, COLON, DIAGNOSTIC  2003    neg    HC BONE MARROW BIOPSY      HX PROSTATECTOMY      IR THORACENTESIS CATH W IMAGE  12/15/2020      Family History   Problem Relation Age of Onset    Hypertension Mother      Social History     Tobacco Use    Smoking status: Never Smoker    Smokeless tobacco: Never Used   Substance Use Topics    Alcohol use: No      Current Facility-Administered Medications   Medication Dose Route Frequency Provider Last Rate Last Admin    Vancomycin Random Level at 1800   Other Meredith Rendon MD        0.9% sodium chloride infusion 250 mL  250 mL IntraVENous PRN Rob Win NP        dexamethasone (DECADRON) 4 mg/mL injection 4 mg  4 mg IntraVENous Q6H Rob Win NP   4 mg at 01/07/21 1228    albuterol (PROVENTIL HFA, VENTOLIN HFA, PROAIR HFA) inhaler 2 Puff  2 Puff Inhalation Q4H PRN Zarefoss, Glynda Schaumann, NP        VANCOMYCIN INFORMATION NOTE   Other PRN Harpreet Gaytan MD        calcium acetate(phosphat bind) (PHOSLO) capsule 1,334 mg  2 Cap Oral TID WITH MEALS Timbo Talley NP   1,334 mg at 01/07/21 0909    dextrose 5% infusion  75 mL/hr IntraVENous CONTINUOUS Rob Win NP 75 mL/hr at 01/07/21 0910 75 mL/hr at 01/07/21 0910    sodium chloride (NS) flush 5-10 mL  5-10 mL IntraVENous PRN Rob Win NP        levoFLOXacin (LEVAQUIN) 750 mg in D5W IVPB  750 mg IntraVENous Q48H Rob Win NP        allopurinoL (ZYLOPRIM) tablet 50 mg  50 mg Oral EVERY OTHER DAY Rob Win NP        famotidine (PEPCID) tablet 20 mg  20 mg Per G Tube DAILY Rob Win NP   20 mg at 01/07/21 0909    [Held by provider] metoprolol tartrate (LOPRESSOR) tablet 12.5 mg  12.5 mg Oral Q12H Rob Win NP   Stopped at 01/07/21 0900    sertraline (ZOLOFT) tablet 50 mg  50 mg Per G Tube QPM Rob Win NP   50 mg at 01/06/21 1801    insulin lispro (HUMALOG) injection   SubCUTAneous AC&HS Rob Win NP   4 Units at 01/07/21 0910    glucose chewable tablet 16 g  4 Tab Oral PRN Rob Win NP        glucagon (GLUCAGEN) injection 1 mg  1 mg IntraMUSCular PRN Rbo Win NP        dextrose (D50W) injection syrg 12.5-25 g  25-50 mL IntraVENous PRN Rob Win NP        mupirocin (BACTROBAN) 2 % ointment   Both Nostrils DAILY Rob Win NP   Given at 01/07/21 0909    ondansetron (ZOFRAN) injection 4 mg  4 mg IntraVENous Q6H PRN Rob Win NP        acetaminophen (TYLENOL) tablet 650 mg  650 mg Per G Tube Q4H PRN Rob Win NP        acetaminophen (TYLENOL) suppository 650 mg  650 mg Rectal Q4H PRN Rob Win NP        hydrOXYzine pamoate (VISTARIL) capsule 25 mg  25 mg Per G Tube TID PRN Rob Win NP   25 mg at 01/05/21 2142        Allergies   Allergen Reactions    Pcn [Penicillins] Swelling       Review of Systems:  Review of Systems   Unable to perform ROS: Mental acuity        Objective:     Vitals:    01/06/21 1912 01/07/21 0157 01/07/21 0741 01/07/21 0828   BP: 116/64 (!) 96/48  117/65   Pulse: (!) 105 80  89   Resp: 18 18  18   Temp: 97.8 °F (36.6 °C) 97.5 °F (36.4 °C)  97.8 °F (36.6 °C)   SpO2: 95% 92%  98%   Weight:   53.6 kg (118 lb 2.7 oz)    Height:            Physical Exam:  Physical Exam   Constitutional: He appears lethargic. He is cooperative. He has a sickly appearance. He appears ill. HENT:   Head: Atraumatic. Eyes: Left conjunctiva is not injected. Left conjunctiva has no hemorrhage. No scleral icterus. Neck: Normal carotid pulses, no hepatojugular reflux and no JVD present. Cardiovascular: An irregular rhythm present. Pulmonary/Chest: No accessory muscle usage. No respiratory distress. Abdominal: He exhibits no fluid wave, no ascites and no mass. Musculoskeletal: Normal range of motion. Neurological: He appears lethargic.       PEG site had drainage with skin erosions   Recent Results (from the past 24 hour(s))   GLUCOSE, POC    Collection Time: 01/06/21  1:55 PM   Result Value Ref Range    Glucose (POC) 104 (H) 65 - 100 mg/dL    Performed by Dagoberto Barbour, ARTERIAL    Collection Time: 01/06/21  3:00 PM   Result Value Ref Range    pH 7.44 7.35 - 7.45      PCO2 39 35 - 45 mmHg    PO2 96 75 - 100 mmHg    O2 SAT 98 >95 %    BICARBONATE 27 (H) 22 - 26 mmol/L    BASE EXCESS 2.4 (H) 0 - 2 mmol/L    O2 METHOD Nasal Cannula      O2 FLOW RATE 5.0 L/min    SITE Left Radial      NEW'S TEST PASS     GLUCOSE, POC    Collection Time: 01/06/21  9:22 PM   Result Value Ref Range    Glucose (POC) 204 (H) 65 - 100 mg/dL    Performed by Elisa Machado, RANDOM    Collection Time: 01/06/21  9:45 PM   Result Value Ref Range    Vancomycin, random 8.7 ug/mL    Reported dose date Not available      Reported dose: Not available Units   GLUCOSE, POC    Collection Time: 01/07/21  8:16 AM   Result Value Ref Range    Glucose (POC) 219 (H) 65 - 100 mg/dL    Performed by [facility]    CBC W/O DIFF    Collection Time: 01/07/21  8:35 AM   Result Value Ref Range    WBC 10.2 4.1 - 11.1 K/uL    RBC 2.87 (L) 4.10 - 5.70 M/uL    HGB 8.5 (L) 12.1 - 17.0 g/dL    HCT 25.7 (L) 36.6 - 50.3 %    MCV 89.5 80.0 - 99.0 FL    MCH 29.6 26.0 - 34.0 PG    MCHC 33.1 30.0 - 36.5 g/dL    RDW 17.6 (H) 11.5 - 14.5 %    PLATELET 240 764 - 406 K/uL    MPV 11.7 8.9 - 82.0 FL   METABOLIC PANEL, BASIC Collection Time: 01/07/21  8:35 AM   Result Value Ref Range    Sodium 146 (H) 136 - 145 mmol/L    Potassium 3.5 3.5 - 5.1 mmol/L    Chloride 114 (H) 97 - 108 mmol/L    CO2 25 21 - 32 mmol/L    Anion gap 7 5 - 15 mmol/L    Glucose 205 (H) 65 - 100 mg/dL    BUN 79 (H) 6 - 20 mg/dL    Creatinine 2.60 (H) 0.70 - 1.30 mg/dL    BUN/Creatinine ratio 30 (H) 12 - 20      GFR est AA 28 (L) >60 ml/min/1.73m2    GFR est non-AA 23 (L) >60 ml/min/1.73m2    Calcium 9.7 8.5 - 10.1 mg/dL        XR CHEST PORT   Final Result   FINDINGS: Impression: Frontal single view chest.      Unchanged cardiomediastinal silhouette. Calcified aorta. Mild vascular   congestion. No pulmonary edema. Mild hypoinflation. Unchanged bilateral lower lung atelectasis or infiltrates   and left lower lung consolidation. Unchanged small left pleural effusion. No   pneumothorax. No free air under the diaphragm. XR CHEST PORT   Final Result         Assessment:     Hospital Problems  Date Reviewed: 1/5/2021          Codes Class Noted POA    Pneumonia ICD-10-CM: J18.9  ICD-9-CM: 665  1/5/2021 Unknown          COVID +\  PEG feeding .  malfuctions  Needed access for feeding  Plan:   Continue on COVIDs treatment  Will make PEG adjustment or changes as needed today    Signed By: Terrie Moore MD     January 7, 2021         Thank you for allowing me to participate in this patients care  Cc Referring Physician   Bryce Gallegos MD

## 2021-01-07 NOTE — PROGRESS NOTES
Problem: Nutrition Deficit  Goal: *Optimize nutritional status  Outcome: Progressing Towards Goal     Problem: Falls - Risk of  Goal: *Absence of Falls  Description: Document Sharon Gottlieb Fall Risk and appropriate interventions in the flowsheet. Outcome: Progressing Towards Goal  Note: Fall Risk Interventions:  Mobility Interventions: Patient to call before getting OOB, PT Consult for assist device competence, PT Consult for mobility concerns    Mentation Interventions: Adequate sleep, hydration, pain control, More frequent rounding, Reorient patient, Room close to nurse's station    Medication Interventions: Evaluate medications/consider consulting pharmacy, Patient to call before getting OOB    Elimination Interventions: Call light in reach, Patient to call for help with toileting needs              Problem: Patient Education: Go to Patient Education Activity  Goal: Patient/Family Education  Outcome: Progressing Towards Goal     Problem: Pressure Injury - Risk of  Goal: *Prevention of pressure injury  Description: Document Daniel Scale and appropriate interventions in the flowsheet.   Outcome: Progressing Towards Goal  Note: Pressure Injury Interventions:  Sensory Interventions: Assess changes in LOC, Assess need for specialty bed, Avoid rigorous massage over bony prominences, Float heels, Keep linens dry and wrinkle-free, Maintain/enhance activity level, Minimize linen layers, Monitor skin under medical devices    Moisture Interventions: Absorbent underpads, Apply protective barrier, creams and emollients, Assess need for specialty bed, Internal/External urinary devices, Maintain skin hydration (lotion/cream), Minimize layers, Moisture barrier    Activity Interventions: Increase time out of bed, Pressure redistribution bed/mattress(bed type), PT/OT evaluation    Mobility Interventions: Pressure redistribution bed/mattress (bed type), PT/OT evaluation    Nutrition Interventions: Document food/fluid/supplement intake    Friction and Shear Interventions: Lift sheet, Lift team/patient mobility team, Minimize layers                Problem: Patient Education: Go to Patient Education Activity  Goal: Patient/Family Education  Outcome: Progressing Towards Goal

## 2021-01-07 NOTE — CONSULTS
PULMONARY NOTE  [facility]    Name: Lesvia Ingram MRN: [id number]   : 1932 Hospital: [facility]   Date: 2021  Admission date: 2021 Hospital Day: 3       HPI:     Hospital Problems  Date Reviewed: 2021          Codes Class Noted POA    Pneumonia ICD-10-CM: J18.9  ICD-9-CM: 983  2021 Unknown                   [x] High complexity decision making was performed  [x] See my orders for details      Subjective/Initial History:     I was asked by Naveen Hardy MD to see Lesvia Ingram  a 80 y.o.  male in consultation     Excerpts from admission 2021 or consult notes as follows:   31-year-old male came in because of shortness of breath dyspnea generalized weakness initial Covid rapid test was negative he has significant past medical history of chronic kidney disease gout hypertension dysphagia had a PEG tube in place history of lymphoma chest x-ray done which shows left lower lobe infiltrate followed by CAT scan which shows bilateral pleural effusion and atelectasis but now his COVID-19 came back positive and also MRSA and he is disheveled unable to get much history out of the patient on oxygen via nasal cannula so pulmonary consult was called for further evaluation.       Allergies   Allergen Reactions    Pcn [Penicillins] Swelling        MAR reviewed and pertinent medications noted or modified as needed     Current Facility-Administered Medications   Medication    Vancomycin Random Level at 1800    0.9% sodium chloride infusion 250 mL    dexamethasone (DECADRON) 4 mg/mL injection 4 mg    albuterol (PROVENTIL HFA, VENTOLIN HFA, PROAIR HFA) inhaler 2 Puff    VANCOMYCIN INFORMATION NOTE    calcium acetate(phosphat bind) (PHOSLO) capsule 1,334 mg    dextrose 5% infusion    sodium chloride (NS) flush 5-10 mL    levoFLOXacin (LEVAQUIN) 750 mg in D5W IVPB    allopurinoL (ZYLOPRIM) tablet 50 mg    famotidine (PEPCID) tablet 20 mg    [Held by provider] metoprolol tartrate (LOPRESSOR) tablet 12.5 mg    sertraline (ZOLOFT) tablet 50 mg    insulin lispro (HUMALOG) injection    glucose chewable tablet 16 g    glucagon (GLUCAGEN) injection 1 mg    dextrose (D50W) injection syrg 12.5-25 g    mupirocin (BACTROBAN) 2 % ointment    ondansetron (ZOFRAN) injection 4 mg    acetaminophen (TYLENOL) tablet 650 mg    acetaminophen (TYLENOL) suppository 650 mg    hydrOXYzine pamoate (VISTARIL) capsule 25 mg      Patient PCP: Pili Soto MD  PMH:  has a past medical history of CRI (chronic renal insufficiency) (2012), Gout (2011), Hypertension, Lymphoma ([facility] [address] and Prostate CA ([facility] [address] (2011). PSH:   has a past surgical history that includes hx prostatectomy; hc bone marrow biopsy; endoscopy, colon, diagnostic (); and ir thoracentesis cath w image (12/15/2020). FHX: family history includes Hypertension in his mother. SHX:  reports that he has never smoked. He has never used smokeless tobacco. He reports previous drug use. He reports that he does not drink alcohol. ROS:    Unable to obtain    Objective:     Vital Signs: Telemetry:    normal sinus rhythm Intake/Output:   Visit Vitals  /65   Pulse 89   Temp 97.8 °F (36.6 °C)   Resp 18   Ht 5' 5.98\" (1.676 m)   Wt 53.6 kg (118 lb 2.7 oz)   SpO2 98%   BMI 19.08 kg/m²       Temp (24hrs), Av.7 °F (36.5 °C), Min:97.1 °F (36.2 °C), Max:98 °F (36.7 °C)        O2 Device: Nasal cannula O2 Flow Rate (L/min): 3 l/min       Wt Readings from Last 4 Encounters:   21 53.6 kg (118 lb 2.7 oz)   20 60.8 kg (134 lb)   20 60.9 kg (134 lb 4.2 oz)   20 64.3 kg (141 lb 12.1 oz)          Intake/Output Summary (Last 24 hours) at 2021 1134  Last data filed at 2021 0640  Gross per 24 hour   Intake 550 ml   Output 550 ml   Net 0 ml       Last shift:      No intake/output data recorded.   Last 3 shifts:  1901 -  0700  In: 2325 [I.V.:1775]  Out: 550 [Urine:550] Physical Exam:     Physical Exam   Constitutional: He appears distressed. HENT:   Head: Normocephalic and atraumatic. Eyes: Pupils are equal, round, and reactive to light. Conjunctivae and EOM are normal.   Neck: Normal range of motion. Neck supple. Cardiovascular: Normal rate and regular rhythm. Pulmonary/Chest: He is in respiratory distress. He has rales. Abdominal: Soft. Musculoskeletal: Normal range of motion. Neurological:   Patient is lethargic drowsy        Labs:    Recent Labs     01/07/21  0835 01/06/21  0423 01/05/21  1245   WBC 10.2 11.9* 12.7*   HGB 8.5* 7.4* 8.3*    264 302     Recent Labs     01/07/21  0835 01/06/21  0423 01/05/21  1245   * 149* 148*   K 3.5 4.0 4.0   * 114* 107   CO2 25 27 31   * 86 96   BUN 79* 90* 98*   CREA 2.60* 2.95* 3.11*   CA 9.7 9.6 10.8*   LAC  --  3.8* 2.9*   ALB  --   --  1.8*   ALT  --   --  102*     Recent Labs     01/06/21  1500   PH 7.44   PCO2 39   PO2 96   HCO3 27*     Recent Labs     01/05/21  1245   TROIQ 0.07*     No results found for: BNPP, BNP   Lab Results   Component Value Date/Time    Culture result: No growth 2 days 01/05/2021 03:00 PM    Culture result: No growth 6 days 12/15/2020 08:33 PM    Culture result: No growth 4 days 12/14/2020 02:45 PM     Lab Results   Component Value Date/Time    TSH 1.79 11/20/2020 01:31 PM       Imaging:    CXR Results  (Last 48 hours)               01/07/21 0815  XR CHEST PORT Final result    Impression:  FINDINGS: Impression: Frontal single view chest.       Unchanged cardiomediastinal silhouette. Calcified aorta. Mild vascular   congestion. No pulmonary edema. Mild hypoinflation. Unchanged bilateral lower lung atelectasis or infiltrates   and left lower lung consolidation. Unchanged small left pleural effusion. No   pneumothorax. No free air under the diaphragm. Narrative:  CHF.        Comparison chest x-ray 1/5/2021.           01/05/21 1351  XR CHEST PORT Final result    Narrative:  Chest single view. Comparison single view chest 12/28/2020       LLL posteromedial opacity now present. Findings would fit with any clinical   concern for pneumonia. Otherwise, no gross interstitial or alveolar pulmonary edema. Cardiac and   mediastinal structures are unchanged noting thoracic aorta atherosclerosis. No   pneumothorax. Probable small dependent left pleural effusion. Results from [place] encounter on 01/05/21   XR CHEST PORT    Narrative CHF. Comparison chest x-ray 1/5/2021. Impression FINDINGS: Impression: Frontal single view chest.    Unchanged cardiomediastinal silhouette. Calcified aorta. Mild vascular  congestion. No pulmonary edema. Mild hypoinflation. Unchanged bilateral lower lung atelectasis or infiltrates  and left lower lung consolidation. Unchanged small left pleural effusion. No  pneumothorax. No free air under the diaphragm. XR CHEST PORT    Narrative Chest single view. Comparison single view chest 12/28/2020    LLL posteromedial opacity now present. Findings would fit with any clinical  concern for pneumonia. Otherwise, no gross interstitial or alveolar pulmonary edema. Cardiac and  mediastinal structures are unchanged noting thoracic aorta atherosclerosis. No  pneumothorax. Probable small dependent left pleural effusion. Results from [place] encounter on 12/28/20   XR ABD (KUB)    Narrative Abdomen, 2 views, 12/28/2020    History PEG tube placement. Comparison: None. Findings: Radiographs are obtained before and after administration of 60 mL of  equal parts Gastrografin and water into the percutaneous feeding tube. Contrast  opacification of the stomach is distended. Contrast opacification of small  bowel loops is also seen. The bowel gas pattern is nonobstructive. Stool  volume is small to moderate. Surgical clips are seen at the pelvis. Degenerative changes are present in the spine. Impression Impression: Percutaneous feeding tube in the stomach. Nonobstructive bowel gas  pattern. Results from [facility] encounter on 12/11/20   CT CHEST WO CONT    Narrative Comparison chest x-ray 12/11/2020 [facility] and chest CTA 11/25/2020 [facility]. TECHNIQUE: Axial imaging of the chest without IV contrast, with multiplanar  reformatting, MIPs. Dose reduction: All CT scans at this facility are performed using dose reduction  optimization techniques as appropriate to a performed exam including the  following: Automated exposure control, adjustments of the mA and/or kV according  to patient's size, or use of iterative reconstruction technique. FINDINGS: Left central catheter distal tip SVC. Mild cardiomegaly. Multivessel  coronary artery calcification. No pericardial effusion. Low-density intracardiac  blood suggests anemia. Calcified thoracic aorta without aneurysm. Pulmonary  arteries are not dilated. No mediastinal or hilar lymphadenopathy. Tubular  structure posterior to the esophagus possibly dilated vessel. Bilateral pleural  effusions and lower lung consolidations without air bronchograms. The left lower  lung is completely consolidated/collapsed. The aerated lungs demonstrate patchy  areas of airspace disease. No axillary adenopathy. Included thyroid is  unremarkable. Included upper abdomen demonstrates lobulated, heterogeneous  structure spleen and small retroperitoneal lymphadenopathy; unchanged. Degenerative changes of the bony structures. Soft tissue anasarca. Impression IMPRESSION:  1. Bilateral pleural effusions and lower lung consolidations, the left lower  lung being completely collapsed, this is similar to previous. 2. Interval development patchy airspace disease in the aerated lungs consistent  with pneumonia. 3. Abnormal spleen, small retroperitoneal lymphadenopathy unchanged. 4. Atherosclerosis. IMPRESSION:   1.  Acute hypoxic respiratory failure  2. COVID-19 pneumonia  3. Severe sepsis  4. Dysphagia PEG tube in place with leaking around the PEG site  5. Hypernatremia  6. Bilateral pleural effusion with bilateral lower lobe atelectasis  7. Pt is requiring Drug therapy requiring intensive monitoring for toxicity  8. Pt is unstable, unpredictable needing inpatient monitoring; is acutely ill and at high risk of sudden decline and decompensation with severe consequenses and continued end organ dysfunction and failure  9. Prognosis guarded       RECOMMENDATIONS/PLAN:     1. Patient is on oxygen  2. Cannot give remdesivir as GFR is 25 cannot give Actemra patient has lymphoma and prostate cancer by history, continue with Decadron and and he is already on vancomycin and Levaquin because of MRSA  3. Patient is on IV fluid D5 because of mild hyponatremia  4. Intubate and place on vent if NIV fails  5. Agree with Empiric IV antibiotics pending culture results   6. Follow culture results  7. Possible PEG tube malfunction  8. Supplemental O2 to keep sats > 93%  9. Aspiration precautions  10. Labs to follow electrolytes, renal function and and blood counts  11. Glucose monitoring and SSI  12. Bronchial hygiene with respiratory therapy techniques, bronchodilators  13. DVT, SUP prophylaxis         This care involved high complexity medical decision making: I personally:  · Reviewed the flowsheet and previous days notes  · Reviewed and summarized records or history from previous days note or discussions with staff, family  · High Risk Drug therapy requiring intensive monitoring for toxicity: eg steroids, pressors, antibiotics  · Reviewed and/or ordered Clinical lab tests  · Reviewed images and/or ordered Radiology tests  · Reviewed the patients ECG / Telemetry  · Reviewed and/or adjusted NiPPV settings  · Called and arranged for Radiologic procedures or interventions  · performed or ordered Diagnostic endoscopies with identified risk factors.   · discussed my assessment/management with : Nursing, Hospitalist and Family for coordination of care                 Time spent 30 min      Philipp Orozco MD

## 2021-01-07 NOTE — PROGRESS NOTES
Primary Nurse Neida Orozco RN and Yoni Nicole RN performed a dual skin assessment on this patient. Excoriation noted to sacrum, groin and PEG tube site. SDTI on left heel, blanchable erythema on right heel, small abrasion on top of right foot and bruise on posterior right calf.

## 2021-01-07 NOTE — CONSULTS
PULMONARY NOTE  [facility]    Name: Coretta Gibson MRN: [id number]   : 1932 Hospital: [address] [place] [address]   Date: 2021  Admission date: 2021 Hospital Day: 3       HPI:     Hospital Problems  Date Reviewed: 2021          Codes Class Noted POA    Pneumonia ICD-10-CM: J18.9  ICD-9-CM: 771  2021 Unknown                   [x] High complexity decision making was performed  [x] See my orders for details      Subjective/Initial History:     I was asked by Kevin Galindo MD to see Coretta Gibson  a 80 y.o.  male in consultation     Excerpts from admission 2021 or consult notes as follows:   71-year-old male came in because of shortness of breath dyspnea generalized weakness initial Covid rapid test was negative he has significant past medical history of chronic kidney disease gout hypertension dysphagia had a PEG tube in place history of lymphoma chest x-ray done which shows left lower lobe infiltrate followed by CAT scan which shows bilateral pleural effusion and atelectasis but now his COVID-19 came back positive and also MRSA and he is disheveled unable to get much history out of the patient on oxygen via nasal cannula so pulmonary consult was called for further evaluation.       Allergies   Allergen Reactions    Pcn [Penicillins] Swelling        MAR reviewed and pertinent medications noted or modified as needed     Current Facility-Administered Medications   Medication    Vancomycin Random Level at 1800    0.9% sodium chloride infusion 250 mL    dexamethasone (DECADRON) 4 mg/mL injection 4 mg    albuterol (PROVENTIL HFA, VENTOLIN HFA, PROAIR HFA) inhaler 2 Puff    VANCOMYCIN INFORMATION NOTE    calcium acetate(phosphat bind) (PHOSLO) capsule 1,334 mg    dextrose 5% infusion    sodium chloride (NS) flush 5-10 mL    levoFLOXacin (LEVAQUIN) 750 mg in D5W IVPB    allopurinoL (ZYLOPRIM) tablet 50 mg    famotidine (PEPCID) tablet 20 mg    [Held by provider] metoprolol tartrate (LOPRESSOR) tablet 12.5 mg    sertraline (ZOLOFT) tablet 50 mg    insulin lispro (HUMALOG) injection    glucose chewable tablet 16 g    glucagon (GLUCAGEN) injection 1 mg    dextrose (D50W) injection syrg 12.5-25 g    mupirocin (BACTROBAN) 2 % ointment    ondansetron (ZOFRAN) injection 4 mg    acetaminophen (TYLENOL) tablet 650 mg    acetaminophen (TYLENOL) suppository 650 mg    hydrOXYzine pamoate (VISTARIL) capsule 25 mg      Patient PCP: Adry Kuhn MD  PMH:  has a past medical history of CRI (chronic renal insufficiency) (2012), Gout (2011), Hypertension, Lymphoma ([facility] [address] and Prostate CA ([facility] [address] (2011). PSH:   has a past surgical history that includes hx prostatectomy; hc bone marrow biopsy; endoscopy, colon, diagnostic (); and ir thoracentesis cath w image (12/15/2020). FHX: family history includes Hypertension in his mother. SHX:  reports that he has never smoked. He has never used smokeless tobacco. He reports previous drug use. He reports that he does not drink alcohol. ROS:    Unable to obtain    Objective:     Vital Signs: Telemetry:    normal sinus rhythm Intake/Output:   Visit Vitals  /65   Pulse 89   Temp 97.8 °F (36.6 °C)   Resp 18   Ht 5' 5.98\" (1.676 m)   Wt 53.6 kg (118 lb 2.7 oz)   SpO2 98%   BMI 19.08 kg/m²       Temp (24hrs), Av.7 °F (36.5 °C), Min:97.1 °F (36.2 °C), Max:98 °F (36.7 °C)        O2 Device: Nasal cannula O2 Flow Rate (L/min): 3 l/min       Wt Readings from Last 4 Encounters:   21 53.6 kg (118 lb 2.7 oz)   20 60.8 kg (134 lb)   20 60.9 kg (134 lb 4.2 oz)   20 64.3 kg (141 lb 12.1 oz)          Intake/Output Summary (Last 24 hours) at 2021 1035  Last data filed at 2021 0640  Gross per 24 hour   Intake 550 ml   Output 550 ml   Net 0 ml       Last shift:      No intake/output data recorded.   Last 3 shifts:  1901 -  0700  In: 2325 [I.V.:1775]  Out: 550 [Urine:550] Physical Exam:     Physical Exam   Constitutional: He appears distressed. HENT:   Head: Normocephalic and atraumatic. Eyes: Pupils are equal, round, and reactive to light. Conjunctivae and EOM are normal.   Neck: Normal range of motion. Neck supple. Cardiovascular: Normal rate and regular rhythm. Pulmonary/Chest: He is in respiratory distress. He has rales. Abdominal: Soft. Musculoskeletal: Normal range of motion. Neurological:   Patient is lethargic drowsy        Labs:    Recent Labs     01/07/21  0835 01/06/21  0423 01/05/21  1245   WBC 10.2 11.9* 12.7*   HGB 8.5* 7.4* 8.3*    264 302     Recent Labs     01/07/21  0835 01/06/21  0423 01/05/21  1245   * 149* 148*   K 3.5 4.0 4.0   * 114* 107   CO2 25 27 31   * 86 96   BUN 79* 90* 98*   CREA 2.60* 2.95* 3.11*   CA 9.7 9.6 10.8*   LAC  --  3.8* 2.9*   ALB  --   --  1.8*   ALT  --   --  102*     Recent Labs     01/06/21  1500   PH 7.44   PCO2 39   PO2 96   HCO3 27*     Recent Labs     01/05/21  1245   TROIQ 0.07*     No results found for: BNPP, BNP   Lab Results   Component Value Date/Time    Culture result: No growth 2 days 01/05/2021 03:00 PM    Culture result: No growth 6 days 12/15/2020 08:33 PM    Culture result: No growth 4 days 12/14/2020 02:45 PM     Lab Results   Component Value Date/Time    TSH 1.79 11/20/2020 01:31 PM       Imaging:    CXR Results  (Last 48 hours)               01/07/21 0815  XR CHEST PORT Final result    Impression:  FINDINGS: Impression: Frontal single view chest.       Unchanged cardiomediastinal silhouette. Calcified aorta. Mild vascular   congestion. No pulmonary edema. Mild hypoinflation. Unchanged bilateral lower lung atelectasis or infiltrates   and left lower lung consolidation. Unchanged small left pleural effusion. No   pneumothorax. No free air under the diaphragm. Narrative:  CHF.        Comparison chest x-ray 1/5/2021.           01/05/21 1351  XR CHEST PORT Final result    Narrative:  Chest single view. Comparison single view chest 12/28/2020       LLL posteromedial opacity now present. Findings would fit with any clinical   concern for pneumonia. Otherwise, no gross interstitial or alveolar pulmonary edema. Cardiac and   mediastinal structures are unchanged noting thoracic aorta atherosclerosis. No   pneumothorax. Probable small dependent left pleural effusion. Results from [place] encounter on 01/05/21   XR CHEST PORT    Narrative CHF. Comparison chest x-ray 1/5/2021. Impression FINDINGS: Impression: Frontal single view chest.    Unchanged cardiomediastinal silhouette. Calcified aorta. Mild vascular  congestion. No pulmonary edema. Mild hypoinflation. Unchanged bilateral lower lung atelectasis or infiltrates  and left lower lung consolidation. Unchanged small left pleural effusion. No  pneumothorax. No free air under the diaphragm. XR CHEST PORT    Narrative Chest single view. Comparison single view chest 12/28/2020    LLL posteromedial opacity now present. Findings would fit with any clinical  concern for pneumonia. Otherwise, no gross interstitial or alveolar pulmonary edema. Cardiac and  mediastinal structures are unchanged noting thoracic aorta atherosclerosis. No  pneumothorax. Probable small dependent left pleural effusion. Results from [place] encounter on 12/28/20   XR ABD (KUB)    Narrative Abdomen, 2 views, 12/28/2020    History PEG tube placement. Comparison: None. Findings: Radiographs are obtained before and after administration of 60 mL of  equal parts Gastrografin and water into the percutaneous feeding tube. Contrast  opacification of the stomach is distended. Contrast opacification of small  bowel loops is also seen. The bowel gas pattern is nonobstructive. Stool  volume is small to moderate. Surgical clips are seen at the pelvis. Degenerative changes are present in the spine. Impression Impression: Percutaneous feeding tube in the stomach. Nonobstructive bowel gas  pattern. Results from [facility] encounter on 12/11/20   CT CHEST WO CONT    Narrative Comparison chest x-ray 12/11/2020 [facility] and chest CTA 11/25/2020 [address] [place] [address]. TECHNIQUE: Axial imaging of the chest without IV contrast, with multiplanar  reformatting, MIPs. Dose reduction: All CT scans at this facility are performed using dose reduction  optimization techniques as appropriate to a performed exam including the  following: Automated exposure control, adjustments of the mA and/or kV according  to patient's size, or use of iterative reconstruction technique. FINDINGS: Left central catheter distal tip SVC. Mild cardiomegaly. Multivessel  coronary artery calcification. No pericardial effusion. Low-density intracardiac  blood suggests anemia. Calcified thoracic aorta without aneurysm. Pulmonary  arteries are not dilated. No mediastinal or hilar lymphadenopathy. Tubular  structure posterior to the esophagus possibly dilated vessel. Bilateral pleural  effusions and lower lung consolidations without air bronchograms. The left lower  lung is completely consolidated/collapsed. The aerated lungs demonstrate patchy  areas of airspace disease. No axillary adenopathy. Included thyroid is  unremarkable. Included upper abdomen demonstrates lobulated, heterogeneous  structure spleen and small retroperitoneal lymphadenopathy; unchanged. Degenerative changes of the bony structures. Soft tissue anasarca. Impression IMPRESSION:  1. Bilateral pleural effusions and lower lung consolidations, the left lower  lung being completely collapsed, this is similar to previous. 2. Interval development patchy airspace disease in the aerated lungs consistent  with pneumonia. 3. Abnormal spleen, small retroperitoneal lymphadenopathy unchanged. 4. Atherosclerosis. IMPRESSION:   1.  Acute hypoxic respiratory failure  2. COVID-19 pneumonia  3. Severe sepsis  4. Dysphagia PEG tube in place with leaking around the PEG site  5. Hypernatremia  6. Bilateral pleural effusion with bilateral lower lobe atelectasis  7. Pt is requiring Drug therapy requiring intensive monitoring for toxicity  8. Pt is unstable, unpredictable needing inpatient monitoring; is acutely ill and at high risk of sudden decline and decompensation with severe consequenses and continued end organ dysfunction and failure  9. Prognosis guarded       RECOMMENDATIONS/PLAN:     1. Patient is on oxygen nonrebreather mask  2. Cannot give remdesivir as GFR is 25 cannot give Actemra patient has lymphoma and prostate cancer by history, continue with Decadron and and he is already on vancomycin and Levaquin because of MRSA  3. Patient is on IV fluid D5 because of mild hyponatremia  4. Intubate and place on vent if NIV fails  5. Agree with Empiric IV antibiotics pending culture results   6. Follow culture results  7. Possible PEG tube malfunction  8. Supplemental O2 to keep sats > 93%  9. Aspiration precautions  10. Labs to follow electrolytes, renal function and and blood counts  11. Glucose monitoring and SSI  12. Bronchial hygiene with respiratory therapy techniques, bronchodilators  13.  DVT, SUP prophylaxis         This care involved high complexity medical decision making: I personally:  · Reviewed the flowsheet and previous days notes  · Reviewed and summarized records or history from previous days note or discussions with staff, family  · High Risk Drug therapy requiring intensive monitoring for toxicity: eg steroids, pressors, antibiotics  · Reviewed and/or ordered Clinical lab tests  · Reviewed images and/or ordered Radiology tests  · Reviewed the patients ECG / Telemetry  · Reviewed and/or adjusted NiPPV settings  · Called and arranged for Radiologic procedures or interventions  · performed or ordered Diagnostic endoscopies with identified risk factors.   · discussed my assessment/management with : Nursing, Hospitalist and Family for coordination of care                 Time spent 30 min      Monica Ruiz MD

## 2021-01-07 NOTE — ROUTINE PROCESS
Bedside and verbal shift change report given to Tiff Greene RN (oncoming nurse) by Iqra Davis RN (offgoing nurse). Report included the following information SBAR, Kardex, Intake/Output, MAR, Recent Results, and Quality Measures.

## 2021-01-07 NOTE — PROGRESS NOTES
Garry Gross called to get bolus TF orders as there are no feeding pumps available in hospital. Received recs for Jevity 1.5 225ml 5x/day with 170ml water flush after each feed. Notified Corine Pedraza that PEG tube site leaked water/bile as water flush was given. Patient had received 50ml of water with med administration, 225ml of Jevity and as I was giving the 170ml water flush, peg tube site began leaking water. Corine Pedraza notified and recs received to decrease water flush to 50ml after each feeding bolus. [address] notified that PEG tube site was leaking excessive amount of bile.  Received orders to consult GI in AM.

## 2021-01-07 NOTE — PROGRESS NOTES
Bedside and Verbal shift change report given to [address] (oncoming nurse) by Mark Cline (offgoing nurse). Report included the following information SBAR and MAR.

## 2021-01-08 LAB
ANION GAP SERPL CALC-SCNC: 12 MMOL/L (ref 5–15)
BUN SERPL-MCNC: 76 MG/DL (ref 6–20)
BUN/CREAT SERPL: 29 (ref 12–20)
CA-I BLD-MCNC: 10.6 MG/DL (ref 8.5–10.1)
CHLORIDE SERPL-SCNC: 109 MMOL/L (ref 97–108)
CO2 SERPL-SCNC: 23 MMOL/L (ref 21–32)
CREAT SERPL-MCNC: 2.66 MG/DL (ref 0.7–1.3)
DATE LAST DOSE: NORMAL
ERYTHROCYTE [DISTWIDTH] IN BLOOD BY AUTOMATED COUNT: 17.8 % (ref 11.5–14.5)
GLUCOSE BLD STRIP.AUTO-MCNC: 110 MG/DL (ref 65–100)
GLUCOSE BLD STRIP.AUTO-MCNC: 135 MG/DL (ref 65–100)
GLUCOSE BLD STRIP.AUTO-MCNC: 147 MG/DL (ref 65–100)
GLUCOSE SERPL-MCNC: 168 MG/DL (ref 65–100)
HCT VFR BLD AUTO: 29.6 % (ref 36.6–50.3)
HGB BLD-MCNC: 9.7 G/DL (ref 12.1–17)
MCH RBC QN AUTO: 29.4 PG (ref 26–34)
MCHC RBC AUTO-ENTMCNC: 32.8 G/DL (ref 30–36.5)
MCV RBC AUTO: 89.7 FL (ref 80–99)
PERFORMED BY, TECHID: ABNORMAL
PLATELET # BLD AUTO: 283 K/UL (ref 150–400)
PMV BLD AUTO: 11.6 FL (ref 8.9–12.9)
POTASSIUM SERPL-SCNC: 3.7 MMOL/L (ref 3.5–5.1)
RBC # BLD AUTO: 3.3 M/UL (ref 4.1–5.7)
REPORTED DOSE,DOSE: NORMAL UNITS
SODIUM SERPL-SCNC: 144 MMOL/L (ref 136–145)
VANCOMYCIN SERPL-MCNC: 24.5 UG/ML
WBC # BLD AUTO: 17 K/UL (ref 4.1–11.1)

## 2021-01-08 PROCEDURE — 94762 N-INVAS EAR/PLS OXIMTRY CONT: CPT

## 2021-01-08 PROCEDURE — 74011250637 HC RX REV CODE- 250/637: Performed by: NURSE PRACTITIONER

## 2021-01-08 PROCEDURE — 36415 COLL VENOUS BLD VENIPUNCTURE: CPT

## 2021-01-08 PROCEDURE — 77010033678 HC OXYGEN DAILY

## 2021-01-08 PROCEDURE — 80202 ASSAY OF VANCOMYCIN: CPT

## 2021-01-08 PROCEDURE — 82962 GLUCOSE BLOOD TEST: CPT

## 2021-01-08 PROCEDURE — 74011250636 HC RX REV CODE- 250/636: Performed by: NURSE PRACTITIONER

## 2021-01-08 PROCEDURE — 74011636637 HC RX REV CODE- 636/637: Performed by: NURSE PRACTITIONER

## 2021-01-08 PROCEDURE — 80048 BASIC METABOLIC PNL TOTAL CA: CPT

## 2021-01-08 PROCEDURE — 65270000029 HC RM PRIVATE

## 2021-01-08 PROCEDURE — 85027 COMPLETE CBC AUTOMATED: CPT

## 2021-01-08 RX ORDER — ASCORBIC ACID 500 MG
500 TABLET ORAL DAILY
Status: DISCONTINUED | OUTPATIENT
Start: 2021-01-09 | End: 2021-01-17 | Stop reason: HOSPADM

## 2021-01-08 RX ADMIN — METOPROLOL TARTRATE 12.5 MG: 25 TABLET, FILM COATED ORAL at 21:33

## 2021-01-08 RX ADMIN — HEPARIN SODIUM 5000 UNITS: 5000 INJECTION INTRAVENOUS; SUBCUTANEOUS at 17:55

## 2021-01-08 RX ADMIN — DEXAMETHASONE SODIUM PHOSPHATE 4 MG: 4 INJECTION, SOLUTION INTRA-ARTICULAR; INTRALESIONAL; INTRAMUSCULAR; INTRAVENOUS; SOFT TISSUE at 07:10

## 2021-01-08 RX ADMIN — DEXAMETHASONE SODIUM PHOSPHATE 4 MG: 4 INJECTION, SOLUTION INTRA-ARTICULAR; INTRALESIONAL; INTRAMUSCULAR; INTRAVENOUS; SOFT TISSUE at 14:35

## 2021-01-08 RX ADMIN — DEXTROSE MONOHYDRATE 75 ML/HR: 50 INJECTION, SOLUTION INTRAVENOUS at 14:36

## 2021-01-08 RX ADMIN — DEXAMETHASONE SODIUM PHOSPHATE 4 MG: 4 INJECTION, SOLUTION INTRA-ARTICULAR; INTRALESIONAL; INTRAMUSCULAR; INTRAVENOUS; SOFT TISSUE at 17:55

## 2021-01-08 RX ADMIN — MUPIROCIN: 20 OINTMENT TOPICAL at 10:52

## 2021-01-08 RX ADMIN — FERROUS SULFATE TAB 325 MG (65 MG ELEMENTAL FE) 325 MG: 325 (65 FE) TAB at 10:52

## 2021-01-08 RX ADMIN — INSULIN LISPRO 4 UNITS: 100 INJECTION, SOLUTION INTRAVENOUS; SUBCUTANEOUS at 11:30

## 2021-01-08 RX ADMIN — CALCIUM ACETATE 1334 MG: 667 CAPSULE ORAL at 14:35

## 2021-01-08 RX ADMIN — HEPARIN SODIUM 5000 UNITS: 5000 INJECTION INTRAVENOUS; SUBCUTANEOUS at 07:09

## 2021-01-08 RX ADMIN — CALCIUM ACETATE 1334 MG: 667 CAPSULE ORAL at 17:55

## 2021-01-08 RX ADMIN — CALCIUM ACETATE 1334 MG: 667 CAPSULE ORAL at 10:52

## 2021-01-08 RX ADMIN — FAMOTIDINE 20 MG: 20 TABLET, FILM COATED ORAL at 10:53

## 2021-01-08 RX ADMIN — SERTRALINE HYDROCHLORIDE 50 MG: 25 TABLET ORAL at 17:54

## 2021-01-08 NOTE — PROGRESS NOTES
Vancomycin Note    Admission date 010521   Attending Freddie Bookbinder   Indication Sepsis        Height Ht Readings from Last 1 Encounters:   01/06/21 167.6 cm (65.98\")       Weight Wt Readings from Last 1 Encounters:   01/07/21 53.6 kg (118 lb 2.7 oz)      IBW ideal body weight      SCr Creatinine   Date Value Ref Range Status   01/07/2021 2.60 (H) 0.70 - 1.30 mg/dL Final   01/06/2021 2.95 (H) 0.70 - 1.30 mg/dL Final   01/05/2021 3.11 (H) 0.70 - 1.30 mg/dL Final      CrCl (based on IBW) 17.7 ml/min       Load/ER dose 1000mg   Current regimen  PULSE   Level Type / Date / Result random /1/7 / 1814   NEW regimen 1000mg   Level Scheduled for 1/8 1800         Current Antimicrobial Therapy (168h ago, onward)       Ordered     Start Stop    01/07/21 2002  vancomycin (VANCOCIN) 1,000 mg in 0.9% sodium chloride 250 mL (VIAL-MATE)  1,000 mg,   IntraVENous,   ONCE      01/07/21 2300 01/08/21 1059    01/05/21 1514  levoFLOXacin (LEVAQUIN) 750 mg in D5W IVPB  750 mg,   IntraVENous,   EVERY 48 HOURS      01/07/21 1459 --    01/06/21 0906  VANCOMYCIN INFORMATION NOTE  Other,   AS NEEDED      01/06/21 0905 --             Vancomycin Random level resulted today at 19.5. Pt is MRSA and Covid Positive. Pt will be Pulsed dose as CrCl is 14.9. Pt will receive One dose of Vanc 1g tonight. Scheduled Random at 1800 tomorrow. 01/08.     Submitted by: Ivonne Zafar, YANGD

## 2021-01-08 NOTE — CONSULTS
PULMONARY NOTE  [facility]    Name: Horacio Viramontes MRN: [id number]   : 1932 Hospital: [address] [place] [address]   Date: 2021  Admission date: 2021 Hospital Day: 4       HPI:     Hospital Problems  Date Reviewed: 2021          Codes Class Noted POA    Pneumonia ICD-10-CM: J18.9  ICD-9-CM: 166  2021 Unknown                   [x] High complexity decision making was performed  [x] See my orders for details      Subjective/Initial History:     I was asked by Ab Souza MD to see Horacio Viramontes  a 80 y.o.  male in consultation     Excerpts from admission 2021 or consult notes as follows:   70-year-old male came in because of shortness of breath dyspnea generalized weakness initial Covid rapid test was negative he has significant past medical history of chronic kidney disease gout hypertension dysphagia had a PEG tube in place history of lymphoma chest x-ray done which shows left lower lobe infiltrate followed by CAT scan which shows bilateral pleural effusion and atelectasis but now his COVID-19 came back positive and also MRSA and he is disheveled unable to get much history out of the patient on oxygen via nasal cannula so pulmonary consult was called for further evaluation.       Allergies   Allergen Reactions    Pcn [Penicillins] Swelling        MAR reviewed and pertinent medications noted or modified as needed     Current Facility-Administered Medications   Medication    ferrous sulfate tablet 325 mg    heparin (porcine) injection 5,000 Units    0.9% sodium chloride infusion 250 mL    dexamethasone (DECADRON) 4 mg/mL injection 4 mg    albuterol (PROVENTIL HFA, VENTOLIN HFA, PROAIR HFA) inhaler 2 Puff    VANCOMYCIN INFORMATION NOTE    calcium acetate(phosphat bind) (PHOSLO) capsule 1,334 mg    dextrose 5% infusion    sodium chloride (NS) flush 5-10 mL    levoFLOXacin (LEVAQUIN) 750 mg in D5W IVPB    allopurinoL (ZYLOPRIM) tablet 50 mg    famotidine (PEPCID) tablet 20 mg    [Held by provider] metoprolol tartrate (LOPRESSOR) tablet 12.5 mg    sertraline (ZOLOFT) tablet 50 mg    insulin lispro (HUMALOG) injection    glucose chewable tablet 16 g    glucagon (GLUCAGEN) injection 1 mg    dextrose (D50W) injection syrg 12.5-25 g    mupirocin (BACTROBAN) 2 % ointment    ondansetron (ZOFRAN) injection 4 mg    acetaminophen (TYLENOL) tablet 650 mg    acetaminophen (TYLENOL) suppository 650 mg    hydrOXYzine pamoate (VISTARIL) capsule 25 mg      Patient PCP: Vignesh Khan MD  PMH:  has a past medical history of CRI (chronic renal insufficiency) (2012), Gout (2011), Hypertension, Lymphoma ([facility] [address] and Prostate CA ([facility] [address] (2011). PSH:   has a past surgical history that includes hx prostatectomy; hc bone marrow biopsy; endoscopy, colon, diagnostic (); and ir thoracentesis cath w image (12/15/2020). FHX: family history includes Hypertension in his mother. SHX:  reports that he has never smoked. He has never used smokeless tobacco. He reports previous drug use. He reports that he does not drink alcohol.      ROS:    Unable to obtain    Objective:     Vital Signs: Telemetry:    normal sinus rhythm Intake/Output:   Visit Vitals  BP (!) 143/82 (BP 1 Location: Left arm, BP Patient Position: At rest)   Pulse (!) 112   Temp 97.8 °F (36.6 °C)   Resp 20   Ht 5' 5.98\" (1.676 m)   Wt 53.3 kg (117 lb 8.1 oz)   SpO2 92%   BMI 18.98 kg/m²       Temp (24hrs), Av.4 °F (36.9 °C), Min:97.5 °F (36.4 °C), Max:99.9 °F (37.7 °C)        O2 Device: Nasal cannula O2 Flow Rate (L/min): 2.5 l/min       Wt Readings from Last 4 Encounters:   21 53.3 kg (117 lb 8.1 oz)   20 60.8 kg (134 lb)   20 60.9 kg (134 lb 4.2 oz)   20 64.3 kg (141 lb 12.1 oz)          Intake/Output Summary (Last 24 hours) at 2021 1122  Last data filed at 2021 0715  Gross per 24 hour   Intake 650 ml   Output 1500 ml   Net -850 ml       Last shift:       0701 - 01/08 1900  In: 50   Out: 1500 [Urine:1500]  Last 3 shifts: 01/06 1901 - 01/08 0700  In: 1150   Out: 550 [Urine:550]       Physical Exam:     Physical Exam   Constitutional: He appears distressed. HENT:   Head: Normocephalic and atraumatic. Eyes: Pupils are equal, round, and reactive to light. Conjunctivae and EOM are normal.   Neck: Normal range of motion. Neck supple. Cardiovascular: Normal rate and regular rhythm. Pulmonary/Chest: He is in respiratory distress. He has rales. Abdominal: Soft. Musculoskeletal: Normal range of motion. Neurological:   Patient is lethargic drowsy        Labs:    Recent Labs     01/07/21  0835 01/06/21  0423 01/05/21  1245   WBC 10.2 11.9* 12.7*   HGB 8.5* 7.4* 8.3*    264 302     Recent Labs     01/07/21  0835 01/06/21  0423 01/05/21  1245   * 149* 148*   K 3.5 4.0 4.0   * 114* 107   CO2 25 27 31   * 86 96   BUN 79* 90* 98*   CREA 2.60* 2.95* 3.11*   CA 9.7 9.6 10.8*   LAC  --  3.8* 2.9*   ALB  --   --  1.8*   ALT  --   --  102*     Recent Labs     01/06/21  1500   PH 7.44   PCO2 39   PO2 96   HCO3 27*     Recent Labs     01/05/21  1245   TROIQ 0.07*     No results found for: BNPP, BNP   Lab Results   Component Value Date/Time    Culture result: No significant growth, <10,000 CFU/mL 01/05/2021 08:00 PM    Culture result: No growth 3 days 01/05/2021 03:00 PM    Culture result: No growth 6 days 12/15/2020 08:33 PM     Lab Results   Component Value Date/Time    TSH 1.79 11/20/2020 01:31 PM       Imaging:    CXR Results  (Last 48 hours)               01/07/21 0815  XR CHEST PORT Final result    Impression:  FINDINGS: Impression: Frontal single view chest.       Unchanged cardiomediastinal silhouette. Calcified aorta. Mild vascular   congestion. No pulmonary edema. Mild hypoinflation. Unchanged bilateral lower lung atelectasis or infiltrates   and left lower lung consolidation. Unchanged small left pleural effusion. No   pneumothorax.    No free air under the diaphragm.       Narrative:  CHF.       Comparison chest x-ray 1/5/2021.               Results from Hospital Encounter encounter on 01/05/21   XR CHEST PORT    Narrative CHF.    Comparison chest x-ray 1/5/2021.      Impression FINDINGS: Impression: Frontal single view chest.    Unchanged cardiomediastinal silhouette. Calcified aorta. Mild vascular  congestion. No pulmonary edema.    Mild hypoinflation. Unchanged bilateral lower lung atelectasis or infiltrates  and left lower lung consolidation. Unchanged small left pleural effusion. No  pneumothorax.    No free air under the diaphragm.   XR CHEST PORT    Narrative Chest single view.    Comparison single view chest 12/28/2020    LLL posteromedial opacity now present. Findings would fit with any clinical  concern for pneumonia.   Otherwise, no gross interstitial or alveolar pulmonary edema. Cardiac and  mediastinal structures are unchanged noting thoracic aorta atherosclerosis. No  pneumothorax. Probable small dependent left pleural effusion.   Results from Hospital Encounter encounter on 12/28/20   XR ABD (KUB)    Narrative Abdomen, 2 views, 12/28/2020    History PEG tube placement.    Comparison: None.    Findings: Radiographs are obtained before and after administration of 60 mL of  equal parts Gastrografin and water into the percutaneous feeding tube.  Contrast  opacification of the stomach is distended.  Contrast opacification of small  bowel loops is also seen.  The bowel gas pattern is nonobstructive.  Stool  volume is small to moderate.  Surgical clips are seen at the pelvis.   Degenerative changes are present in the spine.      Impression Impression: Percutaneous feeding tube in the stomach.  Nonobstructive bowel gas  pattern.     Results from Hospital Encounter encounter on 12/11/20   CT CHEST WO CONT    Narrative Comparison chest x-ray 12/11/2020 [facility] and chest CTA 11/25/2020 [place].    TECHNIQUE: Axial imaging of the chest  without IV contrast, with multiplanar  reformatting, MIPs. Dose reduction: All CT scans at this facility are performed using dose reduction  optimization techniques as appropriate to a performed exam including the  following: Automated exposure control, adjustments of the mA and/or kV according  to patient's size, or use of iterative reconstruction technique. FINDINGS: Left central catheter distal tip SVC. Mild cardiomegaly. Multivessel  coronary artery calcification. No pericardial effusion. Low-density intracardiac  blood suggests anemia. Calcified thoracic aorta without aneurysm. Pulmonary  arteries are not dilated. No mediastinal or hilar lymphadenopathy. Tubular  structure posterior to the esophagus possibly dilated vessel. Bilateral pleural  effusions and lower lung consolidations without air bronchograms. The left lower  lung is completely consolidated/collapsed. The aerated lungs demonstrate patchy  areas of airspace disease. No axillary adenopathy. Included thyroid is  unremarkable. Included upper abdomen demonstrates lobulated, heterogeneous  structure spleen and small retroperitoneal lymphadenopathy; unchanged. Degenerative changes of the bony structures. Soft tissue anasarca. Impression IMPRESSION:  1. Bilateral pleural effusions and lower lung consolidations, the left lower  lung being completely collapsed, this is similar to previous. 2. Interval development patchy airspace disease in the aerated lungs consistent  with pneumonia. 3. Abnormal spleen, small retroperitoneal lymphadenopathy unchanged. 4. Atherosclerosis. IMPRESSION:   1. Acute hypoxic respiratory failure  2. COVID-19 pneumonia  3. Severe sepsis  4. Dysphagia PEG tube in place with leaking around the PEG site which is fixed  5. Hypernatremia  6. Bilateral pleural effusion with bilateral lower lobe atelectasis  7. Pt is requiring Drug therapy requiring intensive monitoring for toxicity  8.  Pt is unstable, unpredictable needing inpatient monitoring; is acutely ill and at high risk of sudden decline and decompensation with severe consequenses and continued end organ dysfunction and failure  9. Prognosis guarded       RECOMMENDATIONS/PLAN:     1. Patient is on oxygen 3 L nasal cannula  2. Cannot give remdesivir as GFR is 25 cannot give Actemra patient has lymphoma and prostate cancer by history, continue with Decadron and and he is already on vancomycin and Levaquin because of MRSA  3. Patient is on IV fluid D5 because of mild hyponatremia  4. Intubate and place on vent if NIV fails  5. Agree with Empiric IV antibiotics pending culture results   6. Follow culture results  7. Possible PEG tube malfunction  8. Supplemental O2 to keep sats > 93%  9. Aspiration precautions  10. Labs to follow electrolytes, renal function and and blood counts  11. Glucose monitoring and SSI  12. Bronchial hygiene with respiratory therapy techniques, bronchodilators  13.  DVT, SUP prophylaxis           Vincent Escobedo MD

## 2021-01-08 NOTE — PROGRESS NOTES
Problem: Nutrition Deficit  Goal: *Optimize nutritional status  Outcome: Progressing Towards Goal     Problem: Falls - Risk of  Goal: *Absence of Falls  Description: Document [place] Fall Risk and appropriate interventions in the flowsheet. Outcome: Progressing Towards Goal  Note: Fall Risk Interventions:  Mobility Interventions: Patient to call before getting OOB, PT Consult for mobility concerns, PT Consult for assist device competence    Mentation Interventions: Adequate sleep, hydration, pain control, Increase mobility, More frequent rounding, Reorient patient    Medication Interventions: Evaluate medications/consider consulting pharmacy, Patient to call before getting OOB    Elimination Interventions: Call light in reach, Bed/chair exit alarm, Patient to call for help with toileting needs              Problem: Patient Education: Go to Patient Education Activity  Goal: Patient/Family Education  Outcome: Progressing Towards Goal     Problem: Pressure Injury - Risk of  Goal: *Prevention of pressure injury  Description: Document Daniel Scale and appropriate interventions in the flowsheet.   Outcome: Progressing Towards Goal  Note: Pressure Injury Interventions:  Sensory Interventions: Assess changes in LOC, Assess need for specialty bed, Avoid rigorous massage over bony prominences, Float heels, Keep linens dry and wrinkle-free, Maintain/enhance activity level, Minimize linen layers, Monitor skin under medical devices    Moisture Interventions: Maintain skin hydration (lotion/cream), Minimize layers, Moisture barrier    Activity Interventions: Increase time out of bed, Pressure redistribution bed/mattress(bed type), PT/OT evaluation    Mobility Interventions: HOB 30 degrees or less, Pressure redistribution bed/mattress (bed type), PT/OT evaluation    Nutrition Interventions: Document food/fluid/supplement intake    Friction and Shear Interventions: Lift sheet, Lift team/patient mobility team, Minimize layers Problem: Patient Education: Go to Patient Education Activity  Goal: Patient/Family Education  Outcome: Progressing Towards Goal     Problem: Risk for Spread of Infection  Goal: Prevent transmission of infectious organism to others  Description: Prevent the transmission of infectious organisms to other patients, staff members, and visitors.   Outcome: Progressing Towards Goal     Problem: Patient Education:  Go to Education Activity  Goal: Patient/Family Education  Outcome: Progressing Towards Goal

## 2021-01-08 NOTE — PROGRESS NOTES
Assumed patient care. Patient confused. talking to self. IVF infusing without difficulty. Perez catheter intact and patent.

## 2021-01-09 LAB
GLUCOSE BLD STRIP.AUTO-MCNC: 265 MG/DL (ref 65–100)
PERFORMED BY, TECHID: ABNORMAL

## 2021-01-09 PROCEDURE — 94640 AIRWAY INHALATION TREATMENT: CPT

## 2021-01-09 PROCEDURE — 74011250637 HC RX REV CODE- 250/637: Performed by: NURSE PRACTITIONER

## 2021-01-09 PROCEDURE — 74011250636 HC RX REV CODE- 250/636: Performed by: NURSE PRACTITIONER

## 2021-01-09 PROCEDURE — 74011636637 HC RX REV CODE- 636/637: Performed by: NURSE PRACTITIONER

## 2021-01-09 PROCEDURE — 65270000029 HC RM PRIVATE

## 2021-01-09 PROCEDURE — 82962 GLUCOSE BLOOD TEST: CPT

## 2021-01-09 PROCEDURE — 77010033678 HC OXYGEN DAILY

## 2021-01-09 PROCEDURE — 94762 N-INVAS EAR/PLS OXIMTRY CONT: CPT

## 2021-01-09 RX ORDER — METOPROLOL TARTRATE 25 MG/1
25 TABLET, FILM COATED ORAL EVERY 12 HOURS
Status: DISCONTINUED | OUTPATIENT
Start: 2021-01-09 | End: 2021-01-17 | Stop reason: HOSPADM

## 2021-01-09 RX ORDER — GUAIFENESIN DEXTROMETHORPHAN HYDROBROMIDE ORAL SOLUTION 10; 100 MG/5ML; MG/5ML
10 SOLUTION ORAL
Status: DISCONTINUED | OUTPATIENT
Start: 2021-01-09 | End: 2021-01-17 | Stop reason: HOSPADM

## 2021-01-09 RX ORDER — ALBUTEROL SULFATE 90 UG/1
2 AEROSOL, METERED RESPIRATORY (INHALATION)
Status: DISCONTINUED | OUTPATIENT
Start: 2021-01-09 | End: 2021-01-11

## 2021-01-09 RX ADMIN — OXYCODONE HYDROCHLORIDE AND ACETAMINOPHEN 500 MG: 500 TABLET ORAL at 10:40

## 2021-01-09 RX ADMIN — FERROUS SULFATE TAB 325 MG (65 MG ELEMENTAL FE) 325 MG: 325 (65 FE) TAB at 10:44

## 2021-01-09 RX ADMIN — DEXAMETHASONE SODIUM PHOSPHATE 4 MG: 4 INJECTION, SOLUTION INTRA-ARTICULAR; INTRALESIONAL; INTRAMUSCULAR; INTRAVENOUS; SOFT TISSUE at 05:41

## 2021-01-09 RX ADMIN — DEXAMETHASONE SODIUM PHOSPHATE 4 MG: 4 INJECTION, SOLUTION INTRA-ARTICULAR; INTRALESIONAL; INTRAMUSCULAR; INTRAVENOUS; SOFT TISSUE at 17:40

## 2021-01-09 RX ADMIN — DEXAMETHASONE SODIUM PHOSPHATE 4 MG: 4 INJECTION, SOLUTION INTRA-ARTICULAR; INTRALESIONAL; INTRAMUSCULAR; INTRAVENOUS; SOFT TISSUE at 10:45

## 2021-01-09 RX ADMIN — HEPARIN SODIUM 5000 UNITS: 5000 INJECTION INTRAVENOUS; SUBCUTANEOUS at 05:40

## 2021-01-09 RX ADMIN — DEXTROSE MONOHYDRATE 75 ML/HR: 50 INJECTION, SOLUTION INTRAVENOUS at 17:39

## 2021-01-09 RX ADMIN — METOPROLOL TARTRATE 25 MG: 25 TABLET, FILM COATED ORAL at 22:10

## 2021-01-09 RX ADMIN — INSULIN LISPRO 6 UNITS: 100 INJECTION, SOLUTION INTRAVENOUS; SUBCUTANEOUS at 10:39

## 2021-01-09 RX ADMIN — METOPROLOL TARTRATE 12.5 MG: 25 TABLET, FILM COATED ORAL at 10:40

## 2021-01-09 RX ADMIN — ALBUTEROL SULFATE 2 PUFF: 90 AEROSOL, METERED RESPIRATORY (INHALATION) at 17:00

## 2021-01-09 RX ADMIN — LEVOFLOXACIN 750 MG: 5 INJECTION, SOLUTION INTRAVENOUS at 17:39

## 2021-01-09 RX ADMIN — FAMOTIDINE 20 MG: 20 TABLET, FILM COATED ORAL at 10:44

## 2021-01-09 RX ADMIN — ALLOPURINOL 50 MG: 100 TABLET ORAL at 17:40

## 2021-01-09 RX ADMIN — SERTRALINE HYDROCHLORIDE 50 MG: 25 TABLET ORAL at 17:40

## 2021-01-09 RX ADMIN — DEXAMETHASONE SODIUM PHOSPHATE 4 MG: 4 INJECTION, SOLUTION INTRA-ARTICULAR; INTRALESIONAL; INTRAMUSCULAR; INTRAVENOUS; SOFT TISSUE at 00:43

## 2021-01-09 RX ADMIN — CALCIUM ACETATE 1334 MG: 667 CAPSULE ORAL at 17:40

## 2021-01-09 RX ADMIN — HEPARIN SODIUM 5000 UNITS: 5000 INJECTION INTRAVENOUS; SUBCUTANEOUS at 17:40

## 2021-01-09 RX ADMIN — CALCIUM ACETATE 1334 MG: 667 CAPSULE ORAL at 10:40

## 2021-01-09 RX ADMIN — MUPIROCIN: 20 OINTMENT TOPICAL at 10:40

## 2021-01-09 RX ADMIN — ALBUTEROL SULFATE 2 PUFF: 90 AEROSOL, METERED RESPIRATORY (INHALATION) at 21:01

## 2021-01-09 NOTE — CONSULTS
PULMONARY NOTE  [facility]    Name: Romana Rose MRN: [id number]   : 1932 Hospital: [facility]   Date: 2021  Admission date: 2021 Hospital Day: 5       HPI:     Hospital Problems  Date Reviewed: 2021          Codes Class Noted POA    Pneumonia ICD-10-CM: J18.9  ICD-9-CM: 365  2021 Unknown                   [x] High complexity decision making was performed  [x] See my orders for details      Subjective/Initial History:     I was asked by Boris William MD to see Romana Rose  a 80 y.o.  male in consultation     Excerpts from admission 2021 or consult notes as follows:   72-year-old male came in because of shortness of breath dyspnea generalized weakness initial Covid rapid test was negative he has significant past medical history of chronic kidney disease gout hypertension dysphagia had a PEG tube in place history of lymphoma chest x-ray done which shows left lower lobe infiltrate followed by CAT scan which shows bilateral pleural effusion and atelectasis but now his COVID-19 came back positive and also MRSA and he is disheveled unable to get much history out of the patient on oxygen via nasal cannula so pulmonary consult was called for further evaluation.       Allergies   Allergen Reactions    Pcn [Penicillins] Swelling        MAR reviewed and pertinent medications noted or modified as needed     Current Facility-Administered Medications   Medication    ascorbic acid (vitamin C) (VITAMIN C) tablet 500 mg    ferrous sulfate tablet 325 mg    heparin (porcine) injection 5,000 Units    0.9% sodium chloride infusion 250 mL    dexamethasone (DECADRON) 4 mg/mL injection 4 mg    albuterol (PROVENTIL HFA, VENTOLIN HFA, PROAIR HFA) inhaler 2 Puff    VANCOMYCIN INFORMATION NOTE    calcium acetate(phosphat bind) (PHOSLO) capsule 1,334 mg    dextrose 5% infusion    sodium chloride (NS) flush 5-10 mL    levoFLOXacin (LEVAQUIN) 750 mg in D5W IVPB    allopurinoL (ZYLOPRIM) tablet 50 mg    famotidine (PEPCID) tablet 20 mg    metoprolol tartrate (LOPRESSOR) tablet 12.5 mg    sertraline (ZOLOFT) tablet 50 mg    insulin lispro (HUMALOG) injection    glucose chewable tablet 16 g    glucagon (GLUCAGEN) injection 1 mg    dextrose (D50W) injection syrg 12.5-25 g    mupirocin (BACTROBAN) 2 % ointment    ondansetron (ZOFRAN) injection 4 mg    acetaminophen (TYLENOL) tablet 650 mg    acetaminophen (TYLENOL) suppository 650 mg    hydrOXYzine pamoate (VISTARIL) capsule 25 mg      Patient PCP: Pili Soto MD  PMH:  has a past medical history of CRI (chronic renal insufficiency) (2012), Gout (2011), Hypertension, Lymphoma ([facility] and Prostate CA ([facility] (2011). PSH:   has a past surgical history that includes hx prostatectomy; hc bone marrow biopsy; endoscopy, colon, diagnostic (); and ir thoracentesis cath w image (12/15/2020). FHX: family history includes Hypertension in his mother. SHX:  reports that he has never smoked. He has never used smokeless tobacco. He reports previous drug use. He reports that he does not drink alcohol.      ROS:    Unable to obtain    Objective:     Vital Signs: Telemetry:    normal sinus rhythm Intake/Output:   Visit Vitals  BP (!) 153/90   Pulse (!) 117   Temp 97.8 °F (36.6 °C)   Resp 20   Ht 5' 5.98\" (1.676 m)   Wt 53.3 kg (117 lb 8.1 oz)   SpO2 91%   BMI 18.98 kg/m²       Temp (24hrs), Av.8 °F (36.6 °C), Min:97.8 °F (36.6 °C), Max:97.8 °F (36.6 °C)        O2 Device: Nasal cannula O2 Flow Rate (L/min): 3 l/min       Wt Readings from Last 4 Encounters:   21 53.3 kg (117 lb 8.1 oz)   20 60.8 kg (134 lb)   20 60.9 kg (134 lb 4.2 oz)   20 64.3 kg (141 lb 12.1 oz)          Intake/Output Summary (Last 24 hours) at 2021 0949  Last data filed at 2021 0610  Gross per 24 hour   Intake 100 ml   Output 1500 ml   Net -1400 ml       Last shift:      No intake/output data recorded. Last 3 shifts: 01/07 1901 - 01/09 0700  In: 475   Out: 3000 [Urine:3000]       Physical Exam:     Physical Exam   Constitutional: He appears distressed. HENT:   Head: Normocephalic and atraumatic. Eyes: Pupils are equal, round, and reactive to light. Conjunctivae and EOM are normal.   Neck: Normal range of motion. Neck supple. Cardiovascular: Normal rate and regular rhythm. Pulmonary/Chest: He is in respiratory distress. He has rales. Abdominal: Soft. Musculoskeletal: Normal range of motion. Neurological:   Patient is lethargic drowsy        Labs:    Recent Labs     01/08/21  1040 01/07/21  0835   WBC 17.0* 10.2   HGB 9.7* 8.5*    214     Recent Labs     01/08/21  1040 01/07/21  0835    146*   K 3.7 3.5   * 114*   CO2 23 25   * 205*   BUN 76* 79*   CREA 2.66* 2.60*   CA 10.6* 9.7     Recent Labs     01/06/21  1500   PH 7.44   PCO2 39   PO2 96   HCO3 27*     No results for input(s): CPK, CKNDX, TROIQ in the last 72 hours. No lab exists for component: CPKMB  No results found for: BNPP, BNP   Lab Results   Component Value Date/Time    Culture result: No significant growth, <10,000 CFU/mL 01/05/2021 08:00 PM    Culture result: No growth 3 days 01/05/2021 03:00 PM    Culture result: No growth 6 days 12/15/2020 08:33 PM     Lab Results   Component Value Date/Time    TSH 1.79 11/20/2020 01:31 PM       Imaging:    CXR Results  (Last 48 hours)    None        Results from Hospital Encounter encounter on 01/05/21   XR CHEST PORT    Narrative CHF. Comparison chest x-ray 1/5/2021. Impression FINDINGS: Impression: Frontal single view chest.    Unchanged cardiomediastinal silhouette. Calcified aorta. Mild vascular  congestion. No pulmonary edema. Mild hypoinflation. Unchanged bilateral lower lung atelectasis or infiltrates  and left lower lung consolidation. Unchanged small left pleural effusion. No  pneumothorax. No free air under the diaphragm.    XR CHEST PORT Narrative Chest single view. Comparison single view chest 12/28/2020    LLL posteromedial opacity now present. Findings would fit with any clinical  concern for pneumonia. Otherwise, no gross interstitial or alveolar pulmonary edema. Cardiac and  mediastinal structures are unchanged noting thoracic aorta atherosclerosis. No  pneumothorax. Probable small dependent left pleural effusion. Results from [place] encounter on 12/28/20   XR ABD (KUB)    Narrative Abdomen, 2 views, 12/28/2020    History PEG tube placement. Comparison: None. Findings: Radiographs are obtained before and after administration of 60 mL of  equal parts Gastrografin and water into the percutaneous feeding tube. Contrast  opacification of the stomach is distended. Contrast opacification of small  bowel loops is also seen. The bowel gas pattern is nonobstructive. Stool  volume is small to moderate. Surgical clips are seen at the pelvis. Degenerative changes are present in the spine. Impression Impression: Percutaneous feeding tube in the stomach. Nonobstructive bowel gas  pattern. Results from [place] encounter on 12/11/20   CT CHEST WO CONT    Narrative Comparison chest x-ray 12/11/2020 [facility] and chest CTA 11/25/2020 [facility]. TECHNIQUE: Axial imaging of the chest without IV contrast, with multiplanar  reformatting, MIPs. Dose reduction: All CT scans at this facility are performed using dose reduction  optimization techniques as appropriate to a performed exam including the  following: Automated exposure control, adjustments of the mA and/or kV according  to patient's size, or use of iterative reconstruction technique. FINDINGS: Left central catheter distal tip SVC. Mild cardiomegaly. Multivessel  coronary artery calcification. No pericardial effusion. Low-density intracardiac  blood suggests anemia. Calcified thoracic aorta without aneurysm. Pulmonary  arteries are not dilated.  No mediastinal or hilar lymphadenopathy. Tubular  structure posterior to the esophagus possibly dilated vessel. Bilateral pleural  effusions and lower lung consolidations without air bronchograms. The left lower  lung is completely consolidated/collapsed. The aerated lungs demonstrate patchy  areas of airspace disease. No axillary adenopathy. Included thyroid is  unremarkable. Included upper abdomen demonstrates lobulated, heterogeneous  structure spleen and small retroperitoneal lymphadenopathy; unchanged. Degenerative changes of the bony structures. Soft tissue anasarca. Impression IMPRESSION:  1. Bilateral pleural effusions and lower lung consolidations, the left lower  lung being completely collapsed, this is similar to previous. 2. Interval development patchy airspace disease in the aerated lungs consistent  with pneumonia. 3. Abnormal spleen, small retroperitoneal lymphadenopathy unchanged. 4. Atherosclerosis. IMPRESSION:   1. Acute hypoxic respiratory failure  2. COVID-19 pneumonia  3. Severe sepsis  4. Dysphagia PEG tube in place with leaking around the PEG site which is fixed  5. Hypernatremia  6. Bilateral pleural effusion with bilateral lower lobe atelectasis  7. Pt is requiring Drug therapy requiring intensive monitoring for toxicity  8. Pt is unstable, unpredictable needing inpatient monitoring; is acutely ill and at high risk of sudden decline and decompensation with severe consequenses and continued end organ dysfunction and failure  9. Prognosis guarded       RECOMMENDATIONS/PLAN:     1. Patient is on oxygen 3 L nasal cannula  2. Cannot give remdesivir as GFR is 25 cannot give Actemra patient has lymphoma and prostate cancer by history, continue with Decadron and and he is already on vancomycin and Levaquin because of MRSA  3. Patient is on IV fluid D5 because of mild hyponatremia  4. Intubate and place on vent if NIV fails  5.  Agree with Empiric IV antibiotics pending culture results 6. Follow culture results  7. Possible PEG tube malfunction  8. Supplemental O2 to keep sats > 93%  9. Aspiration precautions  10. Labs to follow electrolytes, renal function and and blood counts  11. Glucose monitoring and SSI  12. Bronchial hygiene with respiratory therapy techniques, bronchodilators  13.  DVT, SUP prophylaxis           Jael Grover MD

## 2021-01-09 NOTE — PROGRESS NOTES
Bedside and Verbal shift change report given to Herminio August RN (oncoming nurse) by Bee Lujan RN (offgoing nurse). Report included the following information SBAR and MAR.

## 2021-01-09 NOTE — PROGRESS NOTES
Hospitalist Progress Note    Subjective:   Daily Progress Note: 1/9/2021 4:26 PM    Hospital Course:  Pt admitted for dyspnea, cough, fatigue, lives at [address] [place] [address]. Pt found to be covid 19 positive and CXR showing LLL pneumonia with opacities.     Subjective: Pt seen in room , staff nurse reported small TF residual this morning,    Current Facility-Administered Medications   Medication Dose Route Frequency    guaiFENesin-dextromethorphan (TUSSI-ORGANIDIN DM)  mg/5 mL oral solution 10 mL  10 mL Per G Tube Q4H PRN    albuterol (PROVENTIL HFA, VENTOLIN HFA, PROAIR HFA) inhaler 2 Puff  2 Puff Inhalation Q4H RT    ascorbic acid (vitamin C) (VITAMIN C) tablet 500 mg  500 mg Per G Tube DAILY    ferrous sulfate tablet 325 mg  1 Tab Oral DAILY WITH BREAKFAST    heparin (porcine) injection 5,000 Units  5,000 Units SubCUTAneous Q12H    0.9% sodium chloride infusion 250 mL  250 mL IntraVENous PRN    dexamethasone (DECADRON) 4 mg/mL injection 4 mg  4 mg IntraVENous Q6H    albuterol (PROVENTIL HFA, VENTOLIN HFA, PROAIR HFA) inhaler 2 Puff  2 Puff Inhalation Q4H PRN    VANCOMYCIN INFORMATION NOTE   Other PRN    calcium acetate(phosphat bind) (PHOSLO) capsule 1,334 mg  2 Cap Oral TID WITH MEALS    dextrose 5% infusion  75 mL/hr IntraVENous CONTINUOUS    sodium chloride (NS) flush 5-10 mL  5-10 mL IntraVENous PRN    levoFLOXacin (LEVAQUIN) 750 mg in D5W IVPB  750 mg IntraVENous Q48H    allopurinoL (ZYLOPRIM) tablet 50 mg  50 mg Oral EVERY OTHER DAY    famotidine (PEPCID) tablet 20 mg  20 mg Per G Tube DAILY    metoprolol tartrate (LOPRESSOR) tablet 12.5 mg  12.5 mg Oral Q12H    sertraline (ZOLOFT) tablet 50 mg  50 mg Per G Tube QPM    insulin lispro (HUMALOG) injection   SubCUTAneous AC&HS    glucose chewable tablet 16 g  4 Tab Oral PRN    glucagon (GLUCAGEN) injection 1 mg  1 mg IntraMUSCular PRN    dextrose (D50W) injection syrg 12.5-25 g  25-50 mL IntraVENous PRN    mupirocin (BACTROBAN) 2 % ointment Both Nostrils DAILY    ondansetron (ZOFRAN) injection 4 mg  4 mg IntraVENous Q6H PRN    acetaminophen (TYLENOL) tablet 650 mg  650 mg Per G Tube Q4H PRN    acetaminophen (TYLENOL) suppository 650 mg  650 mg Rectal Q4H PRN    hydrOXYzine pamoate (VISTARIL) capsule 25 mg  25 mg Per G Tube TID PRN        Review of Systems:    Review of Systems   Constitutional: Negative for fever. HENT: Positive for congestion. Negative for sore throat. Respiratory: Positive for cough. Negative for shortness of breath. Cardiovascular: Negative for chest pain. Gastrointestinal: Negative for abdominal pain, heartburn and vomiting. Genitourinary: Negative for dysuria and urgency. Neurological: Negative for dizziness and headaches. Objective:     Visit Vitals  /80   Pulse 90   Temp 97.3 °F (36.3 °C)   Resp 22   Ht 5' 5.98\" (1.676 m)   Wt 53.3 kg (117 lb 8.1 oz)   SpO2 92%   BMI 18.98 kg/m²    O2 Flow Rate (L/min): 3 l/min O2 Device: Nasal cannula    Temp (24hrs), Av.6 °F (36.4 °C), Min:97.3 °F (36.3 °C), Max:97.8 °F (36.6 °C)      701 -  1900  In: 100   Out: -    190 -  0700  In: 475   Out: 3000 [Urine:3000]    PHYSICAL EXAM:    Physical Exam   Constitutional: He appears cachectic. Cardiovascular: Normal rate, regular rhythm and intact distal pulses. Murmur heard. Pulmonary/Chest: Effort normal. He has rhonchi in the right lower field and the left lower field. Abdominal: Soft. Bowel sounds are normal.   PEG tube intact   Genitourinary:    Genitourinary Comments: Incontinent of urine, scrotum with excoriation     Musculoskeletal:      Comments: Generalized weakness, decreased ROM in legs   Neurological: He is alert. Answers simple questions, some confusion to time and events. Skin: Skin is warm and dry. Psychiatric: His behavior is normal. He exhibits a depressed mood.           Data Review    Recent Results (from the past 24 hour(s))   GLUCOSE, POC    Collection Time: 01/08/21  5:51 PM   Result Value Ref Range    Glucose (POC) 110 (H) 65 - 100 mg/dL    Performed by Leonora Needle, RANDOM    Collection Time: 01/08/21  8:17 PM   Result Value Ref Range    Vancomycin, random 24.5 ug/mL    Reported dose date Not provided      Reported dose: Not provided Units   GLUCOSE, POC    Collection Time: 01/08/21  8:52 PM   Result Value Ref Range    Glucose (POC) 147 (H) 65 - 100 mg/dL    Performed by Swetha Monreal    GLUCOSE, POC    Collection Time: 01/09/21  8:54 AM   Result Value Ref Range    Glucose (POC) 265 (H) 65 - 100 mg/dL    Performed by Sharmila Heart        XR CHEST PORT   Final Result   FINDINGS: Impression: Frontal single view chest.      Unchanged cardiomediastinal silhouette. Calcified aorta. Mild vascular   congestion. No pulmonary edema. Mild hypoinflation. Unchanged bilateral lower lung atelectasis or infiltrates   and left lower lung consolidation. Unchanged small left pleural effusion. No   pneumothorax. No free air under the diaphragm. XR CHEST PORT   Final Result          Active Problems:    Pneumonia (1/5/2021)        Assessment/Plan:     1.  Sepsis secondary to  LLL/covid 19  pneumonia-  IV levaquin, O2 as needed, keep O2 saturation above 90%, IV vancomycin, pharmacy to dose,  repeat LA, procalcitonin in am.  BC negative, URC less than 10,000 colonies, not significant     2.  Hypertension- on metoprolol,      3. Persistent dysphagia- PEG tube,  TF and water flushes per nutrition recs,  PEG tube working properly, hold for residual >250 ml, notify provider     4. Mild hypernatremia- NA trending down, repeat bmp in am.     5. BENJI on CKD stage 3-  repeat bmp in am.      6. Acute anemia- HGB 9.7 , repeat cbc in am, start fe supplements, vitamin C  Check stool for occult blood.      7.  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              DVT Prophylaxis: heparin sq  Code Status:  DNR  POA: VON Nav[place], [facility] discussed with:   ___staff nurse, patient____________________________________________________________    Jarvis Hurt NP

## 2021-01-09 NOTE — PROGRESS NOTES
Vancomycin Update:01/08/21  Vanc Random resulted today at 24.5 after receiving 1000mg of Vanc yesterday. Will hold the Vancomycin today. Scheduled Random tomorrow AM. Pt will be given Vancomycin when the level will is below 20. Thank you.

## 2021-01-09 NOTE — PROGRESS NOTES
Orders received and acknowledged. Evaluation attempted at [id number]. Pt refused despite max encouragement to sit EOB. Communication difficult as pt has poor enunciation for verbal communication. Pt oriented to his name only. Attempted to assess home environment without success. Briefly able to assess LE strength, pt able to wiggle toes but unable to fully lift LEs off bed. Pt declined repositioning in bed for pressure ulcer prevention. Will continue to follow and attempt evaluation again as time allows. Thank you.

## 2021-01-09 NOTE — PROGRESS NOTES
Problem: Nutrition Deficit  Goal: *Optimize nutritional status  Outcome: Progressing Towards Goal     Problem: Falls - Risk of  Goal: *Absence of Falls  Description: Document Yolis Stover Fall Risk and appropriate interventions in the flowsheet. Outcome: Progressing Towards Goal  Note: Fall Risk Interventions:  Mobility Interventions: Patient to call before getting OOB, PT Consult for mobility concerns, PT Consult for assist device competence    Mentation Interventions: Adequate sleep, hydration, pain control, Bed/chair exit alarm, Increase mobility, Reorient patient    Medication Interventions: Evaluate medications/consider consulting pharmacy, Patient to call before getting OOB, Teach patient to arise slowly    Elimination Interventions: Call light in reach, Patient to call for help with toileting needs, Stay With Me (per policy)              Problem: Patient Education: Go to Patient Education Activity  Goal: Patient/Family Education  Outcome: Progressing Towards Goal     Problem: Pressure Injury - Risk of  Goal: *Prevention of pressure injury  Description: Document Daniel Scale and appropriate interventions in the flowsheet.   Outcome: Progressing Towards Goal  Note: Pressure Injury Interventions:  Sensory Interventions: Assess changes in LOC, Assess need for specialty bed, Avoid rigorous massage over bony prominences, Float heels, Keep linens dry and wrinkle-free, Maintain/enhance activity level, Minimize linen layers, Monitor skin under medical devices    Moisture Interventions: Internal/External urinary devices, Maintain skin hydration (lotion/cream), Minimize layers    Activity Interventions: Increase time out of bed, Pressure redistribution bed/mattress(bed type), PT/OT evaluation    Mobility Interventions: HOB 30 degrees or less, Pressure redistribution bed/mattress (bed type), PT/OT evaluation    Nutrition Interventions: Document food/fluid/supplement intake    Friction and Shear Interventions: Lift sheet, Lift team/patient mobility team, Minimize layers                Problem: Patient Education: Go to Patient Education Activity  Goal: Patient/Family Education  Outcome: Progressing Towards Goal     Problem: Risk for Spread of Infection  Goal: Prevent transmission of infectious organism to others  Description: Prevent the transmission of infectious organisms to other patients, staff members, and visitors.   Outcome: Progressing Towards Goal     Problem: Patient Education:  Go to Education Activity  Goal: Patient/Family Education  Outcome: Progressing Towards Goal

## 2021-01-10 LAB
ANION GAP SERPL CALC-SCNC: 10 MMOL/L (ref 5–15)
BUN SERPL-MCNC: 73 MG/DL (ref 6–20)
BUN/CREAT SERPL: 32 (ref 12–20)
CA-I BLD-MCNC: 11 MG/DL (ref 8.5–10.1)
CHLORIDE SERPL-SCNC: 102 MMOL/L (ref 97–108)
CO2 SERPL-SCNC: 26 MMOL/L (ref 21–32)
CREAT SERPL-MCNC: 2.26 MG/DL (ref 0.7–1.3)
DATE LAST DOSE: NORMAL
ERYTHROCYTE [DISTWIDTH] IN BLOOD BY AUTOMATED COUNT: 17.5 % (ref 11.5–14.5)
GLUCOSE BLD STRIP.AUTO-MCNC: 143 MG/DL (ref 65–100)
GLUCOSE BLD STRIP.AUTO-MCNC: 144 MG/DL (ref 65–100)
GLUCOSE BLD STRIP.AUTO-MCNC: 220 MG/DL (ref 65–100)
GLUCOSE SERPL-MCNC: 113 MG/DL (ref 65–100)
HCT VFR BLD AUTO: 28.8 % (ref 36.6–50.3)
HGB BLD-MCNC: 9.7 G/DL (ref 12.1–17)
MCH RBC QN AUTO: 29.6 PG (ref 26–34)
MCHC RBC AUTO-ENTMCNC: 33.7 G/DL (ref 30–36.5)
MCV RBC AUTO: 87.8 FL (ref 80–99)
PERFORMED BY, TECHID: ABNORMAL
PLATELET # BLD AUTO: 282 K/UL (ref 150–400)
PMV BLD AUTO: 11.2 FL (ref 8.9–12.9)
POTASSIUM SERPL-SCNC: 3.6 MMOL/L (ref 3.5–5.1)
RBC # BLD AUTO: 3.28 M/UL (ref 4.1–5.7)
REPORTED DOSE,DOSE: NORMAL UNITS
SODIUM SERPL-SCNC: 138 MMOL/L (ref 136–145)
VANCOMYCIN SERPL-MCNC: 18.4 UG/ML
WBC # BLD AUTO: 18.6 K/UL (ref 4.1–11.1)

## 2021-01-10 PROCEDURE — 74011250636 HC RX REV CODE- 250/636: Performed by: NURSE PRACTITIONER

## 2021-01-10 PROCEDURE — 74011636637 HC RX REV CODE- 636/637: Performed by: NURSE PRACTITIONER

## 2021-01-10 PROCEDURE — 65270000029 HC RM PRIVATE

## 2021-01-10 PROCEDURE — 82962 GLUCOSE BLOOD TEST: CPT

## 2021-01-10 PROCEDURE — 85027 COMPLETE CBC AUTOMATED: CPT

## 2021-01-10 PROCEDURE — 94640 AIRWAY INHALATION TREATMENT: CPT

## 2021-01-10 PROCEDURE — 80048 BASIC METABOLIC PNL TOTAL CA: CPT

## 2021-01-10 PROCEDURE — 97165 OT EVAL LOW COMPLEX 30 MIN: CPT

## 2021-01-10 PROCEDURE — 97110 THERAPEUTIC EXERCISES: CPT

## 2021-01-10 PROCEDURE — 74011000258 HC RX REV CODE- 258: Performed by: HOSPITALIST

## 2021-01-10 PROCEDURE — 77010033678 HC OXYGEN DAILY

## 2021-01-10 PROCEDURE — 97530 THERAPEUTIC ACTIVITIES: CPT

## 2021-01-10 PROCEDURE — 74011250636 HC RX REV CODE- 250/636: Performed by: HOSPITALIST

## 2021-01-10 PROCEDURE — 36415 COLL VENOUS BLD VENIPUNCTURE: CPT

## 2021-01-10 PROCEDURE — 74011250637 HC RX REV CODE- 250/637: Performed by: NURSE PRACTITIONER

## 2021-01-10 PROCEDURE — 80202 ASSAY OF VANCOMYCIN: CPT

## 2021-01-10 PROCEDURE — 97161 PT EVAL LOW COMPLEX 20 MIN: CPT

## 2021-01-10 RX ORDER — FUROSEMIDE 10 MG/ML
40 INJECTION INTRAMUSCULAR; INTRAVENOUS DAILY
Status: DISCONTINUED | OUTPATIENT
Start: 2021-01-10 | End: 2021-01-11

## 2021-01-10 RX ORDER — DEXTROMETHORPHAN POLISTIREX 30 MG/5ML
30 SUSPENSION ORAL EVERY 12 HOURS
Status: DISCONTINUED | OUTPATIENT
Start: 2021-01-10 | End: 2021-01-17 | Stop reason: HOSPADM

## 2021-01-10 RX ORDER — SERTRALINE HYDROCHLORIDE 50 MG/1
100 TABLET, FILM COATED ORAL EVERY EVENING
Status: DISCONTINUED | OUTPATIENT
Start: 2021-01-10 | End: 2021-01-17 | Stop reason: HOSPADM

## 2021-01-10 RX ADMIN — INSULIN LISPRO 2 UNITS: 100 INJECTION, SOLUTION INTRAVENOUS; SUBCUTANEOUS at 11:30

## 2021-01-10 RX ADMIN — FAMOTIDINE 20 MG: 20 TABLET, FILM COATED ORAL at 09:00

## 2021-01-10 RX ADMIN — ALBUTEROL SULFATE 2 PUFF: 90 AEROSOL, METERED RESPIRATORY (INHALATION) at 11:14

## 2021-01-10 RX ADMIN — CALCIUM ACETATE 1334 MG: 667 CAPSULE ORAL at 17:22

## 2021-01-10 RX ADMIN — CALCIUM ACETATE 1334 MG: 667 CAPSULE ORAL at 09:07

## 2021-01-10 RX ADMIN — DEXTROMETHORPHAN 30 MG: 30 SUSPENSION, EXTENDED RELEASE ORAL at 22:44

## 2021-01-10 RX ADMIN — DEXAMETHASONE SODIUM PHOSPHATE 4 MG: 4 INJECTION, SOLUTION INTRA-ARTICULAR; INTRALESIONAL; INTRAMUSCULAR; INTRAVENOUS; SOFT TISSUE at 22:46

## 2021-01-10 RX ADMIN — DEXAMETHASONE SODIUM PHOSPHATE 4 MG: 4 INJECTION, SOLUTION INTRA-ARTICULAR; INTRALESIONAL; INTRAMUSCULAR; INTRAVENOUS; SOFT TISSUE at 17:19

## 2021-01-10 RX ADMIN — ALBUTEROL SULFATE 2 PUFF: 90 AEROSOL, METERED RESPIRATORY (INHALATION) at 20:34

## 2021-01-10 RX ADMIN — DEXAMETHASONE SODIUM PHOSPHATE 4 MG: 4 INJECTION, SOLUTION INTRA-ARTICULAR; INTRALESIONAL; INTRAMUSCULAR; INTRAVENOUS; SOFT TISSUE at 00:22

## 2021-01-10 RX ADMIN — DEXAMETHASONE SODIUM PHOSPHATE 4 MG: 4 INJECTION, SOLUTION INTRA-ARTICULAR; INTRALESIONAL; INTRAMUSCULAR; INTRAVENOUS; SOFT TISSUE at 05:21

## 2021-01-10 RX ADMIN — HEPARIN SODIUM 5000 UNITS: 5000 INJECTION INTRAVENOUS; SUBCUTANEOUS at 05:21

## 2021-01-10 RX ADMIN — OXYCODONE HYDROCHLORIDE AND ACETAMINOPHEN 500 MG: 500 TABLET ORAL at 09:07

## 2021-01-10 RX ADMIN — CALCIUM ACETATE 1334 MG: 667 CAPSULE ORAL at 11:49

## 2021-01-10 RX ADMIN — INSULIN LISPRO 4 UNITS: 100 INJECTION, SOLUTION INTRAVENOUS; SUBCUTANEOUS at 07:30

## 2021-01-10 RX ADMIN — DEXAMETHASONE SODIUM PHOSPHATE 4 MG: 4 INJECTION, SOLUTION INTRA-ARTICULAR; INTRALESIONAL; INTRAMUSCULAR; INTRAVENOUS; SOFT TISSUE at 11:49

## 2021-01-10 RX ADMIN — FERROUS SULFATE TAB 325 MG (65 MG ELEMENTAL FE) 325 MG: 325 (65 FE) TAB at 09:07

## 2021-01-10 RX ADMIN — FUROSEMIDE 40 MG: 10 INJECTION, SOLUTION INTRAMUSCULAR; INTRAVENOUS at 18:00

## 2021-01-10 RX ADMIN — ALBUTEROL SULFATE 2 PUFF: 90 AEROSOL, METERED RESPIRATORY (INHALATION) at 08:12

## 2021-01-10 RX ADMIN — VANCOMYCIN HYDROCHLORIDE 500 MG: 500 INJECTION, POWDER, LYOPHILIZED, FOR SOLUTION INTRAVENOUS at 18:20

## 2021-01-10 RX ADMIN — METOPROLOL TARTRATE 25 MG: 25 TABLET, FILM COATED ORAL at 22:44

## 2021-01-10 RX ADMIN — ALBUTEROL SULFATE 2 PUFF: 90 AEROSOL, METERED RESPIRATORY (INHALATION) at 00:04

## 2021-01-10 RX ADMIN — ALBUTEROL SULFATE 2 PUFF: 90 AEROSOL, METERED RESPIRATORY (INHALATION) at 23:51

## 2021-01-10 RX ADMIN — METOPROLOL TARTRATE 25 MG: 25 TABLET, FILM COATED ORAL at 09:07

## 2021-01-10 RX ADMIN — SERTRALINE HYDROCHLORIDE 100 MG: 50 TABLET ORAL at 17:27

## 2021-01-10 RX ADMIN — HEPARIN SODIUM 5000 UNITS: 5000 INJECTION INTRAVENOUS; SUBCUTANEOUS at 17:18

## 2021-01-10 RX ADMIN — ALBUTEROL SULFATE 2 PUFF: 90 AEROSOL, METERED RESPIRATORY (INHALATION) at 03:46

## 2021-01-10 NOTE — PROGRESS NOTES
PT evaluation completed ; patient is a poor historian and was not able to  Follow thru with given cues to participate actively with assesment ; patient was dependent with bed mobility and was not able to hold sitting position at this time ; PLOF was not established from patient as he was living at a SNF according to recent notes; patient will be recommended skilled PT services while in hospital to attain improvement in mobility and transfer skills; pt is scheduled to be DC to [facility] per

## 2021-01-10 NOTE — PROGRESS NOTES
Hospitalist Progress Note    Subjective:   Daily Progress Note: 1/10/2021 4:39 PM    Hospital Course:  Pt admitted for covid 19 infection, cough and shortness of breath, LLL pneumonia with opacities.     Subjective: Pt seen in room, tolerating TF, no residual, on higher supplemental oxygen,  At 5 L.     Current Facility-Administered Medications   Medication Dose Route Frequency   • guaiFENesin-dextromethorphan (TUSSI-ORGANIDIN DM)  mg/5 mL oral solution 10 mL  10 mL Per G Tube Q4H PRN   • albuterol (PROVENTIL HFA, VENTOLIN HFA, PROAIR HFA) inhaler 2 Puff  2 Puff Inhalation Q4H RT   • metoprolol tartrate (LOPRESSOR) tablet 25 mg  25 mg Per G Tube Q12H   • ascorbic acid (vitamin C) (VITAMIN C) tablet 500 mg  500 mg Per G Tube DAILY   • ferrous sulfate tablet 325 mg  1 Tab Oral DAILY WITH BREAKFAST   • heparin (porcine) injection 5,000 Units  5,000 Units SubCUTAneous Q12H   • 0.9% sodium chloride infusion 250 mL  250 mL IntraVENous PRN   • dexamethasone (DECADRON) 4 mg/mL injection 4 mg  4 mg IntraVENous Q6H   • albuterol (PROVENTIL HFA, VENTOLIN HFA, PROAIR HFA) inhaler 2 Puff  2 Puff Inhalation Q4H PRN   • VANCOMYCIN INFORMATION NOTE   Other PRN   • calcium acetate(phosphat bind) (PHOSLO) capsule 1,334 mg  2 Cap Oral TID WITH MEALS   • dextrose 5% infusion  20 mL/hr IntraVENous CONTINUOUS   • sodium chloride (NS) flush 5-10 mL  5-10 mL IntraVENous PRN   • levoFLOXacin (LEVAQUIN) 750 mg in D5W IVPB  750 mg IntraVENous Q48H   • allopurinoL (ZYLOPRIM) tablet 50 mg  50 mg Oral EVERY OTHER DAY   • famotidine (PEPCID) tablet 20 mg  20 mg Per G Tube DAILY   • sertraline (ZOLOFT) tablet 50 mg  50 mg Per G Tube QPM   • insulin lispro (HUMALOG) injection   SubCUTAneous AC&HS   • glucose chewable tablet 16 g  4 Tab Oral PRN   • glucagon (GLUCAGEN) injection 1 mg  1 mg IntraMUSCular PRN   • dextrose (D50W) injection syrg 12.5-25 g  25-50 mL IntraVENous PRN   • ondansetron (ZOFRAN) injection 4 mg  4 mg IntraVENous Q6H PRN   acetaminophen (TYLENOL) tablet 650 mg  650 mg Per G Tube Q4H PRN    acetaminophen (TYLENOL) suppository 650 mg  650 mg Rectal Q4H PRN    hydrOXYzine pamoate (VISTARIL) capsule 25 mg  25 mg Per G Tube TID PRN        Review of Systems:    Review of Systems   Constitutional: Negative for chills and fever. HENT: Negative for congestion and sore throat. Respiratory: Positive for cough. Cardiovascular: Negative for chest pain and palpitations. Gastrointestinal: Negative for heartburn and nausea. Neurological: Negative for headaches. Objective:     Visit Vitals  BP (!) 119/49 (BP 1 Location: Left arm, BP Patient Position: At rest)   Pulse (!) 106   Temp 97.4 °F (36.3 °C)   Resp 22   Ht 5' 5.98\" (1.676 m)   Wt 52.2 kg (115 lb 1.3 oz)   SpO2 93%   BMI 18.58 kg/m²    O2 Flow Rate (L/min): 5 l/min O2 Device: Nasal cannula    Temp (24hrs), Av.6 °F (36.4 °C), Min:97.4 °F (36.3 °C), Max:97.8 °F (36.6 °C)      No intake/output data recorded.  1901 - 01/10 0700  In: 2195 [I.V.:920]  Out: 1500 [Urine:1500]    PHYSICAL EXAM:    Physical Exam   Constitutional: He appears cachectic. Cardiovascular: Normal rate, regular rhythm and intact distal pulses. Murmur heard. Pulmonary/Chest: Effort normal. He has rhonchi in the right lower field and the left lower field. Abdominal: Soft. Bowel sounds are normal.   PEG tube intact   Genitourinary:    Genitourinary Comments: Incontinent of urine, scrotum with excoriation    Musculoskeletal:      Comments: Generalized weakness, decreased ROM in legs   Neurological: He is alert. Answers simple questions, some confusion to time and events. Skin: Skin is warm and dry.      Data Review    Recent Results (from the past 24 hour(s))   GLUCOSE, POC    Collection Time: 01/10/21  8:07 AM   Result Value Ref Range    Glucose (POC) 220 (H) 65 - 100 mg/dL    Performed by [facility]    CBC W/O DIFF    Collection Time: 01/10/21 11:50 AM   Result Value Ref Range WBC 18.6 (H) 4.1 - 11.1 K/uL    RBC 3.28 (L) 4.10 - 5.70 M/uL    HGB 9.7 (L) 12.1 - 17.0 g/dL    HCT 28.8 (L) 36.6 - 50.3 %    MCV 87.8 80.0 - 99.0 FL    MCH 29.6 26.0 - 34.0 PG    MCHC 33.7 30.0 - 36.5 g/dL    RDW 17.5 (H) 11.5 - 14.5 %    PLATELET 578 528 - 194 K/uL    MPV 11.2 8.9 - 81.4 FL   METABOLIC PANEL, BASIC    Collection Time: 01/10/21 11:50 AM   Result Value Ref Range    Sodium 138 136 - 145 mmol/L    Potassium 3.6 3.5 - 5.1 mmol/L    Chloride 102 97 - 108 mmol/L    CO2 26 21 - 32 mmol/L    Anion gap 10 5 - 15 mmol/L    Glucose 113 (H) 65 - 100 mg/dL    BUN 73 (H) 6 - 20 mg/dL    Creatinine 2.26 (H) 0.70 - 1.30 mg/dL    BUN/Creatinine ratio 32 (H) 12 - 20      GFR est AA 33 (L) >60 ml/min/1.73m2    GFR est non-AA 28 (L) >60 ml/min/1.73m2    Calcium 11.0 (H) 8.5 - 10.1 mg/dL   VANCOMYCIN, RANDOM    Collection Time: 01/10/21 11:50 AM   Result Value Ref Range    Vancomycin, random 18.4 ug/mL    Reported dose date Not provided      Reported dose: Not provided Units       XR CHEST PORT   Final Result   FINDINGS: Impression: Frontal single view chest.      Unchanged cardiomediastinal silhouette. Calcified aorta. Mild vascular   congestion. No pulmonary edema. Mild hypoinflation. Unchanged bilateral lower lung atelectasis or infiltrates   and left lower lung consolidation. Unchanged small left pleural effusion. No   pneumothorax. No free air under the diaphragm. XR CHEST PORT   Final Result          Active Problems:    Pneumonia (1/5/2021)        Assessment/Plan:     1.  Sepsis secondary to  LLL/covid 19  pneumonia-  IV levaquin, O2 as needed, keep O2 saturation above 90%, IV vancomycin, pharmacy to dose,    BC negative, URC less than 10,000 colonies, not significant.     2.  Hypertension- on metoprolol, controlled.      3. Persistent dysphagia- PEG tube,  TF 5x /day, increase water flushes to 240 ml x5 /day. PEG tube working properly, hold for residual >250 ml, notify provider     4.  Mild hypernatremia- resolved, d/c IV fluids     5. BENJI on CKD stage 3-  creatinine 2.26.      6. Acute anemia- HGB 9.7, continue fe supplements per PEG tube.    Check stool for occult blood.               DVT Prophylaxis: heparin sq  Code Status:  DNLAUREN Chapman, [address] [place] [address] discussed with:   _____patient, staff nurse, pts' daughter via phone conversation ________________________________________________________    Trace Nicole NP

## 2021-01-10 NOTE — PROGRESS NOTES
PHYSICAL THERAPY EVALUATION  Patient: Delroy Rangel (49 y.o. male)  Date: 1/10/2021  Primary Diagnosis: Pneumonia [J18.9]        Precautions: HIGH Fall risk     ASSESSMENT  Based on the objective data described below, the patient presents with ongoing debility ; poor activity facilitation - poor follow thru with given instructions ; poor historian to PLOF ; no written record available to what patient was able to follow thru or given activities at SNF ; patient was bedfast and was answering to questions but not appropriately - confused ; AAROM provided on B LE hips knees and ankle ms grps ; dependent with bed mobility task into sitting     Current Level of Function Impacting Discharge (mobility/balance): dependent on care and was not able to facilitate sitting due to pain - face reaction upon initiating sitting at bedside     Functional Outcome Measure: The patent score low on fucntional measure     Other factors to consider for discharge: confusion      Patient will benefit from skilled therapy intervention to address the above noted impairments. PLAN :  Recommendations and Planned Interventions: bed mobility training and therapeutic exercises      Frequency/Duration: Patient will be followed by physical therapy:  3 times a week to address goals. Recommendation for discharge: (in order for the patient to meet his/her long term goals)  SNF     This discharge recommendation:  A follow-up discussion with the attending provider and/or case management is planned    IF patient discharges home will need the following DME: wheelchair         SUBJECTIVE:   Patient stated NA - uncomprehensible words uttered.     OBJECTIVE DATA SUMMARY:   HISTORY:    Past Medical History:   Diagnosis Date    CRI (chronic renal insufficiency) 12/13/2012    Gout 1/4/2011    Hypertension     [facility] [address]     Prostate CA ([facility] [address] 1/4/2011     Past Surgical History:   Procedure Laterality Date    ENDOSCOPY, COLON, DIAGNOSTIC  2003 neg    HC BONE MARROW BIOPSY      HX PROSTATECTOMY      IR THORACENTESIS CATH W IMAGE  12/15/2020       Personal factors and/or comorbidities impacting plan of care:     Home Situation  Home Environment: [address] [place] [address] Name: [facility]  Living Alone: No  Support Systems: Skilled nursing facility  Patient Expects to be Discharged to[de-identified] Skilled nursing facility    PLOF: Pt Dependent  for ADLS/IADLS, no record of PLOF with mobility prior to admission. Patient is poor historian ; [facility]     EXAMINATION/PRESENTATION/DECISION MAKING:   Critical Behavior:  Neurologic State: Confused  Orientation Level: Disoriented to person, Disoriented to place, Disoriented to situation  Cognition: Poor safety awareness  Safety/Judgement: Lack of insight into deficits, Decreased awareness of need for assistance  Hearing:     Skin:  intact on B LE   Edema: no pedal edema palpated  Range Of Motion:   WNL done passively on B hips knees and ankle joints                        Strength:    Strength: Grossly decreased, non-functional   Grossly graded at 2/5 on B hips knees and ankle ms grps                 Tone & Sensation:          Hypotonic on LE ms grps                         Coordination: NT     Vision: NA     Functional Mobility:  Bed Mobility:  Rolling: Total assistance  Supine to Sit: Total assistance  Sit to Supine: Total assistance  Scooting: Total assistance  Transfers:  Sit to Stand: Total assistance;Assist x2  Stand to Sit: Total assistance;Assist x2  Stand Pivot Transfers:  Total assistance;Assist x2     Bed to Chair: Total assistance;Assist x2              Balance:   Sitting: With support  Standing: Impaired  Standing - Dynamic : Not tested  Ambulation/Gait Training:              Gait Description (WDL): (NT)     Right Side Weight Bearing: As tolerated  Left Side Weight Bearing: As tolerated      NT at this time                            Stairs:      NT at this time         Therapeutic Exercises:   AROME on B hip knee and ankle ms grps x 10 reps ; sitting activity ; bed mobility activities     Functional Measure:    [facility] [address] [address] AM-PAC 6 Clicks         Basic Mobility Inpatient Short Form  How much difficulty does the patient currently have. .. Unable A Lot A Little None   1. Turning over in bed (including adjusting bedclothes, sheets and blankets)? [] 1   [x] 2   [] 3   [] 4   2. Sitting down on and standing up from a chair with arms ( e.g., wheelchair, bedside commode, etc.)   [] 1   [x] 2   [] 3   [] 4   3. Moving from lying on back to sitting on the side of the bed? [] 1   [x] 2   [] 3   [] 4          How much help from another person does the patient currently need. .. Total A Lot A Little None   4. Moving to and from a bed to a chair (including a wheelchair)? [x] 1   [] 2   [] 3   [] 4   5. Need to walk in hospital room? [x] 1   [] 2   [] 3   [] 4   6. Climbing 3-5 steps with a railing? [x] 1   [] 2   [] 3   [] 4   © 2007, Trustees of [facility] [address] [address], under license to [employer]. All rights reserved     Score:  Initial: CM  Most Recent: CM -1.10.21   Interpretation of Tool:  Represents activities that are increasingly more difficult (i.e. Bed mobility, Transfers, Gait).   Score 24 23 22-20 19-15 14-10 9-7 6   Modifier CH CI CJ CK CL CM CN          Physical Therapy Evaluation Charge Determination   History Examination Presentation Decision-Making   LOW Complexity : Zero comorbidities / personal factors that will impact the outcome / POC LOW Complexity : 1-2 Standardized tests and measures addressing body structure, function, activity limitation and / or participation in recreation  LOW Complexity : Stable, uncomplicated  Other Functional Measure AMPAC 6 CM       Based on the above components, the patient evaluation is determined to be of the following complexity level: LOW     Pain Rating:  Non stated; patient winces face upon mobility into sitting     Activity Tolerance:   Poor  Please refer to the flowsheet for vital signs taken during this treatment. After treatment patient left in no apparent distress:   Supine in bed and Bed / chair alarm activated    COMMUNICATION/EDUCATION:     Pt will be MIN ASSISTED with LE HEP in 7 days. Pt will perform bed mobility with MIN A  in 7 days. Pt will perform transfers with MIN A x 1 person in 7 days. The patients plan of care was discussed with: Physical therapist.     Patient is unable to participate in goal setting and plan of care.     Thank you for this referral.  Alverto Mckeon   Time Calculation: 19 mins

## 2021-01-10 NOTE — PROGRESS NOTES
Consult for Vancomycin Dosing by Pharmacy by Dr. Nicanor Leonard provided for this 80y.o. year old male , for indication of sepsis. Day of Therapy: 4  Goal of Level(s): 15-20mcg/dL    Other Current Antibiotics: Levaquin    Serum Creatinine Creatinine   Date Value Ref Range Status   01/10/2021 2.26 (H) 0.70 - 1.30 mg/dL Final   01/08/2021 2.66 (H) 0.70 - 1.30 mg/dL Final   01/07/2021 2.60 (H) 0.70 - 1.30 mg/dL Final      Creatinine Clearance Estimated Creatinine Clearance: 16.7 mL/min (A) (based on SCr of 2.26 mg/dL (H)). BUN Lab Results   Component Value Date/Time    BUN 73 (H) 01/10/2021 11:50 AM      WBC Lab Results   Component Value Date/Time    WBC 18.6 (H) 01/10/2021 11:50 AM      Temp 97.9 °F (36.6 °C)     Last Level:    Vancomycin, random   Date Value Ref Range Status   01/10/2021 18.4 ug/mL Final     Comment:     No reference range has been established. Ht Readings from Last 1 Encounters:   01/06/21 167.6 cm (65.98\")        Wt Readings from Last 1 Encounters:   01/10/21 52.2 kg (115 lb 1.3 oz)     Ideal body weight: 63.8 kg (140 lb 9.2 oz)     New Regimen:   Start Vancomycin therapy, 500 (mg) IV today. Patient has BENJI on CKD 3. Pharmacy ordered a random level tomorrow morning. Pharmacy to follow daily and will make changes to dose and/or frequency based on clinical status.     _________________________________     Pharmacist Maliha Timmons

## 2021-01-10 NOTE — CONSULTS
PULMONARY NOTE  [facility]    Name: Dianna Campos MRN: [id number]   : 1932 Hospital: [facility]   Date: 1/10/2021  Admission date: 2021 Hospital Day: 6       HPI:     Hospital Problems  Date Reviewed: 2021          Codes Class Noted POA    Pneumonia ICD-10-CM: J18.9  ICD-9-CM: 068  2021 Unknown                   [x] High complexity decision making was performed  [x] See my orders for details      Subjective/Initial History:     I was asked by Floyd James MD to see Dianna Campos  a 80 y.o.  male in consultation     Excerpts from admission 2021 or consult notes as follows:   80-year-old male came in because of shortness of breath dyspnea generalized weakness initial Covid rapid test was negative he has significant past medical history of chronic kidney disease gout hypertension dysphagia had a PEG tube in place history of lymphoma chest x-ray done which shows left lower lobe infiltrate followed by CAT scan which shows bilateral pleural effusion and atelectasis but now his COVID-19 came back positive and also MRSA and he is disheveled unable to get much history out of the patient on oxygen via nasal cannula so pulmonary consult was called for further evaluation.   1/10 awake very confused will follow simple commands    Allergies   Allergen Reactions    Pcn [Penicillins] Swelling        MAR reviewed and pertinent medications noted or modified as needed     Current Facility-Administered Medications   Medication    guaiFENesin-dextromethorphan (TUSSI-ORGANIDIN DM)  mg/5 mL oral solution 10 mL    albuterol (PROVENTIL HFA, VENTOLIN HFA, PROAIR HFA) inhaler 2 Puff    metoprolol tartrate (LOPRESSOR) tablet 25 mg    ascorbic acid (vitamin C) (VITAMIN C) tablet 500 mg    ferrous sulfate tablet 325 mg    heparin (porcine) injection 5,000 Units    0.9% sodium chloride infusion 250 mL    dexamethasone (DECADRON) 4 mg/mL injection 4 mg    albuterol (PROVENTIL HFA, VENTOLIN HFA, PROAIR HFA) inhaler 2 Puff    VANCOMYCIN INFORMATION NOTE    calcium acetate(phosphat bind) (PHOSLO) capsule 1,334 mg    dextrose 5% infusion    sodium chloride (NS) flush 5-10 mL    levoFLOXacin (LEVAQUIN) 750 mg in D5W IVPB    allopurinoL (ZYLOPRIM) tablet 50 mg    famotidine (PEPCID) tablet 20 mg    sertraline (ZOLOFT) tablet 50 mg    insulin lispro (HUMALOG) injection    glucose chewable tablet 16 g    glucagon (GLUCAGEN) injection 1 mg    dextrose (D50W) injection syrg 12.5-25 g    ondansetron (ZOFRAN) injection 4 mg    acetaminophen (TYLENOL) tablet 650 mg    acetaminophen (TYLENOL) suppository 650 mg    hydrOXYzine pamoate (VISTARIL) capsule 25 mg      Patient PCP: Brayden Singh MD  PMH:  has a past medical history of CRI (chronic renal insufficiency) (2012), Gout (2011), Hypertension, Lymphoma ([facility] [address] and Prostate CA ([facility] [address] (2011). PSH:   has a past surgical history that includes hx prostatectomy; hc bone marrow biopsy; endoscopy, colon, diagnostic (); and ir thoracentesis cath w image (12/15/2020). FHX: family history includes Hypertension in his mother. SHX:  reports that he has never smoked. He has never used smokeless tobacco. He reports previous drug use. He reports that he does not drink alcohol.      ROS:    Unable to obtain    Objective:     Vital Signs: Telemetry:    normal sinus rhythm Intake/Output:   Visit Vitals  BP (!) 119/49 (BP 1 Location: Left arm, BP Patient Position: At rest)   Pulse (!) 106   Temp 97.4 °F (36.3 °C)   Resp 22   Ht 5' 5.98\" (1.676 m)   Wt 52.2 kg (115 lb 1.3 oz)   SpO2 93%   BMI 18.58 kg/m²       Temp (24hrs), Av.6 °F (36.4 °C), Min:97.4 °F (36.3 °C), Max:97.8 °F (36.6 °C)        O2 Device: Nasal cannula O2 Flow Rate (L/min): 5 l/min       Wt Readings from Last 4 Encounters:   01/10/21 52.2 kg (115 lb 1.3 oz)   20 60.8 kg (134 lb)   20 60.9 kg (134 lb 4.2 oz)   20 64.3 kg (141 lb 12.1 oz)          Intake/Output Summary (Last 24 hours) at 1/10/2021 1612  Last data filed at 1/10/2021 0555  Gross per 24 hour   Intake 1995 ml   Output    Net 1995 ml       Last shift:      No intake/output data recorded. Last 3 shifts: 01/08 1901 - 01/10 0700  In: 2195 [I.V.:920]  Out: 1500 [Urine:1500]       Physical Exam:     Physical Exam   Constitutional: He appears distressed. HENT:   Head: Normocephalic and atraumatic. Eyes: Pupils are equal, round, and reactive to light. Conjunctivae and EOM are normal.   Neck: Normal range of motion. Neck supple. Cardiovascular: Normal rate and regular rhythm. Pulmonary/Chest: He is in respiratory distress. He has rales. Abdominal: Soft. Musculoskeletal: Normal range of motion. Neurological:   Patient is lethargic drowsy        Labs:    Recent Labs     01/10/21  1150 01/08/21  1040   WBC 18.6* 17.0*   HGB 9.7* 9.7*    283     Recent Labs     01/10/21  1150 01/08/21  1040    144   K 3.6 3.7    109*   CO2 26 23   * 168*   BUN 73* 76*   CREA 2.26* 2.66*   CA 11.0* 10.6*     5 L nasal oxygen with oxygen saturation 93%  Lab Results   Component Value Date/Time    Culture result: No significant growth, <10,000 CFU/mL 01/05/2021 08:00 PM    Culture result: No growth 5 days 01/05/2021 03:00 PM    Culture result: No growth 6 days 12/15/2020 08:33 PM     Lab Results   Component Value Date/Time    TSH 1.79 11/20/2020 01:31 PM       Imaging:    CXR Results  (Last 48 hours)    None        Results from Hospital Encounter encounter on 01/05/21   XR CHEST PORT    Narrative CHF. Comparison chest x-ray 1/5/2021. Impression FINDINGS: Impression: Frontal single view chest.    Unchanged cardiomediastinal silhouette. Calcified aorta. Mild vascular  congestion. No pulmonary edema. Mild hypoinflation. Unchanged bilateral lower lung atelectasis or infiltrates  and left lower lung consolidation. Unchanged small left pleural effusion. No  pneumothorax. No free air under the diaphragm. XR CHEST PORT    Narrative Chest single view. Comparison single view chest 12/28/2020    LLL posteromedial opacity now present. Findings would fit with any clinical  concern for pneumonia. Otherwise, no gross interstitial or alveolar pulmonary edema. Cardiac and  mediastinal structures are unchanged noting thoracic aorta atherosclerosis. No  pneumothorax. Probable small dependent left pleural effusion. Results from [place] encounter on 12/28/20   XR ABD (KUB)    Narrative Abdomen, 2 views, 12/28/2020    History PEG tube placement. Comparison: None. Findings: Radiographs are obtained before and after administration of 60 mL of  equal parts Gastrografin and water into the percutaneous feeding tube. Contrast  opacification of the stomach is distended. Contrast opacification of small  bowel loops is also seen. The bowel gas pattern is nonobstructive. Stool  volume is small to moderate. Surgical clips are seen at the pelvis. Degenerative changes are present in the spine. Impression Impression: Percutaneous feeding tube in the stomach. Nonobstructive bowel gas  pattern. Results from [place] encounter on 12/11/20   CT CHEST WO CONT    Narrative Comparison chest x-ray 12/11/2020 [facility] and chest CTA 11/25/2020 [facility]. TECHNIQUE: Axial imaging of the chest without IV contrast, with multiplanar  reformatting, MIPs. Dose reduction: All CT scans at this facility are performed using dose reduction  optimization techniques as appropriate to a performed exam including the  following: Automated exposure control, adjustments of the mA and/or kV according  to patient's size, or use of iterative reconstruction technique. FINDINGS: Left central catheter distal tip SVC. Mild cardiomegaly. Multivessel  coronary artery calcification. No pericardial effusion. Low-density intracardiac  blood suggests anemia.  Calcified thoracic aorta without aneurysm. Pulmonary  arteries are not dilated. No mediastinal or hilar lymphadenopathy. Tubular  structure posterior to the esophagus possibly dilated vessel. Bilateral pleural  effusions and lower lung consolidations without air bronchograms. The left lower  lung is completely consolidated/collapsed. The aerated lungs demonstrate patchy  areas of airspace disease. No axillary adenopathy. Included thyroid is  unremarkable. Included upper abdomen demonstrates lobulated, heterogeneous  structure spleen and small retroperitoneal lymphadenopathy; unchanged. Degenerative changes of the bony structures. Soft tissue anasarca. Impression IMPRESSION:  1. Bilateral pleural effusions and lower lung consolidations, the left lower  lung being completely collapsed, this is similar to previous. 2. Interval development patchy airspace disease in the aerated lungs consistent  with pneumonia. 3. Abnormal spleen, small retroperitoneal lymphadenopathy unchanged. 4. Atherosclerosis. IMPRESSION:   1. Acute hypoxic respiratory failure is on nasal oxygen  2. COVID-19 pneumonia  3. Severe sepsis  4. Dysphagia PEG tube in place with leaking around the PEG site which is fixed  5. Hypernatremia resolved  6. Bilateral pleural effusion with bilateral lower lobe atelectasis  7. RECOMMENDATIONS/PLAN:     1. Patient is on oxygen 3 L nasal cannula have increased to 4 L  2. Cannot give remdesivir as GFR is 25 cannot give Actemra patient has lymphoma and prostate cancer by history, continue with Decadron  and Levaquin  3. Patient is on IV fluid D5 because of mild hypernatremia we will decrease IV fluids  4.             Bella Nur MD

## 2021-01-10 NOTE — PROGRESS NOTES
Problem: Nutrition Deficit  Goal: *Optimize nutritional status  Outcome: Progressing Towards Goal     Problem: Falls - Risk of  Goal: *Absence of Falls  Description: Document Claudia Chapman Fall Risk and appropriate interventions in the flowsheet. Outcome: Progressing Towards Goal  Note: Fall Risk Interventions:  Mobility Interventions: Bed/chair exit alarm, Patient to call before getting OOB, PT Consult for mobility concerns, PT Consult for assist device competence    Mentation Interventions: Bed/chair exit alarm, Increase mobility    Medication Interventions: Evaluate medications/consider consulting pharmacy, Patient to call before getting OOB    Elimination Interventions: Bed/chair exit alarm, Call light in reach, Patient to call for help with toileting needs    History of Falls Interventions: Bed/chair exit alarm, Evaluate medications/consider consulting pharmacy         Problem: Patient Education: Go to Patient Education Activity  Goal: Patient/Family Education  Outcome: Progressing Towards Goal     Problem: Pressure Injury - Risk of  Goal: *Prevention of pressure injury  Description: Document Daniel Scale and appropriate interventions in the flowsheet.   Outcome: Progressing Towards Goal  Note: Pressure Injury Interventions:  Sensory Interventions: Assess changes in LOC, Assess need for specialty bed, Avoid rigorous massage over bony prominences, Float heels, Keep linens dry and wrinkle-free, Maintain/enhance activity level, Minimize linen layers, Monitor skin under medical devices    Moisture Interventions: Absorbent underpads, Apply protective barrier, creams and emollients, Assess need for specialty bed, Maintain skin hydration (lotion/cream), Minimize layers, Moisture barrier    Activity Interventions: Pressure redistribution bed/mattress(bed type), PT/OT evaluation    Mobility Interventions: HOB 30 degrees or less, Pressure redistribution bed/mattress (bed type), PT/OT evaluation    Nutrition Interventions: Document food/fluid/supplement intake    Friction and Shear Interventions: HOB 30 degrees or less, Lift sheet, Lift team/patient mobility team, Minimize layers                Problem: Patient Education: Go to Patient Education Activity  Goal: Patient/Family Education  Outcome: Progressing Towards Goal     Problem: Risk for Spread of Infection  Goal: Prevent transmission of infectious organism to others  Description: Prevent the transmission of infectious organisms to other patients, staff members, and visitors.   Outcome: Progressing Towards Goal     Problem: Patient Education:  Go to Education Activity  Goal: Patient/Family Education  Outcome: Progressing Towards Goal

## 2021-01-10 NOTE — PROGRESS NOTES
Problem: Self Care Deficits Care Plan (Adult)  Goal: *Acute Goals and Plan of Care (Insert Text)  Description: Pt will be mod A sup<->sit in prep for EOB ADL's  Pt will be max A  LB dressing EOB level  Pt will be mod A  sit EOB 10 minutes in prep for EOB ADL's  Pt will be CGA  grooming EOB level  Pt will be mod A  sit<-> prep for toilet transfer  Pt will be mod A BSC transfer with LRAD  Pt will be mod A  toileting/cloth mgmt LRAD  Pt will be SBA dahlia UE HEP in prep for self care tasks      Outcome: Not Met     OCCUPATIONAL THERAPY EVALUATION  Patient: Amberly Fleming [de-identified]80 y.o. 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 Pt has hx of CKD, Gout, HTN, persistent dysphagia s/p PEG placement, chronic renal insufficiency, lymphoma, prostate cancer, prostectomy. Pt received semi supine in bed A&O to self only (stating he is in some kind of home) and agreeable for OT eval/tx. Per chart review pt is from [facility] (possibly LTC resident) however unable to provide PLOF information. Pt currently presents with most likely decreased balance, decreased activity tolerance, decreased safety awareness , generalized weakness and increased need for assist with self care (min A simple grooming bed level for thoroughness, total A LB dressing and toileting bed level simulated) and functional transfers/mobility (max A roll left and right, max A attempt sup->sit however aborted half way 2/2 pt SOB). Pt educated on and completed dahlia UE HEP for 1 set of 10 reps with min A. Pt would benefit from skilled OT services while at [facility] in order to increase safety and independence with self care and functional transfers/mobility.   Recommend discharge to SNF when medically appropriate. Other factors to consider for discharge: PLOF unknown        PLAN :  Recommendations and Planned Interventions: self care training, functional mobility training, therapeutic exercise, balance training, therapeutic activities, endurance activities, patient education, and home safety training    Frequency/Duration: Patient will be followed by occupational therapy 3 times a week to address goals. Recommendation for discharge: (in order for the patient to meet his/her long term goals)  SNF    This discharge recommendation:  Has been made in collaboration with the attending provider and/or case management    IF patient discharges home will need the following DME: TBD       SUBJECTIVE:   Patient stated im in a kind of home.     OBJECTIVE DATA SUMMARY:   HISTORY:   Past Medical History:   Diagnosis Date    CRI (chronic renal insufficiency) 12/13/2012    Gout 1/4/2011    Hypertension     Lymphoma ([facility] [address]     Prostate CA ([facility] [address] 1/4/2011     Past Surgical History:   Procedure Laterality Date    ENDOSCOPY, COLON, DIAGNOSTIC  2003    neg    HC BONE MARROW BIOPSY      HX PROSTATECTOMY      IR THORACENTESIS CATH W IMAGE  12/15/2020       Expanded or extensive additional review of patient history:     Home Situation  Home Environment: [address] [place] [address] Name: [facility]  Living Alone: No  Support Systems: Skilled nursing facility  Patient Expects to be Discharged to[de-identified] Skilled nursing facility    PLOF: unknown at this time however pt is from SNF     EXAMINATION OF PERFORMANCE DEFICITS:  Cognitive/Behavioral Status:  Neurologic State: Alert;Confused  Orientation Level: Disoriented to place; Disoriented to situation;Disoriented to time;Oriented to person  Cognition: Decreased attention/concentration;Decreased command following; Impaired decision making;Poor safety awareness        Safety/Judgement: Decreased awareness of environment;Decreased awareness of need for assistance;Decreased awareness of need for safety;Decreased insight into deficits    Hearing:   Jose Eduardo hard of hearing    Range of Motion:  AROM: Generally decreased, functional  PROM: Generally decreased, functional     Strength:  Strength: Generally decreased, functional(grossly 2-/5)     Balance:  Sitting: With support  Standing: Impaired  Standing - Dynamic : Not tested    Functional Mobility and Transfers for ADLs:  Bed Mobility:  Rolling: Maximum assistance  Supine to Sit: Maximum assistance(aborted half when half way at EOB 2/2 SOB)  Sit to Supine: Total assistance  Scooting: Total assistance    Transfers:  Sit to Stand: Total assistance;Assist x2  Stand to Sit: Total assistance;Assist x2  Bed to Chair: Total assistance;Assist x2    ADL Assessment:     Oral Facial Hygiene/Grooming: Minimum assistance    Lower Body Dressing: Total assistance(simulated)    Toileting: Total assistance(simulated)     ADL Intervention and task modifications:     Grooming  Grooming Assistance: Minimum assistance  Position Performed: (semi supine in bed)  Washing Face: Minimum assistance    Lower Body Dressing Assistance  Socks: Total assistance (dependent)    Toileting  Toileting Assistance: Total assistance(dependent)(simulated)    Cognitive Retraining  Safety/Judgement: Decreased awareness of environment;Decreased awareness of need for assistance;Decreased awareness of need for safety;Decreased insight into deficits    Therapeutic Exercise:  Min A jose eduardo UE HEP for 1 set of 10 reps in prep for self care tasks       [address] [place] [address] [address] [id number] AM-PAC \"6 Clicks\"                                                       Daily Activity Inpatient Short Form  How much help from another person does the patient currently need. .. Total; A Lot A Little None   1. Putting on and taking off regular lower body clothing? [x]  1 []  2 []  3 []  4   2. Bathing (including washing, rinsing, drying)? []  1 [x]  2 []  3 []  4   3.   Toileting, which includes using toilet, bedpan or urinal? [x] 1 []  2 []  3 []  4   4. Putting on and taking off regular upper body clothing? []  1 [x]  2 []  3 []  4   5. Taking care of personal grooming such as brushing teeth? []  1 []  2 [x]  3 []  4   6. Eating meals? PEG feedings [x]  1 []  2 []  3 []  4   © 2007, Trustees of [address] [place] [address] [address] [id number], under license to [employer]. All rights reserved     Score: 10/24     Interpretation of Tool:  Represents clinically-significant functional categories (i.e. Activities of daily living). Percentage of Impairment CH    0%   CI    1-19% CJ    20-39% CK    40-59% CL    60-79% CM    80-99% CN     100%   [facility]  Score 6-24 24 23 20-22 15-19 10-14 7-9 6        Occupational Therapy Evaluation Charge Determination   History Examination Decision-Making   LOW Complexity : Brief history review  MEDIUM Complexity : 3-5 performance deficits relating to physical, cognitive , or psychosocial skils that result in activity limitations and / or participation restrictions MEDIUM Complexity : Patient may present with comorbidities that affect occupational performnce. Miniml to moderate modification of tasks or assistance (eg, physical or verbal ) with assesment(s) is necessary to enable patient to complete evaluation       Based on the above components, the patient evaluation is determined to be of the following complexity level: LOW   Pain Rating:  No pain reported    Activity Tolerance:   Poor and requires rest breaks  Please refer to the flowsheet for vital signs taken during this treatment. After treatment patient left in no apparent distress:    Supine in bed, Call bell within reach, Bed / chair alarm activated, and Side rails x 3    COMMUNICATION/EDUCATION:     Home safety education was provided and the patient/caregiver indicated understanding. and Patient/family have participated as able in goal setting and plan of care. This patients plan of care is appropriate for delegation to [facility].     Thank you for this referral.  Yoko Stapler  Time Calculation: 19 mins

## 2021-01-11 ENCOUNTER — APPOINTMENT (OUTPATIENT)
Dept: GENERAL RADIOLOGY | Age: 86
DRG: 871 | End: 2021-01-11
Attending: INTERNAL MEDICINE
Payer: MEDICARE

## 2021-01-11 ENCOUNTER — APPOINTMENT (OUTPATIENT)
Dept: ENDOSCOPY | Age: 86
DRG: 871 | End: 2021-01-11
Attending: INTERNAL MEDICINE
Payer: MEDICARE

## 2021-01-11 LAB
ANION GAP SERPL CALC-SCNC: 7 MMOL/L (ref 5–15)
BACTERIA SPEC CULT: NORMAL
BUN SERPL-MCNC: 82 MG/DL (ref 6–20)
BUN/CREAT SERPL: 34 (ref 12–20)
CA-I BLD-MCNC: 10.5 MG/DL (ref 8.5–10.1)
CHLORIDE SERPL-SCNC: 101 MMOL/L (ref 97–108)
CO2 SERPL-SCNC: 32 MMOL/L (ref 21–32)
CREAT SERPL-MCNC: 2.42 MG/DL (ref 0.7–1.3)
DATE LAST DOSE: NORMAL
ERYTHROCYTE [DISTWIDTH] IN BLOOD BY AUTOMATED COUNT: 17.4 % (ref 11.5–14.5)
GLUCOSE BLD STRIP.AUTO-MCNC: 128 MG/DL (ref 65–100)
GLUCOSE BLD STRIP.AUTO-MCNC: 140 MG/DL (ref 65–100)
GLUCOSE BLD STRIP.AUTO-MCNC: 157 MG/DL (ref 65–100)
GLUCOSE BLD STRIP.AUTO-MCNC: 185 MG/DL (ref 65–100)
GLUCOSE SERPL-MCNC: 121 MG/DL (ref 65–100)
HCT VFR BLD AUTO: 29.4 % (ref 36.6–50.3)
HGB BLD-MCNC: 9.7 G/DL (ref 12.1–17)
MCH RBC QN AUTO: 29 PG (ref 26–34)
MCHC RBC AUTO-ENTMCNC: 33 G/DL (ref 30–36.5)
MCV RBC AUTO: 88 FL (ref 80–99)
PERFORMED BY, TECHID: ABNORMAL
PLATELET # BLD AUTO: 297 K/UL (ref 150–400)
PMV BLD AUTO: 12.3 FL (ref 8.9–12.9)
POTASSIUM SERPL-SCNC: 4.1 MMOL/L (ref 3.5–5.1)
RBC # BLD AUTO: 3.34 M/UL (ref 4.1–5.7)
REPORTED DOSE,DOSE: NORMAL UNITS
SODIUM SERPL-SCNC: 140 MMOL/L (ref 136–145)
SPECIAL REQUESTS,SREQ: NORMAL
VANCOMYCIN SERPL-MCNC: 20.1 UG/ML
WBC # BLD AUTO: 14.6 K/UL (ref 4.1–11.1)

## 2021-01-11 PROCEDURE — 74011250637 HC RX REV CODE- 250/637: Performed by: NURSE PRACTITIONER

## 2021-01-11 PROCEDURE — 36415 COLL VENOUS BLD VENIPUNCTURE: CPT

## 2021-01-11 PROCEDURE — 94762 N-INVAS EAR/PLS OXIMTRY CONT: CPT

## 2021-01-11 PROCEDURE — 80202 ASSAY OF VANCOMYCIN: CPT

## 2021-01-11 PROCEDURE — 74011250636 HC RX REV CODE- 250/636: Performed by: NURSE PRACTITIONER

## 2021-01-11 PROCEDURE — 80048 BASIC METABOLIC PNL TOTAL CA: CPT

## 2021-01-11 PROCEDURE — 85027 COMPLETE CBC AUTOMATED: CPT

## 2021-01-11 PROCEDURE — 77010033678 HC OXYGEN DAILY

## 2021-01-11 PROCEDURE — 65270000029 HC RM PRIVATE

## 2021-01-11 PROCEDURE — 94640 AIRWAY INHALATION TREATMENT: CPT

## 2021-01-11 PROCEDURE — 74011636637 HC RX REV CODE- 636/637: Performed by: NURSE PRACTITIONER

## 2021-01-11 PROCEDURE — 82962 GLUCOSE BLOOD TEST: CPT

## 2021-01-11 PROCEDURE — 71045 X-RAY EXAM CHEST 1 VIEW: CPT

## 2021-01-11 RX ORDER — ALBUTEROL SULFATE 90 UG/1
2 AEROSOL, METERED RESPIRATORY (INHALATION)
Status: DISCONTINUED | OUTPATIENT
Start: 2021-01-11 | End: 2021-01-17 | Stop reason: HOSPADM

## 2021-01-11 RX ORDER — DEXTROSE MONOHYDRATE 50 MG/ML
20 INJECTION, SOLUTION INTRAVENOUS CONTINUOUS
Status: DISCONTINUED | OUTPATIENT
Start: 2021-01-11 | End: 2021-01-17 | Stop reason: HOSPADM

## 2021-01-11 RX ORDER — FUROSEMIDE 10 MG/ML
40 INJECTION INTRAMUSCULAR; INTRAVENOUS EVERY 12 HOURS
Status: DISCONTINUED | OUTPATIENT
Start: 2021-01-11 | End: 2021-01-13

## 2021-01-11 RX ORDER — FUROSEMIDE 10 MG/ML
40 INJECTION INTRAMUSCULAR; INTRAVENOUS DAILY
Status: DISCONTINUED | OUTPATIENT
Start: 2021-01-11 | End: 2021-01-11

## 2021-01-11 RX ORDER — LORAZEPAM 1 MG/1
1 TABLET ORAL
Status: DISCONTINUED | OUTPATIENT
Start: 2021-01-11 | End: 2021-01-17 | Stop reason: HOSPADM

## 2021-01-11 RX ADMIN — FUROSEMIDE 40 MG: 10 INJECTION, SOLUTION INTRAMUSCULAR; INTRAVENOUS at 22:36

## 2021-01-11 RX ADMIN — ALBUTEROL SULFATE 2 PUFF: 90 AEROSOL, METERED RESPIRATORY (INHALATION) at 12:00

## 2021-01-11 RX ADMIN — INSULIN LISPRO 2 UNITS: 100 INJECTION, SOLUTION INTRAVENOUS; SUBCUTANEOUS at 12:39

## 2021-01-11 RX ADMIN — DEXAMETHASONE SODIUM PHOSPHATE 4 MG: 4 INJECTION, SOLUTION INTRA-ARTICULAR; INTRALESIONAL; INTRAMUSCULAR; INTRAVENOUS; SOFT TISSUE at 18:05

## 2021-01-11 RX ADMIN — ALBUTEROL SULFATE 2 PUFF: 90 AEROSOL, METERED RESPIRATORY (INHALATION) at 08:46

## 2021-01-11 RX ADMIN — DEXAMETHASONE SODIUM PHOSPHATE 4 MG: 4 INJECTION, SOLUTION INTRA-ARTICULAR; INTRALESIONAL; INTRAMUSCULAR; INTRAVENOUS; SOFT TISSUE at 23:30

## 2021-01-11 RX ADMIN — LEVOFLOXACIN 750 MG: 5 INJECTION, SOLUTION INTRAVENOUS at 14:26

## 2021-01-11 RX ADMIN — ALBUTEROL SULFATE 2 PUFF: 90 AEROSOL, METERED RESPIRATORY (INHALATION) at 05:34

## 2021-01-11 RX ADMIN — DEXTROSE MONOHYDRATE 50 ML/HR: 50 INJECTION, SOLUTION INTRAVENOUS at 12:43

## 2021-01-11 RX ADMIN — DEXAMETHASONE SODIUM PHOSPHATE 4 MG: 4 INJECTION, SOLUTION INTRA-ARTICULAR; INTRALESIONAL; INTRAMUSCULAR; INTRAVENOUS; SOFT TISSUE at 12:40

## 2021-01-11 RX ADMIN — FUROSEMIDE 40 MG: 10 INJECTION, SOLUTION INTRAMUSCULAR; INTRAVENOUS at 12:39

## 2021-01-11 NOTE — CONSULTS
PULMONARY NOTE  [facility]    Name: Brenton Hodgkin MRN: [id number]   : 1932 Hospital: [address] [place] [address]   Date: 2021  Admission date: 2021 Hospital Day: 7       HPI:     Hospital Problems  Date Reviewed: 2021          Codes Class Noted POA    Pneumonia ICD-10-CM: J18.9  ICD-9-CM: 039  2021 Unknown                   [x] High complexity decision making was performed  [x] See my orders for details      Subjective/Initial History:     I was asked by Crissy North MD to see Brenton Hodgkin  a 80 y.o.  male in consultation     Excerpts from admission 2021 or consult notes as follows:   51-year-old male came in because of shortness of breath dyspnea generalized weakness initial Covid rapid test was negative he has significant past medical history of chronic kidney disease gout hypertension dysphagia had a PEG tube in place history of lymphoma chest x-ray done which shows left lower lobe infiltrate followed by CAT scan which shows bilateral pleural effusion and atelectasis but now his COVID-19 came back positive and also MRSA and he is disheveled unable to get much history out of the patient on oxygen via nasal cannula so pulmonary consult was called for further evaluation.       Allergies   Allergen Reactions    Pcn [Penicillins] Swelling        MAR reviewed and pertinent medications noted or modified as needed     Current Facility-Administered Medications   Medication    sertraline (ZOLOFT) tablet 100 mg    dextromethorphan (DELSYM) 30 mg/5 mL syrup 30 mg    furosemide (LASIX) injection 40 mg    guaiFENesin-dextromethorphan (TUSSI-ORGANIDIN DM)  mg/5 mL oral solution 10 mL    albuterol (PROVENTIL HFA, VENTOLIN HFA, PROAIR HFA) inhaler 2 Puff    metoprolol tartrate (LOPRESSOR) tablet 25 mg    ascorbic acid (vitamin C) (VITAMIN C) tablet 500 mg    ferrous sulfate tablet 325 mg    heparin (porcine) injection 5,000 Units    0.9% sodium chloride infusion 250 mL    dexamethasone (DECADRON) 4 mg/mL injection 4 mg    albuterol (PROVENTIL HFA, VENTOLIN HFA, PROAIR HFA) inhaler 2 Puff    VANCOMYCIN INFORMATION NOTE    calcium acetate(phosphat bind) (PHOSLO) capsule 1,334 mg    sodium chloride (NS) flush 5-10 mL    levoFLOXacin (LEVAQUIN) 750 mg in D5W IVPB    allopurinoL (ZYLOPRIM) tablet 50 mg    famotidine (PEPCID) tablet 20 mg    insulin lispro (HUMALOG) injection    glucose chewable tablet 16 g    glucagon (GLUCAGEN) injection 1 mg    dextrose (D50W) injection syrg 12.5-25 g    ondansetron (ZOFRAN) injection 4 mg    acetaminophen (TYLENOL) tablet 650 mg    acetaminophen (TYLENOL) suppository 650 mg    hydrOXYzine pamoate (VISTARIL) capsule 25 mg      Patient PCP: Chad Skaggs MD  PMH:  has a past medical history of CRI (chronic renal insufficiency) (2012), Gout (2011), Hypertension, Lymphoma ([facility] [address] and Prostate CA ([facility] [address] (2011). PSH:   has a past surgical history that includes hx prostatectomy; hc bone marrow biopsy; endoscopy, colon, diagnostic (); and ir thoracentesis cath w image (12/15/2020). FHX: family history includes Hypertension in his mother. SHX:  reports that he has never smoked. He has never used smokeless tobacco. He reports previous drug use. He reports that he does not drink alcohol.      ROS:    Unable to obtain    Objective:     Vital Signs: Telemetry:    normal sinus rhythm Intake/Output:   Visit Vitals  /69   Pulse 62   Temp 97.4 °F (36.3 °C)   Resp 22   Ht 5' 5.98\" (1.676 m)   Wt 52.2 kg (115 lb 1.3 oz)   SpO2 92%   BMI 18.58 kg/m²       Temp (24hrs), Av.7 °F (36.5 °C), Min:97.4 °F (36.3 °C), Max:97.9 °F (36.6 °C)        O2 Device: Nasal cannula O2 Flow Rate (L/min): 5 l/min       Wt Readings from Last 4 Encounters:   01/10/21 52.2 kg (115 lb 1.3 oz)   20 60.8 kg (134 lb)   20 60.9 kg (134 lb 4.2 oz)   20 64.3 kg (141 lb 12.1 oz)          Intake/Output Summary (Last 24 hours) at 1/11/2021 0937  Last data filed at 1/11/2021 0430  Gross per 24 hour   Intake 1520 ml   Output    Net 1520 ml       Last shift:      No intake/output data recorded. Last 3 shifts: 01/09 1901 - 01/11 0700  In: 3190 [I.V.:920]  Out: -        Physical Exam:     Physical Exam   Constitutional: He appears distressed. HENT:   Head: Normocephalic and atraumatic. Eyes: Pupils are equal, round, and reactive to light. Conjunctivae and EOM are normal.   Neck: Normal range of motion. Neck supple. Cardiovascular: Normal rate and regular rhythm. Pulmonary/Chest: He is in respiratory distress. He has rales. Abdominal: Soft. Musculoskeletal: Normal range of motion. Neurological:   Patient is lethargic drowsy        Labs:    Recent Labs     01/10/21  1150 01/08/21  1040   WBC 18.6* 17.0*   HGB 9.7* 9.7*    283     Recent Labs     01/10/21  1150 01/08/21  1040    144   K 3.6 3.7    109*   CO2 26 23   * 168*   BUN 73* 76*   CREA 2.26* 2.66*   CA 11.0* 10.6*     No results for input(s): PH, PCO2, PO2, HCO3, FIO2 in the last 72 hours. No results for input(s): CPK, CKNDX, TROIQ in the last 72 hours. No lab exists for component: CPKMB  No results found for: BNPP, BNP   Lab Results   Component Value Date/Time    Culture result: No significant growth, <10,000 CFU/mL 01/05/2021 08:00 PM    Culture result: No growth 6 days 01/05/2021 03:00 PM    Culture result: No growth 6 days 12/15/2020 08:33 PM     Lab Results   Component Value Date/Time    TSH 1.79 11/20/2020 01:31 PM       Imaging:    CXR Results  (Last 48 hours)    None        Results from Hospital Encounter encounter on 01/05/21   XR CHEST PORT    Narrative CHF. Comparison chest x-ray 1/5/2021. Impression FINDINGS: Impression: Frontal single view chest.    Unchanged cardiomediastinal silhouette. Calcified aorta. Mild vascular  congestion. No pulmonary edema. Mild hypoinflation.  Unchanged bilateral lower lung atelectasis or infiltrates  and left lower lung consolidation. Unchanged small left pleural effusion. No  pneumothorax. No free air under the diaphragm. XR CHEST PORT    Narrative Chest single view. Comparison single view chest 12/28/2020    LLL posteromedial opacity now present. Findings would fit with any clinical  concern for pneumonia. Otherwise, no gross interstitial or alveolar pulmonary edema. Cardiac and  mediastinal structures are unchanged noting thoracic aorta atherosclerosis. No  pneumothorax. Probable small dependent left pleural effusion. Results from [place] encounter on 12/28/20   XR ABD (KUB)    Narrative Abdomen, 2 views, 12/28/2020    History PEG tube placement. Comparison: None. Findings: Radiographs are obtained before and after administration of 60 mL of  equal parts Gastrografin and water into the percutaneous feeding tube. Contrast  opacification of the stomach is distended. Contrast opacification of small  bowel loops is also seen. The bowel gas pattern is nonobstructive. Stool  volume is small to moderate. Surgical clips are seen at the pelvis. Degenerative changes are present in the spine. Impression Impression: Percutaneous feeding tube in the stomach. Nonobstructive bowel gas  pattern. Results from [place] encounter on 12/11/20   CT CHEST WO CONT    Narrative Comparison chest x-ray 12/11/2020 [facility] and chest CTA 11/25/2020 [facility]. TECHNIQUE: Axial imaging of the chest without IV contrast, with multiplanar  reformatting, MIPs. Dose reduction: All CT scans at this facility are performed using dose reduction  optimization techniques as appropriate to a performed exam including the  following: Automated exposure control, adjustments of the mA and/or kV according  to patient's size, or use of iterative reconstruction technique. FINDINGS: Left central catheter distal tip SVC. Mild cardiomegaly.  Multivessel  coronary artery calcification. No pericardial effusion. Low-density intracardiac  blood suggests anemia. Calcified thoracic aorta without aneurysm. Pulmonary  arteries are not dilated. No mediastinal or hilar lymphadenopathy. Tubular  structure posterior to the esophagus possibly dilated vessel. Bilateral pleural  effusions and lower lung consolidations without air bronchograms. The left lower  lung is completely consolidated/collapsed. The aerated lungs demonstrate patchy  areas of airspace disease. No axillary adenopathy. Included thyroid is  unremarkable. Included upper abdomen demonstrates lobulated, heterogeneous  structure spleen and small retroperitoneal lymphadenopathy; unchanged. Degenerative changes of the bony structures. Soft tissue anasarca. Impression IMPRESSION:  1. Bilateral pleural effusions and lower lung consolidations, the left lower  lung being completely collapsed, this is similar to previous. 2. Interval development patchy airspace disease in the aerated lungs consistent  with pneumonia. 3. Abnormal spleen, small retroperitoneal lymphadenopathy unchanged. 4. Atherosclerosis. IMPRESSION:   1. Acute hypoxic respiratory failure  2. COVID-19 pneumonia  3. Severe sepsis  4. Dysphagia PEG tube in place with leaking around the PEG site which is fixed  5. Hypernatremia resolved  6. Bilateral pleural effusion with bilateral lower lobe atelectasis  7. Pt is requiring Drug therapy requiring intensive monitoring for toxicity  8. Pt is unstable, unpredictable needing inpatient monitoring; is acutely ill and at high risk of sudden decline and decompensation with severe consequenses and continued end organ dysfunction and failure  9. Prognosis guarded       RECOMMENDATIONS/PLAN:     1. Patient is on oxygen 3 L nasal cannula  2.  Cannot give remdesivir as GFR is 25 cannot give Actemra patient has lymphoma and prostate cancer by history, continue with Decadron and and he is already on vancomycin and Levaquin because of MRSA will get chest x-ray decrease IV fluid  3. Patient is on IV fluid D5 because of mild hypernatremia resolved  4. Follow culture results  5. Possible PEG tube malfunction  6. Supplemental O2 to keep sats > 93%  7. Aspiration precautions  8. Labs to follow electrolytes, renal function and and blood counts  9. Glucose monitoring and SSI  10. Bronchial hygiene with respiratory therapy techniques, bronchodilators  11.  DVT, SUP prophylaxis           Sam Joe MD

## 2021-01-11 NOTE — PROGRESS NOTES
Princeton Callander NP paged. Respiratory therapist paged and notified. Mary Alice Fleming states he will come and check patient.

## 2021-01-11 NOTE — PROGRESS NOTES
Hospitalist Progress Note    Subjective:   Daily Progress Note: 1/11/2021 4:08 PM    Hospital Course:  Pt admitted from [address] [place] [address] for covid 19 pneumonia with acute respiratory failure and hypoxia,  Pt has PEG tube which was malfunctioning on presentation, GI consulted and manipulated in correct position. Pt pulled out PEG tube last evening, and became more hypoxic, requiring 100% NRB . Pt will have replacement of PEG tomorrow morning. Pulmonary and GI following. Subjective: Pt seen in room, sleepy, arouses to name, denies pain.   Appears comfortable at rest    Current Facility-Administered Medications   Medication Dose Route Frequency    dextrose 5% infusion  50 mL/hr IntraVENous CONTINUOUS    [START ON 1/12/2021] Vancomycin random level 1/12 at 04:00   Other ONCE    albuterol (PROVENTIL HFA, VENTOLIN HFA, PROAIR HFA) inhaler 2 Puff  2 Puff Inhalation Q4H PRN    sertraline (ZOLOFT) tablet 100 mg  100 mg Per G Tube QPM    dextromethorphan (DELSYM) 30 mg/5 mL syrup 30 mg  30 mg Per G Tube Q12H    furosemide (LASIX) injection 40 mg  40 mg IntraVENous DAILY    guaiFENesin-dextromethorphan (TUSSI-ORGANIDIN DM)  mg/5 mL oral solution 10 mL  10 mL Per G Tube Q4H PRN    metoprolol tartrate (LOPRESSOR) tablet 25 mg  25 mg Per G Tube Q12H    ascorbic acid (vitamin C) (VITAMIN C) tablet 500 mg  500 mg Per G Tube DAILY    ferrous sulfate tablet 325 mg  1 Tab Oral DAILY WITH BREAKFAST    [Held by provider] heparin (porcine) injection 5,000 Units  5,000 Units SubCUTAneous Q12H    0.9% sodium chloride infusion 250 mL  250 mL IntraVENous PRN    dexamethasone (DECADRON) 4 mg/mL injection 4 mg  4 mg IntraVENous Q6H    VANCOMYCIN INFORMATION NOTE   Other PRN    calcium acetate(phosphat bind) (PHOSLO) capsule 1,334 mg  2 Cap Oral TID WITH MEALS    sodium chloride (NS) flush 5-10 mL  5-10 mL IntraVENous PRN    levoFLOXacin (LEVAQUIN) 750 mg in D5W IVPB  750 mg IntraVENous Q48H    allopurinoL (ZYLOPRIM) tablet 50 mg  50 mg Oral EVERY OTHER DAY    famotidine (PEPCID) tablet 20 mg  20 mg Per G Tube DAILY    insulin lispro (HUMALOG) injection   SubCUTAneous AC&HS    glucose chewable tablet 16 g  4 Tab Oral PRN    glucagon (GLUCAGEN) injection 1 mg  1 mg IntraMUSCular PRN    dextrose (D50W) injection syrg 12.5-25 g  25-50 mL IntraVENous PRN    ondansetron (ZOFRAN) injection 4 mg  4 mg IntraVENous Q6H PRN    acetaminophen (TYLENOL) tablet 650 mg  650 mg Per G Tube Q4H PRN    acetaminophen (TYLENOL) suppository 650 mg  650 mg Rectal Q4H PRN    hydrOXYzine pamoate (VISTARIL) capsule 25 mg  25 mg Per G Tube TID PRN        Review of Systems:    ROS   Unable to obtain full ROS due to lethargy, fatigue  Objective:     Visit Vitals  /78   Pulse 87   Temp 98.4 °F (36.9 °C)   Resp 22   Ht 5' 5.98\" (1.676 m)   Wt 52.2 kg (115 lb 1.3 oz)   SpO2 92%   BMI 18.58 kg/m²    O2 Flow Rate (L/min): 15 l/min O2 Device: Non-rebreather mask    Temp (24hrs), Av.9 °F (36.6 °C), Min:97.4 °F (36.3 °C), Max:98.4 °F (36.9 °C)      No intake/output data recorded.  1901 -  0700  In: 3190 [I.V.:920]  Out: -     PHYSICAL EXAM:    Physical Exam     Constitutional: He appears cachectic. Cardiovascular: Normal rate, regular rhythm and intact distal pulses.   Murmur heard. Pulmonary/Chest: Effort normal. He has rhonchi in the right lower field and the left lower field. Abdominal: Soft. Bowel sounds are normal.   PEG tube intact   Genitourinary:    Genitourinary Comments: Perez patent, clear yellow urine.  scrotum with excoriation    Musculoskeletal:      Comments: Generalized weakness, decreased ROM in legs   Neurological: He is alert to name, lethargic,   Skin: Skin is warm and dry.   Data Review    Recent Results (from the past 24 hour(s))   GLUCOSE, POC    Collection Time: 01/10/21  4:39 PM   Result Value Ref Range    Glucose (POC) 144 (H) 65 - 100 mg/dL    Performed by ENRIKE Schmidt    Collection Time: 01/10/21  8:50 PM   Result Value Ref Range    Glucose (POC) 143 (H) 65 - 100 mg/dL    Performed by AMBAR COBB, POC    Collection Time: 01/11/21  8:04 AM   Result Value Ref Range    Glucose (POC) 185 (H) 65 - 100 mg/dL    Performed by Karmen Edge, POC    Collection Time: 01/11/21 12:16 PM   Result Value Ref Range    Glucose (POC) 157 (H) 65 - 100 mg/dL    Performed by [facility]    CBC W/O DIFF    Collection Time: 01/11/21 12:35 PM   Result Value Ref Range    WBC 14.6 (H) 4.1 - 11.1 K/uL    RBC 3.34 (L) 4.10 - 5.70 M/uL    HGB 9.7 (L) 12.1 - 17.0 g/dL    HCT 29.4 (L) 36.6 - 50.3 %    MCV 88.0 80.0 - 99.0 FL    MCH 29.0 26.0 - 34.0 PG    MCHC 33.0 30.0 - 36.5 g/dL    RDW 17.4 (H) 11.5 - 14.5 %    PLATELET 322 159 - 972 K/uL    MPV 12.3 8.9 - 42.4 FL   METABOLIC PANEL, BASIC    Collection Time: 01/11/21 12:35 PM   Result Value Ref Range    Sodium 140 136 - 145 mmol/L    Potassium 4.1 3.5 - 5.1 mmol/L    Chloride 101 97 - 108 mmol/L    CO2 32 21 - 32 mmol/L    Anion gap 7 5 - 15 mmol/L    Glucose 121 (H) 65 - 100 mg/dL    BUN 82 (H) 6 - 20 mg/dL    Creatinine 2.42 (H) 0.70 - 1.30 mg/dL    BUN/Creatinine ratio 34 (H) 12 - 20      GFR est AA 31 (L) >60 ml/min/1.73m2    GFR est non-AA 25 (L) >60 ml/min/1.73m2    Calcium 10.5 (H) 8.5 - 10.1 mg/dL   VANCOMYCIN, RANDOM    Collection Time: 01/11/21 12:35 PM   Result Value Ref Range    Vancomycin, random 20.1 ug/mL    Reported dose date Not provided      Reported dose: Not provided Units       XR CHEST PORT   Final Result      XR CHEST PORT   Final Result   FINDINGS: Impression: Frontal single view chest.      Unchanged cardiomediastinal silhouette. Calcified aorta. Mild vascular   congestion. No pulmonary edema. Mild hypoinflation. Unchanged bilateral lower lung atelectasis or infiltrates   and left lower lung consolidation. Unchanged small left pleural effusion. No   pneumothorax. No free air under the diaphragm.       XR CHEST PORT   Final Result          Active Problems:    Pneumonia (1/5/2021)        Assessment/Plan:   1.  Sepsis secondary to  LLL/covid 19  pneumonia-  IV levaquin, IV vancomycin, pharmacy to dose, pulmonary following, now on  100% NRB, CXR worsened, increase IV lasix to  bid    2.  Hypertension- on metoprolol, controlled.      3. Persistent dysphagia- PEG tube out, replace tomorrow, continue IV fluids for now. Hold heparin for procedure     5. BENJI on CKD stage 3-  creatinine 2.42, appears at baseline.      6. Acute anemia- HGB 9.7, continue fe supplements per PEG tube.    Check stool for occult blood.                DVT Prophylaxis: heparin sq  Code Status:  DNR  POA: VON Luo, [facility] discussed with:   ________staff nurse, pt's daughter_______________________________________________________    Ziyad Partida NP

## 2021-01-11 NOTE — PROGRESS NOTES
Consult for Vancomycin Dosing by Pharmacy by Dr. Guerra Solders provided for this 80y.o. year old male , for indication of sepsis secondary to LLL pneumonina. Day of Therapy: 5  Goal of Level(s): 15-20mcg/dL    Other Current Antibiotics: levofloxacin    Significant Cultures:    Results       Procedure Component Value Units Date/Time    CULTURE, RESPIRATORY/SPUTUM/BRONCH Nancy Causey [[id number]] Collected: 01/06/21 1030    Order Status: Canceled Specimen: Sputum     COVID-19 RAPID TEST [[id number]]  (Abnormal) Collected: 01/06/21 0514    Order Status: Completed Specimen: Nasopharyngeal Updated: 01/06/21 0604     Specimen source Nasopharyngeal        COVID-19 rapid test DETECTED        Comment: Rapid Abbott ID Now   The specimen is POSITIVE for SARS-CoV-2, the novel coronavirus associated with COVID-19. This test has been authorized by the FDA under an Emergency Use Authorization (EUA) for use by authorized laboratories.    Fact sheet for Healthcare Providers: Joliclouddate.co.nz Fact sheet for Patients: Joliclouddate.co[employer]nz   Methodology: Isothermal Nucleic Acid Amplification Results verified, phoned to and read back by  [facility] Results   verified, phoned to and read back by  Rosana Hodge, MAXI @ [facility]/         CULTURE, URINE [[id number]] Collected: 01/05/21 2000    Order Status: Completed Specimen: Urine Updated: 01/07/21 1320     Special Requests: No Special Requests        Culture result:       No significant growth, <10,000 CFU/mL          INFLUENZA A & B AG (RAPID TEST) [[id number]] Collected: 01/05/21 1940    Order Status: Completed Specimen: Nasopharyngeal from Nasal washing Updated: 01/05/21 2049     Influenza A Antigen Negative        Influenza B Antigen Negative       CULTURE, BLOOD, PAIRED [[id number]] Collected: 01/05/21 1500    Order Status: Completed Specimen: Blood Updated: 01/11/21 0733     Special Requests: No Special Requests        Culture result: No growth 6 days                 Serum Creatinine Creatinine   Date Value Ref Range Status   01/11/2021 2.42 (H) 0.70 - 1.30 mg/dL Final   01/10/2021 2.26 (H) 0.70 - 1.30 mg/dL Final   01/08/2021 2.66 (H) 0.70 - 1.30 mg/dL Final      Creatinine Clearance Estimated Creatinine Clearance: 15.6 mL/min (A) (based on SCr of 2.42 mg/dL (H)). BUN Lab Results   Component Value Date/Time    BUN 82 (H) 01/11/2021 12:35 PM      WBC Lab Results   Component Value Date/Time    WBC 14.6 (H) 01/11/2021 12:35 PM      Temp 98.4 °F (36.9 °C)     Last Level:   Vancomycin,trough   Date Value Ref Range Status   01/07/2021 19.8 (H) 5.0 - 10.0 ug/mL Final     Comment:        Trough levels of 15-20 ug/mL should be targeted for patients with coagulase negative Staphylococcus and MRSA pneumonia, endocarditis,osteomyelitis, meningitis, and bacteremia, as well as patients not responding to lower levels. Trough levels of 10-15 ug/mL for infections from other sources (e.g. urinary tract, cellulitis) are appropriate. All patients receiving concomitant nephrotoxic therapies should have their function closely monitored regardless of peak or trough levels. Vancomycin, random   Date Value Ref Range Status   01/11/2021 20.1 ug/mL Final     Comment:     No reference range has been established. Ht Readings from Last 1 Encounters:   01/06/21 167.6 cm (65.98\")        Wt Readings from Last 1 Encounters:   01/10/21 52.2 kg (115 lb 1.3 oz)     Ideal body weight: 63.8 kg (140 lb 9.2 oz)     Previous Regimen: 500mg IV x 1 on 1/10 at 18:20    New Regimen:   Vancomycin level = 20.1 at 12:35, and serum creatinine = 2.42, up from 2.26. Patient with BENJI on CKD 3. No dose today. Vancomycin random level with 1/12 AM labs. Pharmacy to follow daily and will make changes to dose and/or frequency based on clinical status.     _________________________________     Pharmacist Rexann Horse, [facility]

## 2021-01-11 NOTE — PROGRESS NOTES
Problem: Nutrition Deficit  Goal: *Optimize nutritional status  Outcome: Progressing Towards Goal     Problem: Falls - Risk of  Goal: *Absence of Falls  Description: Document Alexandra Clemens Fall Risk and appropriate interventions in the flowsheet. Outcome: Progressing Towards Goal  Note: Fall Risk Interventions:  Mobility Interventions: Bed/chair exit alarm, Patient to call before getting OOB, PT Consult for mobility concerns, PT Consult for assist device competence    Mentation Interventions: Bed/chair exit alarm, Increase mobility    Medication Interventions: Evaluate medications/consider consulting pharmacy, Patient to call before getting OOB    Elimination Interventions: Call light in reach, Patient to call for help with toileting needs    History of Falls Interventions: Bed/chair exit alarm, Consult care management for discharge planning         Problem: Patient Education: Go to Patient Education Activity  Goal: Patient/Family Education  Outcome: Progressing Towards Goal     Problem: Pressure Injury - Risk of  Goal: *Prevention of pressure injury  Description: Document Daniel Scale and appropriate interventions in the flowsheet.   Outcome: Progressing Towards Goal  Note: Pressure Injury Interventions:  Sensory Interventions: Assess changes in LOC, Assess need for specialty bed, Avoid rigorous massage over bony prominences, Float heels, Keep linens dry and wrinkle-free, Maintain/enhance activity level, Minimize linen layers, Monitor skin under medical devices    Moisture Interventions: Maintain skin hydration (lotion/cream), Minimize layers    Activity Interventions: Increase time out of bed, Pressure redistribution bed/mattress(bed type), PT/OT evaluation    Mobility Interventions: HOB 30 degrees or less, Pressure redistribution bed/mattress (bed type), PT/OT evaluation    Nutrition Interventions: Document food/fluid/supplement intake    Friction and Shear Interventions: HOB 30 degrees or less, Lift sheet, Lift team/patient mobility team, Minimize layers                Problem: Patient Education: Go to Patient Education Activity  Goal: Patient/Family Education  Outcome: Progressing Towards Goal     Problem: Risk for Spread of Infection  Goal: Prevent transmission of infectious organism to others  Description: Prevent the transmission of infectious organisms to other patients, staff members, and visitors.   Outcome: Progressing Towards Goal     Problem: Patient Education:  Go to Education Activity  Goal: Patient/Family Education  Outcome: Progressing Towards Goal     Problem: Patient Education: Go to Patient Education Activity  Goal: Patient/Family Education  Outcome: Progressing Towards Goal     Problem: Patient Education: Go to Patient Education Activity  Goal: Patient/Family Education  Outcome: Progressing Towards Goal

## 2021-01-11 NOTE — PROGRESS NOTES
Nicko Garcia NP notified of patients, respiratory status and treatment applied. Respiratory notified.

## 2021-01-11 NOTE — PROGRESS NOTES
Physician Progress Note      PATIENT:               Ela Stinson  CSN #:                  [id number]  :                       1932  ADMIT DATE:       2021 11:43 AM  DISCH DATE:  RESPONDING  PROVIDER #:        VAUGHN RECINOS NP          QUERY TEXT:    Pt admitted with sepsis . Noted documentation of mild protein calorie malnutrition by nutritional consultant. If possible, please document in progress notes and discharge summary:    The medical record reflects the following:  Risk Factors: CVID  Clinical Indicators:  -nutritional consult  Malnutrition Assessment:  Malnutrition Status:  Mild malnutrition  Context:  Acute illness  Findings of the 6 clinical characteristics of malnutrition:  Energy Intake:  Unable to assess(unknown hx at [facility])  Weight Loss:  7.0 - Greater than 5% over 1 month  Body Fat Loss:  (S) Unable to assess,  Muscle Mass Loss:  (S) Unable to assess,  Fluid Accumulation:  No significant fluid accumulation    Treatment: Current Nutrition Therapies:  DIET TUBE FEEDING Jevity 1.5 Jevity 1.5  DIET NPO With Tube Feedings    Thank you,  Lali Elizondo RN, CDI, CCDS  Certified Clinical   [phone]  Options provided:  -- mild protein calorie malnutrition confirmed present on admission  -- Defer to nutritional  consultant documentation regarding mild protein calorie malnutrition  -- Other - I will add my own diagnosis  -- Disagree - Not applicable / Not valid  -- Disagree - Clinically unable to determine / Unknown  -- Refer to Clinical Documentation Reviewer    PROVIDER RESPONSE TEXT:    The diagnosis of mild protein calorie malnutrition was confirmed as present on admission.     Query created by: Jaspreet Ching on 2021 7:25 AM      Electronically signed by:  Lori Falcon NP 2021 7:34 AM

## 2021-01-12 ENCOUNTER — APPOINTMENT (OUTPATIENT)
Dept: ENDOSCOPY | Age: 86
DRG: 871 | End: 2021-01-12
Attending: INTERNAL MEDICINE
Payer: MEDICARE

## 2021-01-12 LAB
GLUCOSE BLD STRIP.AUTO-MCNC: 113 MG/DL (ref 65–100)
GLUCOSE BLD STRIP.AUTO-MCNC: 118 MG/DL (ref 65–100)
GLUCOSE BLD STRIP.AUTO-MCNC: 132 MG/DL (ref 65–100)
GLUCOSE BLD STRIP.AUTO-MCNC: 146 MG/DL (ref 65–100)
PERFORMED BY, TECHID: ABNORMAL
VANCOMYCIN SERPL-MCNC: 18.5 UG/ML

## 2021-01-12 PROCEDURE — 0DP68UZ REMOVAL OF FEEDING DEVICE FROM STOMACH, VIA NATURAL OR ARTIFICIAL OPENING ENDOSCOPIC: ICD-10-PCS | Performed by: INTERNAL MEDICINE

## 2021-01-12 PROCEDURE — 0DH63UZ INSERTION OF FEEDING DEVICE INTO STOMACH, PERCUTANEOUS APPROACH: ICD-10-PCS | Performed by: INTERNAL MEDICINE

## 2021-01-12 PROCEDURE — 36415 COLL VENOUS BLD VENIPUNCTURE: CPT

## 2021-01-12 PROCEDURE — 74011000258 HC RX REV CODE- 258: Performed by: NURSE PRACTITIONER

## 2021-01-12 PROCEDURE — 80202 ASSAY OF VANCOMYCIN: CPT

## 2021-01-12 PROCEDURE — 77030005122 HC CATH GASTMY PEG BSC -B: Performed by: INTERNAL MEDICINE

## 2021-01-12 PROCEDURE — 65270000029 HC RM PRIVATE

## 2021-01-12 PROCEDURE — 3E0G76Z INTRODUCTION OF NUTRITIONAL SUBSTANCE INTO UPPER GI, VIA NATURAL OR ARTIFICIAL OPENING: ICD-10-PCS | Performed by: INTERNAL MEDICINE

## 2021-01-12 PROCEDURE — 76040000007: Performed by: INTERNAL MEDICINE

## 2021-01-12 PROCEDURE — 82962 GLUCOSE BLOOD TEST: CPT

## 2021-01-12 PROCEDURE — 74011250637 HC RX REV CODE- 250/637: Performed by: NURSE PRACTITIONER

## 2021-01-12 PROCEDURE — 74011250636 HC RX REV CODE- 250/636: Performed by: INTERNAL MEDICINE

## 2021-01-12 PROCEDURE — 2709999900 HC NON-CHARGEABLE SUPPLY: Performed by: INTERNAL MEDICINE

## 2021-01-12 PROCEDURE — 74011250636 HC RX REV CODE- 250/636: Performed by: NURSE PRACTITIONER

## 2021-01-12 RX ORDER — FENTANYL CITRATE 50 UG/ML
INJECTION, SOLUTION INTRAMUSCULAR; INTRAVENOUS AS NEEDED
Status: DISCONTINUED | OUTPATIENT
Start: 2021-01-12 | End: 2021-01-17 | Stop reason: HOSPADM

## 2021-01-12 RX ORDER — MIDAZOLAM HYDROCHLORIDE 1 MG/ML
INJECTION, SOLUTION INTRAMUSCULAR; INTRAVENOUS AS NEEDED
Status: DISCONTINUED | OUTPATIENT
Start: 2021-01-12 | End: 2021-01-17 | Stop reason: HOSPADM

## 2021-01-12 RX ADMIN — DEXAMETHASONE SODIUM PHOSPHATE 4 MG: 4 INJECTION, SOLUTION INTRA-ARTICULAR; INTRALESIONAL; INTRAMUSCULAR; INTRAVENOUS; SOFT TISSUE at 12:52

## 2021-01-12 RX ADMIN — DEXTROSE MONOHYDRATE 50 ML/HR: 50 INJECTION, SOLUTION INTRAVENOUS at 17:22

## 2021-01-12 RX ADMIN — METOPROLOL TARTRATE 25 MG: 25 TABLET, FILM COATED ORAL at 20:56

## 2021-01-12 RX ADMIN — VANCOMYCIN HYDROCHLORIDE 500 MG: 500 INJECTION, POWDER, LYOPHILIZED, FOR SOLUTION INTRAVENOUS at 12:44

## 2021-01-12 RX ADMIN — DEXAMETHASONE SODIUM PHOSPHATE 4 MG: 4 INJECTION, SOLUTION INTRA-ARTICULAR; INTRALESIONAL; INTRAMUSCULAR; INTRAVENOUS; SOFT TISSUE at 23:58

## 2021-01-12 RX ADMIN — DEXTROMETHORPHAN 30 MG: 30 SUSPENSION, EXTENDED RELEASE ORAL at 20:56

## 2021-01-12 RX ADMIN — DEXAMETHASONE SODIUM PHOSPHATE 4 MG: 4 INJECTION, SOLUTION INTRA-ARTICULAR; INTRALESIONAL; INTRAMUSCULAR; INTRAVENOUS; SOFT TISSUE at 17:22

## 2021-01-12 RX ADMIN — FUROSEMIDE 40 MG: 10 INJECTION, SOLUTION INTRAMUSCULAR; INTRAVENOUS at 20:56

## 2021-01-12 RX ADMIN — FUROSEMIDE 40 MG: 10 INJECTION, SOLUTION INTRAMUSCULAR; INTRAVENOUS at 12:45

## 2021-01-12 RX ADMIN — DEXAMETHASONE SODIUM PHOSPHATE 4 MG: 4 INJECTION, SOLUTION INTRA-ARTICULAR; INTRALESIONAL; INTRAMUSCULAR; INTRAVENOUS; SOFT TISSUE at 05:29

## 2021-01-12 NOTE — PROGRESS NOTES
Progress Note    Patient: Kerri Simmons MRN: [id number]  SSN: [id number]    YOB: 1932  Age: 80 y.o.   Sex: male      Admit Date: 1/5/2021    LOS: 6 days     Subjective:   Patient report Peg evan Reyna  Past Medical History:   Diagnosis Date    CRI (chronic renal insufficiency) 12/13/2012    Gout 1/4/2011    Hypertension     [facility] ([facility]     Prostate CA ([facility] 1/4/2011        Current Facility-Administered Medications:     dextrose 5% infusion, 50 mL/hr, IntraVENous, CONTINUOUS, Rob Win NP, Last Rate: 50 mL/hr at 01/11/21 1243, 50 mL/hr at 01/11/21 1243    [START ON 1/12/2021] Vancomycin random level 1/12 at 04:00, , Other, ONCE, Gabrielle La MD    albuterol (PROVENTIL HFA, VENTOLIN HFA, PROAIR HFA) inhaler 2 Puff, 2 Puff, Inhalation, Q4H PRN, Rob Win NP    LORazepam (ATIVAN) tablet 1 mg, 1 mg, Oral, Q4H PRN, Rob Win NP    furosemide (LASIX) injection 40 mg, 40 mg, IntraVENous, Q12H, Rob Win NP    sertraline (ZOLOFT) tablet 100 mg, 100 mg, Per G Tube, QPM, Rob Win NP, Stopped at 01/11/21 1800    dextromethorphan (DELSYM) 30 mg/5 mL syrup 30 mg, 30 mg, Per G Tube, Q12H, Rob Win NP, Stopped at 01/11/21 0900    guaiFENesin-dextromethorphan (TUSSI-ORGANIDIN DM)  mg/5 mL oral solution 10 mL, 10 mL, Per G Tube, Q4H PRN, Rob Win NP    metoprolol tartrate (LOPRESSOR) tablet 25 mg, 25 mg, Per G Tube, Q12H, Rob Win NP, Stopped at 01/11/21 0900    ascorbic acid (vitamin C) (VITAMIN C) tablet 500 mg, 500 mg, Per G Tube, DAILY, Rob Win NP, Stopped at 01/11/21 0900    ferrous sulfate tablet 325 mg, 1 Tab, Oral, DAILY WITH BREAKFAST, Rob Win NP, Stopped at 01/11/21 0800    [Held by provider] heparin (porcine) injection 5,000 Units, 5,000 Units, SubCUTAneous, Q12H, Rob Win NP, Stopped at 01/11/21 0400    0.9% sodium chloride infusion 250 mL, 250 mL, IntraVENous, PRN, Rob Win, NP    dexamethasone (DECADRON) 4 mg/mL injection 4 mg, 4 mg, IntraVENous, Q6H, Rob Win NP, 4 mg at 01/11/21 1805    VANCOMYCIN INFORMATION NOTE, , Other, PRN, Freddy Conde MD    calcium acetate(phosphat bind) (PHOSLO) capsule 1,334 mg, 2 Cap, Oral, TID WITH MEALS, Benjamín Tate, NP, Stopped at 01/11/21 0800    sodium chloride (NS) flush 5-10 mL, 5-10 mL, IntraVENous, PRN, Rob Win NP    levoFLOXacin (LEVAQUIN) 750 mg in D5W IVPB, 750 mg, IntraVENous, Q48H, Rob Win NP, Last Rate: 100 mL/hr at 01/11/21 1426, 750 mg at 01/11/21 1426    allopurinoL (ZYLOPRIM) tablet 50 mg, 50 mg, Oral, EVERY OTHER DAY, Rob Win NP, Stopped at 01/11/21 1714    famotidine (PEPCID) tablet 20 mg, 20 mg, Per G Tube, DAILY, Rob Win NP, Stopped at 01/11/21 0900    insulin lispro (HUMALOG) injection, , SubCUTAneous, AC&HS, Rob Win NP, Stopped at 01/11/21 1630    glucose chewable tablet 16 g, 4 Tab, Oral, PRN, Rob Win NP    glucagon (GLUCAGEN) injection 1 mg, 1 mg, IntraMUSCular, PRN, Rob Win NP    dextrose (D50W) injection syrg 12.5-25 g, 25-50 mL, IntraVENous, PRN, Rob Win NP    ondansetron [facility]) injection 4 mg, 4 mg, IntraVENous, Q6H PRN, Rob Win NP    acetaminophen (TYLENOL) tablet 650 mg, 650 mg, Per G Tube, Q4H PRN, Rbo Win NP    acetaminophen (TYLENOL) suppository 650 mg, 650 mg, Rectal, Q4H PRN, Rob Win NP    hydrOXYzine pamoate (VISTARIL) capsule 25 mg, 25 mg, Per G Tube, TID PRN, Rob Win NP, 25 mg at 01/05/21 2142    Objective:     Vitals:    01/11/21 1135 01/11/21 1600 01/11/21 1804 01/11/21 2035   BP:  131/70  131/70   Pulse:  89  89   Resp:  20  20   Temp:  97.4 °F (36.3 °C)  98 °F (36.7 °C)   SpO2: 92% 100%     Weight:   51.6 kg (113 lb 12.8 oz)    Height:            Intake and Output:  Current Shift: No intake/output data recorded.   Last three shifts: 01/10 0701 - 01/11 1900  In: 1520   Out: 600 [Urine:600]    Physical Exam:   Physical Exam   Constitutional: He appears lethargic. He appears cachectic. He has a sickly appearance. He appears ill. HENT:   Head: Atraumatic. Neck: Normal carotid pulses and no hepatojugular reflux present. Cardiovascular: An irregular rhythm present. Pulmonary/Chest: No bradypnea. No respiratory distress. Abdominal: Soft. Normal appearance. He exhibits no distension, no fluid wave and no mass. Musculoskeletal:         General: Deformity and edema present. Neurological: He appears lethargic. Skin: There is erythema.         Lab/Data Review:  Recent Results (from the past 24 hour(s))   GLUCOSE, POC    Collection Time: 01/11/21  8:04 AM   Result Value Ref Range    Glucose (POC) 185 (H) 65 - 100 mg/dL    Performed by [address] [place] [address], POC    Collection Time: 01/11/21 12:16 PM   Result Value Ref Range    Glucose (POC) 157 (H) 65 - 100 mg/dL    Performed by [facility]    CBC W/O DIFF    Collection Time: 01/11/21 12:35 PM   Result Value Ref Range    WBC 14.6 (H) 4.1 - 11.1 K/uL    RBC 3.34 (L) 4.10 - 5.70 M/uL    HGB 9.7 (L) 12.1 - 17.0 g/dL    HCT 29.4 (L) 36.6 - 50.3 %    MCV 88.0 80.0 - 99.0 FL    MCH 29.0 26.0 - 34.0 PG    MCHC 33.0 30.0 - 36.5 g/dL    RDW 17.4 (H) 11.5 - 14.5 %    PLATELET 356 608 - 803 K/uL    MPV 12.3 8.9 - 06.2 FL   METABOLIC PANEL, BASIC    Collection Time: 01/11/21 12:35 PM   Result Value Ref Range    Sodium 140 136 - 145 mmol/L    Potassium 4.1 3.5 - 5.1 mmol/L    Chloride 101 97 - 108 mmol/L    CO2 32 21 - 32 mmol/L    Anion gap 7 5 - 15 mmol/L    Glucose 121 (H) 65 - 100 mg/dL    BUN 82 (H) 6 - 20 mg/dL    Creatinine 2.42 (H) 0.70 - 1.30 mg/dL    BUN/Creatinine ratio 34 (H) 12 - 20      GFR est AA 31 (L) >60 ml/min/1.73m2    GFR est non-AA 25 (L) >60 ml/min/1.73m2    Calcium 10.5 (H) 8.5 - 10.1 mg/dL   VANCOMYCIN, RANDOM    Collection Time: 01/11/21 12:35 PM   Result Value Ref Range    Vancomycin, random 20.1 ug/mL    Reported dose date Not provided      Reported dose: Not provided Units   GLUCOSE, POC    Collection Time: 01/11/21  4:07 PM   Result Value Ref Range    Glucose (POC) 140 (H) 65 - 100 mg/dL    Performed by Tasha Washburn    GLUCOSE, POC    Collection Time: 01/11/21  8:35 PM   Result Value Ref Range    Glucose (POC) 128 (H) 65 - 100 mg/dL    Performed by Tasha Washburn         XR CHEST PORT   Final Result      XR CHEST PORT   Final Result   FINDINGS: Impression: Frontal single view chest.      Unchanged cardiomediastinal silhouette. Calcified aorta. Mild vascular   congestion. No pulmonary edema. Mild hypoinflation. Unchanged bilateral lower lung atelectasis or infiltrates   and left lower lung consolidation. Unchanged small left pleural effusion. No   pneumothorax. No free air under the diaphragm. XR CHEST PORT   Final Result           Assessment:     Active Problems:    Pneumonia (1/5/2021)         COVID +\  PEG feeding .   Pulled out by pt  Needed access for feeding  Plan:   Continue on COVIDs treatment  Will place peg under egd in am    Plan:       Signed By: Sahil Whiting MD     January 11, 2021        Thank you for allowing me to participate in this patients care  Cc Referring Physician   Jaja Paz MD

## 2021-01-12 NOTE — PROGRESS NOTES
CM notes patient is scheduled to have his PEG tube replaced today and patient is on a non-rebreather @10L/min. Plan for the patient to dc to [facility] once clinically stable.     CM will continue to follow for discharge planning needs

## 2021-01-12 NOTE — ROUTINE PROCESS
Bedside shift change report given to Tomás Riley (oncoming nurse) by Jeferson Cowan RN (offgoing nurse). Report included the following information SBAR, Kardex, Intake/Output and MAR.

## 2021-01-12 NOTE — PROGRESS NOTES
Hospitalist Progress Note         MAUREEN Jeffries, FNP-C    Daily Progress Note: 1/12/2021      Subjective:   Subjective   Patient seen on f/u laying in bed  Reports having to have a bowel movement  No other complaints voiced at this time    Review of Systems:   Review of Systems   Reason unable to perform ROS: limited due to fatigue. Objective:   Objective      Vitals:  Patient Vitals for the past 12 hrs:   Temp Pulse Resp BP SpO2   01/12/21 0836 97.4 °F (36.3 °C) 81 20 123/70 100 %   01/12/21 0419 97.4 °F (36.3 °C) 86 20 (!) 120/59 100 %        Physical Exam:  Physical Exam  Vitals signs and nursing note reviewed. Constitutional:       Comments: Fatigue appearing   HENT:      Head: Normocephalic. Ears:      Comments: [place]     Nose: Nose normal.      Mouth/Throat:      Mouth: Mucous membranes are moist.   Eyes:      Extraocular Movements: Extraocular movements intact. Neck:      Musculoskeletal: Normal range of motion. Cardiovascular:      Rate and Rhythm: Normal rate. Pulses: Normal pulses. Heart sounds: Normal heart sounds. Pulmonary:      Breath sounds: Rhonchi present. Comments: Congested cough, non-rebreather  Abdominal:      General: Bowel sounds are normal.      Palpations: Abdomen is soft. Musculoskeletal: Normal range of motion. Skin:     General: Skin is warm and dry. Capillary Refill: Capillary refill takes less than 2 seconds. Neurological:      Mental Status: He is alert.       Comments: Appears fatigue          Lab Results:  Recent Results (from the past 24 hour(s))   GLUCOSE, POC    Collection Time: 01/11/21 12:16 PM   Result Value Ref Range    Glucose (POC) 157 (H) 65 - 100 mg/dL    Performed by [facility]    CBC W/O DIFF    Collection Time: 01/11/21 12:35 PM   Result Value Ref Range    WBC 14.6 (H) 4.1 - 11.1 K/uL    RBC 3.34 (L) 4.10 - 5.70 M/uL    HGB 9.7 (L) 12.1 - 17.0 g/dL    HCT 29.4 (L) 36.6 - 50.3 %    MCV 88.0 80.0 - 99.0 FL    MCH 29.0 26.0 - 34.0 PG    MCHC 33.0 30.0 - 36.5 g/dL    RDW 17.4 (H) 11.5 - 14.5 %    PLATELET 777 336 - 915 K/uL    MPV 12.3 8.9 - 12.4 FL   METABOLIC PANEL, BASIC    Collection Time: 01/11/21 12:35 PM   Result Value Ref Range    Sodium 140 136 - 145 mmol/L    Potassium 4.1 3.5 - 5.1 mmol/L    Chloride 101 97 - 108 mmol/L    CO2 32 21 - 32 mmol/L    Anion gap 7 5 - 15 mmol/L    Glucose 121 (H) 65 - 100 mg/dL    BUN 82 (H) 6 - 20 mg/dL    Creatinine 2.42 (H) 0.70 - 1.30 mg/dL    BUN/Creatinine ratio 34 (H) 12 - 20      GFR est AA 31 (L) >60 ml/min/1.73m2    GFR est non-AA 25 (L) >60 ml/min/1.73m2    Calcium 10.5 (H) 8.5 - 10.1 mg/dL   VANCOMYCIN, RANDOM    Collection Time: 01/11/21 12:35 PM   Result Value Ref Range    Vancomycin, random 20.1 ug/mL    Reported dose date Not provided      Reported dose: Not provided Units   GLUCOSE, POC    Collection Time: 01/11/21  4:07 PM   Result Value Ref Range    Glucose (POC) 140 (H) 65 - 100 mg/dL    Performed by Saul Nobles    GLUCOSE, POC    Collection Time: 01/11/21  8:35 PM   Result Value Ref Range    Glucose (POC) 128 (H) 65 - 100 mg/dL    Performed by Andreas Winchester, RANDOM    Collection Time: 01/12/21  5:27 AM   Result Value Ref Range    Vancomycin, random 18.5 ug/mL   GLUCOSE, POC    Collection Time: 01/12/21  8:34 AM   Result Value Ref Range    Glucose (POC) 118 (H) 65 - 100 mg/dL    Performed by [facility]    GLUCOSE, POC    Collection Time: 01/12/21 11:10 AM   Result Value Ref Range    Glucose (POC) 146 (H) 65 - 100 mg/dL    Performed by Corinne           Diagnostic Images:  CT Results  (Last 48 hours)    None          Current Medications:    Current Facility-Administered Medications:     vancomycin (VANCOCIN) 500 mg in 0.9% sodium chloride (MBP/ADV) 100 mL MBP, 500 mg, IntraVENous, ONCE, Zarefoss, Rob A, NP    dextrose 5% infusion, 50 mL/hr, IntraVENous, CONTINUOUS, Rob Win NP, Last Rate: 50 mL/hr at 01/11/21 2233, 50 mL/hr at 01/11/21 2233    albuterol (PROVENTIL HFA, VENTOLIN HFA, PROAIR HFA) inhaler 2 Puff, 2 Puff, Inhalation, Q4H PRN, Rob Win NP    LORazepam (ATIVAN) tablet 1 mg, 1 mg, Oral, Q4H PRN, Rob Win NP    furosemide (LASIX) injection 40 mg, 40 mg, IntraVENous, Q12H, Rob Win NP, 40 mg at 01/11/21 2236    sertraline (ZOLOFT) tablet 100 mg, 100 mg, Per G Tube, QPM, Rob Win NP, Stopped at 01/11/21 1800    dextromethorphan (DELSYM) 30 mg/5 mL syrup 30 mg, 30 mg, Per G Tube, Q12H, Rob Win NP, Stopped at 01/11/21 0900    guaiFENesin-dextromethorphan (TUSSI-ORGANIDIN DM)  mg/5 mL oral solution 10 mL, 10 mL, Per G Tube, Q4H PRN, Rob Win NP    metoprolol tartrate (LOPRESSOR) tablet 25 mg, 25 mg, Per G Tube, Q12H, Rob Win NP, Stopped at 01/11/21 0900    ascorbic acid (vitamin C) (VITAMIN C) tablet 500 mg, 500 mg, Per G Tube, DAILY, Rob Win NP, Stopped at 01/11/21 0900    ferrous sulfate tablet 325 mg, 1 Tab, Oral, DAILY WITH BREAKFAST, Rob Contreras Ra, NP, Stopped at 01/11/21 0800    [Held by provider] heparin (porcine) injection 5,000 Units, 5,000 Units, SubCUTAneous, Q12H, Rob Win NP, Stopped at 01/11/21 0400    0.9% sodium chloride infusion 250 mL, 250 mL, IntraVENous, PRN, Rob Win NP    dexamethasone (DECADRON) 4 mg/mL injection 4 mg, 4 mg, IntraVENous, Q6H, Rob Win NP, 4 mg at 01/12/21 0529    VANCOMYCIN INFORMATION NOTE, , Other, PRN, Jeanette Luque MD    calcium acetate(phosphat bind) (PHOSLO) capsule 1,334 mg, 2 Cap, Oral, TID WITH MEALS, Elridge TEO Monge, Stopped at 01/11/21 0800    sodium chloride (NS) flush 5-10 mL, 5-10 mL, IntraVENous, PRN, Rob Win NP    levoFLOXacin (LEVAQUIN) 750 mg in D5W IVPB, 750 mg, IntraVENous, Q48H, Rob Wni NP, Last Rate: 100 mL/hr at 01/11/21 1426, 750 mg at 01/11/21 1426    allopurinoL (ZYLOPRIM) tablet 50 mg, 50 mg, Oral, EVERY OTHER DAY, Lise Win NP, Stopped at 01/11/21 1714    famotidine (PEPCID) tablet 20 mg, 20 mg, Per G Tube, DAILY, Rob Win NP, Stopped at 01/11/21 0900    insulin lispro (HUMALOG) injection, , SubCUTAneous, AC&HS, Rob Win NP, Stopped at 01/11/21 1630    glucose chewable tablet 16 g, 4 Tab, Oral, PRN, Rob Win NP    glucagon (GLUCAGEN) injection 1 mg, 1 mg, IntraMUSCular, PRN, Rob Win NP    dextrose (D50W) injection syrg 12.5-25 g, 25-50 mL, IntraVENous, PRN, Rob Win NP    ondansetron [facility]) injection 4 mg, 4 mg, IntraVENous, Q6H PRN, Rob Win NP    acetaminophen (TYLENOL) tablet 650 mg, 650 mg, Per G Tube, Q4H PRN, Rob Win NP    acetaminophen (TYLENOL) suppository 650 mg, 650 mg, Rectal, Q4H PRN, Rob Win NP    hydrOXYzine pamoate (VISTARIL) capsule 25 mg, 25 mg, Per G Tube, TID PRN, Rob Win NP, 25 mg at 01/05/21 2142       ASSESSMENT:  Acute respiratory failure w/ hypoxia:  Covid pneumonia:  -patient currently on 100% nonrebreather, wean as tolerated  -unable to receive remdesivir do to eGFR 25  -unable to receive actemra due to hx of lymphoma and prostate ca  -pulmonary following  -continue supplemental O2, IV decadron, prn inhalers, levaquin and vancomycin    Sepsis:  -secondary to infx process  -blood and urine cxs negative  -continue above treatment plan    WBC 14.6    Dysphagia:  -plan for replacement of peg tube  -Continue gentle IV hydration    BENJI on CKD Stage 3:  -baseline cr appears around 2.0  -pharmacy to renal dose all medications to eGFR 25    Anemia:  -hgb 9.7, continue to trend h/h      PLAN:  -pulmonary following  -continue supplemental O2, IV decadron, prn inhalers, levaquin and vancomycin  -patient currently on 100% nonrebreather, wean as tolerated  -plan for replacement of peg tube  -Continue gentle IV hydration  -CM for dispo planning    DNR  Dvt Prophylaxis  GI Prophylaxis  Home medications reviewed and reconciled      Above treatment plan reviewed and discussed with patient in detail at bedside, all questions answered. Care Plan discussed with: Interdisciplinary team    Total time spent with patient: 30 minutes.     Alexia Diaz NP

## 2021-01-12 NOTE — PROGRESS NOTES
Problem: Falls - Risk of  Goal: *Absence of Falls  Description: Document Lucy Blood Fall Risk and appropriate interventions in the flowsheet. Outcome: Progressing Towards Goal  Note: Fall Risk Interventions:Bed is in the lowest position and wheels are locked, call bell is within reach, bathroom light is on during evening hours, gripper socks are on and patient has been instructed to call out for assistance if needed. As of now, patient is free from falls and will continue to be monitored. Bedside shift change report given to MAXI Albert (oncoming nurse) by Gabriel Padron RN (offgoing nurse). Report included the following information SBAR, Kardex, Intake/Output, MAR and Accordion.

## 2021-01-12 NOTE — CONSULTS
PULMONARY NOTE  [facility]    Name: Enrique Ann MRN: [id number]   : 1932 Hospital: [facility]   Date: 2021  Admission date: 2021 Hospital Day: 8       HPI:     Hospital Problems  Date Reviewed: 2021          Codes Class Noted POA    * (Principal) Pneumonia ICD-10-CM: J18.9  ICD-9-CM: 322  2021 Unknown                   [x] High complexity decision making was performed  [x] See my orders for details      Subjective/Initial History:     I was asked by Jeremy Wayne MD to see Enrique Ann  a 80 y.o.  male in consultation     Excerpts from admission 2021 or consult notes as follows:   19-year-old male came in because of shortness of breath dyspnea generalized weakness initial Covid rapid test was negative he has significant past medical history of chronic kidney disease gout hypertension dysphagia had a PEG tube in place history of lymphoma chest x-ray done which shows left lower lobe infiltrate followed by CAT scan which shows bilateral pleural effusion and atelectasis but now his COVID-19 came back positive and also MRSA and he is disheveled unable to get much history out of the patient on oxygen via nasal cannula so pulmonary consult was called for further evaluation.       Allergies   Allergen Reactions    Pcn [Penicillins] Swelling        MAR reviewed and pertinent medications noted or modified as needed     Current Facility-Administered Medications   Medication    vancomycin (VANCOCIN) 500 mg in 0.9% sodium chloride (MBP/ADV) 100 mL MBP    dextrose 5% infusion    albuterol (PROVENTIL HFA, VENTOLIN HFA, PROAIR HFA) inhaler 2 Puff    LORazepam (ATIVAN) tablet 1 mg    furosemide (LASIX) injection 40 mg    sertraline (ZOLOFT) tablet 100 mg    dextromethorphan (DELSYM) 30 mg/5 mL syrup 30 mg    guaiFENesin-dextromethorphan (TUSSI-ORGANIDIN DM)  mg/5 mL oral solution 10 mL    metoprolol tartrate (LOPRESSOR) tablet 25 mg    ascorbic acid (vitamin C) (VITAMIN C) tablet 500 mg    ferrous sulfate tablet 325 mg    [Held by provider] heparin (porcine) injection 5,000 Units    0.9% sodium chloride infusion 250 mL    dexamethasone (DECADRON) 4 mg/mL injection 4 mg    VANCOMYCIN INFORMATION NOTE    calcium acetate(phosphat bind) (PHOSLO) capsule 1,334 mg    sodium chloride (NS) flush 5-10 mL    levoFLOXacin (LEVAQUIN) 750 mg in D5W IVPB    allopurinoL (ZYLOPRIM) tablet 50 mg    famotidine (PEPCID) tablet 20 mg    insulin lispro (HUMALOG) injection    glucose chewable tablet 16 g    glucagon (GLUCAGEN) injection 1 mg    dextrose (D50W) injection syrg 12.5-25 g    ondansetron (ZOFRAN) injection 4 mg    acetaminophen (TYLENOL) tablet 650 mg    acetaminophen (TYLENOL) suppository 650 mg    hydrOXYzine pamoate (VISTARIL) capsule 25 mg      Patient PCP: Chava Oliva MD  PMH:  has a past medical history of CRI (chronic renal insufficiency) (2012), Gout (2011), Hypertension, Lymphoma ([facility] [address] and Prostate CA ([facility] [address] (2011). PSH:   has a past surgical history that includes hx prostatectomy; hc bone marrow biopsy; endoscopy, colon, diagnostic (); and ir thoracentesis cath w image (12/15/2020). FHX: family history includes Hypertension in his mother. SHX:  reports that he has never smoked. He has never used smokeless tobacco. He reports previous drug use. He reports that he does not drink alcohol.      ROS:    Unable to obtain    Objective:     Vital Signs: Telemetry:    normal sinus rhythm Intake/Output:   Visit Vitals  /70 (BP 1 Location: Left arm)   Pulse 81   Temp 97.4 °F (36.3 °C)   Resp 20   Ht 5' 5.98\" (1.676 m)   Wt 51.6 kg (113 lb 12.8 oz)   SpO2 100%   BMI 18.38 kg/m²       Temp (24hrs), Av.7 °F (36.5 °C), Min:97.4 °F (36.3 °C), Max:98.4 °F (36.9 °C)        O2 Device: Non-rebreather mask O2 Flow Rate (L/min): 10 l/min       Wt Readings from Last 4 Encounters:   21 51.6 kg (113 lb 12.8 oz) 12/28/20 60.8 kg (134 lb)   12/17/20 60.9 kg (134 lb 4.2 oz)   12/05/20 64.3 kg (141 lb 12.1 oz)          Intake/Output Summary (Last 24 hours) at 1/12/2021 1021  Last data filed at 1/12/2021 0533  Gross per 24 hour   Intake 544.17 ml   Output 1050 ml   Net -505.83 ml       Last shift:      No intake/output data recorded. Last 3 shifts: 01/10 1901 - 01/12 0700  In: 1224.2 [I.V.:544.2]  Out: 1050 [Urine:1050]       Physical Exam:     Physical Exam   Constitutional: He appears distressed. HENT:   Head: Normocephalic and atraumatic. Eyes: Pupils are equal, round, and reactive to light. Conjunctivae and EOM are normal.   Neck: Normal range of motion. Neck supple. Cardiovascular: Normal rate and regular rhythm. Pulmonary/Chest: He is in respiratory distress. He has rales. Abdominal: Soft. Musculoskeletal: Normal range of motion. Neurological:   Patient is lethargic drowsy        Labs:    Recent Labs     01/11/21  1235 01/10/21  1150   WBC 14.6* 18.6*   HGB 9.7* 9.7*    282     Recent Labs     01/11/21  1235 01/10/21  1150    138   K 4.1 3.6    102   CO2 32 26   * 113*   BUN 82* 73*   CREA 2.42* 2.26*   CA 10.5* 11.0*     No results for input(s): PH, PCO2, PO2, HCO3, FIO2 in the last 72 hours. No results for input(s): CPK, CKNDX, TROIQ in the last 72 hours. No lab exists for component: CPKMB  No results found for: BNPP, BNP   Lab Results   Component Value Date/Time    Culture result: No significant growth, <10,000 CFU/mL 01/05/2021 08:00 PM    Culture result: No growth 6 days 01/05/2021 03:00 PM    Culture result: No growth 6 days 12/15/2020 08:33 PM     Lab Results   Component Value Date/Time    TSH 1.79 11/20/2020 01:31 PM       Imaging:    CXR Results  (Last 48 hours)               01/11/21 1310  XR CHEST PORT Final result    Narrative:  Chest single view. Comparison single view chest January 7, 2021. More extensive patchy reticular markings through lungs. Although findings are   nonspecific, consider infectious/inflammatory process. Cardiac and mediastinal   structures unchanged. Thoracic aorta atherosclerosis. No pneumothorax or pleural   effusion. Results from [place] encounter on 01/05/21   XR CHEST PORT    Narrative Chest single view. Comparison single view chest January 7, 2021. More extensive patchy reticular markings through lungs. Although findings are  nonspecific, consider infectious/inflammatory process. Cardiac and mediastinal  structures unchanged. Thoracic aorta atherosclerosis. No pneumothorax or pleural  effusion. XR CHEST PORT    Narrative CHF. Comparison chest x-ray 1/5/2021. Impression FINDINGS: Impression: Frontal single view chest.    Unchanged cardiomediastinal silhouette. Calcified aorta. Mild vascular  congestion. No pulmonary edema. Mild hypoinflation. Unchanged bilateral lower lung atelectasis or infiltrates  and left lower lung consolidation. Unchanged small left pleural effusion. No  pneumothorax. No free air under the diaphragm. XR CHEST PORT    Narrative Chest single view. Comparison single view chest 12/28/2020    LLL posteromedial opacity now present. Findings would fit with any clinical  concern for pneumonia. Otherwise, no gross interstitial or alveolar pulmonary edema. Cardiac and  mediastinal structures are unchanged noting thoracic aorta atherosclerosis. No  pneumothorax. Probable small dependent left pleural effusion. Results from [place] encounter on 12/11/20   CT CHEST WO CONT    Narrative Comparison chest x-ray 12/11/2020 [facility] and chest CTA 11/25/2020 [facility]. TECHNIQUE: Axial imaging of the chest without IV contrast, with multiplanar  reformatting, MIPs.   Dose reduction: All CT scans at this facility are performed using dose reduction  optimization techniques as appropriate to a performed exam including the  following: Automated exposure control, adjustments of the mA and/or kV according  to patient's size, or use of iterative reconstruction technique. FINDINGS: Left central catheter distal tip SVC. Mild cardiomegaly. Multivessel  coronary artery calcification. No pericardial effusion. Low-density intracardiac  blood suggests anemia. Calcified thoracic aorta without aneurysm. Pulmonary  arteries are not dilated. No mediastinal or hilar lymphadenopathy. Tubular  structure posterior to the esophagus possibly dilated vessel. Bilateral pleural  effusions and lower lung consolidations without air bronchograms. The left lower  lung is completely consolidated/collapsed. The aerated lungs demonstrate patchy  areas of airspace disease. No axillary adenopathy. Included thyroid is  unremarkable. Included upper abdomen demonstrates lobulated, heterogeneous  structure spleen and small retroperitoneal lymphadenopathy; unchanged. Degenerative changes of the bony structures. Soft tissue anasarca. Impression IMPRESSION:  1. Bilateral pleural effusions and lower lung consolidations, the left lower  lung being completely collapsed, this is similar to previous. 2. Interval development patchy airspace disease in the aerated lungs consistent  with pneumonia. 3. Abnormal spleen, small retroperitoneal lymphadenopathy unchanged. 4. Atherosclerosis. IMPRESSION:   1. Acute hypoxic respiratory failure  2. COVID-19 pneumonia  3. Severe sepsis  4. Dysphagia PEG tube in place with leaking around the PEG site which is fixed  5. Hypernatremia resolved  6. Bilateral pleural effusion with bilateral lower lobe atelectasis  7. Pt is requiring Drug therapy requiring intensive monitoring for toxicity  8. Pt is unstable, unpredictable needing inpatient monitoring; is acutely ill and at high risk of sudden decline and decompensation with severe consequenses and continued end organ dysfunction and failure  9. Prognosis guarded       RECOMMENDATIONS/PLAN:     1.  Patient is on oxygen 100% nonrebreather mask not doing well chest x-ray shows worsening of infiltrate with possible fluid overload will give 1 dose of Lasix  2. Cannot give remdesivir as GFR is 25 cannot give Actemra patient has lymphoma and prostate cancer by history, continue with Decadron and and he is already on vancomycin and Levaquin because of MRSA   3. Patient is on IV fluid D5 because of mild hypernatremia resolved  4. Follow culture results  5. Possible PEG tube malfunction  6. Supplemental O2 to keep sats > 93%  7. Aspiration precautions  8. Labs to follow electrolytes, renal function and and blood counts  9. Glucose monitoring and SSI  10. Bronchial hygiene with respiratory therapy techniques, bronchodilators  11.  DVT, SUP prophylaxis           Radha Chapin MD

## 2021-01-12 NOTE — PROGRESS NOTES
Vancomycin Update:1/12/21  Day:6  Random level today resulted at 18.5. Pt last received ONE dose of Vanc 500 on 01/10 and hasn't received any dose yesterday(1/11). Scheduled a dose of Vancomycin 500 mg IV today and Random for tomorrow AM. Serum creatinine was 2.42 YESTERDAY and no labs today. Pharmacy to follow daily and will make changes to dose and/or frequency based on clinical status.

## 2021-01-13 LAB
ANION GAP SERPL CALC-SCNC: 8 MMOL/L (ref 5–15)
BASOPHILS # BLD: 0 K/UL (ref 0–0.1)
BASOPHILS NFR BLD: 0 % (ref 0–1)
BUN SERPL-MCNC: 98 MG/DL (ref 6–20)
BUN/CREAT SERPL: 34 (ref 12–20)
CA-I BLD-MCNC: 8.1 MG/DL (ref 8.5–10.1)
CHLORIDE SERPL-SCNC: 98 MMOL/L (ref 97–108)
CO2 SERPL-SCNC: 31 MMOL/L (ref 21–32)
CREAT SERPL-MCNC: 2.89 MG/DL (ref 0.7–1.3)
DIFFERENTIAL METHOD BLD: ABNORMAL
EOSINOPHIL # BLD: 0 K/UL (ref 0–0.4)
EOSINOPHIL NFR BLD: 0 % (ref 0–7)
ERYTHROCYTE [DISTWIDTH] IN BLOOD BY AUTOMATED COUNT: 17.3 % (ref 11.5–14.5)
GLUCOSE BLD STRIP.AUTO-MCNC: 115 MG/DL (ref 65–100)
GLUCOSE BLD STRIP.AUTO-MCNC: 121 MG/DL (ref 65–100)
GLUCOSE BLD STRIP.AUTO-MCNC: 132 MG/DL (ref 65–100)
GLUCOSE BLD STRIP.AUTO-MCNC: 141 MG/DL (ref 65–100)
GLUCOSE SERPL-MCNC: 129 MG/DL (ref 65–100)
HCT VFR BLD AUTO: 25.9 % (ref 36.6–50.3)
HGB BLD-MCNC: 8.5 G/DL (ref 12.1–17)
IMM GRANULOCYTES # BLD AUTO: 0.1 K/UL (ref 0–0.04)
IMM GRANULOCYTES NFR BLD AUTO: 1 % (ref 0–0.5)
LYMPHOCYTES # BLD: 0.7 K/UL (ref 0.8–3.5)
LYMPHOCYTES NFR BLD: 5 % (ref 12–49)
MCH RBC QN AUTO: 29.1 PG (ref 26–34)
MCHC RBC AUTO-ENTMCNC: 32.8 G/DL (ref 30–36.5)
MCV RBC AUTO: 88.7 FL (ref 80–99)
MONOCYTES # BLD: 0.2 K/UL (ref 0–1)
MONOCYTES NFR BLD: 1 % (ref 5–13)
NEUTS SEG # BLD: 12.8 K/UL (ref 1.8–8)
NEUTS SEG NFR BLD: 93 % (ref 32–75)
PERFORMED BY, TECHID: ABNORMAL
PLATELET # BLD AUTO: 245 K/UL (ref 150–400)
PMV BLD AUTO: 11.2 FL (ref 8.9–12.9)
POTASSIUM SERPL-SCNC: 3.5 MMOL/L (ref 3.5–5.1)
RBC # BLD AUTO: 2.92 M/UL (ref 4.1–5.7)
SODIUM SERPL-SCNC: 137 MMOL/L (ref 136–145)
VANCOMYCIN SERPL-MCNC: 16.1 UG/ML
WBC # BLD AUTO: 13.6 K/UL (ref 4.1–11.1)

## 2021-01-13 PROCEDURE — 94762 N-INVAS EAR/PLS OXIMTRY CONT: CPT

## 2021-01-13 PROCEDURE — 65270000029 HC RM PRIVATE

## 2021-01-13 PROCEDURE — 82962 GLUCOSE BLOOD TEST: CPT

## 2021-01-13 PROCEDURE — 36415 COLL VENOUS BLD VENIPUNCTURE: CPT

## 2021-01-13 PROCEDURE — 77010033678 HC OXYGEN DAILY

## 2021-01-13 PROCEDURE — 74011000258 HC RX REV CODE- 258: Performed by: NURSE PRACTITIONER

## 2021-01-13 PROCEDURE — 74011250636 HC RX REV CODE- 250/636: Performed by: NURSE PRACTITIONER

## 2021-01-13 PROCEDURE — 74011250637 HC RX REV CODE- 250/637: Performed by: INTERNAL MEDICINE

## 2021-01-13 PROCEDURE — 97530 THERAPEUTIC ACTIVITIES: CPT

## 2021-01-13 PROCEDURE — 74011250637 HC RX REV CODE- 250/637: Performed by: NURSE PRACTITIONER

## 2021-01-13 PROCEDURE — 80202 ASSAY OF VANCOMYCIN: CPT

## 2021-01-13 PROCEDURE — 85025 COMPLETE CBC W/AUTO DIFF WBC: CPT

## 2021-01-13 PROCEDURE — 80048 BASIC METABOLIC PNL TOTAL CA: CPT

## 2021-01-13 RX ORDER — IVERMECTIN 3 MG/1
6 TABLET ORAL ONCE
Status: COMPLETED | OUTPATIENT
Start: 2021-01-13 | End: 2021-01-13

## 2021-01-13 RX ADMIN — HYDROXYZINE PAMOATE 25 MG: 25 CAPSULE ORAL at 01:23

## 2021-01-13 RX ADMIN — FAMOTIDINE 20 MG: 20 TABLET, FILM COATED ORAL at 09:31

## 2021-01-13 RX ADMIN — ACETAMINOPHEN 650 MG: 650 SUPPOSITORY RECTAL at 01:23

## 2021-01-13 RX ADMIN — LEVOFLOXACIN 750 MG: 5 INJECTION, SOLUTION INTRAVENOUS at 15:48

## 2021-01-13 RX ADMIN — VANCOMYCIN HYDROCHLORIDE 500 MG: 500 INJECTION, POWDER, LYOPHILIZED, FOR SOLUTION INTRAVENOUS at 09:34

## 2021-01-13 RX ADMIN — DEXTROMETHORPHAN 30 MG: 30 SUSPENSION, EXTENDED RELEASE ORAL at 10:00

## 2021-01-13 RX ADMIN — DEXTROMETHORPHAN 30 MG: 30 SUSPENSION, EXTENDED RELEASE ORAL at 21:33

## 2021-01-13 RX ADMIN — FERROUS SULFATE TAB 325 MG (65 MG ELEMENTAL FE) 325 MG: 325 (65 FE) TAB at 09:32

## 2021-01-13 RX ADMIN — DEXAMETHASONE SODIUM PHOSPHATE 4 MG: 4 INJECTION, SOLUTION INTRA-ARTICULAR; INTRALESIONAL; INTRAMUSCULAR; INTRAVENOUS; SOFT TISSUE at 21:41

## 2021-01-13 RX ADMIN — METOPROLOL TARTRATE 25 MG: 25 TABLET, FILM COATED ORAL at 21:33

## 2021-01-13 RX ADMIN — ALLOPURINOL 50 MG: 100 TABLET ORAL at 16:00

## 2021-01-13 RX ADMIN — IVERMECTIN 6 MG: 3 TABLET ORAL at 14:00

## 2021-01-13 RX ADMIN — DEXAMETHASONE SODIUM PHOSPHATE 4 MG: 4 INJECTION, SOLUTION INTRA-ARTICULAR; INTRALESIONAL; INTRAMUSCULAR; INTRAVENOUS; SOFT TISSUE at 23:54

## 2021-01-13 RX ADMIN — FUROSEMIDE 40 MG: 10 INJECTION, SOLUTION INTRAMUSCULAR; INTRAVENOUS at 09:32

## 2021-01-13 RX ADMIN — DEXAMETHASONE SODIUM PHOSPHATE 4 MG: 4 INJECTION, SOLUTION INTRA-ARTICULAR; INTRALESIONAL; INTRAMUSCULAR; INTRAVENOUS; SOFT TISSUE at 15:49

## 2021-01-13 RX ADMIN — DEXAMETHASONE SODIUM PHOSPHATE 4 MG: 4 INJECTION, SOLUTION INTRA-ARTICULAR; INTRALESIONAL; INTRAMUSCULAR; INTRAVENOUS; SOFT TISSUE at 06:03

## 2021-01-13 RX ADMIN — SERTRALINE HYDROCHLORIDE 100 MG: 50 TABLET ORAL at 21:41

## 2021-01-13 RX ADMIN — CALCIUM ACETATE 1334 MG: 667 CAPSULE ORAL at 09:32

## 2021-01-13 RX ADMIN — OXYCODONE HYDROCHLORIDE AND ACETAMINOPHEN 500 MG: 500 TABLET ORAL at 09:32

## 2021-01-13 RX ADMIN — CALCIUM ACETATE 1334 MG: 667 CAPSULE ORAL at 15:48

## 2021-01-13 NOTE — PROGRESS NOTES
Hospitalist Progress Note         Sandy Heimlich, APRN, FNP-C    Daily Progress Note: 1/13/2021      Subjective:   Subjective   Patient seen on f/u laying in bed  No complaints voiced at this time    Review of Systems:   Review of Systems   Unable to perform ROS: Mental status change (limited due to altered mental status)         Objective:   Objective      Vitals:  Patient Vitals for the past 12 hrs:   Temp Pulse Resp BP SpO2   01/13/21 1313 98.1 °F (36.7 °C) 72 18 (!) 100/59    01/13/21 1159 (!) 49.1 °F (9.5 °C) 72 18 (!) 100/59 97 %   01/13/21 1129     95 %   01/13/21 0846 98 °F (36.7 °C) 78 18 (!) 99/51 94 %   01/13/21 0411 97.4 °F (36.3 °C) 85 18 105/66 99 %        Physical Exam:  Physical Exam  Vitals signs and nursing note reviewed. Constitutional:       Appearance: He is ill-appearing. HENT:      Head: Normocephalic. Ears:      Comments: [place]     Nose: Nose normal.      Mouth/Throat:      Mouth: Mucous membranes are moist.   Eyes:      Extraocular Movements: Extraocular movements intact. Neck:      Musculoskeletal: Normal range of motion. Cardiovascular:      Rate and Rhythm: Normal rate. Pulses: Normal pulses. Heart sounds: Normal heart sounds. Pulmonary:      Comments: Diminished air entry, 5L NC  Abdominal:      General: Bowel sounds are normal.      Palpations: Abdomen is soft. Genitourinary:     Comments: [place] in situ  Musculoskeletal: Normal range of motion. Skin:     General: Skin is warm and dry. Capillary Refill: Capillary refill takes less than 2 seconds. Neurological:      Mental Status: He is alert. He is disoriented.       Comments: Pleasantly confused          Lab Results:  Recent Results (from the past 24 hour(s))   GLUCOSE, POC    Collection Time: 01/12/21  4:23 PM   Result Value Ref Range    Glucose (POC) 113 (H) 65 - 100 mg/dL    Performed by Sandrea Schaumann    GLUCOSE, POC    Collection Time: 01/12/21  9:21 PM   Result Value Ref Range    Glucose (POC) 132 (H) 65 - 100 mg/dL    Performed by NITZA Moreno    Collection Time: 01/13/21  5:11 AM   Result Value Ref Range    Vancomycin, random 49.0 ug/mL   METABOLIC PANEL, BASIC    Collection Time: 01/13/21  5:11 AM   Result Value Ref Range    Sodium 137 136 - 145 mmol/L    Potassium 3.5 3.5 - 5.1 mmol/L    Chloride 98 97 - 108 mmol/L    CO2 31 21 - 32 mmol/L    Anion gap 8 5 - 15 mmol/L    Glucose 129 (H) 65 - 100 mg/dL    BUN 98 (H) 6 - 20 mg/dL    Creatinine 2.89 (H) 0.70 - 1.30 mg/dL    BUN/Creatinine ratio 34 (H) 12 - 20      GFR est AA 25 (L) >60 ml/min/1.73m2    GFR est non-AA 21 (L) >60 ml/min/1.73m2    Calcium 8.1 (L) 8.5 - 10.1 mg/dL   CBC WITH AUTOMATED DIFF    Collection Time: 01/13/21  5:11 AM   Result Value Ref Range    WBC 13.6 (H) 4.1 - 11.1 K/uL    RBC 2.92 (L) 4.10 - 5.70 M/uL    HGB 8.5 (L) 12.1 - 17.0 g/dL    HCT 25.9 (L) 36.6 - 50.3 %    MCV 88.7 80.0 - 99.0 FL    MCH 29.1 26.0 - 34.0 PG    MCHC 32.8 30.0 - 36.5 g/dL    RDW 17.3 (H) 11.5 - 14.5 %    PLATELET 715 179 - 300 K/uL    MPV 11.2 8.9 - 12.9 FL    NEUTROPHILS 93 (H) 32 - 75 %    LYMPHOCYTES 5 (L) 12 - 49 %    MONOCYTES 1 (L) 5 - 13 %    EOSINOPHILS 0 0 - 7 %    BASOPHILS 0 0 - 1 %    IMMATURE GRANULOCYTES 1 (H) 0.0 - 0.5 %    ABS. NEUTROPHILS 12.8 (H) 1.8 - 8.0 K/UL    ABS. LYMPHOCYTES 0.7 (L) 0.8 - 3.5 K/UL    ABS. MONOCYTES 0.2 0.0 - 1.0 K/UL    ABS. EOSINOPHILS 0.0 0.0 - 0.4 K/UL    ABS. BASOPHILS 0.0 0.0 - 0.1 K/UL    ABS. IMM.  GRANS. 0.1 (H) 0.00 - 0.04 K/UL    DF AUTOMATED     GLUCOSE, POC    Collection Time: 01/13/21  8:50 AM   Result Value Ref Range    Glucose (POC) 132 (H) 65 - 100 mg/dL    Performed by Cody Newport    GLUCOSE, POC    Collection Time: 01/13/21 11:13 AM   Result Value Ref Range    Glucose (POC) 121 (H) 65 - 100 mg/dL    Performed by Cody Newport           Diagnostic Images:  CT Results  (Last 48 hours)    None          Current Medications:    Current Facility-Administered Medications:    ivermectin (STROMECTOL) tablet 6 mg, 6 mg, Oral, ONCE, Michael Grove MD    fentaNYL citrate (PF) injection, , , PRN, Leydi Amato MD, 25 mcg at 01/12/21 1348    midazolam (PF) (VERSED) injection, , , PRN, Leydi Amato MD, 1 mg at 01/12/21 1348    dextrose 5% infusion, 50 mL/hr, IntraVENous, CONTINUOUS, Rob Win NP, Last Rate: 50 mL/hr at 01/12/21 2056, 50 mL/hr at 01/12/21 2056    albuterol (PROVENTIL HFA, VENTOLIN HFA, PROAIR HFA) inhaler 2 Puff, 2 Puff, Inhalation, Q4H PRN, Rob Win NP    LORazepam (ATIVAN) tablet 1 mg, 1 mg, Oral, Q4H PRN, Rob Win NP    furosemide (LASIX) injection 40 mg, 40 mg, IntraVENous, Q12H, Rob Win NP, 40 mg at 01/13/21 0932    sertraline (ZOLOFT) tablet 100 mg, 100 mg, Per G Tube, QPM, Rob Win NP, Stopped at 01/11/21 1800    dextromethorphan (DELSYM) 30 mg/5 mL syrup 30 mg, 30 mg, Per G Tube, Q12H, Rob Win NP, 30 mg at 01/13/21 1000    guaiFENesin-dextromethorphan (TUSSI-ORGANIDIN DM)  mg/5 mL oral solution 10 mL, 10 mL, Per G Tube, Q4H PRN, Rob Win NP    metoprolol tartrate (LOPRESSOR) tablet 25 mg, 25 mg, Per G Tube, Q12H, Rob Win NP, Stopped at 01/13/21 0900    ascorbic acid (vitamin C) (VITAMIN C) tablet 500 mg, 500 mg, Per G Tube, DAILY, Rob Win NP, 500 mg at 01/13/21 0932    ferrous sulfate tablet 325 mg, 1 Tab, Oral, DAILY WITH BREAKFAST, Rob Win NP, 325 mg at 01/13/21 0932    [Held by provider] heparin (porcine) injection 5,000 Units, 5,000 Units, SubCUTAneous, Q12H, Rob iWn NP, Stopped at 01/11/21 0400    0.9% sodium chloride infusion 250 mL, 250 mL, IntraVENous, PRN, Rob Win NP    dexamethasone (DECADRON) 4 mg/mL injection 4 mg, 4 mg, IntraVENous, Q6H, Rob Win NP, 4 mg at 01/13/21 5432    VANCOMYCIN INFORMATION NOTE, , Other, PRN, Rao Salguero MD    calcium acetate(phosphat bind) (PHOSLO) capsule 1,334 mg, 2 Cap, Oral, TID WITH MEALS, Helene Barros, NP, 1,334 mg at 01/13/21 0932    sodium chloride (NS) flush 5-10 mL, 5-10 mL, IntraVENous, PRN, Rob Win NP    levoFLOXacin (LEVAQUIN) 750 mg in D5W IVPB, 750 mg, IntraVENous, Q48H, Rob Win NP, Last Rate: 100 mL/hr at 01/11/21 1426, 750 mg at 01/11/21 1426    allopurinoL (ZYLOPRIM) tablet 50 mg, 50 mg, Oral, EVERY OTHER DAY, Rob Win NP, Stopped at 01/11/21 1714    famotidine (PEPCID) tablet 20 mg, 20 mg, Per G Tube, DAILY, Rob Win NP, 20 mg at 01/13/21 0931    insulin lispro (HUMALOG) injection, , SubCUTAneous, AC&HS, Rob Win NP, Stopped at 01/11/21 1630    glucose chewable tablet 16 g, 4 Tab, Oral, PRN, Rob Win NP    glucagon (GLUCAGEN) injection 1 mg, 1 mg, IntraMUSCular, PRN, Rob Win NP    dextrose (D50W) injection syrg 12.5-25 g, 25-50 mL, IntraVENous, PRN, Rob Win NP    ondansetron [facility]) injection 4 mg, 4 mg, IntraVENous, Q6H PRN, Rob Win NP    acetaminophen (TYLENOL) tablet 650 mg, 650 mg, Per G Tube, Q4H PRN, Rob Win NP    acetaminophen (TYLENOL) suppository 650 mg, 650 mg, Rectal, Q4H PRN, Rob Win NP, 650 mg at 01/13/21 0123    hydrOXYzine pamoate (VISTARIL) capsule 25 mg, 25 mg, Per G Tube, TID PRN, Rob Win NP, 25 mg at 01/13/21 0123       ASSESSMENT:    Acute respiratory failure w/ hypoxia:  Covid pneumonia:  -patient currently on 5L NC, wean as tolerated  -unable to receive remdesivir do to eGFR 25  -unable to receive actemra due to hx of lymphoma and prostate ca  -pulmonary following  -continue supplemental O2, IV decadron, prn inhalers, levaquin and vancomycin    Sepsis:  -secondary to infx process  -blood and urine cxs negative  -continue above treatment plan    WBC 13.6 previously 14.6    Dysphagia:  -plan for replacement of peg tube  -Continue gentle IV hydration    BENJI on CKD Stage 3:  -baseline cr appears around 2.0, current Cr 2.89  -pharmacy to renal dose all medications to eGFR 25  -repeat labs in am    Anemia:  -hgb 8.5, continue to trend h/h, transfuse for hgb <7  -repeat labs in am      PLAN:  -pulmonary following  -continue supplemental O2, IV decadron, prn inhalers, levaquin and vancomycin  -patient currently on 5L NC wean as tolerated  -s/p replacement of peg tube, continue feeds  -Continue gentle IV hydration  -CM for dispo planning  -hold lasix due worsening cr function      DNR  Dvt Prophylaxis  GI Prophylaxis  Home medications reviewed and reconciled      Above treatment plan reviewed and discussed with daughter Ferdinand Boeck [phone] in detail over the phone x 20 minutes, all questions answered. Care Plan discussed with: Interdisciplinary team    Total time spent with patient: 30 minutes.     Susannah Vera NP

## 2021-01-13 NOTE — PROGRESS NOTES
Progress Note    Patient: Saniya Mendoza MRN: [id number]  SSN: [id number]    YOB: 1932  Age: 80 y.o.   Sex: male      Admit Date: 1/5/2021    LOS: 8 days     Subjective:   Patient had PEG tube replaced yesterday, today patient appeared to be no complain no agitation  Past Medical History:   Diagnosis Date    CRI (chronic renal insufficiency) 12/13/2012    Gout 1/4/2011    Hypertension     [facility] ([facility]     Prostate CA ([facility] 1/4/2011        Current Facility-Administered Medications:     fentaNYL citrate (PF) injection, , , PRN, Bri Francis MD, 25 mcg at 01/12/21 1348    midazolam (PF) (VERSED) injection, , , PRN, Bri Francis MD, 1 mg at 01/12/21 1348    dextrose 5% infusion, 50 mL/hr, IntraVENous, CONTINUOUS, Rob Win NP, Last Rate: 50 mL/hr at 01/12/21 2056, 50 mL/hr at 01/12/21 2056    albuterol (PROVENTIL HFA, VENTOLIN HFA, PROAIR HFA) inhaler 2 Puff, 2 Puff, Inhalation, Q4H PRN, Rob Win NP    LORazepam (ATIVAN) tablet 1 mg, 1 mg, Oral, Q4H PRN, Rob Win NP    sertraline (ZOLOFT) tablet 100 mg, 100 mg, Per G Tube, QPM, Rob Win NP, Stopped at 01/11/21 1800    dextromethorphan (DELSYM) 30 mg/5 mL syrup 30 mg, 30 mg, Per G Tube, Q12H, Rob Win NP, 30 mg at 01/13/21 1000    guaiFENesin-dextromethorphan (TUSSI-ORGANIDIN DM)  mg/5 mL oral solution 10 mL, 10 mL, Per G Tube, Q4H PRN, Rob Win NP    metoprolol tartrate (LOPRESSOR) tablet 25 mg, 25 mg, Per G Tube, Q12H, Rob Win NP, Stopped at 01/13/21 0900    ascorbic acid (vitamin C) (VITAMIN C) tablet 500 mg, 500 mg, Per G Tube, DAILY, Rob Win NP, 500 mg at 01/13/21 0932    ferrous sulfate tablet 325 mg, 1 Tab, Oral, DAILY WITH BREAKFAST, Rob Win NP, 325 mg at 01/13/21 0932    [Held by provider] heparin (porcine) injection 5,000 Units, 5,000 Units, SubCUTAneous, Q12H, Rob Win NP, Stopped at 01/11/21 0400    0.9% sodium chloride infusion 250 mL, 250 mL, IntraVENous, PRN, Rob Win NP    dexamethasone (DECADRON) 4 mg/mL injection 4 mg, 4 mg, IntraVENous, Q6H, Rob Win NP, 4 mg at 01/13/21 1549    VANCOMYCIN INFORMATION NOTE, , Other, PRN, Leonel Golden MD    calcium acetate(phosphat bind) (PHOSLO) capsule 1,334 mg, 2 Cap, Oral, TID WITH MEALS, Teresa Driver NP, Stopped at 01/13/21 1700    sodium chloride (NS) flush 5-10 mL, 5-10 mL, IntraVENous, PRN, Rob Win NP    levoFLOXacin (LEVAQUIN) 750 mg in D5W IVPB, 750 mg, IntraVENous, Q48H, Rob Win NP, Last Rate: 100 mL/hr at 01/13/21 1548, 750 mg at 01/13/21 1548    allopurinoL (ZYLOPRIM) tablet 50 mg, 50 mg, Oral, EVERY OTHER DAY, Rob Win NP, 50 mg at 01/13/21 1600    famotidine (PEPCID) tablet 20 mg, 20 mg, Per G Tube, DAILY, Rob Win NP, 20 mg at 01/13/21 0931    insulin lispro (HUMALOG) injection, , SubCUTAneous, AC&HS, Rob Win NP, Stopped at 01/11/21 1630    glucose chewable tablet 16 g, 4 Tab, Oral, PRN, Rob iWn NP    glucagon (GLUCAGEN) injection 1 mg, 1 mg, IntraMUSCular, PRN, Rob Win NP    dextrose (D50W) injection syrg 12.5-25 g, 25-50 mL, IntraVENous, PRN, Rob Win NP    ondansetron [facility]) injection 4 mg, 4 mg, IntraVENous, Q6H PRN, Rob Win NP    acetaminophen (TYLENOL) tablet 650 mg, 650 mg, Per G Tube, Q4H PRN, Rob Win NP    acetaminophen (TYLENOL) suppository 650 mg, 650 mg, Rectal, Q4H PRN, Rob Win NP, 650 mg at 01/13/21 0123    hydrOXYzine pamoate (VISTARIL) capsule 25 mg, 25 mg, Per G Tube, TID PRN, Rob Win NP, 25 mg at 01/13/21 0123    Objective:     Vitals:    01/13/21 1129 01/13/21 1159 01/13/21 1313 01/13/21 1635   BP:  (!) 100/59 (!) 100/59 (!) 104/47   Pulse:  72 72 76   Resp:  18 18 18   Temp:  (!) 49.1 °F (9.5 °C) 98.1 °F (36.7 °C) 98 °F (36.7 °C)   SpO2: 95% 97%  95%   Weight:       Height:            Intake and Output:  Current Shift: No intake/output data recorded. Last three shifts: 01/11 1901 - 01/13 0700  In: 2123.3 [I.V.:2123.3]  Out: 1450 [Urine:1450]    Physical Exam:   Physical Exam   Constitutional: He appears lethargic. He appears cachectic. He has a sickly appearance. He appears ill. HENT:   Head: Atraumatic. Neck: Normal carotid pulses and no hepatojugular reflux present. Cardiovascular: An irregular rhythm present. Pulmonary/Chest: No bradypnea. No respiratory distress. Abdominal: Soft. Normal appearance. He exhibits no distension, no fluid wave and no mass. Musculoskeletal:         General: Deformity present. No edema. Neurological: He appears lethargic. Skin: No erythema. PEG tube site.   without the drainage bleeding hematoma    Lab/Data Review:  Recent Results (from the past 24 hour(s))   GLUCOSE, POC    Collection Time: 01/12/21  9:21 PM   Result Value Ref Range    Glucose (POC) 132 (H) 65 - 100 mg/dL    Performed by NITZA Crespo    Collection Time: 01/13/21  5:11 AM   Result Value Ref Range    Vancomycin, random 39.3 ug/mL   METABOLIC PANEL, BASIC    Collection Time: 01/13/21  5:11 AM   Result Value Ref Range    Sodium 137 136 - 145 mmol/L    Potassium 3.5 3.5 - 5.1 mmol/L    Chloride 98 97 - 108 mmol/L    CO2 31 21 - 32 mmol/L    Anion gap 8 5 - 15 mmol/L    Glucose 129 (H) 65 - 100 mg/dL    BUN 98 (H) 6 - 20 mg/dL    Creatinine 2.89 (H) 0.70 - 1.30 mg/dL    BUN/Creatinine ratio 34 (H) 12 - 20      GFR est AA 25 (L) >60 ml/min/1.73m2    GFR est non-AA 21 (L) >60 ml/min/1.73m2    Calcium 8.1 (L) 8.5 - 10.1 mg/dL   CBC WITH AUTOMATED DIFF    Collection Time: 01/13/21  5:11 AM   Result Value Ref Range    WBC 13.6 (H) 4.1 - 11.1 K/uL    RBC 2.92 (L) 4.10 - 5.70 M/uL    HGB 8.5 (L) 12.1 - 17.0 g/dL    HCT 25.9 (L) 36.6 - 50.3 %    MCV 88.7 80.0 - 99.0 FL    MCH 29.1 26.0 - 34.0 PG    MCHC 32.8 30.0 - 36.5 g/dL    RDW 17.3 (H) 11.5 - 14.5 %    PLATELET 544 239 - 573 K/uL    MPV 11.2 8.9 - 12.9 FL    NEUTROPHILS 93 (H) 32 - 75 %    LYMPHOCYTES 5 (L) 12 - 49 %    MONOCYTES 1 (L) 5 - 13 %    EOSINOPHILS 0 0 - 7 %    BASOPHILS 0 0 - 1 %    IMMATURE GRANULOCYTES 1 (H) 0.0 - 0.5 %    ABS. NEUTROPHILS 12.8 (H) 1.8 - 8.0 K/UL    ABS. LYMPHOCYTES 0.7 (L) 0.8 - 3.5 K/UL    ABS. MONOCYTES 0.2 0.0 - 1.0 K/UL    ABS. EOSINOPHILS 0.0 0.0 - 0.4 K/UL    ABS. BASOPHILS 0.0 0.0 - 0.1 K/UL    ABS. IMM. GRANS. 0.1 (H) 0.00 - 0.04 K/UL    DF AUTOMATED     GLUCOSE, POC    Collection Time: 01/13/21  8:50 AM   Result Value Ref Range    Glucose (POC) 132 (H) 65 - 100 mg/dL    Performed by [facility]    GLUCOSE, POC    Collection Time: 01/13/21 11:13 AM   Result Value Ref Range    Glucose (POC) 121 (H) 65 - 100 mg/dL    Performed by [facility]    GLUCOSE, POC    Collection Time: 01/13/21  4:32 PM   Result Value Ref Range    Glucose (POC) 141 (H) 65 - 100 mg/dL    Performed by [facility]         XR CHEST PORT   Final Result      XR CHEST PORT   Final Result   FINDINGS: Impression: Frontal single view chest.      Unchanged cardiomediastinal silhouette. Calcified aorta. Mild vascular   congestion. No pulmonary edema. Mild hypoinflation. Unchanged bilateral lower lung atelectasis or infiltrates   and left lower lung consolidation. Unchanged small left pleural effusion. No   pneumothorax. No free air under the diaphragm. XR CHEST PORT   Final Result           Assessment:     Principal Problem:    Pneumonia (1/5/2021)         COVID +\  PEG feeding .   Pulled out by pt  Needed access for feeding  Plan:   Continue on COVIDs treatment  Continue tube  feeding    Plan:       Signed By: Susannah Maher MD     January 13, 2021        Thank you for allowing me to participate in this patients care  Cc Referring Physician   Nandini Flower MD

## 2021-01-13 NOTE — CONSULTS
PULMONARY NOTE  [facility]    Name: Vee Rothman MRN: [id number]   : 1932 Hospital: [address] [place] [address]   Date: 2021  Admission date: 2021 Hospital Day: 9       HPI:     Hospital Problems  Date Reviewed: 2021          Codes Class Noted POA    * (Principal) Pneumonia ICD-10-CM: J18.9  ICD-9-CM: 015  2021 Unknown                   [x] High complexity decision making was performed  [x] See my orders for details      Subjective/Initial History:     I was asked by Edie Vail MD to see Vee Rothman  a 80 y.o.  male in consultation     Excerpts from admission 2021 or consult notes as follows:   55-year-old male came in because of shortness of breath dyspnea generalized weakness initial Covid rapid test was negative he has significant past medical history of chronic kidney disease gout hypertension dysphagia had a PEG tube in place history of lymphoma chest x-ray done which shows left lower lobe infiltrate followed by CAT scan which shows bilateral pleural effusion and atelectasis but now his COVID-19 came back positive and also MRSA and he is disheveled unable to get much history out of the patient on oxygen via nasal cannula so pulmonary consult was called for further evaluation.       Allergies   Allergen Reactions    Pcn [Penicillins] Swelling        MAR reviewed and pertinent medications noted or modified as needed     Current Facility-Administered Medications   Medication    fentaNYL citrate (PF) injection    midazolam (PF) (VERSED) injection    dextrose 5% infusion    albuterol (PROVENTIL HFA, VENTOLIN HFA, PROAIR HFA) inhaler 2 Puff    LORazepam (ATIVAN) tablet 1 mg    furosemide (LASIX) injection 40 mg    sertraline (ZOLOFT) tablet 100 mg    dextromethorphan (DELSYM) 30 mg/5 mL syrup 30 mg    guaiFENesin-dextromethorphan (TUSSI-ORGANIDIN DM)  mg/5 mL oral solution 10 mL    metoprolol tartrate (LOPRESSOR) tablet 25 mg    ascorbic acid (vitamin C) (VITAMIN C) tablet 500 mg    ferrous sulfate tablet 325 mg    [Held by provider] heparin (porcine) injection 5,000 Units    0.9% sodium chloride infusion 250 mL    dexamethasone (DECADRON) 4 mg/mL injection 4 mg    VANCOMYCIN INFORMATION NOTE    calcium acetate(phosphat bind) (PHOSLO) capsule 1,334 mg    sodium chloride (NS) flush 5-10 mL    levoFLOXacin (LEVAQUIN) 750 mg in D5W IVPB    allopurinoL (ZYLOPRIM) tablet 50 mg    famotidine (PEPCID) tablet 20 mg    insulin lispro (HUMALOG) injection    glucose chewable tablet 16 g    glucagon (GLUCAGEN) injection 1 mg    dextrose (D50W) injection syrg 12.5-25 g    ondansetron (ZOFRAN) injection 4 mg    acetaminophen (TYLENOL) tablet 650 mg    acetaminophen (TYLENOL) suppository 650 mg    hydrOXYzine pamoate (VISTARIL) capsule 25 mg      Patient PCP: Pili Soto MD  PMH:  has a past medical history of CRI (chronic renal insufficiency) (2012), Gout (2011), Hypertension, Lymphoma ([facility] [address] and Prostate CA ([facility] [address] (2011). PSH:   has a past surgical history that includes hx prostatectomy; hc bone marrow biopsy; endoscopy, colon, diagnostic (); and ir thoracentesis cath w image (12/15/2020). FHX: family history includes Hypertension in his mother. SHX:  reports that he has never smoked. He has never used smokeless tobacco. He reports previous drug use. He reports that he does not drink alcohol.      ROS:    Unable to obtain    Objective:     Vital Signs: Telemetry:    normal sinus rhythm Intake/Output:   Visit Vitals  BP (!) 99/51   Pulse 78   Temp 98 °F (36.7 °C)   Resp 18   Ht 5' 5.98\" (1.676 m)   Wt 50.3 kg (111 lb)   SpO2 94%   BMI 17.92 kg/m²       Temp (24hrs), Av.9 °F (36.6 °C), Min:97.4 °F (36.3 °C), Max:98.8 °F (37.1 °C)        O2 Device: Non-rebreather mask O2 Flow Rate (L/min): 6 l/min       Wt Readings from Last 4 Encounters:   21 50.3 kg (111 lb)   20 60.8 kg (134 lb)   20 60.9 kg (134 lb 4.2 oz)   12/05/20 64.3 kg (141 lb 12.1 oz)          Intake/Output Summary (Last 24 hours) at 1/13/2021 1035  Last data filed at 1/13/2021 0411  Gross per 24 hour   Intake 1579.17 ml   Output 1000 ml   Net 579.17 ml       Last shift:      No intake/output data recorded. Last 3 shifts: 01/11 1901 - 01/13 0700  In: 2123.3 [I.V.:2123.3]  Out: 1450 [Urine:1450]       Physical Exam:     Physical Exam   Constitutional: He appears distressed. HENT:   Head: Normocephalic and atraumatic. Eyes: Pupils are equal, round, and reactive to light. Conjunctivae and EOM are normal.   Neck: Normal range of motion. Neck supple. Cardiovascular: Normal rate and regular rhythm. Pulmonary/Chest: He is in respiratory distress. He has rales. Abdominal: Soft. Musculoskeletal: Normal range of motion. Neurological:   Patient is lethargic drowsy        Labs:    Recent Labs     01/13/21  0511 01/11/21  1235 01/10/21  1150   WBC 13.6* 14.6* 18.6*   HGB 8.5* 9.7* 9.7*    297 282     Recent Labs     01/13/21  0511 01/11/21  1235 01/10/21  1150    140 138   K 3.5 4.1 3.6   CL 98 101 102   CO2 31 32 26   * 121* 113*   BUN 98* 82* 73*   CREA 2.89* 2.42* 2.26*   CA 8.1* 10.5* 11.0*     No results for input(s): PH, PCO2, PO2, HCO3, FIO2 in the last 72 hours. No results for input(s): CPK, CKNDX, TROIQ in the last 72 hours. No lab exists for component: CPKMB  No results found for: BNPP, BNP   Lab Results   Component Value Date/Time    Culture result: No significant growth, <10,000 CFU/mL 01/05/2021 08:00 PM    Culture result: No growth 6 days 01/05/2021 03:00 PM    Culture result: No growth 6 days 12/15/2020 08:33 PM     Lab Results   Component Value Date/Time    TSH 1.79 11/20/2020 01:31 PM       Imaging:    CXR Results  (Last 48 hours)               01/11/21 1310  XR CHEST PORT Final result    Narrative:  Chest single view. Comparison single view chest January 7, 2021.        More extensive patchy reticular markings through lungs. Although findings are   nonspecific, consider infectious/inflammatory process. Cardiac and mediastinal   structures unchanged. Thoracic aorta atherosclerosis. No pneumothorax or pleural   effusion. Results from [place] encounter on 01/05/21   XR CHEST PORT    Narrative Chest single view. Comparison single view chest January 7, 2021. More extensive patchy reticular markings through lungs. Although findings are  nonspecific, consider infectious/inflammatory process. Cardiac and mediastinal  structures unchanged. Thoracic aorta atherosclerosis. No pneumothorax or pleural  effusion. XR CHEST PORT    Narrative CHF. Comparison chest x-ray 1/5/2021. Impression FINDINGS: Impression: Frontal single view chest.    Unchanged cardiomediastinal silhouette. Calcified aorta. Mild vascular  congestion. No pulmonary edema. Mild hypoinflation. Unchanged bilateral lower lung atelectasis or infiltrates  and left lower lung consolidation. Unchanged small left pleural effusion. No  pneumothorax. No free air under the diaphragm. XR CHEST PORT    Narrative Chest single view. Comparison single view chest 12/28/2020    LLL posteromedial opacity now present. Findings would fit with any clinical  concern for pneumonia. Otherwise, no gross interstitial or alveolar pulmonary edema. Cardiac and  mediastinal structures are unchanged noting thoracic aorta atherosclerosis. No  pneumothorax. Probable small dependent left pleural effusion. Results from [place] encounter on 12/11/20   CT CHEST WO CONT    Narrative Comparison chest x-ray 12/11/2020 [facility] and chest CTA 11/25/2020 [facility]. TECHNIQUE: Axial imaging of the chest without IV contrast, with multiplanar  reformatting, MIPs.   Dose reduction: All CT scans at this facility are performed using dose reduction  optimization techniques as appropriate to a performed exam including the  following: Automated exposure control, adjustments of the mA and/or kV according  to patient's size, or use of iterative reconstruction technique. FINDINGS: Left central catheter distal tip SVC. Mild cardiomegaly. Multivessel  coronary artery calcification. No pericardial effusion. Low-density intracardiac  blood suggests anemia. Calcified thoracic aorta without aneurysm. Pulmonary  arteries are not dilated. No mediastinal or hilar lymphadenopathy. Tubular  structure posterior to the esophagus possibly dilated vessel. Bilateral pleural  effusions and lower lung consolidations without air bronchograms. The left lower  lung is completely consolidated/collapsed. The aerated lungs demonstrate patchy  areas of airspace disease. No axillary adenopathy. Included thyroid is  unremarkable. Included upper abdomen demonstrates lobulated, heterogeneous  structure spleen and small retroperitoneal lymphadenopathy; unchanged. Degenerative changes of the bony structures. Soft tissue anasarca. Impression IMPRESSION:  1. Bilateral pleural effusions and lower lung consolidations, the left lower  lung being completely collapsed, this is similar to previous. 2. Interval development patchy airspace disease in the aerated lungs consistent  with pneumonia. 3. Abnormal spleen, small retroperitoneal lymphadenopathy unchanged. 4. Atherosclerosis. IMPRESSION:   1. Acute hypoxic respiratory failure  2. COVID-19 pneumonia  3. Severe sepsis  4. Dysphagia PEG tube in place with leaking around the PEG site which is fixed  5. Hypernatremia resolved  6. Bilateral pleural effusion with bilateral lower lobe atelectasis  7. Pt is requiring Drug therapy requiring intensive monitoring for toxicity  8. Pt is unstable, unpredictable needing inpatient monitoring; is acutely ill and at high risk of sudden decline and decompensation with severe consequenses and continued end organ dysfunction and failure  9.  Prognosis guarded RECOMMENDATIONS/PLAN:     1. Patient is on oxygen 100% nonrebreather mask not doing well chest x-ray shows worsening of infiltrate with possible fluid overload will we need to high flow nasal cannula or regular nasal cannula  2. Cannot give remdesivir as GFR is 25 and cannot give Actemra patient has lymphoma and prostate cancer by history, continue with Decadron and and he is already on vancomycin and Levaquin because of MRSA I will give 1 dose of ivermectin  3. Patient is on IV fluid D5 because of mild hypernatremia resolved  4. Follow culture results  5. Possible PEG tube malfunction  6. Supplemental O2 to keep sats > 93%  7. Aspiration precautions  8. Labs to follow electrolytes, renal function and and blood counts  9. Glucose monitoring and SSI  10. Bronchial hygiene with respiratory therapy techniques, bronchodilators  11.  DVT, SUP prophylaxis           Sanjay St MD

## 2021-01-13 NOTE — PROGRESS NOTES
Problem: Nutrition Deficit  Goal: *Optimize nutritional status  Outcome: Progressing Towards Goal  Note: Patient has been NPO waiting for a new Peg tube to be placed. New Peg tube was placed feedings will resume soon         Bedside shift change report given to Erin Orta RN (oncoming nurse) by Yehuda Alvares RN (offgoing nurse). Report included the following information SBAR, Kardex, Intake/Output, MAR and Accordion.

## 2021-01-13 NOTE — PROGRESS NOTES
Vancomycin Update:01/13/21  Day:7  Random level resulted today at 16.1. Patient will receive ONE dose of Vanc 500 mg IV today.  Random has been scheduled for tomorrow AM.

## 2021-01-13 NOTE — PROGRESS NOTES
PHYSICAL THERAPY TREATMENT  Patient: Horacio Viramontes (1 y.o. male)  Date: 1/13/2021  Diagnosis: Pneumonia [J18.9] Pneumonia  Procedure(s) (LRB):  PERCUTANEOUS ENDOSCOPIC GASTROSTOMY TUBE INSERTION (N/A) 1 Day Post-Op  Precautions: Fall  Chart, physical therapy assessment, plan of care and goals were reviewed. ASSESSMENT  Patient continues with skilled PT services and is progressing towards goals. Pt semi-supine in bed upon arrival calling for help. Pt pulled IV out, marysol to pull cath out, and took O2 off. Pt O2 at 75 with O2 off PTA put O2 back on and pt recovered to 90s. Assisted pt cleaning blood and changing linens and gown. Nsg in at that time to check IV and cath. Pt roll L/R with Max A and scooted to [facility] with Max A. Pt left semi-supine in bed with needs in place and bed alarm activated. Current Level of Function Impacting Discharge (mobility/balance): Decreased mobility     Other factors to consider for discharge: Decreased mobility          PLAN :  Patient continues to benefit from skilled intervention to address the above impairments. Continue treatment per established plan of care. to address goals. Recommendation for discharge: (in order for the patient to meet his/her long term goals)  Therapy up to 5 days/week in SNF setting    This discharge recommendation:  Has been made in collaboration with the attending provider and/or case management    IF patient discharges home will need the following DME: to be determined (TBD)       SUBJECTIVE:   Patient stated help me.     OBJECTIVE DATA SUMMARY:   Critical Behavior:  Neurologic State: Alert  Orientation Level: Oriented to person, Oriented to place  Cognition: Follows commands  Safety/Judgement: Decreased awareness of environment, Decreased awareness of need for assistance, Decreased awareness of need for safety, Decreased insight into deficits  Functional Mobility Training:  Bed Mobility:  Rolling: Maximum assistance        Scooting: Maximum assistance        Transfers:                                   Balance:     Ambulation/Gait Training:                                                        Stairs: Therapeutic Exercises:     Pain Ratin/10    Activity Tolerance:   Fair and requires rest breaks  Please refer to the flowsheet for vital signs taken during this treatment. After treatment patient left in no apparent distress:   Supine in bed, Call bell within reach, and Bed / chair alarm activated    COMMUNICATION/COLLABORATION:   The patients plan of care was discussed with: Registered nurse.      Jorden Walker PTA   Time Calculation: 33 mins

## 2021-01-14 LAB
DATE LAST DOSE: NORMAL
GLUCOSE BLD STRIP.AUTO-MCNC: 105 MG/DL (ref 65–100)
GLUCOSE BLD STRIP.AUTO-MCNC: 120 MG/DL (ref 65–100)
GLUCOSE BLD STRIP.AUTO-MCNC: 152 MG/DL (ref 65–100)
GLUCOSE BLD STRIP.AUTO-MCNC: 156 MG/DL (ref 65–100)
GLUCOSE BLD STRIP.AUTO-MCNC: 168 MG/DL (ref 65–100)
GLUCOSE BLD STRIP.AUTO-MCNC: 180 MG/DL (ref 65–100)
GLUCOSE BLD STRIP.AUTO-MCNC: 189 MG/DL (ref 65–100)
GLUCOSE BLD STRIP.AUTO-MCNC: 200 MG/DL (ref 65–100)
GLUCOSE BLD STRIP.AUTO-MCNC: 202 MG/DL (ref 65–100)
GLUCOSE BLD STRIP.AUTO-MCNC: 204 MG/DL (ref 65–100)
GLUCOSE BLD STRIP.AUTO-MCNC: 227 MG/DL (ref 65–100)
PERFORMED BY, TECHID: ABNORMAL
REPORTED DOSE,DOSE: NORMAL UNITS
VANCOMYCIN SERPL-MCNC: 19.9 UG/ML

## 2021-01-14 PROCEDURE — 74011636637 HC RX REV CODE- 636/637: Performed by: NURSE PRACTITIONER

## 2021-01-14 PROCEDURE — 36415 COLL VENOUS BLD VENIPUNCTURE: CPT

## 2021-01-14 PROCEDURE — 94762 N-INVAS EAR/PLS OXIMTRY CONT: CPT

## 2021-01-14 PROCEDURE — 77010033678 HC OXYGEN DAILY

## 2021-01-14 PROCEDURE — 65270000029 HC RM PRIVATE

## 2021-01-14 PROCEDURE — 74011250637 HC RX REV CODE- 250/637: Performed by: NURSE PRACTITIONER

## 2021-01-14 PROCEDURE — 80202 ASSAY OF VANCOMYCIN: CPT

## 2021-01-14 PROCEDURE — 74011250636 HC RX REV CODE- 250/636: Performed by: NURSE PRACTITIONER

## 2021-01-14 PROCEDURE — 74011250637 HC RX REV CODE- 250/637: Performed by: INTERNAL MEDICINE

## 2021-01-14 RX ORDER — IVERMECTIN 3 MG/1
6 TABLET ORAL ONCE
Status: COMPLETED | OUTPATIENT
Start: 2021-01-14 | End: 2021-01-14

## 2021-01-14 RX ADMIN — SERTRALINE HYDROCHLORIDE 100 MG: 50 TABLET ORAL at 17:31

## 2021-01-14 RX ADMIN — CALCIUM ACETATE 1334 MG: 667 CAPSULE ORAL at 12:59

## 2021-01-14 RX ADMIN — METOPROLOL TARTRATE 25 MG: 25 TABLET, FILM COATED ORAL at 20:56

## 2021-01-14 RX ADMIN — FAMOTIDINE 20 MG: 20 TABLET, FILM COATED ORAL at 10:35

## 2021-01-14 RX ADMIN — HYDROXYZINE PAMOATE 25 MG: 25 CAPSULE ORAL at 10:35

## 2021-01-14 RX ADMIN — CALCIUM ACETATE 1334 MG: 667 CAPSULE ORAL at 10:35

## 2021-01-14 RX ADMIN — OXYCODONE HYDROCHLORIDE AND ACETAMINOPHEN 500 MG: 500 TABLET ORAL at 10:35

## 2021-01-14 RX ADMIN — CALCIUM ACETATE 1334 MG: 667 CAPSULE ORAL at 17:31

## 2021-01-14 RX ADMIN — DEXAMETHASONE SODIUM PHOSPHATE 4 MG: 4 INJECTION, SOLUTION INTRA-ARTICULAR; INTRALESIONAL; INTRAMUSCULAR; INTRAVENOUS; SOFT TISSUE at 17:31

## 2021-01-14 RX ADMIN — INSULIN LISPRO 4 UNITS: 100 INJECTION, SOLUTION INTRAVENOUS; SUBCUTANEOUS at 16:30

## 2021-01-14 RX ADMIN — INSULIN LISPRO 2 UNITS: 100 INJECTION, SOLUTION INTRAVENOUS; SUBCUTANEOUS at 11:30

## 2021-01-14 RX ADMIN — INSULIN LISPRO 2 UNITS: 100 INJECTION, SOLUTION INTRAVENOUS; SUBCUTANEOUS at 20:56

## 2021-01-14 RX ADMIN — GUAIFENESIN DEXTROMETHORPHAN HYDROBROMIDE ORAL SOLUTION 5 ML: 10; 100 SOLUTION ORAL at 10:35

## 2021-01-14 RX ADMIN — DEXTROMETHORPHAN 30 MG: 30 SUSPENSION, EXTENDED RELEASE ORAL at 10:35

## 2021-01-14 RX ADMIN — DEXTROMETHORPHAN 30 MG: 30 SUSPENSION, EXTENDED RELEASE ORAL at 22:33

## 2021-01-14 RX ADMIN — INSULIN LISPRO 2 UNITS: 100 INJECTION, SOLUTION INTRAVENOUS; SUBCUTANEOUS at 07:30

## 2021-01-14 RX ADMIN — FERROUS SULFATE TAB 325 MG (65 MG ELEMENTAL FE) 325 MG: 325 (65 FE) TAB at 10:35

## 2021-01-14 RX ADMIN — DEXAMETHASONE SODIUM PHOSPHATE 4 MG: 4 INJECTION, SOLUTION INTRA-ARTICULAR; INTRALESIONAL; INTRAMUSCULAR; INTRAVENOUS; SOFT TISSUE at 12:59

## 2021-01-14 RX ADMIN — DEXTROSE MONOHYDRATE 50 ML/HR: 50 INJECTION, SOLUTION INTRAVENOUS at 04:31

## 2021-01-14 RX ADMIN — IVERMECTIN 6 MG: 3 TABLET ORAL at 10:00

## 2021-01-14 RX ADMIN — DEXAMETHASONE SODIUM PHOSPHATE 4 MG: 4 INJECTION, SOLUTION INTRA-ARTICULAR; INTRALESIONAL; INTRAMUSCULAR; INTRAVENOUS; SOFT TISSUE at 05:24

## 2021-01-14 RX ADMIN — METOPROLOL TARTRATE 25 MG: 25 TABLET, FILM COATED ORAL at 10:35

## 2021-01-14 NOTE — PROGRESS NOTES
Nurse received report that patient is to be transferred to [facility] to room 562 when available/cleaned. Report was called to MAXI Smith on the 5th floor with no concerns voiced. Patient's daughter Nura Robin aware of the transfer and was worried about the patient's anxiety level. Nurse reassured her that the transition will go as smoothly as we can make it. Daughter accepted and thanked nurse.

## 2021-01-14 NOTE — PROGRESS NOTES
Problem: Nutrition Deficit  Goal: *Optimize nutritional status  Outcome: Progressing Towards Goal  Note: Peg tube is working and tube feedings have been started     Bedside shift change report given to Tonie Vitale (oncoming nurse) by Yamel Dominguez RN (offgoing nurse). Report included the following information SBAR, Kardex, Procedure Summary, Intake/Output, MAR and Accordion.

## 2021-01-14 NOTE — PROGRESS NOTES
Comprehensive Nutrition Assessment    Type and Reason for Visit: Reassess(goal)    Nutrition Recommendations/Plan:  Reinitiate TF via PEG as bolus feeds of Jevity 1.5 at goal of 225ml/feed x5 feeds/day, Flush with 170ml fluid after each feed  Goal feeds provide 1688kcals, 72g pro, and 1697ml fluid (>/= 96%est needs)    Document TF rate, water flushes, and GRVs in EMR      Nutrition Assessment:  Admitted for COVIVD-19 pna, noted pt in ED <1 week pta for self-removed PEG tube. CXR on admit finding PNA. Currently on 5L NC, remains IP for monitoring. GI consulted for PEG leaking, adjusted bumper (1/7). TF initiated 1/7 as bolus feeds. Continued receiving bolus feeds at goal with good tolerance until pt self-removed PEG overnight 1/10. Replaced 1/12. TF not yet initiated. RN called RD to discuss bolus feeds, however RD not available- on attempted f/u x 5, unable to reach RD. Bolus orders in place, will f/u. Labs: H/H 8.5/25.9, Na 137, BUN 98, Cr 2.89, no recent LFTs. Meds: Allopurinol, dexamethasone, pepcid, insulin, levofloxacin, metroprolol, FeSu. Malnutrition Assessment:  Malnutrition Status:  Mild malnutrition    Context:  Acute illness     Findings of the 6 clinical characteristics of malnutrition:   Energy Intake:  No significant decrease in energy intake  Weight Loss:  7.0 - Greater than 5% over 1 month     Body Fat Loss:  Unable to assess,     Muscle Mass Loss:  Unable to assess,    Fluid Accumulation:  No significant fluid accumulation,        Estimated Daily Nutrient Needs:  Energy (kcal): 1750kcal (35kcal/kg); Weight Used for Energy Requirements: Current  Protein (g): 60g (1.2g/kg); Weight Used for Protein Requirements: Current  Fluid (ml/day): 1750mL; Method Used for Fluid Requirements: 1 ml/kcal      Nutrition Related Findings:  Unable to complete NFPE d/t COVID-19 precautions. Per H&P pt appears cachectic. No edema. No N/V/D/C. Last BM 1/10, RD unable speak with RN to determine if more recent BM. +dysphagia, PEG in place. Wounds:    None       Current Nutrition Therapies:  DIET NPO With Tube Feedings  DIET TUBE FEEDING Jevity 1.5 Jevity 1.5 BOLUS, 5 feeds/day    Anthropometric Measures:  · Height:  5' 6\" (167.6 cm)  · Current Body Wt:  50.3 kg (110 lb 14.3 oz)(1/13)   · Admission Body Wt:  134 lb 0.6 oz(1/5)    · Usual Body Wt:  (paola)     · Ideal Body Wt:  142 lbs:  78.1 %   · BMI Category:  Underweight (BMI less than 22) age over 72   Wt hx: 60.9kg (12/17), 64.3kg (12/5), 64.9kg (11/27), 62.6kg (8/13). Updated measured wt (1/13) indicating 10.5kg (17%BW) wt loss x 1 month, severe.      Nutrition Diagnosis:   · Inadequate oral intake related to swallowing difficulty, cognitive or neurological impairment, impaired respiratory function as evidenced by nutrition support-enteral nutrition      Nutrition Interventions:   Food and/or Nutrient Delivery: Continue NPO, Start tube feeding  Nutrition Education and Counseling: No recommendations at this time  Coordination of Nutrition Care: Continue to monitor while inpatient    Goals:  intakes >/=75% of EENs in >5 days (met)  wt maintenance within +/-0.5kg in 7 days (paola)  Lytes wnl (met)  BM every 1-2 days (paola)    Nutrition Monitoring and Evaluation:   Behavioral-Environmental Outcomes: None identified  Food/Nutrient Intake Outcomes: Enteral nutrition intake/tolerance  Physical Signs/Symptoms Outcomes: Weight, GI status    Discharge Planning:    Enteral nutrition     Electronically signed by Syed Sandoval on 1/14/2021 at 8:07 AM    Contact:

## 2021-01-14 NOTE — PROGRESS NOTES
Patient transferred to [address] [place] [address] 56 as per orders. Patient had no personal belongings to be sent to the floor. Patient awake and alert to name.  Report given to Elena Kingston

## 2021-01-14 NOTE — PROGRESS NOTES
Spoke with Swati Mantilla and Dr. Kaylie Patel about patient's PEG tube leaking brownish red drainage. Dr. Kaylie Patel will be coming to check on it tomorrow.

## 2021-01-14 NOTE — PROGRESS NOTES
Medical Progress Note      NAME: Vee Rothman   :  1932  MRM:  [id number]    Date/Time: 2021  10:15 AM         Subjective:   I was asked by Edie Vail MD to see Vee Rothman  a 80 y.o.    male in consultation      Excerpts from admission 2021 or consult notes as follows:   80-year-old male came in because of shortness of breath dyspnea generalized weakness initial Covid rapid test was negative he has significant past medical history of chronic kidney disease gout hypertension dysphagia had a PEG tube in place history of lymphoma chest x-ray done which shows left lower lobe infiltrate followed by CAT scan which shows bilateral pleural effusion and atelectasis but now his COVID-19 came back positive and also MRSA and he is disheveled unable to get much history out of the patient on oxygen via nasal cannula so pulmonary consult was called for further evaluation.             Allergies   Allergen Reactions    Pcn [Penicillins] Swelling         MAR reviewed and pertinent medications noted or modified as needed          Current Facility-Administered Medications   Medication    fentaNYL citrate (PF) injection    midazolam (PF) (VERSED) injection    dextrose 5% infusion    albuterol (PROVENTIL HFA, VENTOLIN HFA, PROAIR HFA) inhaler 2 Puff    LORazepam (ATIVAN) tablet 1 mg    furosemide (LASIX) injection 40 mg    sertraline (ZOLOFT) tablet 100 mg    dextromethorphan (DELSYM) 30 mg/5 mL syrup 30 mg    guaiFENesin-dextromethorphan (TUSSI-ORGANIDIN DM)  mg/5 mL oral solution 10 mL    metoprolol tartrate (LOPRESSOR) tablet 25 mg    ascorbic acid (vitamin C) (VITAMIN C) tablet 500 mg    ferrous sulfate tablet 325 mg    [Held by provider] heparin (porcine) injection 5,000 Units    0.9% sodium chloride infusion 250 mL    dexamethasone (DECADRON) 4 mg/mL injection 4 mg    VANCOMYCIN INFORMATION NOTE    calcium acetate(phosphat bind) (PHOSLO) capsule 1,334 mg    sodium chloride (NS) flush 5-10 mL    levoFLOXacin (LEVAQUIN) 750 mg in D5W IVPB    allopurinoL (ZYLOPRIM) tablet 50 mg    famotidine (PEPCID) tablet 20 mg    insulin lispro (HUMALOG) injection    glucose chewable tablet 16 g    glucagon (GLUCAGEN) injection 1 mg    dextrose (D50W) injection syrg 12.5-25 g    ondansetron (ZOFRAN) injection 4 mg    acetaminophen (TYLENOL) tablet 650 mg    acetaminophen (TYLENOL) suppository 650 mg    hydrOXYzine pamoate (VISTARIL) capsule 25 mg      Patient PCP: Cristy Paul MD  PMH:  has a past medical history of CRI (chronic renal insufficiency) (12/13/2012), Gout (1/4/2011), Hypertension, Lymphoma ([facility] [address] and Prostate CA ([facility] [address] (1/4/2011). PSH:   has a past surgical history that includes hx prostatectomy; hc bone marrow biopsy; endoscopy, colon, diagnostic (2003); and ir thoracentesis cath w image (12/15/2020). FHX: family history includes Hypertension in his mother. SHX:  reports that he has never smoked. He has never used smokeless tobacco. He reports previous drug use. He reports that he does not drink alcohol. ROS:     Unable to obtain           Objective:       Vitals:      Last 24hrs VS reviewed since prior progress note. Most recent are:    Visit Vitals  /63 (BP Patient Position: At rest;Head of bed elevated (Comment degrees))   Pulse 82   Temp 97.5 °F (36.4 °C)   Resp 20   Ht 5' 6\" (1.676 m)   Wt 111 lb (50.3 kg)   SpO2 92%   BMI 17.92 kg/m²     SpO2 Readings from Last 6 Encounters:   01/13/21 92%   12/28/20 98%   12/21/20 96%   12/06/20 93%   11/27/20 92%   11/11/20 97%    O2 Flow Rate (L/min): 6 l/min       Intake/Output Summary (Last 24 hours) at 1/14/2021 1015  Last data filed at 1/14/2021 0412  Gross per 24 hour   Intake 1400.84 ml   Output    Net 1400.84 ml          Exam:     Physical Exam   Constitutional: He appears distressed. HENT:   Head: Normocephalic and atraumatic. Eyes: Pupils are equal, round, and reactive to light.  Conjunctivae and EOM are normal.   Neck: Normal range of motion. Neck supple. Cardiovascular: Normal rate and regular rhythm. Pulmonary/Chest: He is in respiratory distress. He has rales. Abdominal: Soft. Musculoskeletal: Normal range of motion.    Neurological:   Patient is lethargic drowsy     Lab Data Reviewed: (see below)      Medications:  Current Facility-Administered Medications   Medication Dose Route Frequency    ivermectin (STROMECTOL) tablet 6 mg  6 mg Oral ONCE    fentaNYL citrate (PF) injection    PRN    midazolam (PF) (VERSED) injection    PRN    dextrose 5% infusion  50 mL/hr IntraVENous CONTINUOUS    albuterol (PROVENTIL HFA, VENTOLIN HFA, PROAIR HFA) inhaler 2 Puff  2 Puff Inhalation Q4H PRN    LORazepam (ATIVAN) tablet 1 mg  1 mg Oral Q4H PRN    sertraline (ZOLOFT) tablet 100 mg  100 mg Per G Tube QPM    dextromethorphan (DELSYM) 30 mg/5 mL syrup 30 mg  30 mg Per G Tube Q12H    guaiFENesin-dextromethorphan (TUSSI-ORGANIDIN DM)  mg/5 mL oral solution 10 mL  10 mL Per G Tube Q4H PRN    metoprolol tartrate (LOPRESSOR) tablet 25 mg  25 mg Per G Tube Q12H    ascorbic acid (vitamin C) (VITAMIN C) tablet 500 mg  500 mg Per G Tube DAILY    ferrous sulfate tablet 325 mg  1 Tab Oral DAILY WITH BREAKFAST    [Held by provider] heparin (porcine) injection 5,000 Units  5,000 Units SubCUTAneous Q12H    0.9% sodium chloride infusion 250 mL  250 mL IntraVENous PRN    dexamethasone (DECADRON) 4 mg/mL injection 4 mg  4 mg IntraVENous Q6H    calcium acetate(phosphat bind) (PHOSLO) capsule 1,334 mg  2 Cap Oral TID WITH MEALS    sodium chloride (NS) flush 5-10 mL  5-10 mL IntraVENous PRN    levoFLOXacin (LEVAQUIN) 750 mg in D5W IVPB  750 mg IntraVENous Q48H    allopurinoL (ZYLOPRIM) tablet 50 mg  50 mg Oral EVERY OTHER DAY    famotidine (PEPCID) tablet 20 mg  20 mg Per G Tube DAILY    insulin lispro (HUMALOG) injection   SubCUTAneous AC&HS    glucose chewable tablet 16 g  4 Tab Oral PRN    glucagon (GLUCAGEN) injection 1 mg  1 mg IntraMUSCular PRN    dextrose (D50W) injection syrg 12.5-25 g  25-50 mL IntraVENous PRN    ondansetron (ZOFRAN) injection 4 mg  4 mg IntraVENous Q6H PRN    acetaminophen (TYLENOL) tablet 650 mg  650 mg Per G Tube Q4H PRN    acetaminophen (TYLENOL) suppository 650 mg  650 mg Rectal Q4H PRN    hydrOXYzine pamoate (VISTARIL) capsule 25 mg  25 mg Per G Tube TID PRN       ______________________________________________________________________      Lab Review:     Recent Labs     01/13/21  0511 01/11/21  1235   WBC 13.6* 14.6*   HGB 8.5* 9.7*   HCT 25.9* 29.4*    297     Recent Labs     01/13/21  0511 01/11/21  1235    140   K 3.5 4.1   CL 98 101   CO2 31 32   * 121*   BUN 98* 82*   CREA 2.89* 2.42*   CA 8.1* 10.5*     No components found for: GLPOC  No results for input(s): PH, PCO2, PO2, HCO3, FIO2 in the last 72 hours. No results for input(s): INR, INREXT, INREXT in the last 72 hours. IMPRESSION:   1. Acute hypoxic respiratory failure  2. COVID-19 pneumonia  3. Severe sepsis  4. Dysphagia PEG tube in place with leaking around the PEG site which is fixed  5. Hypernatremia resolved  6. Bilateral pleural effusion with bilateral lower lobe atelectasis  7. Pt is requiring Drug therapy requiring intensive monitoring for toxicity  8. Pt is unstable, unpredictable needing inpatient monitoring; is acutely ill and at high risk of sudden decline and decompensation with severe consequenses and continued end organ dysfunction and failure  9. Prognosis guarded        RECOMMENDATIONS/PLAN:      1. Patient is on oxygen 100% nonrebreather mask not doing well chest x-ray shows worsening of infiltrate with possible fluid overload will we need to high flow nasal cannula or regular nasal cannula  2. Patient not candidate for remdesivir due to GFR of 25. Not a candidate for Actemra due to hx lymphoma and prostate cancer. 1 dose of ivermectin given yesterday.  Will continue with Decadron, vitamin C.  3. Continue with PRN albuterol inhaler. 4. Continue with levaquin and vancomycin for MRSA. 5. Patient is on IV fluid D5 because of mild hypernatremia resolved  6. Follow culture results  7. Possible PEG tube malfunction  8. Supplemental O2 to keep sats > 93%  9. Aspiration precautions  10. Labs to follow electrolytes, renal function and and blood counts  11. Glucose monitoring and SSI  12. Bronchial hygiene with respiratory therapy techniques, bronchodilators  13.  DVT, SUP prophylaxis

## 2021-01-14 NOTE — PROGRESS NOTES
Hospitalist Progress Note         MAUREEN Burdick, FNP-C    Daily Progress Note: 1/14/2021      Subjective:   Subjective   Patient seen on f/u laying in bed  No complaints voiced at this time  Mittens on for safety    Review of Systems:   Review of Systems   Unable to perform ROS: Mental status change (limited due to altered mental status)         Objective:   Objective      Vitals:  Patient Vitals for the past 12 hrs:   Temp Pulse Resp BP   01/14/21 0843 97.5 °F (36.4 °C) 82 20 110/63        Physical Exam:  Physical Exam  Vitals signs and nursing note reviewed. Constitutional:       Appearance: He is ill-appearing. HENT:      Head: Normocephalic. Ears:      Comments: [place]     Nose: Nose normal.      Mouth/Throat:      Mouth: Mucous membranes are moist.   Eyes:      Extraocular Movements: Extraocular movements intact. Neck:      Musculoskeletal: Normal range of motion. Cardiovascular:      Rate and Rhythm: Normal rate. Pulses: Normal pulses. Heart sounds: Normal heart sounds. Pulmonary:      Comments: Diminished air entry, 5L NC  Abdominal:      General: Bowel sounds are normal.      Palpations: Abdomen is soft. Genitourinary:     Comments: Perez in situ  Musculoskeletal: Normal range of motion. Skin:     General: Skin is warm and dry. Capillary Refill: Capillary refill takes less than 2 seconds. Neurological:      Mental Status: He is alert. He is disoriented.       Comments: Pleasantly confused          Lab Results:  Recent Results (from the past 24 hour(s))   GLUCOSE, POC    Collection Time: 01/13/21 11:13 AM   Result Value Ref Range    Glucose (POC) 121 (H) 65 - 100 mg/dL    Performed by Melchor Christianson    GLUCOSE, POC    Collection Time: 01/13/21  4:32 PM   Result Value Ref Range    Glucose (POC) 141 (H) 65 - 100 mg/dL    Performed by Danilo Dunn    GLUCOSE, POC    Collection Time: 01/13/21  8:59 PM   Result Value Ref Range    Glucose (POC) 115 (H) 65 - 100 mg/dL Performed by SKYLAR ASKEW    VANCOMYCIN, RANDOM    Collection Time: 01/14/21  4:13 AM   Result Value Ref Range    Vancomycin, random 19.9 ug/mL    Reported dose date Not provided      Reported dose: Not provided Units   GLUCOSE, POC    Collection Time: 01/14/21  8:47 AM   Result Value Ref Range    Glucose (POC) 156 (H) 65 - 100 mg/dL    Performed by NURIA ABRAHAM           Diagnostic Images:  CT Results  (Last 48 hours)    None          Current Medications:    Current Facility-Administered Medications:   •  ivermectin (STROMECTOL) tablet 6 mg, 6 mg, Oral, ONCE, Michael Grove MD  •  fentaNYL citrate (PF) injection, , , PRN, Marjan Perrin MD, 25 mcg at 01/12/21 1348  •  midazolam (PF) (VERSED) injection, , , PRN, Marjan Perrin MD, 1 mg at 01/12/21 1348  •  dextrose 5% infusion, 50 mL/hr, IntraVENous, CONTINUOUS, Rob Win NP, Last Rate: 50 mL/hr at 01/14/21 0431, 50 mL/hr at 01/14/21 0431  •  albuterol (PROVENTIL HFA, VENTOLIN HFA, PROAIR HFA) inhaler 2 Puff, 2 Puff, Inhalation, Q4H PRN, Rob Win NP  •  LORazepam (ATIVAN) tablet 1 mg, 1 mg, Oral, Q4H PRN, Rob Win NP  •  sertraline (ZOLOFT) tablet 100 mg, 100 mg, Per G Tube, QPM, Rob Win NP, 100 mg at 01/13/21 2141  •  dextromethorphan (DELSYM) 30 mg/5 mL syrup 30 mg, 30 mg, Per G Tube, Q12H, Rob Win NP, 30 mg at 01/13/21 2133  •  guaiFENesin-dextromethorphan (TUSSI-ORGANIDIN DM)  mg/5 mL oral solution 10 mL, 10 mL, Per G Tube, Q4H PRN, Rob Win NP  •  metoprolol tartrate (LOPRESSOR) tablet 25 mg, 25 mg, Per G Tube, Q12H, Rob Win NP, 25 mg at 01/13/21 2133  •  ascorbic acid (vitamin C) (VITAMIN C) tablet 500 mg, 500 mg, Per G Tube, DAILY, Rob Win NP, 500 mg at 01/13/21 0932  •  ferrous sulfate tablet 325 mg, 1 Tab, Oral, DAILY WITH BREAKFAST, Rob Win NP, 325 mg at 01/13/21 0932  •  [Held by provider] heparin (porcine) injection 5,000 Units, 5,000 Units, SubCUTAneous, Q12H,  Eden Griffith NP, Stopped at 01/11/21 0400    0.9% sodium chloride infusion 250 mL, 250 mL, IntraVENous, PRN, Rob Win NP    dexamethasone (DECADRON) 4 mg/mL injection 4 mg, 4 mg, IntraVENous, Q6H, Rob Win NP, 4 mg at 01/14/21 0524    calcium acetate(phosphat bind) (PHOSLO) capsule 1,334 mg, 2 Cap, Oral, TID WITH MEALS, Oswaldo Puentes, TEO, 1,334 mg at 01/13/21 1548    sodium chloride (NS) flush 5-10 mL, 5-10 mL, IntraVENous, PRN, Rob Win NP    levoFLOXacin (LEVAQUIN) 750 mg in D5W IVPB, 750 mg, IntraVENous, Q48H, Rob Win NP, Last Rate: 100 mL/hr at 01/13/21 1548, 750 mg at 01/13/21 1548    allopurinoL (ZYLOPRIM) tablet 50 mg, 50 mg, Oral, EVERY OTHER DAY, Rob Win NP, 50 mg at 01/13/21 1600    famotidine (PEPCID) tablet 20 mg, 20 mg, Per G Tube, DAILY, Rob Win NP, 20 mg at 01/13/21 0931    insulin lispro (HUMALOG) injection, , SubCUTAneous, AC&HS, Rob Win NP, Stopped at 01/11/21 1630    glucose chewable tablet 16 g, 4 Tab, Oral, PRN, Rob Win NP    glucagon (GLUCAGEN) injection 1 mg, 1 mg, IntraMUSCular, PRN, Rob Win NP    dextrose (D50W) injection syrg 12.5-25 g, 25-50 mL, IntraVENous, PRN, Rob Win NP    ondansetron [facility]) injection 4 mg, 4 mg, IntraVENous, Q6H PRN, Rob Win NP    acetaminophen (TYLENOL) tablet 650 mg, 650 mg, Per G Tube, Q4H PRN, Rob Win NP    acetaminophen (TYLENOL) suppository 650 mg, 650 mg, Rectal, Q4H PRN, Rob Win NP, 650 mg at 01/13/21 0123    hydrOXYzine pamoate (VISTARIL) capsule 25 mg, 25 mg, Per G Tube, TID PRN, Rob Win NP, 25 mg at 01/13/21 0123       ASSESSMENT:    Acute respiratory failure w/ hypoxia:  Covid pneumonia:  -patient currently on 5L NC, wean as tolerated  -unable to receive remdesivir do to eGFR 25  -unable to receive actemra due to hx of lymphoma and prostate ca  -pulmonary following  -continue supplemental O2, IV decadron, prn inhalers, levaquin     Sepsis:  -secondary to infx process  -blood and urine cxs negative  -continue above treatment plan    WBC 13.6 previously 14.6    Dysphagia:  -plan for replacement of peg tube  -Continue gentle IV hydration    BENJI on CKD Stage 3:  -baseline cr appears around 2.0, current Cr 2.89  -pharmacy to renal dose all medications to eGFR 25  -morning labs pending    Anemia:  -hgb 8.5, continue to trend h/h, transfuse for hgb <7  -morning labs pending      PLAN:  -pulmonary following  -continue supplemental O2, IV decadron, prn inhalers, levaquin   -patient currently on 5L NC wean as tolerated  -s/p replacement of peg tube, continue feeds  -Continue gentle IV hydration, hold lasix due worsening cr function  -CM for dispo planning  -morning labs pending, consider nephrology consult      DNR  Dvt Prophylaxis  GI Prophylaxis  Home medications reviewed and reconciled      Care Plan discussed with: Interdisciplinary team    Total time spent with patient: 30 minutes.     Andreas Main NP

## 2021-01-15 ENCOUNTER — APPOINTMENT (OUTPATIENT)
Dept: GENERAL RADIOLOGY | Age: 86
DRG: 871 | End: 2021-01-15
Attending: INTERNAL MEDICINE
Payer: MEDICARE

## 2021-01-15 LAB
ALBUMIN SERPL-MCNC: 1.7 G/DL (ref 3.5–5)
ALBUMIN/GLOB SERPL: 0.5 {RATIO} (ref 1.1–2.2)
ALP SERPL-CCNC: 133 U/L (ref 45–117)
ALT SERPL-CCNC: 24 U/L (ref 12–78)
ANION GAP SERPL CALC-SCNC: 8 MMOL/L (ref 5–15)
AST SERPL W P-5'-P-CCNC: 17 U/L (ref 15–37)
BASOPHILS # BLD: 0 K/UL (ref 0–0.1)
BASOPHILS NFR BLD: 0 % (ref 0–1)
BILIRUB SERPL-MCNC: 0.4 MG/DL (ref 0.2–1)
BUN SERPL-MCNC: 86 MG/DL (ref 6–20)
BUN/CREAT SERPL: 38 (ref 12–20)
CA-I BLD-MCNC: 8.9 MG/DL (ref 8.5–10.1)
CHLORIDE SERPL-SCNC: 96 MMOL/L (ref 97–108)
CO2 SERPL-SCNC: 31 MMOL/L (ref 21–32)
CREAT SERPL-MCNC: 2.27 MG/DL (ref 0.7–1.3)
DIFFERENTIAL METHOD BLD: ABNORMAL
EOSINOPHIL # BLD: 0 K/UL (ref 0–0.4)
EOSINOPHIL NFR BLD: 0 % (ref 0–7)
ERYTHROCYTE [DISTWIDTH] IN BLOOD BY AUTOMATED COUNT: 17.4 % (ref 11.5–14.5)
GLOBULIN SER CALC-MCNC: 3.4 G/DL (ref 2–4)
GLUCOSE BLD STRIP.AUTO-MCNC: 136 MG/DL (ref 65–100)
GLUCOSE BLD STRIP.AUTO-MCNC: 160 MG/DL (ref 65–100)
GLUCOSE BLD STRIP.AUTO-MCNC: 242 MG/DL (ref 65–100)
GLUCOSE BLD STRIP.AUTO-MCNC: 298 MG/DL (ref 65–100)
GLUCOSE BLD STRIP.AUTO-MCNC: 313 MG/DL (ref 65–100)
GLUCOSE SERPL-MCNC: 104 MG/DL (ref 65–100)
HCT VFR BLD AUTO: 27.4 % (ref 36.6–50.3)
HGB BLD-MCNC: 9.1 G/DL (ref 12.1–17)
IMM GRANULOCYTES # BLD AUTO: 0.1 K/UL (ref 0–0.04)
IMM GRANULOCYTES NFR BLD AUTO: 1 % (ref 0–0.5)
LYMPHOCYTES # BLD: 0.8 K/UL (ref 0.8–3.5)
LYMPHOCYTES NFR BLD: 4 % (ref 12–49)
MCH RBC QN AUTO: 29.5 PG (ref 26–34)
MCHC RBC AUTO-ENTMCNC: 33.2 G/DL (ref 30–36.5)
MCV RBC AUTO: 89 FL (ref 80–99)
MONOCYTES # BLD: 0.6 K/UL (ref 0–1)
MONOCYTES NFR BLD: 3 % (ref 5–13)
NEUTS SEG # BLD: 17.6 K/UL (ref 1.8–8)
NEUTS SEG NFR BLD: 92 % (ref 32–75)
PERFORMED BY, TECHID: ABNORMAL
PLATELET # BLD AUTO: 266 K/UL (ref 150–400)
PMV BLD AUTO: 11.7 FL (ref 8.9–12.9)
POTASSIUM SERPL-SCNC: 4.5 MMOL/L (ref 3.5–5.1)
PROT SERPL-MCNC: 5.1 G/DL (ref 6.4–8.2)
RBC # BLD AUTO: 3.08 M/UL (ref 4.1–5.7)
SODIUM SERPL-SCNC: 135 MMOL/L (ref 136–145)
WBC # BLD AUTO: 19 K/UL (ref 4.1–11.1)

## 2021-01-15 PROCEDURE — 74011250637 HC RX REV CODE- 250/637: Performed by: NURSE PRACTITIONER

## 2021-01-15 PROCEDURE — 36415 COLL VENOUS BLD VENIPUNCTURE: CPT

## 2021-01-15 PROCEDURE — 77010033678 HC OXYGEN DAILY

## 2021-01-15 PROCEDURE — 94762 N-INVAS EAR/PLS OXIMTRY CONT: CPT

## 2021-01-15 PROCEDURE — 74011636637 HC RX REV CODE- 636/637: Performed by: NURSE PRACTITIONER

## 2021-01-15 PROCEDURE — 74011250636 HC RX REV CODE- 250/636: Performed by: NURSE PRACTITIONER

## 2021-01-15 PROCEDURE — 85025 COMPLETE CBC W/AUTO DIFF WBC: CPT

## 2021-01-15 PROCEDURE — 82962 GLUCOSE BLOOD TEST: CPT

## 2021-01-15 PROCEDURE — 74011636637 HC RX REV CODE- 636/637: Performed by: HOSPITALIST

## 2021-01-15 PROCEDURE — 80053 COMPREHEN METABOLIC PANEL: CPT

## 2021-01-15 PROCEDURE — 65270000029 HC RM PRIVATE

## 2021-01-15 PROCEDURE — 71045 X-RAY EXAM CHEST 1 VIEW: CPT

## 2021-01-15 RX ORDER — INSULIN LISPRO 100 [IU]/ML
10 INJECTION, SOLUTION INTRAVENOUS; SUBCUTANEOUS ONCE
Status: COMPLETED | OUTPATIENT
Start: 2021-01-15 | End: 2021-01-15

## 2021-01-15 RX ADMIN — INSULIN LISPRO 4 UNITS: 100 INJECTION, SOLUTION INTRAVENOUS; SUBCUTANEOUS at 10:44

## 2021-01-15 RX ADMIN — FAMOTIDINE 20 MG: 20 TABLET, FILM COATED ORAL at 10:43

## 2021-01-15 RX ADMIN — CALCIUM ACETATE 1334 MG: 667 CAPSULE ORAL at 17:27

## 2021-01-15 RX ADMIN — METOPROLOL TARTRATE 25 MG: 25 TABLET, FILM COATED ORAL at 10:43

## 2021-01-15 RX ADMIN — DEXAMETHASONE SODIUM PHOSPHATE 4 MG: 4 INJECTION, SOLUTION INTRA-ARTICULAR; INTRALESIONAL; INTRAMUSCULAR; INTRAVENOUS; SOFT TISSUE at 01:15

## 2021-01-15 RX ADMIN — DEXTROMETHORPHAN 30 MG: 30 SUSPENSION, EXTENDED RELEASE ORAL at 12:36

## 2021-01-15 RX ADMIN — METOPROLOL TARTRATE 25 MG: 25 TABLET, FILM COATED ORAL at 20:34

## 2021-01-15 RX ADMIN — GUAIFENESIN DEXTROMETHORPHAN HYDROBROMIDE ORAL SOLUTION 10 ML: 10; 100 SOLUTION ORAL at 10:43

## 2021-01-15 RX ADMIN — DEXAMETHASONE SODIUM PHOSPHATE 4 MG: 4 INJECTION, SOLUTION INTRA-ARTICULAR; INTRALESIONAL; INTRAMUSCULAR; INTRAVENOUS; SOFT TISSUE at 17:27

## 2021-01-15 RX ADMIN — DEXAMETHASONE SODIUM PHOSPHATE 4 MG: 4 INJECTION, SOLUTION INTRA-ARTICULAR; INTRALESIONAL; INTRAMUSCULAR; INTRAVENOUS; SOFT TISSUE at 23:31

## 2021-01-15 RX ADMIN — DEXAMETHASONE SODIUM PHOSPHATE 4 MG: 4 INJECTION, SOLUTION INTRA-ARTICULAR; INTRALESIONAL; INTRAMUSCULAR; INTRAVENOUS; SOFT TISSUE at 06:03

## 2021-01-15 RX ADMIN — DEXAMETHASONE SODIUM PHOSPHATE 4 MG: 4 INJECTION, SOLUTION INTRA-ARTICULAR; INTRALESIONAL; INTRAMUSCULAR; INTRAVENOUS; SOFT TISSUE at 12:36

## 2021-01-15 RX ADMIN — SERTRALINE HYDROCHLORIDE 100 MG: 50 TABLET ORAL at 17:27

## 2021-01-15 RX ADMIN — CALCIUM ACETATE 1334 MG: 667 CAPSULE ORAL at 12:36

## 2021-01-15 RX ADMIN — INSULIN LISPRO 10 UNITS: 100 INJECTION, SOLUTION INTRAVENOUS; SUBCUTANEOUS at 12:35

## 2021-01-15 RX ADMIN — FERROUS SULFATE TAB 325 MG (65 MG ELEMENTAL FE) 325 MG: 325 (65 FE) TAB at 10:43

## 2021-01-15 RX ADMIN — LEVOFLOXACIN 750 MG: 5 INJECTION, SOLUTION INTRAVENOUS at 12:33

## 2021-01-15 RX ADMIN — DEXTROMETHORPHAN 30 MG: 30 SUSPENSION, EXTENDED RELEASE ORAL at 20:34

## 2021-01-15 RX ADMIN — CALCIUM ACETATE 1334 MG: 667 CAPSULE ORAL at 10:43

## 2021-01-15 RX ADMIN — OXYCODONE HYDROCHLORIDE AND ACETAMINOPHEN 500 MG: 500 TABLET ORAL at 10:43

## 2021-01-15 RX ADMIN — ALLOPURINOL 50 MG: 100 TABLET ORAL at 17:27

## 2021-01-15 NOTE — PROGRESS NOTES
Hospitalist Progress Note         MAUREEN Winkler, FNP-C    Daily Progress Note: 1/15/2021      Subjective:   Subjective   Patient seen on f/u laying in bed  Patient is pleasantly confused  Mittens on for safety    Review of Systems:   Review of Systems   Unable to perform ROS: Mental status change (limited due to altered mental status)         Objective:   Objective      Vitals:  Patient Vitals for the past 12 hrs:   Temp Pulse Resp BP SpO2   01/15/21 0809 98.7 °F (37.1 °C) 82 18 113/61 97 %   01/14/21 2045 97.8 °F (36.6 °C) 94 18 (!) 118/58 97 %        Physical Exam:  Physical Exam  Vitals signs and nursing note reviewed. Constitutional:       Appearance: He is ill-appearing. HENT:      Head: Normocephalic. Ears:      Comments: [place]     Nose: Nose normal.      Mouth/Throat:      Mouth: Mucous membranes are moist.   Eyes:      Extraocular Movements: Extraocular movements intact. Neck:      Musculoskeletal: Normal range of motion. Cardiovascular:      Rate and Rhythm: Normal rate. Pulses: Normal pulses. Heart sounds: Normal heart sounds. Pulmonary:      Comments: Diminished air entry, 5L NC  Abdominal:      General: Bowel sounds are normal.      Palpations: Abdomen is soft. Genitourinary:     Comments: Perez in situ  Musculoskeletal: Normal range of motion. Skin:     General: Skin is warm and dry. Capillary Refill: Capillary refill takes less than 2 seconds. Neurological:      Mental Status: He is alert. He is disoriented.       Comments: Pleasantly confused          Lab Results:  Recent Results (from the past 24 hour(s))   GLUCOSE, POC    Collection Time: 01/14/21  8:47 AM   Result Value Ref Range    Glucose (POC) 156 (H) 65 - 100 mg/dL    Performed by Darleen Schreiber    GLUCOSE, POC    Collection Time: 01/14/21 12:01 PM   Result Value Ref Range    Glucose (POC) 204 (H) 65 - 100 mg/dL    Performed by [address], POC    Collection Time: 01/14/21  4:44 PM Result Value Ref Range    Glucose (POC) 202 (H) 65 - 100 mg/dL    Performed by Fredrick Diaz    GLUCOSE, POC    Collection Time: 01/14/21  8:44 PM   Result Value Ref Range    Glucose (POC) 189 (H) 65 - 100 mg/dL    Performed by Carmelina Landis, POC    Collection Time: 01/15/21  7:49 AM   Result Value Ref Range    Glucose (POC) 242 (H) 65 - 100 mg/dL    Performed by Gary Calvert           Diagnostic Images:  CT Results  (Last 48 hours)    None          Current Medications:    Current Facility-Administered Medications:     fentaNYL citrate (PF) injection, , , PRN, Clotilde Pinzon MD, 25 mcg at 01/12/21 1348    midazolam (PF) (VERSED) injection, , , PRN, Clotilde Pinzon MD, 1 mg at 01/12/21 1348    dextrose 5% infusion, 50 mL/hr, IntraVENous, CONTINUOUS, Rob Win NP, Stopped at 01/14/21 1737    albuterol (PROVENTIL HFA, VENTOLIN HFA, PROAIR HFA) inhaler 2 Puff, 2 Puff, Inhalation, Q4H PRN, Rob Win NP    LORazepam (ATIVAN) tablet 1 mg, 1 mg, Oral, Q4H PRN, Rob Win NP    sertraline (ZOLOFT) tablet 100 mg, 100 mg, Per G Tube, QPM, Rob Win NP, Stopped at 01/14/21 1736    dextromethorphan (DELSYM) 30 mg/5 mL syrup 30 mg, 30 mg, Per G Tube, Q12H, Rob Win NP, 30 mg at 01/14/21 2233    guaiFENesin-dextromethorphan (TUSSI-ORGANIDIN DM)  mg/5 mL oral solution 10 mL, 10 mL, Per G Tube, Q4H PRN, Rob Win NP, 5 mL at 01/14/21 1035    metoprolol tartrate (LOPRESSOR) tablet 25 mg, 25 mg, Per G Tube, Q12H, Rob Win NP, 25 mg at 01/14/21 2056    ascorbic acid (vitamin C) (VITAMIN C) tablet 500 mg, 500 mg, Per G Tube, DAILY, Rob Win NP, 500 mg at 01/14/21 1035    ferrous sulfate tablet 325 mg, 1 Tab, Oral, DAILY WITH BREAKFAST, Rob Win NP, 325 mg at 01/14/21 1035    [Held by provider] heparin (porcine) injection 5,000 Units, 5,000 Units, SubCUTAneous, Q12H, Rob Win NP, Stopped at 01/11/21 0400    0.9% sodium chloride infusion 250 mL, 250 mL, IntraVENous, PRN, Rob Win NP    dexamethasone (DECADRON) 4 mg/mL injection 4 mg, 4 mg, IntraVENous, Q6H, Rob Win NP, 4 mg at 01/15/21 0603    calcium acetate(phosphat bind) (PHOSLO) capsule 1,334 mg, 2 Cap, Oral, TID WITH MEALS, Kathy Horan NP, 1,334 mg at 01/14/21 1731    sodium chloride (NS) flush 5-10 mL, 5-10 mL, IntraVENous, PRN, Rob Win NP    levoFLOXacin (LEVAQUIN) 750 mg in D5W IVPB, 750 mg, IntraVENous, Q48H, Rob Win NP, Stopped at 01/14/21 1046    allopurinoL (ZYLOPRIM) tablet 50 mg, 50 mg, Oral, EVERY OTHER DAY, Rob Win NP, 50 mg at 01/13/21 1600    famotidine (PEPCID) tablet 20 mg, 20 mg, Per G Tube, DAILY, Rob iWn NP, 20 mg at 01/14/21 1035    insulin lispro (HUMALOG) injection, , SubCUTAneous, AC&HS, Rob Win NP, 2 Units at 01/14/21 2056    glucose chewable tablet 16 g, 4 Tab, Oral, PRN, Rob Win NP    glucagon (GLUCAGEN) injection 1 mg, 1 mg, IntraMUSCular, PRN, Rob Win NP    dextrose (D50W) injection syrg 12.5-25 g, 25-50 mL, IntraVENous, PRN, Rob Win NP    ondansetron [facility]) injection 4 mg, 4 mg, IntraVENous, Q6H PRN, Rob Win NP    acetaminophen (TYLENOL) tablet 650 mg, 650 mg, Per G Tube, Q4H PRN, Rob Win NP    acetaminophen (TYLENOL) suppository 650 mg, 650 mg, Rectal, Q4H PRN, Rob Win NP, 650 mg at 01/13/21 0123    hydrOXYzine pamoate (VISTARIL) capsule 25 mg, 25 mg, Per G Tube, TID PRN, Rob Win NP, 25 mg at 01/14/21 1035       ASSESSMENT:    Acute respiratory failure w/ hypoxia:  Covid pneumonia:  -patient currently on 5L NC, wean as tolerated  -unable to receive remdesivir do to eGFR 25  -unable to receive actemra due to hx of lymphoma and prostate ca  -pulmonary following  -continue supplemental O2, IV decadron, prn inhalers, levaquin     Sepsis:  -secondary to infx process  -blood and urine cxs negative  -continue above treatment plan    WBC 13.6 previously 14.6    Dysphagia:  -plan for replacement of peg tube  -Continue gentle IV hydration    BENJI on CKD Stage 3:  -baseline cr appears around 2.0, current Cr 2.89  -pharmacy to renal dose all medications to eGFR 25  -morning labs pending    Anemia:  -hgb 8.5, continue to trend h/h, transfuse for hgb <7  -morning labs pending      PLAN:  -pulmonary following  -continue supplemental O2, IV decadron, prn inhalers, levaquin   -patient currently on 5L NC wean as tolerated  -s/p replacement of peg tube, continue feeds  -Continue gentle IV hydration  -continue to hold lasix due worsening cr function  -CM for dispo planning  -morning labs pending, consider nephrology consult      DNR  Dvt Prophylaxis  GI Prophylaxis  Home medications reviewed and reconciled      Care Plan discussed with: Interdisciplinary team    Total time spent with patient: 30 minutes.     Susannah Vera NP

## 2021-01-15 NOTE — PROGRESS NOTES
Visit attempted for patient in [facility] med/surg unit during rounds. Per current COVID-19 isolation protocol in effect, I provided silent support and prayer outside patient room. There were no family members present. Chaplains will follow up if further referrals are requested. Chaplain Burke Peacock M.Div.    can be reached by calling the  at [facility]  [phone]

## 2021-01-15 NOTE — PROGRESS NOTES
Progress Note    Patient: Per Santana MRN: [id number]  SSN: [id number]    YOB: 1932  Age: 80 y.o.   Sex: male      Admit Date: 1/5/2021    LOS: 10 days     Subjective:   Called to see patient PEG tube site bleeding drainage,  Patient examined,  Past Medical History:   Diagnosis Date    CRI (chronic renal insufficiency) 12/13/2012    Gout 1/4/2011    Hypertension     Lymphoma ([facility]     Prostate CA ([facility] 1/4/2011        Current Facility-Administered Medications:     fentaNYL citrate (PF) injection, , , PRN, Leydi Amato MD, 25 mcg at 01/12/21 1348    midazolam (PF) (VERSED) injection, , , PRN, Leydi Amato MD, 1 mg at 01/12/21 1348    dextrose 5% infusion, 20 mL/hr, IntraVENous, CONTINUOUS, Michael Grove MD, Last Rate: 20 mL/hr at 01/15/21 1236, 20 mL/hr at 01/15/21 1236    albuterol (PROVENTIL HFA, VENTOLIN HFA, PROAIR HFA) inhaler 2 Puff, 2 Puff, Inhalation, Q4H PRN, Rob Win NP    LORazepam (ATIVAN) tablet 1 mg, 1 mg, Oral, Q4H PRN, Rob Win NP    sertraline (ZOLOFT) tablet 100 mg, 100 mg, Per G Tube, QPM, Rob Win NP, Stopped at 01/14/21 1736    dextromethorphan (DELSYM) 30 mg/5 mL syrup 30 mg, 30 mg, Per G Tube, Q12H, Rob Win NP, 30 mg at 01/15/21 1236    guaiFENesin-dextromethorphan (TUSSI-ORGANIDIN DM)  mg/5 mL oral solution 10 mL, 10 mL, Per G Tube, Q4H PRN, Rob Win NP, 10 mL at 01/15/21 1043    metoprolol tartrate (LOPRESSOR) tablet 25 mg, 25 mg, Per G Tube, Q12H, Rob Win NP, 25 mg at 01/15/21 1043    ascorbic acid (vitamin C) (VITAMIN C) tablet 500 mg, 500 mg, Per G Tube, DAILY, Rob Win NP, 500 mg at 01/15/21 1043    ferrous sulfate tablet 325 mg, 1 Tab, Oral, DAILY WITH BREAKFAST, Rob Win NP, 325 mg at 01/15/21 1043    [Held by provider] heparin (porcine) injection 5,000 Units, 5,000 Units, SubCUTAneous, Q12H, Rob Win NP, Stopped at 01/11/21 0400    0.9% sodium chloride infusion 250 mL, 250 mL, IntraVENous, PRN, Rob Win NP    dexamethasone (DECADRON) 4 mg/mL injection 4 mg, 4 mg, IntraVENous, Q6H, Rob Win NP, 4 mg at 01/15/21 1236    calcium acetate(phosphat bind) (PHOSLO) capsule 1,334 mg, 2 Cap, Oral, TID WITH MEALS, Zelpha Peal, NP, 1,334 mg at 01/15/21 1236    sodium chloride (NS) flush 5-10 mL, 5-10 mL, IntraVENous, PRN, Rob Win NP    levoFLOXacin (LEVAQUIN) 750 mg in D5W IVPB, 750 mg, IntraVENous, Q48H, Rob Win NP, Stopped at 01/15/21 1459    allopurinoL (ZYLOPRIM) tablet 50 mg, 50 mg, Oral, EVERY OTHER DAY, oRb Win NP, 50 mg at 01/13/21 1600    famotidine (PEPCID) tablet 20 mg, 20 mg, Per G Tube, DAILY, Rob Win NP, 20 mg at 01/15/21 1043    insulin lispro (HUMALOG) injection, , SubCUTAneous, AC&HS, Rob Win NP, Stopped at 01/15/21 1130    glucose chewable tablet 16 g, 4 Tab, Oral, PRN, Rob Win NP    glucagon (GLUCAGEN) injection 1 mg, 1 mg, IntraMUSCular, PRN, Rob Win NP    dextrose (D50W) injection syrg 12.5-25 g, 25-50 mL, IntraVENous, PRN, Rob Win NP    ondansetron [facility]) injection 4 mg, 4 mg, IntraVENous, Q6H PRN, Rob Win NP    acetaminophen (TYLENOL) tablet 650 mg, 650 mg, Per G Tube, Q4H PRN, Rob Win NP    acetaminophen (TYLENOL) suppository 650 mg, 650 mg, Rectal, Q4H PRN, Rob Win NP, 650 mg at 01/13/21 0123    hydrOXYzine pamoate (VISTARIL) capsule 25 mg, 25 mg, Per G Tube, TID PRN, Rob Win NP, 25 mg at 01/14/21 1035    Objective:     Vitals:    01/15/21 0809 01/15/21 0851 01/15/21 1442 01/15/21 1447   BP: 113/61  106/63    Pulse: 82  75    Resp: 18  18    Temp: 98.7 °F (37.1 °C)  97 °F (36.1 °C)    SpO2: 97% 96% (!) 84% 99%   Weight:       Height:            Intake and Output:  Current Shift: No intake/output data recorded.   Last three shifts: 01/13 1901 - 01/15 0700  In: 1400.8 [I.V.:1350.8]  Out: -     Physical Exam:   Physical Exam   Constitutional: He appears lethargic. He appears cachectic. He has a sickly appearance. He appears ill. HENT:   Head: Atraumatic. Neck: Normal carotid pulses and no hepatojugular reflux present. Cardiovascular: An irregular rhythm present. Pulmonary/Chest: No bradypnea. No respiratory distress. Abdominal: Soft. Normal appearance. He exhibits no distension, no fluid wave and no mass. Musculoskeletal:         General: Deformity present. No edema. Neurological: He appears lethargic. Skin: No erythema. PEG tube site. Some fluids rainage, no active  Bleeding or  hematoma    Lab/Data Review:  Recent Results (from the past 24 hour(s))   GLUCOSE, POC    Collection Time: 01/14/21  8:44 PM   Result Value Ref Range    Glucose (POC) 189 (H) 65 - 100 mg/dL    Performed by [address] [place] [address], POC    Collection Time: 01/15/21  7:49 AM   Result Value Ref Range    Glucose (POC) 242 (H) 65 - 100 mg/dL    Performed by [employer]    GLUCOSE, POC    Collection Time: 01/15/21 10:58 AM   Result Value Ref Range    Glucose (POC) 313 (H) 65 - 100 mg/dL    Performed by [employer]    GLUCOSE, POC    Collection Time: 01/15/21 11:17 AM   Result Value Ref Range    Glucose (POC) 298 (H) 65 - 100 mg/dL    Performed by [employer]    GLUCOSE, POC    Collection Time: 01/15/21  3:44 PM   Result Value Ref Range    Glucose (POC) 136 (H) 65 - 100 mg/dL    Performed by Shira Barbosa         XR CHEST PORT   Final Result   Impression: Diffuse opacities in the lungs, improved. XR CHEST PORT   Final Result      XR CHEST PORT   Final Result   FINDINGS: Impression: Frontal single view chest.      Unchanged cardiomediastinal silhouette. Calcified aorta. Mild vascular   congestion. No pulmonary edema. Mild hypoinflation. Unchanged bilateral lower lung atelectasis or infiltrates   and left lower lung consolidation. Unchanged small left pleural effusion. No   pneumothorax.       No free air under the diaphragm. XR CHEST PORT   Final Result           Assessment:     Principal Problem:    Pneumonia (1/5/2021)         COVID +  PEG feeding . replaced no bleeding , repositioned outside bumper  Plan:   Continue on COVIDs treatment  Continue tube  feeding    Plan:       Signed By: Connor Richardson MD     January 15, 2021        Thank you for allowing me to participate in this patients care  Cc Referring Physician   Sourav Diamond MD

## 2021-01-15 NOTE — PROGRESS NOTES
Pulmonary Progress Note      NAME: Vee Rothman   :  1932  MRM:  [id number]    Date/Time: 1/15/2021  10:15 AM         Subjective:   I was asked by Edie Vail MD to see Vee Rothman  a 80 y.o.    male in consultation      Excerpts from admission 2021 or consult notes as follows:   80-year-old male came in because of shortness of breath dyspnea generalized weakness initial Covid rapid test was negative he has significant past medical history of chronic kidney disease gout hypertension dysphagia had a PEG tube in place history of lymphoma chest x-ray done which shows left lower lobe infiltrate followed by CAT scan which shows bilateral pleural effusion and atelectasis but now his COVID-19 came back positive and also MRSA and he is disheveled unable to get much history out of the patient on oxygen via nasal cannula so pulmonary consult was called for further evaluation.             Allergies   Allergen Reactions    Pcn [Penicillins] Swelling         MAR reviewed and pertinent medications noted or modified as needed          Current Facility-Administered Medications   Medication    fentaNYL citrate (PF) injection    midazolam (PF) (VERSED) injection    dextrose 5% infusion    albuterol (PROVENTIL HFA, VENTOLIN HFA, PROAIR HFA) inhaler 2 Puff    LORazepam (ATIVAN) tablet 1 mg    furosemide (LASIX) injection 40 mg    sertraline (ZOLOFT) tablet 100 mg    dextromethorphan (DELSYM) 30 mg/5 mL syrup 30 mg    guaiFENesin-dextromethorphan (TUSSI-ORGANIDIN DM)  mg/5 mL oral solution 10 mL    metoprolol tartrate (LOPRESSOR) tablet 25 mg    ascorbic acid (vitamin C) (VITAMIN C) tablet 500 mg    ferrous sulfate tablet 325 mg    [Held by provider] heparin (porcine) injection 5,000 Units    0.9% sodium chloride infusion 250 mL    dexamethasone (DECADRON) 4 mg/mL injection 4 mg    VANCOMYCIN INFORMATION NOTE    calcium acetate(phosphat bind) (PHOSLO) capsule 1,334 mg    sodium chloride (NS) flush 5-10 mL    levoFLOXacin (LEVAQUIN) 750 mg in D5W IVPB    allopurinoL (ZYLOPRIM) tablet 50 mg    famotidine (PEPCID) tablet 20 mg    insulin lispro (HUMALOG) injection    glucose chewable tablet 16 g    glucagon (GLUCAGEN) injection 1 mg    dextrose (D50W) injection syrg 12.5-25 g    ondansetron (ZOFRAN) injection 4 mg    acetaminophen (TYLENOL) tablet 650 mg    acetaminophen (TYLENOL) suppository 650 mg    hydrOXYzine pamoate (VISTARIL) capsule 25 mg      Patient PCP: Jaja Paz MD  PMH:  has a past medical history of CRI (chronic renal insufficiency) (12/13/2012), Gout (1/4/2011), Hypertension, Lymphoma ([facility] and Prostate CA ([facility] (1/4/2011). PSH:   has a past surgical history that includes hx prostatectomy; hc bone marrow biopsy; endoscopy, colon, diagnostic (2003); and ir thoracentesis cath w image (12/15/2020). FHX: family history includes Hypertension in his mother. SHX:  reports that he has never smoked. He has never used smokeless tobacco. He reports previous drug use. He reports that he does not drink alcohol. ROS:     Unable to obtain           Objective:       Vitals:      Last 24hrs VS reviewed since prior progress note. Most recent are:    Visit Vitals  /61 (BP Patient Position: At rest)   Pulse 82   Temp 98.7 °F (37.1 °C)   Resp 18   Ht 5' 6\" (1.676 m)   Wt 50.3 kg (111 lb)   SpO2 96%   BMI 17.92 kg/m²     SpO2 Readings from Last 6 Encounters:   01/15/21 96%   12/28/20 98%   12/21/20 96%   12/06/20 93%   11/27/20 92%   11/11/20 97%    O2 Flow Rate (L/min): 5 l/min(decreased o2 to 4 lpm nc per protocol)     No intake or output data in the 24 hours ending 01/15/21 1044       Exam:     Physical Exam   Constitutional: He appears distressed. HENT:   Head: Normocephalic and atraumatic. Eyes: Pupils are equal, round, and reactive to light. Conjunctivae and EOM are normal.   Neck: Normal range of motion. Neck supple.    Cardiovascular: Normal rate and regular rhythm. Pulmonary/Chest: He is in respiratory distress. He has rales. Abdominal: Soft. Musculoskeletal: Normal range of motion.    Neurological:   Patient is lethargic drowsy     Lab Data Reviewed: (see below)      Medications:  Current Facility-Administered Medications   Medication Dose Route Frequency    fentaNYL citrate (PF) injection    PRN    midazolam (PF) (VERSED) injection    PRN    dextrose 5% infusion  50 mL/hr IntraVENous CONTINUOUS    albuterol (PROVENTIL HFA, VENTOLIN HFA, PROAIR HFA) inhaler 2 Puff  2 Puff Inhalation Q4H PRN    LORazepam (ATIVAN) tablet 1 mg  1 mg Oral Q4H PRN    sertraline (ZOLOFT) tablet 100 mg  100 mg Per G Tube QPM    dextromethorphan (DELSYM) 30 mg/5 mL syrup 30 mg  30 mg Per G Tube Q12H    guaiFENesin-dextromethorphan (TUSSI-ORGANIDIN DM)  mg/5 mL oral solution 10 mL  10 mL Per G Tube Q4H PRN    metoprolol tartrate (LOPRESSOR) tablet 25 mg  25 mg Per G Tube Q12H    ascorbic acid (vitamin C) (VITAMIN C) tablet 500 mg  500 mg Per G Tube DAILY    ferrous sulfate tablet 325 mg  1 Tab Oral DAILY WITH BREAKFAST    [Held by provider] heparin (porcine) injection 5,000 Units  5,000 Units SubCUTAneous Q12H    0.9% sodium chloride infusion 250 mL  250 mL IntraVENous PRN    dexamethasone (DECADRON) 4 mg/mL injection 4 mg  4 mg IntraVENous Q6H    calcium acetate(phosphat bind) (PHOSLO) capsule 1,334 mg  2 Cap Oral TID WITH MEALS    sodium chloride (NS) flush 5-10 mL  5-10 mL IntraVENous PRN    levoFLOXacin (LEVAQUIN) 750 mg in D5W IVPB  750 mg IntraVENous Q48H    allopurinoL (ZYLOPRIM) tablet 50 mg  50 mg Oral EVERY OTHER DAY    famotidine (PEPCID) tablet 20 mg  20 mg Per G Tube DAILY    insulin lispro (HUMALOG) injection   SubCUTAneous AC&HS    glucose chewable tablet 16 g  4 Tab Oral PRN    glucagon (GLUCAGEN) injection 1 mg  1 mg IntraMUSCular PRN    dextrose (D50W) injection syrg 12.5-25 g  25-50 mL IntraVENous PRN    ondansetron (ZOFRAN) injection 4 mg 4 mg IntraVENous Q6H PRN    acetaminophen (TYLENOL) tablet 650 mg  650 mg Per G Tube Q4H PRN    acetaminophen (TYLENOL) suppository 650 mg  650 mg Rectal Q4H PRN    hydrOXYzine pamoate (VISTARIL) capsule 25 mg  25 mg Per G Tube TID PRN       ______________________________________________________________________      Lab Review:     Recent Labs     01/13/21  0511   WBC 13.6*   HGB 8.5*   HCT 25.9*        Recent Labs     01/13/21 0511      K 3.5   CL 98   CO2 31   *   BUN 98*   CREA 2.89*   CA 8.1*     No components found for: GLPOC  No results for input(s): PH, PCO2, PO2, HCO3, FIO2 in the last 72 hours. No results for input(s): INR, INREXT, INREXT, INREXT in the last 72 hours. IMPRESSION:   1. Acute hypoxic respiratory failure  2. COVID-19 pneumonia  3. Severe sepsis  4. Dysphagia PEG tube in place with leaking around the PEG site which is fixed  5. Hypernatremia resolved  6. Bilateral pleural effusion with bilateral lower lobe atelectasis  7. Pt is requiring Drug therapy requiring intensive monitoring for toxicity  8. Pt is unstable, unpredictable needing inpatient monitoring; is acutely ill and at high risk of sudden decline and decompensation with severe consequenses and continued end organ dysfunction and failure  9. Prognosis guarded        RECOMMENDATIONS/PLAN:      1. Patient on 4L NC without labored breathing. Will attempt to wean as tolerated. 2. Patient not candidate for remdesivir due to GFR of 25. Not a candidate for Actemra due to hx lymphoma and prostate cancer. Repeat 1 dose of ivermectin given yesterday. Will continue with Decadron, vitamin C.  3. Continue with PRN albuterol inhaler. 4. Continue with levaquin  5. Patient is on IV fluid D5 because of mild hypernatremia resolved  6. Hb 8.5 we will follow. Repeat CBC. 7. Possible PEG tube malfunction  8. Supplemental O2 to keep sats > 93%  9. Aspiration precautions  10.  Labs to follow electrolytes, renal function and and blood counts  11. Glucose monitoring and SSI  12. Bronchial hygiene with respiratory therapy techniques, bronchodilators  13.  DVT, SUP prophylaxis  Discontinue IV fluid we will get chest x-ray

## 2021-01-15 NOTE — PROGRESS NOTES
Pulmonary Progress Note      NAME: Karey Maloney   :  1932  MRM:  [id number]    Date/Time: 1/15/2021  10:15 AM         Subjective:   I was asked by Edson Gilmore MD to see Karey Maloney  a 80 y.o.    male in consultation      Excerpts from admission 2021 or consult notes as follows:   55-year-old male came in because of shortness of breath dyspnea generalized weakness initial Covid rapid test was negative he has significant past medical history of chronic kidney disease gout hypertension dysphagia had a PEG tube in place history of lymphoma chest x-ray done which shows left lower lobe infiltrate followed by CAT scan which shows bilateral pleural effusion and atelectasis but now his COVID-19 came back positive and also MRSA and he is disheveled unable to get much history out of the patient on oxygen via nasal cannula so pulmonary consult was called for further evaluation.             Allergies   Allergen Reactions    Pcn [Penicillins] Swelling         MAR reviewed and pertinent medications noted or modified as needed          Current Facility-Administered Medications   Medication    fentaNYL citrate (PF) injection    midazolam (PF) (VERSED) injection    dextrose 5% infusion    albuterol (PROVENTIL HFA, VENTOLIN HFA, PROAIR HFA) inhaler 2 Puff    LORazepam (ATIVAN) tablet 1 mg    furosemide (LASIX) injection 40 mg    sertraline (ZOLOFT) tablet 100 mg    dextromethorphan (DELSYM) 30 mg/5 mL syrup 30 mg    guaiFENesin-dextromethorphan (TUSSI-ORGANIDIN DM)  mg/5 mL oral solution 10 mL    metoprolol tartrate (LOPRESSOR) tablet 25 mg    ascorbic acid (vitamin C) (VITAMIN C) tablet 500 mg    ferrous sulfate tablet 325 mg    [Held by provider] heparin (porcine) injection 5,000 Units    0.9% sodium chloride infusion 250 mL    dexamethasone (DECADRON) 4 mg/mL injection 4 mg    VANCOMYCIN INFORMATION NOTE    calcium acetate(phosphat bind) (PHOSLO) capsule 1,334 mg    sodium chloride (NS) flush 5-10 mL    levoFLOXacin (LEVAQUIN) 750 mg in D5W IVPB    allopurinoL (ZYLOPRIM) tablet 50 mg    famotidine (PEPCID) tablet 20 mg    insulin lispro (HUMALOG) injection    glucose chewable tablet 16 g    glucagon (GLUCAGEN) injection 1 mg    dextrose (D50W) injection syrg 12.5-25 g    ondansetron (ZOFRAN) injection 4 mg    acetaminophen (TYLENOL) tablet 650 mg    acetaminophen (TYLENOL) suppository 650 mg    hydrOXYzine pamoate (VISTARIL) capsule 25 mg      Patient PCP: Jessica Aviles MD  PMH:  has a past medical history of CRI (chronic renal insufficiency) (12/13/2012), Gout (1/4/2011), Hypertension, Lymphoma ([facility] and Prostate CA ([facility] (1/4/2011). PSH:   has a past surgical history that includes hx prostatectomy; hc bone marrow biopsy; endoscopy, colon, diagnostic (2003); and ir thoracentesis cath w image (12/15/2020). FHX: family history includes Hypertension in his mother. SHX:  reports that he has never smoked. He has never used smokeless tobacco. He reports previous drug use. He reports that he does not drink alcohol. ROS:     Unable to obtain           Objective:       Vitals:      Last 24hrs VS reviewed since prior progress note. Most recent are:    Visit Vitals  /61 (BP Patient Position: At rest)   Pulse 82   Temp 98.7 °F (37.1 °C)   Resp 18   Ht 5' 6\" (1.676 m)   Wt 111 lb (50.3 kg)   SpO2 96%   BMI 17.92 kg/m²     SpO2 Readings from Last 6 Encounters:   01/15/21 96%   12/28/20 98%   12/21/20 96%   12/06/20 93%   11/27/20 92%   11/11/20 97%    O2 Flow Rate (L/min): 5 l/min(decreased o2 to 4 lpm nc per protocol)     No intake or output data in the 24 hours ending 01/15/21 1023       Exam:     Physical Exam   Constitutional: He appears distressed. HENT:   Head: Normocephalic and atraumatic. Eyes: Pupils are equal, round, and reactive to light. Conjunctivae and EOM are normal.   Neck: Normal range of motion. Neck supple.    Cardiovascular: Normal rate and regular rhythm. Pulmonary/Chest: He is in respiratory distress. He has rales. Abdominal: Soft. Musculoskeletal: Normal range of motion.    Neurological:   Patient is lethargic drowsy     Lab Data Reviewed: (see below)      Medications:  Current Facility-Administered Medications   Medication Dose Route Frequency    fentaNYL citrate (PF) injection    PRN    midazolam (PF) (VERSED) injection    PRN    dextrose 5% infusion  50 mL/hr IntraVENous CONTINUOUS    albuterol (PROVENTIL HFA, VENTOLIN HFA, PROAIR HFA) inhaler 2 Puff  2 Puff Inhalation Q4H PRN    LORazepam (ATIVAN) tablet 1 mg  1 mg Oral Q4H PRN    sertraline (ZOLOFT) tablet 100 mg  100 mg Per G Tube QPM    dextromethorphan (DELSYM) 30 mg/5 mL syrup 30 mg  30 mg Per G Tube Q12H    guaiFENesin-dextromethorphan (TUSSI-ORGANIDIN DM)  mg/5 mL oral solution 10 mL  10 mL Per G Tube Q4H PRN    metoprolol tartrate (LOPRESSOR) tablet 25 mg  25 mg Per G Tube Q12H    ascorbic acid (vitamin C) (VITAMIN C) tablet 500 mg  500 mg Per G Tube DAILY    ferrous sulfate tablet 325 mg  1 Tab Oral DAILY WITH BREAKFAST    [Held by provider] heparin (porcine) injection 5,000 Units  5,000 Units SubCUTAneous Q12H    0.9% sodium chloride infusion 250 mL  250 mL IntraVENous PRN    dexamethasone (DECADRON) 4 mg/mL injection 4 mg  4 mg IntraVENous Q6H    calcium acetate(phosphat bind) (PHOSLO) capsule 1,334 mg  2 Cap Oral TID WITH MEALS    sodium chloride (NS) flush 5-10 mL  5-10 mL IntraVENous PRN    levoFLOXacin (LEVAQUIN) 750 mg in D5W IVPB  750 mg IntraVENous Q48H    allopurinoL (ZYLOPRIM) tablet 50 mg  50 mg Oral EVERY OTHER DAY    famotidine (PEPCID) tablet 20 mg  20 mg Per G Tube DAILY    insulin lispro (HUMALOG) injection   SubCUTAneous AC&HS    glucose chewable tablet 16 g  4 Tab Oral PRN    glucagon (GLUCAGEN) injection 1 mg  1 mg IntraMUSCular PRN    dextrose (D50W) injection syrg 12.5-25 g  25-50 mL IntraVENous PRN    ondansetron (ZOFRAN) injection 4 mg 4 mg IntraVENous Q6H PRN    acetaminophen (TYLENOL) tablet 650 mg  650 mg Per G Tube Q4H PRN    acetaminophen (TYLENOL) suppository 650 mg  650 mg Rectal Q4H PRN    hydrOXYzine pamoate (VISTARIL) capsule 25 mg  25 mg Per G Tube TID PRN       ______________________________________________________________________      Lab Review:     Recent Labs     01/13/21  0511   WBC 13.6*   HGB 8.5*   HCT 25.9*        Recent Labs     01/13/21 0511      K 3.5   CL 98   CO2 31   *   BUN 98*   CREA 2.89*   CA 8.1*     No components found for: GLPOC  No results for input(s): PH, PCO2, PO2, HCO3, FIO2 in the last 72 hours. No results for input(s): INR, INREXT, INREXT, INREXT in the last 72 hours. IMPRESSION:   1. Acute hypoxic respiratory failure  2. COVID-19 pneumonia  3. Severe sepsis  4. Dysphagia PEG tube in place with leaking around the PEG site which is fixed  5. Hypernatremia resolved  6. Bilateral pleural effusion with bilateral lower lobe atelectasis  7. Pt is requiring Drug therapy requiring intensive monitoring for toxicity  8. Pt is unstable, unpredictable needing inpatient monitoring; is acutely ill and at high risk of sudden decline and decompensation with severe consequenses and continued end organ dysfunction and failure  9. Prognosis guarded        RECOMMENDATIONS/PLAN:      1. Patient on 4L NC without labored breathing. Will attempt to wean as tolerated. 2. Patient not candidate for remdesivir due to GFR of 25. Not a candidate for Actemra due to hx lymphoma and prostate cancer. 1 dose of ivermectin given yesterday. Will continue with Decadron, vitamin C.  3. Continue with PRN albuterol inhaler. 4. Continue with levaquin  5. Patient is on IV fluid D5 because of mild hypernatremia resolved  6. Hb 8.5 we will follow. Repeat CBC. 7. Possible PEG tube malfunction  8. Supplemental O2 to keep sats > 93%  9. Aspiration precautions  10.  Labs to follow electrolytes, renal function and and blood counts  11. Glucose monitoring and SSI  12. Bronchial hygiene with respiratory therapy techniques, bronchodilators  13.  DVT, SUP prophylaxis

## 2021-01-15 NOTE — PROGRESS NOTES
Problem: Falls - Risk of  Goal: *Absence of Falls  Description: Document Earma Sandy Fall Risk and appropriate interventions in the flowsheet.   Outcome: Progressing Towards Goal  Note: Fall Risk Interventions:  Mobility Interventions: Assess mobility with egress test, Communicate number of staff needed for ambulation/transfer, OT consult for ADLs, Patient to call before getting OOB    Mentation Interventions: Adequate sleep, hydration, pain control, Door open when patient unattended, Evaluate medications/consider consulting pharmacy, Increase mobility, More frequent rounding, Reorient patient, Room close to nurse's station, Toileting rounds, Update white board    Medication Interventions: Assess postural VS orthostatic hypotension, Evaluate medications/consider consulting pharmacy, Patient to call before getting OOB, Teach patient to arise slowly    Elimination Interventions: Call light in reach, Patient to call for help with toileting needs, Toilet paper/wipes in reach, Toileting schedule/hourly rounds    History of Falls Interventions: Consult care management for discharge planning, Door open when patient unattended, Investigate reason for fall, Room close to nurse's station, Evaluate medications/consider consulting pharmacy

## 2021-01-15 NOTE — PROGRESS NOTES
Skin assessment performed with Melissa Magana. There is scattered bruising on the upper extremities. He has a red rash around his umbilical area, as well as a fresh PEG site that is draining brownish and bloody. The skin in the groin and buttocks area is excoriated. There is a small stage two on his left buttock, z-guard applied. Skin is otherwise unremarkable.

## 2021-01-15 NOTE — PROGRESS NOTES
CM reviewed chart, patient has been accepted with [facility]. Clinical updates have been seen via [facility] for their review.

## 2021-01-16 LAB
ALBUMIN SERPL-MCNC: 1.7 G/DL (ref 3.5–5)
ALBUMIN/GLOB SERPL: 0.5 {RATIO} (ref 1.1–2.2)
ALP SERPL-CCNC: 140 U/L (ref 45–117)
ALT SERPL-CCNC: 21 U/L (ref 12–78)
ANION GAP SERPL CALC-SCNC: 7 MMOL/L (ref 5–15)
AST SERPL W P-5'-P-CCNC: 18 U/L (ref 15–37)
BASOPHILS # BLD: 0 K/UL (ref 0–0.1)
BASOPHILS NFR BLD: 0 % (ref 0–1)
BILIRUB SERPL-MCNC: 0.4 MG/DL (ref 0.2–1)
BUN SERPL-MCNC: 83 MG/DL (ref 6–20)
BUN/CREAT SERPL: 39 (ref 12–20)
CA-I BLD-MCNC: 8.9 MG/DL (ref 8.5–10.1)
CHLORIDE SERPL-SCNC: 98 MMOL/L (ref 97–108)
CO2 SERPL-SCNC: 32 MMOL/L (ref 21–32)
CREAT SERPL-MCNC: 2.12 MG/DL (ref 0.7–1.3)
DIFFERENTIAL METHOD BLD: ABNORMAL
EOSINOPHIL # BLD: 0 K/UL (ref 0–0.4)
EOSINOPHIL NFR BLD: 0 % (ref 0–7)
ERYTHROCYTE [DISTWIDTH] IN BLOOD BY AUTOMATED COUNT: 17.6 % (ref 11.5–14.5)
GLOBULIN SER CALC-MCNC: 3.2 G/DL (ref 2–4)
GLUCOSE BLD STRIP.AUTO-MCNC: 142 MG/DL (ref 65–100)
GLUCOSE BLD STRIP.AUTO-MCNC: 156 MG/DL (ref 65–100)
GLUCOSE BLD STRIP.AUTO-MCNC: 202 MG/DL (ref 65–100)
GLUCOSE BLD STRIP.AUTO-MCNC: 296 MG/DL (ref 65–100)
GLUCOSE SERPL-MCNC: 114 MG/DL (ref 65–100)
HCT VFR BLD AUTO: 27 % (ref 36.6–50.3)
HGB BLD-MCNC: 9 G/DL (ref 12.1–17)
IMM GRANULOCYTES # BLD AUTO: 0 K/UL
IMM GRANULOCYTES NFR BLD AUTO: 0 %
LYMPHOCYTES # BLD: 1.1 K/UL (ref 0.8–3.5)
LYMPHOCYTES NFR BLD: 5 % (ref 12–49)
MCH RBC QN AUTO: 30 PG (ref 26–34)
MCHC RBC AUTO-ENTMCNC: 33.3 G/DL (ref 30–36.5)
MCV RBC AUTO: 90 FL (ref 80–99)
MONOCYTES # BLD: 0.9 K/UL (ref 0–1)
MONOCYTES NFR BLD: 4 % (ref 5–13)
NEUTS SEG # BLD: 20.3 K/UL (ref 1.8–8)
NEUTS SEG NFR BLD: 91 % (ref 32–75)
PERFORMED BY, TECHID: ABNORMAL
PLATELET # BLD AUTO: 259 K/UL (ref 150–400)
PMV BLD AUTO: 11.7 FL (ref 8.9–12.9)
POTASSIUM SERPL-SCNC: 4.4 MMOL/L (ref 3.5–5.1)
PROT SERPL-MCNC: 4.9 G/DL (ref 6.4–8.2)
RBC # BLD AUTO: 3 M/UL (ref 4.1–5.7)
RBC MORPH BLD: ABNORMAL
SODIUM SERPL-SCNC: 137 MMOL/L (ref 136–145)
TSH SERPL DL<=0.05 MIU/L-ACNC: 2.28 UIU/ML (ref 0.36–3.74)
WBC # BLD AUTO: 22.3 K/UL (ref 4.1–11.1)

## 2021-01-16 PROCEDURE — 83540 ASSAY OF IRON: CPT

## 2021-01-16 PROCEDURE — 74011636637 HC RX REV CODE- 636/637: Performed by: NURSE PRACTITIONER

## 2021-01-16 PROCEDURE — 36415 COLL VENOUS BLD VENIPUNCTURE: CPT

## 2021-01-16 PROCEDURE — 80053 COMPREHEN METABOLIC PANEL: CPT

## 2021-01-16 PROCEDURE — 74011250637 HC RX REV CODE- 250/637: Performed by: NURSE PRACTITIONER

## 2021-01-16 PROCEDURE — 82728 ASSAY OF FERRITIN: CPT

## 2021-01-16 PROCEDURE — 83970 ASSAY OF PARATHORMONE: CPT

## 2021-01-16 PROCEDURE — 65270000029 HC RM PRIVATE

## 2021-01-16 PROCEDURE — 84443 ASSAY THYROID STIM HORMONE: CPT

## 2021-01-16 PROCEDURE — 77010033678 HC OXYGEN DAILY

## 2021-01-16 PROCEDURE — 74011250636 HC RX REV CODE- 250/636: Performed by: INTERNAL MEDICINE

## 2021-01-16 PROCEDURE — 94762 N-INVAS EAR/PLS OXIMTRY CONT: CPT

## 2021-01-16 PROCEDURE — 85025 COMPLETE CBC W/AUTO DIFF WBC: CPT

## 2021-01-16 PROCEDURE — 82962 GLUCOSE BLOOD TEST: CPT

## 2021-01-16 PROCEDURE — 74011250636 HC RX REV CODE- 250/636: Performed by: NURSE PRACTITIONER

## 2021-01-16 PROCEDURE — 83550 IRON BINDING TEST: CPT

## 2021-01-16 RX ADMIN — FAMOTIDINE 20 MG: 20 TABLET, FILM COATED ORAL at 09:28

## 2021-01-16 RX ADMIN — CALCIUM ACETATE 1334 MG: 667 CAPSULE ORAL at 18:02

## 2021-01-16 RX ADMIN — METOPROLOL TARTRATE 25 MG: 25 TABLET, FILM COATED ORAL at 09:27

## 2021-01-16 RX ADMIN — DEXTROMETHORPHAN 30 MG: 30 SUSPENSION, EXTENDED RELEASE ORAL at 09:27

## 2021-01-16 RX ADMIN — OXYCODONE HYDROCHLORIDE AND ACETAMINOPHEN 500 MG: 500 TABLET ORAL at 09:27

## 2021-01-16 RX ADMIN — INSULIN LISPRO 4 UNITS: 100 INJECTION, SOLUTION INTRAVENOUS; SUBCUTANEOUS at 11:58

## 2021-01-16 RX ADMIN — FERROUS SULFATE TAB 325 MG (65 MG ELEMENTAL FE) 325 MG: 325 (65 FE) TAB at 09:27

## 2021-01-16 RX ADMIN — DEXTROMETHORPHAN 30 MG: 30 SUSPENSION, EXTENDED RELEASE ORAL at 20:43

## 2021-01-16 RX ADMIN — METOPROLOL TARTRATE 25 MG: 25 TABLET, FILM COATED ORAL at 20:43

## 2021-01-16 RX ADMIN — SERTRALINE HYDROCHLORIDE 100 MG: 50 TABLET ORAL at 18:02

## 2021-01-16 RX ADMIN — DEXAMETHASONE SODIUM PHOSPHATE 4 MG: 4 INJECTION, SOLUTION INTRA-ARTICULAR; INTRALESIONAL; INTRAMUSCULAR; INTRAVENOUS; SOFT TISSUE at 18:02

## 2021-01-16 RX ADMIN — CALCIUM ACETATE 1334 MG: 667 CAPSULE ORAL at 09:27

## 2021-01-16 RX ADMIN — DEXAMETHASONE SODIUM PHOSPHATE 4 MG: 4 INJECTION, SOLUTION INTRA-ARTICULAR; INTRALESIONAL; INTRAMUSCULAR; INTRAVENOUS; SOFT TISSUE at 05:30

## 2021-01-16 RX ADMIN — INSULIN LISPRO 6 UNITS: 100 INJECTION, SOLUTION INTRAVENOUS; SUBCUTANEOUS at 09:27

## 2021-01-16 RX ADMIN — DEXTROSE MONOHYDRATE 20 ML/HR: 50 INJECTION, SOLUTION INTRAVENOUS at 14:31

## 2021-01-16 RX ADMIN — HEPARIN SODIUM 5000 UNITS: 5000 INJECTION INTRAVENOUS; SUBCUTANEOUS at 18:02

## 2021-01-16 RX ADMIN — DEXAMETHASONE SODIUM PHOSPHATE 4 MG: 4 INJECTION, SOLUTION INTRA-ARTICULAR; INTRALESIONAL; INTRAMUSCULAR; INTRAVENOUS; SOFT TISSUE at 11:58

## 2021-01-16 RX ADMIN — CALCIUM ACETATE 1334 MG: 667 CAPSULE ORAL at 11:58

## 2021-01-16 NOTE — PROGRESS NOTES
Hospitalist Progress Note         MAUREEN Mohr, FNP-C    Daily Progress Note: 1/16/2021      Subjective:   Subjective   Patient seen on f/u laying in bed  Patient is pleasantly confused  [place] on for safety    Review of Systems:   Review of Systems   Unable to perform ROS: Mental status change (limited due to altered mental status)         Objective:   Objective      Vitals:  Patient Vitals for the past 12 hrs:   Temp Pulse Resp BP SpO2   01/16/21 1033     93 %   01/16/21 0719 97 °F (36.1 °C) 95 20 111/65 95 %   01/16/21 0047 (!) 96.4 °F (35.8 °C) 80 18 124/69 94 %        Physical Exam:  Physical Exam  Vitals signs and nursing note reviewed. Constitutional:       Appearance: He is ill-appearing. HENT:      Head: Normocephalic. Ears:      Comments: [place]     Nose: Nose normal.      Mouth/Throat:      Mouth: Mucous membranes are moist.   Eyes:      Extraocular Movements: Extraocular movements intact. Neck:      Musculoskeletal: Normal range of motion. Cardiovascular:      Rate and Rhythm: Normal rate. Pulses: Normal pulses. Heart sounds: Normal heart sounds. Pulmonary:      Comments: Diminished air entry, 4L NC  Abdominal:      General: Bowel sounds are normal.      Palpations: Abdomen is soft. Genitourinary:     Comments: Perez in situ  Musculoskeletal: Normal range of motion. Skin:     General: Skin is warm and dry. Capillary Refill: Capillary refill takes less than 2 seconds. Neurological:      Mental Status: He is alert. He is disoriented.       Comments: Pleasantly confused          Lab Results:  Recent Results (from the past 24 hour(s))   GLUCOSE, POC    Collection Time: 01/15/21  3:44 PM   Result Value Ref Range    Glucose (POC) 136 (H) 65 - 100 mg/dL    Performed by Mona Shipley    CBC WITH AUTOMATED DIFF    Collection Time: 01/15/21  6:33 PM   Result Value Ref Range    WBC 19.0 (H) 4.1 - 11.1 K/uL    RBC 3.08 (L) 4.10 - 5.70 M/uL    HGB 9.1 (L) 12.1 - 17.0 g/dL    HCT 27.4 (L) 36.6 - 50.3 %    MCV 89.0 80.0 - 99.0 FL    MCH 29.5 26.0 - 34.0 PG    MCHC 33.2 30.0 - 36.5 g/dL    RDW 17.4 (H) 11.5 - 14.5 %    PLATELET 550 063 - 961 K/uL    MPV 11.7 8.9 - 12.9 FL    NEUTROPHILS 92 (H) 32 - 75 %    LYMPHOCYTES 4 (L) 12 - 49 %    MONOCYTES 3 (L) 5 - 13 %    EOSINOPHILS 0 0 - 7 %    BASOPHILS 0 0 - 1 %    IMMATURE GRANULOCYTES 1 (H) 0.0 - 0.5 %    ABS. NEUTROPHILS 17.6 (H) 1.8 - 8.0 K/UL    ABS. LYMPHOCYTES 0.8 0.8 - 3.5 K/UL    ABS. MONOCYTES 0.6 0.0 - 1.0 K/UL    ABS. EOSINOPHILS 0.0 0.0 - 0.4 K/UL    ABS. BASOPHILS 0.0 0.0 - 0.1 K/UL    ABS. IMM. GRANS. 0.1 (H) 0.00 - 0.04 K/UL    DF AUTOMATED     METABOLIC PANEL, COMPREHENSIVE    Collection Time: 01/15/21  6:33 PM   Result Value Ref Range    Sodium 135 (L) 136 - 145 mmol/L    Potassium 4.5 3.5 - 5.1 mmol/L    Chloride 96 (L) 97 - 108 mmol/L    CO2 31 21 - 32 mmol/L    Anion gap 8 5 - 15 mmol/L    Glucose 104 (H) 65 - 100 mg/dL    BUN 86 (H) 6 - 20 mg/dL    Creatinine 2.27 (H) 0.70 - 1.30 mg/dL    BUN/Creatinine ratio 38 (H) 12 - 20      GFR est AA 33 (L) >60 ml/min/1.73m2    GFR est non-AA 27 (L) >60 ml/min/1.73m2    Calcium 8.9 8.5 - 10.1 mg/dL    Bilirubin, total 0.4 0.2 - 1.0 mg/dL    AST (SGOT) 17 15 - 37 U/L    ALT (SGPT) 24 12 - 78 U/L    Alk.  phosphatase 133 (H) 45 - 117 U/L    Protein, total 5.1 (L) 6.4 - 8.2 g/dL    Albumin 1.7 (L) 3.5 - 5.0 g/dL    Globulin 3.4 2.0 - 4.0 g/dL    A-G Ratio 0.5 (L) 1.1 - 2.2     GLUCOSE, POC    Collection Time: 01/15/21  7:33 PM   Result Value Ref Range    Glucose (POC) 160 (H) 65 - 100 mg/dL    Performed by Lay Padilla    GLUCOSE, POC    Collection Time: 01/16/21  7:17 AM   Result Value Ref Range    Glucose (POC) 296 (H) 65 - 100 mg/dL    Performed by [employer]            Diagnostic Images:  CT Results  (Last 48 hours)    None          Current Medications:    Current Facility-Administered Medications:     fentaNYL citrate (PF) injection, , , PRN, Mica Mcakay MD, 25 mcg at 01/12/21 1348    midazolam (PF) (VERSED) injection, , , PRN, Brody Stokes MD, 1 mg at 01/12/21 1348    dextrose 5% infusion, 20 mL/hr, IntraVENous, CONTINUOUS, Michael Grove MD, Last Rate: 20 mL/hr at 01/15/21 1236, 20 mL/hr at 01/15/21 1236    albuterol (PROVENTIL HFA, VENTOLIN HFA, PROAIR HFA) inhaler 2 Puff, 2 Puff, Inhalation, Q4H PRN, Rob Win NP    LORazepam (ATIVAN) tablet 1 mg, 1 mg, Oral, Q4H PRN, Rob Win NP    sertraline (ZOLOFT) tablet 100 mg, 100 mg, Per G Tube, QPM, Rob Win NP, 100 mg at 01/15/21 1727    dextromethorphan (DELSYM) 30 mg/5 mL syrup 30 mg, 30 mg, Per G Tube, Q12H, Rob Win NP, 30 mg at 01/16/21 6155    guaiFENesin-dextromethorphan (TUSSI-ORGANIDIN DM)  mg/5 mL oral solution 10 mL, 10 mL, Per G Tube, Q4H PRN, Rob Win NP, 10 mL at 01/15/21 1043    metoprolol tartrate (LOPRESSOR) tablet 25 mg, 25 mg, Per G Tube, Q12H, Rob Win NP, 25 mg at 01/16/21 0765    ascorbic acid (vitamin C) (VITAMIN C) tablet 500 mg, 500 mg, Per G Tube, DAILY, Rob Win NP, 500 mg at 01/16/21 3323    ferrous sulfate tablet 325 mg, 1 Tab, Oral, DAILY WITH BREAKFAST, Rob Win NP, 325 mg at 01/16/21 3904    [Held by provider] heparin (porcine) injection 5,000 Units, 5,000 Units, SubCUTAneous, Q12H, Rob Win NP, Stopped at 01/11/21 0400    0.9% sodium chloride infusion 250 mL, 250 mL, IntraVENous, PRN, Rob Win NP    dexamethasone (DECADRON) 4 mg/mL injection 4 mg, 4 mg, IntraVENous, Q6H, Rob Win NP, 4 mg at 01/16/21 0530    calcium acetate(phosphat bind) (PHOSLO) capsule 1,334 mg, 2 Cap, Oral, TID WITH MEALS, Edwin Orozco NP, 1,334 mg at 01/16/21 7464    sodium chloride (NS) flush 5-10 mL, 5-10 mL, IntraVENous, PRN, Rob Win NP    levoFLOXacin (LEVAQUIN) 750 mg in D5W IVPB, 750 mg, IntraVENous, Q48H, Rob Win NP, Stopped at 01/15/21 1459    allopurinoL (ZYLOPRIM) tablet 50 mg, 50 mg, Oral, EVERY OTHER DAY, Rob Win NP, 50 mg at 01/15/21 1727    famotidine (PEPCID) tablet 20 mg, 20 mg, Per G Tube, DAILY, Rob Win NP, 20 mg at 01/16/21 2863    insulin lispro (HUMALOG) injection, , SubCUTAneous, AC&HS, Rob Win NP, 6 Units at 01/16/21 3838    glucose chewable tablet 16 g, 4 Tab, Oral, PRN, Rob Win NP    glucagon (GLUCAGEN) injection 1 mg, 1 mg, IntraMUSCular, PRN, Rob Win NP    dextrose (D50W) injection syrg 12.5-25 g, 25-50 mL, IntraVENous, PRN, Rob Win NP    ondansetron [facility]) injection 4 mg, 4 mg, IntraVENous, Q6H PRN, Rob Win NP    acetaminophen (TYLENOL) tablet 650 mg, 650 mg, Per G Tube, Q4H PRN, Rob Win NP    acetaminophen (TYLENOL) suppository 650 mg, 650 mg, Rectal, Q4H PRN, Rob Win NP, 650 mg at 01/13/21 0123    hydrOXYzine pamoate (VISTARIL) capsule 25 mg, 25 mg, Per G Tube, TID PRN, Rob Win NP, 25 mg at 01/14/21 1035       ASSESSMENT:    Acute respiratory failure w/ hypoxia:  Covid pneumonia:  -patient currently on 4L NC, wean as tolerated  -unable to receive remdesivir do to eGFR 25  -unable to receive actemra due to hx of lymphoma and prostate ca  -pulmonary following  -continue supplemental O2, IV decadron, prn inhalers, levaquin     Sepsis:  -secondary to infx process  -blood and urine cxs negative  -continue above treatment plan    Dysphagia:  -s/p replacement of peg tube  -continue tube feeds  -Continue gentle IV hydration    BENJI on CKD Stage 3:  -baseline cr appears around 1.7, current Cr 2.27  -pharmacy to renal dose all medications to eGFR 25  -obtain nephrology eval     Anemia:  -hgb 9.1, continue to trend h/h, transfuse for hgb <7        PLAN:  -pulmonary following  -continue supplemental O2, IV decadron, prn inhalers, levaquin   -patient currently on 4L NC wean as tolerated  -s/p replacement of peg tube, continue feeds  -Continue gentle IV hydration  - for dispo planning  -obtain nephrology consult      DNR  Dvt Prophylaxis  GI Prophylaxis  Home medications reviewed and reconciled      Care Plan discussed with: Interdisciplinary team    Total time spent with patient: 30 minutes.     Winda Snellen, NP

## 2021-01-16 NOTE — CONSULTS
Consult Date: 1/16/2021    IP CONSULT TO NEPHROLOGY  Consult performed by: Trini Ferrer MD  Consult ordered by: Elizabeth Nicholson NP  Reason for consult: Elevated creatinine and history of chronic kidney disease          Subjective :  Patient is a 70-year-old  male with a history of gout, hypertension, chronic kidney disease, lymphoma, prostate cancer, s/p prostatectomy who is admitted to hospital for COVID-19 pneumonia. Currently nonverbal and on PEG tube feeds. Creatinine noted to be elevated and I was asked to follow.   Patient could not answer my questions    Past Medical History:   Diagnosis Date    CRI (chronic renal insufficiency) 12/13/2012    Gout 1/4/2011    Hypertension     [facility] [address]     Prostate CA ([facility] [address] 1/4/2011      Past Surgical History:   Procedure Laterality Date    ENDOSCOPY, COLON, DIAGNOSTIC  2003    neg    HC BONE MARROW BIOPSY      HX PROSTATECTOMY      IR THORACENTESIS CATH W IMAGE  12/15/2020     Family History   Problem Relation Age of Onset    Hypertension Mother       Social History     Tobacco Use    Smoking status: Never Smoker    Smokeless tobacco: Never Used   Substance Use Topics    Alcohol use: No       Current Facility-Administered Medications   Medication Dose Route Frequency Provider Last Rate Last Admin    fentaNYL citrate (PF) injection    PRN Beatriz Goldmann, MD   25 mcg at 01/12/21 1348    midazolam (PF) (VERSED) injection    PRN Beatriz Goldmann, MD   1 mg at 01/12/21 1348    dextrose 5% infusion  20 mL/hr IntraVENous CONTINUOUS Michael Grove MD 20 mL/hr at 01/15/21 1236 20 mL/hr at 01/15/21 1236    albuterol (PROVENTIL HFA, VENTOLIN HFA, PROAIR HFA) inhaler 2 Puff  2 Puff Inhalation Q4H PRN Rob Win NP        LORazepam (ATIVAN) tablet 1 mg  1 mg Oral Q4H PRN Rob Win NP        sertraline (ZOLOFT) tablet 100 mg  100 mg Per G Tube QPM Rob Win NP   100 mg at 01/15/21 1727    dextromethorphan (DELSYM) 30 mg/5 mL syrup 30 mg  30 mg Per G Tube Q12H Rob Win NP   30 mg at 01/16/21 9438    guaiFENesin-dextromethorphan (TUSSI-ORGANIDIN DM)  mg/5 mL oral solution 10 mL  10 mL Per G Tube Q4H PRN Rob Win NP   10 mL at 01/15/21 1043    metoprolol tartrate (LOPRESSOR) tablet 25 mg  25 mg Per G Tube Q12H Rob Win NP   25 mg at 01/16/21 8771    ascorbic acid (vitamin C) (VITAMIN C) tablet 500 mg  500 mg Per G Tube DAILY Rob Win NP   500 mg at 01/16/21 6680    ferrous sulfate tablet 325 mg  1 Tab Oral DAILY WITH BREAKFAST Rob Win NP   325 mg at 01/16/21 2999    [Held by provider] heparin (porcine) injection 5,000 Units  5,000 Units SubCUTAneous Q12H Rob Win NP   Stopped at 01/11/21 0400    0.9% sodium chloride infusion 250 mL  250 mL IntraVENous PRN Rob Win NP        dexamethasone (DECADRON) 4 mg/mL injection 4 mg  4 mg IntraVENous Q6H Rob Win NP   4 mg at 01/16/21 0530    calcium acetate(phosphat bind) (PHOSLO) capsule 1,334 mg  2 Cap Oral TID WITH MEALS Raine Romero NP   1,334 mg at 01/16/21 2312    sodium chloride (NS) flush 5-10 mL  5-10 mL IntraVENous PRN Rob Win NP        levoFLOXacin (LEVAQUIN) 750 mg in D5W IVPB  750 mg IntraVENous Q48H Rob Win NP   Stopped at 01/15/21 1459    allopurinoL (ZYLOPRIM) tablet 50 mg  50 mg Oral EVERY OTHER DAY Rob Win NP   50 mg at 01/15/21 1727    famotidine (PEPCID) tablet 20 mg  20 mg Per G Tube DAILY Rob Win NP   20 mg at 01/16/21 0936    insulin lispro (HUMALOG) injection   SubCUTAneous AC&HS Rob Win NP   6 Units at 01/16/21 5425    glucose chewable tablet 16 g  4 Tab Oral PRN Rob Win, NP        glucagon (GLUCAGEN) injection 1 mg  1 mg IntraMUSCular PRN Rob Win, NP        dextrose (D50W) injection syrg 12.5-25 g  25-50 mL IntraVENous PRN Rob Win, NP        ondansetron [facility]) injection 4 mg  4 mg IntraVENous Q6H PRN Andres Win, TEO  acetaminophen (TYLENOL) tablet 650 mg  650 mg Per G Tube Q4H PRN Rob Win MARGIE, NP        acetaminophen (TYLENOL) suppository 650 mg  650 mg Rectal Q4H PRN Rob Win MARGIE NP   650 mg at 01/13/21 0123    hydrOXYzine pamoate (VISTARIL) capsule 25 mg  25 mg Per G Tube TID PRN Rob Win NP   25 mg at 01/14/21 1035        Review of Systems   Unable to perform ROS: Mental status change       Objective     Vital signs for last 24 hours:  Visit Vitals  /65 (BP 1 Location: Left arm, BP Patient Position: At rest)   Pulse 95   Temp 97 °F (36.1 °C)   Resp 20   Ht 5' 6\" (1.676 m)   Wt 50.3 kg (111 lb)   SpO2 93%   BMI 17.92 kg/m²       Intake/Output this shift:  Current Shift: No intake/output data recorded. Last 3 Shifts: 01/14 1901 - 01/16 0700  In: -   Out: 1   Physical Exam   HENT:   Head: Normocephalic and atraumatic. Cardiovascular: Normal rate and normal heart sounds. Pulmonary/Chest: Effort normal and breath sounds normal.   Abdominal: Soft. Neurological: He is alert. Elderly, ill-appearing,  male, thin, bedridden,  PEG tube feeds ongoing, additional water flushes via PEG tube  Not on oxygen at my time of assessment  Did not verbalize. No edema noted. No rash noted. Skin remains dry.       Data Review:   Recent Results (from the past 24 hour(s))   GLUCOSE, POC    Collection Time: 01/15/21  3:44 PM   Result Value Ref Range    Glucose (POC) 136 (H) 65 - 100 mg/dL    Performed by Sunil Stone    CBC WITH AUTOMATED DIFF    Collection Time: 01/15/21  6:33 PM   Result Value Ref Range    WBC 19.0 (H) 4.1 - 11.1 K/uL    RBC 3.08 (L) 4.10 - 5.70 M/uL    HGB 9.1 (L) 12.1 - 17.0 g/dL    HCT 27.4 (L) 36.6 - 50.3 %    MCV 89.0 80.0 - 99.0 FL    MCH 29.5 26.0 - 34.0 PG    MCHC 33.2 30.0 - 36.5 g/dL    RDW 17.4 (H) 11.5 - 14.5 %    PLATELET 386 108 - 948 K/uL    MPV 11.7 8.9 - 12.9 FL    NEUTROPHILS 92 (H) 32 - 75 %    LYMPHOCYTES 4 (L) 12 - 49 %    MONOCYTES 3 (L) 5 - 13 %    EOSINOPHILS 0 0 - 7 %    BASOPHILS 0 0 - 1 %    IMMATURE GRANULOCYTES 1 (H) 0.0 - 0.5 %    ABS. NEUTROPHILS 17.6 (H) 1.8 - 8.0 K/UL    ABS. LYMPHOCYTES 0.8 0.8 - 3.5 K/UL    ABS. MONOCYTES 0.6 0.0 - 1.0 K/UL    ABS. EOSINOPHILS 0.0 0.0 - 0.4 K/UL    ABS. BASOPHILS 0.0 0.0 - 0.1 K/UL    ABS. IMM. GRANS. 0.1 (H) 0.00 - 0.04 K/UL    DF AUTOMATED     METABOLIC PANEL, COMPREHENSIVE    Collection Time: 01/15/21  6:33 PM   Result Value Ref Range    Sodium 135 (L) 136 - 145 mmol/L    Potassium 4.5 3.5 - 5.1 mmol/L    Chloride 96 (L) 97 - 108 mmol/L    CO2 31 21 - 32 mmol/L    Anion gap 8 5 - 15 mmol/L    Glucose 104 (H) 65 - 100 mg/dL    BUN 86 (H) 6 - 20 mg/dL    Creatinine 2.27 (H) 0.70 - 1.30 mg/dL    BUN/Creatinine ratio 38 (H) 12 - 20      GFR est AA 33 (L) >60 ml/min/1.73m2    GFR est non-AA 27 (L) >60 ml/min/1.73m2    Calcium 8.9 8.5 - 10.1 mg/dL    Bilirubin, total 0.4 0.2 - 1.0 mg/dL    AST (SGOT) 17 15 - 37 U/L    ALT (SGPT) 24 12 - 78 U/L    Alk. phosphatase 133 (H) 45 - 117 U/L    Protein, total 5.1 (L) 6.4 - 8.2 g/dL    Albumin 1.7 (L) 3.5 - 5.0 g/dL    Globulin 3.4 2.0 - 4.0 g/dL    A-G Ratio 0.5 (L) 1.1 - 2.2     GLUCOSE, POC    Collection Time: 01/15/21  7:33 PM   Result Value Ref Range    Glucose (POC) 160 (H) 65 - 100 mg/dL    Performed by Kathlean Dubin    GLUCOSE, POC    Collection Time: 01/16/21  7:17 AM   Result Value Ref Range    Glucose (POC) 296 (H) 65 - 100 mg/dL    Performed by BRANCH EDMAR          XR CHEST PORT   Final Result   Impression: Diffuse opacities in the lungs, improved. XR CHEST PORT   Final Result      XR CHEST PORT   Final Result   FINDINGS: Impression: Frontal single view chest.      Unchanged cardiomediastinal silhouette. Calcified aorta. Mild vascular   congestion. No pulmonary edema. Mild hypoinflation. Unchanged bilateral lower lung atelectasis or infiltrates   and left lower lung consolidation. Unchanged small left pleural effusion. No   pneumothorax.       No free air under the diaphragm. XR CHEST PORT   Final Result           Patient Active Problem List   Diagnosis Code    Lymphoma ([facility] [address] C85.90    HTN (hypertension) I10    Prostate CA ([facility] [address] C61    Gout M10.9    CRI (chronic renal insufficiency) N18.9    Hypercalcemia of malignancy E83.52    Cervical stenosis of spinal canal M48.02    Acute renal failure (ARF) ([facility]) N17.9    Lumbar canal stenosis M48.061    Frequent falls R29.6    Multiple falls R29.6    Weakness R53.1    Anemia D64.9    Opioid use F11.90    History of B-cell lymphoma Z85.72    Non-Hodgkin's lymphoma in relapse ([facility]) C85.90    Hypercalcemia E83.52    HAP (hospital-acquired pneumonia) J18.9, Y95    SIRS (systemic inflammatory response syndrome) ([facility]) R65.10    CHF (congestive heart failure) ([facility]) I50.9    Acute hypoxemic respiratory failure ([facility]) J96.01    Severe sepsis ([facility]) A41.9, R65.20    Person under investigation for COVID-19 Z20.822    BPPV (benign paroxysmal positional vertigo) H81.10    Pneumonia J18.9        DIAGNOSES:  1. CKD stage IV  2. Secondary hyperparathyroidism on PhosLo  3. Gout on allopurinol  4. Malnutrition on TPN  5. COVID-19 pneumonia  6. Hypertension metoprolol  7. History of hypercalcemia of malignancy  8. History of lymphoma    PLAN:    Seems that his creatinine has not worsened recently. Creatinine was 2.27 today. Over the past 2 months his best creatinine was at the time of discharge from hospital stay in December; creatinine was 1.7 then    Urine output noted especially keeping track of IV fluids  However he is getting hyponatremic with current water flushes. Prolonged hospitalization reflecting decreased albumin. We could work him up for  [facility] and chronic anemia  Accordingly will send work up    [employer] for consulting me.

## 2021-01-16 NOTE — PROGRESS NOTES
Pulmonary Progress Note      NAME: Denise Morel   :  1932  MRM:  [id number]    Date/Time: 2021  10:15 AM         Subjective:   I was asked by Adan Cohen MD to see Denise Morel  a 80 y.o.    male in consultation      Excerpts from admission 2021 or consult notes as follows:   66-year-old male came in because of shortness of breath dyspnea generalized weakness initial Covid rapid test was negative he has significant past medical history of chronic kidney disease gout hypertension dysphagia had a PEG tube in place history of lymphoma chest x-ray done which shows left lower lobe infiltrate followed by CAT scan which shows bilateral pleural effusion and atelectasis but now his COVID-19 came back positive and also MRSA and he is disheveled unable to get much history out of the patient on oxygen via nasal cannula so pulmonary consult was called for further evaluation.             Allergies   Allergen Reactions    Pcn [Penicillins] Swelling         MAR reviewed and pertinent medications noted or modified as needed          Current Facility-Administered Medications   Medication    fentaNYL citrate (PF) injection    midazolam (PF) (VERSED) injection    dextrose 5% infusion    albuterol (PROVENTIL HFA, VENTOLIN HFA, PROAIR HFA) inhaler 2 Puff    LORazepam (ATIVAN) tablet 1 mg    furosemide (LASIX) injection 40 mg    sertraline (ZOLOFT) tablet 100 mg    dextromethorphan (DELSYM) 30 mg/5 mL syrup 30 mg    guaiFENesin-dextromethorphan (TUSSI-ORGANIDIN DM)  mg/5 mL oral solution 10 mL    metoprolol tartrate (LOPRESSOR) tablet 25 mg    ascorbic acid (vitamin C) (VITAMIN C) tablet 500 mg    ferrous sulfate tablet 325 mg    [Held by provider] heparin (porcine) injection 5,000 Units    0.9% sodium chloride infusion 250 mL    dexamethasone (DECADRON) 4 mg/mL injection 4 mg    VANCOMYCIN INFORMATION NOTE    calcium acetate(phosphat bind) (PHOSLO) capsule 1,334 mg    sodium chloride (NS) flush 5-10 mL    levoFLOXacin (LEVAQUIN) 750 mg in D5W IVPB    allopurinoL (ZYLOPRIM) tablet 50 mg    famotidine (PEPCID) tablet 20 mg    insulin lispro (HUMALOG) injection    glucose chewable tablet 16 g    glucagon (GLUCAGEN) injection 1 mg    dextrose (D50W) injection syrg 12.5-25 g    ondansetron (ZOFRAN) injection 4 mg    acetaminophen (TYLENOL) tablet 650 mg    acetaminophen (TYLENOL) suppository 650 mg    hydrOXYzine pamoate (VISTARIL) capsule 25 mg      Patient PCP: Jaja Paz MD  PMH:  has a past medical history of CRI (chronic renal insufficiency) (12/13/2012), Gout (1/4/2011), Hypertension, Lymphoma ([facility] [address] and Prostate CA ([facility] [address] (1/4/2011). PSH:   has a past surgical history that includes hx prostatectomy; hc bone marrow biopsy; endoscopy, colon, diagnostic (2003); and ir thoracentesis cath w image (12/15/2020). FHX: family history includes Hypertension in his mother. SHX:  reports that he has never smoked. He has never used smokeless tobacco. He reports previous drug use. He reports that he does not drink alcohol. ROS:     Unable to obtain           Objective:       Vitals:      Last 24hrs VS reviewed since prior progress note. Most recent are:    Visit Vitals  /65 (BP 1 Location: Left arm, BP Patient Position: At rest)   Pulse 95   Temp 97 °F (36.1 °C)   Resp 20   Ht 5' 6\" (1.676 m)   Wt 50.3 kg (111 lb)   SpO2 93%   BMI 17.92 kg/m²     SpO2 Readings from Last 6 Encounters:   01/16/21 93%   12/28/20 98%   12/21/20 96%   12/06/20 93%   11/27/20 92%   11/11/20 97%    O2 Flow Rate (L/min): 5 l/min       Intake/Output Summary (Last 24 hours) at 1/16/2021 1200  Last data filed at 1/15/2021 1840  Gross per 24 hour   Intake    Output 1 ml   Net -1 ml          Exam:     Physical Exam   Constitutional: He appears distressed. HENT:   Head: Normocephalic and atraumatic. Eyes: Pupils are equal, round, and reactive to light.  Conjunctivae and EOM are normal.   Neck: Normal range of motion. Neck supple. Cardiovascular: Normal rate and regular rhythm. Pulmonary/Chest: He is in respiratory distress. He has rales. Abdominal: Soft. Musculoskeletal: Normal range of motion.    Neurological:   Patient is lethargic drowsy     Lab Data Reviewed: (see below)      Medications:  Current Facility-Administered Medications   Medication Dose Route Frequency    fentaNYL citrate (PF) injection    PRN    midazolam (PF) (VERSED) injection    PRN    dextrose 5% infusion  20 mL/hr IntraVENous CONTINUOUS    albuterol (PROVENTIL HFA, VENTOLIN HFA, PROAIR HFA) inhaler 2 Puff  2 Puff Inhalation Q4H PRN    LORazepam (ATIVAN) tablet 1 mg  1 mg Oral Q4H PRN    sertraline (ZOLOFT) tablet 100 mg  100 mg Per G Tube QPM    dextromethorphan (DELSYM) 30 mg/5 mL syrup 30 mg  30 mg Per G Tube Q12H    guaiFENesin-dextromethorphan (TUSSI-ORGANIDIN DM)  mg/5 mL oral solution 10 mL  10 mL Per G Tube Q4H PRN    metoprolol tartrate (LOPRESSOR) tablet 25 mg  25 mg Per G Tube Q12H    ascorbic acid (vitamin C) (VITAMIN C) tablet 500 mg  500 mg Per G Tube DAILY    ferrous sulfate tablet 325 mg  1 Tab Oral DAILY WITH BREAKFAST    [Held by provider] heparin (porcine) injection 5,000 Units  5,000 Units SubCUTAneous Q12H    0.9% sodium chloride infusion 250 mL  250 mL IntraVENous PRN    dexamethasone (DECADRON) 4 mg/mL injection 4 mg  4 mg IntraVENous Q6H    calcium acetate(phosphat bind) (PHOSLO) capsule 1,334 mg  2 Cap Oral TID WITH MEALS    sodium chloride (NS) flush 5-10 mL  5-10 mL IntraVENous PRN    levoFLOXacin (LEVAQUIN) 750 mg in D5W IVPB  750 mg IntraVENous Q48H    allopurinoL (ZYLOPRIM) tablet 50 mg  50 mg Oral EVERY OTHER DAY    famotidine (PEPCID) tablet 20 mg  20 mg Per G Tube DAILY    insulin lispro (HUMALOG) injection   SubCUTAneous AC&HS    glucose chewable tablet 16 g  4 Tab Oral PRN    glucagon (GLUCAGEN) injection 1 mg  1 mg IntraMUSCular PRN    dextrose (D50W) injection syrg 12.5-25 g  25-50 mL IntraVENous PRN    ondansetron (ZOFRAN) injection 4 mg  4 mg IntraVENous Q6H PRN    acetaminophen (TYLENOL) tablet 650 mg  650 mg Per G Tube Q4H PRN    acetaminophen (TYLENOL) suppository 650 mg  650 mg Rectal Q4H PRN    hydrOXYzine pamoate (VISTARIL) capsule 25 mg  25 mg Per G Tube TID PRN       ______________________________________________________________________      Lab Review:     Recent Labs     01/15/21  1833   WBC 19.0*   HGB 9.1*   HCT 27.4*        Recent Labs     01/15/21  1833   *   K 4.5   CL 96*   CO2 31   *   BUN 86*   CREA 2.27*   CA 8.9   ALB 1.7*   ALT 24     No components found for: GLPOC  No results for input(s): PH, PCO2, PO2, HCO3, FIO2 in the last 72 hours. No results for input(s): INR, INREXT, INREXT, INREXT in the last 72 hours. IMPRESSION:   1. Acute hypoxic respiratory failure  2. COVID-19 pneumonia  3. Severe sepsis  4. Dysphagia PEG tube in place with leaking around the PEG site which is fixed  5. Hypernatremia resolved  6. Bilateral pleural effusion with bilateral lower lobe atelectasis  7. Pt is requiring Drug therapy requiring intensive monitoring for toxicity  8. Pt is unstable, unpredictable needing inpatient monitoring; is acutely ill and at high risk of sudden decline and decompensation with severe consequenses and continued end organ dysfunction and failure  9. Prognosis guarded        RECOMMENDATIONS/PLAN:      1. Patient on 4L NC without labored breathing. Will attempt to wean as tolerated. 2. Patient not candidate for remdesivir due to GFR of 25. Not a candidate for Actemra due to hx lymphoma and prostate cancer. Repeat 1 dose of ivermectin Will continue with Decadron, vitamin C.  3. Continue with PRN albuterol inhaler. 4. Continue with levaquin  5. Patient is on IV fluid D5 because of mild hypernatremia resolved  6. Hb 9.1 we will follow. Repeat CBC. 7. Supplemental O2 to keep sats > 93%  8.  Aspiration precautions  9. Labs to follow electrolytes, renal function and and blood counts  10. Glucose monitoring and SSI  11. Bronchial hygiene with respiratory therapy techniques, bronchodilators  12.  DVT, SUP prophylaxis        Chest x-ray shows improvement in opacity

## 2021-01-16 NOTE — PROGRESS NOTES
Problem: Falls - Risk of  Goal: *Absence of Falls  Description: Document Davida Fowler Fall Risk and appropriate interventions in the flowsheet.   Outcome: Progressing Towards Goal  Note: Fall Risk Interventions:  Mobility Interventions: Bed/chair exit alarm    Mentation Interventions: Bed/chair exit alarm    Medication Interventions: Bed/chair exit alarm    Elimination Interventions: Call light in reach    History of Falls Interventions: Bed/chair exit alarm

## 2021-01-17 VITALS
DIASTOLIC BLOOD PRESSURE: 59 MMHG | HEIGHT: 66 IN | OXYGEN SATURATION: 96 % | SYSTOLIC BLOOD PRESSURE: 106 MMHG | HEART RATE: 78 BPM | TEMPERATURE: 97.8 F | BODY MASS INDEX: 17.84 KG/M2 | RESPIRATION RATE: 18 BRPM | WEIGHT: 111 LBS

## 2021-01-17 LAB
25(OH)D3 SERPL-MCNC: 33.2 NG/ML (ref 30–100)
ALBUMIN SERPL-MCNC: 1.7 G/DL (ref 3.5–5)
ANION GAP SERPL CALC-SCNC: 6 MMOL/L (ref 5–15)
BUN SERPL-MCNC: 89 MG/DL (ref 6–20)
BUN/CREAT SERPL: 42 (ref 12–20)
CA-I BLD-MCNC: 8.8 MG/DL (ref 8.5–10.1)
CA-I BLD-MCNC: 9.1 MG/DL (ref 8.5–10.1)
CHLORIDE SERPL-SCNC: 99 MMOL/L (ref 97–108)
CO2 SERPL-SCNC: 31 MMOL/L (ref 21–32)
CREAT SERPL-MCNC: 2.12 MG/DL (ref 0.7–1.3)
FERRITIN SERPL-MCNC: 4620 NG/ML (ref 26–388)
GLUCOSE BLD STRIP.AUTO-MCNC: 156 MG/DL (ref 65–100)
GLUCOSE BLD STRIP.AUTO-MCNC: 193 MG/DL (ref 65–100)
GLUCOSE BLD STRIP.AUTO-MCNC: 245 MG/DL (ref 65–100)
GLUCOSE SERPL-MCNC: 204 MG/DL (ref 65–100)
PERFORMED BY, TECHID: ABNORMAL
PHOSPHATE SERPL-MCNC: 2.1 MG/DL (ref 2.6–4.7)
POTASSIUM SERPL-SCNC: 4.4 MMOL/L (ref 3.5–5.1)
PTH-INTACT SERPL-MCNC: 31.2 PG/ML (ref 18.4–88)
SODIUM SERPL-SCNC: 136 MMOL/L (ref 136–145)

## 2021-01-17 PROCEDURE — 80069 RENAL FUNCTION PANEL: CPT

## 2021-01-17 PROCEDURE — 82306 VITAMIN D 25 HYDROXY: CPT

## 2021-01-17 PROCEDURE — 82962 GLUCOSE BLOOD TEST: CPT

## 2021-01-17 PROCEDURE — 74011250637 HC RX REV CODE- 250/637: Performed by: NURSE PRACTITIONER

## 2021-01-17 PROCEDURE — 74011250636 HC RX REV CODE- 250/636: Performed by: NURSE PRACTITIONER

## 2021-01-17 PROCEDURE — 36415 COLL VENOUS BLD VENIPUNCTURE: CPT

## 2021-01-17 PROCEDURE — 94760 N-INVAS EAR/PLS OXIMETRY 1: CPT

## 2021-01-17 PROCEDURE — 74011636637 HC RX REV CODE- 636/637: Performed by: NURSE PRACTITIONER

## 2021-01-17 PROCEDURE — 77010033678 HC OXYGEN DAILY

## 2021-01-17 RX ORDER — LANOLIN ALCOHOL/MO/W.PET/CERES
325 CREAM (GRAM) TOPICAL
Qty: 30 TAB | Refills: 0 | Status: SHIPPED
Start: 2021-01-18 | End: 2021-02-17

## 2021-01-17 RX ORDER — LEVOFLOXACIN 500 MG/1
500 TABLET, FILM COATED ORAL DAILY
Qty: 3 TAB | Refills: 0 | Status: SHIPPED | OUTPATIENT
Start: 2021-01-17 | End: 2021-01-20

## 2021-01-17 RX ORDER — HYDROXYZINE PAMOATE 25 MG/1
25 CAPSULE ORAL
Qty: 20 CAP | Refills: 0 | Status: SHIPPED
Start: 2021-01-17 | End: 2021-01-24

## 2021-01-17 RX ORDER — LORAZEPAM 1 MG/1
1 TABLET ORAL
Qty: 6 TAB | Refills: 0 | Status: SHIPPED
Start: 2021-01-17 | End: 2021-01-18

## 2021-01-17 RX ORDER — ASCORBIC ACID 500 MG
500 TABLET ORAL DAILY
Qty: 30 TAB | Refills: 0 | Status: SHIPPED
Start: 2021-01-18 | End: 2021-02-17

## 2021-01-17 RX ORDER — PREDNISONE 20 MG/1
20 TABLET ORAL
Qty: 5 TAB | Refills: 0 | Status: SHIPPED | OUTPATIENT
Start: 2021-01-17 | End: 2021-01-22

## 2021-01-17 RX ORDER — ALBUTEROL SULFATE 90 UG/1
2 AEROSOL, METERED RESPIRATORY (INHALATION)
Qty: 1 INHALER | Refills: 0 | Status: SHIPPED
Start: 2021-01-17 | End: 2021-02-16

## 2021-01-17 RX ORDER — CALCIUM ACETATE 667 MG/1
2 CAPSULE ORAL
Qty: 180 CAP | Refills: 0 | Status: SHIPPED
Start: 2021-01-17 | End: 2021-02-16

## 2021-01-17 RX ORDER — INSULIN LISPRO 100 [IU]/ML
INJECTION, SOLUTION INTRAVENOUS; SUBCUTANEOUS
Qty: 1 VIAL | Refills: 0 | Status: SHIPPED
Start: 2021-01-17

## 2021-01-17 RX ORDER — DEXTROMETHORPHAN POLISTIREX 30 MG/5ML
30 SUSPENSION ORAL EVERY 12 HOURS
Qty: 100 ML | Refills: 0 | Status: SHIPPED
Start: 2021-01-17 | End: 2021-01-27

## 2021-01-17 RX ORDER — GUAIFENESIN DEXTROMETHORPHAN HYDROBROMIDE ORAL SOLUTION 10; 100 MG/5ML; MG/5ML
10 SOLUTION ORAL
Qty: 120 ML | Refills: 0 | Status: SHIPPED
Start: 2021-01-17 | End: 2021-01-19

## 2021-01-17 RX ADMIN — OXYCODONE HYDROCHLORIDE AND ACETAMINOPHEN 500 MG: 500 TABLET ORAL at 10:12

## 2021-01-17 RX ADMIN — DEXAMETHASONE SODIUM PHOSPHATE 4 MG: 4 INJECTION, SOLUTION INTRA-ARTICULAR; INTRALESIONAL; INTRAMUSCULAR; INTRAVENOUS; SOFT TISSUE at 10:12

## 2021-01-17 RX ADMIN — DEXTROMETHORPHAN 30 MG: 30 SUSPENSION, EXTENDED RELEASE ORAL at 10:11

## 2021-01-17 RX ADMIN — FAMOTIDINE 20 MG: 20 TABLET, FILM COATED ORAL at 10:12

## 2021-01-17 RX ADMIN — FERROUS SULFATE TAB 325 MG (65 MG ELEMENTAL FE) 325 MG: 325 (65 FE) TAB at 10:12

## 2021-01-17 RX ADMIN — METOPROLOL TARTRATE 25 MG: 25 TABLET, FILM COATED ORAL at 10:11

## 2021-01-17 RX ADMIN — CALCIUM ACETATE 1334 MG: 667 CAPSULE ORAL at 10:12

## 2021-01-17 RX ADMIN — LEVOFLOXACIN 750 MG: 5 INJECTION, SOLUTION INTRAVENOUS at 15:16

## 2021-01-17 RX ADMIN — DEXAMETHASONE SODIUM PHOSPHATE 4 MG: 4 INJECTION, SOLUTION INTRA-ARTICULAR; INTRALESIONAL; INTRAMUSCULAR; INTRAVENOUS; SOFT TISSUE at 02:38

## 2021-01-17 RX ADMIN — INSULIN LISPRO 2 UNITS: 100 INJECTION, SOLUTION INTRAVENOUS; SUBCUTANEOUS at 12:47

## 2021-01-17 RX ADMIN — INSULIN LISPRO 4 UNITS: 100 INJECTION, SOLUTION INTRAVENOUS; SUBCUTANEOUS at 10:12

## 2021-01-17 RX ADMIN — CALCIUM ACETATE 1334 MG: 667 CAPSULE ORAL at 12:48

## 2021-01-17 RX ADMIN — HEPARIN SODIUM 5000 UNITS: 5000 INJECTION INTRAVENOUS; SUBCUTANEOUS at 06:01

## 2021-01-17 NOTE — PROGRESS NOTES
Received call from pt's RN that DC was ordered this date. Order, summary and pulmonary progress notes faxed via [employer] to [facility] for review. Called w/som Yates for guidance with DC . She will review and contact via [employer] pt's ability to admit this date. Received notice from [facility] that pt's last Covid was NEG. Not verified by this facility's documentation. Message back to [facility] regarding POS status as of [id number] with query regarding need for repeat testing >10days with symptomatic pt.

## 2021-01-17 NOTE — PROGRESS NOTES
Patient was seen and examined in his room.  Today is hospital day 2 well and it seems that he may be discharged today.  I am following him for the second day today.  Patient is attempted need to communicate however I cannot understand him.  He seems that he is confused and is all soft wrist restraints along with protective mitts.      Past Medical History:   Diagnosis Date   • CRI (chronic renal insufficiency) 12/13/2012   • Gout 1/4/2011   • Hypertension    • Lymphoma (HCC)    • Prostate CA (HCC) 1/4/2011      Past Surgical History:   Procedure Laterality Date   • ENDOSCOPY, COLON, DIAGNOSTIC  2003    neg   • HC BONE MARROW BIOPSY     • HX PROSTATECTOMY     • IR THORACENTESIS CATH W IMAGE  12/15/2020     Family History   Problem Relation Age of Onset   • Hypertension Mother       Social History     Tobacco Use   • Smoking status: Never Smoker   • Smokeless tobacco: Never Used   Substance Use Topics   • Alcohol use: No       Current Facility-Administered Medications   Medication Dose Route Frequency Provider Last Rate Last Admin   • fentaNYL citrate (PF) injection    PRN Marjan Perrin MD   25 mcg at 01/12/21 1348   • midazolam (PF) (VERSED) injection    PRN Marjan Perrin MD   1 mg at 01/12/21 1348   • dextrose 5% infusion  20 mL/hr IntraVENous CONTINUOUS Michael Grove MD   Stopped at 01/17/21 1601   • albuterol (PROVENTIL HFA, VENTOLIN HFA, PROAIR HFA) inhaler 2 Puff  2 Puff Inhalation Q4H PRN Rob Win NP       • LORazepam (ATIVAN) tablet 1 mg  1 mg Oral Q4H PRN Rob Win NP       • sertraline (ZOLOFT) tablet 100 mg  100 mg Per G Tube QPM Rob Win NP   100 mg at 01/16/21 1802   • dextromethorphan (DELSYM) 30 mg/5 mL syrup 30 mg  30 mg Per G Tube Q12H Rob Win NP   30 mg at 01/17/21 1011   • guaiFENesin-dextromethorphan (TUSSI-ORGANIDIN DM)  mg/5 mL oral solution 10 mL  10 mL Per G Tube Q4H PRN Rob Win NP   10 mL at 01/15/21 1043   • metoprolol tartrate (LOPRESSOR)  tablet 25 mg  25 mg Per G Tube Q12H Rob Win NP   25 mg at 01/17/21 1011    ascorbic acid (vitamin C) (VITAMIN C) tablet 500 mg  500 mg Per G Tube DAILY oRb Win NP   500 mg at 01/17/21 1012    ferrous sulfate tablet 325 mg  1 Tab Oral DAILY WITH BREAKFAST Rob Win NP   325 mg at 01/17/21 1012    heparin (porcine) injection 5,000 Units  5,000 Units SubCUTAneous Q12H Rob Win NP   5,000 Units at 01/17/21 0601    0.9% sodium chloride infusion 250 mL  250 mL IntraVENous PRN Rob Win NP        dexamethasone (DECADRON) 4 mg/mL injection 4 mg  4 mg IntraVENous Q6H Rob Win NP   Stopped at 01/17/21 1200    calcium acetate(phosphat bind) (PHOSLO) capsule 1,334 mg  2 Cap Oral TID WITH MEALS Edwin Orozco NP   1,334 mg at 01/17/21 1248    sodium chloride (NS) flush 5-10 mL  5-10 mL IntraVENous PRN Rob Win NP        levoFLOXacin (LEVAQUIN) 750 mg in D5W IVPB  750 mg IntraVENous Q48H Rob Win  mL/hr at 01/17/21 1516 750 mg at 01/17/21 1516    allopurinoL (ZYLOPRIM) tablet 50 mg  50 mg Oral EVERY OTHER DAY Rob Win NP   50 mg at 01/15/21 1727    famotidine (PEPCID) tablet 20 mg  20 mg Per G Tube DAILY Rob Win NP   20 mg at 01/17/21 1012    insulin lispro (HUMALOG) injection   SubCUTAneous AC&HS Rob Win NP   2 Units at 01/17/21 1247    glucose chewable tablet 16 g  4 Tab Oral PRN Rob Win NP        glucagon (GLUCAGEN) injection 1 mg  1 mg IntraMUSCular PRN Rob Win NP        dextrose (D50W) injection syrg 12.5-25 g  25-50 mL IntraVENous PRN Rob Win NP        ondansetron [facility]) injection 4 mg  4 mg IntraVENous Q6H PRN Rob Win NP        acetaminophen (TYLENOL) tablet 650 mg  650 mg Per G Tube Q4H PRN Rob Win NP        acetaminophen (TYLENOL) suppository 650 mg  650 mg Rectal Q4H PRN Rob Win NP   650 mg at 01/13/21 0123    hydrOXYzine pamoate (VISTARIL) capsule 25 mg  25 mg Per G Tube TID PRN Rob WinTEO   25 mg at 01/14/21 1035        Review of Systems   Unable to perform ROS: Mental status change       Objective     Vital signs for last 24 hours:  Visit Vitals  BP (!) 106/59 (BP 1 Location: Left arm, BP Patient Position: At rest)   Pulse 78   Temp 97.8 °F (36.6 °C)   Resp 18   Ht 5' 6\" (1.676 m)   Wt 50.3 kg (111 lb)   SpO2 96%   BMI 17.92 kg/m²       Intake/Output this shift:  Current Shift: 01/17 0701 - 01/17 1900  In: 375   Out: -   Last 3 Shifts: 01/15 1901 - 01/17 0700  In: 325   Out: 1925 [Urine:1925]  Physical Exam   HENT:   Head: Normocephalic and atraumatic. Cardiovascular: Normal rate and normal heart sounds. Pulmonary/Chest: Effort normal and breath sounds normal.   Abdominal: Soft. Neurological: He is alert. Elderly, ill-appearing,  male, thin, bedridden,  PEG tube feeds ongoing, additional water flushes via PEG tube  Confused  No edema noted. No rash noted. Skin remains dry.       Data Review:   Recent Results (from the past 24 hour(s))   GLUCOSE, POC    Collection Time: 01/16/21  4:06 PM   Result Value Ref Range    Glucose (POC) 142 (H) 65 - 100 mg/dL    Performed by [address], POC    Collection Time: 01/16/21  8:05 PM   Result Value Ref Range    Glucose (POC) 156 (H) 65 - 100 mg/dL    Performed by Leila Espinoza    RENAL FUNCTION PANEL    Collection Time: 01/17/21  7:00 AM   Result Value Ref Range    Sodium 136 136 - 145 mmol/L    Potassium 4.4 3.5 - 5.1 mmol/L    Chloride 99 97 - 108 mmol/L    CO2 31 21 - 32 mmol/L    Anion gap 6 5 - 15 mmol/L    Glucose 204 (H) 65 - 100 mg/dL    BUN 89 (H) 6 - 20 mg/dL    Creatinine 2.12 (H) 0.70 - 1.30 mg/dL    BUN/Creatinine ratio 42 (H) 12 - 20      GFR est AA 36 (L) >60 ml/min/1.73m2    GFR est non-AA 30 (L) >60 ml/min/1.73m2    Calcium 9.1 8.5 - 10.1 mg/dL    Phosphorus 2.1 (L) 2.6 - 4.7 mg/dL    Albumin 1.7 (L) 3.5 - 5.0 g/dL   GLUCOSE, POC    Collection Time: 01/17/21  8:05 AM   Result Value Ref Range    Glucose (POC) 245 (H) 65 - 100 mg/dL    Performed by [address], POC    Collection Time: 01/17/21 12:02 PM   Result Value Ref Range    Glucose (POC) 156 (H) 65 - 100 mg/dL    Performed by VIRGIE HYATT          XR CHEST PORT   Final Result   Impression: Diffuse opacities in the lungs, improved. XR CHEST PORT   Final Result      XR CHEST PORT   Final Result   FINDINGS: Impression: Frontal single view chest.      Unchanged cardiomediastinal silhouette. Calcified aorta. Mild vascular   congestion. No pulmonary edema. Mild hypoinflation. Unchanged bilateral lower lung atelectasis or infiltrates   and left lower lung consolidation. Unchanged small left pleural effusion. No   pneumothorax. No free air under the diaphragm. XR CHEST PORT   Final Result           Patient Active Problem List   Diagnosis Code    Lymphoma ([facility] [address] C85.90    HTN (hypertension) I10    Prostate CA ([facility] [address] C61    Gout M10.9    CRI (chronic renal insufficiency) N18.9    Hypercalcemia of malignancy E83.52    Cervical stenosis of spinal canal M48.02    Acute renal failure (ARF) ([facility]) N17.9    Lumbar canal stenosis M48.061    Frequent falls R29.6    Multiple falls R29.6    Weakness R53.1    Anemia D64.9    Opioid use F11.90    History of B-cell lymphoma Z85.72    Non-Hodgkin's lymphoma in relapse ([facility]) C85.90    Hypercalcemia E83.52    HAP (hospital-acquired pneumonia) J18.9, Y95    SIRS (systemic inflammatory response syndrome) ([facility]) R65.10    CHF (congestive heart failure) ([facility]) I50.9    Acute hypoxemic respiratory failure ([facility]) J96.01    Severe sepsis ([facility]) A41.9, R65.20    Person under investigation for COVID-19 Z20.822    BPPV (benign paroxysmal positional vertigo) H81.10    Pneumonia J18.9        DIAGNOSES:  1. CKD stage IV  2. Secondary hyperparathyroidism on PhosLo  3. Gout on allopurinol  4. Malnutrition on TPN  5. COVID-19 pneumonia  6.  Hypertension metoprolol  7. History of hypercalcemia of malignancy  8. History of lymphoma    PLAN:    No major change in clinical condition. PTH and vitamin D levels were sent but have not resulted. Anemia work-up is not yet back. Blood pressure is at goal.  Urine output remains excellent without diuretics thus suggesting either an osmotic load or increased water intake. Reduce water content of effective feeds. 100 mL every 4 hours should suffice for now  Weekly electrolyte check at least outpatient    Thanks for calling me.

## 2021-01-17 NOTE — MANAGEMENT PLAN
Pt will be going to [facility]  at [facility]  Please call report to [phone] when transport time is known. Unable to print MAR at this time. Medicine notes with Medications listed faxed for [facility] use. Covid results faxed as requested.

## 2021-01-17 NOTE — PROGRESS NOTES
Discharge instructions called to Martínez Talamantes, pt's daughter. Kim ramona pt discharging back to [facility]. Report called to [facility] at [facility]. IV and yang for accurate measurement removed. Pt voided prior to discharge. Pt discharged to [facility] via [address] on O2 @ 4lpm NC. Pt stable upon discharge. [id number] Detailed

## 2021-01-17 NOTE — DISCHARGE SUMMARY
Admit date: 1/5/2021   Admitting Provider: Andres Wild MD    Discharge date: 1/17/2021  Discharging Provider: Mickey Seaman NP      * Admission Diagnoses: Pneumonia [J18.9]    * Discharge Diagnoses:    Hospital Problems as of 1/17/2021 Date Reviewed: 1/12/2021          Codes Class Noted - Resolved POA    * (Principal) Pneumonia ICD-10-CM: J18.9  ICD-9-CM: 012  1/5/2021 - Present Unknown            HPI: Nkechi Orona is a 80 y.o. male who lives in [facility] was brought to the hospital for shortness of breath and cough, rapid covid test is negative. PMH includes CKD, gout, hypertension, persistent dysphagia requiring PEG placement. CXR showing LLL pneumonia with posteromedial opacity. will admit for further treatment and evaluation    [facility] Course: Acute respiratory failure w/ hypoxia secondary to Covid pneumonia patient currently on 4L NC, wean as tolerated. Unable to receive remdesivir do to eGFR 25. Unable to receive actemra due to hx of lymphoma and prostate ca, pulmonary following. Continue supplemental O2, IV decadron, prn inhalers, levaquin. Sepsis secondary to infx process, blood and urine cxs negative. Continue above treatment plan. Dysphagia s/p replacement of peg tube. Continue tube feeds. BENJI on CKD Stage 3 baseline cr appears around 1.7, current Cr 2.27, pharmacy to renal dose all medications to eGFR 25. Anemia  hgb 9.1, continue to trend h/h, transfuse for hgb <7.    * Procedures:   Procedure(s):  PERCUTANEOUS ENDOSCOPIC GASTROSTOMY TUBE INSERTION      Consults: Pulmonary, Nephrology    Significant Diagnostic Studies:    XR Results (maximum last 3): Results from [place] encounter on 01/05/21   XR CHEST PORT     Narrative Chest single view.     Comparison single view chest 12/28/2020     LLL posteromedial opacity now present. Findings would fit with any clinical  concern for pneumonia. Otherwise, no gross interstitial or alveolar pulmonary edema.  Cardiac and  mediastinal structures are unchanged noting thoracic aorta atherosclerosis. No  pneumothorax. Probable small dependent left pleural effusion. Results from [place] encounter on 12/28/20   XR ABD (KUB)     Narrative Abdomen, 2 views, 12/28/2020     History PEG tube placement.     Comparison: None.     Findings: Radiographs are obtained before and after administration of 60 mL of  equal parts Gastrografin and water into the percutaneous feeding tube. Contrast  opacification of the stomach is distended. Contrast opacification of small  bowel loops is also seen. The bowel gas pattern is nonobstructive. Stool  volume is small to moderate. Surgical clips are seen at the pelvis. Degenerative changes are present in the spine.        Impression Impression: Percutaneous feeding tube in the stomach. Nonobstructive bowel gas  pattern. XR CHEST PORT     Narrative Chest, 2 frontal views, 12/28/2020     History: Cough.     Comparison: Including chest 12/15/2020.     Findings: Feeding tube and left internal jugular catheter on chest 12/14/2020 oh  no longer present. The cardiac silhouette is within normal limits. The lungs  are underexpanded. There is mild prominence of the pulmonary vasculature and  hydrostatic edema is possible. Airspace consolidation most pronounced at the  bases is improved as compared to chest 12/15/2020. Left-sided pleural effusion  is improved and now small appearing. No right-sided pleural effusion is  definitively seen on this single view. No pneumothorax is identified. The  osseous structures are stable.        Impression Impression: Airspace consolidation in the lungs, improved. Left pleural  effusion, improved. Possible hydrostatic edema.         CT Results (maximum last 3):        Results from [place] encounter on 12/11/20   CT CHEST WO CONT     Narrative Comparison chest x-ray 12/11/2020 [facility] and chest CTA 11/25/2020 [facility].     TECHNIQUE: Axial imaging of the chest without IV contrast, with multiplanar  reformatting, MIPs. Dose reduction: All CT scans at this facility are performed using dose reduction  optimization techniques as appropriate to a performed exam including the  following: Automated exposure control, adjustments of the mA and/or kV according  to patient's size, or use of iterative reconstruction technique.     FINDINGS: Left central catheter distal tip SVC. Mild cardiomegaly. Multivessel  coronary artery calcification. No pericardial effusion. Low-density intracardiac  blood suggests anemia. Calcified thoracic aorta without aneurysm. Pulmonary  arteries are not dilated. No mediastinal or hilar lymphadenopathy. Tubular  structure posterior to the esophagus possibly dilated vessel. Bilateral pleural  effusions and lower lung consolidations without air bronchograms. The left lower  lung is completely consolidated/collapsed. The aerated lungs demonstrate patchy  areas of airspace disease. No axillary adenopathy. Included thyroid is  unremarkable. Included upper abdomen demonstrates lobulated, heterogeneous  structure spleen and small retroperitoneal lymphadenopathy; unchanged. Degenerative changes of the bony structures. Soft tissue anasarca.        Impression IMPRESSION:  1. Bilateral pleural effusions and lower lung consolidations, the left lower  lung being completely collapsed, this is similar to previous. 2. Interval development patchy airspace disease in the aerated lungs consistent  with pneumonia. 3. Abnormal spleen, small retroperitoneal lymphadenopathy unchanged. 4. Atherosclerosis. Results from [place] encounter on 11/30/20   CTA CHEST W OR W WO CONT     Narrative EXAM:  CTA CHEST W OR W WO CONT     INDICATION: Elevated d-dimer     COMPARISON: None.     TECHNIQUE: Helical thin section chest CT following uneventful intravenous  administration of nonionic contrast 85 mL of isovue 370 according to  departmental PE protocol.  Coronal and sagittal reformats were performed. 3D post  processing was performed. CT dose reduction was achieved through the use of a  standardized protocol tailored for this examination and automatic exposure  control for dose modulation.     FINDINGS: This is a limited quality study for the evaluation of pulmonary  embolism to the proximal segmental arterial level. There is no pulmonary  embolism to this level.         THYROID: No nodule. MEDIASTINUM: No mass or lymphadenopathy. MARTELL: No mass or lymphadenopathy. THORACIC AORTA: No aneurysm. HEART: Coronary atherosclerotic disease  ESOPHAGUS: No wall thickening or dilatation. TRACHEA/BRONCHI: Patent. PLEURA: Bilateral pleural effusions  LUNGS: Bilateral lower lobe volume loss. Mild right middle lobe volume loss. UPPER ABDOMEN: Distended gallbladder. Lobulated appearance of the spleen. Possible retroperitoneal adenopathy is again noted. BONES: No aggressive bone lesion or fracture.        Impression IMPRESSION:      1.  [Limited study. No evidence of pulmonary embolus.     2.  Bilateral pleural effusions and bilateral lower lobe volume loss. Mild right  middle lobe volume loss.     3. Distended gallbladder. This may be related to fasting state. Correlate  clinically. Lobulated spleen and possible retroperitoneal adenopathy is again  noted      Results from Hospital Encounter encounter on 11/20/20   CT BX BONE MARROW AND ASPIRATION     Narrative INDICATION: Retroperitoneal lymphadenopathy. Evaluate for lymphoma.     PROCEDURE: Bone marrow biopsy under CT guidance.  CT dose reduction was achieved  through use of a standardized protocol tailored for this examination and  automatic exposure control for dose modulation.      ESTIMATED BLOOD LOSS: Less than 5 mL     OPERATING PHYSICIAN: Davida Brooke M.D.     POSTOPERATIVE DIAGNOSIS: Retroperitoneal lymphadenopathy.     SPECIMENS REMOVED: Bone marrow aspirate and core     SEDATION:  Moderate intravenous conscious sedation was supervised by Dr. Julietta Dakins. The  patient was independently monitored by a registered nurse assigned to the  Department of Radiology using automated blood pressure, EKG, and pulse oximetry. The detailed conscious sedation record is stored in the hospital information  system.     MEDICATION:  Versed: 3 mg  Fentanyl: 75 mcg  Intraprocedure time: 24 minutes     EXAM:      FINDINGS: Written and verbal consent was obtained from the patient after the  risks, benefits and alternatives of the procedure were explained. The patient  was placed prone on the CT gantry. Initial  images were obtained. Site on  the lower back was chosen. The skin was prepped and draped in the normal sterile  fashion. Skin and subcutaneous tissues were anesthetized with lidocaine. Under  CT guidance the [employer] power bone biopsy needle was advanced to the margin of  the posterior iliac bone. A 2 cm core biopsy was obtained and approximately 5 mL  marrow aspirate were handed to the cytology technologist in the CT suite. The  needle was removed after the sample was considered adequate. No immediate  complications. Patient was recovered in the recovery room without incident.        Impression IMPRESSION:  1. Successful conscious sedation  2. Successful CT guided power bone marrow biopsy         MRI Results (maximum last 3):        Nuclear Medicine Results (maximum last 3): No results found for this or any previous visit.     US Results (maximum last 3): Results from [place] encounter on 12/11/20   US SPLEEN     Narrative Real-time sonography of the spleen was performed. Comparison with CT scans dated  December 11, 2020, December 1, 2020 November 23, 2020.     INDICATION: Lymphoma versus infection.     The spleen measures 12.9 x 11.9 x 6.3 cm and contains at least 2 heterogeneously  hypoechoic to adjacent splenic tissue mass lesions or complex cystic  collections.  A central lesion measures 2.7 x 3.9 x 3.6 cm and a more posterior  lesion measures 3.8 x 3.7 x 3.8 cm. No blood flow is seen within either of these  lesions. Overall, the lobulated appearance of the spleen seen on CT scan is not  as evident on ultrasound. .        Impression IMPRESSION: At least 2 heterogeneously hypoechoic to adjacent splenic tissue  mass lesions within the spleen. Findings are nonspecific and could represent  lymphoma or infection. Lymphoma would typically be hot on a PET/CT. If  clinically warranted, follow-up may be helpful. Recent Results (from the past 12 hour(s))   RENAL FUNCTION PANEL    Collection Time: 01/17/21  7:00 AM   Result Value Ref Range    Sodium 136 136 - 145 mmol/L    Potassium 4.4 3.5 - 5.1 mmol/L    Chloride 99 97 - 108 mmol/L    CO2 31 21 - 32 mmol/L    Anion gap 6 5 - 15 mmol/L    Glucose 204 (H) 65 - 100 mg/dL    BUN 89 (H) 6 - 20 mg/dL    Creatinine 2.12 (H) 0.70 - 1.30 mg/dL    BUN/Creatinine ratio 42 (H) 12 - 20      GFR est AA 36 (L) >60 ml/min/1.73m2    GFR est non-AA 30 (L) >60 ml/min/1.73m2    Calcium 9.1 8.5 - 10.1 mg/dL    Phosphorus 2.1 (L) 2.6 - 4.7 mg/dL    Albumin 1.7 (L) 3.5 - 5.0 g/dL   GLUCOSE, POC    Collection Time: 01/17/21  8:05 AM   Result Value Ref Range    Glucose (POC) 245 (H) 65 - 100 mg/dL    Performed by ISORGON         Discharge Exam:  Physical Exam  Vitals signs and nursing note reviewed. Constitutional:       Appearance: He is ill-appearing. HENT:      Nose: Nose normal.      Mouth/Throat:      Mouth: Mucous membranes are moist.   Eyes:      Extraocular Movements: Extraocular movements intact. Neck:      Musculoskeletal: Normal range of motion. Cardiovascular:      Rate and Rhythm: Normal rate and regular rhythm. Pulses: Normal pulses. Heart sounds: Normal heart sounds. Pulmonary:      Effort: Pulmonary effort is normal.      Comments: 4L NC diminished bases  Abdominal:      General: Bowel sounds are normal.      Palpations: Abdomen is soft.    Musculoskeletal: Normal range of motion. Skin:     General: Skin is warm and dry. Capillary Refill: Capillary refill takes 2 to 3 seconds. Neurological:      Mental Status: He is alert. He is disoriented. Psychiatric:      Comments: Confused, decreased verbal communication         * Discharge Condition: stable  * Disposition: [place] ([facility])    Discharge Medications:  Current Discharge Medication List      START taking these medications    Details   albuterol (PROVENTIL HFA, VENTOLIN HFA, PROAIR HFA) 90 mcg/actuation inhaler Take 2 Puffs by inhalation every four (4) hours as needed for Wheezing or Shortness of Breath for up to 30 days. Qty: 1 Inhaler, Refills: 0      ascorbic acid, vitamin C, (VITAMIN C) 500 mg tablet 1 Tab by Per G Tube route daily for 30 days. Qty: 30 Tab, Refills: 0      calcium acetate,phosphat bind, (PHOSLO) 667 mg cap Take 2 Caps by mouth three (3) times daily (with meals) for 30 days. Qty: 180 Cap, Refills: 0      LORazepam (ATIVAN) 1 mg tablet Take 1 Tab by mouth every four (4) hours as needed for Anxiety for up to 1 day. Max Daily Amount: 6 mg. Qty: 6 Tab, Refills: 0    Associated Diagnoses: COVID-19      dextromethorphan (DELSYM) 30 mg/5 mL liquid 5 mL by Per G Tube route every twelve (12) hours for 10 days. Qty: 100 mL, Refills: 0      ferrous sulfate 325 mg (65 mg iron) tablet Take 1 Tab by mouth daily (with breakfast) for 30 days. Qty: 30 Tab, Refills: 0      guaiFENesin-dextromethorphan (TUSSI-ORGANIDIN DM)  mg/5 mL liqd 10 mL by Per G Tube route every four (4) hours as needed for Cough or Congestion for up to 2 days.   Qty: 120 mL, Refills: 0      insulin lispro (HUMALOG) 100 unit/mL injection For Blood Sugar (mg/dL) of:  Less than 150 =  0 units  150 -199 =  2 units 200 -249 =  4 units 250 -299 =  6 units 300 -349 =  8 units 350 and above = 10 units and Call Physician  Qty: 1 Vial, Refills: 0      levoFLOXacin (Levaquin) 500 mg tablet Take 1 Tab by mouth daily for 3 days.  Willadean Sprinkle: 3 Tab, Refills: 0      predniSONE (DELTASONE) 20 mg tablet Take 20 mg by mouth daily (with breakfast) for 5 days. Qty: 5 Tab, Refills: 0         CONTINUE these medications which have CHANGED    Details   hydrOXYzine pamoate (VISTARIL) 25 mg capsule 1 Cap by Per G Tube route three (3) times daily as needed for Itching, Anxiety or Sleep for up to 7 days. Qty: 20 Cap, Refills: 0         CONTINUE these medications which have NOT CHANGED    Details   sertraline (ZOLOFT) 50 mg tablet Take 1 Tab by mouth every evening. Qty: 30 Tab, Refills: 0      famotidine (PEPCID) 20 mg tablet Take 1 Tab by mouth every twenty-four (24) hours. Qty: 30 Tab, Refills: 0      metoprolol tartrate (LOPRESSOR) 25 mg tablet Take 0.5 Tabs by mouth every twelve (12) hours. Qty: 30 Tab, Refills: 0         STOP taking these medications       albuterol-ipratropium (DUO-NEB) 2.5 mg-0.5 mg/3 ml nebu Comments:   Reason for Stopping:         allopurinoL (ZYLOPRIM) 100 mg tablet Comments:   Reason for Stopping:               * Follow-up Care/Patient Instructions:   Activity: Activity as tolerated  Diet: PEG feeding Jevity 1.5 BOLUS, 5 feeds/day      Patient to continue all medications as prescribed  Patient to maintain all f/u appointments  Patient medically stable for discharge    Follow-up Information     Follow up With Specialties Details Why Contact Info    Pritesh Vaughn MD Internal Medicine In 3 days  . [address]  [phone]      Aleida Arroyo MD Pulmonary Disease In 2 weeks  [address] [facility] [address]       Zakia Warren MD Internal Medicine In 2 weeks  [address] [facility] [phone]      [address] [facility] Emergency Medicine  As needed, If symptoms worsen [facility]  [phone]            Time spent: 40 minutes coordinating care and reviewing chart with daughter Rakesh Tobias [phone]. Bhavna expressed concern about patient hgb, informed her hgb is stable at 9 patient is to continue ferrous sulfate. She expressed concerns about elevated blood sugars. Bhavna educated that blood sugars are elevated secondary to tube feeds and steroids, that the sugars are being controlled with insulin. She expressed concerned regarding patient's Cr. Informed her at baseline patient has chronic kidney disease, cr has been stable, nephrology has seen patient and agrees patient is cleared for discharge. Bhavna expresses that she just wants the patient to stay in the hospital longer. Explained to her patient is medically stable for discharge and has been cleared for discharge. Informed her that it will take roughly 6 weeks to 3 months for the symptoms of COVID to completely resolve.     CC: Vishal Alaniz MD    Signed:  Vonnie Pereira NP  1/17/2021  11:20 AM

## 2021-01-17 NOTE — PROGRESS NOTES
Pulmonary Progress Note      NAME: Coretta Gibson   :  1932  MRM:  [id number]    Date/Time: 2021  10:15 AM         Subjective:   I was asked by Kevin Galindo MD to see Coretta Gibson  a 80 y.o.    male in consultation      Excerpts from admission 2021 or consult notes as follows:   70-year-old male came in because of shortness of breath dyspnea generalized weakness initial Covid rapid test was negative he has significant past medical history of chronic kidney disease gout hypertension dysphagia had a PEG tube in place history of lymphoma chest x-ray done which shows left lower lobe infiltrate followed by CAT scan which shows bilateral pleural effusion and atelectasis but now his COVID-19 came back positive and also MRSA and he is disheveled unable to get much history out of the patient on oxygen via nasal cannula so pulmonary consult was called for further evaluation.             Allergies   Allergen Reactions    Pcn [Penicillins] Swelling         MAR reviewed and pertinent medications noted or modified as needed          Current Facility-Administered Medications   Medication    fentaNYL citrate (PF) injection    midazolam (PF) (VERSED) injection    dextrose 5% infusion    albuterol (PROVENTIL HFA, VENTOLIN HFA, PROAIR HFA) inhaler 2 Puff    LORazepam (ATIVAN) tablet 1 mg    furosemide (LASIX) injection 40 mg    sertraline (ZOLOFT) tablet 100 mg    dextromethorphan (DELSYM) 30 mg/5 mL syrup 30 mg    guaiFENesin-dextromethorphan (TUSSI-ORGANIDIN DM)  mg/5 mL oral solution 10 mL    metoprolol tartrate (LOPRESSOR) tablet 25 mg    ascorbic acid (vitamin C) (VITAMIN C) tablet 500 mg    ferrous sulfate tablet 325 mg    [Held by provider] heparin (porcine) injection 5,000 Units    0.9% sodium chloride infusion 250 mL    dexamethasone (DECADRON) 4 mg/mL injection 4 mg    VANCOMYCIN INFORMATION NOTE    calcium acetate(phosphat bind) (PHOSLO) capsule 1,334 mg    sodium chloride (NS) flush 5-10 mL    levoFLOXacin (LEVAQUIN) 750 mg in D5W IVPB    allopurinoL (ZYLOPRIM) tablet 50 mg    famotidine (PEPCID) tablet 20 mg    insulin lispro (HUMALOG) injection    glucose chewable tablet 16 g    glucagon (GLUCAGEN) injection 1 mg    dextrose (D50W) injection syrg 12.5-25 g    ondansetron (ZOFRAN) injection 4 mg    acetaminophen (TYLENOL) tablet 650 mg    acetaminophen (TYLENOL) suppository 650 mg    hydrOXYzine pamoate (VISTARIL) capsule 25 mg      Patient PCP: Chava Oliva MD  PMH:  has a past medical history of CRI (chronic renal insufficiency) (12/13/2012), Gout (1/4/2011), Hypertension, Lymphoma ([facility] [address] and Prostate CA ([facility] [address] (1/4/2011). PSH:   has a past surgical history that includes hx prostatectomy; hc bone marrow biopsy; endoscopy, colon, diagnostic (2003); and ir thoracentesis cath w image (12/15/2020). FHX: family history includes Hypertension in his mother. SHX:  reports that he has never smoked. He has never used smokeless tobacco. He reports previous drug use. He reports that he does not drink alcohol. ROS:     Unable to obtain           Objective:       Vitals:      Last 24hrs VS reviewed since prior progress note. Most recent are:    Visit Vitals  /60 (BP 1 Location: Left arm, BP Patient Position: At rest)   Pulse 84   Temp 97.2 °F (36.2 °C)   Resp 18   Ht 5' 6\" (1.676 m)   Wt 50.3 kg (111 lb)   SpO2 92%   BMI 17.92 kg/m²     SpO2 Readings from Last 6 Encounters:   01/17/21 92%   12/28/20 98%   12/21/20 96%   12/06/20 93%   11/27/20 92%   11/11/20 97%    O2 Flow Rate (L/min): 4 l/min       Intake/Output Summary (Last 24 hours) at 1/17/2021 1237  Last data filed at 1/17/2021 0836  Gross per 24 hour   Intake 375 ml   Output 1025 ml   Net -650 ml          Exam:     Physical Exam   Constitutional: He appears distressed. HENT:   Head: Normocephalic and atraumatic. Eyes: Pupils are equal, round, and reactive to light.  Conjunctivae and EOM are normal. Neck: Normal range of motion. Neck supple. Cardiovascular: Normal rate and regular rhythm. Pulmonary/Chest: He is in respiratory distress. He has rales. Abdominal: Soft. Musculoskeletal: Normal range of motion.    Neurological:   Patient is lethargic drowsy     Lab Data Reviewed: (see below)      Medications:  Current Facility-Administered Medications   Medication Dose Route Frequency    fentaNYL citrate (PF) injection    PRN    midazolam (PF) (VERSED) injection    PRN    dextrose 5% infusion  20 mL/hr IntraVENous CONTINUOUS    albuterol (PROVENTIL HFA, VENTOLIN HFA, PROAIR HFA) inhaler 2 Puff  2 Puff Inhalation Q4H PRN    LORazepam (ATIVAN) tablet 1 mg  1 mg Oral Q4H PRN    sertraline (ZOLOFT) tablet 100 mg  100 mg Per G Tube QPM    dextromethorphan (DELSYM) 30 mg/5 mL syrup 30 mg  30 mg Per G Tube Q12H    guaiFENesin-dextromethorphan (TUSSI-ORGANIDIN DM)  mg/5 mL oral solution 10 mL  10 mL Per G Tube Q4H PRN    metoprolol tartrate (LOPRESSOR) tablet 25 mg  25 mg Per G Tube Q12H    ascorbic acid (vitamin C) (VITAMIN C) tablet 500 mg  500 mg Per G Tube DAILY    ferrous sulfate tablet 325 mg  1 Tab Oral DAILY WITH BREAKFAST    heparin (porcine) injection 5,000 Units  5,000 Units SubCUTAneous Q12H    0.9% sodium chloride infusion 250 mL  250 mL IntraVENous PRN    dexamethasone (DECADRON) 4 mg/mL injection 4 mg  4 mg IntraVENous Q6H    calcium acetate(phosphat bind) (PHOSLO) capsule 1,334 mg  2 Cap Oral TID WITH MEALS    sodium chloride (NS) flush 5-10 mL  5-10 mL IntraVENous PRN    levoFLOXacin (LEVAQUIN) 750 mg in D5W IVPB  750 mg IntraVENous Q48H    allopurinoL (ZYLOPRIM) tablet 50 mg  50 mg Oral EVERY OTHER DAY    famotidine (PEPCID) tablet 20 mg  20 mg Per G Tube DAILY    insulin lispro (HUMALOG) injection   SubCUTAneous AC&HS    glucose chewable tablet 16 g  4 Tab Oral PRN    glucagon (GLUCAGEN) injection 1 mg  1 mg IntraMUSCular PRN    dextrose (D50W) injection syrg 12.5-25 g  25-50 mL IntraVENous PRN    ondansetron (ZOFRAN) injection 4 mg  4 mg IntraVENous Q6H PRN    acetaminophen (TYLENOL) tablet 650 mg  650 mg Per G Tube Q4H PRN    acetaminophen (TYLENOL) suppository 650 mg  650 mg Rectal Q4H PRN    hydrOXYzine pamoate (VISTARIL) capsule 25 mg  25 mg Per G Tube TID PRN       ______________________________________________________________________      Lab Review:     Recent Labs     01/16/21  1422 01/15/21  1833   WBC 22.3* 19.0*   HGB 9.0* 9.1*   HCT 27.0* 27.4*    266     Recent Labs     01/17/21  0700 01/16/21  1422 01/15/21  1833    137 135*   K 4.4 4.4 4.5   CL 99 98 96*   CO2 31 32 31   * 114* 104*   BUN 89* 83* 86*   CREA 2.12* 2.12* 2.27*   CA 9.1 8.9 8.9   PHOS 2.1*  --   --    ALB 1.7* 1.7* 1.7*   ALT  --  21 24     No components found for: GLPOC  No results for input(s): PH, PCO2, PO2, HCO3, FIO2 in the last 72 hours. No results for input(s): INR, INREXT, INREXT, INREXT in the last 72 hours. IMPRESSION:   1. Acute hypoxic respiratory failure  2. COVID-19 pneumonia  3. Severe sepsis  4. Dysphagia PEG tube in place with leaking around the PEG site which is fixed  5. Hypernatremia resolved  6. Bilateral pleural effusion with bilateral lower lobe atelectasis  7. Pt is requiring Drug therapy requiring intensive monitoring for toxicity  8. Pt is unstable, unpredictable needing inpatient monitoring; is acutely ill and at high risk of sudden decline and decompensation with severe consequenses and continued end organ dysfunction and failure  9. Prognosis guarded        RECOMMENDATIONS/PLAN:      1. Patient on 4L NC without labored breathing. Will attempt to wean as tolerated. 2. Patient not candidate for remdesivir due to GFR of 25. Not a candidate for Actemra due to hx lymphoma and prostate cancer. Repeat 1 dose of ivermectin Will continue with Decadron, vitamin C.  3. Continue with PRN albuterol inhaler. 4. Continue with levaquin  5.  Patient is on IV fluid D5 because of mild hypernatremia resolved  6. Hb 9.1 we will follow. Repeat CBC. 7. Supplemental O2 to keep sats > 93%  8. Aspiration precautions  9. Labs to follow electrolytes, renal function and and blood counts  10. Glucose monitoring and SSI  11. Bronchial hygiene with respiratory therapy techniques, bronchodilators  12.  DVT, SUP prophylaxis        Chest x-ray shows improvement in opacity

## 2021-01-19 LAB
IRON SATN MFR SERPL: 35 %
IRON,IRN: 58 UG/DL
TIBC SERPL-MCNC: 165 UG/DL
UIBC SERPL-MCNC: 107 UG/DL

## 2021-05-04 NOTE — PATIENT INSTRUCTIONS
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  p17 abnormality  January 2011. Rx with bortezomib, lenalidomide and dexamethasone with very good response. Developed deep venous thrombosis while on therapy.  July 2011. Very good response, declined autologous bone marrow transplantation. Rx maintenance lenalidomide.  Recurrent disease with increasing paraprotein levels. Restarted bortezomib, lenalidomide and dexamethasone.  Good response to therapy, lenalidomide and dexamethasone discontinued due to toxicities.   August 2013 Ongoing therapy with single agent bortezomib  September 2015. Progressive disease.FISH negative with a multiple myeloma panel normal cytogenetics.High dose arm 2  October 2015. Rx with Carfilzomib on clinical trial OG . Patient decided against stem cell transplantation (Dr. Vizcaino at [facility] [place]note 2])  January 2016. Acute pulmonary injury presumed secondary to Carfilzomib. February February 2016. Carfilzomib resumed, dose level -1.  March 2016. Very good response with Rx Carfilzomib.  September 2016. Rx with high-dose Carfilzomib on clinical trial completed.  Patient achieved Very Good Partial Response on Clinical Trial treatment.  November 2016. Rx maintenance Carfilzomib (every other week)   August/Spetember 2018. Rising M protein levels.  November 2018. BMbx 10-15%, FISH 11q13, PET scan \"negative\"  January 2019. Rx Daratumumab/pomalidomide/dex   June 2019. Very good partial response thus far. Pomalidomide dose reduced to 3 mg secondary to leukopenia  October 2019. Pomalidomide discontinued secondary to neutropenia.  Ongoing Rx daratumumab.       • Hypogammaglobulinemia (CMS/[facility]) 10/22/2019     Priority: Medium   •  Preventing Falls: Care Instructions Your Care Instructions Getting around your home safely can be a challenge if you have injuries or health problems that make it easy for you to fall. Loose rugs and furniture in walkways are among the dangers for many older people who have problems walking or who have poor eyesight. People who have conditions such as arthritis, osteoporosis, or dementia also have to be careful not to fall. You can make your home safer with a few simple measures. Follow-up care is a key part of your treatment and safety. Be sure to make and go to all appointments, and call your doctor if you are having problems. It's also a good idea to know your test results and keep a list of the medicines you take. How can you care for yourself at home? Taking care of yourself · You may get dizzy if you do not drink enough water. To prevent dehydration, drink plenty of fluids, enough so that your urine is light yellow or clear like water. Choose water and other caffeine-free clear liquids. If you have kidney, heart, or liver disease and have to limit fluids, talk with your doctor before you increase the amount of fluids you drink. · Exercise regularly to improve your strength, muscle tone, and balance. Walk if you can. Swimming may be a good choice if you cannot walk easily. · Have your vision and hearing checked each year or any time you notice a change. If you have trouble seeing and hearing, you might not be able to avoid objects and could lose your balance. · Know the side effects of the medicines you take. Ask your doctor or pharmacist whether the medicines you take can affect your balance. Sleeping pills or sedatives can affect your balance. · Limit the amount of alcohol you drink. Alcohol can impair your balance and other senses. · Ask your doctor whether calluses or corns on your feet need to be removed.  If you wear loose-fitting shoes because of calluses or corns, you Posterior subcapsular age-related cataract of both eyes 01/14/2018     Priority: Medium   • Bilateral dry eyes 01/14/2018     Priority: Medium   • Bilateral pulmonary embolism (CMS/HCC) 07/11/2015     Priority: Medium     Overview Note:     On chronic anticoagulation with rivaroxaban (Xarelto)  March 2018. 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  He is doing well.  He is tolerating therapy with daratumumab without any problems.  He has hypogammaglobulinemia without issues with recurrent bacterial infections for the time being.  He denies any new back or bony pain.  No new neurologic symptoms.  He is on anticoagulation secondary to his prior history of pulmonary emboli as well as his history of atrial fibrillation.  He denies any issues with excessive bleeding or bruising.    PAST MEDICAL HISTORY AND PAST SURGICAL HISTORY:  Reviewed and interval changes as noted above.    CURRENT MEDICATIONS:  Reviewed in electronic medical records.    FAMILY HISTORY AND SOCIAL HISTORY:  Reviewed and interval changes as noted above.    REVIEW OF SYSTEMS:  Per electronic medical record, reviewed and agreed upon.    PHYSICAL EXAMINATION:  Vital Signs: Blood pressure 134/81, pulse (!) 51, temperature 97.7 °F (36.5 °C), weight 77.4 kg, SpO2 98 %.   ECOG Performance Status   ECOG [05/04/21 0826]   ECOG Performance Status 0      General Appearance: 64 year old male in no acute distress.  Psychiatric:  Mood and affect are normal. Judgment and insight are appropriate.  Head:  Normocephalic, atraumatic.  Lymphatic: No pathological adenopathy  can lose your balance and fall. · Talk to your doctor if you have numbness in your feet. Preventing falls at home · Remove raised doorway thresholds, throw rugs, and clutter. Repair loose carpet or raised areas in the floor. · Move furniture and electrical cords to keep them out of walking paths. · Use nonskid floor wax, and wipe up spills right away, especially on ceramic tile floors. · If you use a walker or cane, put rubber tips on it. If you use crutches, clean the bottoms of them regularly with an abrasive pad, such as steel wool. · Keep your house well lit, especially [place], and outside walkways. Use night-lights in areas such as hallways and bathrooms. Add extra light switches or use remote switches (such as switches that go on or off when you clap your hands) to make it easier to turn lights on if you have to get up during the night. · Install sturdy handrails on stairways. · Move items in your cabinets so that the things you use a lot are on the lower shelves (about waist level). · Keep a cordless phone and a flashlight with new batteries by your bed. If possible, put a phone in each of the main rooms of your house, or carry a cell phone in case you fall and cannot reach a phone. Or, you can wear a device around your neck or wrist. You push a button that sends a signal for help. · Wear low-heeled shoes that fit well and give your feet good support. Use footwear with nonskid soles. Check the heels and soles of your shoes for wear. Repair or replace worn heels or soles. · Do not wear socks without shoes on wood floors. · Walk on the grass when the sidewalks are slippery. If you live in an area that gets snow and ice in the winter, sprinkle salt on slippery steps and sidewalks. Preventing falls in the bath · Install grab bars and nonskid mats inside and outside your shower or tub and near the toilet and sinks. · Use shower chairs and bath benches. palpated.  Cardiovascular: No JVD(jugular venous distention). Heart had a regular rhythm and rate with no gallops, rubs, or murmurs noted.  Respiratory: Normal respiratory effort, bilateral and symmetric expansion. Lungs are clear to auscultation bilaterally.   Abdomen: No masses or ascites, no hepatosplenomegaly, soft and nontender. Normal bowel sounds, no rebound tenderness.   Musculoskeletal: No tenderness to palpation of the spine. Extremities without clubbing,  cyanosis or edema.  Skin: No rashes, petechiae or ecchymosis. No icterus, no ulcerations.   Neurologic: Normal gait and station. Alert and oriented.     LABORATORY DATA:  Office Visit on 05/04/2021   Component Date Value   • VIA MED ONC PATHWAYS TAG 05/04/2021 [id number]    Office Visit on 04/27/2021   Component Date Value   • VIA MED ONC PATHWAYS TAG 04/30/2021 [id number]    Lab Services on 04/27/2021   Component Date Value   • M Protein 1 04/27/2021 0.1*   • Protein, Total 04/27/2021 6.4    • Total Globulin 04/27/2021 1.7*   • Albumin 04/27/2021 4.7    • Alpha 1 04/27/2021 0.3    • Alpha 2 04/27/2021 0.6    • Beta 04/27/2021 0.6*   • Gamma 04/27/2021 0.3*   • Immunoglobulin G 04/27/2021 352*   • Immunoglobulin A 04/27/2021 22*   • Immunoglobulin M 04/27/2021 5*   • Kappa, Free 04/27/2021 0.61    • Lambda, Free 04/27/2021 <0.40*   • Kappa/ Lambda Ratio 04/27/2021     • Serum Protein Electropho* 04/27/2021 A faint monoclonal protein is present in the gamma region that measures 0.1 g/dL. Decreased beta globulins.    • Serum Immunofixation Pat* 04/27/2021 Two bands of IgG kappa monoclonal protein are present, one very faint one in the beta region and one in the gamma region. The one in the gamma region could be consistent with daratumumab.  Interpretation by Brigitte Escudero MD      • Sodium 04/27/2021 138    • Potassium 04/27/2021 4.2    • Chloride 04/27/2021 103    • Carbon Dioxide 04/27/2021 27    • Anion Gap 04/27/2021 12    • Glucose 04/27/2021 94    •  · Use a hand-held shower head that will allow you to sit while showering. · Get into a tub or shower by putting the weaker leg in first. Get out of a tub or shower with your strong side first. 
· Repair loose toilet seats and consider installing a raised toilet seat to make getting on and off the toilet easier. · Keep your bathroom door unlocked while you are in the shower. Where can you learn more? Go to http://www.umaña.com/. Enter [id number] in the search box to learn more about \"Preventing Falls: Care Instructions. \" Current as of: March 16, 2018 Content Version: 11.8 © 6478-4171 [employer]wise, [employer]. Care instructions adapted under license by [employer] (which disclaims liability or warranty for this information). If you have questions about a medical condition or this instruction, always ask your healthcare professional. [address] any warranty or liability for your use of this information. BUN 04/27/2021 30*   • Creatinine 04/27/2021 1.14    • Glomerular Filtration Ra* 04/27/2021 68*   • BUN/ Creatinine Ratio 04/27/2021 26*   • Calcium 04/27/2021 8.9    • Bilirubin, Total 04/27/2021 0.5    • GOT/AST 04/27/2021 26    • GPT/ALT 04/27/2021 46    • Alkaline Phosphatase 04/27/2021 71    • Albumin 04/27/2021 4.1    • Protein, Total 04/27/2021 6.7    • Globulin 04/27/2021 2.6    • A/G Ratio 04/27/2021 1.6    • LD, Total 04/27/2021 193    • WBC 04/27/2021 3.6*   • RBC 04/27/2021 4.60    • HGB 04/27/2021 15.9    • HCT 04/27/2021 44.7    • MCV 04/27/2021 97.2    • MCH 04/27/2021 34.6*   • MCHC 04/27/2021 35.6    • RDW-CV 04/27/2021 12.5    • RDW-SD 04/27/2021 44.5    • PLT 04/27/2021 158    • NRBC 04/27/2021 0    • Neutrophil, Percent 04/27/2021 60    • Lymphocytes, Percent 04/27/2021 19    • Mono, Percent 04/27/2021 15    • Eosinophils, Percent 04/27/2021 5    • Basophils, Percent 04/27/2021 1    • Immature Granulocytes 04/27/2021 0    • Absolute Neutrophils 04/27/2021 2.1    • Absolute Lymphocytes 04/27/2021 0.7*   • Absolute Monocytes 04/27/2021 0.5    • Absolute Eosinophils  04/27/2021 0.2    • Absolute Basophils 04/27/2021 0.1    • Absolute Immmature Granu* 04/27/2021 0.0    • Extra Tube 04/27/2021 Hold for Add Ons        CLINICAL IMPRESSION AND PLAN:  1. Multiple myeloma.  Reviewed with patient results of plasma cell profile.  Doing well.  Continue treatment with daratumumab every 4 weeks.  He will see Mrs. Elvira King in 7 weeks.  We discussed possible treatment options following progression on current regimen of daratumumab.  He is likely a candidate for investigational treatment, transplant, CAR-T therapy.  2. Hypogammaglobulinemia.  Monitor for recurrent infections.  Consider replacement therapy as clinically indicated.  3. Chronic anticoagulation.  Current [place] current regimen of rivaroxaban.  4. Continue monitor for therapy related toxicities.

## 2021-06-03 NOTE — CONSULTS
[facility]  Pulmonary, Critical Care, and Sleep Medicine    Par fu     Name: Kaur Bernal MRN: [id number]   : 1932 Hospital: [address] [facility]   Date: 2020        IMPRESSION:   · Acute hypoxic resp failure-  NOW ON RA . sats recorded at 95 %   · pulm congestion and  Mild bronchospasm- better on exam today   · Small bilateral pleural effusions with bibasilar atx left greater than rt- better on cxr yesterday   · Hypercalcemia  ·  newly discovered retroperitoneal adenopathy - in pt with hx of   b - cell lymphoma and of prostate cancer // s//p bone marrow bx today   · Hx of prostate cancer   · Hx  Diffuse large b cell lymphoma 2009 with - r-chop   · ? Mild dementia  · cory _ ? Volume depletion - improved with hydration   · Recent frequent falls   ·  decreased po intake per pt   · Lll asdz _ may have component of pna - ? Aspiration   · Dysphagia - per family member has had some trouble swallowing for years - speech is following   · abnl lytes       RECOMMENDATIONS:   · Discussed with attending - he is to be dc today  ·  agree with ab on dc  To complete ab choice   · dvt prophylaxis  · Hypercalcemia - as per heme / onc - fu with them post bone marrow   · Par will sign off at  This time . Please call again if needed. · Correction of lytes as per attending or renal      Subjective: This patient has been seen and evaluated at the request of Dr. Hans Koch  for resp failure . Patient is a 80 y.o. male  Who was admitted with increased weakness and falls . He was noted on admit to have cory thought to be related ot volume depletion. He was hydrated and overnight went into resp distress prompting bipap support and diuretic. He is better today  And is on 2 liters nc. He denies hx of tobacco use or of pulm dz. Currently no cp , pleurisy . Admitted to dizziness and weakness. Discussed with rn, dtr, and admitting team and speech . - no complaints today .  Says he feels Pt called in for a medication refill ok .    11/27 - on ra . Cc is that yang leaks. He is  To be dc today . Discussed with attending . Past Medical History:   Diagnosis Date    CRI (chronic renal insufficiency) 12/13/2012    Gout 1/4/2011    Prostate CA ([address] 1/4/2011    ux of b  Cell lymphoma - s/p tx   Past Surgical History:   Procedure Laterality Date    ENDOSCOPY, COLON, DIAGNOSTIC  2003    neg    HC BONE MARROW BIOPSY      HX PROSTATECTOMY        Prior to Admission medications    Medication Sig Start Date End Date Taking? Authorizing Provider   allopurinoL (ZYLOPRIM) 300 mg tablet Take 150 mg by mouth daily. Yes Provider, Historical   vitamin e (E GEMS) 100 unit capsule Take 100 Units by mouth daily. Yes Provider, Historical   multivitamin (ONE A DAY) tablet Take 1 Tab by mouth daily. Yes Provider, Historical   ketoconazole (NIZORAL) 2 % topical cream Apply  to affected area two (2) times a day for 28 days. 11/11/20 12/9/20 Yes Gunjan Abarca MD   atenoloL (TENORMIN) 50 mg tablet Take 1 Tab by mouth daily. 9/9/20  Yes Lucas Quiroz MD     Allergies   Allergen Reactions    Pcn [Penicillins] Swelling      Social History     Tobacco Use    Smoking status: Never Smoker    Smokeless tobacco: Never Used   Substance Use Topics    Alcohol use: No      No family history on file.    - reportedly  Was living alone     Current Facility-Administered Medications   Medication Dose Route Frequency    allopurinoL (ZYLOPRIM) tablet 150 mg  150 mg Oral DAILY    heparin (porcine) injection 5,000 Units  5,000 Units SubCUTAneous Q8H    atenoloL (TENORMIN) tablet 50 mg  50 mg Oral DAILY    albuterol-ipratropium (DUO-NEB) 2.5 MG-0.5 MG/3 ML  3 mL Nebulization Q6H RT    budesonide (PULMICORT) 500 mcg/2 ml nebulizer suspension  500 mcg Nebulization BID RT    metroNIDAZOLE (FLAGYL) IVPB premix 500 mg  500 mg IntraVENous Q12H    levoFLOXacin (LEVAQUIN) 500 mg in D5W IVPB  500 mg IntraVENous Q48H    lidocaine 4 % patch 1 Patch  1 Patch TransDERmal Q24H    docusate sodium (COLACE) capsule 100 mg  100 mg Oral BID    sodium chloride (NS) flush 5-40 mL  5-40 mL IntraVENous Q8H       Review of Systems:  A comprehensive review of systems was negative except for that written in the HPI. He admits to trouble breathing last pm. - ok now    he reports trouble  Swallowing large pills --  Vit c .    Objective:   Vital Signs:    Visit Vitals  BP (!) 153/71   Pulse 95   Temp 98 °F (36.7 °C)   Resp 22   Ht 5' 3\" (1.6 m)   Wt 64.9 kg (143 lb)   SpO2 95%   BMI 25.33 kg/m²       O2 Device: Room air   O2 Flow Rate (L/min): 2 l/min   Temp (24hrs), Av.1 °F (36.7 °C), Min:97.9 °F (36.6 °C), Max:98.6 °F (37 °C)       Intake/Output:   Last shift:       07 - 1900  In: -   Out: 815 [Urine:815]  Last 3 shifts: 1901 -  0700  In: 9000 [P.O.:840; I.V.:450]  Out: 1595 [Urine:1595]    Intake/Output Summary (Last 24 hours) at 2020 1442  Last data filed at 2020 1222  Gross per 24 hour   Intake 930 ml   Output 1615 ml   Net -685 ml      Physical Exam:   General:  Alert, cooperative, no distress, appears stated age. Head:  Normocephalic, without obvious abnormality, atraumatic. Eyes:  Conjunctivae/corneas clear. PERRL, EOMs intact. Nose: Nares normal. Septum midline. Mucosa normal. No drainage or sinus tenderness. Throat: Lips, mucosa, and tongue normal. Dry mouth and poor dentition    Neck: Supple, symmetrical, trachea midline, no adenopathy, thyroid: no enlargment/tenderness/nodules, no carotid bruit and no JVD. Back:   Symmetric, no curvature. ROM normal.   Lungs:   Clear ant/lat and no distress    Chest wall:  No tenderness or deformity. Heart:  Regular rate and rhythm, S1, S2 normal, no murmur, click, rub or gallop. Abdomen:   Soft, non-tender. Bowel sounds normal. No masses,  No organomegaly. Extremities: Extremities normal, atraumatic, no cyanosis or edema. Pulses: 2+ and symmetric all extremities.    Skin: Skin color, texture, turgor normal. No rashes or lesions   Lymph nodes: Cervical, supraclavicular, nodes normal.   Neurologic: Grossly nonfocal- alert and talkative -      Data review:     Recent Results (from the past 24 hour(s))   METABOLIC PANEL, COMPREHENSIVE    Collection Time: 11/26/20  3:00 PM   Result Value Ref Range    Sodium 140 136 - 145 mmol/L    Potassium 3.0 (L) 3.5 - 5.1 mmol/L    Chloride 105 97 - 108 mmol/L    CO2 28 21 - 32 mmol/L    Anion gap 7 5 - 15 mmol/L    Glucose 121 (H) 65 - 100 mg/dL    BUN 31 (H) 6 - 20 MG/DL    Creatinine 1.95 (H) 0.70 - 1.30 MG/DL    BUN/Creatinine ratio 16 12 - 20      GFR est AA 40 (L) >60 ml/min/1.73m2    GFR est non-AA 33 (L) >60 ml/min/1.73m2    Calcium 10.1 8.5 - 10.1 MG/DL    Bilirubin, total 1.0 0.2 - 1.0 MG/DL    ALT (SGPT) 14 12 - 78 U/L    AST (SGOT) 33 15 - 37 U/L    Alk. phosphatase 56 45 - 117 U/L    Protein, total 4.6 (L) 6.4 - 8.2 g/dL    Albumin 2.4 (L) 3.5 - 5.0 g/dL    Globulin 2.2 2.0 - 4.0 g/dL    A-G Ratio 1.1 1.1 - 2.2     METABOLIC PANEL, COMPREHENSIVE    Collection Time: 11/27/20  4:06 AM   Result Value Ref Range    Sodium 138 136 - 145 mmol/L    Potassium 3.4 (L) 3.5 - 5.1 mmol/L    Chloride 103 97 - 108 mmol/L    CO2 28 21 - 32 mmol/L    Anion gap 7 5 - 15 mmol/L    Glucose 133 (H) 65 - 100 mg/dL    BUN 32 (H) 6 - 20 MG/DL    Creatinine 1.97 (H) 0.70 - 1.30 MG/DL    BUN/Creatinine ratio 16 12 - 20      GFR est AA 39 (L) >60 ml/min/1.73m2    GFR est non-AA 32 (L) >60 ml/min/1.73m2    Calcium 10.1 8.5 - 10.1 MG/DL    Bilirubin, total 0.8 0.2 - 1.0 MG/DL    ALT (SGPT) 15 12 - 78 U/L    AST (SGOT) 36 15 - 37 U/L    Alk.  phosphatase 64 45 - 117 U/L    Protein, total 5.3 (L) 6.4 - 8.2 g/dL    Albumin 2.4 (L) 3.5 - 5.0 g/dL    Globulin 2.9 2.0 - 4.0 g/dL    A-G Ratio 0.8 (L) 1.1 - 2.2     CBC W/O DIFF    Collection Time: 11/27/20  4:06 AM   Result Value Ref Range    WBC 10.5 4.1 - 11.1 K/uL    RBC 3.18 (L) 4.10 - 5.70 M/uL    HGB 9.9 (L) 12.1 - 17.0 g/dL HCT 29.1 (L) 36.6 - 50.3 %    MCV 91.5 80.0 - 99.0 FL    MCH 31.1 26.0 - 34.0 PG    MCHC 34.0 30.0 - 36.5 g/dL    RDW 15.5 (H) 11.5 - 14.5 %    PLATELET 572 (L) 652 - 400 K/uL    MPV 11.7 8.9 - 12.9 FL    NRBC 0.0 0  WBC    ABSOLUTE NRBC 0.00 0.00 - 0.01 K/uL   PHOSPHORUS    Collection Time: 11/27/20  4:06 AM   Result Value Ref Range    Phosphorus 1.4 (L) 2.6 - 4.7 MG/DL   VANCOMYCIN, RANDOM    Collection Time: 11/27/20  4:06 AM   Result Value Ref Range    Vancomycin, random 27.1 UG/ML   MAGNESIUM    Collection Time: 11/27/20  4:06 AM   Result Value Ref Range    Magnesium 2.2 1.6 - 2.4 mg/dL   CBC WITH AUTOMATED DIFF    Collection Time: 11/27/20  4:06 AM   Result Value Ref Range    WBC 10.5 4.1 - 11.1 K/uL    RBC 3.17 (L) 4.10 - 5.70 M/uL    HGB 9.9 (L) 12.1 - 17.0 g/dL    HCT 29.2 (L) 36.6 - 50.3 %    MCV 92.1 80.0 - 99.0 FL    MCH 31.2 26.0 - 34.0 PG    MCHC 33.9 30.0 - 36.5 g/dL    RDW 15.7 (H) 11.5 - 14.5 %    PLATELET 685 (L) 345 - 400 K/uL    MPV 12.3 8.9 - 12.9 FL    NRBC 0.0 0  WBC    ABSOLUTE NRBC 0.00 0.00 - 0.01 K/uL    NEUTROPHILS 77 (H) 32 - 75 %    LYMPHOCYTES 7 (L) 12 - 49 %    MONOCYTES 13 5 - 13 %    EOSINOPHILS 2 0 - 7 %    BASOPHILS 0 0 - 1 %    IMMATURE GRANULOCYTES 1 (H) 0.0 - 0.5 %    ABS. NEUTROPHILS 8.1 (H) 1.8 - 8.0 K/UL    ABS. LYMPHOCYTES 0.7 (L) 0.8 - 3.5 K/UL    ABS. MONOCYTES 1.4 (H) 0.0 - 1.0 K/UL    ABS. EOSINOPHILS 0.2 0.0 - 0.4 K/UL    ABS. BASOPHILS 0.0 0.0 - 0.1 K/UL    ABS. IMM. GRANS. 0.1 (H) 0.00 - 0.04 K/UL    DF SMEAR SCANNED      RBC COMMENTS OVALOCYTES  PRESENT           Imaging:  I have personally reviewed the patients radiographs and have reviewed the reports:   report of am cxr seen - not able to view films as pacs is down.     cxr seen 11/26- better      Fiorella Unger MD

## 2021-06-29 NOTE — PROGRESS NOTES
PHYSICAL THERAPY TREATMENT  Patient: Ankur Guillory (9 y.o. male)  Date: 2020  Diagnosis: Severe sepsis [facility] [A41.9, R65.20]   <principal problem not specified>       Precautions:    Chart, physical therapy assessment, plan of care and goals were reviewed. ASSESSMENT  Patient continues with skilled PT services and is slowly progressing towards goals. Pt declining OOB activity at this time, shaking head no to therapist when asked if he would sit EOB. Patient agreeable to semi supine therex, see details below. Patient left semi supine in bed with all needs met and RN in room. Other factors to consider for discharge: time since onset         PLAN :  Patient continues to benefit from skilled intervention to address the above impairments. Continue treatment per established plan of care. to address goals. Recommendation for discharge: (in order for the patient to meet his/her long term goals)  Therapy up to 5 days/week in SNF setting    This discharge recommendation:  Has been made in collaboration with the attending provider and/or case management    IF patient discharges home will need the following DME: to be determined (TBD)       SUBJECTIVE:   Patient only shook head yes to participation with therapy    OBJECTIVE DATA SUMMARY:   Critical Behavior:  Neurologic State: Alert  Orientation Level: Disoriented to situation  Cognition: Follows commands     Functional Mobility Training:  Bed Mobility:  Declined OOB activity      Therapeutic Exercises:   1 x 20 AP  1 x 20 Hip ab/ad  2 x 10 SLR  2  x 10 Heel slides    Pain Ratin/10    Activity Tolerance:   Fair  Please refer to the flowsheet for vital signs taken during this treatment. After treatment patient left in no apparent distress:   Supine in bed and Call bell within reach    COMMUNICATION/COLLABORATION:   The patients plan of care was discussed with: Registered nurse.          Problem: Mobility Impaired (Adult and Pediatric)  Goal: *Acute Goals and Plan of Care (Insert Text)  Description: Pt will be I with LE HEP in 7 days. Pt will perform bed mobility with Min A x1 in 7 days. Pt will perform transfers with Min A x1 in 7 days.      Outcome: Not Progressing Towards Goal     Ace Watkins PTA   Time Calculation: 11 mins [Time Spent: ___ minutes] : I have spent [unfilled] minutes of time on the encounter.

## 2022-01-01 NOTE — PROGRESS NOTES
Pulmonary Progress Note      NAME: Carl Alva   :  1932  MRM:  [id number]    Date/Time: 2021  10:15 AM         Subjective:   I was asked by Al Garcia MD to see Carl Alva  a 80 y.o.    male in consultation      Excerpts from admission 2021 or consult notes as follows:   79-year-old male came in because of shortness of breath dyspnea generalized weakness initial Covid rapid test was negative he has significant past medical history of chronic kidney disease gout hypertension dysphagia had a PEG tube in place history of lymphoma chest x-ray done which shows left lower lobe infiltrate followed by CAT scan which shows bilateral pleural effusion and atelectasis but now his COVID-19 came back positive and also MRSA and he is disheveled unable to get much history out of the patient on oxygen via nasal cannula so pulmonary consult was called for further evaluation.             Allergies   Allergen Reactions    Pcn [Penicillins] Swelling         MAR reviewed and pertinent medications noted or modified as needed          Current Facility-Administered Medications   Medication    fentaNYL citrate (PF) injection    midazolam (PF) (VERSED) injection    dextrose 5% infusion    albuterol (PROVENTIL HFA, VENTOLIN HFA, PROAIR HFA) inhaler 2 Puff    LORazepam (ATIVAN) tablet 1 mg    furosemide (LASIX) injection 40 mg    sertraline (ZOLOFT) tablet 100 mg    dextromethorphan (DELSYM) 30 mg/5 mL syrup 30 mg    guaiFENesin-dextromethorphan (TUSSI-ORGANIDIN DM)  mg/5 mL oral solution 10 mL    metoprolol tartrate (LOPRESSOR) tablet 25 mg    ascorbic acid (vitamin C) (VITAMIN C) tablet 500 mg    ferrous sulfate tablet 325 mg    [Held by provider] heparin (porcine) injection 5,000 Units    0.9% sodium chloride infusion 250 mL    dexamethasone (DECADRON) 4 mg/mL injection 4 mg    VANCOMYCIN INFORMATION NOTE    calcium acetate(phosphat bind) (PHOSLO) capsule 1,334 mg    sodium chloride (NS) flush 5-10 mL    levoFLOXacin (LEVAQUIN) 750 mg in D5W IVPB    allopurinoL (ZYLOPRIM) tablet 50 mg    famotidine (PEPCID) tablet 20 mg    insulin lispro (HUMALOG) injection    glucose chewable tablet 16 g    glucagon (GLUCAGEN) injection 1 mg    dextrose (D50W) injection syrg 12.5-25 g    ondansetron (ZOFRAN) injection 4 mg    acetaminophen (TYLENOL) tablet 650 mg    acetaminophen (TYLENOL) suppository 650 mg    hydrOXYzine pamoate (VISTARIL) capsule 25 mg      Patient PCP: Sergey Chapman MD  PMH:  has a past medical history of CRI (chronic renal insufficiency) (12/13/2012), Gout (1/4/2011), Hypertension, Lymphoma ([facility] and Prostate CA ([facility] (1/4/2011). PSH:   has a past surgical history that includes hx prostatectomy; hc bone marrow biopsy; endoscopy, colon, diagnostic (2003); and ir thoracentesis cath w image (12/15/2020). FHX: family history includes Hypertension in his mother. SHX:  reports that he has never smoked. He has never used smokeless tobacco. He reports previous drug use. He reports that he does not drink alcohol. ROS:     Unable to obtain           Objective:       Vitals:      Last 24hrs VS reviewed since prior progress note. Most recent are:    Visit Vitals  /63 (BP Patient Position: At rest;Head of bed elevated (Comment degrees))   Pulse 82   Temp 97.5 °F (36.4 °C)   Resp 20   Ht 5' 6\" (1.676 m)   Wt 50.3 kg (111 lb)   SpO2 92%   BMI 17.92 kg/m²     SpO2 Readings from Last 6 Encounters:   01/13/21 92%   12/28/20 98%   12/21/20 96%   12/06/20 93%   11/27/20 92%   11/11/20 97%    O2 Flow Rate (L/min): 6 l/min       Intake/Output Summary (Last 24 hours) at 1/14/2021 1030  Last data filed at 1/14/2021 0412  Gross per 24 hour   Intake 1400.84 ml   Output    Net 1400.84 ml          Exam:     Physical Exam   Constitutional: He appears distressed. HENT:   Head: Normocephalic and atraumatic. Eyes: Pupils are equal, round, and reactive to light.  Conjunctivae and EOM are normal.   Neck: Normal range of motion. Neck supple. Cardiovascular: Normal rate and regular rhythm. Pulmonary/Chest: He is in respiratory distress. He has rales. Abdominal: Soft. Musculoskeletal: Normal range of motion.    Neurological:   Patient is lethargic drowsy     Lab Data Reviewed: (see below)      Medications:  Current Facility-Administered Medications   Medication Dose Route Frequency    ivermectin (STROMECTOL) tablet 6 mg  6 mg Oral ONCE    fentaNYL citrate (PF) injection    PRN    midazolam (PF) (VERSED) injection    PRN    dextrose 5% infusion  50 mL/hr IntraVENous CONTINUOUS    albuterol (PROVENTIL HFA, VENTOLIN HFA, PROAIR HFA) inhaler 2 Puff  2 Puff Inhalation Q4H PRN    LORazepam (ATIVAN) tablet 1 mg  1 mg Oral Q4H PRN    sertraline (ZOLOFT) tablet 100 mg  100 mg Per G Tube QPM    dextromethorphan (DELSYM) 30 mg/5 mL syrup 30 mg  30 mg Per G Tube Q12H    guaiFENesin-dextromethorphan (TUSSI-ORGANIDIN DM)  mg/5 mL oral solution 10 mL  10 mL Per G Tube Q4H PRN    metoprolol tartrate (LOPRESSOR) tablet 25 mg  25 mg Per G Tube Q12H    ascorbic acid (vitamin C) (VITAMIN C) tablet 500 mg  500 mg Per G Tube DAILY    ferrous sulfate tablet 325 mg  1 Tab Oral DAILY WITH BREAKFAST    [Held by provider] heparin (porcine) injection 5,000 Units  5,000 Units SubCUTAneous Q12H    0.9% sodium chloride infusion 250 mL  250 mL IntraVENous PRN    dexamethasone (DECADRON) 4 mg/mL injection 4 mg  4 mg IntraVENous Q6H    calcium acetate(phosphat bind) (PHOSLO) capsule 1,334 mg  2 Cap Oral TID WITH MEALS    sodium chloride (NS) flush 5-10 mL  5-10 mL IntraVENous PRN    levoFLOXacin (LEVAQUIN) 750 mg in D5W IVPB  750 mg IntraVENous Q48H    allopurinoL (ZYLOPRIM) tablet 50 mg  50 mg Oral EVERY OTHER DAY    famotidine (PEPCID) tablet 20 mg  20 mg Per G Tube DAILY    insulin lispro (HUMALOG) injection   SubCUTAneous AC&HS    glucose chewable tablet 16 g  4 Tab Oral PRN    glucagon (GLUCAGEN) injection 1 mg  1 mg IntraMUSCular PRN    dextrose (D50W) injection syrg 12.5-25 g  25-50 mL IntraVENous PRN    ondansetron (ZOFRAN) injection 4 mg  4 mg IntraVENous Q6H PRN    acetaminophen (TYLENOL) tablet 650 mg  650 mg Per G Tube Q4H PRN    acetaminophen (TYLENOL) suppository 650 mg  650 mg Rectal Q4H PRN    hydrOXYzine pamoate (VISTARIL) capsule 25 mg  25 mg Per G Tube TID PRN       ______________________________________________________________________      Lab Review:     Recent Labs     01/13/21  0511 01/11/21  1235   WBC 13.6* 14.6*   HGB 8.5* 9.7*   HCT 25.9* 29.4*    297     Recent Labs     01/13/21  0511 01/11/21  1235    140   K 3.5 4.1   CL 98 101   CO2 31 32   * 121*   BUN 98* 82*   CREA 2.89* 2.42*   CA 8.1* 10.5*     No components found for: GLPOC  No results for input(s): PH, PCO2, PO2, HCO3, FIO2 in the last 72 hours. No results for input(s): INR, INREXT, INREXT, INREXT in the last 72 hours. IMPRESSION:   1. Acute hypoxic respiratory failure  2. COVID-19 pneumonia  3. Severe sepsis  4. Dysphagia PEG tube in place with leaking around the PEG site which is fixed  5. Hypernatremia resolved  6. Bilateral pleural effusion with bilateral lower lobe atelectasis  7. Pt is requiring Drug therapy requiring intensive monitoring for toxicity  8. Pt is unstable, unpredictable needing inpatient monitoring; is acutely ill and at high risk of sudden decline and decompensation with severe consequenses and continued end organ dysfunction and failure  9. Prognosis guarded        RECOMMENDATIONS/PLAN:      1. Patient is on nasal cannula doing better   2. Patient not candidate for remdesivir due to GFR of 25. Not a candidate for Actemra due to hx lymphoma and prostate cancer. 1 dose of ivermectin given yesterday. Will continue with Decadron, vitamin C.  3. Continue with PRN albuterol inhaler. 4. Continue with levaquin and discontinue vancomycin  5.  Patient is on IV fluid D5 because of mild hypernatremia resolved  6. Hb 8.5 we will follow  7. Possible PEG tube malfunction  8. Supplemental O2 to keep sats > 93%  9. Aspiration precautions  10. Labs to follow electrolytes, renal function and and blood counts  11. Glucose monitoring and SSI  12. Bronchial hygiene with respiratory therapy techniques, bronchodilators  13.  DVT, SUP prophylaxis        There are no Wet Read(s) to document.

## 2022-03-18 PROBLEM — R65.20 SEVERE SEPSIS (HCC): Status: ACTIVE | Noted: 2020-12-01

## 2022-03-18 PROBLEM — R29.6 FREQUENT FALLS: Status: ACTIVE | Noted: 2020-11-20

## 2022-03-18 PROBLEM — J96.01 ACUTE HYPOXEMIC RESPIRATORY FAILURE (HCC): Status: ACTIVE | Noted: 2020-12-01

## 2022-03-18 PROBLEM — J18.9 PNEUMONIA: Status: ACTIVE | Noted: 2021-01-05

## 2022-03-18 PROBLEM — A41.9 SEVERE SEPSIS (HCC): Status: ACTIVE | Noted: 2020-12-01

## 2022-03-19 PROBLEM — I50.9 CHF (CONGESTIVE HEART FAILURE) (HCC): Status: ACTIVE | Noted: 2020-12-01

## 2022-03-19 PROBLEM — N17.9 ACUTE RENAL FAILURE (ARF) (HCC): Status: ACTIVE | Noted: 2020-11-20

## 2022-03-19 PROBLEM — D64.9 ANEMIA: Status: ACTIVE | Noted: 2020-11-21

## 2022-03-19 PROBLEM — Z20.822 PERSON UNDER INVESTIGATION FOR COVID-19: Status: ACTIVE | Noted: 2020-12-01

## 2022-03-19 PROBLEM — H81.10 BPPV (BENIGN PAROXYSMAL POSITIONAL VERTIGO): Status: ACTIVE | Noted: 2020-12-03

## 2022-03-19 PROBLEM — C85.90 NON-HODGKIN'S LYMPHOMA IN RELAPSE (HCC): Status: ACTIVE | Noted: 2020-11-22

## 2022-03-19 PROBLEM — M48.02 CERVICAL STENOSIS OF SPINAL CANAL: Status: ACTIVE | Noted: 2020-11-20
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
## 2022-03-19 PROBLEM — E83.52 HYPERCALCEMIA: Status: ACTIVE | Noted: 2020-11-23

## 2022-03-19 PROBLEM — E83.52 HYPERCALCEMIA OF MALIGNANCY: Status: ACTIVE | Noted: 2020-11-20

## 2022-03-19 PROBLEM — J18.9 HAP (HOSPITAL-ACQUIRED PNEUMONIA): Status: ACTIVE | Noted: 2020-11-30

## 2022-03-19 PROBLEM — R65.10 SIRS (SYSTEMIC INFLAMMATORY RESPONSE SYNDROME) (HCC): Status: ACTIVE | Noted: 2020-11-30

## 2022-03-19 PROBLEM — M48.061 LUMBAR CANAL STENOSIS: Status: ACTIVE | Noted: 2020-11-20

## 2022-03-20 PROBLEM — Z85.72 HISTORY OF B-CELL LYMPHOMA: Status: ACTIVE | Noted: 2020-11-22

## 2022-03-20 PROBLEM — R29.6 MULTIPLE FALLS: Status: ACTIVE | Noted: 2020-11-20

## 2022-03-20 PROBLEM — F11.90 OPIOID USE: Status: ACTIVE | Noted: 2020-11-21

## 2022-03-20 PROBLEM — R53.1 WEAKNESS: Status: ACTIVE | Noted: 2020-11-21

## 2023-05-12 RX ORDER — INSULIN LISPRO 100 [IU]/ML
INJECTION, SOLUTION INTRAVENOUS; SUBCUTANEOUS
COMMUNITY
Start: 2021-01-17

## 2023-05-12 RX ORDER — FAMOTIDINE 20 MG/1
TABLET, FILM COATED ORAL EVERY 24 HOURS
COMMUNITY
Start: 2020-12-21

## (undated) DEVICE — MASK 14X6.5MM O2 PVC ADLT ADPT -- 2 ENTRY PORT ELAS STRP NSE CL

## (undated) DEVICE — KIT GASTMY PERC PEG PULL 20FR -- ENDOVIVE BX/2

## (undated) DEVICE — CANNULA NSL O2 AD 7 FT END-TIDAL CARBON DIOX VENTFLO

## (undated) DEVICE — CONMED SCOPE SAVER BITE BLOCK, 20X27 MM: Brand: SCOPE SAVER

## (undated) DEVICE — BLOCK BITE STD GRN ORAL AD W/O STRP SLD PLAS DISP BITEGARD

## (undated) DEVICE — THE ENDO CARRY-ON PROCEDURE KIT CONTAINS ALL OF THE SUPPLIES AND INFECTION PREVENTION PRODUCTS NEEDED FOR ENDOSCOPIC PROCEDURES: Brand: ENDO CARRY-ON PROCEDURE KIT